# Patient Record
Sex: MALE | Race: WHITE | NOT HISPANIC OR LATINO | Employment: OTHER | URBAN - NONMETROPOLITAN AREA
[De-identification: names, ages, dates, MRNs, and addresses within clinical notes are randomized per-mention and may not be internally consistent; named-entity substitution may affect disease eponyms.]

---

## 2017-01-05 ENCOUNTER — PROCEDURE VISIT (OUTPATIENT)
Dept: UROLOGY | Facility: CLINIC | Age: 77
End: 2017-01-05

## 2017-01-05 VITALS
WEIGHT: 217 LBS | TEMPERATURE: 97.5 F | HEIGHT: 71 IN | BODY MASS INDEX: 30.38 KG/M2 | DIASTOLIC BLOOD PRESSURE: 72 MMHG | SYSTOLIC BLOOD PRESSURE: 138 MMHG

## 2017-01-05 DIAGNOSIS — N40.0 BENIGN NON-NODULAR PROSTATIC HYPERPLASIA WITHOUT LOWER URINARY TRACT SYMPTOMS: Primary | ICD-10-CM

## 2017-01-05 LAB
BILIRUB BLD-MCNC: NEGATIVE MG/DL
CLARITY, POC: CLEAR
COLOR UR: YELLOW
GLUCOSE UR STRIP-MCNC: NEGATIVE MG/DL
KETONES UR QL: ABNORMAL
LEUKOCYTE EST, POC: NEGATIVE
NITRITE UR-MCNC: NEGATIVE MG/ML
PH UR: 7 [PH] (ref 5–8)
PROT UR STRIP-MCNC: NEGATIVE MG/DL
RBC # UR STRIP: NEGATIVE /UL
SP GR UR: 1.02 (ref 1–1.03)
UROBILINOGEN UR QL: NORMAL

## 2017-01-05 PROCEDURE — 99213 OFFICE O/P EST LOW 20 MIN: CPT | Performed by: UROLOGY

## 2017-01-05 PROCEDURE — 81003 URINALYSIS AUTO W/O SCOPE: CPT | Performed by: UROLOGY

## 2017-01-05 NOTE — MR AVS SNAPSHOT
Mina Ratliff   1/5/2017 9:20 AM   Procedure visit    Dept Phone:  [phone]   Encounter #:  [id number]    Provider:  Joe Guerrero MD   Department:  [facility]OLOG[facility]                Your Full Care Plan              Your Updated Medication List          This list is accurate as of: 1/5/17 10:35 AM.  Always use your most recent med list.                aspirin  MG tablet       isosorbide mononitrate 30 MG 24 hr tablet   Commonly known as:  IMDUR   Take 1 tablet by mouth Daily.       metFORMIN 1000 MG tablet   Commonly known as:  GLUCOPHAGE       MULTIVITAMIN ADULT PO       omeprazole 20 MG capsule   Commonly known as:  priLOSEC       primidone 50 MG tablet   Commonly known as:  MYSOLINE       propranolol 40 MG tablet   Commonly known as:  INDERAL       simvastatin 40 MG tablet   Commonly known as:  ZOCOR       tamsulosin 0.4 MG capsule 24 hr capsule   Commonly known as:  FLOMAX       tiotropium 18 MCG per inhalation capsule   Commonly known as:  SPIRIVA       triamcinolone 0.1 % cream   Commonly known as:  KENALOG               We Performed the Following     POC Urinalysis Dipstick, Automated       You Were Diagnosed With        Codes Comments    Benign non-nodular prostatic hyperplasia without lower urinary tract symptoms    -  Primary ICD-10-CM: N40.0  ICD-9-CM: 600.90       Medications to be Given to You by a Medical Professional     Due       Frequency    (none) lidocaine (XYLOCAINE) 2 % jelly  As Needed      Instructions     None    Patient Instructions History      Upcoming Appointments     Visit Type Date Time Department    IN OFFICE PROCEDURE 1/5/2017  9:20 AM [facility]    FOLLOW UP 3/6/2017  2:30 PM [facility]      MyChart Signup     [facility] [employer] allows you to send messages to your doctor, view your test results, renew your prescriptions, schedule appointments, and more. To sign up, go to [employer] and click  "on the Sign Up Now link in the New User? box. Enter your [employer] Activation Code exactly as it appears below along with the last four digits of your Social Security Number and your Date of Birth () to complete the sign-up process. If you do not sign up before the expiration date, you must request a new code.    [employer] Activation Code: [email or web address]  Expires: 2017 10:34 AM    If you have questions, you can email [email or web address] or call [phone] to talk to our [employer] staff. Remember, [employer] is NOT to be used for urgent needs. For medical emergencies, dial 911.               Other Info from Your Visit           Your Appointments     Mar 06, 2017  2:30 PM CST   Follow Up with Ciro Helm MD   [facility] (--)    [address] [place] [address] [address]  [facility] [address] [phone]           Arrive 15 minutes prior to appointment.              Allergies     No Known Allergies      Reason for Visit     Benign Prostatic Hypertrophy           Vital Signs     Blood Pressure Temperature Height Weight Body Mass Index Smoking Status    138/72 97.5 °F (36.4 °C) 71\" (180.3 cm) 217 lb (98.4 kg) 30.27 kg/m2 Former Smoker      Problems and Diagnoses Noted     Benign non-nodular prostatic hyperplasia without lower urinary tract symptoms    -  Primary      Results     POC Urinalysis Dipstick, Automated      Component Value Standard Range & Units    Color Yellow Yellow, Straw, Dark Yellow, Ruth    Clarity, UA Clear Clear    Glucose, UA Negative Negative, 1000 mg/dL (3+) mg/dL    Bilirubin Negative Negative    Ketones, UA Trace Negative    Specific Gravity  1.020 1.005 - 1.030    Blood, UA Negative Negative    pH, Urine 7.0 5.0 - 8.0    Protein, POC Negative Negative mg/dL    Urobilinogen, UA Normal Normal    Leukocytes Negative Negative    Nitrite, UA Negative Negative                    "

## 2017-01-05 NOTE — LETTER
January 8, 2017     Shabana Franks MD  [address]  [place] [address]    Patient: Mina Ratliff   YOB: 1940   Date of Visit: 1/5/2017       Dear Dr. Tiny MD:    Thank you for referring Mina Ratliff to me for evaluation. Below are the relevant portions of my assessment and plan of care.    If you have questions, please do not hesitate to call me. I look forward to following Mina along with you.         Sincerely,        Joe Guerrero MD        CC: No Recipients  Joe Guerrero MD  1/8/2017 12:13 PM  Sign at close encounter    Mr. Ratliff is 76 y.o. male    CHIEF COMPLAINT: Here to follow prostate    HPI  Benign Prostatic hyperplasia  Patient was initially diagnosed with benign prostatic hyperplasia approximately5 years ago. This was identified in the context of worsening voiding symptoms. Severity of the diease would be moderate. This has been managed with Alpha blockers and 5 alpha reductase inhibitors. Alpha blockers and 5 alpha reductase inhibitors has/have not improved the symptoms to an acceptable level to the patient.  His disease has not been complicated by gross hematuria, urinary infection and bladder stones. His most bothersome symptom(s) is/are nocturia x 2, urinary frequency, weak stream.       The following portions of the patient's history were reviewed and updated as appropriate: allergies, current medications, past family history, past medical history, past social history, past surgical history and problem list.    Review of Systems   Constitutional: Negative for chills and fever.   Gastrointestinal: Negative for abdominal pain, anal bleeding and blood in stool.   Genitourinary: Negative for flank pain and hematuria.         Current Outpatient Prescriptions:   •  aspirin  MG tablet, Take 325 mg by mouth Every 6 (Six) Hours As Needed., Disp: , Rfl:   •  isosorbide mononitrate (IMDUR) 30 MG 24 hr tablet, Take 1 tablet by mouth Daily., Disp: 90 tablet,  "Rfl: 3  •  metFORMIN (GLUCOPHAGE) 1000 MG tablet, Take 1,000 mg by mouth 2 (Two) Times a Day With Meals., Disp: , Rfl:   •  Multiple Vitamins-Minerals (MULTIVITAMIN ADULT PO), Take  by mouth., Disp: , Rfl:   •  omeprazole (priLOSEC) 20 MG capsule, Take 20 mg by mouth Daily., Disp: , Rfl:   •  primidone (MYSOLINE) 50 MG tablet, Take 50 mg by mouth 4 (Four) Times a Day., Disp: , Rfl:   •  propranolol (INDERAL) 40 MG tablet, Take 40 mg by mouth 2 (Two) Times a Day., Disp: , Rfl:   •  simvastatin (ZOCOR) 40 MG tablet, Take 40 mg by mouth Every Night., Disp: , Rfl:   •  tamsulosin (FLOMAX) 0.4 MG capsule 24 hr capsule, Take 1 capsule by mouth Every Night., Disp: , Rfl:   •  tiotropium (SPIRIVA) 18 MCG per inhalation capsule, Place 1 capsule into inhaler and inhale Daily., Disp: , Rfl:   •  triamcinolone (KENALOG) 0.1 % cream, Apply topically 2 times daily. To lower leg dry skin, Disp: , Rfl:     Current Facility-Administered Medications:   •  lidocaine (XYLOCAINE) 2 % jelly, , Topical, PRN, Joe Guerrero MD    Past Medical History   Diagnosis Date   • Asthma    • CAD (coronary artery disease)    • Chest pain    • COPD (chronic obstructive pulmonary disease)    • Diabetes mellitus    • GERD (gastroesophageal reflux disease)    • HTN (hypertension)    • Hyperlipidemia    • Malaise and fatigue    • Old myocardial infarction        Past Surgical History   Procedure Laterality Date   • Cardiac catheterization         Social History     Social History   • Marital status:      Spouse name: N/A   • Number of children: N/A   • Years of education: N/A     Social History Main Topics   • Smoking status: Former Smoker     Types: Cigarettes   • Smokeless tobacco: Never Used   • Alcohol use No   • Drug use: No   • Sexual activity: Defer     Other Topics Concern   • Not on file     Social History Narrative       No family history on file.    Visit Vitals   • /72   • Temp 97.5 °F (36.4 °C)   • Ht 71\" (180.3 cm)   • Wt 217 " lb (98.4 kg)   • BMI 30.27 kg/m2       Physical Exam   Constitutional: He is oriented to person, place, and time. He appears well-developed and well-nourished.   Pulmonary/Chest: Effort normal.   Abdominal: Soft. He exhibits no distension and no mass. There is no tenderness. There is no rebound and no guarding. No hernia.   Genitourinary: Penis normal. Rectal exam shows no mass, no tenderness and anal tone normal. Enlarged: for the age of the patient. Right testis shows no mass, no swelling and no tenderness. Left testis shows no mass, no swelling and no tenderness. No hypospadias. No discharge found.   Genitourinary Comments:  The urethral meatus normal in position without evidence of stricture. Epididymis without mass or tenderness. Vas Deferens is palpably normal.Anus and perineum without mass or tenderness. The prostate is approximately 35 ml. It is Symmetric, with a Soft consistency. There are no nodules present. . The seminal vesicles are Palpably normal in size and without mass.     Neurological: He is alert and oriented to person, place, and time.   Vitals reviewed.        Results for orders placed or performed in visit on 01/05/17   POC Urinalysis Dipstick, Automated   Result Value Ref Range    Color Yellow Yellow, Straw, Dark Yellow, Ruth    Clarity, UA Clear Clear    Glucose, UA Negative Negative, 1000 mg/dL (3+) mg/dL    Bilirubin Negative Negative    Ketones, UA Trace (A) Negative    Specific Gravity  1.020 1.005 - 1.030    Blood, UA Negative Negative    pH, Urine 7.0 5.0 - 8.0    Protein, POC Negative Negative mg/dL    Urobilinogen, UA Normal Normal    Leukocytes Negative Negative    Nitrite, UA Negative Negative         Assessment and Plan  Mina was seen today for benign prostatic hypertrophy.    Diagnoses and all orders for this visit:    Benign non-nodular prostatic hyperplasia without lower urinary tract symptoms  -     POC Urinalysis Dipstick, Automated  -     lidocaine (XYLOCAINE) 2 % jelly;  Apply  topically As Needed for mild pain (1-3).    Continue tamsulosin and finasteride.  Pros and cons of GLAP were discussed.  When I last saw him he was considering green light laser of the prostate was going to discuss this with his cardiologist.  He thinks his symptoms have improved enough that he does not want to pursue surgical therapy at this time.      Joe Guerrero MD  01/08/17  12:12 PM    EMR Dragon/Transcription disclaimer:  Much of this encounter note is an electronic transcription/translation of spoken language to printed text. The electronic translation of spoken language may permit erroneous, or at times, nonsensical words or phrases to be inadvertently transcribed; although I have reviewed the note for such errors, some may still exist.       Cc:

## 2017-01-05 NOTE — PROGRESS NOTES
Mr. Ratliff is 76 y.o. male    CHIEF COMPLAINT: Here to follow prostate    HPI  Benign Prostatic hyperplasia  Patient was initially diagnosed with benign prostatic hyperplasia approximately5 years ago. This was identified in the context of worsening voiding symptoms. Severity of the diease would be moderate. This has been managed with Alpha blockers and 5 alpha reductase inhibitors. Alpha blockers and 5 alpha reductase inhibitors has/have not improved the symptoms to an acceptable level to the patient.  His disease has not been complicated by gross hematuria, urinary infection and bladder stones. His most bothersome symptom(s) is/are nocturia x 2, urinary frequency, weak stream.       The following portions of the patient's history were reviewed and updated as appropriate: allergies, current medications, past family history, past medical history, past social history, past surgical history and problem list.    Review of Systems   Constitutional: Negative for chills and fever.   Gastrointestinal: Negative for abdominal pain, anal bleeding and blood in stool.   Genitourinary: Negative for flank pain and hematuria.         Current Outpatient Prescriptions:   •  aspirin  MG tablet, Take 325 mg by mouth Every 6 (Six) Hours As Needed., Disp: , Rfl:   •  isosorbide mononitrate (IMDUR) 30 MG 24 hr tablet, Take 1 tablet by mouth Daily., Disp: 90 tablet, Rfl: 3  •  metFORMIN (GLUCOPHAGE) 1000 MG tablet, Take 1,000 mg by mouth 2 (Two) Times a Day With Meals., Disp: , Rfl:   •  Multiple Vitamins-Minerals (MULTIVITAMIN ADULT PO), Take  by mouth., Disp: , Rfl:   •  omeprazole (priLOSEC) 20 MG capsule, Take 20 mg by mouth Daily., Disp: , Rfl:   •  primidone (MYSOLINE) 50 MG tablet, Take 50 mg by mouth 4 (Four) Times a Day., Disp: , Rfl:   •  propranolol (INDERAL) 40 MG tablet, Take 40 mg by mouth 2 (Two) Times a Day., Disp: , Rfl:   •  simvastatin (ZOCOR) 40 MG tablet, Take 40 mg by mouth Every Night., Disp: , Rfl:   •   "tamsulosin (FLOMAX) 0.4 MG capsule 24 hr capsule, Take 1 capsule by mouth Every Night., Disp: , Rfl:   •  tiotropium (SPIRIVA) 18 MCG per inhalation capsule, Place 1 capsule into inhaler and inhale Daily., Disp: , Rfl:   •  triamcinolone (KENALOG) 0.1 % cream, Apply topically 2 times daily. To lower leg dry skin, Disp: , Rfl:     Current Facility-Administered Medications:   •  lidocaine (XYLOCAINE) 2 % jelly, , Topical, PRN, Joe Guerrero MD    Past Medical History   Diagnosis Date   • Asthma    • CAD (coronary artery disease)    • Chest pain    • COPD (chronic obstructive pulmonary disease)    • Diabetes mellitus    • GERD (gastroesophageal reflux disease)    • HTN (hypertension)    • Hyperlipidemia    • Malaise and fatigue    • Old myocardial infarction        Past Surgical History   Procedure Laterality Date   • Cardiac catheterization         Social History     Social History   • Marital status:      Spouse name: N/A   • Number of children: N/A   • Years of education: N/A     Social History Main Topics   • Smoking status: Former Smoker     Types: Cigarettes   • Smokeless tobacco: Never Used   • Alcohol use No   • Drug use: No   • Sexual activity: Defer     Other Topics Concern   • Not on file     Social History Narrative       No family history on file.    Visit Vitals   • /72   • Temp 97.5 °F (36.4 °C)   • Ht 71\" (180.3 cm)   • Wt 217 lb (98.4 kg)   • BMI 30.27 kg/m2       Physical Exam   Constitutional: He is oriented to person, place, and time. He appears well-developed and well-nourished.   Pulmonary/Chest: Effort normal.   Abdominal: Soft. He exhibits no distension and no mass. There is no tenderness. There is no rebound and no guarding. No hernia.   Genitourinary: Penis normal. Rectal exam shows no mass, no tenderness and anal tone normal. Enlarged: for the age of the patient. Right testis shows no mass, no swelling and no tenderness. Left testis shows no mass, no swelling and no " tenderness. No hypospadias. No discharge found.   Genitourinary Comments:  The urethral meatus normal in position without evidence of stricture. Epididymis without mass or tenderness. Vas Deferens is palpably normal.Anus and perineum without mass or tenderness. The prostate is approximately 35 ml. It is Symmetric, with a Soft consistency. There are no nodules present. . The seminal vesicles are Palpably normal in size and without mass.     Neurological: He is alert and oriented to person, place, and time.   Vitals reviewed.        Results for orders placed or performed in visit on 01/05/17   POC Urinalysis Dipstick, Automated   Result Value Ref Range    Color Yellow Yellow, Straw, Dark Yellow, Ruth    Clarity, UA Clear Clear    Glucose, UA Negative Negative, 1000 mg/dL (3+) mg/dL    Bilirubin Negative Negative    Ketones, UA Trace (A) Negative    Specific Gravity  1.020 1.005 - 1.030    Blood, UA Negative Negative    pH, Urine 7.0 5.0 - 8.0    Protein, POC Negative Negative mg/dL    Urobilinogen, UA Normal Normal    Leukocytes Negative Negative    Nitrite, UA Negative Negative         Assessment and Plan  Mina was seen today for benign prostatic hypertrophy.    Diagnoses and all orders for this visit:    Benign non-nodular prostatic hyperplasia without lower urinary tract symptoms  -     POC Urinalysis Dipstick, Automated  -     lidocaine (XYLOCAINE) 2 % jelly; Apply  topically As Needed for mild pain (1-3).    Continue tamsulosin and finasteride.  Pros and cons of GLAP were discussed.  When I last saw him he was considering green light laser of the prostate was going to discuss this with his cardiologist.  He thinks his symptoms have improved enough that he does not want to pursue surgical therapy at this time.      Joe Guerrero MD  01/08/17  12:12 PM    EMR Dragon/Transcription disclaimer:  Much of this encounter note is an electronic transcription/translation of spoken language to printed text. The  electronic translation of spoken language may permit erroneous, or at times, nonsensical words or phrases to be inadvertently transcribed; although I have reviewed the note for such errors, some may still exist.       Cc:

## 2017-01-07 DIAGNOSIS — N40.0 BENIGN PROSTATIC HYPERPLASIA WITHOUT LOWER URINARY TRACT SYMPTOMS, UNSPECIFIED MORPHOLOGY: Primary | ICD-10-CM

## 2017-01-09 RX ORDER — TAMSULOSIN HYDROCHLORIDE 0.4 MG/1
CAPSULE ORAL
Qty: 90 CAPSULE | Refills: 3 | Status: SHIPPED | OUTPATIENT
Start: 2017-01-09 | End: 2017-10-31 | Stop reason: SDUPTHER

## 2017-03-06 ENCOUNTER — OFFICE VISIT (OUTPATIENT)
Dept: CARDIOLOGY | Facility: CLINIC | Age: 77
End: 2017-03-06

## 2017-03-06 VITALS
HEIGHT: 71 IN | OXYGEN SATURATION: 99 % | HEART RATE: 78 BPM | BODY MASS INDEX: 29.12 KG/M2 | SYSTOLIC BLOOD PRESSURE: 146 MMHG | DIASTOLIC BLOOD PRESSURE: 76 MMHG | WEIGHT: 208 LBS

## 2017-03-06 DIAGNOSIS — R53.83 MALAISE AND FATIGUE: ICD-10-CM

## 2017-03-06 DIAGNOSIS — I10 ESSENTIAL HYPERTENSION: ICD-10-CM

## 2017-03-06 DIAGNOSIS — I25.119 CORONARY ARTERY DISEASE INVOLVING NATIVE CORONARY ARTERY OF NATIVE HEART WITH ANGINA PECTORIS (HCC): Primary | ICD-10-CM

## 2017-03-06 DIAGNOSIS — R53.81 MALAISE AND FATIGUE: ICD-10-CM

## 2017-03-06 DIAGNOSIS — E78.2 MIXED HYPERLIPIDEMIA: ICD-10-CM

## 2017-03-06 PROCEDURE — 99214 OFFICE O/P EST MOD 30 MIN: CPT | Performed by: INTERNAL MEDICINE

## 2017-03-06 NOTE — PROGRESS NOTES
Reason for Visit: cardiovascular follow up.    HPI:  Mina Ratliff is a 77 y.o. male is here today for follow-up.  He feels he is continuing to get worse.  He has little to no energy.  He recently had an episode of dizziness, about a week ago, lasting minutes before subsiding.  He denies any chest pain.  He feels his breathing is relatively good as well.  He take propranolol for a tremor which seems to help.      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    Patient Active Problem List   Diagnosis   • Malaise and fatigue   • Hyperlipidemia   • CAD (coronary artery disease)   • HTN (hypertension)   • Chest pain       Social History   Substance Use Topics   • Smoking status: Former Smoker     Types: Cigarettes   • Smokeless tobacco: Never Used      Comment: Quit about 1989   • Alcohol use No       Family History   Problem Relation Age of Onset   • Coronary artery disease Other        The following portions of the patient's history were reviewed and updated as appropriate: allergies, current medications, past family history, past medical history, past social history, past surgical history and problem list.      Current Outpatient Prescriptions:   •  aspirin  MG tablet, Take 325 mg by mouth Every 6 (Six) Hours As Needed., Disp: , Rfl:   •  FINASTERIDE PO, Take 4 mg by mouth Daily., Disp: , Rfl:   •  fluticasone-salmeterol (ADVAIR) 100-50 MCG/DOSE DISKUS, Inhale 2 (Two) Times a Day., Disp: , Rfl:   •  isosorbide mononitrate (IMDUR) 30 MG 24 hr tablet, Take 1 tablet by mouth Daily., Disp: 90 tablet, Rfl: 3  •  metFORMIN (GLUCOPHAGE) 1000 MG tablet, Take 1,000 mg by mouth 2 (Two) Times a Day With Meals., Disp: , Rfl:   •  Multiple Vitamins-Minerals (MULTIVITAMIN ADULT PO), Take  by mouth., Disp: , Rfl:   •  omeprazole (priLOSEC) 20 MG capsule, Take  "20 mg by mouth Daily., Disp: , Rfl:   •  primidone (MYSOLINE) 50 MG tablet, Take 50 mg by mouth 4 (Four) Times a Day., Disp: , Rfl:   •  propranolol (INDERAL) 40 MG tablet, Take 40 mg by mouth 2 (Two) Times a Day., Disp: , Rfl:   •  simvastatin (ZOCOR) 40 MG tablet, Take 40 mg by mouth Every Night., Disp: , Rfl:   •  tamsulosin (FLOMAX) 0.4 MG capsule 24 hr capsule, TAKE 1 CAPSULE TWICE A DAY, Disp: 90 capsule, Rfl: 3  •  tiotropium (SPIRIVA) 18 MCG per inhalation capsule, Place 1 capsule into inhaler and inhale Daily., Disp: , Rfl:   •  triamcinolone (KENALOG) 0.1 % cream, Apply topically 2 times daily. To lower leg dry skin, Disp: , Rfl:     Current Facility-Administered Medications:   •  lidocaine (XYLOCAINE) 2 % jelly, , Topical, PRN, Joe Guerrero MD    Review of Systems   Constitution: Positive for weakness and malaise/fatigue. Negative for chills and fever.   Cardiovascular: Negative for chest pain and paroxysmal nocturnal dyspnea.   Respiratory: Negative for cough and shortness of breath.    Skin: Negative for rash.   Musculoskeletal: Positive for back pain.   Gastrointestinal: Negative for abdominal pain and heartburn.   Neurological: Negative for dizziness and numbness.       Objective   Visit Vitals   • /76 (BP Location: Right arm, Patient Position: Sitting, Cuff Size: Adult)   • Pulse 78   • Ht 71\" (180.3 cm)   • Wt 208 lb (94.3 kg)   • SpO2 99%   • BMI 29.01 kg/m2     Physical Exam   Constitutional: He is oriented to person, place, and time. He appears well-developed and well-nourished.   HENT:   Head: Normocephalic and atraumatic.   Cardiovascular: Normal rate, regular rhythm and normal heart sounds.    No murmur heard.  Pulmonary/Chest: Effort normal and breath sounds normal.   Musculoskeletal: He exhibits no edema.   Neurological: He is alert and oriented to person, place, and time.   Skin: Skin is warm and dry.   Psychiatric: He has a normal mood and affect.     Procedures      ICD-10-CM " ICD-9-CM   1. Coronary artery disease involving native coronary artery of native heart with angina pectoris I25.119 414.01     413.9   2. Essential hypertension I10 401.9   3. Mixed hyperlipidemia E78.2 272.2   4. Malaise and fatigue R53.81 780.79    R53.83          Assessment/Plan:  1.  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  Currently managed aspirin, Imdur, propranolol, and simvastatin.  He denies any chest pain.      2.  Essential hypertension: Systolic blood pressure is elevated at 146 mmHg.  Will continue to monitor.    3.  Hyperlipidemia: Managed on simvastatin.    4.  Fatigue:  Unclear etiology, physical deconditioning and sedentary lifestyle may be playing a role.  Consider holding propranolol.

## 2017-03-28 ENCOUNTER — NURSE ONLY (OUTPATIENT)
Dept: PRIMARY CARE CLINIC | Age: 77
End: 2017-03-28
Payer: MEDICARE

## 2017-03-28 DIAGNOSIS — Z23 NEED FOR 23-POLYVALENT PNEUMOCOCCAL POLYSACCHARIDE VACCINE: Primary | ICD-10-CM

## 2017-03-28 PROCEDURE — G0009 ADMIN PNEUMOCOCCAL VACCINE: HCPCS | Performed by: FAMILY MEDICINE

## 2017-03-28 PROCEDURE — 90732 PPSV23 VACC 2 YRS+ SUBQ/IM: CPT | Performed by: FAMILY MEDICINE

## 2017-04-17 RX ORDER — OMEPRAZOLE 20 MG/1
CAPSULE, DELAYED RELEASE ORAL
Qty: 180 CAPSULE | Refills: 2 | Status: SHIPPED | OUTPATIENT
Start: 2017-04-17 | End: 2018-01-15 | Stop reason: SDUPTHER

## 2017-04-17 RX ORDER — SIMVASTATIN 40 MG
TABLET ORAL
Qty: 90 TABLET | Refills: 3 | Status: SHIPPED | OUTPATIENT
Start: 2017-04-17 | End: 2018-03-20 | Stop reason: SDUPTHER

## 2017-04-17 RX ORDER — PROPRANOLOL HCL 60 MG
CAPSULE, EXTENDED RELEASE 24HR ORAL
Qty: 90 CAPSULE | Refills: 2 | Status: SHIPPED | OUTPATIENT
Start: 2017-04-17 | End: 2018-01-15 | Stop reason: SDUPTHER

## 2017-04-24 ENCOUNTER — TELEPHONE (OUTPATIENT)
Dept: PRIMARY CARE CLINIC | Age: 77
End: 2017-04-24

## 2017-04-24 RX ORDER — PREDNISONE 10 MG/1
TABLET ORAL
Qty: 15 TABLET | Refills: 0 | Status: SHIPPED | OUTPATIENT
Start: 2017-04-24 | End: 2017-11-03 | Stop reason: SDUPTHER

## 2017-04-24 RX ORDER — AMOXICILLIN AND CLAVULANATE POTASSIUM 875; 125 MG/1; MG/1
TABLET, FILM COATED ORAL
Qty: 20 TABLET | Refills: 0 | Status: SHIPPED | OUTPATIENT
Start: 2017-04-24 | End: 2018-05-04 | Stop reason: CLARIF

## 2017-07-05 DIAGNOSIS — J44.9 CHRONIC OBSTRUCTIVE PULMONARY DISEASE, UNSPECIFIED COPD TYPE (HCC): Primary | ICD-10-CM

## 2017-09-11 ENCOUNTER — OFFICE VISIT (OUTPATIENT)
Dept: CARDIOLOGY | Facility: CLINIC | Age: 77
End: 2017-09-11

## 2017-09-11 VITALS
WEIGHT: 202 LBS | OXYGEN SATURATION: 90 % | HEART RATE: 98 BPM | HEIGHT: 71 IN | DIASTOLIC BLOOD PRESSURE: 60 MMHG | BODY MASS INDEX: 28.28 KG/M2 | SYSTOLIC BLOOD PRESSURE: 160 MMHG

## 2017-09-11 DIAGNOSIS — I10 ESSENTIAL HYPERTENSION: ICD-10-CM

## 2017-09-11 DIAGNOSIS — E78.2 MIXED HYPERLIPIDEMIA: ICD-10-CM

## 2017-09-11 DIAGNOSIS — R60.0 LOCALIZED EDEMA: ICD-10-CM

## 2017-09-11 DIAGNOSIS — I25.10 CORONARY ARTERY DISEASE INVOLVING NATIVE CORONARY ARTERY OF NATIVE HEART WITHOUT ANGINA PECTORIS: Primary | ICD-10-CM

## 2017-09-11 PROCEDURE — 93000 ELECTROCARDIOGRAM COMPLETE: CPT | Performed by: INTERNAL MEDICINE

## 2017-09-11 PROCEDURE — 99214 OFFICE O/P EST MOD 30 MIN: CPT | Performed by: INTERNAL MEDICINE

## 2017-09-11 RX ORDER — ISOSORBIDE MONONITRATE 30 MG/1
30 TABLET, EXTENDED RELEASE ORAL DAILY
Qty: 90 TABLET | Refills: 3 | Status: SHIPPED | OUTPATIENT
Start: 2017-09-11 | End: 2018-09-26 | Stop reason: SDUPTHER

## 2017-09-11 RX ORDER — FUROSEMIDE 40 MG/1
40 TABLET ORAL DAILY
COMMUNITY

## 2017-09-11 NOTE — PROGRESS NOTES
Reason for Visit: cardiovascular follow up.    HPI:  Mina Ratliff is a 77 y.o. male is here today for follow-up.  He continues not to have any energy.  He gets wore out easily.  Has been having swelling in his right ankle.  This has been present over the past week.  Has not been checking his blood pressure at home.  He denies any chest pain.       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    Patient Active Problem List   Diagnosis   • Malaise and fatigue   • Hyperlipidemia   • CAD (coronary artery disease)   • HTN (hypertension)   • Chest pain       Social History   Substance Use Topics   • Smoking status: Former Smoker     Types: Cigarettes   • Smokeless tobacco: Never Used      Comment: Quit about 1989   • Alcohol use No       Family History   Problem Relation Age of Onset   • Coronary artery disease Other        The following portions of the patient's history were reviewed and updated as appropriate: allergies, current medications, past family history, past medical history, past social history, past surgical history and problem list.      Current Outpatient Prescriptions:   •  aspirin  MG tablet, Take 325 mg by mouth Every 6 (Six) Hours As Needed., Disp: , Rfl:   •  fluticasone-salmeterol (ADVAIR) 100-50 MCG/DOSE DISKUS, Inhale 2 (Two) Times a Day., Disp: , Rfl:   •  furosemide (LASIX) 40 MG tablet, Take 40 mg by mouth 2 (Two) Times a Day., Disp: , Rfl:   •  isosorbide mononitrate (IMDUR) 30 MG 24 hr tablet, Take 1 tablet by mouth Daily., Disp: 90 tablet, Rfl: 3  •  metFORMIN (GLUCOPHAGE) 1000 MG tablet, Take 1,000 mg by mouth 2 (Two) Times a Day With Meals., Disp: , Rfl:   •  Multiple Vitamins-Minerals (MULTIVITAMIN ADULT PO), Take  by mouth., Disp: , Rfl:   •  omeprazole (priLOSEC) 20 MG capsule, Take 20 mg by mouth Daily., Disp: ,  "Rfl:   •  primidone (MYSOLINE) 50 MG tablet, Take 50 mg by mouth 4 (Four) Times a Day., Disp: , Rfl:   •  propranolol (INDERAL) 40 MG tablet, Take 40 mg by mouth 2 (Two) Times a Day., Disp: , Rfl:   •  simvastatin (ZOCOR) 40 MG tablet, Take 40 mg by mouth Every Night., Disp: , Rfl:   •  tamsulosin (FLOMAX) 0.4 MG capsule 24 hr capsule, TAKE 1 CAPSULE TWICE A DAY, Disp: 90 capsule, Rfl: 3  •  tiotropium (SPIRIVA) 18 MCG per inhalation capsule, Place 1 capsule into inhaler and inhale Daily., Disp: , Rfl:   •  triamcinolone (KENALOG) 0.1 % cream, Apply topically 2 times daily. To lower leg dry skin, Disp: , Rfl:     Current Facility-Administered Medications:   •  lidocaine (XYLOCAINE) 2 % jelly, , Topical, PRN, Joe Guerrero MD    Review of Systems   Constitution: Positive for malaise/fatigue. Negative for chills and fever.   Cardiovascular: Negative for chest pain and paroxysmal nocturnal dyspnea.   Respiratory: Negative for cough and shortness of breath.    Skin: Negative for rash.   Gastrointestinal: Negative for abdominal pain and heartburn.   Neurological: Negative for dizziness and numbness.       Objective   /60 (BP Location: Left arm, Patient Position: Sitting, Cuff Size: Adult)  Pulse 98  Ht 71\" (180.3 cm)  Wt 202 lb (91.6 kg)  SpO2 90%  BMI 28.17 kg/m2  Physical Exam   Constitutional: He is oriented to person, place, and time. He appears well-developed and well-nourished.   HENT:   Head: Normocephalic and atraumatic.   Cardiovascular: Normal rate, regular rhythm and normal heart sounds.    No murmur heard.  Pulmonary/Chest: Effort normal and breath sounds normal.   Musculoskeletal: He exhibits edema (mild right lower extremity).   Neurological: He is alert and oriented to person, place, and time.   Skin: Skin is warm and dry.   Psychiatric: He has a normal mood and affect.       ECG 12 Lead  Date/Time: 9/11/2017 2:17 PM  Performed by: GRAYSON SHERMAN  Authorized by: GRAYSON SHERMAN   Comparison: " compared with previous ECG from 11/7/2016  Similar to previous ECG  Rhythm: sinus rhythm  Ectopy: atrial premature contractions  Other findings comments: Poor R wave progression                ICD-10-CM ICD-9-CM   1. Coronary artery disease involving native coronary artery of native heart without angina pectoris I25.10 414.01   2. Essential hypertension I10 401.9   3. Mixed hyperlipidemia E78.2 272.2   4. Localized edema R60.0 782.3         Assessment/Plan:  1. 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  Continue current aspirin, Imdur, propranolol, and simvastatin.  He denies any chest pain.       2.  Essential hypertension: Systolic blood pressure remains elevated.  Encourage him to keep log and bring to next visit.  Will check BMP given potassium replacement and Lasix.       3.  Hyperlipidemia: Continue simvastatin.     4.  Edema: Right lower extremity.  Will check a venous ultrasound to evaluate for any possible clot.

## 2017-09-21 ENCOUNTER — HOSPITAL ENCOUNTER (OUTPATIENT)
Dept: ULTRASOUND IMAGING | Facility: HOSPITAL | Age: 77
Discharge: HOME | End: 2017-09-21
Attending: INTERNAL MEDICINE | Admitting: INTERNAL MEDICINE

## 2017-09-21 PROCEDURE — 93971 EXTREMITY STUDY: CPT

## 2017-09-21 PROCEDURE — 93971 EXTREMITY STUDY: CPT | Performed by: SURGERY

## 2017-10-31 DIAGNOSIS — N40.0 BENIGN PROSTATIC HYPERPLASIA WITHOUT LOWER URINARY TRACT SYMPTOMS: ICD-10-CM

## 2017-10-31 RX ORDER — TAMSULOSIN HYDROCHLORIDE 0.4 MG/1
CAPSULE ORAL
Qty: 90 CAPSULE | Refills: 3 | Status: SHIPPED | OUTPATIENT
Start: 2017-10-31 | End: 2018-05-21

## 2017-11-03 ENCOUNTER — OFFICE VISIT (OUTPATIENT)
Dept: PRIMARY CARE CLINIC | Age: 77
End: 2017-11-03
Payer: MEDICARE

## 2017-11-03 VITALS
TEMPERATURE: 97.4 F | BODY MASS INDEX: 28.7 KG/M2 | SYSTOLIC BLOOD PRESSURE: 135 MMHG | RESPIRATION RATE: 18 BRPM | HEIGHT: 71 IN | OXYGEN SATURATION: 98 % | HEART RATE: 87 BPM | DIASTOLIC BLOOD PRESSURE: 71 MMHG | WEIGHT: 205 LBS

## 2017-11-03 DIAGNOSIS — E11.9 TYPE 2 DIABETES MELLITUS WITHOUT COMPLICATION, WITHOUT LONG-TERM CURRENT USE OF INSULIN (HCC): ICD-10-CM

## 2017-11-03 DIAGNOSIS — L82.0 SEBORRHEIC KERATOSES, INFLAMED: ICD-10-CM

## 2017-11-03 DIAGNOSIS — R06.09 DYSPNEA ON EXERTION: ICD-10-CM

## 2017-11-03 DIAGNOSIS — Z23 NEED FOR INFLUENZA VACCINATION: ICD-10-CM

## 2017-11-03 DIAGNOSIS — Z00.00 ROUTINE GENERAL MEDICAL EXAMINATION AT A HEALTH CARE FACILITY: Primary | ICD-10-CM

## 2017-11-03 PROCEDURE — 1036F TOBACCO NON-USER: CPT | Performed by: FAMILY MEDICINE

## 2017-11-03 PROCEDURE — G8484 FLU IMMUNIZE NO ADMIN: HCPCS | Performed by: FAMILY MEDICINE

## 2017-11-03 PROCEDURE — 4040F PNEUMOC VAC/ADMIN/RCVD: CPT | Performed by: FAMILY MEDICINE

## 2017-11-03 PROCEDURE — 1123F ACP DISCUSS/DSCN MKR DOCD: CPT | Performed by: FAMILY MEDICINE

## 2017-11-03 PROCEDURE — 99213 OFFICE O/P EST LOW 20 MIN: CPT | Performed by: FAMILY MEDICINE

## 2017-11-03 PROCEDURE — G8419 CALC BMI OUT NRM PARAM NOF/U: HCPCS | Performed by: FAMILY MEDICINE

## 2017-11-03 PROCEDURE — G0439 PPPS, SUBSEQ VISIT: HCPCS | Performed by: FAMILY MEDICINE

## 2017-11-03 PROCEDURE — 90662 IIV NO PRSV INCREASED AG IM: CPT | Performed by: FAMILY MEDICINE

## 2017-11-03 PROCEDURE — G8427 DOCREV CUR MEDS BY ELIG CLIN: HCPCS | Performed by: FAMILY MEDICINE

## 2017-11-03 PROCEDURE — G0008 ADMIN INFLUENZA VIRUS VAC: HCPCS | Performed by: FAMILY MEDICINE

## 2017-11-03 RX ORDER — PRIMIDONE 250 MG/1
TABLET ORAL
Qty: 180 TABLET | Refills: 3 | Status: SHIPPED | OUTPATIENT
Start: 2017-11-03 | End: 2017-11-29 | Stop reason: DRUGHIGH

## 2017-11-03 ASSESSMENT — PATIENT HEALTH QUESTIONNAIRE - PHQ9: SUM OF ALL RESPONSES TO PHQ QUESTIONS 1-9: 0

## 2017-11-03 ASSESSMENT — LIFESTYLE VARIABLES: HOW OFTEN DO YOU HAVE A DRINK CONTAINING ALCOHOL: 0

## 2017-11-03 ASSESSMENT — ANXIETY QUESTIONNAIRES: GAD7 TOTAL SCORE: 0

## 2017-11-03 NOTE — PROGRESS NOTES
Medicare Annual Wellness Visit  Name: Mitali Potter Date: 11/3/2017   MRN: [id number] Sex: Male   Age: 65 y.o. Ethnicity: Non-/Non    : 1940 Race: Brian Johnston is here for Medicare AWV (yearly)    Screenings for behavioral, psychosocial and functional/safety risks, and cognitive dysfunction are all negative except as indicated below. These results, as well as other patient data from the [address] form, are documented in Flowsheets linked to this Encounter. No Known Allergies  Prior to Visit Medications    Medication Sig Taking? Authorizing Provider   metFORMIN (GLUCOPHAGE) 1000 MG tablet TAKE 1 TABLET TWICE A DAY FOR DIABETES Yes Marit Leyden, MD   propranolol (INDERAL LA) 60 MG extended release capsule TAKE 1 CAPSULE DAILY FOR TREMOR (REPLACES 40 MG INDERAL) Yes Marit Leyden, MD   simvastatin (ZOCOR) 40 MG tablet 1 tab po q HS for cholesterol Yes Marit Leyden, MD   furosemide (LASIX) 40 MG tablet 1 tablet by mouth once a day for fluid Yes Marit Leyden, MD   primidone (MYSOLINE) 250 MG tablet Take 250 mg by mouth nightly Yes Historical Provider, MD   KLOR-CON M10 10 MEQ extended release tablet TAKE 1 TABLET TWICE A DAY Yes Marit Leyden, MD   metoprolol (TOPROL XL) 50 MG XL tablet 1 tablet by mouth once a day for high blood pressure Yes Marit Leyden, MD   triamcinolone (KENALOG) 0.1 % cream Apply topically 2 times daily. To lower leg dry skin Yes Marit Leyden, MD   tamsulosin (FLOMAX) 0.4 MG capsule Take 1 capsule by mouth daily. Yes Marit Leyden, MD   aspirin 325 MG tablet Take 325 mg by mouth daily.    Yes Historical Provider, MD   predniSONE (DELTASONE) 10 MG tablet 2 tablets by mouth once a day for 3 days then one tablet once a day for 7 days  Marit Leyden, MD   amoxicillin-clavulanate (AUGMENTIN) 875-125 MG per tablet 1 tablet by mouth with food twice a day for 10 days  Marit Leyden, MD   omeprazole (PRILOSEC) 20 MG delayed release capsule TAKE 1 CAPSULE TWICE A DAY  Lenard Barthel, MD   levofloxacin (LEVAQUIN) 500 MG tablet 1 tab po q day for infection  Lenard Barthel, MD   amoxicillin (AMOXIL) 875 MG tablet 1 tablet by mouth twice a day when needed for flareup of bronchitis  Lenard Barthel, MD   fluticasone-salmeterol (ADVAIR DISKUS) 250-50 MCG/DOSE AEPB Inhale 1 puff into the lungs every 12 hours. Lenard Barthel, MD   tiotropium (SPIRIVA HANDIHALER) 18 MCG inhalation capsule Inhale 1 capsule into the lungs daily. Lenard Barthel, MD   potassium chloride (K-DUR) 10 MEQ tablet TAKE 1 TABLET TWICE A DAY  Lenard Barthel, MD     Past Medical History:   Diagnosis Date    Allergic rhinitis     Bronchitis, chronic (HCC)     Carotid artery stenosis     GERD (gastroesophageal reflux disease)     Hypercholesterolemia     Type II or unspecified type diabetes mellitus without mention of complication, not stated as uncontrolled      Past Surgical History:   Procedure Laterality Date    CARDIAC SURGERY       Family History   Problem Relation Age of Onset    Diabetes Mother     High Blood Pressure Mother     Cancer Father        CareTeam (Including outside providers/suppliers regularly involved in providing care):   Patient Care Team:  Lenard Barthel, MD as PCP - General (Family Medicine)  Lenard Barthel, MD as PCP - [facility] Attributed Provider    Wt Readings from Last 3 Encounters:   11/03/17 205 lb (93 kg)   10/31/16 205 lb (93 kg)   03/23/16 210 lb 4 oz (95.4 kg)     Vitals:    11/03/17 1407   BP: 135/71   Site: Left Arm   Position: Sitting   Cuff Size: Medium Adult   Pulse: 87   Resp: 18   Temp: 97.4 °F (36.3 °C)   TempSrc: Temporal   SpO2: 98%   Weight: 205 lb (93 kg)   Height: 5' 11\" (1.803 m)           The following problems were reviewed today and where indicated follow up appointments were made and/or referrals ordered.     Risk Factor Screenings with Interventions:   Fall Risk:  Timed Up and Go Test > 12 seconds?: no  2 or more falls in past year?: (!) yes  Fall with injury in past year?: no  Fall Risk Interventions:    · Home safety tips provided    Depression:  PHQ-2 Score: 0  Depression Interventions:  · None indicated    Anxiety:  Anxiety Score: 0  Anxiety Interventions:  · None indicated    Cognitive: Words recalled: 3  Clock Drawing Test (CDT) Score: Normal  Cognitive Impairment Interventions:  · None indicated    Substance Abuse:  Social History     Social History Main Topics    Smoking status: Former Smoker    Smokeless tobacco: Never Used    Alcohol use No    Drug use: No    Sexual activity: Not on file     Audit Questionnaire: Screen for Alcohol Misuse  How often do you have a drink containing alcohol?: Never  Substance Abuse Interventions:  · None indicated    Health Risk Assessment:   General  In general, how would you say your health is?: Good  In the past 7 days, have you experienced any of the following?: None of These  Do you get the social and emotional support that you need?: Yes  Do you have a Living Will?: (!) No  General Health Risk Interventions:  · No Living Will: additional information provided    Health Habits/Nutrition  Do you exercise for at least 20 minutes 2-3 times per week?: (!) No  Have you lost any weight without trying in the past 3 months?: (!) Yes  Do you eat fewer than 2 meals per day?: (!) Yes  Have you seen a dentist within the past year?: Yes  Body mass index is 28.59 kg/m². Health Habits/Nutrition Interventions:  · Patient is doing well in regards to his weight. We are monitoring it. It is difficult for him to exercise because of his lung problems.     Hearing/Vision  Do you or your family notice any trouble with your hearing?: (!) Yes (does have hearing aids)  Do you have difficulty driving, watching TV, or doing any of your daily activities because of your eyesight?: No  Have you had an eye exam within the past year?: Yes  Hearing/Vision Interventions:  · Hearing concerns:  Patient will call if he needs a referral to the audiologist.    Safety  Do you have working smoke detectors?: Yes  Have all throw rugs been removed or fastened?: Yes  Do you have non-slip mats in all bathtubs?: (!) No  Do all of your stairways have a railing or banister?: (!) No  Are your doorways, halls and stairs free of clutter?: (!) No  Do you always fasten your seatbelt when you are in a car?: Yes  Safety Interventions:  · Home safety tips provided    ADLs  In the past 7 days, did you need help from others to perform any of the following everyday activities?: None  In the past 7 days, did you need help from others to take care of any of the following?: None  ADL Interventions:  · None indicated    Personalized Preventive Plan   Current Health Maintenance Status  Immunization History   Administered Date(s) Administered    Influenza Whole 10/08/2015    Influenza, High Dose 08/07/2015    Pneumococcal 13-valent Conjugate ([id number]) 10/08/2015    Pneumococcal Polysaccharide ([id number]) 03/28/2017        Health Maintenance   Topic Date Due    DTaP/Tdap/Td vaccine (1 - Tdap) 02/20/1959    Flu vaccine (1) 09/01/2017    Lipid screen  03/23/2021    Zostavax vaccine  Addressed    Pneumococcal low/med risk  Completed     Recommendations for Preventive Services Due: see orders.   Recommended screening schedule for the next 5-10 years is provided to the patient in written form: see Patient Instructions/AVS.

## 2017-11-03 NOTE — PATIENT INSTRUCTIONS
make even the most youthful senior more prone to accidents. Falls are one of the leading health risks for older people. This increased risk of falling is related to:   Aging process (eg, decreased muscle strength, slowed reflexes)   Higher incidence of chronic health problems (eg, arthritis, diabetes) that may limit mobility, agility or sensory awareness   Side effects of medicine (eg, dizziness, blurred vision)especially medicines like prescription pain medicines and drugs used to treat mental health conditions   Depending on the brittleness of your bones, the consequences of a fall can be serious and long lasting. Home Life   Research by the Association of Aging [facility]) shows that some home accidents among older adults can be prevented by making simple lifestyle changes and basic modifications and repairs to the home environment. Here are some lifestyle changes that experts recommend:   Have your hearing and vision checked regularly. Be sure to wear prescription glasses that are right for you. Speak to your doctor or pharmacist about the possible side effects of your medicines. A number of medicines can cause dizziness. If you have problems with sleep, talk to your doctor. Limit your intake of alcohol. If necessary, use a cane or walker to help maintain your balance. Wear supportive, rubber-soled shoes, even at home. If you live in a region that gets wintry weather, you may want to put special cleats on your shoes to prevent you from slipping on the snow and ice. Exercise regularly to help maintain muscle tone, agility, and balance. Always hold the banister when going up or down stairs. Also, use  bars when getting in or out of the bath or shower, or using the toilet. To avoid dizziness, get up slowly from a lying down position. Sit up first, dangling your legs for a minute or two before rising to a standing position.    Overall Home Safety Check   According to the Consumer Product Safety Commision's \"Older Consumer Home Safety Checklist,\" it is important to check for potential hazards in each room. And remember, proper lighting is an essential factor in home safety. If you cannot see clearly, you are more likely to fall. Important questions to ask yourself include:   Are lamp, electric, extension, and telephone cords placed out of the flow of traffic and maintained in good condition? Have frayed cords been replaced? Are all small rugs and runners slip resistant? If not, you can secure them to the floor with a special double-sided carpet tape. Are smoke detectors properly locatedone on every floor of your home and one outside of every sleeping area? Are they in good working order? Are batteries replaced at least once a year? Do you have a well-maintained carbon monoxide detector outside every sleeping are in your home? Does your furniture layout leave plenty of space to maneuver between and around chairs, tables, beds, and sofas? Are hallways, stairs and passages between rooms well lit? Can you reach a lamp without getting out of bed? Are floor surfaces well maintained? Shag rugs, high-pile carpeting, tile floors, and polished wood floors can be particularly slippery. Stairs should always have handrails and be carpeted or fitted with a non-skid tread. Is your telephone easily reachable. Is the cord safely tucked away? Room by Room   According to the Association of Aging, bathrooms and mary are the two most potentially hazardous rooms in your home. In the Kitchen    Be sure your stove is in proper working order and always make sure burners and the oven are off before you go out or go to sleep. Keep pots on the back burners, turn handles away from the front of the stove, and keep stove clean and free of grease build-up. Kitchen ventilation systems and range exhausts should be working properly.     Keep flammable objects such as towels and pot holders away from the cooking area except when in use. Make sure kitchen curtains are tied back. Move cords and appliances away from the sink and hot surfaces. If extension cords are needed, install wiring guides so they do not hang over the sink, range, or working areas. Look for coffee pots, kettles and toaster ovens with automatic shut-offs. Keep a mop handy in the kitchen so you can wipe up spills instantly. You should also have a small fire extinguisher. Arrange your kitchen with frequently used items on lower shelves to avoid the need to stand on a stepstool to reach them. Make sure countertops are well-lit to avoid injuries while cutting and preparing food. In the Bathroom    Use a non-slip mat or decals in the tub and shower, since wet, soapy tile or porcelain surfaces are extremely slippery. Make sure bathroom rugs are non-skid or tape them firmly to the floor. Bathtubs should have at least one, preferably two, grab bars, firmly attached to structural supports in the wall. (Do not use built-in soap holders or glass shower doors as grab bars.)    Tub seats fitted with non-slip material on the legs allow you to wash sitting down. For people with limited mobility, bathtub transfer benches allow you to slide safely into the tub. Raised toilet seats and toilet safety rails are helpful for those with knee or hip problems. In the [facility]    Make sure you use a nightlight and that the area around your bed is clear of potential obstacles. Be careful with electric blankets and never go to sleep with a heating pad, which can cause serious burns even if on a low setting. Use fire-resistant mattress covers and pillows, and NEVER smoke in bed. Keep a phone next to the bed that is programmed to dial 911 at the push of a button. If you have a chronic condition, you may want to sign on with an automatic call-in service.  Typically the system includes a small pendant that connects directly to an emergency medical voice-response system. You should also make arrangements to stay in contact with someonefriend, neighbor, family memberon a regular schedule. Fire Prevention   According to the [employer]. (Smoke Alarms for Every) [facility], senior citizens are one of the two highest risk groups for death and serious injuries due to residential fires. When cooking, wear short-sleeved items, never a bulky long-sleeved robe. The [facility]'s Safety Checklist for Older Consumers emphasizes the importance of checking basements, garages, workshops and storage areas for fire hazards, such as volatile liquids, piles of old rags or clothing and overloaded circuits. Never smoke in bed or when lying down on a couch or recliner chair. Small portable electric or kerosene heaters are responsible for many home fires and should be used cautiously if at all. If you do use one, be sure to keep them away from flammable materials. In case of fire, make sure you have a pre-established emergency exit plan. Have a professional check your fireplace and other fuel-burning appliances yearly. [employer]   Baby boomers entering the burgess years will continue to see the development of new products to help older adults live safely and independently in spite of age-related changes. Making Life More Livable  , by Elzbieta Ramirez, lists over 1,000 products for \"living well in the mature years,\" such as bathing and mobility aids, household security devices, ergonomically designed knives and peelers, and faucet valves and knobs for temperature control. Medical supply stores and organizations are good sources of information about products that improve your quality of life and insure your safety. Last Reviewed: November 2009 Lisa Stoner MD   Updated: 3/7/2011     ·        Learning About Living Sol Soulier  What is a living will? A living will is a legal form you use to write down the kind of care you want at the end of your life.  It is online registry. This is a place where you can store your living will online so the doctors and nurses who need to treat you can find it right away. What should you think about when creating a living will? Talk about your end-of-life wishes with your family members and your doctor. Let them know what you want. That way the people making decisions for you won't be surprised by your choices. Think about these questions as you make your living will:  Do you know enough about life support methods that might be used? If not, talk to your doctor so you know what might be done if you can't breathe on your own, your heart stops, or you're unable to swallow. What things would you still want to be able to do after you receive life-support methods? Would you want to be able to walk? To speak? To eat on your own? To live without the help of machines? If you have a choice, where do you want to be cared for? In your home? At a hospital or nursing home? Do you want certain [facility] practices performed if you become very ill? If you have a choice at the end of your life, where would you prefer to die? At home? In a hospital or nursing home? Somewhere else? Would you prefer to be buried or cremated? Do you want your organs to be donated after you die? What should you do with your living will? Make sure that your family members and your health care agent have copies of your living will. Give your doctor a copy of your living will to keep in your medical record. If you have more than one doctor, make sure that each one has a copy. You may want to put a copy of your living will where it can be easily found. Where can you learn more? Go to https://[email or web address] org and sign in to your [employer] account. Enter P258 in the [employer] box to learn more about \"Learning About Living [place]. \"     If you do not have an account, please click on the \"Sign Up Now\" link.   Current as of: August 8, 2016  Content Version: 11.3  © 4470-8585 [employer]. Care instructions adapted under license by [facility] ([facility]). If you have questions about a medical condition or this instruction, always ask your healthcare professional. [address] any warranty or liability for your use of this information.   ·

## 2017-11-04 ASSESSMENT — ENCOUNTER SYMPTOMS
SHORTNESS OF BREATH: 1
COUGH: 0
WHEEZING: 0

## 2017-11-04 NOTE — PROGRESS NOTES
recheck. PATIENT INSTRUCTIONS:  Patient Instructions     Personalized Preventive Plan for Andie Esquivel - 11/3/2017  Medicare offers a range of preventive health benefits. Some of the tests and screenings are paid in full while other may be subject to a deductible, co-insurance, and/or copay. Some of these benefits include a comprehensive review of your medical history including lifestyle, illnesses that may run in your family, and various assessments and screenings as appropriate. After reviewing your medical record and screening and assessments performed today your provider may have ordered immunizations, labs, imaging, and/or referrals for you. A list of these orders (if applicable) as well as your Preventive Care list are included within your After Visit Summary for your review. Other Preventive Recommendations:    · A preventive eye exam performed by an eye specialist is recommended every 1-2 years to screen for glaucoma; cataracts, macular degeneration, and other eye disorders. · A preventive dental visit is recommended every 6 months. · Try to get at least 150 minutes of exercise per week or 10,000 steps per day on a pedometer . · Order or download the FREE \"Exercise & Physical Activity: Your Everyday Guide\" from [employer]. Call 5-759.278.5630 or search [employer] online. · You need 1321-4978 mg of calcium and 5263-0983 IU of vitamin D per day. It is possible to meet your calcium requirement with diet alone, but a vitamin D supplement is usually necessary to meet this goal.  · When exposed to the sun, use a sunscreen that protects against both UVA and UVB radiation with an SPF of 30 or greater. Reapply every 2 to 3 hours or after sweating, drying off with a towel, or swimming. · Always wear a seat belt when traveling in a car. Always wear a helmet when riding a bicycle or motorcycle.     Keeping Home a [facility]       As we get older, changes objects such as towels and pot holders away from the cooking area except when in use. Make sure kitchen curtains are tied back. Move cords and appliances away from the sink and hot surfaces. If extension cords are needed, install wiring guides so they do not hang over the sink, range, or working areas. Look for coffee pots, kettles and toaster ovens with automatic shut-offs. Keep a mop handy in the kitchen so you can wipe up spills instantly. You should also have a small fire extinguisher. Arrange your kitchen with frequently used items on lower shelves to avoid the need to stand on a stepstool to reach them. Make sure countertops are well-lit to avoid injuries while cutting and preparing food. In the Bathroom    Use a non-slip mat or decals in the tub and shower, since wet, soapy tile or porcelain surfaces are extremely slippery. Make sure bathroom rugs are non-skid or tape them firmly to the floor. Bathtubs should have at least one, preferably two, grab bars, firmly attached to structural supports in the wall. (Do not use built-in soap holders or glass shower doors as grab bars.)    Tub seats fitted with non-slip material on the legs allow you to wash sitting down. For people with limited mobility, bathtub transfer benches allow you to slide safely into the tub. Raised toilet seats and toilet safety rails are helpful for those with knee or hip problems. In the [facility]    Make sure you use a nightlight and that the area around your bed is clear of potential obstacles. Be careful with electric blankets and never go to sleep with a heating pad, which can cause serious burns even if on a low setting. Use fire-resistant mattress covers and pillows, and NEVER smoke in bed. Keep a phone next to the bed that is programmed to dial 911 at the push of a button. If you have a chronic condition, you may want to sign on with an automatic call-in service.  Typically the system includes a down the kind of care you want at the end of your life. It is used by the health professionals who will treat you if you aren't able to decide for yourself. If you put your wishes in writing, your loved ones and others will know what kind of care you want. They won't need to guess. This can ease your mind and be helpful to others. A living will is not the same as an estate or property will. An estate will explains what you want to happen with your money and property after you die. Is a living will a legal document? A living will is a legal document. Each state has its own laws about living [place]. If you move to another state, make sure that your living will is legal in the state where you now live. Or you might use a universal form that has been approved by many states. This kind of form can sometimes be completed and stored online. Your electronic copy will then be available wherever you have a connection to the Internet. In most cases, doctors will respect your wishes even if you have a form from a different state. You don't need an  to complete a living will. But legal advice can be helpful if your state's laws are unclear, your health history is complicated, or your family can't agree on what should be in your living will. You can change your living will at any time. Some people find that their wishes about end-of-life care change as their health changes. In addition to making a living will, think about completing a medical power of  form. This form lets you name the person you want to make end-of-life treatment decisions for you (your \"health care agent\") if you're not able to. Many hospitals and nursing homes will give you the forms you need to complete a living will and a medical power of . Your living will is used only if you can't make or communicate decisions for yourself anymore.  If you become able to make decisions again, you can accept or refuse any treatment, no matter what you wrote in your living will. Your state may offer an online registry. This is a place where you can store your living will online so the doctors and nurses who need to treat you can find it right away. What should you think about when creating a living will? Talk about your end-of-life wishes with your family members and your doctor. Let them know what you want. That way the people making decisions for you won't be surprised by your choices. Think about these questions as you make your living will:  Do you know enough about life support methods that might be used? If not, talk to your doctor so you know what might be done if you can't breathe on your own, your heart stops, or you're unable to swallow. What things would you still want to be able to do after you receive life-support methods? Would you want to be able to walk? To speak? To eat on your own? To live without the help of machines? If you have a choice, where do you want to be cared for? In your home? At a hospital or nursing home? Do you want certain Hinduism practices performed if you become very ill? If you have a choice at the end of your life, where would you prefer to die? At home? In a hospital or nursing home? Somewhere else? Would you prefer to be buried or cremated? Do you want your organs to be donated after you die? What should you do with your living will? Make sure that your family members and your health care agent have copies of your living will. Give your doctor a copy of your living will to keep in your medical record. If you have more than one doctor, make sure that each one has a copy. You may want to put a copy of your living will where it can be easily found. Where can you learn more? Go to [email or web address] and sign in to your F[employer] account. Enter P448 in the [employer] box to learn more about \"Learning About Living [place]. \"     If you do not have an account, please click

## 2017-11-29 RX ORDER — PRIMIDONE 50 MG/1
TABLET ORAL
Qty: 180 TABLET | Refills: 3 | Status: SHIPPED | OUTPATIENT
Start: 2017-11-29

## 2017-12-11 ENCOUNTER — OFFICE VISIT (OUTPATIENT)
Dept: CARDIOLOGY | Facility: CLINIC | Age: 77
End: 2017-12-11

## 2017-12-11 VITALS
SYSTOLIC BLOOD PRESSURE: 130 MMHG | WEIGHT: 212 LBS | HEIGHT: 71 IN | BODY MASS INDEX: 29.68 KG/M2 | DIASTOLIC BLOOD PRESSURE: 68 MMHG | HEART RATE: 88 BPM | OXYGEN SATURATION: 97 %

## 2017-12-11 DIAGNOSIS — E78.2 MIXED HYPERLIPIDEMIA: ICD-10-CM

## 2017-12-11 DIAGNOSIS — I25.10 CORONARY ARTERY DISEASE INVOLVING NATIVE CORONARY ARTERY OF NATIVE HEART WITHOUT ANGINA PECTORIS: Primary | ICD-10-CM

## 2017-12-11 DIAGNOSIS — I10 ESSENTIAL HYPERTENSION: ICD-10-CM

## 2017-12-11 PROCEDURE — 99213 OFFICE O/P EST LOW 20 MIN: CPT | Performed by: INTERNAL MEDICINE

## 2017-12-11 NOTE — PROGRESS NOTES
Reason for Visit: cardiovascular follow up.    HPI:  Mina Ratliff is a 77 y.o. male is here today for follow-up.  He has been doing well for the most part.  He forgot to get his blood test that was ordered last visit.  He notes his pulmonary meds have been changed around by Dr Amin.  He does not have any new issues or complaints.      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      Patient Active Problem List   Diagnosis   • Malaise and fatigue   • Hyperlipidemia   • CAD (coronary artery disease)   • HTN (hypertension)   • Chest pain       Social History   Substance Use Topics   • Smoking status: Former Smoker     Types: Cigarettes   • Smokeless tobacco: Never Used      Comment: Quit about 1989   • Alcohol use No       Family History   Problem Relation Age of Onset   • Coronary artery disease Other        The following portions of the patient's history were reviewed and updated as appropriate: allergies, current medications, past family history, past medical history, past social history, past surgical history and problem list.      Current Outpatient Prescriptions:   •  aspirin  MG tablet, Take 325 mg by mouth Every 6 (Six) Hours As Needed., Disp: , Rfl:   •  furosemide (LASIX) 40 MG tablet, Take 40 mg by mouth 2 (Two) Times a Day., Disp: , Rfl:   •  isosorbide mononitrate (IMDUR) 30 MG 24 hr tablet, Take 1 tablet by mouth Daily., Disp: 90 tablet, Rfl: 3  •  metFORMIN (GLUCOPHAGE) 1000 MG tablet, Take 1,000 mg by mouth 2 (Two) Times a Day With Meals., Disp: , Rfl:   •  Multiple Vitamins-Minerals (MULTIVITAMIN ADULT PO), Take  by mouth., Disp: , Rfl:   •  omeprazole (priLOSEC) 20 MG capsule, Take 20 mg by mouth Daily., Disp: , Rfl:   •  primidone (MYSOLINE) 50 MG tablet, Take 50 mg  "by mouth 4 (Four) Times a Day., Disp: , Rfl:   •  propranolol (INDERAL) 40 MG tablet, Take 40 mg by mouth 2 (Two) Times a Day., Disp: , Rfl:   •  simvastatin (ZOCOR) 40 MG tablet, Take 40 mg by mouth Every Night., Disp: , Rfl:   •  tamsulosin (FLOMAX) 0.4 MG capsule 24 hr capsule, TAKE 1 CAPSULE TWICE A DAY, Disp: 90 capsule, Rfl: 3  •  triamcinolone (KENALOG) 0.1 % cream, Apply topically 2 times daily. To lower leg dry skin, Disp: , Rfl:   •  umeclidinium-vilanterol (ANORO ELLIPTA) 62.5-25 MCG/INH aerosol powder  inhaler, Inhale 1 puff Daily., Disp: , Rfl:     Current Facility-Administered Medications:   •  lidocaine (XYLOCAINE) 2 % jelly, , Topical, PRN, Joe Guerrero MD    Review of Systems   Constitution: Positive for malaise/fatigue. Negative for chills and fever.   Cardiovascular: Positive for leg swelling. Negative for chest pain and paroxysmal nocturnal dyspnea.   Respiratory: Negative for cough and shortness of breath.    Skin: Negative for rash.   Gastrointestinal: Negative for abdominal pain and heartburn.   Neurological: Negative for dizziness and numbness.       Objective   /68 (BP Location: Left arm, Patient Position: Sitting, Cuff Size: Adult)  Pulse 88  Ht 180.3 cm (70.98\")  Wt 96.2 kg (212 lb)  SpO2 97%  BMI 29.58 kg/m2  Physical Exam   Constitutional: He is oriented to person, place, and time. He appears well-developed and well-nourished.   HENT:   Head: Normocephalic and atraumatic.   Cardiovascular: Normal rate, regular rhythm and normal heart sounds.    No murmur heard.  Pulmonary/Chest: Effort normal and breath sounds normal.   Musculoskeletal: He exhibits no edema.   Neurological: He is alert and oriented to person, place, and time.   Skin: Skin is warm and dry.   Psychiatric: He has a normal mood and affect.     Procedures      ICD-10-CM ICD-9-CM   1. Coronary artery disease involving native coronary artery of native heart without angina pectoris I25.10 414.01   2. Essential " hypertension I10 401.9   3. Mixed hyperlipidemia E78.2 272.2         Assessment/Plan:  1. 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  Continue current aspirin, Imdur, propranolol, and simvastatin.  Remains asymptomatic.         2.  Essential hypertension: Blood pressure is acceptable today.  Continue to monitor.  BMP ordered and pending due to diuretic and potassium usage.        3.  Hyperlipidemia: Continue simvastatin.

## 2017-12-13 LAB
BUN SERPL-MCNC: 17 MG/DL (ref 8–27)
BUN/CREAT SERPL: 15 (ref 10–24)
CALCIUM SERPL-MCNC: 9.5 MG/DL (ref 8.6–10.2)
CHLORIDE SERPL-SCNC: 96 MMOL/L (ref 96–106)
CO2 SERPL-SCNC: 27 MMOL/L (ref 18–29)
CREAT SERPL-MCNC: 1.11 MG/DL (ref 0.76–1.27)
GFR SERPLBLD CREATININE-BSD FMLA CKD-EPI: 63 ML/MIN/1.73
GFR SERPLBLD CREATININE-BSD FMLA CKD-EPI: 73 ML/MIN/1.73
GLUCOSE SERPL-MCNC: 133 MG/DL (ref 65–99)
POTASSIUM SERPL-SCNC: 5.6 MMOL/L (ref 3.5–5.2)
SODIUM SERPL-SCNC: 140 MMOL/L (ref 134–144)

## 2017-12-13 RX ORDER — POTASSIUM CHLORIDE 750 MG/1
TABLET, EXTENDED RELEASE ORAL
Qty: 180 TABLET | Refills: 3 | Status: SHIPPED | OUTPATIENT
Start: 2017-12-13 | End: 2018-05-05 | Stop reason: ALTCHOICE

## 2017-12-15 DIAGNOSIS — E87.5 HYPERKALEMIA: Primary | ICD-10-CM

## 2017-12-23 LAB
BUN SERPL-MCNC: 17 MG/DL (ref 5–21)
BUN/CREAT SERPL: 17.7 (ref 7–25)
CALCIUM SERPL-MCNC: 9.4 MG/DL (ref 8.4–10.4)
CHLORIDE SERPL-SCNC: 98 MMOL/L (ref 98–110)
CO2 SERPL-SCNC: 29 MMOL/L (ref 24–31)
CREAT SERPL-MCNC: 0.96 MG/DL (ref 0.5–1.4)
GLUCOSE SERPL-MCNC: 151 MG/DL (ref 70–100)
POTASSIUM SERPL-SCNC: 4.7 MMOL/L (ref 3.5–5.3)
SODIUM SERPL-SCNC: 141 MMOL/L (ref 135–145)

## 2018-01-15 RX ORDER — OMEPRAZOLE 20 MG/1
CAPSULE, DELAYED RELEASE ORAL
Qty: 180 CAPSULE | Refills: 2 | Status: SHIPPED | OUTPATIENT
Start: 2018-01-15 | End: 2018-09-26 | Stop reason: SDUPTHER

## 2018-01-15 RX ORDER — FUROSEMIDE 40 MG/1
TABLET ORAL
Qty: 90 TABLET | Refills: 3 | Status: SHIPPED | OUTPATIENT
Start: 2018-01-15 | End: 2019-03-26 | Stop reason: SDUPTHER

## 2018-01-15 RX ORDER — PROPRANOLOL HCL 60 MG
CAPSULE, EXTENDED RELEASE 24HR ORAL
Qty: 90 CAPSULE | Refills: 2 | Status: SHIPPED | OUTPATIENT
Start: 2018-01-15 | End: 2018-09-26 | Stop reason: SDUPTHER

## 2018-03-20 RX ORDER — SIMVASTATIN 40 MG
TABLET ORAL
Qty: 90 TABLET | Refills: 3 | Status: SHIPPED | OUTPATIENT
Start: 2018-03-20 | End: 2019-07-03 | Stop reason: SDUPTHER

## 2018-05-04 ENCOUNTER — OFFICE VISIT (OUTPATIENT)
Dept: PRIMARY CARE CLINIC | Age: 78
End: 2018-05-04
Payer: MEDICARE

## 2018-05-04 VITALS
RESPIRATION RATE: 16 BRPM | SYSTOLIC BLOOD PRESSURE: 114 MMHG | WEIGHT: 211 LBS | DIASTOLIC BLOOD PRESSURE: 74 MMHG | HEIGHT: 70 IN | HEART RATE: 85 BPM | TEMPERATURE: 98.5 F | OXYGEN SATURATION: 96 % | BODY MASS INDEX: 30.21 KG/M2

## 2018-05-04 DIAGNOSIS — E11.9 TYPE 2 DIABETES MELLITUS WITHOUT COMPLICATION, WITHOUT LONG-TERM CURRENT USE OF INSULIN (HCC): ICD-10-CM

## 2018-05-04 DIAGNOSIS — R60.9 PERIPHERAL EDEMA: Primary | ICD-10-CM

## 2018-05-04 DIAGNOSIS — I10 ESSENTIAL HYPERTENSION: ICD-10-CM

## 2018-05-04 DIAGNOSIS — E78.00 HYPERCHOLESTEROLEMIA: ICD-10-CM

## 2018-05-04 DIAGNOSIS — Z91.81 AT HIGH RISK FOR FALLS: ICD-10-CM

## 2018-05-04 PROCEDURE — G8417 CALC BMI ABV UP PARAM F/U: HCPCS | Performed by: FAMILY MEDICINE

## 2018-05-04 PROCEDURE — 4040F PNEUMOC VAC/ADMIN/RCVD: CPT | Performed by: FAMILY MEDICINE

## 2018-05-04 PROCEDURE — 1036F TOBACCO NON-USER: CPT | Performed by: FAMILY MEDICINE

## 2018-05-04 PROCEDURE — 1123F ACP DISCUSS/DSCN MKR DOCD: CPT | Performed by: FAMILY MEDICINE

## 2018-05-04 PROCEDURE — G8427 DOCREV CUR MEDS BY ELIG CLIN: HCPCS | Performed by: FAMILY MEDICINE

## 2018-05-04 PROCEDURE — 99213 OFFICE O/P EST LOW 20 MIN: CPT | Performed by: FAMILY MEDICINE

## 2018-05-05 ASSESSMENT — ENCOUNTER SYMPTOMS
SHORTNESS OF BREATH: 1
COUGH: 1
GASTROINTESTINAL NEGATIVE: 1
RHINORRHEA: 1

## 2018-05-12 DIAGNOSIS — N40.0 BENIGN PROSTATIC HYPERPLASIA WITHOUT LOWER URINARY TRACT SYMPTOMS: ICD-10-CM

## 2018-05-14 RX ORDER — TAMSULOSIN HYDROCHLORIDE 0.4 MG/1
CAPSULE ORAL
Qty: 90 CAPSULE | Refills: 3 | OUTPATIENT
Start: 2018-05-14

## 2018-05-17 ENCOUNTER — TELEPHONE (OUTPATIENT)
Dept: UROLOGY | Facility: CLINIC | Age: 78
End: 2018-05-17

## 2018-05-17 DIAGNOSIS — N13.8 BPH WITH OBSTRUCTION/LOWER URINARY TRACT SYMPTOMS: Primary | ICD-10-CM

## 2018-05-17 DIAGNOSIS — N40.1 BPH WITH OBSTRUCTION/LOWER URINARY TRACT SYMPTOMS: Primary | ICD-10-CM

## 2018-05-17 NOTE — PROGRESS NOTES
"  Mr. Ratliff is 78 y.o. male    CHIEF COMPLAINT: I am here for follow up on BPH.    HPI  Follow up BPH  Location: Prostate gland  Quality:  Benign enlargement  Severity: Symptom score is 25 which is considered \"severe symptoms\"  Duration: 6 years  Context: Evaluation of lower urinary tract symptoms  Modifying factors: Tamsulosin has helped some but not enough. He stopped finasteride 2 years ago. .   Associated signs or symptoms: Poor stream, intermittency, frequency and straining to urinate      The following portions of the patient's history were reviewed and updated as appropriate: allergies, current medications, past family history, past medical history, past social history, past surgical history and problem list.      Review of Systems   Constitutional: Negative for chills and fever.   Gastrointestinal: Negative for abdominal pain, anal bleeding and blood in stool.   Genitourinary: Positive for difficulty urinating, frequency and urgency. Negative for flank pain and hematuria.           Current Outpatient Prescriptions:   •  aspirin  MG tablet, Take 325 mg by mouth Every 6 (Six) Hours As Needed., Disp: , Rfl:   •  furosemide (LASIX) 40 MG tablet, Take 40 mg by mouth 2 (Two) Times a Day., Disp: , Rfl:   •  isosorbide mononitrate (IMDUR) 30 MG 24 hr tablet, Take 1 tablet by mouth Daily., Disp: 90 tablet, Rfl: 3  •  metFORMIN (GLUCOPHAGE) 1000 MG tablet, Take 1,000 mg by mouth 2 (Two) Times a Day With Meals., Disp: , Rfl:   •  Multiple Vitamins-Minerals (MULTIVITAMIN ADULT PO), Take  by mouth., Disp: , Rfl:   •  omeprazole (priLOSEC) 20 MG capsule, Take 20 mg by mouth Daily., Disp: , Rfl:   •  primidone (MYSOLINE) 50 MG tablet, Take 50 mg by mouth 4 (Four) Times a Day., Disp: , Rfl:   •  propranolol (INDERAL) 40 MG tablet, Take 40 mg by mouth 2 (Two) Times a Day., Disp: , Rfl:   •  simvastatin (ZOCOR) 40 MG tablet, Take 40 mg by mouth Every Night., Disp: , Rfl:   •  tamsulosin (FLOMAX) 0.4 MG capsule 24 hr " "capsule, Take 1 capsule by mouth Every Night for 360 days., Disp: 90 capsule, Rfl: 3  •  triamcinolone (KENALOG) 0.1 % cream, Apply topically 2 times daily. To lower leg dry skin, Disp: , Rfl:   •  umeclidinium-vilanterol (ANORO ELLIPTA) 62.5-25 MCG/INH aerosol powder  inhaler, Inhale 1 puff Daily., Disp: , Rfl:     Current Facility-Administered Medications:   •  lidocaine (XYLOCAINE) 2 % jelly, , Topical, PRN, Joe Guerrero MD    Past Medical History:   Diagnosis Date   • Asthma    • Atherosclerosis of native coronary artery of native heart without angina pectoris    • CAD (coronary artery disease)    • Chest pain    • Chronic airway obstruction    • COPD (chronic obstructive pulmonary disease)    • Diabetes mellitus    • Dizziness    • GERD (gastroesophageal reflux disease)    • HTN (hypertension)    • Hyperlipidemia    • Malaise and fatigue    • Myocardial infarction, old    • Old myocardial infarction        Past Surgical History:   Procedure Laterality Date   • CARDIAC CATHETERIZATION     • CORONARY ANGIOPLASTY  08/27/2009    PSTPRC STATUS, PTCA        Social History     Social History   • Marital status:      Social History Main Topics   • Smoking status: Former Smoker     Types: Cigarettes   • Smokeless tobacco: Never Used      Comment: Quit about 1989   • Alcohol use No   • Drug use: No   • Sexual activity: Defer     Other Topics Concern   • Not on file       Family History   Problem Relation Age of Onset   • Coronary artery disease Other          /68   Temp 98.7 °F (37.1 °C)   Ht 177.8 cm (70\")   Wt 95.6 kg (210 lb 12.8 oz)   BMI 30.25 kg/m²       Physical Exam  Alert and oriented ×3  Not agitated or distressed  No obvious deformities  No respiratory distress  Skin without pallor or diaphoresis  TRINA: Benign feeling prostate without nodule approximately 45 mL in size.   Penis and testicles are normal     Data    Bladder Scan interpretation  Estimation of residual urine via abdominal " ultrasound  Residual Urine: 45 ml  Indication: BPH  Position: Supine  Examination: Incremental scanning of the suprapubic area using 3 MHz transducer using copious amounts of acoustic gel.   Findings: An anechoic area was demonstrated which represented the bladder, with measurement of residual urine as noted. I inspected this myself. In that the residual urine was stable or insignificant, no treatment will be necessary at this time.     Data  Results for orders placed or performed in visit on 05/23/18   POC Urinalysis Dipstick, Automated   Result Value Ref Range    Color Yellow Yellow, Straw, Dark Yellow, Ruth    Clarity, UA Clear Clear    Glucose, UA Negative Negative, 1000 mg/dL (3+) mg/dL    Bilirubin Negative Negative    Ketones, UA Negative Negative    Specific Gravity  1.025 1.005 - 1.030    Blood, UA Negative Negative    pH, Urine 5.0 5.0 - 8.0    Protein, POC Trace (A) Negative mg/dL    Urobilinogen, UA Normal Normal    Leukocytes Negative Negative    Nitrite, UA Negative Negative         Assessment and Plan  Diagnoses and all orders for this visit:    Benign prostatic hyperplasia with severe lower urinary tract symptoms  -     POC Urinalysis Dipstick, Automated    BPH with obstruction/lower urinary tract symptoms  -     tamsulosin (FLOMAX) 0.4 MG capsule 24 hr capsule; Take 1 capsule by mouth Every Night for 360 days.    #1. His symptoms are considerably worse. I am going to cystoscope him. We talked about UroLift and TURP. He is interested in these options. Cystoscopy as the definitive lower urinary tract study is discussed . The risks of pain and discomfort, infection, and urethral stricture are discussed with the patient including the technique used in the office setting.  All patient questions were answered.         Joe Guerrero MD  05/23/18  9:59 AM      Cc: Shabana Franks MD

## 2018-05-17 NOTE — TELEPHONE ENCOUNTER
Pt made appt for 1 yr fu 5/23/18. He needs 1 refill of the Tamsulosin sent to [facility]. He is completely out of med.

## 2018-05-21 RX ORDER — TAMSULOSIN HYDROCHLORIDE 0.4 MG/1
1 CAPSULE ORAL NIGHTLY
Qty: 90 CAPSULE | Refills: 3 | Status: SHIPPED | OUTPATIENT
Start: 2018-05-21 | End: 2018-05-23 | Stop reason: SDUPTHER

## 2018-05-23 ENCOUNTER — OFFICE VISIT (OUTPATIENT)
Dept: UROLOGY | Facility: CLINIC | Age: 78
End: 2018-05-23

## 2018-05-23 VITALS
DIASTOLIC BLOOD PRESSURE: 68 MMHG | HEIGHT: 70 IN | WEIGHT: 210.8 LBS | SYSTOLIC BLOOD PRESSURE: 130 MMHG | BODY MASS INDEX: 30.18 KG/M2 | TEMPERATURE: 98.7 F

## 2018-05-23 DIAGNOSIS — N13.8 BPH WITH OBSTRUCTION/LOWER URINARY TRACT SYMPTOMS: ICD-10-CM

## 2018-05-23 DIAGNOSIS — N40.0 BENIGN PROSTATIC HYPERPLASIA WITHOUT LOWER URINARY TRACT SYMPTOMS: Primary | ICD-10-CM

## 2018-05-23 DIAGNOSIS — N40.1 BPH WITH OBSTRUCTION/LOWER URINARY TRACT SYMPTOMS: ICD-10-CM

## 2018-05-23 LAB
BILIRUB BLD-MCNC: NEGATIVE MG/DL
CLARITY, POC: CLEAR
COLOR UR: YELLOW
GLUCOSE UR STRIP-MCNC: NEGATIVE MG/DL
KETONES UR QL: NEGATIVE
LEUKOCYTE EST, POC: NEGATIVE
NITRITE UR-MCNC: NEGATIVE MG/ML
PH UR: 5 [PH] (ref 5–8)
PROT UR STRIP-MCNC: ABNORMAL MG/DL
RBC # UR STRIP: NEGATIVE /UL
SP GR UR: 1.02 (ref 1–1.03)
UROBILINOGEN UR QL: NORMAL

## 2018-05-23 PROCEDURE — 51798 US URINE CAPACITY MEASURE: CPT | Performed by: UROLOGY

## 2018-05-23 PROCEDURE — 81003 URINALYSIS AUTO W/O SCOPE: CPT | Performed by: UROLOGY

## 2018-05-23 PROCEDURE — 99214 OFFICE O/P EST MOD 30 MIN: CPT | Performed by: UROLOGY

## 2018-05-23 RX ORDER — TAMSULOSIN HYDROCHLORIDE 0.4 MG/1
1 CAPSULE ORAL NIGHTLY
Qty: 90 CAPSULE | Refills: 3 | Status: SHIPPED | OUTPATIENT
Start: 2018-05-23 | End: 2019-05-18

## 2018-05-23 NOTE — PATIENT INSTRUCTIONS

## 2018-06-06 ENCOUNTER — APPOINTMENT (OUTPATIENT)
Dept: PREADMISSION TESTING | Facility: HOSPITAL | Age: 78
End: 2018-06-06

## 2018-06-06 ENCOUNTER — TELEPHONE (OUTPATIENT)
Dept: UROLOGY | Facility: CLINIC | Age: 78
End: 2018-06-06

## 2018-06-06 ENCOUNTER — PROCEDURE VISIT (OUTPATIENT)
Dept: UROLOGY | Facility: CLINIC | Age: 78
End: 2018-06-06

## 2018-06-06 VITALS
BODY MASS INDEX: 31.61 KG/M2 | SYSTOLIC BLOOD PRESSURE: 129 MMHG | RESPIRATION RATE: 18 BRPM | DIASTOLIC BLOOD PRESSURE: 57 MMHG | HEIGHT: 69 IN | OXYGEN SATURATION: 96 % | WEIGHT: 213.41 LBS | HEART RATE: 97 BPM

## 2018-06-06 DIAGNOSIS — N40.0 BENIGN PROSTATIC HYPERPLASIA WITHOUT LOWER URINARY TRACT SYMPTOMS: Primary | ICD-10-CM

## 2018-06-06 LAB
ANION GAP SERPL CALCULATED.3IONS-SCNC: 13 MMOL/L (ref 4–13)
BACTERIA UR QL AUTO: ABNORMAL /HPF
BILIRUB BLD-MCNC: NEGATIVE MG/DL
BILIRUB UR QL STRIP: NEGATIVE
BUN BLD-MCNC: 26 MG/DL (ref 5–21)
BUN/CREAT SERPL: 23.6 (ref 7–25)
CALCIUM SPEC-SCNC: 9.3 MG/DL (ref 8.4–10.4)
CHLORIDE SERPL-SCNC: 98 MMOL/L (ref 98–110)
CLARITY UR: CLEAR
CLARITY, POC: CLEAR
CO2 SERPL-SCNC: 28 MMOL/L (ref 24–31)
COLOR UR: ABNORMAL
COLOR UR: YELLOW
CREAT BLD-MCNC: 1.1 MG/DL (ref 0.5–1.4)
DEPRECATED RDW RBC AUTO: 45 FL (ref 40–54)
ERYTHROCYTE [DISTWIDTH] IN BLOOD BY AUTOMATED COUNT: 13.6 % (ref 12–15)
GFR SERPL CREATININE-BSD FRML MDRD: 65 ML/MIN/1.73
GLUCOSE BLD-MCNC: 148 MG/DL (ref 70–100)
GLUCOSE UR STRIP-MCNC: NEGATIVE MG/DL
GLUCOSE UR STRIP-MCNC: NEGATIVE MG/DL
HCT VFR BLD AUTO: 35.5 % (ref 40–52)
HGB BLD-MCNC: 11.7 G/DL (ref 14–18)
HGB UR QL STRIP.AUTO: ABNORMAL
HYALINE CASTS UR QL AUTO: ABNORMAL /LPF
KETONES UR QL STRIP: ABNORMAL
KETONES UR QL: NEGATIVE
LEUKOCYTE EST, POC: NEGATIVE
LEUKOCYTE ESTERASE UR QL STRIP.AUTO: ABNORMAL
MCH RBC QN AUTO: 29.8 PG (ref 28–32)
MCHC RBC AUTO-ENTMCNC: 33 G/DL (ref 33–36)
MCV RBC AUTO: 90.6 FL (ref 82–95)
NITRITE UR QL STRIP: NEGATIVE
NITRITE UR-MCNC: NEGATIVE MG/ML
PH UR STRIP.AUTO: <=5 [PH] (ref 5–8)
PH UR: 5 [PH] (ref 5–8)
PLATELET # BLD AUTO: 243 10*3/MM3 (ref 130–400)
PMV BLD AUTO: 11.4 FL (ref 6–12)
POTASSIUM BLD-SCNC: 4.5 MMOL/L (ref 3.5–5.3)
PROT UR QL STRIP: ABNORMAL
PROT UR STRIP-MCNC: NEGATIVE MG/DL
RBC # BLD AUTO: 3.92 10*6/MM3 (ref 4.8–5.9)
RBC # UR STRIP: NEGATIVE /UL
RBC # UR: ABNORMAL /HPF
REF LAB TEST METHOD: ABNORMAL
SODIUM BLD-SCNC: 139 MMOL/L (ref 135–145)
SP GR UR STRIP: 1.02 (ref 1–1.03)
SP GR UR: 1.01 (ref 1–1.03)
SQUAMOUS #/AREA URNS HPF: ABNORMAL /HPF
UROBILINOGEN UR QL STRIP: ABNORMAL
UROBILINOGEN UR QL: NORMAL
WBC NRBC COR # BLD: 7.78 10*3/MM3 (ref 4.8–10.8)
WBC UR QL AUTO: ABNORMAL /HPF

## 2018-06-06 PROCEDURE — 93005 ELECTROCARDIOGRAM TRACING: CPT

## 2018-06-06 PROCEDURE — 80048 BASIC METABOLIC PNL TOTAL CA: CPT | Performed by: UROLOGY

## 2018-06-06 PROCEDURE — 36415 COLL VENOUS BLD VENIPUNCTURE: CPT

## 2018-06-06 PROCEDURE — 85027 COMPLETE CBC AUTOMATED: CPT | Performed by: UROLOGY

## 2018-06-06 PROCEDURE — 93010 ELECTROCARDIOGRAM REPORT: CPT | Performed by: INTERNAL MEDICINE

## 2018-06-06 PROCEDURE — 81002 URINALYSIS NONAUTO W/O SCOPE: CPT | Performed by: UROLOGY

## 2018-06-06 PROCEDURE — 52000 CYSTOURETHROSCOPY: CPT | Performed by: UROLOGY

## 2018-06-06 PROCEDURE — 81001 URINALYSIS AUTO W/SCOPE: CPT | Performed by: UROLOGY

## 2018-06-06 NOTE — PROGRESS NOTES
CC: I am here for the doctor to look at my bladder    Cystoscopy procedure note  Pre- operative diagnosis:  Benign prostatic hypertrophy    Post operative diagnosis:  Same    Procedure:  The patient was prepped and draped in a normal sterile fashion.  The urethra was anesthetized with 2% lidocaine jelly.  A flexible cystoscope was introduced per urethra.      Urethra:  No urethral stricture    Bladder:  There is no evidence of a stone, foreign body or mass within the bladder.  The bladder is minimally trabeculated.  The bladder neck is without contracture.    Ureteral orifices:  Normal position bilaterally and Clear efflux bilaterally    Prostate:  lateral lobe hypertrophy    Patient tolerated the procedure well    Complications: none    Blood loss: minimal    Diagnoses and all orders for this visit:    Benign prostatic hyperplasia with severe lower urinary tract symptoms  -     POC Urinalysis Dipstick, Multipro  -     Case Request; Standing  -     ceFAZolin (ANCEF) 2 g in sodium chloride 0.9 % 100 mL IVPB; Infuse 2 g into a venous catheter 1 (One) Time.  -     Case Request    Other orders  -     Follow Anesthesia Guidelines / Standing Orders; Future  -     Obtain Informed Consent  -     Follow Anesthesia Guidelines / Standing Orders; Standing        Follow up:    We discussed benign prostatic hyperplasia (BPH) today including the pathophysiology, diagnosis, and management.  The role of PSA and management of BPH is discussed.  The patient and I discussed objective measurements of voiding dysfunction related to BPH such as uroflow, postvoid residual by bladder scan, and the need for cystoscopy.  We discussed the role of alpha blockers, 5 alpha reductase inhibitors, and anticholinergics.  Minimally invasive techniques including UroLift,  transurethral needle ablation of prostate, transurethral microwave therapy, interstitial laser and ablative laser techniques to remove prostate tissue are discussed.  We also  discussed more invasive procedures such as transurethral incision of the prostate, transurethral resection of the prostate and open removal of adenomatous tissue of very large prostate glands.  TURP is acknowledged to be the gold standard for surgical management.  Behavioral, dietary, and herbal therapy is also discussed pointing out the limited available data for the latter.  At this point he chooses to undergo UroLift.  I did explain this is a relatively new procedure with only 5 year follow-up but appears to work very well in patients with similar symptoms and the findings of his prostate.  Basically success I told him is approximately 80%.  Of that 80% to see significant improvement in her urine symptoms, only 10-13% of patients will require some type of surgical intervention whether that be a repeat procedure or a transurethral resection prostate.  Lastly, we talked about the risks of bladder neck contracture, encrustation of the device, malfunction of device, hematuria, pelvic pain that can last typically up to one month but is usually mild and controlled with anti-inflammatory drugs, persistence or recurrence of symptoms.

## 2018-06-06 NOTE — DISCHARGE INSTRUCTIONS
DAY OF SURGERY INSTRUCTIONS        YOUR SURGEON: EDWINAHERNANDEZ RIZVI    PROCEDURE: PROSTATIC URETHRAL LIFT    DATE OF SURGERY: June 12TH 2018    ARRIVAL TIME: AS DIRECTED BY OFFICE    DAY OF SURGERY TAKE ONLY THESE MEDICATIONS UNLESS OTHERWISE INSTRUCTED BY YOUR PHYSICIAN: ISOSORBIDE AND PROPRANOLOL          MANAGING PAIN AFTER SURGERY    We know you are probably wondering what your pain will be like after surgery.  Following surgery it is unrealistic to expect you will not have pain.   Pain is how our bodies let us know that something is wrong or cautions us to be careful.  That said, our goal is to make your pain tolerable.    Methods we may use to treat your pain include (oral or IV medications, PCAs, epidurals, nerve blocks, etc.)   While some procedures require IV pain medications for a short time after surgery, transitioning to pain medications by mouth allows for better management of pain.   Your nurse will encourage you to take oral pain medications whenever possible.  IV medications work almost immediately, but only last a short while.  Taking medications by mouth allows for a more constant level of medication in your blood stream for a longer period of time.      Once your pain is out of control it is harder to get back under control.  It is important you are aware when your next dose of pain medication is due.  If you are admitted, your nurse may write the time of your next dose on the white board in your room to help you remember.      We are interested in your pain and encourage you to inform us about aggravating factors during your visit.   Many times a simple repositioning every few hours can make a big difference.    If your physician says it is okay, do not let your pain prevent you from getting out of bed. Be sure to call your nurse for assistance prior to getting up so you do not fall.      Before surgery, please decide your tolerable pain goal.  These faces help describe the pain ratings we use on a  0-10 scale.   Be prepared to tell us your goal and whether or not you take pain or anxiety medications at home.            BEFORE YOU COME TO THE HOSPITAL  (Pre-op instructions)  • Do not eat, drink, smoke or chew gum after midnight the night before surgery.  This also includes no mints.  • Morning of surgery take only the medicines you have been instructed with a sip of water unless otherwise instructed  by your physician.  • Do not shave, wear makeup or dark nail polish.  • Remove all jewelry including rings.  • Leave anything you consider valuable at home.  • Leave your suitcase in the car until after your surgery.  • Bring the following with you if applicable:  o Picture ID and insurance, Medicare or Medicaid cards  o Co-pay/deductible required by insurance (cash, check, credit card)  o Copy of advance directive, living will or power-of- documents if not brought to PAT  o CPAP or BIPAP mask and tubing  o Relaxation aids (MP3 player, book, magazine)  • On the day of surgery check in at registration located at the main entrance of the hospital.       Outpatient Surgery Guidelines, Adult  Outpatient procedures are those for which the person having the procedure is allowed to go home the same day as the procedure. Various procedures are done on an outpatient basis. You should follow some general guidelines if you will be having an outpatient procedure.  LET YOUR HEALTH CARE PROVIDER KNOW ABOUT:  · Any allergies you have.  · All medicines you are taking, including vitamins, herbs, eye drops, creams, and over-the-counter medicines.  · Previous problems you or members of your family have had with the use of anesthetics.  · Any blood disorders you have.  · Previous surgeries you have had.  · Medical conditions you have.  RISKS AND COMPLICATIONS  Your health care provider will discuss possible risks and complications with you before surgery. Common risks and complications include:    · Problems due to the use of  anesthetics.  · Blood loss and replacement (does not apply to minor surgical procedures).  · Temporary increase in pain due to surgery.  · Uncorrected pain or problems that the surgery was meant to correct.  · Infection.  · New damage.  BEFORE THE PROCEDURE  · Ask your health care provider about changing or stopping your regular medicines. You may need to stop taking certain medicines in the days or weeks before the procedure.  · Stop smoking at least 2 weeks before surgery. This lowers your risk for complications during and after surgery. Ask your health care provider for help with this if needed.  · Eat your usual meals and a light supper the day before surgery. Continue fluid intake. Do not drink alcohol.  · Do not eat or drink after midnight the night before your surgery.   · Arrange for someone to take you home and to stay with you for 24 hours after the procedure. Medicine given for your procedure may affect your ability to drive or to care for yourself.  · Call your health care provider's office if you develop an illness or problem that may prevent you from safely having your procedure.  AFTER THE PROCEDURE  After surgery, you will be taken to a recovery area, where your progress will be monitored. If there are no complications, you will be allowed to go home when you are awake, stable, and taking fluids well. You may have numbness around the surgical site. Healing will take some time. You will have tenderness at the surgical site and may have some swelling and bruising. You may also have some nausea.  HOME CARE INSTRUCTIONS  · Do not drive for 24 hours, or as directed by your health care provider. Do not drive while taking prescription pain medicines.  · Do not drink alcohol for 24 hours.  · Do not make important decisions or sign legal documents for 24 hours.  · You may resume a normal diet and activities as directed.  · Do not lift anything heavier than 10 pounds (4.5 kg) or play contact sports until your  health care provider says it is okay.  · Change your bandages (dressings) as directed.  · Only take over-the-counter or prescription medicines as directed by your health care provider.  · Follow up with your health care provider as directed.  SEEK MEDICAL CARE IF:  · You have increased bleeding (more than a small spot) from the surgical site.  · You have redness, swelling, or increasing pain in the wound.  · You see pus coming from the wound.  · You have a fever.  · You notice a bad smell coming from the wound or dressing.  · You feel lightheaded or faint.  · You develop a rash.  · You have trouble breathing.  · You develop allergies.  MAKE SURE YOU:  · Understand these instructions.  · Will watch your condition.  · Will get help right away if you are not doing well or get worse.     This information is not intended to replace advice given to you by your health care provider. Make sure you discuss any questions you have with your health care provider.     Document Released: 09/12/2002 Document Revised: 05/03/2016 Document Reviewed: 05/22/2014  [employer] Interactive Patient Education ©2016 [employer]       Fall Prevention in Hospitals, Adult  As a hospital patient, your condition and the treatments you receive can increase your risk for falls. Some additional risk factors for falls in a hospital include:  · Being in an unfamiliar environment.  · Being on bed rest.  · Your surgery.  · Taking certain medicines.  · Your tubing requirements, such as intravenous (IV) therapy or catheters.  It is important that you learn how to decrease fall risks while at the hospital. Below are important tips that can help prevent falls.  SAFETY TIPS FOR PREVENTING FALLS  Talk about your risk of falling.  · Ask your health care provider why you are at risk for falling. Is it your medicine, illness, tubing placement, or something else?  · Make a plan with your health care provider to keep you safe from falls.  · Ask your health care  provider or pharmacist about side effects of your medicines. Some medicines can make you dizzy or affect your coordination.  Ask for help.  · Ask for help before getting out of bed. You may need to press your call button.  · Ask for assistance in getting safely to the toilet.  · Ask for a walker or cane to be put at your bedside. Ask that most of the side rails on your bed be placed up before your health care provider leaves the room.  · Ask family or friends to sit with you.  · Ask for things that are out of your reach, such as your glasses, hearing aids, telephone, bedside table, or call button.  Follow these tips to avoid falling:  · Stay lying or seated, rather than standing, while waiting for help.  · Wear rubber-soled slippers or shoes whenever you walk in the hospital.  · Avoid quick, sudden movements.  ¨ Change positions slowly.  ¨ Sit on the side of your bed before standing.  ¨ Stand up slowly and wait before you start to walk.  · Let your health care provider know if there is a spill on the floor.  · Pay careful attention to the medical equipment, electrical cords, and tubes around you.  · When you need help, use your call button by your bed or in the bathroom. Wait for one of your health care providers to help you.  · If you feel dizzy or unsure of your footing, return to bed and wait for assistance.  · Avoid being distracted by the TV, telephone, or another person in your room.  · Do not lean or support yourself on rolling objects, such as IV poles or bedside tables.     This information is not intended to replace advice given to you by your health care provider. Make sure you discuss any questions you have with your health care provider.     Document Released: 12/15/2001 Document Revised: 01/08/2016 Document Reviewed: 08/25/2013  [employer] Interactive Patient Education ©2016 [employer]       Surgical Site Infections FAQs  What is a Surgical Site Infection (SSI)?  A surgical site infection is an  infection that occurs after surgery in the part of the body where the surgery took place. Most patients who have surgery do not develop an infection. However, infections develop in about 1 to 3 out of every 100 patients who have surgery.  Some of the common symptoms of a surgical site infection are:  · Redness and pain around the area where you had surgery  · Drainage of cloudy fluid from your surgical wound  · Fever  Can SSIs be treated?  Yes. Most surgical site infections can be treated with antibiotics. The antibiotic given to you depends on the bacteria (germs) causing the infection. Sometimes patients with SSIs also need another surgery to treat the infection.  What are some of the things that hospitals are doing to prevent SSIs?  To prevent SSIs, doctors, nurses, and other healthcare providers:  · Clean their hands and arms up to their elbows with an antiseptic agent just before the surgery.  · Clean their hands with soap and water or an alcohol-based hand rub before and after caring for each patient.  · May remove some of your hair immediately before your surgery using electric clippers if the hair is in the same area where the procedure will occur. They should not shave you with a razor.  · Wear special hair covers, masks, gowns, and gloves during surgery to keep the surgery area clean.  · Give you antibiotics before your surgery starts. In most cases, you should get antibiotics within 60 minutes before the surgery starts and the antibiotics should be stopped within 24 hours after surgery.  · Clean the skin at the site of your surgery with a special soap that kills germs.  What can I do to help prevent SSIs?  Before your surgery:  · Tell your doctor about other medical problems you may have. Health problems such as allergies, diabetes, and obesity could affect your surgery and your treatment.  · Quit smoking. Patients who smoke get more infections. Talk to your doctor about how you can quit before your  surgery.  · Do not shave near where you will have surgery. Shaving with a razor can irritate your skin and make it easier to develop an infection.  At the time of your surgery:  · Speak up if someone tries to shave you with a razor before surgery. Ask why you need to be shaved and talk with your surgeon if you have any concerns.  · Ask if you will get antibiotics before surgery.  After your surgery:  · Make sure that your healthcare providers clean their hands before examining you, either with soap and water or an alcohol-based hand rub.  · If you do not see your providers clean their hands, please ask them to do so.  · Family and friends who visit you should not touch the surgical wound or dressings.  · Family and friends should clean their hands with soap and water or an alcohol-based hand rub before and after visiting you. If you do not see them clean their hands, ask them to clean their hands.  What do I need to do when I go home from the hospital?  · Before you go home, your doctor or nurse should explain everything you need to know about taking care of your wound. Make sure you understand how to care for your wound before you leave the hospital.  · Always clean your hands before and after caring for your wound.  · Before you go home, make sure you know who to contact if you have questions or problems after you get home.  · If you have any symptoms of an infection, such as redness and pain at the surgery site, drainage, or fever, call your doctor immediately.  If you have additional questions, please ask your doctor or nurse.  Developed and co-sponsored by The Society for Healthcare Epidemiology of [place] (SHEA); Infectious Diseases Society of [place] (IDSA); American Hospital Association; Association for Professionals in Infection Control and Epidemiology (APIC); Centers for Disease Control and Prevention (CDC); and The Joint Commission.     This information is not intended to replace advice given to you by  your health care provider. Make sure you discuss any questions you have with your health care provider.     Document Released: 12/23/2014 Document Revised: 01/08/2016 Document Reviewed: 03/02/2016  [employer] Interactive Patient Education ©2016 [employer]     PATIENT/FAMILY/RESPONSIBLE PARTY VERBALIZES UNDERSTANDING OF ABOVE EDUCATION.  COPY OF PAIN SCALE GIVEN AND REVIEWED WITH VERBALIZED UNDERSTANDING.

## 2018-06-12 ENCOUNTER — ANESTHESIA (OUTPATIENT)
Dept: PERIOP | Facility: HOSPITAL | Age: 78
End: 2018-06-12

## 2018-06-12 ENCOUNTER — HOSPITAL ENCOUNTER (OUTPATIENT)
Facility: HOSPITAL | Age: 78
Setting detail: HOSPITAL OUTPATIENT SURGERY
Discharge: HOME | End: 2018-06-12
Attending: UROLOGY | Admitting: UROLOGY

## 2018-06-12 ENCOUNTER — ANESTHESIA EVENT (OUTPATIENT)
Dept: PERIOP | Facility: HOSPITAL | Age: 78
End: 2018-06-12

## 2018-06-12 VITALS
RESPIRATION RATE: 16 BRPM | HEART RATE: 76 BPM | TEMPERATURE: 97.2 F | SYSTOLIC BLOOD PRESSURE: 164 MMHG | OXYGEN SATURATION: 99 % | DIASTOLIC BLOOD PRESSURE: 86 MMHG

## 2018-06-12 DIAGNOSIS — N40.0 BENIGN PROSTATIC HYPERPLASIA WITHOUT LOWER URINARY TRACT SYMPTOMS: Primary | ICD-10-CM

## 2018-06-12 DIAGNOSIS — N40.0 BENIGN PROSTATIC HYPERPLASIA WITHOUT LOWER URINARY TRACT SYMPTOMS: ICD-10-CM

## 2018-06-12 LAB
GLUCOSE BLDC GLUCOMTR-MCNC: 151 MG/DL (ref 70–130)
GLUCOSE BLDC GLUCOMTR-MCNC: 159 MG/DL (ref 70–130)

## 2018-06-12 PROCEDURE — 25010000002 ONDANSETRON PER 1 MG: Performed by: NURSE ANESTHETIST, CERTIFIED REGISTERED

## 2018-06-12 PROCEDURE — C1889 IMPLANT/INSERT DEVICE, NOC: HCPCS | Performed by: UROLOGY

## 2018-06-12 PROCEDURE — 82962 GLUCOSE BLOOD TEST: CPT

## 2018-06-12 PROCEDURE — 25010000002 MIDAZOLAM PER 1 MG: Performed by: ANESTHESIOLOGY

## 2018-06-12 PROCEDURE — 25010000002 PROPOFOL 10 MG/ML EMULSION: Performed by: NURSE ANESTHETIST, CERTIFIED REGISTERED

## 2018-06-12 PROCEDURE — 52441 CYSTO INSJ TRNSPRSTC 1 IMPLT: CPT | Performed by: UROLOGY

## 2018-06-12 PROCEDURE — 25010000002 FENTANYL CITRATE (PF) 100 MCG/2ML SOLUTION: Performed by: NURSE ANESTHETIST, CERTIFIED REGISTERED

## 2018-06-12 PROCEDURE — 52442 CYSTO INS TRNSPRSTC IMPLT EA: CPT | Performed by: UROLOGY

## 2018-06-12 DEVICE — SYS UROLIFT PROSTATIC RETR: Type: IMPLANTABLE DEVICE | Site: URETHRA | Status: FUNCTIONAL

## 2018-06-12 RX ORDER — METOCLOPRAMIDE HYDROCHLORIDE 5 MG/ML
5 INJECTION INTRAMUSCULAR; INTRAVENOUS
Status: DISCONTINUED | OUTPATIENT
Start: 2018-06-12 | End: 2018-06-12 | Stop reason: HOSPADM

## 2018-06-12 RX ORDER — OXYCODONE AND ACETAMINOPHEN 10; 325 MG/1; MG/1
1 TABLET ORAL ONCE AS NEEDED
Status: DISCONTINUED | OUTPATIENT
Start: 2018-06-12 | End: 2018-06-12 | Stop reason: HOSPADM

## 2018-06-12 RX ORDER — LIDOCAINE HYDROCHLORIDE 20 MG/ML
INJECTION, SOLUTION INFILTRATION; PERINEURAL AS NEEDED
Status: DISCONTINUED | OUTPATIENT
Start: 2018-06-12 | End: 2018-06-12 | Stop reason: SURG

## 2018-06-12 RX ORDER — LABETALOL HYDROCHLORIDE 5 MG/ML
5 INJECTION, SOLUTION INTRAVENOUS
Status: DISCONTINUED | OUTPATIENT
Start: 2018-06-12 | End: 2018-06-12 | Stop reason: HOSPADM

## 2018-06-12 RX ORDER — FENTANYL CITRATE 50 UG/ML
INJECTION, SOLUTION INTRAMUSCULAR; INTRAVENOUS AS NEEDED
Status: DISCONTINUED | OUTPATIENT
Start: 2018-06-12 | End: 2018-06-12 | Stop reason: SURG

## 2018-06-12 RX ORDER — MIDAZOLAM HYDROCHLORIDE 1 MG/ML
1 INJECTION INTRAMUSCULAR; INTRAVENOUS
Status: DISCONTINUED | OUTPATIENT
Start: 2018-06-12 | End: 2018-06-12 | Stop reason: HOSPADM

## 2018-06-12 RX ORDER — ACETAMINOPHEN 500 MG
1000 TABLET ORAL ONCE
Status: COMPLETED | OUTPATIENT
Start: 2018-06-12 | End: 2018-06-12

## 2018-06-12 RX ORDER — FENTANYL CITRATE 50 UG/ML
25 INJECTION, SOLUTION INTRAMUSCULAR; INTRAVENOUS AS NEEDED
Status: DISCONTINUED | OUTPATIENT
Start: 2018-06-12 | End: 2018-06-12 | Stop reason: HOSPADM

## 2018-06-12 RX ORDER — SODIUM CHLORIDE 0.9 % (FLUSH) 0.9 %
3 SYRINGE (ML) INJECTION AS NEEDED
Status: DISCONTINUED | OUTPATIENT
Start: 2018-06-12 | End: 2018-06-12 | Stop reason: HOSPADM

## 2018-06-12 RX ORDER — NALOXONE HCL 0.4 MG/ML
0.4 VIAL (ML) INJECTION AS NEEDED
Status: DISCONTINUED | OUTPATIENT
Start: 2018-06-12 | End: 2018-06-12 | Stop reason: HOSPADM

## 2018-06-12 RX ORDER — MAGNESIUM HYDROXIDE 1200 MG/15ML
LIQUID ORAL AS NEEDED
Status: DISCONTINUED | OUTPATIENT
Start: 2018-06-12 | End: 2018-06-12 | Stop reason: HOSPADM

## 2018-06-12 RX ORDER — HYDROCODONE BITARTRATE AND ACETAMINOPHEN 5; 325 MG/1; MG/1
1 TABLET ORAL ONCE AS NEEDED
Status: DISCONTINUED | OUTPATIENT
Start: 2018-06-12 | End: 2018-06-12 | Stop reason: HOSPADM

## 2018-06-12 RX ORDER — SODIUM CHLORIDE 0.9 % (FLUSH) 0.9 %
1-10 SYRINGE (ML) INJECTION AS NEEDED
Status: DISCONTINUED | OUTPATIENT
Start: 2018-06-12 | End: 2018-06-12 | Stop reason: HOSPADM

## 2018-06-12 RX ORDER — CEPHALEXIN 500 MG/1
500 CAPSULE ORAL 2 TIMES DAILY
Qty: 6 CAPSULE | Refills: 0 | Status: SHIPPED | OUTPATIENT
Start: 2018-06-12 | End: 2018-06-15

## 2018-06-12 RX ORDER — OXYCODONE AND ACETAMINOPHEN 7.5; 325 MG/1; MG/1
2 TABLET ORAL ONCE AS NEEDED
Status: DISCONTINUED | OUTPATIENT
Start: 2018-06-12 | End: 2018-06-12 | Stop reason: HOSPADM

## 2018-06-12 RX ORDER — SODIUM CHLORIDE, SODIUM LACTATE, POTASSIUM CHLORIDE, CALCIUM CHLORIDE 600; 310; 30; 20 MG/100ML; MG/100ML; MG/100ML; MG/100ML
100 INJECTION, SOLUTION INTRAVENOUS CONTINUOUS
Status: DISCONTINUED | OUTPATIENT
Start: 2018-06-12 | End: 2018-06-12 | Stop reason: HOSPADM

## 2018-06-12 RX ORDER — MEPERIDINE HYDROCHLORIDE 50 MG/ML
12.5 INJECTION INTRAMUSCULAR; INTRAVENOUS; SUBCUTANEOUS
Status: DISCONTINUED | OUTPATIENT
Start: 2018-06-12 | End: 2018-06-12 | Stop reason: HOSPADM

## 2018-06-12 RX ORDER — ONDANSETRON 2 MG/ML
4 INJECTION INTRAMUSCULAR; INTRAVENOUS ONCE AS NEEDED
Status: DISCONTINUED | OUTPATIENT
Start: 2018-06-12 | End: 2018-06-12 | Stop reason: HOSPADM

## 2018-06-12 RX ORDER — FAMOTIDINE 10 MG/ML
20 INJECTION, SOLUTION INTRAVENOUS
Status: DISCONTINUED | OUTPATIENT
Start: 2018-06-12 | End: 2018-06-12 | Stop reason: HOSPADM

## 2018-06-12 RX ORDER — IPRATROPIUM BROMIDE AND ALBUTEROL SULFATE 2.5; .5 MG/3ML; MG/3ML
3 SOLUTION RESPIRATORY (INHALATION) ONCE AS NEEDED
Status: DISCONTINUED | OUTPATIENT
Start: 2018-06-12 | End: 2018-06-12 | Stop reason: HOSPADM

## 2018-06-12 RX ORDER — SODIUM CHLORIDE, SODIUM LACTATE, POTASSIUM CHLORIDE, CALCIUM CHLORIDE 600; 310; 30; 20 MG/100ML; MG/100ML; MG/100ML; MG/100ML
1000 INJECTION, SOLUTION INTRAVENOUS CONTINUOUS
Status: DISCONTINUED | OUTPATIENT
Start: 2018-06-12 | End: 2018-06-12 | Stop reason: HOSPADM

## 2018-06-12 RX ORDER — PROPOFOL 10 MG/ML
VIAL (ML) INTRAVENOUS AS NEEDED
Status: DISCONTINUED | OUTPATIENT
Start: 2018-06-12 | End: 2018-06-12 | Stop reason: SURG

## 2018-06-12 RX ORDER — HYDROCODONE BITARTRATE AND ACETAMINOPHEN 5; 325 MG/1; MG/1
1 TABLET ORAL EVERY 8 HOURS PRN
Qty: 6 TABLET | Refills: 0 | Status: SHIPPED | OUTPATIENT
Start: 2018-06-12 | End: 2018-10-01

## 2018-06-12 RX ORDER — MIDAZOLAM HYDROCHLORIDE 1 MG/ML
2 INJECTION INTRAMUSCULAR; INTRAVENOUS
Status: DISCONTINUED | OUTPATIENT
Start: 2018-06-12 | End: 2018-06-12 | Stop reason: HOSPADM

## 2018-06-12 RX ORDER — IBUPROFEN 600 MG/1
600 TABLET ORAL ONCE AS NEEDED
Status: DISCONTINUED | OUTPATIENT
Start: 2018-06-12 | End: 2018-06-12 | Stop reason: HOSPADM

## 2018-06-12 RX ORDER — ONDANSETRON 2 MG/ML
INJECTION INTRAMUSCULAR; INTRAVENOUS AS NEEDED
Status: DISCONTINUED | OUTPATIENT
Start: 2018-06-12 | End: 2018-06-12 | Stop reason: SURG

## 2018-06-12 RX ADMIN — SODIUM CHLORIDE, POTASSIUM CHLORIDE, SODIUM LACTATE AND CALCIUM CHLORIDE 1000 ML: 600; 310; 30; 20 INJECTION, SOLUTION INTRAVENOUS at 06:51

## 2018-06-12 RX ADMIN — FENTANYL CITRATE 50 MCG: 50 INJECTION, SOLUTION INTRAMUSCULAR; INTRAVENOUS at 08:58

## 2018-06-12 RX ADMIN — LIDOCAINE HYDROCHLORIDE 100 MG: 20 INJECTION, SOLUTION INFILTRATION; PERINEURAL at 08:45

## 2018-06-12 RX ADMIN — ACETAMINOPHEN 1000 MG: 500 TABLET, FILM COATED ORAL at 08:38

## 2018-06-12 RX ADMIN — Medication 2 G: at 08:47

## 2018-06-12 RX ADMIN — FAMOTIDINE 20 MG: 10 INJECTION INTRAVENOUS at 08:38

## 2018-06-12 RX ADMIN — PROPOFOL 120 MG: 10 INJECTION, EMULSION INTRAVENOUS at 08:45

## 2018-06-12 RX ADMIN — FENTANYL CITRATE 50 MCG: 50 INJECTION, SOLUTION INTRAMUSCULAR; INTRAVENOUS at 08:45

## 2018-06-12 RX ADMIN — LIDOCAINE HYDROCHLORIDE 0.5 ML: 10 INJECTION, SOLUTION EPIDURAL; INFILTRATION; INTRACAUDAL; PERINEURAL at 06:51

## 2018-06-12 RX ADMIN — ONDANSETRON HYDROCHLORIDE 4 MG: 2 SOLUTION INTRAMUSCULAR; INTRAVENOUS at 08:57

## 2018-06-12 RX ADMIN — MIDAZOLAM HYDROCHLORIDE 2 MG: 1 INJECTION, SOLUTION INTRAMUSCULAR; INTRAVENOUS at 08:38

## 2018-06-12 NOTE — INTERVAL H&P NOTE
H&P updated. 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

## 2018-06-12 NOTE — ANESTHESIA PREPROCEDURE EVALUATION
Anesthesia Evaluation     Patient summary reviewed and Nursing notes reviewed   no history of anesthetic complications:  NPO Solid Status: > 8 hours  NPO Liquid Status: > 8 hours           Airway   Mallampati: I  TM distance: >3 FB  Neck ROM: full  No difficulty expected  Dental      Pulmonary     breath sounds clear to auscultation  (+) COPD, asthma,   Cardiovascular   Exercise tolerance: poor (<4 METS)    Patient on routine beta blocker and Beta blocker given within 24 hours of surgery  Rhythm: regular  Rate: normal    (+) hypertension, CAD, hyperlipidemia,     ROS comment: Echo 2012 wnl    [facility] (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

## 2018-06-12 NOTE — ANESTHESIA POSTPROCEDURE EVALUATION
Patient: Mina Ratliff    Procedure Summary     Date:  06/12/18 Room / Location:   [facility]  /  [facility]    Anesthesia Start:  0842 Anesthesia Stop:  0911    Procedure:  PROSTATIC URETHRAL LIFT (N/A Urethra) Diagnosis:       Benign prostatic hyperplasia without lower urinary tract symptoms      (Benign prostatic hyperplasia without lower urinary tract symptoms [N40.0])    Surgeon:  Joe Guerrero MD Provider:  Braulio Marino CRNA    Anesthesia Type:  general ASA Status:  3          Anesthesia Type: general  Last vitals  BP   164/86 (06/12/18 1020)   Temp   97.2 °F (36.2 °C) (06/12/18 0922)   Pulse   76 (06/12/18 1020)   Resp   16 (06/12/18 1020)     SpO2   99 % (06/12/18 1020)     Post Anesthesia Care and Evaluation    Patient location during evaluation: PACU  Patient participation: complete - patient participated  Level of consciousness: awake and alert  Pain management: adequate  Airway patency: patent  Anesthetic complications: No anesthetic complications    Cardiovascular status: acceptable  Respiratory status: acceptable  Hydration status: acceptable    Comments: Blood pressure 164/86, pulse 76, temperature 97.2 °F (36.2 °C), temperature source Temporal Artery , resp. rate 16, SpO2 99 %.    Pt discharged from PACU based on delilah score >8

## 2018-06-12 NOTE — OP NOTE
Operative Summary    Mina Ratliff  Date of Procedure: 6/12/2018    Pre-op Diagnosis:   Benign prostatic hyperplasia without lower urinary tract symptoms [N40.0]    Post-op Diagnosis:     Post-Op Diagnosis Codes:     * Benign prostatic hyperplasia without lower urinary tract symptoms [N40.0]    Procedure/CPT® Codes:      Procedure(s):  PROSTATIC URETHRAL LIFT    Surgeon(s):  Joe Guerrero MD    Anesthesia: General    Staff:   Circulator: Mily Wolf RN  Scrub Person: Chapin Ireland; Raegan Bella    Indications for procedure:  Bladder outlet obstruction on cystoscopy with Lower urinary tract symptoms    Findings:   Lateral lobe hyperplasia with obstructive appearance.     Procedure details:  The patient is identified in the preoperative holding area.  The informed consent process had been completed In the office documented by recent note that included a discussion of the risks of this procedure, alternative management options, and the potential benefits of this procedure.  The patient voiced a good recollection of that discussion.  The patient voiced no additional questions.     The patient is taken to the cystoscopic suite and given effective general anesthesia. The patient is then placed in the{ds OR patient positions: dorsal lithotomy position.  The patient is then prepped and draped in the usual sterile fashion.  At this point appropriate timeout protocol was observed.    A 20 Japanese cystoscope sheath is inserted into the meatus and advanced toward the bladder to assess the precise location of the prostatic obstruction, the intended location for implants and the amount of compression required to apply to the obstructing lateral lobes so as to achieve the intended result.  This is accomplished by pressing the cystoscope against prostate lobe and determining the level of the opening to be created.  Once the locations of the implantation sites are confirmed, I proceeded with placement of the  first implant.  With the sheath in the bladder, the telescope Bridges replaced with the implant delivery device and the telescope lens reintroduced into the device.  The first treatment site is determined by orienting the delivery device tip in an anterior lateral direction at anatomic 10 o'clock in the bladder and slowly moving the device distally within the prostatic fossa approximately 1.5 cm from the bladder neck.  The distal tip of the delivery device is then angled laterally at least 20° at this position so as to compress the lateral lobes and assure proper needle trajectory.  Prior to deploying the implant I confirmed cystoscopically that adequate tissue is captured both anterior and posterior to the implant.  The trigger was pulled, thereby deploying a needle containing the implant to the prostate.  The needle was then retracted, allowing one end of the implant to be delivered to the capsular surface of the prostate.  The implant is then tensioned to assure capsular seating and removal of the slack monofilament.  The device is then angled back towards the midline and slowly advanced proximally about 3-4 mm until cystoscopic verification of the monofilament is centered in the delivery bay.  The urethral end pieces then affixed to the monofilament, thereby tailoring the size and tension of the implant.  Excess filament is then severed.  The device is then advanced into the bladder delivery system was replaced with the cystoscope bridge.  I then inspected the degree of prostatic compression achieved at the site determine where the next implant should be delivered so as to ultimately shape an anterior channel through the prostatic fossa.  The above sequences repeated at the contralateral location in the 10 o'clock rotation with the installing the cystoscope and inspecting successful placement of each implant and degree of lateral lobe obstruction remaining.  The above sequences is repeated with further implants  delivered and inspected until a satisfactory result is obtained given the presence of persistent obstruction distally.  Namely a reshaping of the prostate that offers a continuous channel to the anterior aspect of the prostatic fossa.  A final cystoscopy is conducted to inspect the location state of each implant to assure proper seating and location.  A cystoscopy was contacted with the irrigation turned off to assure that the prostatic channel remains, without irrigation pressure.  The bladder was then filled with 100-200 mL of saline to assist the patient in the postprocedure void.  There appeared to be no need for a epps catheter based on how open the prostate urethra was at the conclusion of implant placements. .     I placed a total number of 4 implants.       Estimated Blood Loss: <30 mL    Specimens:                None      Drains:  None    Complications: none    Plan: See him one month. Home without catheter.     Joe Guerrero MD     Date: 6/12/2018  Time: 9:19 AM

## 2018-06-12 NOTE — DISCHARGE INSTRUCTIONS

## 2018-06-18 ENCOUNTER — OFFICE VISIT (OUTPATIENT)
Dept: CARDIOLOGY | Facility: CLINIC | Age: 78
End: 2018-06-18

## 2018-06-18 VITALS
SYSTOLIC BLOOD PRESSURE: 160 MMHG | OXYGEN SATURATION: 99 % | HEART RATE: 99 BPM | WEIGHT: 210 LBS | HEIGHT: 69 IN | BODY MASS INDEX: 31.1 KG/M2 | DIASTOLIC BLOOD PRESSURE: 80 MMHG

## 2018-06-18 DIAGNOSIS — I25.10 CORONARY ARTERY DISEASE INVOLVING NATIVE CORONARY ARTERY OF NATIVE HEART WITHOUT ANGINA PECTORIS: Primary | ICD-10-CM

## 2018-06-18 DIAGNOSIS — E78.2 MIXED HYPERLIPIDEMIA: ICD-10-CM

## 2018-06-18 DIAGNOSIS — I10 ESSENTIAL HYPERTENSION: ICD-10-CM

## 2018-06-18 PROCEDURE — 99214 OFFICE O/P EST MOD 30 MIN: CPT | Performed by: INTERNAL MEDICINE

## 2018-06-18 RX ORDER — LOSARTAN POTASSIUM 25 MG/1
25 TABLET ORAL DAILY
Qty: 90 TABLET | Refills: 3 | Status: SHIPPED | OUTPATIENT
Start: 2018-06-18 | End: 2019-05-13

## 2018-06-18 NOTE — PROGRESS NOTES
Reason for Visit: cardiovascular follow up.    HPI:  Mina Ratliff is a 78 y.o. male is here today for follow-up.  He has been doing well and having no issues or complaints.  He denies any chest pain, palpitations, dizziness, syncope, PND, or orthopnea.  Does not have much energy and feels lethargic.  He does not get any exercise.  Does not check blood pressure at home much.   Blood pressure is elevated today.   He recently had prostate surgery last week.      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      Patient Active Problem List   Diagnosis   • Malaise and fatigue   • Hyperlipidemia   • CAD (coronary artery disease)   • HTN (hypertension)   • Chest pain   • Benign prostatic hyperplasia without lower urinary tract symptoms       Social History   Substance Use Topics   • Smoking status: Former Smoker     Types: Cigarettes   • Smokeless tobacco: Never Used      Comment: Quit about 1989   • Alcohol use No       Family History   Problem Relation Age of Onset   • Coronary artery disease Other        The following portions of the patient's history were reviewed and updated as appropriate: allergies, current medications, past family history, past medical history, past social history, past surgical history and problem list.      Current Outpatient Prescriptions:   •  aspirin  MG tablet, Take 325 mg by mouth Daily. HOLD STARTING TODAY, Disp: , Rfl:   •  Fluticasone Furoate-Vilanterol (BREO ELLIPTA) 100-25 MCG/INH aerosol powder , Inhale 1 puff Daily., Disp: , Rfl:   •  furosemide (LASIX) 40 MG tablet, Take 40 mg by mouth Daily., Disp: , Rfl:   •  HYDROcodone-acetaminophen (NORCO) 5-325 MG per tablet, Take 1 tablet by mouth Every 8 (Eight) Hours As Needed for Moderate Pain  for up to 6  "doses., Disp: 6 tablet, Rfl: 0  •  isosorbide mononitrate (IMDUR) 30 MG 24 hr tablet, Take 1 tablet by mouth Daily., Disp: 90 tablet, Rfl: 3  •  metFORMIN (GLUCOPHAGE) 1000 MG tablet, Take 1,000 mg by mouth 2 (Two) Times a Day With Meals., Disp: , Rfl:   •  Multiple Vitamins-Minerals (MULTIVITAMIN ADULT PO), Take  by mouth., Disp: , Rfl:   •  omeprazole (priLOSEC) 20 MG capsule, Take 20 mg by mouth Daily., Disp: , Rfl:   •  primidone (MYSOLINE) 50 MG tablet, Take 50 mg by mouth 2 (Two) Times a Day., Disp: , Rfl:   •  propranolol (INDERAL) 40 MG tablet, Take 40 mg by mouth 2 (Two) Times a Day., Disp: , Rfl:   •  simvastatin (ZOCOR) 40 MG tablet, Take 40 mg by mouth Every Night., Disp: , Rfl:   •  tamsulosin (FLOMAX) 0.4 MG capsule 24 hr capsule, Take 1 capsule by mouth Every Night for 360 days., Disp: 90 capsule, Rfl: 3  •  triamcinolone (KENALOG) 0.1 % cream, Apply topically 2 times daily. To lower leg dry skin, Disp: , Rfl:     Current Facility-Administered Medications:   •  lidocaine (XYLOCAINE) 2 % jelly, , Topical, PRN, Joe Guerrero MD    Review of Systems   Constitution: Negative for chills and fever.   Cardiovascular: Negative for chest pain and paroxysmal nocturnal dyspnea.   Respiratory: Negative for cough and shortness of breath.    Skin: Negative for rash.   Gastrointestinal: Negative for abdominal pain and heartburn.   Neurological: Negative for dizziness and numbness.       Objective   /80 (BP Location: Left arm, Patient Position: Sitting, Cuff Size: Adult)   Pulse 99   Ht 174.5 cm (68.7\")   Wt 95.3 kg (210 lb)   SpO2 99%   BMI 31.28 kg/m²   Physical Exam   Constitutional: He is oriented to person, place, and time. He appears well-developed and well-nourished.   HENT:   Head: Normocephalic and atraumatic.   Cardiovascular: Normal rate, regular rhythm and normal heart sounds.    No murmur heard.  Pulmonary/Chest: Effort normal and breath sounds normal.   Musculoskeletal: He exhibits no " edema.   Neurological: He is alert and oriented to person, place, and time.   Skin: Skin is warm and dry.   Psychiatric: He has a normal mood and affect.     Procedures      ICD-10-CM ICD-9-CM   1. Coronary artery disease involving native coronary artery of native heart without angina pectoris I25.10 414.01   2. Essential hypertension I10 401.9   3. Mixed hyperlipidemia E78.2 272.2         Assessment/Plan:  1. 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  No current symptoms.  Continue medical therapy with aspirin, Imdur, propranolol, and simvastatin.     2.  Essential hypertension: Blood pressure is elevated today at 160 mmHg systolic.  Will add low losartan.          3.  Hyperlipidemia: Continue simvastatin.

## 2018-07-03 NOTE — PROGRESS NOTES
Mr. Ratliff is 78 y.o. male    CHIEF COMPLAINT: I am here for follow up on BPH.    HPI  He is here for BPH with obstruction with moderate voiding symptoms. Some improvement noted after undergoing UroLift. Symptom score pre-procedure was 25. Symptom score is 13.         The following portions of the patient's history were reviewed and updated as appropriate: allergies, current medications, past family history, past medical history, past social history, past surgical history and problem list.      Review of Systems   Constitutional: Negative for chills and fever.   Gastrointestinal: Negative for abdominal pain, anal bleeding and blood in stool.   Genitourinary: Negative for flank pain, frequency, hematuria and urgency.           Current Outpatient Prescriptions:   •  aspirin  MG tablet, Take 325 mg by mouth Daily. HOLD STARTING TODAY, Disp: , Rfl:   •  diphenhydrAMINE-acetaminophen (TYLENOL PM)  MG tablet per tablet, Take 1 tablet by mouth At Night As Needed for Sleep., Disp: , Rfl:   •  furosemide (LASIX) 40 MG tablet, Take 40 mg by mouth Daily., Disp: , Rfl:   •  metFORMIN (GLUCOPHAGE) 1000 MG tablet, Take 1,000 mg by mouth 2 (Two) Times a Day With Meals., Disp: , Rfl:   •  Multiple Vitamins-Minerals (MULTIVITAMIN ADULT PO), Take  by mouth., Disp: , Rfl:   •  omeprazole (priLOSEC) 20 MG capsule, Take 20 mg by mouth Daily., Disp: , Rfl:   •  primidone (MYSOLINE) 50 MG tablet, Take 50 mg by mouth 2 (Two) Times a Day., Disp: , Rfl:   •  propranolol (INDERAL) 40 MG tablet, Take 40 mg by mouth 2 (Two) Times a Day., Disp: , Rfl:   •  simvastatin (ZOCOR) 40 MG tablet, Take 40 mg by mouth Every Night., Disp: , Rfl:   •  tamsulosin (FLOMAX) 0.4 MG capsule 24 hr capsule, Take 1 capsule by mouth Every Night for 360 days., Disp: 90 capsule, Rfl: 3  •  tiotropium (SPIRIVA) 18 MCG per inhalation capsule, Place 1 capsule into inhaler and inhale Daily., Disp: , Rfl:   •  umeclidinium-vilanterol (ANORO ELLIPTA)  62.5-25 MCG/INH aerosol powder  inhaler, Inhale 1 puff Daily., Disp: , Rfl:   •  Fluticasone Furoate-Vilanterol (BREO ELLIPTA) 100-25 MCG/INH aerosol powder , Inhale 1 puff Daily., Disp: , Rfl:   •  HYDROcodone-acetaminophen (NORCO) 5-325 MG per tablet, Take 1 tablet by mouth Every 8 (Eight) Hours As Needed for Moderate Pain  for up to 6 doses., Disp: 6 tablet, Rfl: 0  •  isosorbide mononitrate (IMDUR) 30 MG 24 hr tablet, Take 1 tablet by mouth Daily., Disp: 90 tablet, Rfl: 3  •  losartan (COZAAR) 25 MG tablet, Take 1 tablet by mouth Daily., Disp: 90 tablet, Rfl: 3  •  triamcinolone (KENALOG) 0.1 % cream, Apply topically 2 times daily. To lower leg dry skin, Disp: , Rfl:     Current Facility-Administered Medications:   •  lidocaine (XYLOCAINE) 2 % jelly, , Topical, PRN, Joe Guerrero MD    Past Medical History:   Diagnosis Date   • Asthma    • Atherosclerosis of native coronary artery of native heart without angina pectoris    • CAD (coronary artery disease)    • Chest pain    • Chronic airway obstruction (CMS/HCC)    • COPD (chronic obstructive pulmonary disease) (CMS/HCC)    • Diabetes mellitus (CMS/HCC)    • Dizziness    • GERD (gastroesophageal reflux disease)    • HTN (hypertension)    • Hyperlipidemia    • Malaise and fatigue    • Myocardial infarction, old    • Old myocardial infarction        Past Surgical History:   Procedure Laterality Date   • CARDIAC CATHETERIZATION     • CORONARY ANGIOPLASTY  08/27/2009    [facility] STATUS, [facility]    • HEMORRHOIDECTOMY     • INNER EAR SURGERY      NEW EAR DRUM CONSTRUCTED   • PROSTATE SURGERY         Social History     Social History   • Marital status:      Social History Main Topics   • Smoking status: Former Smoker     Types: Cigarettes   • Smokeless tobacco: Never Used      Comment: Quit about 1989   • Alcohol use No   • Drug use: No   • Sexual activity: Defer     Other Topics Concern   • Not on file       Family History   Problem Relation Age of Onset   •  "Coronary artery disease Other          /66   Temp 98.3 °F (36.8 °C)   Ht 177.8 cm (70\")   Wt 97.3 kg (214 lb 9.6 oz)   BMI 30.79 kg/m²       Physical Exam  Constitutional: The patient  is oriented to person, place, and time. They  appear well-developed and well-nourished. No distress.   Pulmonary/Chest: Effort normal.   Abdominal: Soft. The patient exhibits no distension and no mass. There is no tenderness. There is no rebound and no guarding. No hernia.   Neurological: Patient is alert and oriented to person, place, and time.   Skin: Skin is warm and dry. Not diaphoretic.   Psychiatric:  normal mood and affect.   Vitals reviewed.      Data  Results for orders placed or performed in visit on 07/12/18   POC Urinalysis Dipstick, Multipro   Result Value Ref Range    Color Yellow Yellow, Straw, Dark Yellow, Ruth    Clarity, UA Clear Clear    Glucose, UA Negative Negative, 1000 mg/dL (3+) mg/dL    Bilirubin Negative Negative    Ketones, UA Negative Negative    Specific Gravity  1.030 1.005 - 1.030    Blood, UA Negative Negative    pH, Urine 5.5 5.0 - 8.0    Protein, POC 30 mg/dL (A) Negative mg/dL    Urobilinogen, UA Normal Normal    Nitrite, UA Negative Negative    Leukocytes Negative Negative         Imaging Results (last 7 days)     ** No results found for the last 168 hours. **            Assessment and Plan  Diagnoses and all orders for this visit:    Benign prostatic hyperplasia with severe lower urinary tract symptoms  -     POC Urinalysis Dipstick, Multipro      Doing okay after UroLift. Will begin to reduce his tamsulosin to every other day. Hope to wean to off by the time he sees me in six months.       Joe Guerrero MD  07/12/18  3:52 PM      Cc: Shabana Franks MD  "

## 2018-07-12 ENCOUNTER — OFFICE VISIT (OUTPATIENT)
Dept: UROLOGY | Facility: CLINIC | Age: 78
End: 2018-07-12

## 2018-07-12 VITALS
BODY MASS INDEX: 30.72 KG/M2 | SYSTOLIC BLOOD PRESSURE: 134 MMHG | HEIGHT: 70 IN | WEIGHT: 214.6 LBS | DIASTOLIC BLOOD PRESSURE: 66 MMHG | TEMPERATURE: 98.3 F

## 2018-07-12 DIAGNOSIS — N40.0 BENIGN PROSTATIC HYPERPLASIA WITHOUT LOWER URINARY TRACT SYMPTOMS: Primary | ICD-10-CM

## 2018-07-12 LAB
BILIRUB BLD-MCNC: NEGATIVE MG/DL
CLARITY, POC: CLEAR
COLOR UR: YELLOW
GLUCOSE UR STRIP-MCNC: NEGATIVE MG/DL
KETONES UR QL: NEGATIVE
LEUKOCYTE EST, POC: NEGATIVE
NITRITE UR-MCNC: NEGATIVE MG/ML
PH UR: 5.5 [PH] (ref 5–8)
PROT UR STRIP-MCNC: ABNORMAL MG/DL
RBC # UR STRIP: NEGATIVE /UL
SP GR UR: 1.03 (ref 1–1.03)
UROBILINOGEN UR QL: NORMAL

## 2018-07-12 PROCEDURE — 81002 URINALYSIS NONAUTO W/O SCOPE: CPT | Performed by: UROLOGY

## 2018-07-12 PROCEDURE — 99213 OFFICE O/P EST LOW 20 MIN: CPT | Performed by: UROLOGY

## 2018-07-12 RX ORDER — ACETAMINOPHEN,DIPHENHYDRAMINE HCL 500; 25 MG/1; MG/1
1 TABLET, FILM COATED ORAL NIGHTLY PRN
COMMUNITY

## 2018-07-12 NOTE — PATIENT INSTRUCTIONS

## 2018-09-26 DIAGNOSIS — I25.10 CORONARY ARTERY DISEASE INVOLVING NATIVE CORONARY ARTERY OF NATIVE HEART WITHOUT ANGINA PECTORIS: ICD-10-CM

## 2018-09-26 RX ORDER — PROPRANOLOL HCL 60 MG
CAPSULE, EXTENDED RELEASE 24HR ORAL
Qty: 90 CAPSULE | Refills: 2 | Status: SHIPPED | OUTPATIENT
Start: 2018-09-26 | End: 2018-12-13 | Stop reason: ALTCHOICE

## 2018-09-26 RX ORDER — OMEPRAZOLE 20 MG/1
CAPSULE, DELAYED RELEASE ORAL
Qty: 180 CAPSULE | Refills: 2 | Status: SHIPPED | OUTPATIENT
Start: 2018-09-26 | End: 2019-07-03 | Stop reason: SDUPTHER

## 2018-09-27 RX ORDER — ISOSORBIDE MONONITRATE 30 MG/1
TABLET, EXTENDED RELEASE ORAL
Qty: 90 TABLET | Refills: 3 | Status: SHIPPED | OUTPATIENT
Start: 2018-09-27 | End: 2018-10-01 | Stop reason: SDUPTHER

## 2018-10-01 ENCOUNTER — OFFICE VISIT (OUTPATIENT)
Dept: CARDIOLOGY | Facility: CLINIC | Age: 78
End: 2018-10-01

## 2018-10-01 VITALS
BODY MASS INDEX: 29.92 KG/M2 | DIASTOLIC BLOOD PRESSURE: 80 MMHG | WEIGHT: 209 LBS | SYSTOLIC BLOOD PRESSURE: 140 MMHG | OXYGEN SATURATION: 98 % | HEART RATE: 109 BPM | HEIGHT: 70 IN

## 2018-10-01 DIAGNOSIS — E78.2 MIXED HYPERLIPIDEMIA: ICD-10-CM

## 2018-10-01 DIAGNOSIS — I10 ESSENTIAL HYPERTENSION: ICD-10-CM

## 2018-10-01 DIAGNOSIS — I25.118 CORONARY ARTERY DISEASE OF NATIVE ARTERY OF NATIVE HEART WITH STABLE ANGINA PECTORIS (HCC): Primary | ICD-10-CM

## 2018-10-01 PROCEDURE — 99214 OFFICE O/P EST MOD 30 MIN: CPT | Performed by: INTERNAL MEDICINE

## 2018-10-01 PROCEDURE — 93000 ELECTROCARDIOGRAM COMPLETE: CPT | Performed by: INTERNAL MEDICINE

## 2018-10-01 RX ORDER — ISOSORBIDE MONONITRATE 60 MG/1
60 TABLET, EXTENDED RELEASE ORAL DAILY
Qty: 90 TABLET | Refills: 3 | Status: SHIPPED | OUTPATIENT
Start: 2018-10-01 | End: 2018-10-15 | Stop reason: SDUPTHER

## 2018-10-01 NOTE — PROGRESS NOTES
Reason for Visit: cardiovascular follow up.    HPI:  Mina Ratliff is a 78 y.o. male is here today for follow-up.  He has had some chest pain recently.  If he exerts himself sometimes it will affect and other times it won't.  He feels the pain is relatively mild.   He has no symptoms at rest.  He gets out of breath very easily.  He recently ran out of his propranolol and is waiting for the new one to come in the mail.  He had waited until there is only one or 2 pills left in his bottle to [facility] in [facility].  His heart rate is elevated today, in sinus tachycardia with premature atrial contractions.  His essential tremor is also worse with being off the propranolol.  He has not sure if he is still taking isosorbide mononitrate at home.  It is on his medication list he brings in today but he does not remember seeing the pill bottle.    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    - Lower extremity US (9/21/17) negative for any DVT or thrombophlebitis.       Patient Active Problem List   Diagnosis   • Malaise and fatigue   • Hyperlipidemia   • CAD (coronary artery disease)   • HTN (hypertension)   • Chest pain   • Benign prostatic hyperplasia without lower urinary tract symptoms       Social History   Substance Use Topics   • Smoking status: Former Smoker     Types: Cigarettes   • Smokeless tobacco: Never Used      Comment: Quit about 1989   • Alcohol use No       Family History   Problem Relation Age of Onset   • Coronary artery disease Other        The following portions of the patient's history were reviewed and updated as appropriate: allergies, current medications, past family history, past medical history, past social history, past surgical history and problem list.      Current  Outpatient Prescriptions:   •  aspirin  MG tablet, Take 325 mg by mouth Daily. HOLD STARTING TODAY, Disp: , Rfl:   •  diphenhydrAMINE-acetaminophen (TYLENOL PM)  MG tablet per tablet, Take 1 tablet by mouth At Night As Needed for Sleep., Disp: , Rfl:   •  Fluticasone Furoate-Vilanterol (BREO ELLIPTA) 100-25 MCG/INH aerosol powder , Inhale 1 puff Daily., Disp: , Rfl:   •  furosemide (LASIX) 40 MG tablet, Take 40 mg by mouth Daily., Disp: , Rfl:   •  isosorbide mononitrate (IMDUR) 60 MG 24 hr tablet, Take 1 tablet by mouth Daily., Disp: 90 tablet, Rfl: 3  •  losartan (COZAAR) 25 MG tablet, Take 1 tablet by mouth Daily., Disp: 90 tablet, Rfl: 3  •  metFORMIN (GLUCOPHAGE) 1000 MG tablet, Take 1,000 mg by mouth 2 (Two) Times a Day With Meals., Disp: , Rfl:   •  Multiple Vitamins-Minerals (MULTIVITAMIN ADULT PO), Take  by mouth., Disp: , Rfl:   •  omeprazole (priLOSEC) 20 MG capsule, Take 20 mg by mouth Daily., Disp: , Rfl:   •  primidone (MYSOLINE) 50 MG tablet, Take 50 mg by mouth 2 (Two) Times a Day., Disp: , Rfl:   •  propranolol (INDERAL) 40 MG tablet, Take 40 mg by mouth 2 (Two) Times a Day., Disp: , Rfl:   •  simvastatin (ZOCOR) 40 MG tablet, Take 40 mg by mouth Every Night., Disp: , Rfl:   •  tamsulosin (FLOMAX) 0.4 MG capsule 24 hr capsule, Take 1 capsule by mouth Every Night for 360 days., Disp: 90 capsule, Rfl: 3  •  triamcinolone (KENALOG) 0.1 % cream, Apply topically 2 times daily. To lower leg dry skin, Disp: , Rfl:   •  umeclidinium-vilanterol (ANORO ELLIPTA) 62.5-25 MCG/INH aerosol powder  inhaler, Inhale 1 puff Daily., Disp: , Rfl:   •  tiotropium (SPIRIVA) 18 MCG per inhalation capsule, Place 1 capsule into inhaler and inhale Daily., Disp: , Rfl:     Current Facility-Administered Medications:   •  lidocaine (XYLOCAINE) 2 % jelly, , Topical, PRN, Joe Guerrero MD    Review of Systems   Constitution: Negative for chills and fever.   Cardiovascular: Positive for chest pain. Negative for  "paroxysmal nocturnal dyspnea.   Respiratory: Negative for cough and shortness of breath.    Skin: Negative for rash.   Gastrointestinal: Negative for abdominal pain and heartburn.   Neurological: Positive for tremors. Negative for dizziness and numbness.       Objective   /80 (BP Location: Left arm, Patient Position: Sitting, Cuff Size: Adult)   Pulse 109   Ht 177.8 cm (70\")   Wt 94.8 kg (209 lb)   SpO2 98%   BMI 29.99 kg/m²   Physical Exam   Constitutional: He is oriented to person, place, and time. He appears well-developed and well-nourished.   HENT:   Head: Normocephalic and atraumatic.   Cardiovascular: Regular rhythm and normal heart sounds.  Tachycardia present.    No murmur heard.  Pulmonary/Chest: Effort normal and breath sounds normal.   Abdominal: He exhibits no distension.   Musculoskeletal: He exhibits no edema.   Neurological: He is alert and oriented to person, place, and time.   Skin: Skin is warm and dry.   Psychiatric: He has a normal mood and affect.       ECG 12 Lead  Date/Time: 10/1/2018 5:44 PM  Performed by: GRAYSON SHERMAN  Authorized by: GRAYSON SHERMAN   Comparison: compared with previous ECG from 6/6/2018  Comparison to previous ECG: Heart rate has increased  Rhythm: sinus tachycardia  Other findings comments: Nonspecific ST changes                ICD-10-CM ICD-9-CM   1. Coronary artery disease of native artery of native heart with stable angina pectoris (CMS/[facility]) I25.118 414.01     413.9   2. Essential hypertension I10 401.9   3. Mixed hyperlipidemia E78.2 272.2         Assessment/Plan:  1. 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  Now having worsening chest pain.  Will check a nuclear stress test.  Titrate up Imdur to 60 mg.  Continue medical therapy with aspirin, Imdur, propranolol, and simvastatin.       2.  Essential hypertension: Blood pressure is " mildly elevated today.  Will titrate up Imdur which should help with blood pressure.  If remains elevated at follow-up will titrate up medical therapy further.      3.  Hyperlipidemia: Continue simvastatin.  Will obtain copy of last lipid panel from PCP.

## 2018-10-03 ENCOUNTER — TELEPHONE (OUTPATIENT)
Dept: CARDIOLOGY | Facility: CLINIC | Age: 78
End: 2018-10-03

## 2018-10-03 DIAGNOSIS — E78.5 DYSLIPIDEMIA: Primary | ICD-10-CM

## 2018-10-03 NOTE — TELEPHONE ENCOUNTER
Eloisa Warner from [facility] has called and left and message stating that you had called her about most recent labs/lipid on this patient of yours, she states the last lipid/labs that they have are from 10/2016 and didn't know if you would want them still, please call patient back at your convenience.     Thanks,     Marie FELIZ

## 2018-10-04 ENCOUNTER — NURSE ONLY (OUTPATIENT)
Dept: PRIMARY CARE CLINIC | Age: 78
End: 2018-10-04
Payer: MEDICARE

## 2018-10-04 DIAGNOSIS — E78.5 HYPERLIPIDEMIA, UNSPECIFIED HYPERLIPIDEMIA TYPE: Primary | ICD-10-CM

## 2018-10-04 DIAGNOSIS — Z23 NEED FOR INFLUENZA VACCINATION: ICD-10-CM

## 2018-10-04 LAB
CHOLESTEROL, TOTAL: 195 MG/DL (ref 160–199)
HDLC SERPL-MCNC: 42 MG/DL (ref 55–121)
LDL CHOLESTEROL CALCULATED: 95 MG/DL
TRIGL SERPL-MCNC: 290 MG/DL (ref 0–149)

## 2018-10-04 PROCEDURE — 90688 IIV4 VACCINE SPLT 0.5 ML IM: CPT | Performed by: FAMILY MEDICINE

## 2018-10-04 PROCEDURE — G0008 ADMIN INFLUENZA VIRUS VAC: HCPCS | Performed by: FAMILY MEDICINE

## 2018-10-04 PROCEDURE — 36415 COLL VENOUS BLD VENIPUNCTURE: CPT | Performed by: FAMILY MEDICINE

## 2018-10-08 ENCOUNTER — TELEPHONE (OUTPATIENT)
Dept: CARDIOLOGY | Facility: CLINIC | Age: 78
End: 2018-10-08

## 2018-10-08 DIAGNOSIS — E78.5 DYSLIPIDEMIA: ICD-10-CM

## 2018-10-08 DIAGNOSIS — E87.5 HYPERKALEMIA: ICD-10-CM

## 2018-10-12 ENCOUNTER — HOSPITAL ENCOUNTER (OUTPATIENT)
Dept: CARDIOLOGY | Facility: HOSPITAL | Age: 78
Discharge: HOME | End: 2018-10-12
Attending: INTERNAL MEDICINE

## 2018-10-12 VITALS
DIASTOLIC BLOOD PRESSURE: 95 MMHG | BODY MASS INDEX: 29.92 KG/M2 | WEIGHT: 209 LBS | SYSTOLIC BLOOD PRESSURE: 195 MMHG | HEART RATE: 80 BPM | HEIGHT: 70 IN

## 2018-10-12 PROCEDURE — 78452 HT MUSCLE IMAGE SPECT MULT: CPT

## 2018-10-12 PROCEDURE — A9500 TC99M SESTAMIBI: HCPCS | Performed by: INTERNAL MEDICINE

## 2018-10-12 PROCEDURE — 0 TECHNETIUM SESTAMIBI: Performed by: INTERNAL MEDICINE

## 2018-10-12 PROCEDURE — 25010000002 REGADENOSON 0.4 MG/5ML SOLUTION: Performed by: INTERNAL MEDICINE

## 2018-10-12 PROCEDURE — 93017 CV STRESS TEST TRACING ONLY: CPT

## 2018-10-12 PROCEDURE — 93018 CV STRESS TEST I&R ONLY: CPT | Performed by: INTERNAL MEDICINE

## 2018-10-12 PROCEDURE — 78452 HT MUSCLE IMAGE SPECT MULT: CPT | Performed by: INTERNAL MEDICINE

## 2018-10-12 RX ADMIN — TECHNETIUM TC 99M SESTAMIBI 1 DOSE: 1 INJECTION INTRAVENOUS at 08:04

## 2018-10-12 RX ADMIN — TECHNETIUM TC 99M SESTAMIBI 1 DOSE: 1 INJECTION INTRAVENOUS at 09:46

## 2018-10-12 RX ADMIN — REGADENOSON 0.4 MG: 0.08 INJECTION, SOLUTION INTRAVENOUS at 09:33

## 2018-10-15 DIAGNOSIS — I25.118 CORONARY ARTERY DISEASE OF NATIVE ARTERY OF NATIVE HEART WITH STABLE ANGINA PECTORIS (HCC): ICD-10-CM

## 2018-10-15 LAB
BH CV NUCLEAR PRIOR STUDY: 3
BH CV STRESS BP STAGE 1: NORMAL
BH CV STRESS COMMENTS STAGE 1: NORMAL
BH CV STRESS DOSE REGADENOSON STAGE 1: 0.4
BH CV STRESS DURATION MIN STAGE 1: 0
BH CV STRESS DURATION SEC STAGE 1: 10
BH CV STRESS HR STAGE 1: 98
BH CV STRESS PROTOCOL 1: NORMAL
BH CV STRESS RECOVERY BP: NORMAL MMHG
BH CV STRESS RECOVERY HR: 88 BPM
BH CV STRESS STAGE 1: 1
LV EF NUC BP: 72 %
MAXIMAL PREDICTED HEART RATE: 141 BPM
PERCENT MAX PREDICTED HR: 69.5 %
STRESS BASELINE BP: NORMAL MMHG
STRESS BASELINE HR: 80 BPM
STRESS PERCENT HR: 82 %
STRESS POST EXERCISE DUR MIN: 0 MIN
STRESS POST EXERCISE DUR SEC: 10 SEC
STRESS POST PEAK BP: NORMAL MMHG
STRESS POST PEAK HR: 98 BPM
STRESS TARGET HR: 120 BPM

## 2018-10-15 RX ORDER — ISOSORBIDE MONONITRATE 60 MG/1
60 TABLET, EXTENDED RELEASE ORAL DAILY
Qty: 90 TABLET | Refills: 3 | Status: SHIPPED | OUTPATIENT
Start: 2018-10-15 | End: 2020-01-20

## 2018-11-12 ENCOUNTER — OFFICE VISIT (OUTPATIENT)
Dept: PRIMARY CARE CLINIC | Age: 78
End: 2018-11-12
Payer: MEDICARE

## 2018-11-12 VITALS
WEIGHT: 210.8 LBS | OXYGEN SATURATION: 93 % | TEMPERATURE: 97.2 F | SYSTOLIC BLOOD PRESSURE: 118 MMHG | RESPIRATION RATE: 18 BRPM | HEART RATE: 68 BPM | HEIGHT: 71 IN | DIASTOLIC BLOOD PRESSURE: 69 MMHG | BODY MASS INDEX: 29.51 KG/M2

## 2018-11-12 DIAGNOSIS — E11.9 TYPE 2 DIABETES MELLITUS WITHOUT COMPLICATION, WITHOUT LONG-TERM CURRENT USE OF INSULIN (HCC): ICD-10-CM

## 2018-11-12 DIAGNOSIS — I10 ESSENTIAL HYPERTENSION: ICD-10-CM

## 2018-11-12 DIAGNOSIS — R53.1 GENERAL WEAKNESS: ICD-10-CM

## 2018-11-12 DIAGNOSIS — H61.22 LEFT EAR IMPACTED CERUMEN: ICD-10-CM

## 2018-11-12 DIAGNOSIS — J44.9 CHRONIC OBSTRUCTIVE PULMONARY DISEASE, UNSPECIFIED COPD TYPE (HCC): ICD-10-CM

## 2018-11-12 DIAGNOSIS — Z00.00 ROUTINE GENERAL MEDICAL EXAMINATION AT A HEALTH CARE FACILITY: Primary | ICD-10-CM

## 2018-11-12 LAB
ALBUMIN SERPL-MCNC: 4.3 G/DL (ref 3.5–5.2)
ALP BLD-CCNC: 67 U/L (ref 40–130)
ALT SERPL-CCNC: 19 U/L (ref 5–41)
ANION GAP SERPL CALCULATED.3IONS-SCNC: 16 MMOL/L (ref 7–19)
AST SERPL-CCNC: 21 U/L (ref 5–40)
BILIRUB SERPL-MCNC: <0.2 MG/DL (ref 0.2–1.2)
BUN BLDV-MCNC: 16 MG/DL (ref 8–23)
CALCIUM SERPL-MCNC: 9 MG/DL (ref 8.8–10.2)
CHLORIDE BLD-SCNC: 94 MMOL/L (ref 98–111)
CO2: 29 MMOL/L (ref 22–29)
CREAT SERPL-MCNC: 1.1 MG/DL (ref 0.5–1.2)
CREATININE URINE: 68.3 MG/DL (ref 4.2–622)
GFR NON-AFRICAN AMERICAN: >60
GLUCOSE BLD-MCNC: 136 MG/DL (ref 74–109)
HBA1C MFR BLD: 7.3 % (ref 4–6)
HCT VFR BLD CALC: 38.8 % (ref 42–52)
HEMOGLOBIN: 12.4 G/DL (ref 14–18)
MCH RBC QN AUTO: 30.2 PG (ref 27–31)
MCHC RBC AUTO-ENTMCNC: 32 G/DL (ref 33–37)
MCV RBC AUTO: 94.6 FL (ref 80–94)
MICROALBUMIN UR-MCNC: <1.2 MG/DL (ref 0–19)
MICROALBUMIN/CREAT UR-RTO: NORMAL MG/G
PDW BLD-RTO: 13.7 % (ref 11.5–14.5)
PLATELET # BLD: 250 K/UL (ref 130–400)
PMV BLD AUTO: 11.2 FL (ref 9.4–12.4)
POTASSIUM SERPL-SCNC: 4.5 MMOL/L (ref 3.5–5)
RBC # BLD: 4.1 M/UL (ref 4.7–6.1)
SODIUM BLD-SCNC: 139 MMOL/L (ref 136–145)
TOTAL PROTEIN: 7.7 G/DL (ref 6.6–8.7)
WBC # BLD: 8.2 K/UL (ref 4.8–10.8)

## 2018-11-12 PROCEDURE — 3023F SPIROM DOC REV: CPT | Performed by: FAMILY MEDICINE

## 2018-11-12 PROCEDURE — G8417 CALC BMI ABV UP PARAM F/U: HCPCS | Performed by: FAMILY MEDICINE

## 2018-11-12 PROCEDURE — G0439 PPPS, SUBSEQ VISIT: HCPCS | Performed by: FAMILY MEDICINE

## 2018-11-12 PROCEDURE — G8926 SPIRO NO PERF OR DOC: HCPCS | Performed by: FAMILY MEDICINE

## 2018-11-12 PROCEDURE — G8427 DOCREV CUR MEDS BY ELIG CLIN: HCPCS | Performed by: FAMILY MEDICINE

## 2018-11-12 PROCEDURE — 4040F PNEUMOC VAC/ADMIN/RCVD: CPT | Performed by: FAMILY MEDICINE

## 2018-11-12 PROCEDURE — 1101F PT FALLS ASSESS-DOCD LE1/YR: CPT | Performed by: FAMILY MEDICINE

## 2018-11-12 PROCEDURE — 1123F ACP DISCUSS/DSCN MKR DOCD: CPT | Performed by: FAMILY MEDICINE

## 2018-11-12 PROCEDURE — 36415 COLL VENOUS BLD VENIPUNCTURE: CPT | Performed by: FAMILY MEDICINE

## 2018-11-12 PROCEDURE — 1036F TOBACCO NON-USER: CPT | Performed by: FAMILY MEDICINE

## 2018-11-12 PROCEDURE — 99214 OFFICE O/P EST MOD 30 MIN: CPT | Performed by: FAMILY MEDICINE

## 2018-11-12 PROCEDURE — G8482 FLU IMMUNIZE ORDER/ADMIN: HCPCS | Performed by: FAMILY MEDICINE

## 2018-11-12 RX ORDER — LOSARTAN POTASSIUM 25 MG/1
25 TABLET ORAL
COMMUNITY
Start: 2018-06-18

## 2018-11-12 RX ORDER — ISOSORBIDE MONONITRATE 60 MG/1
60 TABLET, EXTENDED RELEASE ORAL
COMMUNITY
Start: 2018-10-15

## 2018-11-12 ASSESSMENT — LIFESTYLE VARIABLES: HOW OFTEN DO YOU HAVE A DRINK CONTAINING ALCOHOL: 0

## 2018-11-12 ASSESSMENT — PATIENT HEALTH QUESTIONNAIRE - PHQ9
SUM OF ALL RESPONSES TO PHQ QUESTIONS 1-9: 0
SUM OF ALL RESPONSES TO PHQ QUESTIONS 1-9: 0

## 2018-11-12 ASSESSMENT — ANXIETY QUESTIONNAIRES: GAD7 TOTAL SCORE: 0

## 2018-11-12 NOTE — PATIENT INSTRUCTIONS
Personalized Preventive Plan for Andrade Armando - 11/12/2018  Medicare offers a range of preventive health benefits. Some of the tests and screenings are paid in full while other may be subject to a deductible, co-insurance, and/or copay. Some of these benefits include a comprehensive review of your medical history including lifestyle, illnesses that may run in your family, and various assessments and screenings as appropriate. After reviewing your medical record and screening and assessments performed today your provider may have ordered immunizations, labs, imaging, and/or referrals for you. A list of these orders (if applicable) as well as your Preventive Care list are included within your After Visit Summary for your review. Other Preventive Recommendations:    · A preventive eye exam performed by an eye specialist is recommended every 1-2 years to screen for glaucoma; cataracts, macular degeneration, and other eye disorders. · A preventive dental visit is recommended every 6 months. · Try to get at least 150 minutes of exercise per week or 10,000 steps per day on a pedometer . · Order or download the FREE \"Exercise & Physical Activity: Your Everyday Guide\" from [employer]. Call 7-430.493.7516 or search [employer] online. · You need 7189-2222 mg of calcium and 7674-2567 IU of vitamin D per day. It is possible to meet your calcium requirement with diet alone, but a vitamin D supplement is usually necessary to meet this goal.  · When exposed to the sun, use a sunscreen that protects against both UVA and UVB radiation with an SPF of 30 or greater. Reapply every 2 to 3 hours or after sweating, drying off with a towel, or swimming. · Always wear a seat belt when traveling in a car. Always wear a helmet when riding a bicycle or motorcycle. Heart-Healthy Diet   Sodium, Fat, and Cholesterol Controlled Diet       What Is a Heart Healthy Diet?    A emergency exit plan. Have a professional check your fireplace and other fuel-burning appliances yearly. [employer]   Baby boomers entering the burgess years will continue to see the development of new products to help older adults live safely and independently in spite of age-related changes. Making Life More Livable  , by [employer], lists over 1,000 products for \"living well in the mature years,\" such as bathing and mobility aids, household security devices, ergonomically designed knives and peelers, and faucet valves and knobs for temperature control. Medical supply stores and organizations are good sources of information about products that improve your quality of life and insure your safety.      Last Reviewed: November 2009 Karina Mckeon MD   Updated: 3/7/2011     ·

## 2018-11-12 NOTE — PROGRESS NOTES
Medicare Annual Wellness Visit  Name: Ludivina Durbin Date: 2018   MRN: [id number] Sex: Male   Age: 77 y.o. Ethnicity: Non-/Non    : 1940 Race: Edwige Lind is here for Medicare AWV    Screenings for behavioral, psychosocial and functional/safety risks, and cognitive dysfunction are all negative except as indicated below. These results, as well as other patient data from the [address] form, are documented in Flowsheets linked to this Encounter. No Known Allergies  Prior to Visit Medications    Medication Sig Taking? Authorizing Provider   isosorbide mononitrate (IMDUR) 60 MG extended release tablet Take 60 mg by mouth Yes Historical Provider, MD   losartan (COZAAR) 25 MG tablet Take 25 mg by mouth Yes Historical Provider, MD   propranolol (INDERAL LA) 60 MG extended release capsule TAKE 1 CAPSULE DAILY FOR TREMOR (REPLACES 40 MG INDERAL) Yes Radha Cordero MD   metFORMIN (GLUCOPHAGE) 1000 MG tablet TAKE 1 TABLET TWICE A DAY FOR DIABETES Yes Radha Cordero MD   omeprazole (PRILOSEC) 20 MG delayed release capsule TAKE 1 CAPSULE TWICE A DAY Yes Radha Cordero MD   umeclidinium-vilanterol (ANORO ELLIPTA) 62.5-25 MCG/INH AEPB inhaler Inhale 1 puff into the lungs daily Yes Historical Provider, MD   simvastatin (ZOCOR) 40 MG tablet TAKE 1 TABLET AT BEDTIME FOR CHOLESTEROL Yes Radha Cordero MD   furosemide (LASIX) 40 MG tablet TAKE 1 TABLET DAILY FOR FLUID Yes Radha Cordero MD   primidone (MYSOLINE) 50 MG tablet 1 tab po BID for tremor Yes Radha Cordero MD   metoprolol (TOPROL XL) 50 MG XL tablet 1 tablet by mouth once a day for high blood pressure Yes Radha Cordero MD   triamcinolone (KENALOG) 0.1 % cream Apply topically 2 times daily. To lower leg dry skin Yes Radha Cordero MD   tamsulosin (FLOMAX) 0.4 MG capsule Take 1 capsule by mouth daily. Yes Radha Cordero MD   aspirin 325 MG tablet Take 325 mg by mouth daily.    Yes Historical Provider, MD

## 2018-11-13 ASSESSMENT — ENCOUNTER SYMPTOMS
SINUS PRESSURE: 0
RHINORRHEA: 0
COUGH: 1
SHORTNESS OF BREATH: 1
GASTROINTESTINAL NEGATIVE: 1
SINUS PAIN: 0

## 2018-11-14 NOTE — PROGRESS NOTES
per day. It is possible to meet your calcium requirement with diet alone, but a vitamin D supplement is usually necessary to meet this goal.  · When exposed to the sun, use a sunscreen that protects against both UVA and UVB radiation with an SPF of 30 or greater. Reapply every 2 to 3 hours or after sweating, drying off with a towel, or swimming. · Always wear a seat belt when traveling in a car. Always wear a helmet when riding a bicycle or motorcycle. Heart-Healthy Diet   Sodium, Fat, and Cholesterol Controlled Diet       What Is a Heart Healthy Diet? A heart-healthy diet is one that limits sodium , certain types of fat , and cholesterol . This type of diet is recommended for:   People with any form of cardiovascular disease (eg, coronary heart disease , peripheral vascular disease , previous heart attack , previous stroke )   People with risk factors for cardiovascular disease, such as high blood pressure , high cholesterol , or diabetes   Anyone who wants to lower their risk of developing cardiovascular disease   Sodium    Sodium is a mineral found in many foods. In general, most people consume much more sodium than they need. Diets high in sodium can increase blood pressure and lead to edema (water retention). On a heart-healthy diet, you should consume no more than 2,300 mg (milligrams) of sodium per dayabout the amount in one teaspoon of table salt. The foods highest in sodium include table salt (about 50% sodium), processed foods, convenience foods, and preserved foods. Cholesterol    Cholesterol is a fat-like, waxy substance in your blood. Our bodies make some cholesterol. It is also found in animal products, with the highest amounts in fatty meat, egg yolks, whole milk, cheese, shellfish, and organ meats. On a heart-healthy diet, you should limit your cholesterol intake to less than 200 mg per day.    It is normal and important to have some cholesterol in your bloodstream. But too much cholesterol Fruits processed with salt or sodium   Milk   Nonfat or low-fat (1%) milk Nonfat or low-fat yogurt Cottage cheese, low-fat ricotta, cheeses labeled as low-fat and low-sodium   Whole milk Reduced-fat (2%) milk Malted and chocolate milk Full fat yogurt Most cheeses (unless low-fat and low salt) Buttermilk (no more than 1 cup per week)   Meats and Beans   Lean cuts of fresh or frozen beef, veal, lamb, or pork (look for the word loin) Fresh or frozen poultry without the skin Fresh or frozen fish and some shellfish Egg whites and egg substitutes (Limit whole eggs to three per week) Tofu Nuts or seeds (unsalted, dry-roasted), low-sodium peanut butter Dried peas, beans, and lentils   Any smoked, cured, salted, or canned meat, fish, or poultry (including zamora, chipped beef, cold cuts, hot dogs, sausages, sardines, and anchovies) Poultry skins Breaded and/or fried fish or meats Canned peas, beans, and lentils Salted nuts   Fats and Oils   Olive oil and canola oil Low-sodium, low-fat salad dressings and mayonnaise   Butter, margarine, coconut and palm oils, azmora fat   Snacks, Sweets, and Condiments   Low-sodium or unsalted versions of broths, soups, soy sauce, and condiments Pepper, herbs, and spices; vinegar, lemon, or lime juice Low-fat frozen desserts (yogurt, sherbet, fruit bars) Sugar, cocoa powder, honey, syrup, jam, and preserves Low-fat, trans-fat free cookies, cakes, and pies Mahendra and animal crackers, fig bars, vargas snaps   High-fat desserts Broth, soups, gravies, and sauces, made from instant mixes or other high-sodium ingredients Salted snack foods Canned olives Meat tenderizers, seasoning salt, and most flavored vinegars   Beverages   Low-sodium carbonated beverages Tea and coffee in moderation Soy milk   Commercially softened water   Suggestions   Make whole grains, fruits, and vegetables the base of your diet.     Choose heart-healthy fats such as canola, olive, and flaxseed oil, and foods high in blurred vision)especially medicines like prescription pain medicines and drugs used to treat mental health conditions   Depending on the brittleness of your bones, the consequences of a fall can be serious and long lasting. Home Life   Research by the [employer]ging [facility]) shows that some home accidents among older adults can be prevented by making simple lifestyle changes and basic modifications and repairs to the home environment. Here are some lifestyle changes that experts recommend:   Have your hearing and vision checked regularly. Be sure to wear prescription glasses that are right for you. Speak to your doctor or pharmacist about the possible side effects of your medicines. A number of medicines can cause dizziness. If you have problems with sleep, talk to your doctor. Limit your intake of alcohol. If necessary, use a cane or walker to help maintain your balance. Wear supportive, rubber-soled shoes, even at home. If you live in a region that gets wintry weather, you may want to put special cleats on your shoes to prevent you from slipping on the snow and ice. Exercise regularly to help maintain muscle tone, agility, and balance. Always hold the banister when going up or down stairs. Also, use  bars when getting in or out of the bath or shower, or using the toilet. To avoid dizziness, get up slowly from a lying down position. Sit up first, dangling your legs for a minute or two before rising to a standing position. Overall Home Safety Check   According to the Consumer Product Safety Commision's \"Older Consumer Home Safety Checklist,\" it is important to check for potential hazards in each room. And remember, proper lighting is an essential factor in home safety. If you cannot see clearly, you are more likely to fall. Important questions to ask yourself include:   Are lamp, electric, extension, and telephone cords placed out of the flow of traffic and maintained in good condition?

## 2018-11-19 ENCOUNTER — OFFICE VISIT (OUTPATIENT)
Dept: CARDIOLOGY | Facility: CLINIC | Age: 78
End: 2018-11-19

## 2018-11-19 VITALS
HEIGHT: 70 IN | BODY MASS INDEX: 29.92 KG/M2 | SYSTOLIC BLOOD PRESSURE: 140 MMHG | DIASTOLIC BLOOD PRESSURE: 78 MMHG | HEART RATE: 78 BPM | WEIGHT: 209 LBS | OXYGEN SATURATION: 97 %

## 2018-11-19 DIAGNOSIS — I25.118 CORONARY ARTERY DISEASE OF NATIVE ARTERY OF NATIVE HEART WITH STABLE ANGINA PECTORIS (HCC): Primary | ICD-10-CM

## 2018-11-19 DIAGNOSIS — E78.2 MIXED HYPERLIPIDEMIA: ICD-10-CM

## 2018-11-19 DIAGNOSIS — I10 ESSENTIAL HYPERTENSION: ICD-10-CM

## 2018-11-19 PROCEDURE — 99214 OFFICE O/P EST MOD 30 MIN: CPT | Performed by: INTERNAL MEDICINE

## 2018-11-19 PROCEDURE — 93000 ELECTROCARDIOGRAM COMPLETE: CPT | Performed by: INTERNAL MEDICINE

## 2018-11-19 RX ORDER — PROPRANOLOL HYDROCHLORIDE 60 MG/1
60 TABLET ORAL 2 TIMES DAILY
Qty: 180 TABLET | Refills: 3 | Status: SHIPPED | OUTPATIENT
Start: 2018-11-19 | End: 2019-05-13 | Stop reason: SDUPTHER

## 2018-11-19 NOTE — PROGRESS NOTES
Reason for Visit: cardiovascular follow up.    HPI:  Mina Ratliff is a 78 y.o. male is here today for 6 week follow-up.  He was last seen for evaluation of worsening chest pain.  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  He was started on medical therapy.  He is doing about the same.  He occasionally notes some mild discomfort in his chest but overall this has improved slightly with medication changes.  He denies any palpitations, dizziness, syncope, PND, or orthopnea.      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    - Lower extremity US (9/21/17) negative for any DVT or thrombophlebitis.   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 with mild miranda-infarct ischemia, EF > 70%      Patient Active Problem List   Diagnosis   • Malaise and fatigue   • Hyperlipidemia   • CAD (coronary artery disease)   • HTN (hypertension)   • Chest pain   • Benign prostatic hyperplasia without lower urinary tract symptoms       Social History     Tobacco Use   • Smoking status: Former Smoker     Types: Cigarettes   • Smokeless tobacco: Never Used   • Tobacco comment: Quit about 1989   Substance Use Topics   • Alcohol use: No   • Drug use: No       Family History   Problem Relation Age of Onset   • Coronary artery disease Other        The following portions of the patient's history were reviewed and updated as appropriate: allergies, current medications, past family history, past medical history, past social history, past surgical history and problem list.      Current Outpatient Medications:   •  aspirin  MG tablet, Take 325 mg by mouth Daily. HOLD STARTING  TODAY, Disp: , Rfl:   •  diphenhydrAMINE-acetaminophen (TYLENOL PM)  MG tablet per tablet, Take 1 tablet by mouth At Night As Needed for Sleep., Disp: , Rfl:   •  Fluticasone Furoate-Vilanterol (BREO ELLIPTA) 100-25 MCG/INH aerosol powder , Inhale 1 puff Daily., Disp: , Rfl:   •  furosemide (LASIX) 40 MG tablet, Take 40 mg by mouth Daily., Disp: , Rfl:   •  isosorbide mononitrate (IMDUR) 60 MG 24 hr tablet, Take 1 tablet by mouth Daily., Disp: 90 tablet, Rfl: 3  •  losartan (COZAAR) 25 MG tablet, Take 1 tablet by mouth Daily., Disp: 90 tablet, Rfl: 3  •  metFORMIN (GLUCOPHAGE) 1000 MG tablet, Take 1,000 mg by mouth 2 (Two) Times a Day With Meals., Disp: , Rfl:   •  Multiple Vitamins-Minerals (MULTIVITAMIN ADULT PO), Take  by mouth., Disp: , Rfl:   •  omeprazole (priLOSEC) 20 MG capsule, Take 20 mg by mouth Daily., Disp: , Rfl:   •  primidone (MYSOLINE) 50 MG tablet, Take 50 mg by mouth 2 (Two) Times a Day., Disp: , Rfl:   •  propranolol (INDERAL) 60 MG tablet, Take 1 tablet by mouth 2 (Two) Times a Day., Disp: 180 tablet, Rfl: 3  •  simvastatin (ZOCOR) 40 MG tablet, Take 40 mg by mouth Every Night., Disp: , Rfl:   •  tamsulosin (FLOMAX) 0.4 MG capsule 24 hr capsule, Take 1 capsule by mouth Every Night for 360 days., Disp: 90 capsule, Rfl: 3  •  tiotropium (SPIRIVA) 18 MCG per inhalation capsule, Place 1 capsule into inhaler and inhale Daily., Disp: , Rfl:   •  triamcinolone (KENALOG) 0.1 % cream, Apply topically 2 times daily. To lower leg dry skin, Disp: , Rfl:   •  umeclidinium-vilanterol (ANORO ELLIPTA) 62.5-25 MCG/INH aerosol powder  inhaler, Inhale 1 puff Daily., Disp: , Rfl:     Current Facility-Administered Medications:   •  lidocaine (XYLOCAINE) 2 % jelly, , Topical, PRN, Cesar, Joe L, MD    Review of Systems   Constitution: Negative for chills and fever.   Cardiovascular: Positive for chest pain. Negative for paroxysmal nocturnal dyspnea.   Respiratory: Negative for cough and shortness of breath.   "  Skin: Negative for rash.   Gastrointestinal: Negative for abdominal pain and heartburn.   Neurological: Negative for dizziness and numbness.       Objective   /78 (BP Location: Left arm, Patient Position: Sitting, Cuff Size: Adult)   Pulse 78   Ht 177.8 cm (70\")   Wt 94.8 kg (209 lb)   SpO2 97%   BMI 29.99 kg/m²   Physical Exam   Constitutional: He is oriented to person, place, and time. He appears well-developed and well-nourished.   HENT:   Head: Normocephalic and atraumatic.   Cardiovascular: Normal rate, regular rhythm and normal heart sounds.   No murmur heard.  Pulmonary/Chest: Effort normal and breath sounds normal.   Musculoskeletal: He exhibits no edema.   Neurological: He is alert and oriented to person, place, and time.   Skin: Skin is warm and dry.   Psychiatric: He has a normal mood and affect.       ECG 12 Lead  Date/Time: 11/19/2018 2:22 PM  Performed by: Ciro Helm MD  Authorized by: Ciro Helm MD   Comparison: compared with previous ECG from 10/1/2018  Similar to previous ECG  Rhythm: sinus rhythm  Ectopy: atrial premature contractions  Rate: normal  Other findings comments: nonspecific ST changes                ICD-10-CM ICD-9-CM   1. Coronary artery disease of native artery of native heart with stable angina pectoris (CMS/[facility]) I25.118 414.01     413.9   2. Essential hypertension I10 401.9   3. Mixed hyperlipidemia E78.2 272.2         Assessment/Plan:  1. 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 Nuclear stress test demonstrated inferolateral defect in area of circumflex distribution.  Titrate up propranolol.  Continue aspirin, Imdur, and simvastatin.        2.  Essential hypertension: Blood pressure remains mildly elevated today.  Will titrate up propranolol which patient takes for an essential tremor.        3.  Hyperlipidemia: Continue simvastatin.  " Will obtain copy of last lipid panel from PCP.

## 2018-11-29 ENCOUNTER — TELEPHONE (OUTPATIENT)
Dept: PRIMARY CARE CLINIC | Age: 78
End: 2018-11-29

## 2018-12-02 PROBLEM — K21.9 GERD (GASTROESOPHAGEAL REFLUX DISEASE): Status: ACTIVE | Noted: 2018-12-02

## 2018-12-03 ENCOUNTER — OFFICE VISIT (OUTPATIENT)
Dept: PULMONOLOGY | Facility: CLINIC | Age: 78
End: 2018-12-03

## 2018-12-03 VITALS
HEIGHT: 70 IN | WEIGHT: 213 LBS | SYSTOLIC BLOOD PRESSURE: 130 MMHG | DIASTOLIC BLOOD PRESSURE: 80 MMHG | HEART RATE: 83 BPM | OXYGEN SATURATION: 97 % | BODY MASS INDEX: 30.49 KG/M2

## 2018-12-03 DIAGNOSIS — K21.9 GASTROESOPHAGEAL REFLUX DISEASE WITHOUT ESOPHAGITIS: ICD-10-CM

## 2018-12-03 DIAGNOSIS — J44.9 STAGE 3 SEVERE COPD BY GOLD CLASSIFICATION (HCC): Primary | ICD-10-CM

## 2018-12-03 DIAGNOSIS — I25.10 CORONARY ARTERY DISEASE INVOLVING NATIVE CORONARY ARTERY OF NATIVE HEART WITHOUT ANGINA PECTORIS: ICD-10-CM

## 2018-12-03 DIAGNOSIS — I10 ESSENTIAL HYPERTENSION: ICD-10-CM

## 2018-12-03 DIAGNOSIS — E11.9 TYPE 2 DIABETES MELLITUS WITHOUT COMPLICATION, UNSPECIFIED WHETHER LONG TERM INSULIN USE (HCC): ICD-10-CM

## 2018-12-03 PROCEDURE — 99214 OFFICE O/P EST MOD 30 MIN: CPT | Performed by: INTERNAL MEDICINE

## 2018-12-03 RX ORDER — METOPROLOL SUCCINATE 50 MG/1
TABLET, EXTENDED RELEASE ORAL
COMMUNITY
Start: 2016-04-05 | End: 2019-05-13

## 2018-12-03 NOTE — PROGRESS NOTES
Subjective   Mina Ratliff is a 78 y.o. male.     Chief Complaint   Patient presents with   • Follow-up        History of Present Illness   I lst saw him about 6 months ago with Gold stage 3 copd with fev1 42% predicted.  He says he has moderate to severe shortness of breath and fatigue with walking for the past 6 months, associated with severe phlegm build up.  He is in biokinetic therapy.    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   has a past surgical history that includes Cardiac catheterization; Coronary angioplasty (08/27/2009); Hemorrhoid surgery; Inner ear surgery; Prostate surgery; and PROSTATIC URETHRAL LIFT (N/A, 6/12/2018).  family history includes Coronary artery disease in his other.   reports that he has quit smoking. His smoking use included cigarettes. he has never used smokeless tobacco. He reports that he does not drink alcohol or use drugs.  No Known Allergies  Medications    Current Outpatient Medications:   •  metoprolol succinate XL (TOPROL-XL) 50 MG 24 hr tablet, 1 tablet by mouth once a day for high blood pressure, Disp: , Rfl:   •  aspirin  MG tablet, Take 325 mg by mouth Daily. HOLD STARTING TODAY, Disp: , Rfl:   •  diphenhydrAMINE-acetaminophen (TYLENOL PM)  MG tablet per tablet, Take 1 tablet by mouth At Night As Needed for Sleep., Disp: , Rfl:   •  Fluticasone Furoate-Vilanterol (BREO ELLIPTA) 100-25 MCG/INH aerosol powder , Inhale 1 puff Daily., Disp: , Rfl:   •   furosemide (LASIX) 40 MG tablet, Take 40 mg by mouth Daily., Disp: , Rfl:   •  isosorbide mononitrate (IMDUR) 60 MG 24 hr tablet, Take 1 tablet by mouth Daily., Disp: 90 tablet, Rfl: 3  •  losartan (COZAAR) 25 MG tablet, Take 1 tablet by mouth Daily., Disp: 90 tablet, Rfl: 3  •  metFORMIN (GLUCOPHAGE) 1000 MG tablet, Take 1,000 mg by mouth 2 (Two) Times a Day With Meals., Disp: , Rfl:   •  Multiple Vitamins-Minerals (MULTIVITAMIN ADULT PO), Take  by mouth., Disp: , Rfl:   •  omeprazole (priLOSEC) 20 MG capsule, Take 20 mg by mouth Daily., Disp: , Rfl:   •  primidone (MYSOLINE) 50 MG tablet, Take 50 mg by mouth 2 (Two) Times a Day., Disp: , Rfl:   •  propranolol (INDERAL) 60 MG tablet, Take 1 tablet by mouth 2 (Two) Times a Day., Disp: 180 tablet, Rfl: 3  •  simvastatin (ZOCOR) 40 MG tablet, Take 40 mg by mouth Every Night., Disp: , Rfl:   •  tamsulosin (FLOMAX) 0.4 MG capsule 24 hr capsule, Take 1 capsule by mouth Every Night for 360 days., Disp: 90 capsule, Rfl: 3  •  tiotropium (SPIRIVA) 18 MCG per inhalation capsule, Place 1 capsule into inhaler and inhale Daily., Disp: , Rfl:   •  triamcinolone (KENALOG) 0.1 % cream, Apply topically 2 times daily. To lower leg dry skin, Disp: , Rfl:   •  umeclidinium-vilanterol (ANORO ELLIPTA) 62.5-25 MCG/INH aerosol powder  inhaler, Inhale 1 puff Daily., Disp: , Rfl:     Current Facility-Administered Medications:   •  lidocaine (XYLOCAINE) 2 % jelly, , Topical, PRN, CesarJoe MD    Review of Systems   HENT: Positive for hearing loss (uses hearing aids).    Gastrointestinal: Negative for nausea and vomiting.     ------------------------------------  Objective     Physical Exam   Constitutional: He appears well-developed and well-nourished. He does not appear ill. No distress.   HENT:   Head: Normocephalic and atraumatic.   Nose: Nose normal.   Eyes: Conjunctivae and EOM are normal.   Neck: Neck supple.   Cardiovascular: Normal rate, regular rhythm, S1 normal and S2  normal.   Pulmonary/Chest: Effort normal. No stridor. He has no wheezes. He has no rales.   Abdominal: Soft. He exhibits no distension. There is no tenderness. There is no guarding.   Musculoskeletal: He exhibits no edema or deformity.   Neurological: He is alert.   Skin: Skin is warm and dry. No rash noted.   Psychiatric: He has a normal mood and affect.         ------------------------------------  Assessment/Plan   Mina was seen today for follow-up.    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 BMI is above normal parameters. Recommendations include: referral to primary care.      He is doing as well as we could expect.  I offered pulmonary rehab, but he is going to continue his current therapy for now.  He will call for problems over the winter and will call when he is ready to try pulmonary rehab. Continue anoro for now.  Continue rescue inhaler prn and preemptively prior to exertion.  Cad and htn could contribute to respiratory symptoms but appear stable currently  Recommendmucinex or mucinex dm for cough symptoms

## 2018-12-03 NOTE — PATIENT INSTRUCTIONS
Dextromethorphan; Guaifenesin oral solution  What is this medicine?  DEXTROMETHORPHAN; GUAIFENESIN (dex troe meth OR fan; gwye FEN e sin) is a combination of a cough suppressant andDextromethorphan; Guaifenesin capsules and ER tablets  What is this medicine?  DEXTROMETHORPHAN; GUAIFENESIN (dex troe meth OR fan; gwye FEN e sin) is a cough suppressant, expectorant combination. It is used to provide relief from cough. This medicine will not treat an infection.  This medicine may be used for other purposes; ask your health care provider or pharmacist if you have questions.  COMMON BRAND NAME(S): Maru-[place] Plus Max Cough, Mucus & Congestion, Maru-[place] Plus Mucus and Congestion, AllFen DM, Ambi, Amibid DM, Aquabid DM, Aquatab DM, Bidex-A, Bidex-DM, Cofex-DM, Coricidin HBP Chest Congetion and Cough, Duradex, Duradex Forte, Extuss LA, Fenesin DM, G-Bid DM TR, /DM 30, Guaidrine DM, Guaifenex DM, Reinaldo-D, Humibid DM, Iobid DM, Mindal DM, Mucinex DM, Muco-Fen DM, Phlemex, Q-Bid DM, Relacon LAX, Respa-DM, Ru-Tuss-DM, Sudal DM, Touro DM, Tussi-Bid, Z-Cof LA, Z-Cof LAX  What should I tell my health care provider before I take this medicine?  They need to know if you have any of these conditions:  -asthma  -chronic bronchitis  -emphysema  -if you have taken an MAOI like Carbex, Eldepryl, Marplan, Nardil, or Parnate in last 14 days  -kidney or liver disease  -unable to sit up  -an unusual or allergic reaction to dextromethorphan, guaifenesin, other medicines, foods, dyes, bromides, or preservatives  -pregnant or trying to get pregnant  -breast-feeding  How should I use this medicine?  Take this medicine by mouth with a full glass of water. Follow the directions on the prescription label. Do not crush or chew. Take your medicine at regular intervals. Do not take your medicine more often than directed.  Talk to your pediatrician regarding the use of this medicine in children. While this drug may be prescribed for children  as young as 6 years of age for selected conditions, precautions do apply.  Overdosage: If you think you have taken too much of this medicine contact a poison control center or emergency room at once.  NOTE: This medicine is only for you. Do not share this medicine with others.  What if I miss a dose?  If you miss a dose, take it as soon as you can. If it is almost time for your next dose, take only that dose. Do not take double or extra doses.  What may interact with this medicine?  Do not take this medicine with any of the following medications:  -MAOIs like Carbex, Eldepryl, Marplan, Nardil, and Parnate  -procarbazine  This medicine may also interact with the following medications:  -medicines for depression or other mental disturbances  -other medicines for colds or allergy  This list may not describe all possible interactions. Give your health care provider a list of all the medicines, herbs, non-prescription drugs, or dietary supplements you use. Also tell them if you smoke, drink alcohol, or use illegal drugs. Some items may interact with your medicine.  What should I watch for while using this medicine?  Tell your doctor if your symptoms do not improve within 5 days or if they get worse. If you have a high fever, skin rash, lasting headache, or sore throat, see your doctor.  Drink 6 to 8 glasses of water daily while you are taking this medicine to help loosen mucus.  You may get drowsy or dizzy. Do not drive, use machinery, or do anything that needs mental alertness until you know how this medicine affects you. Do not stand or sit up quickly, especially if you are an older patient. This reduces the risk of dizzy or fainting spells. Alcohol may interfere with the effect of this medicine. Avoid alcoholic drinks.  What side effects may I notice from receiving this medicine?  Side effects that you should report to your doctor or health care professional as soon as possible:  -allergic reactions like skin rash,  itching or hives, swelling of the face, lips, or tongue  -confusion  -excitement, nervousness, restlessness, or irritability  -slow or troubled breathing  Side effects that usually do not require medical attention (report to your doctor or health care professional if they continue or are bothersome):  -headache  -stomach upset  This list may not describe all possible side effects. Call your doctor for medical advice about side effects. You may report side effects to FDA at 9-070-FDA-9685.  Where should I keep my medicine?  Keep out of the reach of children.  Store at room temperature between 15 and 30 degrees C (59 and 86 degrees F). Protect from light and moisture. Throw away any unused medicine after the expiration date.  NOTE: This sheet is a summary. It may not cover all possible information. If you have questions about this medicine, talk to your doctor, pharmacist, or health care provider.  © 2018 Elsevier/Gold Standard (2009-04-09 17:31:08)   expectorant. It is used for the temporary relief of coughs. This medicine is also used to loosen mucus.  This medicine may be used for other purposes; ask your health care provider or pharmacist if you have questions.  COMMON BRAND NAME(S): Altarussin DM, Aquatab DM, Cheracol D, Delsym Children's Cough + Chest Congestion DM, Delsym Cough + Chest Congestion DM, Dex-Tuss DM, Diabetic Tussin DM, DM/GUAI, Dometuss DM, Drituss DM, Duraganidin DM, Duratuss DM, Gani-Tuss DM NR, Genatuss DM, Guai-Dex, Guiadrine DX, Guiatuss DM, H-T Tussin, Hydro-Tussin DM, Iophen DM-NR, Mucinex Children's Cough, Mucinex Fast-Max DM Max, Mucus Children's Cough, Naldecon, Nalspan Senior DX, Nortuss EX, Orgadin-Tuss DM, PediaCare Cough & Congestion, Pulexn DM, Q-Tussin DM, Robafen DM, Robafen DM Clear, Robafen DM Max, Robitussin Adult Peak Cold, Robitussin Cough and Congestion, Robitussin DM, Scot-Tussin Senior, Siltussin DM [place], Siltussin-DM, Siltussin-DM Diabetic [place]-Na, Siltussin-DM Diabetic  [place]-Na Maximum Strength, Simuc-DM, Stewart-Tuss DM, Triaminic Cough & Congestion, Tussi-Organidin DM NR, Tussiden DM, Tussidin DM NR, Vicks DayQuil Mucus Control DM, Vicks DayQuil Nature Fusion, Vicks Formula 44, Vicks Formula 44E, Vicks Nature Fusion Cough & Chest Congestion  What should I tell my health care provider before I take this medicine?  They need to know if you have any of these conditions:  -chronic bronchitis  -kidney disease  -liver disease  -lung or breathing disease, like asthma or emphysema  -unable to sit up  -an unusual or allergic reaction to dextromethorphan, guaifenesin, other medicines, foods, dyes, bromides, or preservatives  -pregnant or trying to get pregnant  -breast-feeding  How should I use this medicine?  Take this medicine by mouth with a full glass of water. Follow the directions on the prescription label. Use a specially marked spoon or container to measure your dose. Household spoons are not accurate. Take your medicine at regular intervals. Do not take it more often than directed.  Talk to your pediatrician regarding the use of this medicine in children. Special care may be needed.  Overdosage: If you think you have taken too much of this medicine contact a poison control center or emergency room at once.  NOTE: This medicine is only for you. Do not share this medicine with others.  What if I miss a dose?  If you miss a dose, take it as soon as you can. If it is almost time for your next dose, take only that dose. Do not take double or extra doses.  What may interact with this medicine?  Do not take this medicine with any of the following medications:  -MAOIs like Carbex, Eldepryl, Marplan, Nardil, and Parnate  -procarbazine  This medicine may also interact with the following medications:  -other medicines for colds or allergy  -medicines for depression or other mental disturbances  This list may not describe all possible interactions. Give your health care provider a list of all the  medicines, herbs, non-prescription drugs, or dietary supplements you use. Also tell them if you smoke, drink alcohol, or use illegal drugs. Some items may interact with your medicine.  What should I watch for while using this medicine?  Do not treat yourself for a cough for more than 1 week without consulting your doctor or health care professional. If you have a high fever, skin rash, lasting headache, or sore throat, see your doctor.  Drink 6 to 8 glasses of water daily while you are taking this medicine to help loosen mucus.  You may get drowsy or dizzy. Do not drive, use machinery, or do anything that needs mental alertness until you know how this medicine affects you. Do not stand or sit up quickly, especially if you are an older patient. This reduces the risk of dizzy or fainting spells. Alcohol may interfere with the effect of this medicine. Avoid alcoholic drinks.  What side effects may I notice from receiving this medicine?  Side effects that you should report to your doctor or health care professional as soon as possible:  -allergic reactions like skin rash, itching or hives, swelling of the face, lips, or tongue  -breathing problems  -confusion  -excitement, nervousness, restlessness, or irritability  Side effects that usually do not require medical attention (report to your doctor or health care professional if they continue or are bothersome):  -headache  -stomach upset  This list may not describe all possible side effects. Call your doctor for medical advice about side effects. You may report side effects to FDA at 9-754-FDA-1935.  Where should I keep my medicine?  Keep out of the reach of children.  Store at room temperature between 20 and 25 degrees C (68 and 77 degrees F). Keep bottle tightly closed. Throw away any unused medicine after the expiration date.  NOTE: This sheet is a summary. It may not cover all possible information. If you have questions about this medicine, talk to your doctor,  pharmacist, or health care provider.  © 2018 Elsevier/Gold Standard (2009-04-09 17:34:44)

## 2018-12-13 ENCOUNTER — TELEPHONE (OUTPATIENT)
Dept: PRIMARY CARE CLINIC | Age: 78
End: 2018-12-13

## 2018-12-13 ENCOUNTER — OFFICE VISIT (OUTPATIENT)
Dept: PRIMARY CARE CLINIC | Age: 78
End: 2018-12-13
Payer: MEDICARE

## 2018-12-13 VITALS
HEIGHT: 70 IN | BODY MASS INDEX: 30.43 KG/M2 | DIASTOLIC BLOOD PRESSURE: 72 MMHG | RESPIRATION RATE: 18 BRPM | SYSTOLIC BLOOD PRESSURE: 145 MMHG | HEART RATE: 84 BPM | OXYGEN SATURATION: 97 % | WEIGHT: 212.6 LBS | TEMPERATURE: 98 F

## 2018-12-13 DIAGNOSIS — I10 ESSENTIAL HYPERTENSION: ICD-10-CM

## 2018-12-13 DIAGNOSIS — R53.82 CHRONIC FATIGUE: ICD-10-CM

## 2018-12-13 DIAGNOSIS — I49.9 IRREGULAR HEART BEAT: Primary | ICD-10-CM

## 2018-12-13 PROCEDURE — 99213 OFFICE O/P EST LOW 20 MIN: CPT | Performed by: FAMILY MEDICINE

## 2018-12-13 PROCEDURE — 1123F ACP DISCUSS/DSCN MKR DOCD: CPT | Performed by: FAMILY MEDICINE

## 2018-12-13 PROCEDURE — 4040F PNEUMOC VAC/ADMIN/RCVD: CPT | Performed by: FAMILY MEDICINE

## 2018-12-13 PROCEDURE — G8482 FLU IMMUNIZE ORDER/ADMIN: HCPCS | Performed by: FAMILY MEDICINE

## 2018-12-13 PROCEDURE — 1036F TOBACCO NON-USER: CPT | Performed by: FAMILY MEDICINE

## 2018-12-13 PROCEDURE — G8427 DOCREV CUR MEDS BY ELIG CLIN: HCPCS | Performed by: FAMILY MEDICINE

## 2018-12-13 PROCEDURE — 1101F PT FALLS ASSESS-DOCD LE1/YR: CPT | Performed by: FAMILY MEDICINE

## 2018-12-13 PROCEDURE — G8417 CALC BMI ABV UP PARAM F/U: HCPCS | Performed by: FAMILY MEDICINE

## 2018-12-13 PROCEDURE — 93000 ELECTROCARDIOGRAM COMPLETE: CPT | Performed by: FAMILY MEDICINE

## 2018-12-13 RX ORDER — METOPROLOL TARTRATE 50 MG/1
TABLET, FILM COATED ORAL
Qty: 60 TABLET | Refills: 5 | Status: SHIPPED | OUTPATIENT
Start: 2018-12-13 | End: 2019-01-08 | Stop reason: SDUPTHER

## 2018-12-13 ASSESSMENT — ENCOUNTER SYMPTOMS
WHEEZING: 0
SHORTNESS OF BREATH: 1
COUGH: 1

## 2018-12-13 NOTE — TELEPHONE ENCOUNTER
I spoke with Dr. Micah Kenney the cardiologist. He suggested that we stop the propranolol and changed him to Lopressor 50 mg 1 tablet twice a day. We can titrate up if we need to. Follow-up with me in 2 weeks. If he develops severe fatigue, chest pain or increasing shortness of breath he needs to call. I have sent in the Lopressor.  Please put this in a phone note. -per

## 2018-12-13 NOTE — PATIENT INSTRUCTIONS
Patient Education        Premature Heartbeat: Care Instructions  Your Care Instructions    A premature heartbeat happens when the heart beats earlier than it should. This briefly interrupts the heart's rhythm. You do not usually feel the early heartbeat, and the next beat is stronger. To many people, this feels like a skipped heartbeat or a flutter. This heartbeat is also called a premature ventricular contraction (PVC). If you have no heart problems, premature heartbeats that happen once in a while are not a cause for concern. Most people have them at some time. They may happen more often if you drink a lot of caffeine or alcohol or are under stress. Usually, no cause for a premature heartbeat is found, and no treatment is needed. Some people may take medicine to prevent these heartbeats and to relieve symptoms. Follow-up care is a key part of your treatment and safety. Be sure to make and go to all appointments, and call your doctor if you are having problems. It's also a good idea to know your test results and keep a list of the medicines you take. How can you care for yourself at home? · Limit caffeine and other stimulants if they trigger premature heartbeats. · Reduce stress. Avoid people and places that make you feel anxious, if you can. Learn ways to reduce stress, such as biofeedback, guided imagery, and meditation. · Do not smoke or allow others to smoke around you. If you need help quitting, talk to your doctor about stop-smoking programs and medicines. These can increase your chances of quitting for good. · Get at least 30 minutes of exercise on most days of the week. Walking is a good choice. You also may want to do other activities, such as running, swimming, cycling, or playing tennis or team sports. · Eat heart-healthy foods. · Stay at a healthy weight. Lose weight if you need to. · Get enough sleep.  Keep your room dark and quiet, and try to go to bed at the same time every

## 2018-12-13 NOTE — PROGRESS NOTES
Subjective:      Patient ID: Margo Rowe is a 77 y.o. male who comes in today because they told him at physical therapy. He had an irregular heartbeat. HPI. A few days ago he noticed that he was dizzy and tired at physical therapy. Blood pressure was elevated 180 over something and it came down to 150/72 when he rested. He felt better but went back to physical therapy today and again he became lightheaded and tired and felt weak. They listen to his heart and told him he had an irregular heartbeat and he needed to be seen. He has not noticed that his heart was out of rhythm. He denies any chest pain. He does have some shortness of breath but he does have COPD and occasionally will have rhonchi. He says he thinks he needs an antibiotic and steroid but has had no fever or chills. He does have a cough and it is occasionally productive.     Margo Rowe is a 77 y.o. male with the following history as recorded in [facility]:  Patient Active Problem List    Diagnosis Date Noted    Type 2 diabetes mellitus without complication ([facility] 55/76/3788    Essential hypertension 09/15/2015    COPD (chronic obstructive pulmonary disease) ([facility] 03/13/2015    Hypercholesterolemia     Bronchitis, chronic (HCC)     GERD (gastroesophageal reflux disease)      Current Outpatient Prescriptions   Medication Sig Dispense Refill    isosorbide mononitrate (IMDUR) 60 MG extended release tablet Take 60 mg by mouth      losartan (COZAAR) 25 MG tablet Take 25 mg by mouth      propranolol (INDERAL LA) 60 MG extended release capsule TAKE 1 CAPSULE DAILY FOR TREMOR (REPLACES 40 MG INDERAL) 90 capsule 2    metFORMIN (GLUCOPHAGE) 1000 MG tablet TAKE 1 TABLET TWICE A DAY FOR DIABETES 180 tablet 3    omeprazole (PRILOSEC) 20 MG delayed release capsule TAKE 1 CAPSULE TWICE A  capsule 2    umeclidinium-vilanterol (ANORO ELLIPTA) 62.5-25 MCG/INH AEPB inhaler Inhale 1 puff into the lungs daily      simvastatin (ZOCOR) 40 MG

## 2019-01-04 ENCOUNTER — OFFICE VISIT (OUTPATIENT)
Dept: PRIMARY CARE CLINIC | Age: 79
End: 2019-01-04
Payer: MEDICARE

## 2019-01-04 VITALS
HEART RATE: 67 BPM | RESPIRATION RATE: 18 BRPM | DIASTOLIC BLOOD PRESSURE: 84 MMHG | TEMPERATURE: 97.9 F | HEIGHT: 71 IN | OXYGEN SATURATION: 93 % | WEIGHT: 210 LBS | BODY MASS INDEX: 29.4 KG/M2 | SYSTOLIC BLOOD PRESSURE: 122 MMHG

## 2019-01-04 DIAGNOSIS — J41.0 SIMPLE CHRONIC BRONCHITIS (HCC): ICD-10-CM

## 2019-01-04 DIAGNOSIS — J44.1 COPD EXACERBATION (HCC): Primary | ICD-10-CM

## 2019-01-04 DIAGNOSIS — I10 ESSENTIAL HYPERTENSION: ICD-10-CM

## 2019-01-04 PROCEDURE — G8427 DOCREV CUR MEDS BY ELIG CLIN: HCPCS | Performed by: FAMILY MEDICINE

## 2019-01-04 PROCEDURE — 3023F SPIROM DOC REV: CPT | Performed by: FAMILY MEDICINE

## 2019-01-04 PROCEDURE — 99213 OFFICE O/P EST LOW 20 MIN: CPT | Performed by: FAMILY MEDICINE

## 2019-01-04 PROCEDURE — G8482 FLU IMMUNIZE ORDER/ADMIN: HCPCS | Performed by: FAMILY MEDICINE

## 2019-01-04 PROCEDURE — G8417 CALC BMI ABV UP PARAM F/U: HCPCS | Performed by: FAMILY MEDICINE

## 2019-01-04 PROCEDURE — 1101F PT FALLS ASSESS-DOCD LE1/YR: CPT | Performed by: FAMILY MEDICINE

## 2019-01-04 PROCEDURE — G8926 SPIRO NO PERF OR DOC: HCPCS | Performed by: FAMILY MEDICINE

## 2019-01-04 PROCEDURE — 4040F PNEUMOC VAC/ADMIN/RCVD: CPT | Performed by: FAMILY MEDICINE

## 2019-01-04 PROCEDURE — 1123F ACP DISCUSS/DSCN MKR DOCD: CPT | Performed by: FAMILY MEDICINE

## 2019-01-04 PROCEDURE — 1036F TOBACCO NON-USER: CPT | Performed by: FAMILY MEDICINE

## 2019-01-04 RX ORDER — PREDNISONE 20 MG/1
20 TABLET ORAL DAILY
Qty: 10 TABLET | Refills: 0 | Status: SHIPPED | OUTPATIENT
Start: 2019-01-04 | End: 2019-01-14

## 2019-01-04 RX ORDER — AZITHROMYCIN 250 MG/1
TABLET, FILM COATED ORAL
Qty: 6 TABLET | Refills: 0 | Status: SHIPPED | OUTPATIENT
Start: 2019-01-04 | End: 2019-05-18 | Stop reason: ALTCHOICE

## 2019-01-04 RX ORDER — TRIAMCINOLONE ACETONIDE 40 MG/ML
40 INJECTION, SUSPENSION INTRA-ARTICULAR; INTRAMUSCULAR ONCE
Status: COMPLETED | OUTPATIENT
Start: 2019-01-04 | End: 2019-01-04

## 2019-01-04 RX ADMIN — TRIAMCINOLONE ACETONIDE 40 MG: 40 INJECTION, SUSPENSION INTRA-ARTICULAR; INTRAMUSCULAR at 12:03

## 2019-01-05 ASSESSMENT — ENCOUNTER SYMPTOMS
COUGH: 1
WHEEZING: 0
SHORTNESS OF BREATH: 1

## 2019-01-08 RX ORDER — METOPROLOL TARTRATE 50 MG/1
TABLET, FILM COATED ORAL
Qty: 180 TABLET | Refills: 3 | Status: SHIPPED | OUTPATIENT
Start: 2019-01-08 | End: 2019-07-30 | Stop reason: SDUPTHER

## 2019-01-31 ENCOUNTER — OFFICE VISIT (OUTPATIENT)
Dept: UROLOGY | Facility: CLINIC | Age: 79
End: 2019-01-31

## 2019-01-31 VITALS — BODY MASS INDEX: 29.4 KG/M2 | HEIGHT: 71 IN | TEMPERATURE: 98.7 F | WEIGHT: 210 LBS

## 2019-01-31 DIAGNOSIS — R35.1 NOCTURIA: ICD-10-CM

## 2019-01-31 DIAGNOSIS — N40.0 BENIGN PROSTATIC HYPERPLASIA WITHOUT LOWER URINARY TRACT SYMPTOMS: Primary | ICD-10-CM

## 2019-01-31 LAB
BILIRUB BLD-MCNC: NEGATIVE MG/DL
CLARITY, POC: CLEAR
COLOR UR: YELLOW
GLUCOSE UR STRIP-MCNC: NEGATIVE MG/DL
KETONES UR QL: NEGATIVE
LEUKOCYTE EST, POC: NEGATIVE
NITRITE UR-MCNC: NEGATIVE MG/ML
PH UR: 5 [PH] (ref 5–8)
PROT UR STRIP-MCNC: NEGATIVE MG/DL
RBC # UR STRIP: ABNORMAL /UL
SP GR UR: 1.02 (ref 1–1.03)
UROBILINOGEN UR QL: NORMAL

## 2019-01-31 PROCEDURE — 99213 OFFICE O/P EST LOW 20 MIN: CPT | Performed by: UROLOGY

## 2019-01-31 PROCEDURE — 81003 URINALYSIS AUTO W/O SCOPE: CPT | Performed by: UROLOGY

## 2019-02-13 ENCOUNTER — NURSE ONLY (OUTPATIENT)
Dept: PRIMARY CARE CLINIC | Age: 79
End: 2019-02-13

## 2019-02-13 DIAGNOSIS — E78.00 HYPERCHOLESTEREMIA: ICD-10-CM

## 2019-02-13 DIAGNOSIS — E11.9 TYPE 2 DIABETES MELLITUS WITHOUT COMPLICATION, WITHOUT LONG-TERM CURRENT USE OF INSULIN (HCC): Primary | ICD-10-CM

## 2019-02-13 DIAGNOSIS — I10 HYPERTENSION, UNSPECIFIED TYPE: ICD-10-CM

## 2019-02-13 LAB
ANION GAP SERPL CALCULATED.3IONS-SCNC: 15 MMOL/L (ref 7–19)
BUN BLDV-MCNC: 25 MG/DL (ref 8–23)
CALCIUM SERPL-MCNC: 9.3 MG/DL (ref 8.8–10.2)
CHLORIDE BLD-SCNC: 102 MMOL/L (ref 98–111)
CO2: 25 MMOL/L (ref 22–29)
CREAT SERPL-MCNC: 1.1 MG/DL (ref 0.5–1.2)
GFR NON-AFRICAN AMERICAN: >60
GLUCOSE BLD-MCNC: 147 MG/DL (ref 74–109)
POTASSIUM SERPL-SCNC: 5 MMOL/L (ref 3.5–5)
SODIUM BLD-SCNC: 142 MMOL/L (ref 136–145)

## 2019-03-27 RX ORDER — FUROSEMIDE 40 MG/1
TABLET ORAL
Qty: 90 TABLET | Refills: 3 | Status: SHIPPED | OUTPATIENT
Start: 2019-03-27 | End: 2020-02-10

## 2019-04-08 ENCOUNTER — NURSE ONLY (OUTPATIENT)
Dept: PRIMARY CARE CLINIC | Age: 79
End: 2019-04-08
Payer: MEDICARE

## 2019-04-08 DIAGNOSIS — E78.00 HYPERCHOLESTEREMIA: ICD-10-CM

## 2019-04-08 LAB
CHOLESTEROL, TOTAL: 192 MG/DL (ref 160–199)
HDLC SERPL-MCNC: 46 MG/DL (ref 55–121)
LDL CHOLESTEROL CALCULATED: 88 MG/DL
TRIGL SERPL-MCNC: 292 MG/DL (ref 0–149)

## 2019-04-08 PROCEDURE — 36415 COLL VENOUS BLD VENIPUNCTURE: CPT | Performed by: FAMILY MEDICINE

## 2019-05-13 ENCOUNTER — OFFICE VISIT (OUTPATIENT)
Dept: CARDIOLOGY | Facility: CLINIC | Age: 79
End: 2019-05-13

## 2019-05-13 VITALS
SYSTOLIC BLOOD PRESSURE: 110 MMHG | HEIGHT: 71 IN | OXYGEN SATURATION: 98 % | HEART RATE: 87 BPM | DIASTOLIC BLOOD PRESSURE: 60 MMHG | BODY MASS INDEX: 29.96 KG/M2 | WEIGHT: 214 LBS

## 2019-05-13 DIAGNOSIS — I25.10 CORONARY ARTERY DISEASE INVOLVING NATIVE CORONARY ARTERY OF NATIVE HEART WITHOUT ANGINA PECTORIS: Primary | ICD-10-CM

## 2019-05-13 DIAGNOSIS — E78.2 MIXED HYPERLIPIDEMIA: ICD-10-CM

## 2019-05-13 DIAGNOSIS — I10 ESSENTIAL HYPERTENSION: ICD-10-CM

## 2019-05-13 PROBLEM — G25.0 ESSENTIAL TREMOR: Status: ACTIVE | Noted: 2019-05-13

## 2019-05-13 PROCEDURE — 99214 OFFICE O/P EST MOD 30 MIN: CPT | Performed by: INTERNAL MEDICINE

## 2019-05-13 RX ORDER — PROPRANOLOL HYDROCHLORIDE 60 MG/1
60 TABLET ORAL 2 TIMES DAILY
Qty: 180 TABLET | Refills: 3 | Status: SHIPPED | OUTPATIENT
Start: 2019-05-13 | End: 2021-03-02

## 2019-05-13 NOTE — PROGRESS NOTES
Reason for Visit: cardiovascular follow up.    HPI:  Mina Ratliff is a 79 y.o. male is being seen for follow up.  He misunderstood instructions for his propranolol and stopped taking it instead of increasing the dose.  His heart has been doing well and he has no new issues or complaints.  He stopped taking the losartan because his blood pressure is well controlled and he was concerned about some of the recall as he was hearing about.  He denies any chest pain, palpitations, dizziness, syncope, PND, or orthopnea.        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    - Lower extremity US (9/21/17) negative for any DVT or thrombophlebitis.   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 with mild miranda-infarct ischemia, EF > 70%  - Lipid panel (4/8/2019) total cholesterol 192, HDL 46, LDL 88, triglycerides 292    Patient Active Problem List   Diagnosis   • Malaise and fatigue   • Hyperlipidemia   • CAD (coronary artery disease)   • HTN (hypertension)   • Chest pain   • Benign prostatic hyperplasia without lower urinary tract symptoms   • Stage 3 severe COPD by GOLD classification (CMS/HCC)   • GERD (gastroesophageal reflux disease)   • Type 2 diabetes mellitus without complication (CMS/HCC)   • Essential tremor       Social History     Tobacco Use   • Smoking status: Former Smoker     Types: Cigarettes   • Smokeless tobacco: Never Used   • Tobacco comment: Quit about 1989   Substance Use Topics   • Alcohol use: No   • Drug use: No       Family History   Problem Relation Age of Onset   • Coronary artery disease Other        The following portions of the patient's history were reviewed and updated as appropriate: allergies, current medications, past family history, past  medical history, past social history, past surgical history and problem list.      Current Outpatient Medications:   •  aspirin  MG tablet, Take 325 mg by mouth Daily. HOLD STARTING TODAY, Disp: , Rfl:   •  diphenhydrAMINE-acetaminophen (TYLENOL PM)  MG tablet per tablet, Take 1 tablet by mouth At Night As Needed for Sleep., Disp: , Rfl:   •  furosemide (LASIX) 40 MG tablet, Take 40 mg by mouth Daily., Disp: , Rfl:   •  isosorbide mononitrate (IMDUR) 60 MG 24 hr tablet, Take 1 tablet by mouth Daily., Disp: 90 tablet, Rfl: 3  •  metFORMIN (GLUCOPHAGE) 1000 MG tablet, Take 1,000 mg by mouth 2 (Two) Times a Day With Meals., Disp: , Rfl:   •  Multiple Vitamins-Minerals (MULTIVITAMIN ADULT PO), Take  by mouth., Disp: , Rfl:   •  omeprazole (priLOSEC) 20 MG capsule, Take 20 mg by mouth Daily., Disp: , Rfl:   •  primidone (MYSOLINE) 50 MG tablet, Take 50 mg by mouth 2 (Two) Times a Day., Disp: , Rfl:   •  propranolol (INDERAL) 60 MG tablet, Take 1 tablet by mouth 2 (Two) Times a Day., Disp: 180 tablet, Rfl: 3  •  simvastatin (ZOCOR) 40 MG tablet, Take 40 mg by mouth Every Night., Disp: , Rfl:   •  tamsulosin (FLOMAX) 0.4 MG capsule 24 hr capsule, Take 1 capsule by mouth Every Night for 360 days., Disp: 90 capsule, Rfl: 3  •  triamcinolone (KENALOG) 0.1 % cream, Apply topically 2 times daily. To lower leg dry skin, Disp: , Rfl:   •  umeclidinium-vilanterol (ANORO ELLIPTA) 62.5-25 MCG/INH aerosol powder  inhaler, Inhale 1 puff Daily., Disp: , Rfl:   •  umeclidinium-vilanterol (ANORO ELLIPTA) 62.5-25 MCG/INH aerosol powder  inhaler, Inhale 1 puff Daily., Disp: 3 each, Rfl: 0    Current Facility-Administered Medications:   •  lidocaine (XYLOCAINE) 2 % jelly, , Topical, PRN, [place], Joe INGRAM MD    Review of Systems   Constitution: Negative for chills and fever.   Cardiovascular: Negative for chest pain and paroxysmal nocturnal dyspnea.   Respiratory: Negative for cough and shortness of breath.    Skin: Negative for  "rash.   Gastrointestinal: Negative for abdominal pain and heartburn.   Neurological: Negative for dizziness and numbness.       Objective   /60 (BP Location: Left arm, Patient Position: Sitting, Cuff Size: Adult)   Pulse 87   Ht 180.3 cm (70.98\")   Wt 97.1 kg (214 lb)   SpO2 98%   BMI 29.86 kg/m²   Physical Exam   Constitutional: He is oriented to person, place, and time. He appears well-developed and well-nourished.   HENT:   Head: Normocephalic and atraumatic.   Cardiovascular: Normal rate, regular rhythm and normal heart sounds.   No murmur heard.  Pulmonary/Chest: Effort normal and breath sounds normal.   Musculoskeletal: He exhibits no edema.   Neurological: He is alert and oriented to person, place, and time.   Skin: Skin is warm and dry.   Psychiatric: He has a normal mood and affect.     Procedures      ICD-10-CM ICD-9-CM   1. Coronary artery disease involving native coronary artery of native heart without angina pectoris I25.10 414.01   2. Essential hypertension I10 401.9   3. Mixed hyperlipidemia E78.2 272.2         Assessment/Plan:  1. 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 Nuclear stress test demonstrated inferolateral defect in area of circumflex distribution.    No current chest pain.  Restart propanolol.  Continue aspirin, simvastatin, and Imdur.        2.  Essential hypertension: Blood pressure is well controlled today.  No longer taking losartan.  Restart propranolol which he takes for an essential tremor.      3.  Mixed hyperlipidemia: Reasonably well controlled cholesterol with elevated triglycerides.  Continue simvastatin.        "

## 2019-05-15 ENCOUNTER — OFFICE VISIT (OUTPATIENT)
Dept: PRIMARY CARE CLINIC | Age: 79
End: 2019-05-15
Payer: MEDICARE

## 2019-05-15 VITALS
SYSTOLIC BLOOD PRESSURE: 111 MMHG | HEIGHT: 70 IN | RESPIRATION RATE: 18 BRPM | HEART RATE: 87 BPM | DIASTOLIC BLOOD PRESSURE: 62 MMHG | TEMPERATURE: 97.9 F | BODY MASS INDEX: 30.41 KG/M2 | WEIGHT: 212.4 LBS | OXYGEN SATURATION: 96 %

## 2019-05-15 DIAGNOSIS — I10 ESSENTIAL HYPERTENSION: ICD-10-CM

## 2019-05-15 DIAGNOSIS — E11.9 TYPE 2 DIABETES MELLITUS WITHOUT COMPLICATION, WITHOUT LONG-TERM CURRENT USE OF INSULIN (HCC): Primary | ICD-10-CM

## 2019-05-15 LAB — HBA1C MFR BLD: 7.4 % (ref 4–6)

## 2019-05-15 PROCEDURE — G8427 DOCREV CUR MEDS BY ELIG CLIN: HCPCS | Performed by: FAMILY MEDICINE

## 2019-05-15 PROCEDURE — G8417 CALC BMI ABV UP PARAM F/U: HCPCS | Performed by: FAMILY MEDICINE

## 2019-05-15 PROCEDURE — 1036F TOBACCO NON-USER: CPT | Performed by: FAMILY MEDICINE

## 2019-05-15 PROCEDURE — 1123F ACP DISCUSS/DSCN MKR DOCD: CPT | Performed by: FAMILY MEDICINE

## 2019-05-15 PROCEDURE — 99213 OFFICE O/P EST LOW 20 MIN: CPT | Performed by: FAMILY MEDICINE

## 2019-05-15 PROCEDURE — 36415 COLL VENOUS BLD VENIPUNCTURE: CPT | Performed by: FAMILY MEDICINE

## 2019-05-15 PROCEDURE — 4040F PNEUMOC VAC/ADMIN/RCVD: CPT | Performed by: FAMILY MEDICINE

## 2019-05-15 RX ORDER — PROPRANOLOL HYDROCHLORIDE 60 MG/1
60 TABLET ORAL
COMMUNITY
Start: 2019-05-13 | End: 2019-11-27 | Stop reason: SDUPTHER

## 2019-05-15 ASSESSMENT — PATIENT HEALTH QUESTIONNAIRE - PHQ9
SUM OF ALL RESPONSES TO PHQ QUESTIONS 1-9: 0
SUM OF ALL RESPONSES TO PHQ9 QUESTIONS 1 & 2: 0
1. LITTLE INTEREST OR PLEASURE IN DOING THINGS: 0
2. FEELING DOWN, DEPRESSED OR HOPELESS: 0
SUM OF ALL RESPONSES TO PHQ QUESTIONS 1-9: 0

## 2019-05-18 ASSESSMENT — ENCOUNTER SYMPTOMS
DIARRHEA: 0
SHORTNESS OF BREATH: 1
COUGH: 1
WHEEZING: 0

## 2019-05-19 NOTE — PROGRESS NOTES
Subjective:      Patient ID: Justice Johnson is a [de-identified] y.o. male who comes in today for a six-month checkup of his diabetes. HPI. He is accompanied by his wife. He says he is doing fairly well. He denies any symptoms of diabetes. He also has essential hypertension which seems to be well controlled. His biggest complaint is his constant phlegm. He has it all year long. It is usually clear. He does carry a diagnosis of COPD for which she sees Dr. Marylen Dunker. He is a former smoker. Review of Systems   Constitutional: Negative. Negative for activity change and fatigue. Eyes: Negative for visual disturbance. Respiratory: Positive for cough and shortness of breath. Negative for wheezing. Cardiovascular: Negative. Gastrointestinal: Negative for diarrhea. Endocrine: Negative for polydipsia, polyphagia and polyuria. Genitourinary: Negative for dysuria. Musculoskeletal: Positive for arthralgias. Neurological: Negative for weakness and numbness. Hematological: Negative. Psychiatric/Behavioral: Negative. Objective:   Physical Exam   Constitutional: He is oriented to person, place, and time. He appears well-developed and well-nourished. No distress. HENT:   Head: Normocephalic. Right Ear: Tympanic membrane, external ear and ear canal normal.   Left Ear: Tympanic membrane, external ear and ear canal normal.   Mouth/Throat: Oropharynx is clear and moist.   Eyes: Pupils are equal, round, and reactive to light. Conjunctivae are normal.   Neck: Normal range of motion. Neck supple. Cardiovascular: Normal rate, regular rhythm and normal heart sounds. Pulmonary/Chest: Effort normal and breath sounds normal.   Decreased breath sounds diffusely   Musculoskeletal: Normal range of motion. 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 He exhibits no edema. Lymphadenopathy:     He has no cervical adenopathy.    Neurological: He is alert and oriented to

## 2019-06-10 ENCOUNTER — OFFICE VISIT (OUTPATIENT)
Dept: PULMONOLOGY | Facility: CLINIC | Age: 79
End: 2019-06-10

## 2019-06-10 VITALS
WEIGHT: 214.4 LBS | DIASTOLIC BLOOD PRESSURE: 70 MMHG | BODY MASS INDEX: 30.69 KG/M2 | HEART RATE: 77 BPM | OXYGEN SATURATION: 97 % | SYSTOLIC BLOOD PRESSURE: 140 MMHG | HEIGHT: 70 IN

## 2019-06-10 DIAGNOSIS — J44.9 STAGE 3 SEVERE COPD BY GOLD CLASSIFICATION (HCC): Primary | ICD-10-CM

## 2019-06-10 DIAGNOSIS — E11.9 TYPE 2 DIABETES MELLITUS WITHOUT COMPLICATION, UNSPECIFIED WHETHER LONG TERM INSULIN USE (HCC): ICD-10-CM

## 2019-06-10 DIAGNOSIS — K21.9 GASTROESOPHAGEAL REFLUX DISEASE WITHOUT ESOPHAGITIS: ICD-10-CM

## 2019-06-10 DIAGNOSIS — I25.10 CORONARY ARTERY DISEASE INVOLVING NATIVE CORONARY ARTERY OF NATIVE HEART WITHOUT ANGINA PECTORIS: ICD-10-CM

## 2019-06-10 DIAGNOSIS — I10 ESSENTIAL HYPERTENSION: ICD-10-CM

## 2019-06-10 LAB
FEV1/FVC: NORMAL %
FEV1: NORMAL LITERS
FVC VOL RESPIRATORY: NORMAL LITERS

## 2019-06-10 PROCEDURE — 94010 BREATHING CAPACITY TEST: CPT | Performed by: INTERNAL MEDICINE

## 2019-06-10 PROCEDURE — 99214 OFFICE O/P EST MOD 30 MIN: CPT | Performed by: INTERNAL MEDICINE

## 2019-06-10 NOTE — PROGRESS NOTES
Subjective   Mina Ratliff is a 79 y.o. male.     Background: pt with gold stage 3 copd, fev1 42% pred 2018, asthma overlap    Chief Complaint   Patient presents with   • Cough   • Shortness of Breath      History of Present Illness   He cant shake a cold with a moderate cough in his chest daily for several months associated with phlegm and not improving.  No blood or fever.  He has not been needing the rescue inhaler    Medical/Family/Social History   has a past medical history of Asthma, Atherosclerosis of native coronary artery of native heart without angina pectoris, CAD (coronary artery disease), Chest pain, Chronic airway obstruction (CMS/HCC), COPD (chronic obstructive pulmonary disease) (CMS/[facility]), Diabetes mellitus (CMS/[facility]), Dizziness, GERD (gastroesophageal reflux disease), HTN (hypertension), Hyperlipidemia, Malaise and fatigue, Myocardial infarction, old, Old myocardial infarction, and Stage 3 severe COPD by GOLD classification (CMS/[facility]) (3/13/2015).   has a past surgical history that includes Cardiac catheterization; Coronary angioplasty (08/27/2009); Hemorrhoid surgery; Inner ear surgery; and Prostate surgery.  family history includes Coronary artery disease in his other.   reports that he has quit smoking. His smoking use included cigarettes. He has never used smokeless tobacco. He reports that he does not drink alcohol or use drugs.  No Known Allergies  Medications    Current Outpatient Medications:   •  aspirin  MG tablet, Take 325 mg by mouth Daily. HOLD STARTING TODAY, Disp: , Rfl:   •  diphenhydrAMINE-acetaminophen (TYLENOL PM)  MG tablet per tablet, Take 1 tablet by mouth At Night As Needed for Sleep., Disp: , Rfl:   •  furosemide (LASIX) 40 MG tablet, Take 40 mg by mouth Daily., Disp: , Rfl:   •  isosorbide mononitrate (IMDUR) 60 MG 24 hr tablet, Take 1 tablet by mouth Daily., Disp: 90 tablet, Rfl: 3  •  metFORMIN (GLUCOPHAGE) 1000 MG tablet, Take 1,000 mg by mouth 2 (Two)  "Times a Day With Meals., Disp: , Rfl:   •  Multiple Vitamins-Minerals (MULTIVITAMIN ADULT PO), Take  by mouth., Disp: , Rfl:   •  omeprazole (priLOSEC) 20 MG capsule, Take 20 mg by mouth Daily., Disp: , Rfl:   •  primidone (MYSOLINE) 50 MG tablet, Take 50 mg by mouth 2 (Two) Times a Day., Disp: , Rfl:   •  propranolol (INDERAL) 60 MG tablet, Take 1 tablet by mouth 2 (Two) Times a Day., Disp: 180 tablet, Rfl: 3  •  simvastatin (ZOCOR) 40 MG tablet, Take 40 mg by mouth Every Night., Disp: , Rfl:   •  umeclidinium-vilanterol (ANORO ELLIPTA) 62.5-25 MCG/INH aerosol powder  inhaler, Inhale 1 puff Daily., Disp: , Rfl:   •  umeclidinium-vilanterol (ANORO ELLIPTA) 62.5-25 MCG/INH aerosol powder  inhaler, Inhale 1 puff Daily., Disp: 3 each, Rfl: 0  •  Fluticasone-Umeclidin-Vilant (TRELEGY ELLIPTA) 100-62.5-25 MCG/INH aerosol powder , Inhale 1 each Daily for 30 days., Disp: 1 each, Rfl: 11  •  Fluticasone-Umeclidin-Vilant (TRELEGY ELLIPTA) 100-62.5-25 MCG/INH aerosol powder , Inhale 1 each Daily for 30 days., Disp: 1 each, Rfl: 11    Current Facility-Administered Medications:   •  lidocaine (XYLOCAINE) 2 % jelly, , Topical, PRN, [place]Joe MD    Review of Systems   Constitutional: Negative for chills and fever.   Cardiovascular: Negative for chest pain.   Gastrointestinal: Negative for nausea and vomiting.       Objective   /70   Pulse 77   Ht 177.8 cm (70\")   Wt 97.3 kg (214 lb 6.4 oz)   SpO2 97% Comment: ra  BMI 30.76 kg/m²   Physical Exam   Constitutional: He appears well-developed and well-nourished. He does not appear ill. No distress.   HENT:   Head: Normocephalic and atraumatic.   Nose: Nose normal.   Eyes: Conjunctivae and EOM are normal.   Neck: Neck supple.   Cardiovascular: Normal rate, regular rhythm, S1 normal and S2 normal.   Pulmonary/Chest: Effort normal. No respiratory distress. He has decreased breath sounds. He has no wheezes. He has no rales. He exhibits no tenderness.   Abdominal: Soft. " He exhibits no distension. There is no tenderness. There is no guarding.   Musculoskeletal: He exhibits no deformity.   Lymphadenopathy:     He has no cervical adenopathy.   Neurological: He is alert.   Skin: Skin is warm and dry. No rash noted.   Psychiatric: He has a normal mood and affect.     -----------------------------------------------------------------------------------------------  Recent Imaging:    XR CHEST STANDARD TWO VW   11/1/2016 2:56 PM  History: Persistent cough for 3 weeks or longer.  Two-view chest x-ray with no comparison.  Fully expanded lungs with mild interstitial disease and a few  granulomas.  No pneumonia, pneumothorax, or pleural effusion.  Moderate thoracic spurring.  Normal heart size.  Moderate aortic arch calcification.  Summary:  1. Mild chronic interstitial disease with no pneumonia.  Dictated on 11/1/2016 3:56 PM EST. Signed by Dr Ranulfo Roberts  on 11/1/2016 3:56 PM EST  Signed by Dr Ranulfo Roberts  on 11/01/2016 14:56  -----------------------------------------------------------------------------------------------  Pulmonary Functions Testing Results:  FEV1   Date Value Ref Range Status   06/10/2019 49% liters Final     FVC   Date Value Ref Range Status   06/10/2019 68% liters Final     FEV1/FVC   Date Value Ref Range Status   06/10/2019 55.55% % Final      My interpretation of PFT: severe airflow obstruction, improved from prior study last year  -----------------------------------------------------------------------------------------------  Assessment/Plan   Problem List Items Addressed This Visit        Cardiovascular and Mediastinum    CAD (coronary artery disease)    HTN (hypertension)       Respiratory    Stage 3 severe COPD by GOLD classification (CMS/[facility]) - Primary    Relevant Medications    Fluticasone-Umeclidin-Vilant (TRELEGY ELLIPTA) 100-62.5-25 MCG/INH aerosol powder     Fluticasone-Umeclidin-Vilant (TRELEGY ELLIPTA) 100-62.5-25 MCG/INH aerosol powder     Other  Relevant Orders    Pulmonary Function Test (Completed)       Digestive    GERD (gastroesophageal reflux disease)       Endocrine    Type 2 diabetes mellitus without complication (CMS/[facility])        Patient's Body mass index is 30.76 kg/m². BMI is above normal parameters. Recommendations include: referral to primary care.      Will start trial of trelegy due to persistent symptoms above  Discussed pulm function which is improved, and his overall improvement in shortness of breath which is good  After trelegy trial we will reassess whether to continue with that or continue with anoro.  Continue albuterol as needed  Continue mucinex for cough  Continue meds for gerd.    Electronically signed by Mg Amin MD, 6/10/2019, 11:07 AM

## 2019-07-03 DIAGNOSIS — N40.1 BPH WITH OBSTRUCTION/LOWER URINARY TRACT SYMPTOMS: ICD-10-CM

## 2019-07-03 DIAGNOSIS — N13.8 BPH WITH OBSTRUCTION/LOWER URINARY TRACT SYMPTOMS: ICD-10-CM

## 2019-07-03 RX ORDER — TAMSULOSIN HYDROCHLORIDE 0.4 MG/1
CAPSULE ORAL
Qty: 90 CAPSULE | Refills: 3 | Status: SHIPPED | OUTPATIENT
Start: 2019-07-03 | End: 2020-01-20

## 2019-07-03 RX ORDER — OMEPRAZOLE 20 MG/1
CAPSULE, DELAYED RELEASE ORAL
Qty: 180 CAPSULE | Refills: 2 | Status: SHIPPED | OUTPATIENT
Start: 2019-07-03 | End: 2020-06-16

## 2019-07-03 RX ORDER — SIMVASTATIN 40 MG
TABLET ORAL
Qty: 90 TABLET | Refills: 3 | Status: SHIPPED | OUTPATIENT
Start: 2019-07-03 | End: 2020-08-07

## 2019-07-30 RX ORDER — METOPROLOL TARTRATE 50 MG/1
TABLET, FILM COATED ORAL
Qty: 180 TABLET | Refills: 3 | Status: SHIPPED | OUTPATIENT
Start: 2019-07-30 | End: 2020-10-02

## 2019-10-16 ENCOUNTER — NURSE ONLY (OUTPATIENT)
Dept: PRIMARY CARE CLINIC | Age: 79
End: 2019-10-16
Payer: MEDICARE

## 2019-10-16 DIAGNOSIS — Z23 NEED FOR INFLUENZA VACCINATION: Primary | ICD-10-CM

## 2019-10-16 PROCEDURE — 90653 IIV ADJUVANT VACCINE IM: CPT | Performed by: FAMILY MEDICINE

## 2019-10-16 PROCEDURE — G0008 ADMIN INFLUENZA VIRUS VAC: HCPCS | Performed by: FAMILY MEDICINE

## 2019-11-15 ENCOUNTER — OFFICE VISIT (OUTPATIENT)
Dept: PRIMARY CARE CLINIC | Age: 79
End: 2019-11-15
Payer: MEDICARE

## 2019-11-15 VITALS
WEIGHT: 208 LBS | OXYGEN SATURATION: 97 % | TEMPERATURE: 97.5 F | SYSTOLIC BLOOD PRESSURE: 130 MMHG | BODY MASS INDEX: 29.12 KG/M2 | RESPIRATION RATE: 18 BRPM | DIASTOLIC BLOOD PRESSURE: 76 MMHG | HEIGHT: 71 IN | HEART RATE: 77 BPM

## 2019-11-15 DIAGNOSIS — B35.1 ONYCHOMYCOSIS: ICD-10-CM

## 2019-11-15 DIAGNOSIS — E11.9 TYPE 2 DIABETES MELLITUS WITHOUT COMPLICATION, WITHOUT LONG-TERM CURRENT USE OF INSULIN (HCC): ICD-10-CM

## 2019-11-15 DIAGNOSIS — Z00.00 ROUTINE GENERAL MEDICAL EXAMINATION AT A HEALTH CARE FACILITY: Primary | ICD-10-CM

## 2019-11-15 DIAGNOSIS — E78.00 HYPERCHOLESTEROLEMIA: ICD-10-CM

## 2019-11-15 DIAGNOSIS — J44.9 CHRONIC OBSTRUCTIVE PULMONARY DISEASE, UNSPECIFIED COPD TYPE (HCC): ICD-10-CM

## 2019-11-15 DIAGNOSIS — I10 ESSENTIAL HYPERTENSION: ICD-10-CM

## 2019-11-15 LAB
ALBUMIN SERPL-MCNC: 4.4 G/DL (ref 3.5–5.2)
ALP BLD-CCNC: 68 U/L (ref 40–130)
ALT SERPL-CCNC: 25 U/L (ref 5–41)
ANION GAP SERPL CALCULATED.3IONS-SCNC: 17 MMOL/L (ref 7–19)
AST SERPL-CCNC: 23 U/L (ref 5–40)
BILIRUB SERPL-MCNC: <0.2 MG/DL (ref 0.2–1.2)
BUN BLDV-MCNC: 26 MG/DL (ref 8–23)
CALCIUM SERPL-MCNC: 10 MG/DL (ref 8.8–10.2)
CHLORIDE BLD-SCNC: 94 MMOL/L (ref 98–111)
CHOLESTEROL, TOTAL: 169 MG/DL (ref 160–199)
CO2: 26 MMOL/L (ref 22–29)
CREAT SERPL-MCNC: 1.3 MG/DL (ref 0.5–1.2)
GFR NON-AFRICAN AMERICAN: 53
GLUCOSE BLD-MCNC: 152 MG/DL (ref 74–109)
HBA1C MFR BLD: 7.1 % (ref 4–6)
HCT VFR BLD CALC: 39.1 % (ref 42–52)
HDLC SERPL-MCNC: 45 MG/DL (ref 55–121)
HEMOGLOBIN: 12.5 G/DL (ref 14–18)
LDL CHOLESTEROL CALCULATED: 66 MG/DL
MCH RBC QN AUTO: 30.3 PG (ref 27–31)
MCHC RBC AUTO-ENTMCNC: 32 G/DL (ref 33–37)
MCV RBC AUTO: 94.7 FL (ref 80–94)
PDW BLD-RTO: 13.9 % (ref 11.5–14.5)
PLATELET # BLD: 256 K/UL (ref 130–400)
PMV BLD AUTO: 11.2 FL (ref 9.4–12.4)
POTASSIUM SERPL-SCNC: 4.5 MMOL/L (ref 3.5–5)
RBC # BLD: 4.13 M/UL (ref 4.7–6.1)
SODIUM BLD-SCNC: 137 MMOL/L (ref 136–145)
TOTAL PROTEIN: 7.8 G/DL (ref 6.6–8.7)
TRIGL SERPL-MCNC: 292 MG/DL (ref 0–149)
WBC # BLD: 7.7 K/UL (ref 4.8–10.8)

## 2019-11-15 PROCEDURE — 1123F ACP DISCUSS/DSCN MKR DOCD: CPT | Performed by: FAMILY MEDICINE

## 2019-11-15 PROCEDURE — G0439 PPPS, SUBSEQ VISIT: HCPCS | Performed by: FAMILY MEDICINE

## 2019-11-15 PROCEDURE — 3023F SPIROM DOC REV: CPT | Performed by: FAMILY MEDICINE

## 2019-11-15 PROCEDURE — 36415 COLL VENOUS BLD VENIPUNCTURE: CPT | Performed by: FAMILY MEDICINE

## 2019-11-15 PROCEDURE — G8417 CALC BMI ABV UP PARAM F/U: HCPCS | Performed by: FAMILY MEDICINE

## 2019-11-15 PROCEDURE — 1036F TOBACCO NON-USER: CPT | Performed by: FAMILY MEDICINE

## 2019-11-15 PROCEDURE — 4040F PNEUMOC VAC/ADMIN/RCVD: CPT | Performed by: FAMILY MEDICINE

## 2019-11-15 PROCEDURE — G8427 DOCREV CUR MEDS BY ELIG CLIN: HCPCS | Performed by: FAMILY MEDICINE

## 2019-11-15 PROCEDURE — G8926 SPIRO NO PERF OR DOC: HCPCS | Performed by: FAMILY MEDICINE

## 2019-11-15 PROCEDURE — 99214 OFFICE O/P EST MOD 30 MIN: CPT | Performed by: FAMILY MEDICINE

## 2019-11-15 PROCEDURE — G8482 FLU IMMUNIZE ORDER/ADMIN: HCPCS | Performed by: FAMILY MEDICINE

## 2019-11-15 ASSESSMENT — PATIENT HEALTH QUESTIONNAIRE - PHQ9
SUM OF ALL RESPONSES TO PHQ QUESTIONS 1-9: 0
SUM OF ALL RESPONSES TO PHQ QUESTIONS 1-9: 0

## 2019-11-17 ASSESSMENT — ENCOUNTER SYMPTOMS
COUGH: 1
SHORTNESS OF BREATH: 1
GASTROINTESTINAL NEGATIVE: 1

## 2019-11-25 DIAGNOSIS — I10 ESSENTIAL HYPERTENSION: ICD-10-CM

## 2019-11-25 RX ORDER — LOSARTAN POTASSIUM 25 MG/1
TABLET ORAL
Qty: 90 TABLET | Refills: 3 | Status: SHIPPED | OUTPATIENT
Start: 2019-11-25 | End: 2020-10-19

## 2019-11-27 ENCOUNTER — TELEPHONE (OUTPATIENT)
Dept: PRIMARY CARE CLINIC | Age: 79
End: 2019-11-27

## 2019-11-27 RX ORDER — PROPRANOLOL HYDROCHLORIDE 60 MG/1
60 TABLET ORAL DAILY
Qty: 90 TABLET | Refills: 3 | Status: SHIPPED | OUTPATIENT
Start: 2019-11-27 | End: 2019-11-27 | Stop reason: SDUPTHER

## 2019-11-27 RX ORDER — PROPRANOLOL HYDROCHLORIDE 60 MG/1
60 TABLET ORAL DAILY
Qty: 30 TABLET | Refills: 0 | Status: SHIPPED | OUTPATIENT
Start: 2019-11-27 | End: 2019-12-10 | Stop reason: SDUPTHER

## 2019-12-10 RX ORDER — PROPRANOLOL HYDROCHLORIDE 60 MG/1
60 TABLET ORAL 2 TIMES DAILY
Qty: 180 TABLET | Refills: 3 | Status: SHIPPED | OUTPATIENT
Start: 2019-12-10 | End: 2019-12-18 | Stop reason: SDUPTHER

## 2019-12-11 ENCOUNTER — OFFICE VISIT (OUTPATIENT)
Dept: PULMONOLOGY | Facility: CLINIC | Age: 79
End: 2019-12-11

## 2019-12-11 VITALS
DIASTOLIC BLOOD PRESSURE: 80 MMHG | HEIGHT: 70 IN | WEIGHT: 211 LBS | HEART RATE: 80 BPM | BODY MASS INDEX: 30.21 KG/M2 | OXYGEN SATURATION: 98 % | SYSTOLIC BLOOD PRESSURE: 132 MMHG

## 2019-12-11 DIAGNOSIS — I25.10 CORONARY ARTERY DISEASE INVOLVING NATIVE CORONARY ARTERY OF NATIVE HEART WITHOUT ANGINA PECTORIS: ICD-10-CM

## 2019-12-11 DIAGNOSIS — K21.9 GASTROESOPHAGEAL REFLUX DISEASE WITHOUT ESOPHAGITIS: ICD-10-CM

## 2019-12-11 DIAGNOSIS — Z79.899 CURRENT USE OF BETA BLOCKER: ICD-10-CM

## 2019-12-11 DIAGNOSIS — J44.9 STAGE 3 SEVERE COPD BY GOLD CLASSIFICATION (HCC): Primary | ICD-10-CM

## 2019-12-11 PROCEDURE — 99214 OFFICE O/P EST MOD 30 MIN: CPT | Performed by: INTERNAL MEDICINE

## 2019-12-11 NOTE — PROGRESS NOTES
Subjective   Mina Ratliff is a 79 y.o. male.     Background: pt with gold stage 3 copd, fev1 42% pred 2018, asthma overlap    Chief Complaint   Patient presents with   • COPD        History of Present Illness   He is taking the trelegy but not every day.  He preferred the anoro.  Pt reports moderate intermittent dyspnea on exertion in chest associated with wheeze and alleviated by inhalers.     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   has a past surgical history that includes Cardiac catheterization; Coronary angioplasty (08/27/2009); Hemorrhoid surgery; Inner ear surgery; and Prostate surgery.  family history includes Coronary artery disease in an other family member.   reports that he has quit smoking. His smoking use included cigarettes. He has never used smokeless tobacco. He reports that he does not drink alcohol or use drugs.  No Known Allergies  Medications    Current Outpatient Medications:   •  aspirin  MG tablet, Take 325 mg by mouth Daily. HOLD STARTING TODAY, Disp: , Rfl:   •  diphenhydrAMINE-acetaminophen (TYLENOL PM)  MG tablet per tablet, Take 1 tablet by mouth At Night As Needed for Sleep., Disp: , Rfl:   •  furosemide (LASIX) 40 MG tablet, Take 40 mg by mouth Daily., Disp: , Rfl:   •  isosorbide mononitrate (IMDUR) 60 MG 24 hr tablet, Take 1 tablet by mouth Daily., Disp: 90 tablet, Rfl: 3  •  losartan (COZAAR) 25 MG tablet, TAKE 1 TABLET DAILY, Disp: 90 tablet, Rfl: 3  •   "metFORMIN (GLUCOPHAGE) 1000 MG tablet, Take 1,000 mg by mouth 2 (Two) Times a Day With Meals., Disp: , Rfl:   •  Multiple Vitamins-Minerals (MULTIVITAMIN ADULT PO), Take  by mouth., Disp: , Rfl:   •  omeprazole (priLOSEC) 20 MG capsule, Take 20 mg by mouth Daily., Disp: , Rfl:   •  primidone (MYSOLINE) 50 MG tablet, Take 50 mg by mouth 2 (Two) Times a Day., Disp: , Rfl:   •  propranolol (INDERAL) 60 MG tablet, Take 1 tablet by mouth 2 (Two) Times a Day., Disp: 180 tablet, Rfl: 3  •  simvastatin (ZOCOR) 40 MG tablet, Take 40 mg by mouth Every Night., Disp: , Rfl:   •  tamsulosin (FLOMAX) 0.4 MG capsule 24 hr capsule, TAKE 1 CAPSULE EVERY NIGHT, Disp: 90 capsule, Rfl: 3  •  umeclidinium-vilanterol (ANORO ELLIPTA) 62.5-25 MCG/INH aerosol powder  inhaler, Inhale 1 puff Daily., Disp: 1 each, Rfl: 11    Current Facility-Administered Medications:   •  lidocaine (XYLOCAINE) 2 % jelly, , Topical, PRN, [place] TopJoe MD    Review of Systems   Constitutional: Negative for chills and fever.   Cardiovascular: Negative for chest pain.   Gastrointestinal: Negative for nausea and vomiting.         Objective   /80   Pulse 80   Ht 177.8 cm (70\")   Wt 95.7 kg (211 lb)   SpO2 98% Comment: RA  BMI 30.28 kg/m²   Physical Exam   Constitutional: He appears well-developed and well-nourished. He does not appear ill. No distress.   HENT:   Head: Normocephalic and atraumatic.   Nose: Nose normal.   Eyes: Conjunctivae and EOM are normal.   Neck: Neck supple.   Cardiovascular: Normal rate, regular rhythm, S1 normal and S2 normal.   No murmur heard.  Pulmonary/Chest: Effort normal. He has no wheezes. He has no rales.   Abdominal: Soft. He exhibits no distension. There is no tenderness. There is no guarding.   Musculoskeletal: He exhibits no deformity.   Neurological: He is alert.   Skin: Skin is warm.   -----------------------------------------------------------------------------------------------  PFT Values        Some values may " be hidden. Unless noted otherwise, only the newest values recorded on each date are displayed.         Old Values PFT Results 6/10/19   FVC 68%   FEV1 49%   FEV1/FVC 55.55%      Pre Drug PFT Results 6/10/19   No data to display.      Post Drug PFT Results 6/10/19   No data to display.      Other Tests PFT Results 6/10/19   No data to display.               -----------------------------------------------------------------------------------------------  Assessment/Plan   Problem List Items Addressed This Visit        Pulmonary Problems    Stage 3 severe COPD by GOLD classification (CMS/[facility]) - Primary    Relevant Medications    umeclidinium-vilanterol (ANORO ELLIPTA) 62.5-25 MCG/INH aerosol powder  inhaler       Other    CAD (coronary artery disease)    GERD (gastroesophageal reflux disease)    Current use of beta blocker        Patient's Body mass index is 30.28 kg/m². BMI is above normal parameters. Recommendations include: referral to primary care.    Will change off trelegy back to anoro.  Pt takes propranolol for tremors, consider alternative.  But he really likes it for the tremors.  This probably is adversely affecting pulmonary physiology  We discussed disadvantage of trelegy including risk for pneumonia and other side effects and cost       Electronically signed by Mg Amin MD, 12/11/2019, 12:28 PM

## 2019-12-18 ENCOUNTER — TELEPHONE (OUTPATIENT)
Dept: PRIMARY CARE CLINIC | Age: 79
End: 2019-12-18

## 2019-12-18 RX ORDER — PROPRANOLOL HYDROCHLORIDE 60 MG/1
60 TABLET ORAL 2 TIMES DAILY
Qty: 180 TABLET | Refills: 3 | Status: SHIPPED | OUTPATIENT
Start: 2019-12-18 | End: 2020-01-27 | Stop reason: SDUPTHER

## 2020-01-03 ENCOUNTER — TELEPHONE (OUTPATIENT)
Dept: PULMONOLOGY | Facility: CLINIC | Age: 80
End: 2020-01-03

## 2020-01-20 ENCOUNTER — OFFICE VISIT (OUTPATIENT)
Dept: CARDIOLOGY | Facility: CLINIC | Age: 80
End: 2020-01-20

## 2020-01-20 VITALS
BODY MASS INDEX: 30.35 KG/M2 | WEIGHT: 212 LBS | HEIGHT: 70 IN | DIASTOLIC BLOOD PRESSURE: 66 MMHG | SYSTOLIC BLOOD PRESSURE: 120 MMHG | HEART RATE: 81 BPM | OXYGEN SATURATION: 98 %

## 2020-01-20 DIAGNOSIS — I10 ESSENTIAL HYPERTENSION: ICD-10-CM

## 2020-01-20 DIAGNOSIS — I25.10 CORONARY ARTERY DISEASE INVOLVING NATIVE CORONARY ARTERY OF NATIVE HEART WITHOUT ANGINA PECTORIS: Primary | ICD-10-CM

## 2020-01-20 DIAGNOSIS — E66.09 CLASS 1 OBESITY DUE TO EXCESS CALORIES WITH SERIOUS COMORBIDITY AND BODY MASS INDEX (BMI) OF 30.0 TO 30.9 IN ADULT: ICD-10-CM

## 2020-01-20 DIAGNOSIS — E78.2 MIXED HYPERLIPIDEMIA: ICD-10-CM

## 2020-01-20 PROBLEM — E66.9 OBESITY: Status: ACTIVE | Noted: 2020-01-20

## 2020-01-20 PROCEDURE — 93000 ELECTROCARDIOGRAM COMPLETE: CPT | Performed by: INTERNAL MEDICINE

## 2020-01-20 PROCEDURE — 99214 OFFICE O/P EST MOD 30 MIN: CPT | Performed by: INTERNAL MEDICINE

## 2020-01-20 NOTE — PROGRESS NOTES
Reason for Visit: cardiovascular follow up.    HPI:  Mina Ratliff is a 79 y.o. male is here today for follow-up.  He is doing well not having any new issues or complaints.  He notes some right shoulder pain but denies any chest pain, palpitations, dizziness, syncope, PND, or orthopnea.  His blood pressure is well controlled at home.  He is no longer taking Imdur and has not felt any different without it.  He is tolerating all of his other medications.    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    - Lower extremity US (9/21/17) negative for any DVT or thrombophlebitis.   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 with mild miranda-infarct ischemia, EF > 70%  - Lipid panel (4/8/2019) total cholesterol 192, HDL 46, LDL 88, triglycerides 292    Patient Active Problem List   Diagnosis   • Malaise and fatigue   • Hyperlipidemia   • CAD (coronary artery disease)   • HTN (hypertension)   • Benign prostatic hyperplasia without lower urinary tract symptoms   • Stage 3 severe COPD by GOLD classification (CMS/[facility])   • GERD (gastroesophageal reflux disease)   • Type 2 diabetes mellitus without complication (CMS/[facility])   • Essential tremor   • Current use of beta blocker   • Obesity       Social History     Tobacco Use   • Smoking status: Former Smoker     Types: Cigarettes   • Smokeless tobacco: Never Used   • Tobacco comment: Quit about 1989   Substance Use Topics   • Alcohol use: No   • Drug use: No       Family History   Problem Relation Age of Onset   • Coronary artery disease Other        The following portions of the patient's history were reviewed and updated as appropriate: allergies, current medications, past family history, past medical history, past social history,  "past surgical history and problem list.      Current Outpatient Medications:   •  aspirin  MG tablet, Take 325 mg by mouth Daily. HOLD STARTING TODAY, Disp: , Rfl:   •  diphenhydrAMINE-acetaminophen (TYLENOL PM)  MG tablet per tablet, Take 1 tablet by mouth At Night As Needed for Sleep., Disp: , Rfl:   •  furosemide (LASIX) 40 MG tablet, Take 40 mg by mouth Daily., Disp: , Rfl:   •  losartan (COZAAR) 25 MG tablet, TAKE 1 TABLET DAILY, Disp: 90 tablet, Rfl: 3  •  metFORMIN (GLUCOPHAGE) 1000 MG tablet, Take 1,000 mg by mouth 2 (Two) Times a Day With Meals., Disp: , Rfl:   •  Multiple Vitamins-Minerals (MULTIVITAMIN ADULT PO), Take  by mouth., Disp: , Rfl:   •  omeprazole (priLOSEC) 20 MG capsule, Take 20 mg by mouth Daily., Disp: , Rfl:   •  primidone (MYSOLINE) 50 MG tablet, Take 50 mg by mouth 2 (Two) Times a Day., Disp: , Rfl:   •  propranolol (INDERAL) 60 MG tablet, Take 1 tablet by mouth 2 (Two) Times a Day., Disp: 180 tablet, Rfl: 3  •  simvastatin (ZOCOR) 40 MG tablet, Take 40 mg by mouth Every Night., Disp: , Rfl:   •  umeclidinium-vilanterol (ANORO ELLIPTA) 62.5-25 MCG/INH aerosol powder  inhaler, Inhale 1 puff Daily., Disp: 3 each, Rfl: 3    Current Facility-Administered Medications:   •  lidocaine (XYLOCAINE) 2 % jelly, , Topical, PRN, CesarJoe MD    Review of Systems   Constitution: Negative for chills and fever.   Cardiovascular: Negative for chest pain and paroxysmal nocturnal dyspnea.   Respiratory: Negative for cough and shortness of breath.    Skin: Negative for rash.   Musculoskeletal: Positive for arthritis.   Gastrointestinal: Negative for abdominal pain and heartburn.   Neurological: Negative for dizziness and numbness.       Objective   /66 (BP Location: Left arm, Patient Position: Sitting, Cuff Size: Adult)   Pulse 81   Ht 177.8 cm (70\")   Wt 96.2 kg (212 lb)   SpO2 98%   BMI 30.42 kg/m²   Physical Exam   Constitutional: He is oriented to person, place, and time. " He appears well-developed and well-nourished.   HENT:   Head: Normocephalic and atraumatic.   Cardiovascular: Normal rate, regular rhythm and normal heart sounds.   No murmur heard.  Pulmonary/Chest: Effort normal and breath sounds normal.   Abdominal: He exhibits distension (obese).   Musculoskeletal: He exhibits no edema.   Neurological: He is alert and oriented to person, place, and time.   Skin: Skin is warm and dry.   Psychiatric: He has a normal mood and affect.       ECG 12 Lead  Date/Time: 1/20/2020 2:55 PM  Performed by: Ciro Helm MD  Authorized by: Ciro Helm MD   Comparison: compared with previous ECG from 11/20/2018  Similar to previous ECG  Rhythm: sinus rhythm  Rate: normal  Other findings: poor R wave progression              ICD-10-CM ICD-9-CM   1. Coronary artery disease involving native coronary artery of native heart without angina pectoris I25.10 414.01   2. Essential hypertension I10 401.9   3. Mixed hyperlipidemia E78.2 272.2   4. Class 1 obesity due to excess calories with serious comorbidity and body mass index (BMI) of 30.0 to 30.9 in adult E66.09 278.00    Z68.30 V85.30         Assessment/Plan:  1. 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 Nuclear stress from 10/2018 with inferolateral defect in area of circumflex distribution.  No current angina or chest pain symptoms.  Continue propranolol, aspirin, and simvastatin.  He is now off Imdur.        2.  Essential hypertension: Blood pressure remains well controlled on current medications.      3.  Mixed hyperlipidemia:  Acceptable control on simvastatin.    4.  Obesity: Patient's Body mass index is 30.42 kg/m². BMI is above normal parameters. Recommendations include: exercise counseling and nutrition counseling.

## 2020-01-27 RX ORDER — PROPRANOLOL HYDROCHLORIDE 60 MG/1
60 TABLET ORAL 2 TIMES DAILY
Qty: 180 TABLET | Refills: 3 | Status: SHIPPED | OUTPATIENT
Start: 2020-01-27 | End: 2020-10-12 | Stop reason: SDUPTHER

## 2020-02-10 RX ORDER — FUROSEMIDE 40 MG/1
TABLET ORAL
Qty: 90 TABLET | Refills: 4 | Status: SHIPPED | OUTPATIENT
Start: 2020-02-10 | End: 2021-04-16

## 2020-02-10 RX ORDER — ISOSORBIDE MONONITRATE 60 MG/1
TABLET, EXTENDED RELEASE ORAL
Qty: 90 TABLET | Refills: 4 | OUTPATIENT
Start: 2020-02-10

## 2020-02-13 NOTE — PROGRESS NOTES
Mr. Ratliff is 79 y.o. male    CHIEF COMPLAINT: I am here for my yearly follow up for BPH.     HPI  Follow BPH/Lower urinary tract symptoms & nocturia  He is followed for 2 chronic urologic issues.  He has BPH with lower urinary tract symptoms that are moderate in severity.  He has not had improvement with UroLift procedure.  He continues on tamsulosin but is frequency and urgency has worsened due to furosemide.  Continues to have a symptom score of 18 bothered by combination of both bladder outlet obstructive and irritative symptoms.    He is also bothered by nocturia which is from multiple factors including sleep pattern, deterioration and concentration of urine in the evening as is common in this age group and there may be some role from the BPH with obstruction as well.  Nonetheless he is able to get back to sleep and says he has a reasonable quality of life.  Continues to get up around 4 times a night.  History related to this issue:   - 06/2018: Urolift    The following portions of the patient's history were reviewed and updated as appropriate: allergies, current medications, past family history, past medical history, past social history, past surgical history and problem list.      Review of Systems   Constitutional: Negative for chills and fever.   Gastrointestinal: Negative for abdominal pain, anal bleeding and blood in stool.   Genitourinary: Positive for difficulty urinating (weak stream), frequency and urgency. Negative for dysuria and hematuria.         Current Outpatient Medications:   •  aspirin  MG tablet, Take 325 mg by mouth Daily. HOLD STARTING TODAY, Disp: , Rfl:   •  diphenhydrAMINE-acetaminophen (TYLENOL PM)  MG tablet per tablet, Take 1 tablet by mouth At Night As Needed for Sleep., Disp: , Rfl:   •  furosemide (LASIX) 40 MG tablet, Take 40 mg by mouth Daily., Disp: , Rfl:   •  losartan (COZAAR) 25 MG tablet, TAKE 1 TABLET DAILY, Disp: 90 tablet, Rfl: 3  •  metFORMIN  (GLUCOPHAGE) 1000 MG tablet, Take 1,000 mg by mouth 2 (Two) Times a Day With Meals., Disp: , Rfl:   •  Multiple Vitamins-Minerals (MULTIVITAMIN ADULT PO), Take  by mouth., Disp: , Rfl:   •  omeprazole (priLOSEC) 20 MG capsule, Take 20 mg by mouth Daily., Disp: , Rfl:   •  primidone (MYSOLINE) 50 MG tablet, Take 50 mg by mouth 2 (Two) Times a Day., Disp: , Rfl:   •  propranolol (INDERAL) 60 MG tablet, Take 1 tablet by mouth 2 (Two) Times a Day., Disp: 180 tablet, Rfl: 3  •  simvastatin (ZOCOR) 40 MG tablet, Take 40 mg by mouth Every Night., Disp: , Rfl:   •  tamsulosin (FLOMAX) 0.4 MG capsule 24 hr capsule, Take 1 capsule by mouth Daily., Disp: , Rfl:   •  umeclidinium-vilanterol (ANORO ELLIPTA) 62.5-25 MCG/INH aerosol powder  inhaler, Inhale 1 puff Daily., Disp: 3 each, Rfl: 3    Current Facility-Administered Medications:   •  lidocaine (XYLOCAINE) 2 % jelly, , Topical, PRN, Joe Guerrero MD    Past Medical History:   Diagnosis Date   • Asthma    • Atherosclerosis of native coronary artery of native heart without angina pectoris    • CAD (coronary artery disease)    • Chest pain    • Chronic airway obstruction (CMS/[facility])    • COPD (chronic obstructive pulmonary disease) (CMS/[facility])    • Diabetes mellitus (CMS/[facility])    • Dizziness    • GERD (gastroesophageal reflux disease)    • HTN (hypertension)    • Hyperlipidemia    • Malaise and fatigue    • Myocardial infarction, old    • Old myocardial infarction    • Stage 3 severe COPD by GOLD classification (CMS/[facility]) 3/13/2015       Past Surgical History:   Procedure Laterality Date   • CARDIAC CATHETERIZATION     • CORONARY ANGIOPLASTY  08/27/2009    PSTPRC STATUS, PTCA    • HEMORRHOIDECTOMY     • INNER EAR SURGERY      NEW EAR DRUM CONSTRUCTED   • PROSTATE SURGERY         Social History     Socioeconomic History   • Marital status:      Spouse name: Not on file   • Number of children: Not on file   • Years of education: Not on file   • Highest education level: Not  "on file   Tobacco Use   • Smoking status: Former Smoker     Types: Cigarettes   • Smokeless tobacco: Never Used   • Tobacco comment: Quit about 1989   Substance and Sexual Activity   • Alcohol use: No   • Drug use: No   • Sexual activity: Defer       Family History   Problem Relation Age of Onset   • Coronary artery disease Other          Temp 98 °F (36.7 °C)   Ht 177.8 cm (70\")   Wt 96.2 kg (212 lb)   BMI 30.42 kg/m²       Physical Exam      Data  Results for orders placed or performed in visit on 02/17/20   POC Urinalysis Dipstick, Multipro   Result Value Ref Range    Color Yellow Yellow, Straw, Dark Yellow, Ruth    Clarity, UA Clear Clear    Glucose, UA Negative Negative, 1000 mg/dL (3+) mg/dL    Bilirubin Negative Negative    Ketones, UA Negative Negative    Specific Gravity  1.015 1.005 - 1.030    Blood, UA Negative Negative    pH, Urine 5.0 5.0 - 8.0    Protein, POC Negative Negative mg/dL    Urobilinogen, UA Normal Normal    Nitrite, UA Negative Negative    Leukocytes Negative Negative         Imaging Results (Last 7 Days)     ** No results found for the last 168 hours. **        International Prostate Symptom Score  The following is posted based on patient questionnaire answers:  0 - not at all    1-7 mild symptoms  1- Less than one time in five  8-19 moderate symptoms  2 -Less than half the time  20-35 severe symptoms  3 - About half the time  4 - More than half the time  5 - Almost always     For following sections:  Incomplete Emptying: - How often have you had the sensation  of not emptying your bladder completely after you finished urinating?  3  Frequency: -How often have you had to urinate again less than   two hours after you finished urinating?      3  Intermittency: -How often have you found you stopped and started again  Several times when you urinate?       3  Urgency: -How often do you find it difficult to postpone urination?             3  Weak stream: - How often have you had a weak " urinary stream?             3  Straining: - How often have you had to push or strain to begin  Urination?          2  Sleeping: -How many times did you most typically get up to urinate   From the time you went to bed at night until the time you got up in the   4  Morning          Total `  21    Quality of Life  How would you feel if you had to live with your urinary condition the way   3  It is now, no better, no worse for the rest of your life?    Where: 0=delighted; 1= pleased, 2= mostly satisfied, 3= mixed, 4 = mostly  Dissatisfied, 5= Unhappy, 6 = terrible      Assessment and Plan  Diagnoses and all orders for this visit:    BPH with obstruction/lower urinary tract symptoms  -     POC Urinalysis Dipstick, Multipro    Nocturia    -Voiding symptoms the same.  No evidence of retention, gross hematuria or recurrent UTI.  We will continue tamsulosin.  He does not think his symptoms are bad enough to warrant transurethral section prostate considering the risks of the procedure.  -Nocturia continues to be a problem.  Evening fluid restriction is discussed.  Both chronic issues are about the same.    (Please note that portions of this note were completed with a voice recognition program.)  Joe Guerrero MD  02/18/20

## 2020-02-17 ENCOUNTER — OFFICE VISIT (OUTPATIENT)
Dept: UROLOGY | Facility: CLINIC | Age: 80
End: 2020-02-17

## 2020-02-17 VITALS — TEMPERATURE: 98 F | WEIGHT: 212 LBS | BODY MASS INDEX: 30.35 KG/M2 | HEIGHT: 70 IN

## 2020-02-17 DIAGNOSIS — N13.8 BPH WITH OBSTRUCTION/LOWER URINARY TRACT SYMPTOMS: Primary | ICD-10-CM

## 2020-02-17 DIAGNOSIS — R35.1 NOCTURIA: ICD-10-CM

## 2020-02-17 DIAGNOSIS — N40.1 BPH WITH OBSTRUCTION/LOWER URINARY TRACT SYMPTOMS: Primary | ICD-10-CM

## 2020-02-17 LAB
BILIRUB BLD-MCNC: NEGATIVE MG/DL
CLARITY, POC: CLEAR
COLOR UR: YELLOW
GLUCOSE UR STRIP-MCNC: NEGATIVE MG/DL
KETONES UR QL: NEGATIVE
LEUKOCYTE EST, POC: NEGATIVE
NITRITE UR-MCNC: NEGATIVE MG/ML
PH UR: 5 [PH] (ref 5–8)
PROT UR STRIP-MCNC: NEGATIVE MG/DL
RBC # UR STRIP: NEGATIVE /UL
SP GR UR: 1.01 (ref 1–1.03)
UROBILINOGEN UR QL: NORMAL

## 2020-02-17 PROCEDURE — 81003 URINALYSIS AUTO W/O SCOPE: CPT | Performed by: UROLOGY

## 2020-02-17 PROCEDURE — 99213 OFFICE O/P EST LOW 20 MIN: CPT | Performed by: UROLOGY

## 2020-02-17 RX ORDER — TAMSULOSIN HYDROCHLORIDE 0.4 MG/1
1 CAPSULE ORAL DAILY
COMMUNITY
End: 2020-07-27

## 2020-02-27 DIAGNOSIS — I25.118 CORONARY ARTERY DISEASE OF NATIVE ARTERY OF NATIVE HEART WITH STABLE ANGINA PECTORIS (HCC): ICD-10-CM

## 2020-02-28 RX ORDER — ISOSORBIDE MONONITRATE 60 MG/1
TABLET, EXTENDED RELEASE ORAL
Qty: 90 TABLET | Refills: 4 | OUTPATIENT
Start: 2020-02-28

## 2020-05-20 ENCOUNTER — VIRTUAL VISIT (OUTPATIENT)
Dept: PRIMARY CARE CLINIC | Age: 80
End: 2020-05-20
Payer: MEDICARE

## 2020-05-20 PROCEDURE — G8428 CUR MEDS NOT DOCUMENT: HCPCS | Performed by: FAMILY MEDICINE

## 2020-05-20 PROCEDURE — 99213 OFFICE O/P EST LOW 20 MIN: CPT | Performed by: FAMILY MEDICINE

## 2020-05-20 PROCEDURE — 1123F ACP DISCUSS/DSCN MKR DOCD: CPT | Performed by: FAMILY MEDICINE

## 2020-05-20 PROCEDURE — 4040F PNEUMOC VAC/ADMIN/RCVD: CPT | Performed by: FAMILY MEDICINE

## 2020-05-20 RX ORDER — PREDNISONE 20 MG/1
20 TABLET ORAL DAILY
Qty: 7 TABLET | Refills: 0 | Status: SHIPPED | OUTPATIENT
Start: 2020-05-20 | End: 2020-05-27

## 2020-05-20 RX ORDER — AMOXICILLIN AND CLAVULANATE POTASSIUM 875; 125 MG/1; MG/1
1 TABLET, FILM COATED ORAL 2 TIMES DAILY
Qty: 14 TABLET | Refills: 0 | Status: SHIPPED | OUTPATIENT
Start: 2020-05-20 | End: 2020-05-27

## 2020-05-20 ASSESSMENT — ENCOUNTER SYMPTOMS
COUGH: 1
SHORTNESS OF BREATH: 1
CHEST TIGHTNESS: 1

## 2020-05-20 NOTE — PROGRESS NOTES
[address], [address]  Phone:  [phone]      2020     TELEHEALTH EVALUATION -- Audio/Visual (During [id number] public health emergency)    HPI:    Radha Solis (:  1940) has requested an audio/video evaluation for the following concern(s):    Recheck of diabetes. 134 glucose yesterday. He has had no episodes of hypoglycemia. He has not been getting out and has gained a little bit of weight but his sugars are staying in the 140 range. Chest has been tight lately with increased phlegm. He has COPD. He denies shortness of breath. Sinuses are clear. It is all in his chest with clear phlegm. He has not checked his blood pressure but he feels good. Review of Systems   Constitutional: Negative. Eyes: Negative for visual disturbance. Respiratory: Positive for cough, chest tightness and shortness of breath (with exertion). Endocrine: Negative for polydipsia, polyphagia and polyuria. Musculoskeletal: Positive for arthralgias. Neurological: Negative. Hematological: Negative. Psychiatric/Behavioral: Negative. Prior to Visit Medications    Medication Sig Taking?  Authorizing Provider   predniSONE (DELTASONE) 20 MG tablet Take 1 tablet by mouth daily for 7 days Yes Deloris Bustos MD   amoxicillin-clavulanate (AUGMENTIN) 875-125 MG per tablet Take 1 tablet by mouth 2 times daily for 7 days With food for infection Yes Deloris Bustos MD   furosemide (LASIX) 40 MG tablet TAKE 1 TABLET DAILY FOR FLUID  Susy Marlow MD   propranolol (INDERAL) 60 MG tablet Take 1 tablet by mouth 2 times daily  Deloris Bustos MD   metFORMIN (GLUCOPHAGE) 1000 MG tablet TAKE 1 TABLET TWICE A DAY FOR DIABETES  Deloris Bustos MD   metoprolol tartrate (LOPRESSOR) 50 MG tablet 1 tablet by mouth twice a day for blood pressure and irregular heartbeat  Deloris Bustos MD   omeprazole (PRILOSEC) 20 MG delayed release capsule TAKE 1 CAPSULE TWICE A DAY  Deloris Bustos MD

## 2020-05-27 ENCOUNTER — NURSE ONLY (OUTPATIENT)
Dept: PRIMARY CARE CLINIC | Age: 80
End: 2020-05-27
Payer: MEDICARE

## 2020-05-27 LAB
ALBUMIN SERPL-MCNC: 4.5 G/DL (ref 3.5–5.2)
ALP BLD-CCNC: 76 U/L (ref 40–130)
ALT SERPL-CCNC: 19 U/L (ref 5–41)
ANION GAP SERPL CALCULATED.3IONS-SCNC: 16 MMOL/L (ref 7–19)
AST SERPL-CCNC: 20 U/L (ref 5–40)
BILIRUB SERPL-MCNC: <0.2 MG/DL (ref 0.2–1.2)
BUN BLDV-MCNC: 15 MG/DL (ref 8–23)
CALCIUM SERPL-MCNC: 9.7 MG/DL (ref 8.8–10.2)
CHLORIDE BLD-SCNC: 98 MMOL/L (ref 98–111)
CHOLESTEROL, TOTAL: 174 MG/DL (ref 160–199)
CO2: 24 MMOL/L (ref 22–29)
CREAT SERPL-MCNC: 1 MG/DL (ref 0.5–1.2)
GFR NON-AFRICAN AMERICAN: >60
GLUCOSE BLD-MCNC: 167 MG/DL (ref 74–109)
HBA1C MFR BLD: 7.5 % (ref 4–6)
HDLC SERPL-MCNC: 46 MG/DL (ref 55–121)
LDL CHOLESTEROL CALCULATED: 71 MG/DL
POTASSIUM SERPL-SCNC: 4.5 MMOL/L (ref 3.5–5)
SODIUM BLD-SCNC: 138 MMOL/L (ref 136–145)
TOTAL PROTEIN: 7.8 G/DL (ref 6.6–8.7)
TRIGL SERPL-MCNC: 284 MG/DL (ref 0–149)

## 2020-05-27 PROCEDURE — 36415 COLL VENOUS BLD VENIPUNCTURE: CPT | Performed by: FAMILY MEDICINE

## 2020-06-16 RX ORDER — OMEPRAZOLE 40 MG/1
CAPSULE, DELAYED RELEASE ORAL
Qty: 90 CAPSULE | Refills: 3 | Status: SHIPPED | OUTPATIENT
Start: 2020-06-16 | End: 2021-06-11

## 2020-07-07 ENCOUNTER — OFFICE VISIT (OUTPATIENT)
Dept: PULMONOLOGY | Facility: CLINIC | Age: 80
End: 2020-07-07

## 2020-07-07 VITALS
HEART RATE: 80 BPM | WEIGHT: 207.8 LBS | BODY MASS INDEX: 29.82 KG/M2 | SYSTOLIC BLOOD PRESSURE: 152 MMHG | OXYGEN SATURATION: 97 % | TEMPERATURE: 98.1 F | DIASTOLIC BLOOD PRESSURE: 74 MMHG

## 2020-07-07 DIAGNOSIS — K21.9 GASTROESOPHAGEAL REFLUX DISEASE WITHOUT ESOPHAGITIS: ICD-10-CM

## 2020-07-07 DIAGNOSIS — J44.9 STAGE 3 SEVERE COPD BY GOLD CLASSIFICATION (HCC): Primary | ICD-10-CM

## 2020-07-07 DIAGNOSIS — J45.40 MODERATE PERSISTENT ASTHMA WITHOUT COMPLICATION: ICD-10-CM

## 2020-07-07 DIAGNOSIS — I25.10 CORONARY ARTERY DISEASE INVOLVING NATIVE CORONARY ARTERY OF NATIVE HEART WITHOUT ANGINA PECTORIS: ICD-10-CM

## 2020-07-07 DIAGNOSIS — Z79.899 CURRENT USE OF BETA BLOCKER: ICD-10-CM

## 2020-07-07 DIAGNOSIS — K21.9 GASTROESOPHAGEAL REFLUX DISEASE, ESOPHAGITIS PRESENCE NOT SPECIFIED: ICD-10-CM

## 2020-07-07 PROCEDURE — 99214 OFFICE O/P EST MOD 30 MIN: CPT | Performed by: INTERNAL MEDICINE

## 2020-07-07 RX ORDER — MONTELUKAST SODIUM 10 MG/1
10 TABLET ORAL NIGHTLY
Qty: 90 TABLET | Refills: 3 | Status: SHIPPED | OUTPATIENT
Start: 2020-07-07 | End: 2020-07-07 | Stop reason: SDUPTHER

## 2020-07-07 RX ORDER — MONTELUKAST SODIUM 10 MG/1
10 TABLET ORAL NIGHTLY
Qty: 90 TABLET | Refills: 3 | Status: SHIPPED | OUTPATIENT
Start: 2020-07-07 | End: 2021-03-02

## 2020-07-07 RX ORDER — ISOSORBIDE MONONITRATE 30 MG/1
30 TABLET, EXTENDED RELEASE ORAL DAILY
COMMUNITY
End: 2020-08-11

## 2020-07-07 NOTE — PATIENT INSTRUCTIONS
Montelukast oral tablets  What is this medicine?  MONTELUKAST (mon te LOO kast) is used to prevent and treat the symptoms of asthma. It is also used to treat allergies. Do not use for an acute asthma attack.  This medicine may be used for other purposes; ask your health care provider or pharmacist if you have questions.  COMMON BRAND NAME(S): Singulair  What should I tell my health care provider before I take this medicine?  They need to know if you have any of these conditions:  · liver disease  · an unusual or allergic reaction to montelukast, other medicines, foods, dyes, or preservatives  · pregnant or trying to get pregnant  · breast-feeding  How should I use this medicine?  This medicine should be given by mouth. Follow the directions on the prescription label. Take this medicine at the same time every day. You may take this medicine with or without meals. Do not chew the tablets. Do not stop taking your medicine unless your doctor tells you to.  Talk to your pediatrician regarding the use of this medicine in children. Special care may be needed. While this drug may be prescribed for children as young as 15 years of age for selected conditions, precautions do apply.  Overdosage: If you think you have taken too much of this medicine contact a poison control center or emergency room at once.  NOTE: This medicine is only for you. Do not share this medicine with others.  What if I miss a dose?  If you miss a dose, skip it. Take your next dose at the normal time. Do not take extra or 2 doses at the same time to make up for the missed dose.  What may interact with this medicine?  · anti-infectives like rifampin and rifabutin  · medicines for seizures like phenytoin, phenobarbital, and carbamazepine  This list may not describe all possible interactions. Give your health care provider a list of all the medicines, herbs, non-prescription drugs, or dietary supplements you use. Also tell them if you smoke, drink alcohol,  or use illegal drugs. Some items may interact with your medicine.  What should I watch for while using this medicine?  Visit your doctor or health care professional for regular checks on your progress. Tell your doctor or health care professional if your allergy or asthma symptoms do not improve. Take your medicine even when you do not have symptoms. Do not stop taking any of your medicine(s) unless your doctor tells you to.  If you have asthma, talk to your doctor about what to do in an acute asthma attack. Always have your inhaled rescue medicine for asthma attacks with you.  Patients and their families should watch for new or worsening thoughts of suicide or depression. Also watch for sudden changes in feelings such as feeling anxious, agitated, panicky, irritable, hostile, aggressive, impulsive, severely restless, overly excited and hyperactive, or not being able to sleep. Any worsening of mood or thoughts of suicide or dying should be reported to your health care professional right away.  What side effects may I notice from receiving this medicine?  Side effects that you should report to your doctor or health care professional as soon as possible:  · allergic reactions like skin rash or hives, or swelling of the face, lips, or tongue  · breathing problems  · changes in emotions or moods  · confusion  · depressed mood  · fever or infection  · hallucinations  · joint pain  · painful lumps under the skin  · pain, tingling, numbness in the hands or feet  · redness, blistering, peeling, or loosening of the skin, including inside the mouth  · restlessness  · seizures  · sleep walking  · signs and symptoms of infection like fever; chills; cough; sore throat; flu-like illness  · signs and symptoms of liver injury like dark yellow or brown urine; general ill feeling or flu-like symptoms; light-colored stools; loss of appetite; nausea; right upper belly pain; unusually weak or tired; yellowing of the eyes or  skin  · sinus pain or swelling  · stuttering  · suicidal thoughts or other mood changes  · tremors  · trouble sleeping  · uncontrolled muscle movements  · unusual bleeding or bruising  · vivid or bad dreams  Side effects that usually do not require medical attention (report to your doctor or health care professional if they continue or are bothersome):  · dizziness  · drowsiness  · headache  · runny nose  · stomach upset  · tiredness  This list may not describe all possible side effects. Call your doctor for medical advice about side effects. You may report side effects to FDA at 7-471-FDA-6723.  Where should I keep my medicine?  Keep out of the reach of children.  Store at room temperature between 15 and 30 degrees C (59 and 86 degrees F). Protect from light and moisture. Keep this medicine in the original bottle. Throw away any unused medicine after the expiration date.  NOTE: This sheet is a summary. It may not cover all possible information. If you have questions about this medicine, talk to your doctor, pharmacist, or health care provider.  © 2020 [employer] (2020-04-17 12:54:33)

## 2020-07-07 NOTE — PROGRESS NOTES
Subjective   Mina Ratliff is a 80 y.o. male.     Background: pt with gold stage 3 copd, fev1 42% pred 2018, asthma overlap    Chief Complaint   Patient presents with   • COPD        History of Present Illness   Patient follows up with history of asthma and COPD.  He is denying any sinus problems right now but he says he is coughing more but denies full-blown exacerbation or symptoms of bronchitis or fever.  Notes purulent sputum.  His daughter is bringing him his groceries.  He does have some phlegm.  He is using Anoro and he says that is improved compared to Trelegy.  He is known to have an asthmatic component and he also is known to take Inderal for tremor.    Medical/Family/Social History   has a past medical history of Asthma, Atherosclerosis of native coronary artery of native heart without angina pectoris, CAD (coronary artery disease), Chest pain, Chronic airway obstruction (CMS/HCC), COPD (chronic obstructive pulmonary disease) (CMS/[facility]), Diabetes mellitus (CMS/[facility]), Dizziness, GERD (gastroesophageal reflux disease), HTN (hypertension), Hyperlipidemia, Malaise and fatigue, Myocardial infarction, old, Old myocardial infarction, and Stage 3 severe COPD by GOLD classification (CMS/HCC) (3/13/2015).   has a past surgical history that includes Cardiac catheterization; Coronary angioplasty (08/27/2009); Hemorrhoid surgery; Inner ear surgery; and Prostate surgery.  family history includes Coronary artery disease in an other family member.   reports that he has quit smoking. His smoking use included cigarettes. He has never used smokeless tobacco. He reports that he does not drink alcohol or use drugs.  No Known Allergies  Medications    Current Outpatient Medications:   •  aspirin  MG tablet, Take 325 mg by mouth Daily. HOLD STARTING TODAY, Disp: , Rfl:   •  diphenhydrAMINE-acetaminophen (TYLENOL PM)  MG tablet per tablet, Take 1 tablet by mouth At Night As Needed for Sleep., Disp: , Rfl:   •   furosemide (LASIX) 40 MG tablet, Take 40 mg by mouth Daily., Disp: , Rfl:   •  isosorbide mononitrate (IMDUR) 30 MG 24 hr tablet, Take 30 mg by mouth Daily., Disp: , Rfl:   •  losartan (COZAAR) 25 MG tablet, TAKE 1 TABLET DAILY, Disp: 90 tablet, Rfl: 3  •  metFORMIN (GLUCOPHAGE) 1000 MG tablet, Take 1,000 mg by mouth 2 (Two) Times a Day With Meals., Disp: , Rfl:   •  Multiple Vitamins-Minerals (MULTIVITAMIN ADULT PO), Take  by mouth., Disp: , Rfl:   •  omeprazole (priLOSEC) 20 MG capsule, Take 20 mg by mouth Daily., Disp: , Rfl:   •  primidone (MYSOLINE) 50 MG tablet, Take 50 mg by mouth 2 (Two) Times a Day., Disp: , Rfl:   •  propranolol (INDERAL) 60 MG tablet, Take 1 tablet by mouth 2 (Two) Times a Day. (Patient taking differently: Take 40 mg by mouth 2 (Two) Times a Day.), Disp: 180 tablet, Rfl: 3  •  simvastatin (ZOCOR) 40 MG tablet, Take 40 mg by mouth Every Night., Disp: , Rfl:   •  tamsulosin (FLOMAX) 0.4 MG capsule 24 hr capsule, Take 1 capsule by mouth Daily., Disp: , Rfl:   •  umeclidinium-vilanterol (ANORO ELLIPTA) 62.5-25 MCG/INH aerosol powder  inhaler, Inhale 1 puff Daily., Disp: 3 each, Rfl: 3  •  montelukast (SINGULAIR) 10 MG tablet, Take 1 tablet by mouth Every Night., Disp: 90 tablet, Rfl: 3    Current Facility-Administered Medications:   •  lidocaine (XYLOCAINE) 2 % jelly, , Topical, PRN, [place] MD    Review of Systems   Constitutional: Negative for chills and fever.   Cardiovascular: Negative for chest pain, palpitations and leg swelling.   Gastrointestinal: Negative for nausea and vomiting.   Neurological: Negative for tremors.       Objective   /74   Pulse 80   Temp 98.1 °F (36.7 °C)   Wt 94.3 kg (207 lb 12.8 oz)   SpO2 97% Comment: ra  BMI 29.82 kg/m²   Physical Exam    -----------------------------------------------------------------------------------------------       -----------------------------------------------------------------------------------------------  PFT  Values        Some values may be hidden. Unless noted otherwise, only the newest values recorded on each date are displayed.         Old Values PFT Results 6/10/19   FVC 68%   FEV1 49%   FEV1/FVC 55.55%      Pre Drug PFT Results 6/10/19   No data to display.      Post Drug PFT Results 6/10/19   No data to display.      Other Tests PFT Results 6/10/19   No data to display.                -----------------------------------------------------------------------------------------------  Assessment/Plan   Problem List Items Addressed This Visit        Pulmonary Problems    Stage 3 severe COPD by GOLD classification (CMS/[facility]) - Primary    Relevant Medications    montelukast (SINGULAIR) 10 MG tablet       Other    CAD (coronary artery disease)    Relevant Medications    isosorbide mononitrate (IMDUR) 30 MG 24 hr tablet    GERD (gastroesophageal reflux disease)    Current use of beta blocker      Other Visit Diagnoses     Moderate persistent asthma without complication        Relevant Medications    montelukast (SINGULAIR) 10 MG tablet        Patient's Body mass index is 29.82 kg/m². BMI is within normal parameters. No follow-up required..      We will add montelukast.  I gave him information about this medication and a prescription.  Continue Anoro and continue albuterol.  monitor for any effect on the cough.  We also discussed that the propranolol that he uses for tremors could be aggravating bronchospasm but he feels he can do without this.       Electronically signed by Mg Amin MD, 7/7/2020, 16:47

## 2020-07-26 DIAGNOSIS — N13.8 BPH WITH OBSTRUCTION/LOWER URINARY TRACT SYMPTOMS: Primary | ICD-10-CM

## 2020-07-26 DIAGNOSIS — N40.1 BPH WITH OBSTRUCTION/LOWER URINARY TRACT SYMPTOMS: Primary | ICD-10-CM

## 2020-07-27 RX ORDER — TAMSULOSIN HYDROCHLORIDE 0.4 MG/1
CAPSULE ORAL
Qty: 90 CAPSULE | Refills: 3 | Status: SHIPPED | OUTPATIENT
Start: 2020-07-27 | End: 2021-06-08

## 2020-08-07 RX ORDER — SIMVASTATIN 40 MG
TABLET ORAL
Qty: 90 TABLET | Refills: 3 | Status: SHIPPED | OUTPATIENT
Start: 2020-08-07 | End: 2021-07-19

## 2020-10-02 RX ORDER — METOPROLOL TARTRATE 50 MG/1
TABLET, FILM COATED ORAL
Qty: 180 TABLET | Refills: 3 | Status: SHIPPED | OUTPATIENT
Start: 2020-10-02 | End: 2021-04-22 | Stop reason: ALTCHOICE

## 2020-10-12 RX ORDER — PROPRANOLOL HYDROCHLORIDE 60 MG/1
60 TABLET ORAL 2 TIMES DAILY
Qty: 180 TABLET | Refills: 3 | Status: SHIPPED | OUTPATIENT
Start: 2020-10-12 | End: 2021-04-22 | Stop reason: SDUPTHER

## 2020-10-17 DIAGNOSIS — I10 ESSENTIAL HYPERTENSION: ICD-10-CM

## 2020-10-19 ENCOUNTER — OFFICE VISIT (OUTPATIENT)
Dept: CARDIOLOGY | Facility: CLINIC | Age: 80
End: 2020-10-19

## 2020-10-19 VITALS
BODY MASS INDEX: 28.77 KG/M2 | HEART RATE: 85 BPM | HEIGHT: 70 IN | OXYGEN SATURATION: 98 % | SYSTOLIC BLOOD PRESSURE: 128 MMHG | DIASTOLIC BLOOD PRESSURE: 68 MMHG | WEIGHT: 201 LBS

## 2020-10-19 DIAGNOSIS — E66.3 OVERWEIGHT: ICD-10-CM

## 2020-10-19 DIAGNOSIS — I10 ESSENTIAL HYPERTENSION: ICD-10-CM

## 2020-10-19 DIAGNOSIS — I25.10 CORONARY ARTERY DISEASE INVOLVING NATIVE CORONARY ARTERY OF NATIVE HEART WITHOUT ANGINA PECTORIS: Primary | ICD-10-CM

## 2020-10-19 DIAGNOSIS — E78.2 MIXED HYPERLIPIDEMIA: ICD-10-CM

## 2020-10-19 PROCEDURE — 93000 ELECTROCARDIOGRAM COMPLETE: CPT | Performed by: INTERNAL MEDICINE

## 2020-10-19 PROCEDURE — 99214 OFFICE O/P EST MOD 30 MIN: CPT | Performed by: INTERNAL MEDICINE

## 2020-10-19 RX ORDER — LOSARTAN POTASSIUM 25 MG/1
TABLET ORAL
Qty: 90 TABLET | Refills: 3 | Status: SHIPPED | OUTPATIENT
Start: 2020-10-19 | End: 2021-10-14

## 2020-10-19 NOTE — PROGRESS NOTES
Reason for Visit: cardiovascular follow up.    HPI:  Mina Ratliff is a 80 y.o. male is here today for follow-up.  He has been doing well and not having any issues or complaints.  He has been isolation and social distancing.  He denies any chest pain, palpitations, dizziness, syncope, PND, or orthopnea.  He is able to do all his activities of daily living without difficulty.  His blood pressure has been well controlled at home.    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    - Lower extremity US (9/21/17) negative for any DVT or thrombophlebitis.   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 with mild miranda-infarct ischemia, EF > 70%  - Lipid panel (4/8/2019) total cholesterol 192, HDL 46, LDL 88, triglycerides 292  - Lipid panel (5/27/2020) total cholesterol 174, HDL 46, LDL 71, triglycerides 284  - Hemoglobin A1c (5/27/2020) 7.5%    Patient Active Problem List   Diagnosis   • Malaise and fatigue   • Mixed hyperlipidemia   • Coronary artery disease involving native coronary artery of native heart without angina pectoris   • Essential hypertension   • Benign prostatic hyperplasia without lower urinary tract symptoms   • Stage 3 severe COPD by GOLD classification (CMS/[facility])   • GERD (gastroesophageal reflux disease)   • Type 2 diabetes mellitus without complication (CMS/[facility])   • Essential tremor   • Current use of beta blocker   • Obesity       Social History     Tobacco Use   • Smoking status: Former Smoker     Types: Cigarettes   • Smokeless tobacco: Never Used   • Tobacco comment: Quit about 1989   Substance Use Topics   • Alcohol use: No   • Drug use: No       Family History   Problem Relation Age of Onset   • Coronary artery disease Other        The following  portions of the patient's history were reviewed and updated as appropriate: allergies, current medications, past family history, past medical history, past social history, past surgical history and problem list.      Current Outpatient Medications:   •  aspirin  MG tablet, Take 325 mg by mouth Daily. HOLD STARTING TODAY, Disp: , Rfl:   •  diphenhydrAMINE-acetaminophen (TYLENOL PM)  MG tablet per tablet, Take 1 tablet by mouth At Night As Needed for Sleep., Disp: , Rfl:   •  furosemide (LASIX) 40 MG tablet, Take 40 mg by mouth Daily., Disp: , Rfl:   •  losartan (COZAAR) 25 MG tablet, TAKE 1 TABLET DAILY, Disp: 90 tablet, Rfl: 3  •  metFORMIN (GLUCOPHAGE) 1000 MG tablet, Take 1,000 mg by mouth 2 (Two) Times a Day With Meals., Disp: , Rfl:   •  montelukast (SINGULAIR) 10 MG tablet, Take 1 tablet by mouth Every Night., Disp: 90 tablet, Rfl: 3  •  Multiple Vitamins-Minerals (MULTIVITAMIN ADULT PO), Take  by mouth., Disp: , Rfl:   •  omeprazole (priLOSEC) 20 MG capsule, Take 20 mg by mouth Daily., Disp: , Rfl:   •  primidone (MYSOLINE) 50 MG tablet, Take 50 mg by mouth 2 (Two) Times a Day., Disp: , Rfl:   •  propranolol (INDERAL) 60 MG tablet, Take 1 tablet by mouth 2 (Two) Times a Day. (Patient taking differently: Take 40 mg by mouth 2 (Two) Times a Day.), Disp: 180 tablet, Rfl: 3  •  simvastatin (ZOCOR) 40 MG tablet, Take 40 mg by mouth Every Night., Disp: , Rfl:   •  tamsulosin (FLOMAX) 0.4 MG capsule 24 hr capsule, TAKE 1 CAPSULE EVERY NIGHT, Disp: 90 capsule, Rfl: 3  •  umeclidinium-vilanterol (ANORO ELLIPTA) 62.5-25 MCG/INH aerosol powder  inhaler, Inhale 1 puff Daily., Disp: 3 each, Rfl: 3    Current Facility-Administered Medications:   •  lidocaine (XYLOCAINE) 2 % jelly, , Topical, PRN, [place], Joe INGRAM MD    Review of Systems   Constitution: Negative for chills and fever.   HENT: Negative for congestion and hearing loss.    Cardiovascular: Negative for chest pain and paroxysmal nocturnal dyspnea.  "  Respiratory: Negative for cough and shortness of breath.    Skin: Negative for rash.   Musculoskeletal: Negative for arthritis and back pain.   Gastrointestinal: Negative for abdominal pain and heartburn.   Neurological: Positive for numbness and paresthesias. Negative for dizziness.   Psychiatric/Behavioral: Negative for altered mental status and hallucinations.       Objective   /68 (BP Location: Left arm, Patient Position: Sitting, Cuff Size: Adult)   Pulse 85   Ht 177.8 cm (70\")   Wt 91.2 kg (201 lb)   SpO2 98%   BMI 28.84 kg/m²   Constitutional:       Appearance: Well-developed.   Eyes:      General: Lids are normal. Vision grossly intact.   HENT:      Head: Normocephalic and atraumatic.   Neck:      Musculoskeletal: Neck supple.      Vascular: No JVD.   Pulmonary:      Effort: Pulmonary effort is normal.      Breath sounds: Normal breath sounds.   Cardiovascular:      Normal rate. Regular rhythm.   Skin:     General: Skin is warm and dry.   Neurological:      Mental Status: Alert and oriented to person, place, and time.   Psychiatric:         Attention and Perception: Attention normal.         Mood and Affect: Mood normal.         Speech: Speech normal.         ECG 12 Lead    Date/Time: 10/19/2020 3:57 PM  Performed by: Ciro Helm MD  Authorized by: Ciro Helm MD   Comparison: compared with previous ECG from 1/20/2020  Similar to previous ECG  Rhythm: sinus rhythm  Rate: normal  Other findings: non-specific ST-T wave changes and prolonged QTc interval              ICD-10-CM ICD-9-CM   1. Coronary artery disease involving native coronary artery of native heart without angina pectoris  I25.10 414.01   2. Essential hypertension  I10 401.9   3. Mixed hyperlipidemia  E78.2 272.2   4. Overweight  E66.3 278.02         Assessment/Plan:  1. Coronary artery disease: Anomalous left circumflex artery that was  on [facility] from 1/2011, and filling via collaterals. Nuclear stress from 10/2018 with " inferolateral defect consistent with the circumflex .  He remains chest pain-free.  Continue aspirin, simvastatin, and propanolol.  Will discuss changing to 81 mg follow-up.    2.  Essential hypertension: Blood pressure remains well controlled on current therapy.      3.  Mixed hyperlipidemia:  Continue simvastatin.     4.  Overweight: Patient's Body mass index is 28.84 kg/m².  Weight is down 11 lbs over the past 6 months.  BMI is above normal parameters. Recommendations include: exercise counseling and nutrition counseling.

## 2020-11-25 ENCOUNTER — TRANSCRIBE ORDERS (OUTPATIENT)
Dept: ADMINISTRATIVE | Facility: HOSPITAL | Age: 80
End: 2020-11-25

## 2020-11-25 ENCOUNTER — OFFICE VISIT (OUTPATIENT)
Dept: PRIMARY CARE CLINIC | Age: 80
End: 2020-11-25
Payer: MEDICARE

## 2020-11-25 VITALS
HEIGHT: 71 IN | RESPIRATION RATE: 18 BRPM | SYSTOLIC BLOOD PRESSURE: 135 MMHG | BODY MASS INDEX: 29.09 KG/M2 | OXYGEN SATURATION: 96 % | TEMPERATURE: 98.2 F | WEIGHT: 207.8 LBS | DIASTOLIC BLOOD PRESSURE: 78 MMHG | HEART RATE: 74 BPM

## 2020-11-25 DIAGNOSIS — R93.89 ABNORMAL CHEST X-RAY: ICD-10-CM

## 2020-11-25 DIAGNOSIS — J44.9 CHRONIC OBSTRUCTIVE PULMONARY DISEASE, UNSPECIFIED COPD TYPE (HCC): Primary | ICD-10-CM

## 2020-11-25 DIAGNOSIS — R91.8 MASS OF UPPER LOBE OF RIGHT LUNG: ICD-10-CM

## 2020-11-25 LAB
ALBUMIN SERPL-MCNC: 4.5 G/DL (ref 3.5–5.2)
ALP BLD-CCNC: 78 U/L (ref 40–130)
ALT SERPL-CCNC: 17 U/L (ref 5–41)
ANION GAP SERPL CALCULATED.3IONS-SCNC: 17 MMOL/L (ref 7–19)
AST SERPL-CCNC: 20 U/L (ref 5–40)
BILIRUB SERPL-MCNC: <0.2 MG/DL (ref 0.2–1.2)
BUN BLDV-MCNC: 22 MG/DL (ref 8–23)
CALCIUM SERPL-MCNC: 9.4 MG/DL (ref 8.8–10.2)
CHLORIDE BLD-SCNC: 100 MMOL/L (ref 98–111)
CHOLESTEROL, TOTAL: 156 MG/DL (ref 160–199)
CO2: 23 MMOL/L (ref 22–29)
CREAT SERPL-MCNC: 1.1 MG/DL (ref 0.5–1.2)
GFR AFRICAN AMERICAN: >59
GFR NON-AFRICAN AMERICAN: >60
GLUCOSE BLD-MCNC: 207 MG/DL (ref 74–109)
HBA1C MFR BLD: 7 % (ref 4–6)
HDLC SERPL-MCNC: 44 MG/DL (ref 55–121)
LDL CHOLESTEROL CALCULATED: 62 MG/DL
POTASSIUM SERPL-SCNC: 4.7 MMOL/L (ref 3.5–5)
SODIUM BLD-SCNC: 140 MMOL/L (ref 136–145)
TOTAL PROTEIN: 7.6 G/DL (ref 6.6–8.7)
TRIGL SERPL-MCNC: 251 MG/DL (ref 0–149)

## 2020-11-25 PROCEDURE — G8427 DOCREV CUR MEDS BY ELIG CLIN: HCPCS | Performed by: FAMILY MEDICINE

## 2020-11-25 PROCEDURE — G8417 CALC BMI ABV UP PARAM F/U: HCPCS | Performed by: FAMILY MEDICINE

## 2020-11-25 PROCEDURE — G0008 ADMIN INFLUENZA VIRUS VAC: HCPCS | Performed by: FAMILY MEDICINE

## 2020-11-25 PROCEDURE — 3023F SPIROM DOC REV: CPT | Performed by: FAMILY MEDICINE

## 2020-11-25 PROCEDURE — 4040F PNEUMOC VAC/ADMIN/RCVD: CPT | Performed by: FAMILY MEDICINE

## 2020-11-25 PROCEDURE — G0439 PPPS, SUBSEQ VISIT: HCPCS | Performed by: FAMILY MEDICINE

## 2020-11-25 PROCEDURE — 1123F ACP DISCUSS/DSCN MKR DOCD: CPT | Performed by: FAMILY MEDICINE

## 2020-11-25 PROCEDURE — G8926 SPIRO NO PERF OR DOC: HCPCS | Performed by: FAMILY MEDICINE

## 2020-11-25 PROCEDURE — 4004F PT TOBACCO SCREEN RCVD TLK: CPT | Performed by: FAMILY MEDICINE

## 2020-11-25 PROCEDURE — G8484 FLU IMMUNIZE NO ADMIN: HCPCS | Performed by: FAMILY MEDICINE

## 2020-11-25 PROCEDURE — 3051F HG A1C>EQUAL 7.0%<8.0%: CPT | Performed by: FAMILY MEDICINE

## 2020-11-25 PROCEDURE — 36415 COLL VENOUS BLD VENIPUNCTURE: CPT | Performed by: FAMILY MEDICINE

## 2020-11-25 PROCEDURE — 90694 VACC AIIV4 NO PRSRV 0.5ML IM: CPT | Performed by: FAMILY MEDICINE

## 2020-11-25 PROCEDURE — 99214 OFFICE O/P EST MOD 30 MIN: CPT | Performed by: FAMILY MEDICINE

## 2020-11-25 RX ORDER — PREDNISONE 10 MG/1
10 TABLET ORAL DAILY
Qty: 10 TABLET | Refills: 0 | Status: SHIPPED | OUTPATIENT
Start: 2020-11-25 | End: 2020-12-05

## 2020-11-25 RX ORDER — MECLIZINE HYDROCHLORIDE 25 MG/1
25 TABLET ORAL 3 TIMES DAILY PRN
Qty: 30 TABLET | Refills: 1 | Status: SHIPPED | OUTPATIENT
Start: 2020-11-25 | End: 2020-12-05

## 2020-11-25 RX ORDER — AMOXICILLIN 875 MG/1
875 TABLET, COATED ORAL 2 TIMES DAILY
Qty: 20 TABLET | Refills: 0 | Status: SHIPPED | OUTPATIENT
Start: 2020-11-25 | End: 2020-12-05

## 2020-11-25 RX ORDER — CALCIUM CITRATE/VITAMIN D3 200MG-6.25
TABLET ORAL
Qty: 100 STRIP | Refills: 5 | Status: SHIPPED | OUTPATIENT
Start: 2020-11-25

## 2020-11-25 RX ORDER — CALCIUM CITRATE/VITAMIN D3 200MG-6.25
TABLET ORAL
Qty: 100 STRIP | Refills: 0 | Status: SHIPPED | OUTPATIENT
Start: 2020-11-25 | End: 2020-11-25 | Stop reason: SDUPTHER

## 2020-11-25 ASSESSMENT — PATIENT HEALTH QUESTIONNAIRE - PHQ9
SUM OF ALL RESPONSES TO PHQ QUESTIONS 1-9: 0
SUM OF ALL RESPONSES TO PHQ9 QUESTIONS 1 & 2: 0
1. LITTLE INTEREST OR PLEASURE IN DOING THINGS: 0
2. FEELING DOWN, DEPRESSED OR HOPELESS: 0

## 2020-11-25 ASSESSMENT — LIFESTYLE VARIABLES: HOW OFTEN DO YOU HAVE A DRINK CONTAINING ALCOHOL: 0

## 2020-11-25 NOTE — PROGRESS NOTES
SUBJECTIVE:   Ava Lambert is a 79 y.o. male presenting for his annual checkup. He actually came in for a Medicare annual wellness which was done but he has other problems we follow including type 2 diabetes, hypertension and COPD. He does not check his sugars often but when he does they are usually less than 140. He denies polyuria polyphagia or polydipsia. He denies numbness or tingling in his feet. He has had no episodes of hypoglycemia. Blood pressure is fairly well controlled. He has had no recent flareups of COPD exacerbation. He did have an episode about a week ago where he was a little dizzy. It felt like he was off balance and he took some Antivert that was 15years old. It did help. His daughter also has inner ear problems and she went to the chiropractor. He also mentioned that occasionally when he takes a deep breath there will be a sharp pain in his upper lungs. It really is not associated with a cough. He occasionally will have a cough productive of phlegm but it is clear. He denies any fever or chills.     Patient Active Problem List    Diagnosis Date Noted    Type 2 diabetes mellitus without complication ([facility]) 33/91/1755    Essential hypertension 09/15/2015    COPD (chronic obstructive pulmonary disease) ([facility]) 03/13/2015    Hypercholesterolemia     Bronchitis, chronic (HCC)     GERD (gastroesophageal reflux disease)      Current Outpatient Medications   Medication Sig Dispense Refill    meclizine (ANTIVERT) 25 MG tablet Take 1 tablet by mouth 3 times daily as needed for Dizziness 30 tablet 1    amoxicillin (AMOXIL) 875 MG tablet Take 1 tablet by mouth 2 times daily for 10 days 20 tablet 0    predniSONE (DELTASONE) 10 MG tablet Take 1 tablet by mouth daily for 10 days 10 tablet 0    blood glucose test strips (TRUE METRIX BLOOD GLUCOSE TEST) strip TEST BLOOD SUGAR ONCE DAILY *E11.9* 100 strip 5    propranolol (INDERAL) 60 MG tablet Take 1 tablet by mouth 2 times daily 180 tablet 3    metoprolol tartrate (LOPRESSOR) 50 MG tablet TAKE 1 TABLET TWICE A DAY FOR BLOOD PRESSURE AND IRREGULAR HEARTBEAT (STOP PROPRANOLOL) 180 tablet 3    simvastatin (ZOCOR) 40 MG tablet TAKE 1 TABLET AT BEDTIME FOR CHOLESTEROL 90 tablet 3    omeprazole (PRILOSEC) 40 MG delayed release capsule TAKE 1 CAPSULE DAILY FOR STOMACH 90 capsule 3    furosemide (LASIX) 40 MG tablet TAKE 1 TABLET DAILY FOR FLUID 90 tablet 4    metFORMIN (GLUCOPHAGE) 1000 MG tablet TAKE 1 TABLET TWICE A DAY FOR DIABETES 180 tablet 4    isosorbide mononitrate (IMDUR) 60 MG extended release tablet Take 60 mg by mouth      losartan (COZAAR) 25 MG tablet Take 25 mg by mouth      umeclidinium-vilanterol (ANORO ELLIPTA) 62.5-25 MCG/INH AEPB inhaler Inhale 1 puff into the lungs daily      primidone (MYSOLINE) 50 MG tablet 1 tab po BID for tremor 180 tablet 3    metoprolol (TOPROL XL) 50 MG XL tablet 1 tablet by mouth once a day for high blood pressure 30 tablet 3    triamcinolone (KENALOG) 0.1 % cream Apply topically 2 times daily. To lower leg dry skin 3 Tube 3    tamsulosin (FLOMAX) 0.4 MG capsule Take 1 capsule by mouth daily. 90 capsule 3    aspirin 325 MG tablet Take 325 mg by mouth daily. No current facility-administered medications for this visit.       Past Medical History:   Diagnosis Date    Allergic rhinitis     Bronchitis, chronic (HCC)     Carotid artery stenosis     GERD (gastroesophageal reflux disease)     Hypercholesterolemia     Type II or unspecified type diabetes mellitus without mention of complication, not stated as uncontrolled      Past Surgical History:   Procedure Laterality Date    CARDIAC SURGERY       Family History   Problem Relation Age of Onset    Diabetes Mother     High Blood Pressure Mother     Cancer Father      Social History     Tobacco Use    Smoking status: Former Smoker     Packs/day: 1.50     Years: 30.00     Pack years: 45.00     Types: Cigarettes     Last attempt to quit:      Years since quittin.9    Smokeless tobacco: Never Used   Substance Use Topics    Alcohol use: No       Allergies: Patient has no known allergies. ROS:  Feeling well. No dyspnea or chest pain on exertion. No abdominal pain, change in bowel habits, black or bloody stools. No urinary tract or prostatic symptoms. No neurological complaints. All other systems negative. OBJECTIVE:   The patient appears well, alert, oriented x 3, in no distress. /78   Pulse 74   Temp 98.2 °F (36.8 °C)   Resp 18   Ht 5' 11\" (1.803 m)   Wt 207 lb 12.8 oz (94.3 kg)   SpO2 96%   BMI 28.98 kg/m²   Skin normal, no suspicious skin lesions. ENT normal.  Neck supple. No adenopathy or thyromegaly. REGINALD. Lungs are clear, good air entry, no wheezes, rhonchi or rales. S1 and S2 normal, no murmurs, regular rate and rhythm. Abdomen is soft without tenderness, guarding, mass or organomegaly.  exam: Not examined. .  Extremities show no edema, normal peripheral pulses. Neurological is normal without focal findings. Psychiatric exam, no signs of depression. ASSESSMENT:   1. Routine general medical examination at a health care facility    2. Need for influenza vaccination    3. Type 2 diabetes mellitus without complication, without long-term current use of insulin ([facility] [address]    4. Essential hypertension    5. Chronic obstructive pulmonary disease, unspecified COPD type ([facility] [address]    6. Hypercholesterolemia    7. Productive cough    8. Abnormal chest x-ray    9. Mass of upper lobe of right lung    10.  Dizziness        PLAN:   Lifestyle advice: discussed diet and exercise  MEDICATIONS:  Orders Placed This Encounter   Medications    meclizine (ANTIVERT) 25 MG tablet     Sig: Take 1 tablet by mouth 3 times daily as needed for Dizziness     Dispense:  30 tablet     Refill:  1    amoxicillin (AMOXIL) 875 MG tablet     Sig: Take 1 tablet by mouth 2 times daily for 10 days     Dispense:  20 tablet     Refill:  0  predniSONE (DELTASONE) 10 MG tablet     Sig: Take 1 tablet by mouth daily for 10 days     Dispense:  10 tablet     Refill:  0    blood glucose test strips (TRUE METRIX BLOOD GLUCOSE TEST) strip     Sig: TEST BLOOD SUGAR ONCE DAILY *E11.9*     Dispense:  100 strip     Refill:  5       ORDERS:  Orders Placed This Encounter   Procedures    XR CHEST (2 VW)    CT CHEST W CONTRAST    INFLUENZA, QUADV, ADJUVANTED, 65 YRS =, IM, PF, PREFILL SYR, 0.5ML (FLUAD)    Comprehensive Metabolic Panel    Lipid Panel    Hemoglobin A1C     I am going to get a chest x-ray because of his sharp chest pain. Unfortunately the chest x-ray showed a 4 cm mass in the right upper lung. Radiologist recommended chest CT. Immunizations were updated. We will contact patient when we get results of blood work. He was given a prescription for glucometer strips. He is to check his glucoses once a day. I am going to go ahead and put him on prednisone but I added an antibiotic once we got the results of his chest x-ray. I also gave him a new prescription for Antivert. If the dizziness persists we may need to send him for physical therapy. Follow-up:  Return in 6 months (on 5/25/2021) for diabetes check. PATIENT INSTRUCTIONS:  Patient Instructions     Personalized Preventive Plan for Anthony Eddy - 11/25/2020  Medicare offers a range of preventive health benefits. Some of the tests and screenings are paid in full while other may be subject to a deductible, co-insurance, and/or copay. Some of these benefits include a comprehensive review of your medical history including lifestyle, illnesses that may run in your family, and various assessments and screenings as appropriate. After reviewing your medical record and screening and assessments performed today your provider may have ordered immunizations, labs, imaging, and/or referrals for you.   A list of these orders (if applicable) as well as your Preventive Care list are support you want as you near the end of your life or if you get seriously hurt or ill. Keep these facts in mind about living irving. Your living will is used only if you can't speak or make decisions for yourself. Most often, one or more doctors must certify that you can't speak or decide for yourself before your living will takes effect. If you get better and can speak for yourself again, you can accept or refuse any treatment. It doesn't matter what you said in your living will. Some states may limit your right to refuse treatment in certain cases. For example, you may need to clearly state in your living will that you don't want artificial hydration and nutrition, such as being fed through a tube. Is a living will a legal document? A living will is a legal document. Each state has its own laws about living irving. And a living will may be called something else in your state. Here are some things to know about living irving. You don't need an  to complete a living will. But legal advice can be helpful if your state's laws are unclear. It can also help if your health history is complicated or your family can't agree on what should be in your living will. You can change your living will at any time. Some people find that their wishes about end-of-life care change as their health changes. If you make big changes to your living will, complete a new form. If you move to another state, make sure that your living will is legal in the state where you now live. In most cases, doctors will respect your wishes even if you have a form from a different state. You might use a universal form that has been approved by many states. This kind of form can sometimes be filled out and stored online. Your digital copy will then be available wherever you have a connection to the internet. The doctors and nurses who need to treat you can find it right away. Your state may offer an online registry.  This is another place where you can store your living will online. It's a good idea to get your living will notarized. This means using a person called a Yecuris to watch two people sign, or witness, your living will. What should you know when you create a living will? Here are some questions to ask yourself as you make your living will:  Do you know enough about life support methods that might be used? If not, talk to your doctor so you know what might be done if you can't breathe on your own, your heart stops, or you can't swallow. What things would you still want to be able to do after you receive life-support methods? Would you want to be able to walk? To speak? To eat on your own? To live without the help of machines? Do you want certain Oriental orthodox practices performed if you become very ill? If you have a choice, where do you want to be cared for? In your home? At a hospital or nursing home? If you have a choice at the end of your life, where would you prefer to die? At home? In a hospital or nursing home? Somewhere else? Would you prefer to be buried or cremated? Do you want your organs to be donated after you die? What should you do with your living will? Make sure that your family members and your health care agent have copies of your living will (also called a declaration). Give your doctor a copy of your living will. Ask him or her to keep it as part of your medical record. If you have more than one doctor, make sure that each one has a copy. Put a copy of your living will where it can be easily found. For example, some people may put a copy on their refrigerator door. If you are using a digital copy, be sure your doctor, family members, and health care agent know how to find and access it. Where can you learn more? Go to https://[email or web address].[employer]. org and sign in to your [employer] account. Enter W844 in the [employer] box to learn more about \"Learning About Living Zohra Rudolph. \"     If you do not have an account, please click on the \"Sign Up Now\" link. Current as of: December 9, 2019               Content Version: 12.6  © 3334-1599 [employer]. Care instructions adapted under license by [facility] [place] ([facility]). If you have questions about a medical condition or this instruction, always ask your healthcare professional. [address] any warranty or liability for your use of this information. ·       EMR Dragon/transcription disclaimer:  Much of this encounter note is electronic transcription/translation of spoken language to printed texts. The electronic translation of spoken language may be erroneous, or at times, nonsensical words or phrases may be inadvertently transcribed.   Although I have reviewed the note for such errors, some may still exist.

## 2020-11-25 NOTE — PATIENT INSTRUCTIONS
Personalized Preventive Plan for Rachell Vargas - 11/25/2020  Medicare offers a range of preventive health benefits. Some of the tests and screenings are paid in full while other may be subject to a deductible, co-insurance, and/or copay. Some of these benefits include a comprehensive review of your medical history including lifestyle, illnesses that may run in your family, and various assessments and screenings as appropriate. After reviewing your medical record and screening and assessments performed today your provider may have ordered immunizations, labs, imaging, and/or referrals for you. A list of these orders (if applicable) as well as your Preventive Care list are included within your After Visit Summary for your review. Other Preventive Recommendations:    · A preventive eye exam performed by an eye specialist is recommended every 1-2 years to screen for glaucoma; cataracts, macular degeneration, and other eye disorders. · A preventive dental visit is recommended every 6 months. · Try to get at least 150 minutes of exercise per week or 10,000 steps per day on a pedometer . · Order or download the FREE \"Exercise & Physical Activity: Your Everyday Guide\" from [employer]. Call 4-703.765.9660 or search [employer] online. · You need 3423-3145 mg of calcium and 4117-4553 IU of vitamin D per day. It is possible to meet your calcium requirement with diet alone, but a vitamin D supplement is usually necessary to meet this goal.  · When exposed to the sun, use a sunscreen that protects against both UVA and UVB radiation with an SPF of 30 or greater. Reapply every 2 to 3 hours or after sweating, drying off with a towel, or swimming. · Always wear a seat belt when traveling in a car. Always wear a helmet when riding a bicycle or motorcycle. Learning About Living Reinaldo Luu  What is a living will?      A living will, also called a declaration, is a legal form. It tells your family and your doctor your wishes when you can't speak for yourself. It's used by the health professionals who will treat you as you near the end of your life or if you get seriously hurt or ill. If you put your wishes in writing, your loved ones and others will know what kind of care you want. They won't need to guess. This can ease your mind and be helpful to others. And you can change or cancel your living will at any time. A living will is not the same as an estate or property will. An estate will explains what you want to happen with your money and property after you die. How do you use it? A living will is used to describe the kinds of treatment or life support you want as you near the end of your life or if you get seriously hurt or ill. Keep these facts in mind about living irving. Your living will is used only if you can't speak or make decisions for yourself. Most often, one or more doctors must certify that you can't speak or decide for yourself before your living will takes effect. If you get better and can speak for yourself again, you can accept or refuse any treatment. It doesn't matter what you said in your living will. Some states may limit your right to refuse treatment in certain cases. For example, you may need to clearly state in your living will that you don't want artificial hydration and nutrition, such as being fed through a tube. Is a living will a legal document? A living will is a legal document. Each state has its own laws about living irving. And a living will may be called something else in your state. Here are some things to know about living irving. You don't need an  to complete a living will. But legal advice can be helpful if your state's laws are unclear. It can also help if your health history is complicated or your family can't agree on what should be in your living will. You can change your living will at any time.  Some people find that their wishes about end-of-life care change as their health changes. If you make big changes to your living will, complete a new form. If you move to another state, make sure that your living will is legal in the state where you now live. In most cases, doctors will respect your wishes even if you have a form from a different state. You might use a universal form that has been approved by many states. This kind of form can sometimes be filled out and stored online. Your digital copy will then be available wherever you have a connection to the internet. The doctors and nurses who need to treat you can find it right away. Your state may offer an online registry. This is another place where you can store your living will online. It's a good idea to get your living will notarized. This means using a person called a Ninua to watch two people sign, or witness, your living will. What should you know when you create a living will? Here are some questions to ask yourself as you make your living will:  Do you know enough about life support methods that might be used? If not, talk to your doctor so you know what might be done if you can't breathe on your own, your heart stops, or you can't swallow. What things would you still want to be able to do after you receive life-support methods? Would you want to be able to walk? To speak? To eat on your own? To live without the help of machines? Do you want certain Latter-day practices performed if you become very ill? If you have a choice, where do you want to be cared for? In your home? At a hospital or nursing home? If you have a choice at the end of your life, where would you prefer to die? At home? In a hospital or nursing home? Somewhere else? Would you prefer to be buried or cremated? Do you want your organs to be donated after you die? What should you do with your living will?   Make sure that your family members and your health care agent have copies of your living will (also called a declaration). Give your doctor a copy of your living will. Ask him or her to keep it as part of your medical record. If you have more than one doctor, make sure that each one has a copy. Put a copy of your living will where it can be easily found. For example, some people may put a copy on their refrigerator door. If you are using a digital copy, be sure your doctor, family members, and health care agent know how to find and access it. Where can you learn more? Go to [email or web address] org and sign in to your [employer] account. Enter Q788 in the [employer] box to learn more about \"Learning About Living [place]. \"     If you do not have an account, please click on the \"Sign Up Now\" link. Current as of: December 9, 2019               Content Version: 12.6  © 8081-9870 [employer]. Care instructions adapted under license by [facility] ([facility]). If you have questions about a medical condition or this instruction, always ask your healthcare professional. [address] any warranty or liability for your use of this information.     ·

## 2020-11-25 NOTE — PROGRESS NOTES
Medicare Annual Wellness Visit  Name: Ary Burton Date: 2020   MRN: [id number] Sex: Male   Age: 79 y.o. Ethnicity: Non-/Non    : 1940 Race: Heidi Ortiz is here for Medicare AWV    Screenings for behavioral, psychosocial and functional/safety risks, and cognitive dysfunction are all negative except as indicated below. These results, as well as other patient data from the [address] form, are documented in Flowsheets linked to this Encounter. No Known Allergies    Prior to Visit Medications    Medication Sig Taking? Authorizing Provider   propranolol (INDERAL) 60 MG tablet Take 1 tablet by mouth 2 times daily Yes Hemalatha Winter MD   metoprolol tartrate (LOPRESSOR) 50 MG tablet TAKE 1 TABLET TWICE A DAY FOR BLOOD PRESSURE AND IRREGULAR HEARTBEAT (STOP PROPRANOLOL) Yes Hemalatha Winter MD   simvastatin (ZOCOR) 40 MG tablet TAKE 1 TABLET AT BEDTIME FOR CHOLESTEROL Yes Hemalatha Winter MD   omeprazole (PRILOSEC) 40 MG delayed release capsule TAKE 1 CAPSULE DAILY FOR STOMACH Yes Hemalatha Winter MD   furosemide (LASIX) 40 MG tablet TAKE 1 TABLET DAILY FOR FLUID Yes Susy Marlow MD   metFORMIN (GLUCOPHAGE) 1000 MG tablet TAKE 1 TABLET TWICE A DAY FOR DIABETES Yes Hemalatha Winter MD   isosorbide mononitrate (IMDUR) 60 MG extended release tablet Take 60 mg by mouth Yes Historical Provider, MD   losartan (COZAAR) 25 MG tablet Take 25 mg by mouth Yes Historical Provider, MD   umeclidinium-vilanterol (ANORO ELLIPTA) 62.5-25 MCG/INH AEPB inhaler Inhale 1 puff into the lungs daily Yes Historical Provider, MD   primidone (MYSOLINE) 50 MG tablet 1 tab po BID for tremor Yes Hemalatha Winter MD   metoprolol (TOPROL XL) 50 MG XL tablet 1 tablet by mouth once a day for high blood pressure Yes Hemalatha Winter MD   triamcinolone (KENALOG) 0.1 % cream Apply topically 2 times daily.  To lower leg dry skin Yes Hemalatha Winter MD   tamsulosin (FLOMAX) 0.4 MG capsule Take 1 capsule by mouth daily. Yes Paul Ybarra MD   aspirin 325 MG tablet Take 325 mg by mouth daily. Yes Historical Provider, MD   potassium chloride (K-DUR) 10 MEQ tablet TAKE 1 TABLET TWICE A DAY  Paul Ybarra MD       Past Medical History:   Diagnosis Date    Allergic rhinitis     Bronchitis, chronic (HCC)     Carotid artery stenosis     GERD (gastroesophageal reflux disease)     Hypercholesterolemia     Type II or unspecified type diabetes mellitus without mention of complication, not stated as uncontrolled        Past Surgical History:   Procedure Laterality Date    CARDIAC SURGERY         Family History   Problem Relation Age of Onset    Diabetes Mother     High Blood Pressure Mother     Cancer Father        CareTeam (Including outside providers/suppliers regularly involved in providing care):   Patient Care Team:  Paul Ybarra MD as PCP - General (Family Medicine)  Paul Ybarra MD as PCP - [facility] Empaneled Provider    Wt Readings from Last 3 Encounters:   11/25/20 207 lb 12.8 oz (94.3 kg)   11/15/19 208 lb (94.3 kg)   05/15/19 212 lb 6.4 oz (96.3 kg)     Vitals:    11/25/20 1115   BP: 135/78   Pulse: 74   Resp: 18   Temp: 98.2 °F (36.8 °C)   SpO2: 96%   Weight: 207 lb 12.8 oz (94.3 kg)   Height: 5' 11\" (1.803 m)     Body mass index is 28.98 kg/m². Based upon direct observation of the patient, evaluation of cognition reveals recent and remote memory intact. Patient's complete Health Risk Assessment and screening values have been reviewed and are found in Flowsheets. The following problems were reviewed today and where indicated follow up appointments were made and/or referrals ordered. Positive Risk Factor Screenings with Interventions:     General Health and ACP:  General  In general, how would you say your health is?: Good  In the past 7 days, have you experienced any of the following?  New or Increased Pain, New or Increased Fatigue, Loneliness, Social Isolation, Stress or Anger?: None of These  Do you get the social and emotional support that you need?: Yes  Do you have a Living Will?: (!) No  Advance Directives     Power of [address] Will ACP-Advance Directive ACP-Power of     Not on File Not on File [address] [facility] Risk Interventions:  · No Living Will: Advance Care Planning addressed with patient today    Hearing/Vision:  No exam data present  Hearing/Vision  Do you or your family notice any trouble with your hearing?: (!) Yes  Do you have difficulty driving, watching TV, or doing any of your daily activities because of your eyesight?: No  Have you had an eye exam within the past year?: Yes  Hearing/Vision Interventions:  · Hearing concerns:  Hearing worsens, please call and I will refer him to the audiologist    Personalized Preventive Plan   Current Health Maintenance Status  Immunization History   Administered Date(s) Administered    Influenza Vaccine, unspecified formulation 10/04/2018    Influenza Whole 10/08/2015    Influenza, High Dose (Fluzone 65 yrs and older) 08/07/2015, 10/01/2016, 11/03/2017, 09/10/2019    Influenza, Janas Rota, IM, (6 mo and older Fluzone, Flulaval, Fluarix and 3 yrs and older Afluria) 10/04/2018    Influenza, Triv, inactivated, subunit, adjuvanted, IM (Fluad 65 yrs and older) 10/16/2019    Pneumococcal Conjugate 13-valent ([id number]) 10/08/2015    Pneumococcal Polysaccharide ([id number]) 03/28/2017        Health Maintenance   Topic Date Due    Shingles Vaccine (1 of 2) 02/20/1989    Annual Wellness Visit (AWV)  05/29/2019    Flu vaccine (1) 09/01/2020    DTaP/Tdap/Td vaccine (1 - Tdap) 11/25/2021 (Originally 2/20/1959)    Lipid screen  05/27/2021    Potassium monitoring  05/27/2021    Creatinine monitoring  05/27/2021    Pneumococcal 65+ years Vaccine  Completed    Hepatitis A vaccine  Aged Out    Hib vaccine  Aged Out    Meningococcal (ACWY) vaccine  Aged Out     Recommendations for [employer] Due: see orders and patient instructions/AVS.  . Recommended screening schedule for the next 5-10 years is provided to the patient in written form: see Patient Reji Hagen was seen today for medicare awv.     Diagnoses and all orders for this visit:    Need for influenza vaccination  -     INFLUENZA, QUADV, ADJUVANTED, 65 YRS =, IM, PF, PREFILL SYR, 0.5ML (FLUAD)

## 2020-11-25 NOTE — LETTER
[facility] [address] [address] [place] [address]  Phone: [phone]  Fax: [phone]    Bishop Guillory MD        November 30, 2020     [id number]      Dear Leanna Livingston:    Below are the results from your recent visit:    Resulted Orders   Comprehensive Metabolic Panel   Result Value Ref Range    Sodium 140 136 - 145 mmol/L    Potassium 4.7 3.5 - 5.0 mmol/L    Chloride 100 98 - 111 mmol/L    CO2 23 22 - 29 mmol/L    Anion Gap 17 7 - 19 mmol/L    Glucose 207 (H) 74 - 109 mg/dL    BUN 22 8 - 23 mg/dL    CREATININE 1.1 0.5 - 1.2 mg/dL    GFR Non-African American >60 >60      Comment: This calculation may be inaccurate for patients under the age of 25 years. For ages 25 and older, a GFR >60 mL/min/1.73m2 (not corrected for weight) is  valid for stable renal function. GFR  >59 >59      Comment:      Chronic Kidney Disease: less than 60 ml/min/1.73 sq.m. Kidney Failure: less than 15 ml/min/1.73 sq.m. Results valid for patients 18 years and older.       Calcium 9.4 8.8 - 10.2 mg/dL    Total Protein 7.6 6.6 - 8.7 g/dL    Alb 4.5 3.5 - 5.2 g/dL    Total Bilirubin <0.2 0.2 - 1.2 mg/dL    Alkaline Phosphatase 78 40 - 130 U/L    ALT 17 5 - 41 U/L    AST 20 5 - 40 U/L   Lipid Panel   Result Value Ref Range    Cholesterol, Total 156 (L) 160 - 199 mg/dL      Comment:      <160 MG/DL=OPTIMAL    160-199 MG/DL= DESIRABLE    200-239 MG/DL=BORDERLINE-INCREASED RISK OF ATHEROSCLEROTIC  CARDIOVASCULAR DISEASE    > OR = 240 MG/DL-ASSOCIATED WITH AN INCREASED RISK OF  ATHROSCLEROTIC CARDIOVASCULAR DISEASE      Triglycerides 251 (H) 0 - 149 mg/dL    HDL 44 (L) 55 - 121 mg/dL      Comment:      VALUES>60 MG/DL ARE ASSOCIATED WITH A DECREASED RISK OF  ATHEROSCLEROTIC CARDIOVASCULAR DISEASE      LDL Calculated 62 <100 mg/dL      Comment:      <100 MG/DL=OPITIMAL    100-129 MG/DL=DESIRABLE 130-159 MG/DL BORDERLINE=INCREASED RISK OF ATHEROSCLEROTIC  CARDIOVASCULAR DISEASE    > OR = 160 MG/DL=ASSOCIATED WITH AN INCREASE RISK OF  ATHEROSCLEROTIC CARDIOVASCULAR DISEASE     Hemoglobin A1C   Result Value Ref Range    Hemoglobin A1C 7.0 (H) 4.0 - 6.0 %      Comment:      HbA1c levels >6% are an indication of hyperglycemia during the preceding 2  to 3 months or longer. HbA1c levels may reach 20% or higher in poorly controlled diabetes. Therapeutic action is suggested at levels above 8%. Diabetes patients with HbA1c levels below 7% meet the goal of the American  Diabetes Association. HbA1c levels below the established reference range may indicate recent  episodes of hypoglycemia, the presence of Hb variants, or shortened lifetime  of erythrocytes.       Glucose is elevated at 207 but he was not fasting.  Cholesterol is excellent but triglycerides are elevated at 251.  HB A1c is good at 7.  Work hard on eating healthy and exercising. If you have any questions or concerns, please don't hesitate to call.     Sincerely,        Yanira Hernandez MD   [facility], [facility]

## 2020-11-30 ENCOUNTER — HOSPITAL ENCOUNTER (OUTPATIENT)
Dept: CT IMAGING | Facility: HOSPITAL | Age: 80
Discharge: HOME | End: 2020-11-30
Admitting: FAMILY MEDICINE

## 2020-11-30 LAB — CREAT BLDA-MCNC: 1.1 MG/DL (ref 0.6–1.3)

## 2020-11-30 PROCEDURE — 82565 ASSAY OF CREATININE: CPT

## 2020-11-30 PROCEDURE — 25010000002 IOPAMIDOL 61 % SOLUTION: Performed by: FAMILY MEDICINE

## 2020-11-30 PROCEDURE — 71260 CT THORAX DX C+: CPT

## 2020-11-30 RX ADMIN — IOPAMIDOL 100 ML: 612 INJECTION, SOLUTION INTRAVENOUS at 16:41

## 2020-12-03 ENCOUNTER — PREP FOR SURGERY (OUTPATIENT)
Dept: OTHER | Facility: HOSPITAL | Age: 80
End: 2020-12-03

## 2020-12-03 ENCOUNTER — OFFICE VISIT (OUTPATIENT)
Dept: PULMONOLOGY | Facility: CLINIC | Age: 80
End: 2020-12-03

## 2020-12-03 VITALS
HEIGHT: 70 IN | BODY MASS INDEX: 29.63 KG/M2 | SYSTOLIC BLOOD PRESSURE: 124 MMHG | TEMPERATURE: 97.9 F | DIASTOLIC BLOOD PRESSURE: 78 MMHG | OXYGEN SATURATION: 99 % | HEART RATE: 80 BPM | WEIGHT: 207 LBS

## 2020-12-03 DIAGNOSIS — J44.9 STAGE 3 SEVERE COPD BY GOLD CLASSIFICATION (HCC): Primary | ICD-10-CM

## 2020-12-03 DIAGNOSIS — R91.8 MASS OF UPPER LOBE OF RIGHT LUNG: Primary | ICD-10-CM

## 2020-12-03 DIAGNOSIS — K21.9 GASTROESOPHAGEAL REFLUX DISEASE WITHOUT ESOPHAGITIS: ICD-10-CM

## 2020-12-03 DIAGNOSIS — E11.9 TYPE 2 DIABETES MELLITUS WITHOUT COMPLICATION, UNSPECIFIED WHETHER LONG TERM INSULIN USE (HCC): ICD-10-CM

## 2020-12-03 DIAGNOSIS — Z79.899 CURRENT USE OF BETA BLOCKER: ICD-10-CM

## 2020-12-03 DIAGNOSIS — R59.0 HILAR ADENOPATHY: ICD-10-CM

## 2020-12-03 DIAGNOSIS — J45.40 MODERATE PERSISTENT ASTHMA WITHOUT COMPLICATION: ICD-10-CM

## 2020-12-03 PROCEDURE — 99214 OFFICE O/P EST MOD 30 MIN: CPT | Performed by: INTERNAL MEDICINE

## 2020-12-03 RX ORDER — SODIUM CHLORIDE 0.9 % (FLUSH) 0.9 %
10 SYRINGE (ML) INJECTION AS NEEDED
Status: CANCELLED | OUTPATIENT
Start: 2020-12-03

## 2020-12-03 RX ORDER — SODIUM CHLORIDE 0.9 % (FLUSH) 0.9 %
3 SYRINGE (ML) INJECTION EVERY 12 HOURS SCHEDULED
Status: CANCELLED | OUTPATIENT
Start: 2020-12-03

## 2020-12-03 NOTE — H&P (VIEW-ONLY)
Subjective   Mina Ratliff is a 80 y.o. male.     Background: pt with gold stage 3 copd, fev1 42% pred 2018, asthma overlap    Chief Complaint   Patient presents with   • COPD        History of Present Illness   He had his physical with Dr. Franks recently.  A spot was found on right lung.  I have reviewed images.  My personal interpretation: rul posteror segment mass, station 11R adenopathy.  This is asymptomatic.  Lung lesion is present continuously in lung for undetermined time, of undetermined severity, with no modifying factors or associated symptoms. Pt reports moderate intermittent dyspnea on exertion in chest associated with wheeze and alleviated by inhalers.  No COPD exacerbations.  He feels breathing is better than it was in the past.      Medical/Family/Social History   has a past medical history of Asthma, Atherosclerosis of native coronary artery of native heart without angina pectoris, CAD (coronary artery disease), Chest pain, Chronic airway obstruction (CMS/HCC), COPD (chronic obstructive pulmonary disease) (CMS/[facility]), Diabetes mellitus (CMS/[facility]), Dizziness, GERD (gastroesophageal reflux disease), HTN (hypertension), Hyperlipidemia, Malaise and fatigue, Myocardial infarction, old, Old myocardial infarction, and Stage 3 severe COPD by GOLD classification (CMS/HCC) (3/13/2015).   has a past surgical history that includes Cardiac catheterization; Coronary angioplasty (08/27/2009); Hemorrhoid surgery; Inner ear surgery; and Prostate surgery.  family history includes Coronary artery disease in an other family member.   reports that he has quit smoking. His smoking use included cigarettes. He has never used smokeless tobacco. He reports that he does not drink alcohol or use drugs.  No Known Allergies  Medications    Current Outpatient Medications:   •  aspirin  MG tablet, Take 325 mg by mouth Daily. HOLD STARTING TODAY, Disp: , Rfl:   •  diphenhydrAMINE-acetaminophen (TYLENOL PM)  MG tablet  "per tablet, Take 1 tablet by mouth At Night As Needed for Sleep., Disp: , Rfl:   •  furosemide (LASIX) 40 MG tablet, Take 40 mg by mouth Daily., Disp: , Rfl:   •  losartan (COZAAR) 25 MG tablet, TAKE 1 TABLET DAILY, Disp: 90 tablet, Rfl: 3  •  metFORMIN (GLUCOPHAGE) 1000 MG tablet, Take 1,000 mg by mouth 2 (Two) Times a Day With Meals., Disp: , Rfl:   •  montelukast (SINGULAIR) 10 MG tablet, Take 1 tablet by mouth Every Night., Disp: 90 tablet, Rfl: 3  •  Multiple Vitamins-Minerals (MULTIVITAMIN ADULT PO), Take  by mouth., Disp: , Rfl:   •  omeprazole (priLOSEC) 20 MG capsule, Take 20 mg by mouth Daily., Disp: , Rfl:   •  primidone (MYSOLINE) 50 MG tablet, Take 50 mg by mouth 2 (Two) Times a Day., Disp: , Rfl:   •  propranolol (INDERAL) 60 MG tablet, Take 1 tablet by mouth 2 (Two) Times a Day. (Patient taking differently: Take 40 mg by mouth 2 (Two) Times a Day.), Disp: 180 tablet, Rfl: 3  •  simvastatin (ZOCOR) 40 MG tablet, Take 40 mg by mouth Every Night., Disp: , Rfl:   •  tamsulosin (FLOMAX) 0.4 MG capsule 24 hr capsule, TAKE 1 CAPSULE EVERY NIGHT, Disp: 90 capsule, Rfl: 3  •  umeclidinium-vilanterol (ANORO ELLIPTA) 62.5-25 MCG/INH aerosol powder  inhaler, Inhale 1 puff Daily., Disp: 3 each, Rfl: 3    Current Facility-Administered Medications:   •  lidocaine (XYLOCAINE) 2 % jelly, , Topical, PRN, CesarJoe MD    Review of Systems   Constitutional: Negative for chills and fever.   Cardiovascular: Negative for chest pain.   Gastrointestinal: Negative for nausea and vomiting.       Objective   /78   Pulse 80   Temp 97.9 °F (36.6 °C)   Ht 177.8 cm (70\")   Wt 93.9 kg (207 lb)   SpO2 99% Comment: RA  BMI 29.70 kg/m²   Physical Exam  Constitutional:       General: He is not in acute distress.     Appearance: He is well-developed. He is not toxic-appearing or diaphoretic.   Eyes:      General: No scleral icterus.  Neck:      Vascular: No JVD.      Trachea: No tracheal deviation.   Cardiovascular:    "   Rate and Rhythm: Normal rate and regular rhythm.   Pulmonary:      Effort: No respiratory distress.      Breath sounds: No wheezing or rhonchi.   Abdominal:      Palpations: Abdomen is soft.      Tenderness: There is no abdominal tenderness. There is no guarding.   Skin:     General: Skin is warm and dry.   Psychiatric:         Behavior: Behavior normal.       -----------------------------------------------------------------------------------------------    Ct Chest With Contrast    Result Date: 12/1/2020  Impression: 1. Large right upper lobe mass concerning for primary neoplasm.  2. Enlarged right hilar lymph node concerning for metastatic disease. 3. Additional noncalcified pulmonary nodules bilaterally for which follow-up CT is recommended in 3-6 months. 4. Atherosclerosis of the aorta and coronary arteries.  This report was finalized on 12/01/2020 08:14 by Dr. Mal Elizabeth MD.    -----------------------------------------------------------------------------------------------  PFT Values        Some values may be hidden. Unless noted otherwise, only the newest values recorded on each date are displayed.         Old Values PFT Results 6/10/19   FVC 68%   FEV1 49%   FEV1/FVC 55.55%      Pre Drug PFT Results 6/10/19   No data to display.      Post Drug PFT Results 6/10/19   No data to display.      Other Tests PFT Results 6/10/19   No data to display.                -----------------------------------------------------------------------------------------------  Assessment/Plan   Problem List Items Addressed This Visit        Pulmonary Problems    Stage 3 severe COPD by GOLD classification (CMS/[facility]) - Primary       Other    GERD (gastroesophageal reflux disease)    Type 2 diabetes mellitus without complication (CMS/[facility])    Current use of beta blocker      Other Visit Diagnoses     Moderate persistent asthma without complication          right upper lobe mass  Hilar adenopathy    Patient's Body mass index is 29.7  kg/m². BMI is within normal parameters. No follow-up required..      Pt has new dx lung mass and hilar adenopathy.  He has chronic dyspnea on exertion.  If this is cancer his lung function is very marginal for any sort of surgical management.  We discussed bronchoscopy with endobronchial ultrasound which in my opinion is preferred approach.  Pt wishes to proceed with this.  He will hold asa after tomorrow's dose.       Electronically signed by Mg Amin MD, 12/3/2020, 14:21 CST

## 2020-12-03 NOTE — H&P
Subjective   Mina Ratliff is a 80 y.o. male.     Background: pt with gold stage 3 copd, fev1 42% pred 2018, asthma overlap    Chief Complaint   Patient presents with   • COPD        History of Present Illness   He had his physical with Dr. Franks recently.  A spot was found on right lung.  I have reviewed images.  My personal interpretation: rul posteror segment mass, station 11R adenopathy.  This is asymptomatic.  Lung lesion is present continuously in lung for undetermined time, of undetermined severity, with no modifying factors or associated symptoms. Pt reports moderate intermittent dyspnea on exertion in chest associated with wheeze and alleviated by inhalers.  No COPD exacerbations.  He feels breathing is better than it was in the past.      Medical/Family/Social History   has a past medical history of Asthma, Atherosclerosis of native coronary artery of native heart without angina pectoris, CAD (coronary artery disease), Chest pain, Chronic airway obstruction (CMS/HCC), COPD (chronic obstructive pulmonary disease) (CMS/[facility]), Diabetes mellitus (CMS/[facility]), Dizziness, GERD (gastroesophageal reflux disease), HTN (hypertension), Hyperlipidemia, Malaise and fatigue, Myocardial infarction, old, Old myocardial infarction, and Stage 3 severe COPD by GOLD classification (CMS/HCC) (3/13/2015).   has a past surgical history that includes Cardiac catheterization; Coronary angioplasty (08/27/2009); Hemorrhoid surgery; Inner ear surgery; and Prostate surgery.  family history includes Coronary artery disease in an other family member.   reports that he has quit smoking. His smoking use included cigarettes. He has never used smokeless tobacco. He reports that he does not drink alcohol or use drugs.  No Known Allergies  Medications    Current Outpatient Medications:   •  aspirin  MG tablet, Take 325 mg by mouth Daily. HOLD STARTING TODAY, Disp: , Rfl:   •  diphenhydrAMINE-acetaminophen (TYLENOL PM)  MG tablet  "per tablet, Take 1 tablet by mouth At Night As Needed for Sleep., Disp: , Rfl:   •  furosemide (LASIX) 40 MG tablet, Take 40 mg by mouth Daily., Disp: , Rfl:   •  losartan (COZAAR) 25 MG tablet, TAKE 1 TABLET DAILY, Disp: 90 tablet, Rfl: 3  •  metFORMIN (GLUCOPHAGE) 1000 MG tablet, Take 1,000 mg by mouth 2 (Two) Times a Day With Meals., Disp: , Rfl:   •  montelukast (SINGULAIR) 10 MG tablet, Take 1 tablet by mouth Every Night., Disp: 90 tablet, Rfl: 3  •  Multiple Vitamins-Minerals (MULTIVITAMIN ADULT PO), Take  by mouth., Disp: , Rfl:   •  omeprazole (priLOSEC) 20 MG capsule, Take 20 mg by mouth Daily., Disp: , Rfl:   •  primidone (MYSOLINE) 50 MG tablet, Take 50 mg by mouth 2 (Two) Times a Day., Disp: , Rfl:   •  propranolol (INDERAL) 60 MG tablet, Take 1 tablet by mouth 2 (Two) Times a Day. (Patient taking differently: Take 40 mg by mouth 2 (Two) Times a Day.), Disp: 180 tablet, Rfl: 3  •  simvastatin (ZOCOR) 40 MG tablet, Take 40 mg by mouth Every Night., Disp: , Rfl:   •  tamsulosin (FLOMAX) 0.4 MG capsule 24 hr capsule, TAKE 1 CAPSULE EVERY NIGHT, Disp: 90 capsule, Rfl: 3  •  umeclidinium-vilanterol (ANORO ELLIPTA) 62.5-25 MCG/INH aerosol powder  inhaler, Inhale 1 puff Daily., Disp: 3 each, Rfl: 3    Current Facility-Administered Medications:   •  lidocaine (XYLOCAINE) 2 % jelly, , Topical, PRN, CesarJoe MD    Review of Systems   Constitutional: Negative for chills and fever.   Cardiovascular: Negative for chest pain.   Gastrointestinal: Negative for nausea and vomiting.       Objective   /78   Pulse 80   Temp 97.9 °F (36.6 °C)   Ht 177.8 cm (70\")   Wt 93.9 kg (207 lb)   SpO2 99% Comment: RA  BMI 29.70 kg/m²   Physical Exam  Constitutional:       General: He is not in acute distress.     Appearance: He is well-developed. He is not toxic-appearing or diaphoretic.   Eyes:      General: No scleral icterus.  Neck:      Vascular: No JVD.      Trachea: No tracheal deviation.   Cardiovascular:    "   Rate and Rhythm: Normal rate and regular rhythm.   Pulmonary:      Effort: No respiratory distress.      Breath sounds: No wheezing or rhonchi.   Abdominal:      Palpations: Abdomen is soft.      Tenderness: There is no abdominal tenderness. There is no guarding.   Skin:     General: Skin is warm and dry.   Psychiatric:         Behavior: Behavior normal.       -----------------------------------------------------------------------------------------------    Ct Chest With Contrast    Result Date: 12/1/2020  Impression: 1. Large right upper lobe mass concerning for primary neoplasm.  2. Enlarged right hilar lymph node concerning for metastatic disease. 3. Additional noncalcified pulmonary nodules bilaterally for which follow-up CT is recommended in 3-6 months. 4. Atherosclerosis of the aorta and coronary arteries.  This report was finalized on 12/01/2020 08:14 by Dr. Mal Elizabeth MD.    -----------------------------------------------------------------------------------------------  PFT Values        Some values may be hidden. Unless noted otherwise, only the newest values recorded on each date are displayed.         Old Values PFT Results 6/10/19   FVC 68%   FEV1 49%   FEV1/FVC 55.55%      Pre Drug PFT Results 6/10/19   No data to display.      Post Drug PFT Results 6/10/19   No data to display.      Other Tests PFT Results 6/10/19   No data to display.                -----------------------------------------------------------------------------------------------  Assessment/Plan   Problem List Items Addressed This Visit        Pulmonary Problems    Stage 3 severe COPD by GOLD classification (CMS/[facility]) - Primary       Other    GERD (gastroesophageal reflux disease)    Type 2 diabetes mellitus without complication (CMS/[facility])    Current use of beta blocker      Other Visit Diagnoses     Moderate persistent asthma without complication          right upper lobe mass  Hilar adenopathy    Patient's Body mass index is 29.7  kg/m². BMI is within normal parameters. No follow-up required..      Pt has new dx lung mass and hilar adenopathy.  He has chronic dyspnea on exertion.  If this is cancer his lung function is very marginal for any sort of surgical management.  We discussed bronchoscopy with endobronchial ultrasound which in my opinion is preferred approach.  Pt wishes to proceed with this.  He will hold asa after tomorrow's dose.       Electronically signed by Mg Amin MD, 12/3/2020, 14:21 CST

## 2020-12-03 NOTE — PROGRESS NOTES
Subjective   Mina Ratliff is a 80 y.o. male.     Background: pt with gold stage 3 copd, fev1 42% pred 2018, asthma overlap    Chief Complaint   Patient presents with   • COPD        History of Present Illness   He had his physical with Dr. Franks recently.  A spot was found on right lung.  I have reviewed images.  My personal interpretation: rul posteror segment mass, station 11R adenopathy.  This is asymptomatic.  Lung lesion is present continuously in lung for undetermined time, of undetermined severity, with no modifying factors or associated symptoms. Pt reports moderate intermittent dyspnea on exertion in chest associated with wheeze and alleviated by inhalers.  No COPD exacerbations.  He feels breathing is better than it was in the past.      Medical/Family/Social History   has a past medical history of Asthma, Atherosclerosis of native coronary artery of native heart without angina pectoris, CAD (coronary artery disease), Chest pain, Chronic airway obstruction (CMS/HCC), COPD (chronic obstructive pulmonary disease) (CMS/[facility]), Diabetes mellitus (CMS/[facility]), Dizziness, GERD (gastroesophageal reflux disease), HTN (hypertension), Hyperlipidemia, Malaise and fatigue, Myocardial infarction, old, Old myocardial infarction, and Stage 3 severe COPD by GOLD classification (CMS/HCC) (3/13/2015).   has a past surgical history that includes Cardiac catheterization; Coronary angioplasty (08/27/2009); Hemorrhoid surgery; Inner ear surgery; and Prostate surgery.  family history includes Coronary artery disease in an other family member.   reports that he has quit smoking. His smoking use included cigarettes. He has never used smokeless tobacco. He reports that he does not drink alcohol or use drugs.  No Known Allergies  Medications    Current Outpatient Medications:   •  aspirin  MG tablet, Take 325 mg by mouth Daily. HOLD STARTING TODAY, Disp: , Rfl:   •  diphenhydrAMINE-acetaminophen (TYLENOL PM)  MG tablet  "per tablet, Take 1 tablet by mouth At Night As Needed for Sleep., Disp: , Rfl:   •  furosemide (LASIX) 40 MG tablet, Take 40 mg by mouth Daily., Disp: , Rfl:   •  losartan (COZAAR) 25 MG tablet, TAKE 1 TABLET DAILY, Disp: 90 tablet, Rfl: 3  •  metFORMIN (GLUCOPHAGE) 1000 MG tablet, Take 1,000 mg by mouth 2 (Two) Times a Day With Meals., Disp: , Rfl:   •  montelukast (SINGULAIR) 10 MG tablet, Take 1 tablet by mouth Every Night., Disp: 90 tablet, Rfl: 3  •  Multiple Vitamins-Minerals (MULTIVITAMIN ADULT PO), Take  by mouth., Disp: , Rfl:   •  omeprazole (priLOSEC) 20 MG capsule, Take 20 mg by mouth Daily., Disp: , Rfl:   •  primidone (MYSOLINE) 50 MG tablet, Take 50 mg by mouth 2 (Two) Times a Day., Disp: , Rfl:   •  propranolol (INDERAL) 60 MG tablet, Take 1 tablet by mouth 2 (Two) Times a Day. (Patient taking differently: Take 40 mg by mouth 2 (Two) Times a Day.), Disp: 180 tablet, Rfl: 3  •  simvastatin (ZOCOR) 40 MG tablet, Take 40 mg by mouth Every Night., Disp: , Rfl:   •  tamsulosin (FLOMAX) 0.4 MG capsule 24 hr capsule, TAKE 1 CAPSULE EVERY NIGHT, Disp: 90 capsule, Rfl: 3  •  umeclidinium-vilanterol (ANORO ELLIPTA) 62.5-25 MCG/INH aerosol powder  inhaler, Inhale 1 puff Daily., Disp: 3 each, Rfl: 3    Current Facility-Administered Medications:   •  lidocaine (XYLOCAINE) 2 % jelly, , Topical, PRN, CesarJoe MD    Review of Systems   Constitutional: Negative for chills and fever.   Cardiovascular: Negative for chest pain.   Gastrointestinal: Negative for nausea and vomiting.       Objective   /78   Pulse 80   Temp 97.9 °F (36.6 °C)   Ht 177.8 cm (70\")   Wt 93.9 kg (207 lb)   SpO2 99% Comment: RA  BMI 29.70 kg/m²   Physical Exam  Constitutional:       General: He is not in acute distress.     Appearance: He is well-developed. He is not toxic-appearing or diaphoretic.   Eyes:      General: No scleral icterus.  Neck:      Vascular: No JVD.      Trachea: No tracheal deviation.   Cardiovascular:    "   Rate and Rhythm: Normal rate and regular rhythm.   Pulmonary:      Effort: No respiratory distress.      Breath sounds: No wheezing or rhonchi.   Abdominal:      Palpations: Abdomen is soft.      Tenderness: There is no abdominal tenderness. There is no guarding.   Skin:     General: Skin is warm and dry.   Psychiatric:         Behavior: Behavior normal.       -----------------------------------------------------------------------------------------------    Ct Chest With Contrast    Result Date: 12/1/2020  Impression: 1. Large right upper lobe mass concerning for primary neoplasm.  2. Enlarged right hilar lymph node concerning for metastatic disease. 3. Additional noncalcified pulmonary nodules bilaterally for which follow-up CT is recommended in 3-6 months. 4. Atherosclerosis of the aorta and coronary arteries.  This report was finalized on 12/01/2020 08:14 by Dr. Mal Elizabeth MD.    -----------------------------------------------------------------------------------------------  PFT Values        Some values may be hidden. Unless noted otherwise, only the newest values recorded on each date are displayed.         Old Values PFT Results 6/10/19   FVC 68%   FEV1 49%   FEV1/FVC 55.55%      Pre Drug PFT Results 6/10/19   No data to display.      Post Drug PFT Results 6/10/19   No data to display.      Other Tests PFT Results 6/10/19   No data to display.                -----------------------------------------------------------------------------------------------  Assessment/Plan   Problem List Items Addressed This Visit        Pulmonary Problems    Stage 3 severe COPD by GOLD classification (CMS/[facility]) - Primary       Other    GERD (gastroesophageal reflux disease)    Type 2 diabetes mellitus without complication (CMS/[facility])    Current use of beta blocker      Other Visit Diagnoses     Moderate persistent asthma without complication          right upper lobe mass  Hilar adenopathy    Patient's Body mass index is 29.7  kg/m². BMI is within normal parameters. No follow-up required..      Pt has new dx lung mass and hilar adenopathy.  He has chronic dyspnea on exertion.  If this is cancer his lung function is very marginal for any sort of surgical management.  We discussed bronchoscopy with endobronchial ultrasound which in my opinion is preferred approach.  Pt wishes to proceed with this.  He will hold asa after tomorrow's dose.       Electronically signed by Mg Amin MD, 12/3/2020, 14:21 CST

## 2020-12-03 NOTE — PATIENT INSTRUCTIONS
Flexible Bronchoscopy    Flexible bronchoscopy is a procedure that is used to examine the passageways in the lungs. During the procedure, a thin, flexible tool with a camera on it (bronchoscope) is passed into the mouth or nose, down through the windpipe (trachea), and into the air tubes (bronchi) in the lungs. This tool allows your health care provider to look at your lungs from the inside and take testing (diagnostic) samples if needed.  Tell a health care provider about:  · Any allergies you have.  · All medicines you are taking, including vitamins, herbs, eye drops, creams, and over-the-counter medicines.  · Any problems you or family members have had with anesthetic medicines.  · Any blood disorders you have.  · Any surgeries you have had.  · Any medical conditions you have.  · Whether you are pregnant or may be pregnant.  What are the risks?  Generally, this is a safe procedure. However, problems may occur, including:  · Infection.  · Bleeding.  · Damage to other structures or organs.  · Allergic reactions to medicines.  · Collapsed lung (pneumothorax).  · Increased need for oxygen or difficulty breathing after the procedure.  What happens before the procedure?  Medicines  Ask your health care provider about:  · Changing or stopping your regular medicines. This is especially important if you are taking diabetes medicines or blood thinners.  · Taking medicines such as aspirin and ibuprofen. These medicines can thin your blood. Do not take these medicines before your procedure if your health care provider instructs you not to.  You may be given antibiotic medicine to help prevent infection.  Staying hydrated  Follow instructions from your health care provider about hydration, which may include:  · Up to 2 hours before the procedure - you may continue to drink clear liquids, such as water, clear fruit juice, black coffee, and plain tea.  Eating and drinking  Follow instructions from your health care provider  about eating and drinking, which may include:  · 8 hours before the procedure - stop eating heavy meals or foods such as meat, fried foods, or fatty foods.  · 6 hours before the procedure - stop eating light meals or foods, such as toast or cereal.  · 6 hours before the procedure - stop drinking milk or drinks that contain milk.  · 2 hours before the procedure - stop drinking clear liquids.  General instructions  · Plan to have someone take you home from the hospital or clinic.  · If you will be going home right after the procedure, plan to have someone with you for 24 hours.  What happens during the procedure?  · To lower your risk of infection:  ? Your health care team will wash or sanitize their hands.  ? Your skin will be washed with soap.  · An IV tube will be inserted into one of your veins.  · You will be given a medicine (local anesthetic) to numb your mouth, nose, throat, and voice box (larynx). You may also be given one or more of the following:  ? A medicine to help you relax (sedative).  ? A medicine to control coughing.  ? A medicine to dry up any fluids in your lungs (secretions).  · A bronchoscope will be passed into your nose or mouth, and into your lungs. Your health care provider will examine your lungs.  · Samples of airway secretions may be collected for testing.  · If abnormal areas are seen in your airways, tissue samples may be removed for examination under a microscope (biopsy).  · If tissue samples are needed from the outer parts of the lung, a type of X-ray (fluoroscopy) may be used to guide the bronchoscope to these areas.  · If bleeding occurs, you may be given medicine to stop or decrease the bleeding.  The procedure may vary among health care providers and hospitals.  What happens after the procedure?  · Do not drive for 24 hours if you were given a sedative.  · Your blood pressure, heart rate, breathing rate, and blood oxygen level will be monitored until the medicines you were given  have worn off.  · You may have a chest X-ray to check for signs of pneumothorax.  · You will not be allowed to eat or drink anything for 2 hours after your procedure.  · If a biopsy was taken, it is up to you to get the results of your procedure. Ask your health care provider, or the department that is doing the procedure, when your results will be ready.  Summary  · Flexible bronchoscopy is a procedure that allows your health care provider to look closely at your lungs from the inside and take testing (diagnostic) samples if needed.  · Risks of flexible bronchoscopy include bleeding, infection, and pneumothorax.  · Before a flexible bronchoscopy, you will be given a medicine (local anesthetic) to numb your mouth, nose, throat, and voice box (larynx). Then, a bronchoscope will be passed into your nose or mouth, and into your lungs.  · After the procedure, your blood pressure, heart rate, breathing rate, and blood oxygen level will be monitored until the medicines you were given have worn off. You may have a chest X-ray to check for signs of pneumothorax.  · You will not be allowed to eat or drink anything for 2 hours after your procedure.  This information is not intended to replace advice given to you by your health care provider. Make sure you discuss any questions you have with your health care provider.  Document Revised: 11/30/2018 Document Reviewed: 01/20/2018  [employer] Patient Education © 2020 [employer]

## 2020-12-07 ENCOUNTER — TELEPHONE (OUTPATIENT)
Dept: PULMONOLOGY | Facility: CLINIC | Age: 80
End: 2020-12-07

## 2020-12-07 PROBLEM — R91.8 MASS OF UPPER LOBE OF RIGHT LUNG: Status: ACTIVE | Noted: 2020-12-07

## 2020-12-07 PROBLEM — R59.0 HILAR ADENOPATHY: Status: ACTIVE | Noted: 2020-12-07

## 2020-12-07 PROCEDURE — U0004 COV-19 TEST NON-CDC HGH THRU: HCPCS | Performed by: INTERNAL MEDICINE

## 2020-12-09 ENCOUNTER — TRANSCRIBE ORDERS (OUTPATIENT)
Dept: ADMINISTRATIVE | Facility: HOSPITAL | Age: 80
End: 2020-12-09

## 2020-12-09 DIAGNOSIS — Z11.59 SCREENING FOR VIRAL DISEASE: Primary | ICD-10-CM

## 2020-12-10 ENCOUNTER — APPOINTMENT (OUTPATIENT)
Dept: PREADMISSION TESTING | Facility: HOSPITAL | Age: 80
End: 2020-12-10

## 2020-12-10 VITALS
DIASTOLIC BLOOD PRESSURE: 75 MMHG | BODY MASS INDEX: 30.14 KG/M2 | RESPIRATION RATE: 18 BRPM | HEIGHT: 69 IN | HEART RATE: 81 BPM | WEIGHT: 203.48 LBS | SYSTOLIC BLOOD PRESSURE: 159 MMHG | OXYGEN SATURATION: 98 %

## 2020-12-10 LAB
ANION GAP SERPL CALCULATED.3IONS-SCNC: 13 MMOL/L (ref 5–15)
BUN SERPL-MCNC: 29 MG/DL (ref 8–23)
BUN/CREAT SERPL: 24 (ref 7–25)
CALCIUM SPEC-SCNC: 10.6 MG/DL (ref 8.6–10.5)
CHLORIDE SERPL-SCNC: 97 MMOL/L (ref 98–107)
CO2 SERPL-SCNC: 28 MMOL/L (ref 22–29)
CREAT SERPL-MCNC: 1.21 MG/DL (ref 0.76–1.27)
DEPRECATED RDW RBC AUTO: 48 FL (ref 37–54)
ERYTHROCYTE [DISTWIDTH] IN BLOOD BY AUTOMATED COUNT: 14.5 % (ref 12.3–15.4)
GFR SERPL CREATININE-BSD FRML MDRD: 58 ML/MIN/1.73
GLUCOSE SERPL-MCNC: 190 MG/DL (ref 65–99)
HCT VFR BLD AUTO: 37.8 % (ref 37.5–51)
HGB BLD-MCNC: 12.3 G/DL (ref 13–17.7)
MCH RBC QN AUTO: 29.6 PG (ref 26.6–33)
MCHC RBC AUTO-ENTMCNC: 32.5 G/DL (ref 31.5–35.7)
MCV RBC AUTO: 90.9 FL (ref 79–97)
PLATELET # BLD AUTO: 299 10*3/MM3 (ref 140–450)
PMV BLD AUTO: 11.3 FL (ref 6–12)
POTASSIUM SERPL-SCNC: 4.8 MMOL/L (ref 3.5–5.2)
RBC # BLD AUTO: 4.16 10*6/MM3 (ref 4.14–5.8)
SODIUM SERPL-SCNC: 138 MMOL/L (ref 136–145)
WBC # BLD AUTO: 8.97 10*3/MM3 (ref 3.4–10.8)

## 2020-12-10 PROCEDURE — 80048 BASIC METABOLIC PNL TOTAL CA: CPT

## 2020-12-10 PROCEDURE — 85027 COMPLETE CBC AUTOMATED: CPT

## 2020-12-10 PROCEDURE — 36415 COLL VENOUS BLD VENIPUNCTURE: CPT

## 2020-12-10 NOTE — DISCHARGE INSTRUCTIONS
DAY OF SURGERY INSTRUCTIONS        YOUR SURGEON: ***DIANNE WHEAT    PROCEDURE: ***BRONCHOSCOPY    DATE OF SURGERY: ***December 16,2020    ARRIVAL TIME: AS DIRECTED BY OFFICE    YOU MAY TAKE THE FOLLOWING MEDICATION(S) THE MORNING OF SURGERY WITH A SIP OF WATER: ***PROPANOLOL/INDERAL    ALL OTHER HOME MEDICATIONS CHECK WITH YOUR DOCTOR    DO NOT TAKE ANY ERECTILE DYSFUNCTION MEDICATIONS (EX:  CIALIS, VIAGRA) 24 HOURS PRIOR TO SURGERY              MANAGING PAIN AFTER SURGERY    We know you are probably wondering what your pain will be like after surgery.  Following surgery it is unrealistic to expect you will not have pain.   Pain is how our bodies let us know that something is wrong or cautions us to be careful.  That said, our goal is to make your pain tolerable.    Methods we may use to treat your pain include (oral or IV medications, PCAs, epidurals, nerve blocks, etc.)   While some procedures require IV pain medications for a short time after surgery, transitioning to pain medications by mouth allows for better management of pain.   Your nurse will encourage you to take oral pain medications whenever possible.  IV medications work almost immediately, but only last a short while.  Taking medications by mouth allows for a more constant level of medication in your blood stream for a longer period of time.      Once your pain is out of control it is harder to get back under control.  It is important you are aware when your next dose of pain medication is due.  If you are admitted, your nurse may write the time of your next dose on the white board in your room to help you remember.      We are interested in your pain and encourage you to inform us about aggravating factors during your visit.   Many times a simple repositioning every few hours can make a big difference.    If your physician says it is okay, do not let your pain prevent you from getting out of bed. Be sure to call your nurse for assistance prior to  getting up so you do not fall.      Before surgery, please decide your tolerable pain goal.  These faces help describe the pain ratings we use on a 0-10 scale.   Be prepared to tell us your goal and whether or not you take pain or anxiety medications at home.      BEFORE YOU COME TO THE HOSPITAL  (Pre-op instructions)  • Do not eat, drink, smoke or chew gum after midnight the night before surgery.  This also includes no mints.  • Morning of surgery take only the medicines you have been instructed with a sip of water unless otherwise instructed  by your physician.  • Do not shave, wear makeup or dark nail polish.  • Remove all jewelry including rings.  • Leave anything you consider valuable at home.  • Leave your suitcase in the car until after your surgery.  • Bring the following with you if applicable:  o Picture ID and insurance, Medicare or Medicaid cards  o Co-pay/deductible required by insurance (cash, check, credit card)  o Copy of advance directive, living will or power-of- documents if not brought to PAT  o CPAP or BIPAP mask and tubing  o Relaxation aids ( book, magazine), etc.  o Hearing aids                                 ON THE DAY OF SURGERY  · On the day of surgery check in at registration located at the main entrance of the hospital.   ? You will be registered and given a beeper with instructions where to wait in the main lobby.  ? When your beeper lights up and vibrates a member of the Outpatient Surgery staff will meet you at the double doors under the stair steps and escort you to your preoperative room.   · You may have cloth compression devices placed on your legs. These help to prevent blood clots and reduce swelling in your legs.  · An IV may be inserted into one of your veins.  · In the operating room, you may be given one or more of the following:  ? A medicine to help you relax (sedative).  ? A medicine to numb the area (local anesthetic).  ? A medicine to make you fall asleep  "(general anesthetic).  ? A medicine that is injected into an area of your body to numb everything below the injection site (regional anesthetic).  · Your surgical site will be marked or identified.  · You may be given an antibiotic through your IV to help prevent infection.  Contact a health care provider if you:  · Develop a fever of more than 100.4°F (38°C) or other feelings of illness during the 48 hours before your surgery.  · Have symptoms that get worse.  Have questions or concerns about your surgery    General Anesthesia/Surgery, Adult  General anesthesia is the use of medicines to make a person \"go to sleep\" (unconscious) for a medical procedure. General anesthesia must be used for certain procedures, and is often recommended for procedures that:  · Last a long time.  · Require you to be still or in an unusual position.  · Are major and can cause blood loss.  The medicines used for general anesthesia are called general anesthetics. As well as making you unconscious for a certain amount of time, these medicines:  · Prevent pain.  · Control your blood pressure.  · Relax your muscles.  Tell a health care provider about:  · Any allergies you have.  · All medicines you are taking, including vitamins, herbs, eye drops, creams, and over-the-counter medicines.  · Any problems you or family members have had with anesthetic medicines.  · Types of anesthetics you have had in the past.  · Any blood disorders you have.  · Any surgeries you have had.  · Any medical conditions you have.  · Any recent upper respiratory, chest, or ear infections.  · Any history of:  ? Heart or lung conditions, such as heart failure, sleep apnea, asthma, or chronic obstructive pulmonary disease (COPD).  ?  service.  ? Depression or anxiety.  · Any tobacco or drug use, including marijuana or alcohol use.  · Whether you are pregnant or may be pregnant.  What are the risks?  Generally, this is a safe procedure. However, problems may " occur, including:  · Allergic reaction.  · Lung and heart problems.  · Inhaling food or liquid from the stomach into the lungs (aspiration).  · Nerve injury.  · Air in the bloodstream, which can lead to stroke.  · Extreme agitation or confusion (delirium) when you wake up from the anesthetic.  · Waking up during your procedure and being unable to move. This is rare.  These problems are more likely to develop if you are having a major surgery or if you have an advanced or serious medical condition. You can prevent some of these complications by answering all of your health care provider's questions thoroughly and by following all instructions before your procedure.  General anesthesia can cause side effects, including:  · Nausea or vomiting.  · A sore throat from the breathing tube.  · Hoarseness.  · Wheezing or coughing.  · Shaking chills.  · Tiredness.  · Body aches.  · Anxiety.  · Sleepiness or drowsiness.  · Confusion or agitation.  RISKS AND COMPLICATIONS OF SURGERY  Your health care provider will discuss possible risks and complications with you before surgery. Common risks and complications include:    · Problems due to the use of anesthetics.  · Blood loss and replacement (does not apply to minor surgical procedures).  · Temporary increase in pain due to surgery.  · Uncorrected pain or problems that the surgery was meant to correct.  · Infection.  · New damage.    What happens before the procedure?    Medicines  Ask your health care provider about:  · Changing or stopping your regular medicines. This is especially important if you are taking diabetes medicines or blood thinners.  · Taking medicines such as aspirin and ibuprofen. These medicines can thin your blood. Do not take these medicines unless your health care provider tells you to take them.  · Taking over-the-counter medicines, vitamins, herbs, and supplements. Do not take these during the week before your procedure unless your health care provider  approves them.  General instructions  · Starting 3-6 weeks before the procedure, do not use any products that contain nicotine or tobacco, such as cigarettes and e-cigarettes. If you need help quitting, ask your health care provider.  · If you brush your teeth on the morning of the procedure, make sure to spit out all of the toothpaste.  · Tell your health care provider if you become ill or develop a cold, cough, or fever.  · If instructed by your health care provider, bring your sleep apnea device with you on the day of your surgery (if applicable).  · Ask your health care provider if you will be going home the same day, the following day, or after a longer hospital stay.  ? Plan to have someone take you home from the hospital or clinic.  ? Plan to have a responsible adult care for you for at least 24 hours after you leave the hospital or clinic. This is important.  What happens during the procedure?  · You will be given anesthetics through both of the following:  ? A mask placed over your nose and mouth.  ? An IV in one of your veins.  · You may receive a medicine to help you relax (sedative).  · After you are unconscious, a breathing tube may be inserted down your throat to help you breathe. This will be removed before you wake up.  · An anesthesia specialist will stay with you throughout your procedure. He or she will:  ? Keep you comfortable and safe by continuing to give you medicines and adjusting the amount of medicine that you get.  ? Monitor your blood pressure, pulse, and oxygen levels to make sure that the anesthetics do not cause any problems.  The procedure may vary among health care providers and hospitals.  What happens after the procedure?  · Your blood pressure, temperature, heart rate, breathing rate, and blood oxygen level will be monitored until the medicines you were given have worn off.  · You will wake up in a recovery area. You may wake up slowly.  · If you feel anxious or agitated, you may  be given medicine to help you calm down.  · If you will be going home the same day, your health care provider may check to make sure you can walk, drink, and urinate.  · Your health care provider will treat any pain or side effects you have before you go home.  · Do not drive for 24 hours if you were given a sedative.  Summary  · General anesthesia is used to keep you still and prevent pain during a procedure.  · It is important to tell your healthcare provider about your medical history and any surgeries you have had, and previous experience with anesthesia.  · Follow your healthcare provider’s instructions about when to stop eating, drinking, or taking certain medicines before your procedure.  · Plan to have someone take you home from the hospital or clinic.  This information is not intended to replace advice given to you by your health care provider. Make sure you discuss any questions you have with your health care provider.  Document Released: 03/26/2009 Document Revised: 08/03/2018 Document Reviewed: 08/03/2018  [employer] Interactive Patient Education © 2019 [employer]      Fall Prevention in Hospitals, Adult  As a hospital patient, your condition and the treatments you receive can increase your risk for falls. Some additional risk factors for falls in a hospital include:  · Being in an unfamiliar environment.  · Being on bed rest.  · Your surgery.  · Taking certain medicines.  · Your tubing requirements, such as intravenous (IV) therapy or catheters.  It is important that you learn how to decrease fall risks while at the hospital. Below are important tips that can help prevent falls.  SAFETY TIPS FOR PREVENTING FALLS  Talk about your risk of falling.  · Ask your health care provider why you are at risk for falling. Is it your medicine, illness, tubing placement, or something else?  · Make a plan with your health care provider to keep you safe from falls.  · Ask your health care provider or pharmacist about  side effects of your medicines. Some medicines can make you dizzy or affect your coordination.  Ask for help.  · Ask for help before getting out of bed. You may need to press your call button.  · Ask for assistance in getting safely to the toilet.  · Ask for a walker or cane to be put at your bedside. Ask that most of the side rails on your bed be placed up before your health care provider leaves the room.  · Ask family or friends to sit with you.  · Ask for things that are out of your reach, such as your glasses, hearing aids, telephone, bedside table, or call button.  Follow these tips to avoid falling:  · Stay lying or seated, rather than standing, while waiting for help.  · Wear rubber-soled slippers or shoes whenever you walk in the hospital.  · Avoid quick, sudden movements.  ¨ Change positions slowly.  ¨ Sit on the side of your bed before standing.  ¨ Stand up slowly and wait before you start to walk.  · Let your health care provider know if there is a spill on the floor.  · Pay careful attention to the medical equipment, electrical cords, and tubes around you.  · When you need help, use your call button by your bed or in the bathroom. Wait for one of your health care providers to help you.  · If you feel dizzy or unsure of your footing, return to bed and wait for assistance.  · Avoid being distracted by the TV, telephone, or another person in your room.  · Do not lean or support yourself on rolling objects, such as IV poles or bedside tables.     This information is not intended to replace advice given to you by your health care provider. Make sure you discuss any questions you have with your health care provider.     Document Released: 12/15/2001 Document Revised: 01/08/2016 Document Reviewed: 08/25/2013  [employer] Interactive Patient Education ©2016 [employer]            PATIENT/FAMILY/RESPONSIBLE PARTY VERBALIZES UNDERSTANDING OF ABOVE EDUCATION.  COPY OF PAIN SCALE GIVEN AND REVIEWED WITH VERBALIZED  UNDERSTANDING.

## 2020-12-14 ENCOUNTER — LAB (OUTPATIENT)
Dept: LAB | Facility: HOSPITAL | Age: 80
End: 2020-12-14

## 2020-12-14 DIAGNOSIS — Z01.818 PREOP TESTING: Primary | ICD-10-CM

## 2020-12-16 ENCOUNTER — ANESTHESIA EVENT (OUTPATIENT)
Dept: PERIOP | Facility: HOSPITAL | Age: 80
End: 2020-12-16

## 2020-12-16 ENCOUNTER — HOSPITAL ENCOUNTER (OUTPATIENT)
Facility: HOSPITAL | Age: 80
Setting detail: HOSPITAL OUTPATIENT SURGERY
Discharge: HOME | End: 2020-12-16
Attending: INTERNAL MEDICINE | Admitting: INTERNAL MEDICINE

## 2020-12-16 ENCOUNTER — ANESTHESIA (OUTPATIENT)
Dept: PERIOP | Facility: HOSPITAL | Age: 80
End: 2020-12-16

## 2020-12-16 VITALS
OXYGEN SATURATION: 100 % | DIASTOLIC BLOOD PRESSURE: 69 MMHG | SYSTOLIC BLOOD PRESSURE: 158 MMHG | TEMPERATURE: 97.3 F | HEART RATE: 76 BPM | RESPIRATION RATE: 16 BRPM

## 2020-12-16 DIAGNOSIS — R59.0 HILAR ADENOPATHY: ICD-10-CM

## 2020-12-16 DIAGNOSIS — R91.8 MASS OF UPPER LOBE OF RIGHT LUNG: ICD-10-CM

## 2020-12-16 LAB
GLUCOSE BLDC GLUCOMTR-MCNC: 190 MG/DL (ref 70–130)
GLUCOSE BLDC GLUCOMTR-MCNC: 230 MG/DL (ref 70–130)
KOH PREP NAIL: NORMAL
SARS-COV-2 ORF1AB RESP QL NAA+PROBE: NOT DETECTED

## 2020-12-16 PROCEDURE — 25010000002 PHENYLEPHRINE HCL 0.8 MG/10ML SOLUTION PREFILLED SYRINGE: Performed by: NURSE ANESTHETIST, CERTIFIED REGISTERED

## 2020-12-16 PROCEDURE — 31652 BRONCH EBUS SAMPLNG 1/2 NODE: CPT | Performed by: INTERNAL MEDICINE

## 2020-12-16 PROCEDURE — 87070 CULTURE OTHR SPECIMN AEROBIC: CPT | Performed by: INTERNAL MEDICINE

## 2020-12-16 PROCEDURE — 88313 SPECIAL STAINS GROUP 2: CPT | Performed by: INTERNAL MEDICINE

## 2020-12-16 PROCEDURE — C1726 CATH, BAL DIL, NON-VASCULAR: HCPCS | Performed by: INTERNAL MEDICINE

## 2020-12-16 PROCEDURE — 25010000002 PROPOFOL 10 MG/ML EMULSION: Performed by: NURSE ANESTHETIST, CERTIFIED REGISTERED

## 2020-12-16 PROCEDURE — 25010000002 ONDANSETRON PER 1 MG: Performed by: NURSE ANESTHETIST, CERTIFIED REGISTERED

## 2020-12-16 PROCEDURE — 87205 SMEAR GRAM STAIN: CPT | Performed by: INTERNAL MEDICINE

## 2020-12-16 PROCEDURE — 88305 TISSUE EXAM BY PATHOLOGIST: CPT | Performed by: INTERNAL MEDICINE

## 2020-12-16 PROCEDURE — 87220 TISSUE EXAM FOR FUNGI: CPT | Performed by: INTERNAL MEDICINE

## 2020-12-16 PROCEDURE — 88312 SPECIAL STAINS GROUP 1: CPT | Performed by: INTERNAL MEDICINE

## 2020-12-16 PROCEDURE — 87116 MYCOBACTERIA CULTURE: CPT | Performed by: INTERNAL MEDICINE

## 2020-12-16 PROCEDURE — 87102 FUNGUS ISOLATION CULTURE: CPT | Performed by: INTERNAL MEDICINE

## 2020-12-16 PROCEDURE — 87206 SMEAR FLUORESCENT/ACID STAI: CPT | Performed by: INTERNAL MEDICINE

## 2020-12-16 PROCEDURE — 82962 GLUCOSE BLOOD TEST: CPT

## 2020-12-16 RX ORDER — FENTANYL CITRATE 50 UG/ML
25 INJECTION, SOLUTION INTRAMUSCULAR; INTRAVENOUS
Status: DISCONTINUED | OUTPATIENT
Start: 2020-12-16 | End: 2020-12-16 | Stop reason: HOSPADM

## 2020-12-16 RX ORDER — FLUMAZENIL 0.1 MG/ML
0.2 INJECTION INTRAVENOUS AS NEEDED
Status: DISCONTINUED | OUTPATIENT
Start: 2020-12-16 | End: 2020-12-16 | Stop reason: HOSPADM

## 2020-12-16 RX ORDER — PHENYLEPHRINE HCL IN 0.9% NACL 0.8MG/10ML
SYRINGE (ML) INTRAVENOUS AS NEEDED
Status: DISCONTINUED | OUTPATIENT
Start: 2020-12-16 | End: 2020-12-16 | Stop reason: SURG

## 2020-12-16 RX ORDER — ONDANSETRON 2 MG/ML
4 INJECTION INTRAMUSCULAR; INTRAVENOUS ONCE AS NEEDED
Status: DISCONTINUED | OUTPATIENT
Start: 2020-12-16 | End: 2020-12-16 | Stop reason: HOSPADM

## 2020-12-16 RX ORDER — DEXTROSE MONOHYDRATE 25 G/50ML
12.5 INJECTION, SOLUTION INTRAVENOUS AS NEEDED
Status: DISCONTINUED | OUTPATIENT
Start: 2020-12-16 | End: 2020-12-16 | Stop reason: HOSPADM

## 2020-12-16 RX ORDER — SODIUM CHLORIDE 0.9 % (FLUSH) 0.9 %
10 SYRINGE (ML) INJECTION AS NEEDED
Status: DISCONTINUED | OUTPATIENT
Start: 2020-12-16 | End: 2020-12-16 | Stop reason: HOSPADM

## 2020-12-16 RX ORDER — LIDOCAINE HYDROCHLORIDE 10 MG/ML
0.5 INJECTION, SOLUTION EPIDURAL; INFILTRATION; INTRACAUDAL; PERINEURAL ONCE AS NEEDED
Status: DISCONTINUED | OUTPATIENT
Start: 2020-12-16 | End: 2020-12-16 | Stop reason: HOSPADM

## 2020-12-16 RX ORDER — IBUPROFEN 600 MG/1
600 TABLET ORAL ONCE AS NEEDED
Status: DISCONTINUED | OUTPATIENT
Start: 2020-12-16 | End: 2020-12-16 | Stop reason: HOSPADM

## 2020-12-16 RX ORDER — LIDOCAINE HYDROCHLORIDE 40 MG/ML
SOLUTION TOPICAL AS NEEDED
Status: DISCONTINUED | OUTPATIENT
Start: 2020-12-16 | End: 2020-12-16 | Stop reason: SURG

## 2020-12-16 RX ORDER — ROCURONIUM BROMIDE 10 MG/ML
INJECTION, SOLUTION INTRAVENOUS AS NEEDED
Status: DISCONTINUED | OUTPATIENT
Start: 2020-12-16 | End: 2020-12-16 | Stop reason: SURG

## 2020-12-16 RX ORDER — SODIUM CHLORIDE, SODIUM LACTATE, POTASSIUM CHLORIDE, CALCIUM CHLORIDE 600; 310; 30; 20 MG/100ML; MG/100ML; MG/100ML; MG/100ML
9 INJECTION, SOLUTION INTRAVENOUS CONTINUOUS
Status: DISCONTINUED | OUTPATIENT
Start: 2020-12-16 | End: 2020-12-16 | Stop reason: HOSPADM

## 2020-12-16 RX ORDER — SODIUM CHLORIDE 0.9 % (FLUSH) 0.9 %
3 SYRINGE (ML) INJECTION EVERY 12 HOURS SCHEDULED
Status: DISCONTINUED | OUTPATIENT
Start: 2020-12-16 | End: 2020-12-16 | Stop reason: HOSPADM

## 2020-12-16 RX ORDER — LABETALOL HYDROCHLORIDE 5 MG/ML
5 INJECTION, SOLUTION INTRAVENOUS
Status: DISCONTINUED | OUTPATIENT
Start: 2020-12-16 | End: 2020-12-16 | Stop reason: HOSPADM

## 2020-12-16 RX ORDER — ONDANSETRON 2 MG/ML
INJECTION INTRAMUSCULAR; INTRAVENOUS AS NEEDED
Status: DISCONTINUED | OUTPATIENT
Start: 2020-12-16 | End: 2020-12-16 | Stop reason: SURG

## 2020-12-16 RX ORDER — OXYCODONE AND ACETAMINOPHEN 10; 325 MG/1; MG/1
1 TABLET ORAL ONCE AS NEEDED
Status: DISCONTINUED | OUTPATIENT
Start: 2020-12-16 | End: 2020-12-16 | Stop reason: HOSPADM

## 2020-12-16 RX ORDER — SODIUM CHLORIDE 0.9 % (FLUSH) 0.9 %
10 SYRINGE (ML) INJECTION EVERY 12 HOURS SCHEDULED
Status: DISCONTINUED | OUTPATIENT
Start: 2020-12-16 | End: 2020-12-16 | Stop reason: HOSPADM

## 2020-12-16 RX ORDER — PROPOFOL 10 MG/ML
VIAL (ML) INTRAVENOUS AS NEEDED
Status: DISCONTINUED | OUTPATIENT
Start: 2020-12-16 | End: 2020-12-16 | Stop reason: SURG

## 2020-12-16 RX ORDER — NALOXONE HCL 0.4 MG/ML
0.4 VIAL (ML) INJECTION AS NEEDED
Status: DISCONTINUED | OUTPATIENT
Start: 2020-12-16 | End: 2020-12-16 | Stop reason: HOSPADM

## 2020-12-16 RX ORDER — SODIUM CHLORIDE, SODIUM LACTATE, POTASSIUM CHLORIDE, CALCIUM CHLORIDE 600; 310; 30; 20 MG/100ML; MG/100ML; MG/100ML; MG/100ML
1000 INJECTION, SOLUTION INTRAVENOUS CONTINUOUS
Status: DISCONTINUED | OUTPATIENT
Start: 2020-12-16 | End: 2020-12-16 | Stop reason: HOSPADM

## 2020-12-16 RX ORDER — OXYCODONE AND ACETAMINOPHEN 7.5; 325 MG/1; MG/1
2 TABLET ORAL EVERY 4 HOURS PRN
Status: DISCONTINUED | OUTPATIENT
Start: 2020-12-16 | End: 2020-12-16 | Stop reason: HOSPADM

## 2020-12-16 RX ADMIN — ROCURONIUM BROMIDE 20 MG: 10 INJECTION INTRAVENOUS at 14:12

## 2020-12-16 RX ADMIN — PROPOFOL 30 MG: 10 INJECTION, EMULSION INTRAVENOUS at 14:42

## 2020-12-16 RX ADMIN — ONDANSETRON HYDROCHLORIDE 4 MG: 2 SOLUTION INTRAMUSCULAR; INTRAVENOUS at 14:25

## 2020-12-16 RX ADMIN — LIDOCAINE HYDROCHLORIDE 1 EACH: 40 SOLUTION TOPICAL at 14:14

## 2020-12-16 RX ADMIN — SODIUM CHLORIDE, POTASSIUM CHLORIDE, SODIUM LACTATE AND CALCIUM CHLORIDE 1000 ML: 600; 310; 30; 20 INJECTION, SOLUTION INTRAVENOUS at 12:49

## 2020-12-16 RX ADMIN — PROPOFOL 120 MG: 10 INJECTION, EMULSION INTRAVENOUS at 14:12

## 2020-12-16 RX ADMIN — Medication 80 MCG: at 14:37

## 2020-12-16 RX ADMIN — LIDOCAINE HYDROCHLORIDE 100 MG: 20 INJECTION, SOLUTION INTRAVENOUS at 14:11

## 2020-12-16 RX ADMIN — Medication 80 MCG: at 14:31

## 2020-12-16 NOTE — DISCHARGE INSTRUCTIONS

## 2020-12-16 NOTE — ANESTHESIA POSTPROCEDURE EVALUATION
Patient: Mina Ratliff    Procedure Summary     Date: 12/16/20 Room / Location:  [facility] /  [facility]    Anesthesia Start: 1410 Anesthesia Stop: 1504    Procedure: BRONCHOSCOPY WITH ENDOBRONCHIAL ULTRASOUND, BIOPSY, TRANSBRONCHIAL NEEDLE ASPIRATE (N/A Bronchus) Diagnosis:       Mass of upper lobe of right lung      Hilar adenopathy      (Mass of upper lobe of right lung [R91.8])      (Hilar adenopathy [R59.0])    Surgeon: Mg Amin MD Provider: Artemio Oglesby CRNA    Anesthesia Type: general ASA Status: 3          Anesthesia Type: general    Vitals  Vitals Value Taken Time   BP     Temp     Pulse 82 12/16/20 1504   Resp     SpO2 100 % 12/16/20 1504   Vitals shown include unvalidated device data.        Post Anesthesia Care and Evaluation    Patient location during evaluation: PHASE II  Patient participation: complete - patient participated  Level of consciousness: awake  Pain score: 1  Pain management: adequate  Airway patency: patent  Anesthetic complications: No anesthetic complications  PONV Status: none  Cardiovascular status: acceptable  Respiratory status: acceptable  Hydration status: acceptable

## 2020-12-16 NOTE — ANESTHESIA PROCEDURE NOTES
Airway  Airway not difficult    General Information and Staff    CRNA: Artemio Oglesby CRNA    Indications and Patient Condition  Indications for airway management: airway protection    Preoxygenated: yes  Mask difficulty assessment: 1 - vent by mask    Final Airway Details  Final airway type: endotracheal airway      Successful airway: ETT  Cuffed: yes   Successful intubation technique: direct laryngoscopy  Blade: Elizabeth  Blade size: 2  ETT size (mm): 8.5  Cormack-Lehane Classification: grade I - full view of glottis  Placement verified by: chest auscultation and capnometry   Cuff volume (mL): 8  Measured from: teeth  ETT/EBT  to teeth (cm): 22  Number of attempts at approach: 1  Assessment: lips, teeth, and gum same as pre-op

## 2020-12-16 NOTE — ANESTHESIA PREPROCEDURE EVALUATION
Anesthesia Evaluation     Patient summary reviewed   no history of anesthetic complications:  NPO Solid Status: > 8 hours             Airway   Mallampati: II  TM distance: >3 FB  Neck ROM: full  Dental      Pulmonary    (+) COPD,   (-) asthma, sleep apnea, not a smoker  Cardiovascular   Exercise tolerance: good (4-7 METS)    (+) hypertension, past MI , CAD, hyperlipidemia,   (-) pacemaker, angina, cardiac stents      Neuro/Psych  (-) seizures, TIA, CVA  GI/Hepatic/Renal/Endo    (+) obesity,  GERD,  diabetes mellitus,   (-) liver disease, no renal disease    Musculoskeletal     Abdominal    Substance History      OB/GYN          Other                        Anesthesia Plan    ASA 3     general     intravenous induction     Anesthetic plan, all risks, benefits, and alternatives have been provided, discussed and informed consent has been obtained with: patient.

## 2020-12-16 NOTE — OP NOTE
Bronchoscopy Procedure Note (Endobronchial Ultrasound Bronchoscopy)    Date of Operation: 12/16/20  Pre-op Diagnosis: lung mass, mediastinal adenopathy  Post-op Diagnosis: Same  Surgeon: Mg Amin MD  Anesthesia: General    Operation: Flexible fiberoptic bronchoscopy Bronchoscopy, Diagnostic, Endobronchial ultrasound and Transbronchial needle aspirate was performed  Findings: normal airways.  Single enlarged station 11 R node  Specimen: bronch wash, tbna station 11 R, uncertain and doubtful cellularity  Estimated Blood Loss: Minimal  Complications: none    Indications and History:  The patient is a 80 y.o.  male with <principal problem not specified>.  The risks, benefits, complications, treatment options and expected outcomes were discussed with the patient.  The possibilities of reaction to medication, pulmonary aspiration, perforation of a viscus, bleeding, failure to diagnose a condition and creating a complication requiring transfusion or operation were discussed with the patient who freely signed the consent.  Time out was taken prior to the procedure.    Description of Procedure:    After the induction of general anesthesia, the patient was positioned supine and the bronchoscope was passed through the endotracheal tube.  The scope was then passed into the trachea.     The trachea, mainstem bronchi, RUL, RML, Bronchus Intermedius, RLL, CARLITO, CARLITO upper division and lingula, and LLL, and all primary segmental airways were examined and were normal except as described below:    Endobronchial findings:  Trachea: Normal mucosa  Yvette: Sharp  Right main bronchus: Normal mucosa  Right upper lobe bronchus: Normal mucosa.  No endobronchial tumor encountered  Right middle lobe bronchus: Normal mucosa  Right lower lobe bronchus: Normal mucosa  Left main bronchus: Normal mucosa  Left upper lobe bronchus: Normal mucosa  Left lower lobe bronchus: Normal mucosa    The conventional bronchoscope was removed and the  EBUS scope was inserted and the mediastinum was examined via ultrasound.  Findings: 1.5 cm node station 11R.  TBNA under ultrasound guidance was collected from station 11R, minimal hemorrhage controlled with suction and iced saline wash.  Adequacy of specimen doubtful.   I was unable to recapture the node again on ultrasound despite all efforts and localizing maneuvers.  Balloon changed out and still unable to localize the node.  As it was clear we had passed the point of diminishing return, the bronchoscope was removed.    The Patient was extubated and taken to the Recovery Room in satisfactory condition.      Mg Amin MD at 14:59 CST, 12/16/2020

## 2020-12-18 LAB
BACTERIA SPEC RESP CULT: ABNORMAL
BACTERIA SPEC RESP CULT: ABNORMAL
GRAM STN SPEC: ABNORMAL

## 2020-12-22 LAB
CYTO UR: NORMAL
LAB AP CASE REPORT: NORMAL
PATH REPORT.FINAL DX SPEC: NORMAL
PATH REPORT.GROSS SPEC: NORMAL

## 2020-12-23 NOTE — PROGRESS NOTES
Please call the patient regarding his abnormal result. From the bronch we did not find cancer cells, which does not rule out the possibility of cancer though.  They did see some foreign material.  Any exposure to any particulate fumes or vapors in any industrial type work?  Will need to continue to follow and monitor at very least.  Waiting on culture results.  May still need to do another biopsy at some point but this is difficult with poor lung function.

## 2021-01-04 PROBLEM — R91.8 MASS OF UPPER LOBE OF RIGHT LUNG: Chronic | Status: ACTIVE | Noted: 2020-12-07

## 2021-01-04 PROBLEM — R59.0 HILAR ADENOPATHY: Chronic | Status: ACTIVE | Noted: 2020-12-07

## 2021-01-04 PROBLEM — E66.9 OBESITY: Chronic | Status: ACTIVE | Noted: 2020-01-20

## 2021-01-04 NOTE — PROGRESS NOTES
"Chief Complaint  Stage 3 severe COPD (no hospitalizations; no exposure; tested and negative a month ago)    Subjective    History of Present Illness      Mina Ratliff presents to [facility] for:    Management of lung mass with hilar adenopathy on the right.  He is chronically short of breath and findings were identified on routine CT imaging.  He underwent EBUS with Dr. Amin which was nondiagnostic.  It did show \"Refractile polarizable foreign material of uncertain origin identified in the background\".  Dr. Amin inquired about occupational history.  He was employed it be of [employer] looks like for over 30 years.  He was exposed to radiation, asbestos and other occupational hazards during his employment.  He gets short of breath and coughs.  He is on Anoro every day which helps.  He has tried Trelegy, Breo and Symbicort however none of those helped anymore than the Anoro did.  He has a rescue inhaler but seldom uses it.  He does not use a nebulizer or oxygen.  He has had no fever, chills, night sweats change in sputum production or increasing in shortness of breath.  He does experience some occasional pain with a deep breath around right shoulder blade in that location of the known tumor.        Objective   Vital Signs:   /78   Pulse 80   Temp 97.8 °F (36.6 °C)   Ht 175 cm (68.9\")   Wt 93.4 kg (206 lb)   SpO2 95% Comment: RA  BMI 30.51 kg/m²     Physical Exam  Constitutional:       Appearance: He is obese.      Interventions: Face mask in place.   Eyes:      Comments: Glasses   Cardiovascular:      Rate and Rhythm: Normal rate and regular rhythm.      Heart sounds: No murmur.   Pulmonary:      Effort: Pulmonary effort is normal.      Breath sounds: Normal breath sounds.   Musculoskeletal:      Right lower leg: No edema.      Left lower leg: No edema.   Neurological:      Mental Status: He is alert and oriented to person, place, and time.      "   Result Review :{ Labs  Result Review  Imaging  Med Tab  Media    PFT Values        Some values may be hidden. Unless noted otherwise, only the newest values recorded on each date are displayed.         Old Values PFT Results 6/10/19   FVC 68%   FEV1 49%   FEV1/FVC 55.55%      Pre Drug PFT Results 6/10/19   No data to display.      Post Drug PFT Results 6/10/19   No data to display.      Other Tests PFT Results 6/10/19   No data to display.           My interpretation of the PFT: None for this visit    Results for orders placed in visit on 06/10/19   Pulmonary Function Test       My interpretation of imaging: None for this visit    Non-gynecologic Cytology (12/16/2020 14:50)    My interpretation of labs: No malignant cells, epithelial cells, mucus, Refractile polarizable foreign material of uncertain origin       Assessment and Plan      Problem List Items Addressed This Visit        Other    Stage 3 severe COPD by GOLD classification (CMS/[facility]) (Chronic)    Obesity (Chronic)    Mass of upper lobe of right lung (Chronic)    Overview     Added automatically from request for surgery [id number]         Relevant Orders    CT Chest Without Contrast    Hilar adenopathy - Primary (Chronic)    Overview     Added automatically from request for surgery [id number]         Relevant Orders    CT Chest Without Contrast    Occupational exposure in workplace (Chronic)    Overview     Asbestos,  Radiation, silica               Patient's Body mass index is 30.51 kg/m². BMI is above normal parameters. Recommendations include: educational material.      EBUS nondiagnostic for hilar adenopathy and lung mass.  Suspicious for hazardous material potentially related to occupational history of exposure.  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  We discussed that there is still a high suspicion for malignant process  despite no malignant cells on biopsy.  We will repeat CT scan in the short-term.  If this were inflammation or infection it should be improving by then.  If it is not he will need to be considered for repeat EBUS versus navigational bronchoscopy versus surgery.  He would be a high risk for surgical resection given his age, comorbidities and COPD.  He understands this and would like to repeat CT and discuss other biopsy options in a few weeks.  His COPD remained stable on Anoro.  He will continue this.  He is overweight and was provided educational material on this.  We will see him back in a few weeks after CT imaging to discuss.  He is aware he can call sooner if needed.    Meenakshi Munguia, APRN  1/7/2021  16:18 CST    Follow Up   Return in about 7 weeks (around 2/22/2021).    Patient was given instructions and counseling regarding his condition or for health maintenance advice. Please see specific information pulled into the AVS if appropriate.

## 2021-01-07 ENCOUNTER — OFFICE VISIT (OUTPATIENT)
Dept: PULMONOLOGY | Facility: CLINIC | Age: 81
End: 2021-01-07

## 2021-01-07 VITALS
TEMPERATURE: 97.8 F | SYSTOLIC BLOOD PRESSURE: 128 MMHG | WEIGHT: 206 LBS | DIASTOLIC BLOOD PRESSURE: 78 MMHG | HEIGHT: 69 IN | BODY MASS INDEX: 30.51 KG/M2 | HEART RATE: 80 BPM | OXYGEN SATURATION: 95 %

## 2021-01-07 DIAGNOSIS — R59.0 HILAR ADENOPATHY: Primary | ICD-10-CM

## 2021-01-07 DIAGNOSIS — Z57.9 OCCUPATIONAL EXPOSURE IN WORKPLACE: Chronic | ICD-10-CM

## 2021-01-07 DIAGNOSIS — E66.09 CLASS 1 OBESITY DUE TO EXCESS CALORIES WITH SERIOUS COMORBIDITY AND BODY MASS INDEX (BMI) OF 30.0 TO 30.9 IN ADULT: ICD-10-CM

## 2021-01-07 DIAGNOSIS — J44.9 STAGE 3 SEVERE COPD BY GOLD CLASSIFICATION (HCC): ICD-10-CM

## 2021-01-07 DIAGNOSIS — R91.8 MASS OF UPPER LOBE OF RIGHT LUNG: ICD-10-CM

## 2021-01-07 PROCEDURE — 99214 OFFICE O/P EST MOD 30 MIN: CPT | Performed by: NURSE PRACTITIONER

## 2021-01-07 RX ORDER — OMEPRAZOLE 40 MG/1
40 CAPSULE, DELAYED RELEASE ORAL DAILY
COMMUNITY
Start: 2020-12-13

## 2021-01-07 RX ORDER — METOPROLOL TARTRATE 50 MG/1
50 TABLET, FILM COATED ORAL DAILY
COMMUNITY
Start: 2020-12-13

## 2021-01-07 SDOH — HEALTH STABILITY - PHYSICAL HEALTH: OCCUPATIONAL EXPOSURE TO UNSPECIFIED RISK FACTOR: Z57.9

## 2021-01-07 NOTE — PATIENT INSTRUCTIONS
"BMI for Adults  What is BMI?  Body mass index (BMI) is a number that is calculated from a person's weight and height. BMI can help estimate how much of a person's weight is composed of fat. BMI does not measure body fat directly. Rather, it is an alternative to procedures that directly measure body fat, which can be difficult and expensive.  BMI can help identify people who may be at higher risk for certain medical problems.  What are BMI measurements used for?  BMI is used as a screening tool to identify possible weight problems. It helps determine whether a person is obese, overweight, a healthy weight, or underweight.  BMI is useful for:  · Identifying a weight problem that may be related to a medical condition or may increase the risk for medical problems.  · Promoting changes, such as changes in diet and exercise, to help reach a healthy weight. BMI screening can be repeated to see if these changes are working.  How is BMI calculated?  BMI involves measuring your weight in relation to your height. Both height and weight are measured, and the BMI is calculated from those numbers. This can be done either in English (U.S.) or metric measurements. Note that charts and online BMI calculators are available to help you find your BMI quickly and easily without having to do these calculations yourself.  To calculate your BMI in English (U.S.) measurements:    1. Measure your weight in pounds (lb).  2. Multiply the number of pounds by 703.  ? For example, for a person who weighs 180 lb, multiply that number by 703, which equals 126,540.  3. Measure your height in inches. Then multiply that number by itself to get a measurement called \"inches squared.\"  ? For example, for a person who is 70 inches tall, the \"inches squared\" measurement is 70 inches x 70 inches, which equals 4,900 inches squared.  4. Divide the total from step 2 (number of lb x 703) by the total from step 3 (inches squared): 126,540 ÷ 4,900 = 25.8. This is " "your BMI.  To calculate your BMI in metric measurements:  1. Measure your weight in kilograms (kg).  2. Measure your height in meters (m). Then multiply that number by itself to get a measurement called \"meters squared.\"  ? For example, for a person who is 1.75 m tall, the \"meters squared\" measurement is 1.75 m x 1.75 m, which is equal to 3.1 meters squared.  3. Divide the number of kilograms (your weight) by the meters squared number. In this example: 70 ÷ 3.1 = 22.6. This is your BMI.  What do the results mean?  BMI charts are used to identify whether you are underweight, normal weight, overweight, or obese. The following guidelines will be used:  · Underweight: BMI less than 18.5.  · Normal weight: BMI between 18.5 and 24.9.  · Overweight: BMI between 25 and 29.9.  · Obese: BMI of 30 or above.  Keep these notes in mind:  · Weight includes both fat and muscle, so someone with a muscular build, such as an athlete, may have a BMI that is higher than 24.9. In cases like these, BMI is not an accurate measure of body fat.  · To determine if excess body fat is the cause of a BMI of 25 or higher, further assessments may need to be done by a health care provider.  · BMI is usually interpreted in the same way for men and women.  Where to find more information  For more information about BMI, including tools to quickly calculate your BMI, go to these websites:  · Centers for Disease Control and Prevention: www.cdc.gov  · American Heart Association: www.heart.org  · National Heart, Lung, and Blood [place]: www.nhlbi.nih.gov  Summary  · Body mass index (BMI) is a number that is calculated from a person's weight and height.  · BMI may help estimate how much of a person's weight is composed of fat. BMI can help identify those who may be at higher risk for certain medical problems.  · BMI can be measured using English measurements or metric measurements.  · BMI charts are used to identify whether you are underweight, normal " weight, overweight, or obese.  This information is not intended to replace advice given to you by your health care provider. Make sure you discuss any questions you have with your health care provider.  Document Revised: 09/09/2020 Document Reviewed: 07/17/2020  Elsevier Patient Education © 2020 [employer] Inc.

## 2021-01-27 LAB
FUNGUS WND CULT: NORMAL
MYCOBACTERIUM SPEC CULT: NORMAL
NIGHT BLUE STAIN TISS: NORMAL
NIGHT BLUE STAIN TISS: NORMAL

## 2021-02-17 NOTE — PROGRESS NOTES
"Chief Complaint  COPD    Subjective    History of Present Illness {CC  Problem List  Visit Diagnosis   Encounters  Notes  Medications  Labs  Result Review Imaging  Media     Mina Ratliff presents to [facility] for:    Management of lung mass with hilar adenopathy on the right.  He is chronically short of breath and findings were identified on routine CT imaging.  He underwent EBUS with Dr. Amin which was nondiagnostic.  It did show \"Refractile polarizable foreign material of uncertain origin identified in the background\".  Occupational history does include being employed at [employer] for over 30 years.  He was exposed to radiation, asbestos and other occupational hazards during his employment.  He gets short of breath and coughs.  He is on Anoro every day which helps.  He has tried Trelegy, Breo and Symbicor, none of which helped anymore than the Anoro did.  He has a rescue inhaler but seldom uses it.  He does not use a nebulizer or oxygen.  He reports increased pain in his posterior right lung located adjacent to his right shoulder blade.  This is consistent with location of tumor.  This is slightly worse than his last office visit.       Prior to Admission medications    Medication Sig Start Date End Date Taking? Authorizing Provider   aspirin  MG tablet Take 325 mg by mouth Daily. HOLD STARTING TODAY    Jose Hammond MD   diphenhydrAMINE-acetaminophen (TYLENOL PM)  MG tablet per tablet Take 1 tablet by mouth At Night As Needed for Sleep.    Jose Hammond MD   furosemide (LASIX) 40 MG tablet Take 40 mg by mouth Daily.    Jose Hammond MD   losartan (COZAAR) 25 MG tablet TAKE 1 TABLET DAILY 10/19/20   Ciro Helm MD   metFORMIN (GLUCOPHAGE) 1000 MG tablet Take 1,000 mg by mouth 2 (Two) Times a Day With Meals.    Jose Hammond MD   metoprolol tartrate (LOPRESSOR) 50 MG tablet  12/13/20   " "Jose Hammond MD   montelukast (SINGULAIR) 10 MG tablet Take 1 tablet by mouth Every Night. 20   Mg Amin MD   Multiple Vitamins-Minerals (MULTIVITAMIN ADULT PO) Take  by mouth.    Jose Hammond MD   omeprazole (priLOSEC) 40 MG capsule  20   Jose Hammond MD   primidone (MYSOLINE) 50 MG tablet Take 50 mg by mouth 2 (Two) Times a Day.    Jose Hammond MD   propranolol (INDERAL) 60 MG tablet Take 1 tablet by mouth 2 (Two) Times a Day.  Patient taking differently: Take 40 mg by mouth 2 (Two) Times a Day. 19   Ciro Helm MD   simvastatin (ZOCOR) 40 MG tablet Take 40 mg by mouth Every Night.    Jose Hammond MD   tamsulosin (FLOMAX) 0.4 MG capsule 24 hr capsule TAKE 1 CAPSULE EVERY NIGHT 20   Joe Guerrero MD   umeclidinium-vilanterol (ANORO ELLIPTA) 62.5-25 MCG/INH aerosol powder  inhaler Inhale 1 puff Daily. 1/3/20   Mg Amin MD       Social History     Socioeconomic History   • Marital status:      Spouse name: Not on file   • Number of children: Not on file   • Years of education: Not on file   • Highest education level: Not on file   Tobacco Use   • Smoking status: Former Smoker     Packs/day: 1.50     Years: 25.00     Pack years: 37.50     Types: Cigarettes     Quit date:      Years since quittin.1   • Smokeless tobacco: Never Used   • Tobacco comment: Quit about    Substance and Sexual Activity   • Alcohol use: No   • Drug use: No   • Sexual activity: Defer       Objective   Vital Signs:   /70   Pulse 82   Temp 97.1 °F (36.2 °C)   Ht 172.7 cm (68\")   Wt 92.5 kg (204 lb)   SpO2 97% Comment: RA  BMI 31.02 kg/m²     Physical Exam  Constitutional:       Appearance: He is overweight.      Interventions: Face mask in place.   Eyes:      Comments: Glasses   Cardiovascular:      Rate and Rhythm: Normal rate and regular rhythm.      Heart sounds: No murmur.   Pulmonary:      Effort: Pulmonary effort " is normal.      Breath sounds: Normal breath sounds.   Musculoskeletal:      Right lower leg: No edema.      Left lower leg: No edema.   Neurological:      Mental Status: He is alert and oriented to person, place, and time.        Result Review :{ Labs  Result Review  Imaging  Med Tab  Media :    PFT Values        Some values may be hidden. Unless noted otherwise, only the newest values recorded on each date are displayed.         Old Values PFT Results 6/10/19   FVC 68%   FEV1 49%   FEV1/FVC 55.55%      Pre Drug PFT Results 6/10/19   No data to display.      Post Drug PFT Results 6/10/19   No data to display.      Other Tests PFT Results 6/10/19   No data to display.           My interpretation of the PFT: None for this visit    Results for orders placed in visit on 06/10/19   Pulmonary Function Test      CT Chest Without Contrast Diagnostic (02/23/2021 14:13)      My interpretation of imaging: Right upper lobe mass, 6.6 x 3.8 cm, previously 6.3 x 3.7, surrounded by patchy infiltrate, stable 3 mm nodule right lower lobe, stable 2 mm nodule left lower lobe, groundglass lesion in the left lower lobe measuring 1.7 cm, 5 mm nodule left upper lobe, 4 mm nodule left upper lobe, 2 mm nodule left lower lobe, scarring versus nodule left lower lobe, 1 cm    My interpretation of labs: None for this visit      Assessment and Plan {CC Problem List  Visit Diagnosis  ROS  Review (Popup)  Health Maintenance  Quality  BestPractice  Medications  SmartSets  SnapShot Encounters  Media      Problem List Items Addressed This Visit        Advance Directives and General Issues    Occupational exposure in workplace (Chronic)    Overview     Asbestos,  Radiation, silica            Endocrine and Metabolic    Obesity (Chronic)       Pulmonary and Pneumonias    Stage 3 severe COPD by GOLD classification (CMS/HCC) (Chronic)    Mass of upper lobe of right lung - Primary (Chronic)    Overview     Added automatically from request  for surgery [id number]            Symptoms and Signs    Hilar adenopathy (Chronic)    Overview     Added automatically from request for surgery [id number]               Patient's Body mass index is 31.02 kg/m². BMI is above normal parameters. Recommendations include: educational material.      Hilar adenopathy addressed with EBUS.  Identified refractory polarizing material suspicious for occupational obtain debris.  Mass in right lung slightly larger than previous.  Reviewed imaging with Dr. Amin.  Agreeable to trial navigational bronchoscopy and biopsy of mass.  Risk and benefits of the procedure explained to the patient.  He is agreeable to proceed.  His COPD seems to be stable and at baseline on his inhalers.  We will continue the Anoro for maintenance therapy.  He has a nebulizer he can use when needed.  We will contact him to make arrangements for the bronchoscopy.  He will call sooner if needed.    Meenakshi Munguia, APRN  2/25/2021  16:03 CST    Follow Up {Instructions Charge Capture  Follow-up Communications   Return in about 3 months (around 5/25/2021).    Patient was given instructions and counseling regarding his condition or for health maintenance advice. Please see specific information pulled into the AVS if appropriate.

## 2021-02-18 ENCOUNTER — APPOINTMENT (OUTPATIENT)
Dept: CT IMAGING | Facility: HOSPITAL | Age: 81
End: 2021-02-18

## 2021-02-23 ENCOUNTER — HOSPITAL ENCOUNTER (OUTPATIENT)
Dept: CT IMAGING | Facility: HOSPITAL | Age: 81
Discharge: HOME | End: 2021-02-23
Admitting: NURSE PRACTITIONER

## 2021-02-23 DIAGNOSIS — R59.0 HILAR ADENOPATHY: ICD-10-CM

## 2021-02-23 DIAGNOSIS — R91.8 MASS OF UPPER LOBE OF RIGHT LUNG: ICD-10-CM

## 2021-02-23 PROCEDURE — 71250 CT THORAX DX C-: CPT

## 2021-02-25 ENCOUNTER — PREP FOR SURGERY (OUTPATIENT)
Dept: OTHER | Facility: HOSPITAL | Age: 81
End: 2021-02-25

## 2021-02-25 ENCOUNTER — OFFICE VISIT (OUTPATIENT)
Dept: PULMONOLOGY | Facility: CLINIC | Age: 81
End: 2021-02-25

## 2021-02-25 VITALS
HEART RATE: 82 BPM | SYSTOLIC BLOOD PRESSURE: 136 MMHG | WEIGHT: 204 LBS | OXYGEN SATURATION: 97 % | BODY MASS INDEX: 30.92 KG/M2 | HEIGHT: 68 IN | DIASTOLIC BLOOD PRESSURE: 70 MMHG | TEMPERATURE: 97.1 F

## 2021-02-25 DIAGNOSIS — J44.9 STAGE 3 SEVERE COPD BY GOLD CLASSIFICATION (HCC): Chronic | ICD-10-CM

## 2021-02-25 DIAGNOSIS — Z57.9 OCCUPATIONAL EXPOSURE IN WORKPLACE: Chronic | ICD-10-CM

## 2021-02-25 DIAGNOSIS — R59.0 HILAR ADENOPATHY: Chronic | ICD-10-CM

## 2021-02-25 DIAGNOSIS — R91.8 MASS OF UPPER LOBE OF RIGHT LUNG: Primary | Chronic | ICD-10-CM

## 2021-02-25 DIAGNOSIS — E66.09 CLASS 1 OBESITY DUE TO EXCESS CALORIES WITH SERIOUS COMORBIDITY AND BODY MASS INDEX (BMI) OF 30.0 TO 30.9 IN ADULT: Chronic | ICD-10-CM

## 2021-02-25 DIAGNOSIS — R91.8 MASS OF UPPER LOBE OF RIGHT LUNG: Primary | ICD-10-CM

## 2021-02-25 PROCEDURE — 99214 OFFICE O/P EST MOD 30 MIN: CPT | Performed by: NURSE PRACTITIONER

## 2021-02-25 SDOH — HEALTH STABILITY - PHYSICAL HEALTH: OCCUPATIONAL EXPOSURE TO UNSPECIFIED RISK FACTOR: Z57.9

## 2021-02-25 NOTE — PATIENT INSTRUCTIONS
"BMI for Adults  What is BMI?  Body mass index (BMI) is a number that is calculated from a person's weight and height. BMI can help estimate how much of a person's weight is composed of fat. BMI does not measure body fat directly. Rather, it is an alternative to procedures that directly measure body fat, which can be difficult and expensive.  BMI can help identify people who may be at higher risk for certain medical problems.  What are BMI measurements used for?  BMI is used as a screening tool to identify possible weight problems. It helps determine whether a person is obese, overweight, a healthy weight, or underweight.  BMI is useful for:  · Identifying a weight problem that may be related to a medical condition or may increase the risk for medical problems.  · Promoting changes, such as changes in diet and exercise, to help reach a healthy weight. BMI screening can be repeated to see if these changes are working.  How is BMI calculated?  BMI involves measuring your weight in relation to your height. Both height and weight are measured, and the BMI is calculated from those numbers. This can be done either in English (U.S.) or metric measurements. Note that charts and online BMI calculators are available to help you find your BMI quickly and easily without having to do these calculations yourself.  To calculate your BMI in English (U.S.) measurements:    1. Measure your weight in pounds (lb).  2. Multiply the number of pounds by 703.  ? For example, for a person who weighs 180 lb, multiply that number by 703, which equals 126,540.  3. Measure your height in inches. Then multiply that number by itself to get a measurement called \"inches squared.\"  ? For example, for a person who is 70 inches tall, the \"inches squared\" measurement is 70 inches x 70 inches, which equals 4,900 inches squared.  4. Divide the total from step 2 (number of lb x 703) by the total from step 3 (inches squared): 126,540 ÷ 4,900 = 25.8. This is " "your BMI.  To calculate your BMI in metric measurements:  1. Measure your weight in kilograms (kg).  2. Measure your height in meters (m). Then multiply that number by itself to get a measurement called \"meters squared.\"  ? For example, for a person who is 1.75 m tall, the \"meters squared\" measurement is 1.75 m x 1.75 m, which is equal to 3.1 meters squared.  3. Divide the number of kilograms (your weight) by the meters squared number. In this example: 70 ÷ 3.1 = 22.6. This is your BMI.  What do the results mean?  BMI charts are used to identify whether you are underweight, normal weight, overweight, or obese. The following guidelines will be used:  · Underweight: BMI less than 18.5.  · Normal weight: BMI between 18.5 and 24.9.  · Overweight: BMI between 25 and 29.9.  · Obese: BMI of 30 or above.  Keep these notes in mind:  · Weight includes both fat and muscle, so someone with a muscular build, such as an athlete, may have a BMI that is higher than 24.9. In cases like these, BMI is not an accurate measure of body fat.  · To determine if excess body fat is the cause of a BMI of 25 or higher, further assessments may need to be done by a health care provider.  · BMI is usually interpreted in the same way for men and women.  Where to find more information  For more information about BMI, including tools to quickly calculate your BMI, go to these websites:  · Centers for Disease Control and Prevention: www.cdc.gov  · American Heart Association: www.heart.org  · National Heart, Lung, and Blood [place]: www.nhlbi.nih.gov  Summary  · Body mass index (BMI) is a number that is calculated from a person's weight and height.  · BMI may help estimate how much of a person's weight is composed of fat. BMI can help identify those who may be at higher risk for certain medical problems.  · BMI can be measured using English measurements or metric measurements.  · BMI charts are used to identify whether you are underweight, normal " weight, overweight, or obese.  This information is not intended to replace advice given to you by your health care provider. Make sure you discuss any questions you have with your health care provider.  Document Revised: 09/09/2020 Document Reviewed: 07/17/2020  Elsevier Patient Education © 2020 Elsevier Inc.

## 2021-02-26 ENCOUNTER — TRANSCRIBE ORDERS (OUTPATIENT)
Dept: ADMINISTRATIVE | Facility: HOSPITAL | Age: 81
End: 2021-02-26

## 2021-02-26 ENCOUNTER — TELEPHONE (OUTPATIENT)
Dept: PULMONOLOGY | Facility: CLINIC | Age: 81
End: 2021-02-26

## 2021-02-26 DIAGNOSIS — Z11.59 SCREENING FOR VIRAL DISEASE: Primary | ICD-10-CM

## 2021-03-01 ENCOUNTER — TELEPHONE (OUTPATIENT)
Dept: PULMONOLOGY | Facility: CLINIC | Age: 81
End: 2021-03-01

## 2021-03-01 DIAGNOSIS — R91.8 MASS OF UPPER LOBE OF RIGHT LUNG: Primary | Chronic | ICD-10-CM

## 2021-03-01 NOTE — TELEPHONE ENCOUNTER
I was told by Baljeet/ Surgery  that a CT Chest WO Contrast referral will need to be placed when ordering the navigational bronch's as this is mandatory to be done 1 hour prior to the surgery.  This patient is scheduled for Friday 03/05/21 at 1:00pm, will you please place the order the CT Chest WO so this can be scheduled accordingly? Thank you!

## 2021-03-02 ENCOUNTER — APPOINTMENT (OUTPATIENT)
Dept: PREADMISSION TESTING | Facility: HOSPITAL | Age: 81
End: 2021-03-02

## 2021-03-02 ENCOUNTER — LAB (OUTPATIENT)
Dept: LAB | Facility: HOSPITAL | Age: 81
End: 2021-03-02

## 2021-03-02 VITALS
DIASTOLIC BLOOD PRESSURE: 64 MMHG | HEIGHT: 69 IN | SYSTOLIC BLOOD PRESSURE: 140 MMHG | WEIGHT: 206.35 LBS | OXYGEN SATURATION: 99 % | BODY MASS INDEX: 30.56 KG/M2 | HEART RATE: 81 BPM | RESPIRATION RATE: 20 BRPM

## 2021-03-02 LAB
ANION GAP SERPL CALCULATED.3IONS-SCNC: 12 MMOL/L (ref 5–15)
BUN SERPL-MCNC: 21 MG/DL (ref 8–23)
BUN/CREAT SERPL: 18.3 (ref 7–25)
CALCIUM SPEC-SCNC: 9.7 MG/DL (ref 8.6–10.5)
CHLORIDE SERPL-SCNC: 95 MMOL/L (ref 98–107)
CO2 SERPL-SCNC: 27 MMOL/L (ref 22–29)
CREAT SERPL-MCNC: 1.15 MG/DL (ref 0.76–1.27)
DEPRECATED RDW RBC AUTO: 45.9 FL (ref 37–54)
ERYTHROCYTE [DISTWIDTH] IN BLOOD BY AUTOMATED COUNT: 14.4 % (ref 12.3–15.4)
GFR SERPL CREATININE-BSD FRML MDRD: 61 ML/MIN/1.73
GLUCOSE SERPL-MCNC: 316 MG/DL (ref 65–99)
HCT VFR BLD AUTO: 36 % (ref 37.5–51)
HGB BLD-MCNC: 11.9 G/DL (ref 13–17.7)
MCH RBC QN AUTO: 29.2 PG (ref 26.6–33)
MCHC RBC AUTO-ENTMCNC: 33.1 G/DL (ref 31.5–35.7)
MCV RBC AUTO: 88.5 FL (ref 79–97)
PLATELET # BLD AUTO: 291 10*3/MM3 (ref 140–450)
PMV BLD AUTO: 10.9 FL (ref 6–12)
POTASSIUM SERPL-SCNC: 4.8 MMOL/L (ref 3.5–5.2)
RBC # BLD AUTO: 4.07 10*6/MM3 (ref 4.14–5.8)
SARS-COV-2 ORF1AB RESP QL NAA+PROBE: NOT DETECTED
SODIUM SERPL-SCNC: 134 MMOL/L (ref 136–145)
WBC # BLD AUTO: 7.08 10*3/MM3 (ref 3.4–10.8)

## 2021-03-02 PROCEDURE — U0004 COV-19 TEST NON-CDC HGH THRU: HCPCS | Performed by: INTERNAL MEDICINE

## 2021-03-02 PROCEDURE — U0005 INFEC AGEN DETEC AMPLI PROBE: HCPCS | Performed by: INTERNAL MEDICINE

## 2021-03-02 PROCEDURE — 80048 BASIC METABOLIC PNL TOTAL CA: CPT

## 2021-03-02 PROCEDURE — C9803 HOPD COVID-19 SPEC COLLECT: HCPCS | Performed by: INTERNAL MEDICINE

## 2021-03-02 PROCEDURE — 85027 COMPLETE CBC AUTOMATED: CPT

## 2021-03-02 PROCEDURE — 36415 COLL VENOUS BLD VENIPUNCTURE: CPT

## 2021-03-02 RX ORDER — PROPRANOLOL HYDROCHLORIDE 40 MG/1
40 TABLET ORAL DAILY
COMMUNITY

## 2021-03-02 NOTE — DISCHARGE INSTRUCTIONS
DAY OF SURGERY INSTRUCTIONS        YOUR SURGEON: DIANNE WHEAT    PROCEDURE: NAVIGATIONAL BRONCHOSCOPY FOR BIOPSY OF RIGHT UPPER LOBE LUNG MASS - RIGHT    DATE OF SURGERY: MARCH 5, 2021    ARRIVAL TIME: AS DIRECTED BY OFFICE    YOU MAY TAKE THE FOLLOWING MEDICATION(S) THE MORNING OF SURGERY WITH A SIP OF WATER: METOPROLOL, PROPRANOLOL      DO NOT TAKE LOSARTAN FOR 24 HOURS PRIOR TO SURGERY    ALL OTHER HOME MEDICATIONS CHECK WITH YOUR DOCTOR    DO NOT TAKE ANY ERECTILE DYSFUNCTION MEDICATIONS (EX:  CIALIS, VIAGRA) 24 HOURS PRIOR TO SURGERY              MANAGING PAIN AFTER SURGERY    We know you are probably wondering what your pain will be like after surgery.  Following surgery it is unrealistic to expect you will not have pain.   Pain is how our bodies let us know that something is wrong or cautions us to be careful.  That said, our goal is to make your pain tolerable.    Methods we may use to treat your pain include (oral or IV medications, PCAs, epidurals, nerve blocks, etc.)   While some procedures require IV pain medications for a short time after surgery, transitioning to pain medications by mouth allows for better management of pain.   Your nurse will encourage you to take oral pain medications whenever possible.  IV medications work almost immediately, but only last a short while.  Taking medications by mouth allows for a more constant level of medication in your blood stream for a longer period of time.      Once your pain is out of control it is harder to get back under control.  It is important you are aware when your next dose of pain medication is due.  If you are admitted, your nurse may write the time of your next dose on the white board in your room to help you remember.      We are interested in your pain and encourage you to inform us about aggravating factors during your visit.   Many times a simple repositioning every few hours can make a big difference.    If your physician says it is okay,  do not let your pain prevent you from getting out of bed. Be sure to call your nurse for assistance prior to getting up so you do not fall.      Before surgery, please decide your tolerable pain goal.  These faces help describe the pain ratings we use on a 0-10 scale.   Be prepared to tell us your goal and whether or not you take pain or anxiety medications at home.      BEFORE YOU COME TO THE HOSPITAL  (Pre-op instructions)  • Do not eat, drink, smoke or chew gum after midnight the night before surgery.  This also includes no mints.  • Morning of surgery take only the medicines you have been instructed with a sip of water unless otherwise instructed  by your physician.  • Do not shave, wear makeup or dark nail polish.  • Remove all jewelry including rings.  • Leave anything you consider valuable at home.  • Leave your suitcase in the car until after your surgery.  • Bring the following with you if applicable:  o Picture ID and insurance, Medicare or Medicaid cards  o Co-pay/deductible required by insurance (cash, check, credit card)  o Copy of advance directive, living will or power-of- documents if not brought to PAT  o CPAP or BIPAP mask and tubing  o Relaxation aids ( book, magazine), etc.  o Hearing aids                                 ON THE DAY OF SURGERY  · On the day of surgery check in at registration located at the main entrance of the hospital.   ? You will be registered and given a beeper with instructions where to wait in the main lobby.  ? When your beeper lights up and vibrates a member of the Outpatient Surgery staff will meet you at the double doors under the stair steps and escort you to your preoperative room.   · You may have cloth compression devices placed on your legs. These help to prevent blood clots and reduce swelling in your legs.  · An IV may be inserted into one of your veins.  · In the operating room, you may be given one or more of the following:  ? A medicine to help you  "relax (sedative).  ? A medicine to numb the area (local anesthetic).  ? A medicine to make you fall asleep (general anesthetic).  ? A medicine that is injected into an area of your body to numb everything below the injection site (regional anesthetic).  · Your surgical site will be marked or identified.  · You may be given an antibiotic through your IV to help prevent infection.  Contact a health care provider if you:  · Develop a fever of more than 100.4°F (38°C) or other feelings of illness during the 48 hours before your surgery.  · Have symptoms that get worse.  Have questions or concerns about your surgery    General Anesthesia/Surgery, Adult  General anesthesia is the use of medicines to make a person \"go to sleep\" (unconscious) for a medical procedure. General anesthesia must be used for certain procedures, and is often recommended for procedures that:  · Last a long time.  · Require you to be still or in an unusual position.  · Are major and can cause blood loss.  The medicines used for general anesthesia are called general anesthetics. As well as making you unconscious for a certain amount of time, these medicines:  · Prevent pain.  · Control your blood pressure.  · Relax your muscles.  Tell a health care provider about:  · Any allergies you have.  · All medicines you are taking, including vitamins, herbs, eye drops, creams, and over-the-counter medicines.  · Any problems you or family members have had with anesthetic medicines.  · Types of anesthetics you have had in the past.  · Any blood disorders you have.  · Any surgeries you have had.  · Any medical conditions you have.  · Any recent upper respiratory, chest, or ear infections.  · Any history of:  ? Heart or lung conditions, such as heart failure, sleep apnea, asthma, or chronic obstructive pulmonary disease (COPD).  ?  service.  ? Depression or anxiety.  · Any tobacco or drug use, including marijuana or alcohol use.  · Whether you are " pregnant or may be pregnant.  What are the risks?  Generally, this is a safe procedure. However, problems may occur, including:  · Allergic reaction.  · Lung and heart problems.  · Inhaling food or liquid from the stomach into the lungs (aspiration).  · Nerve injury.  · Air in the bloodstream, which can lead to stroke.  · Extreme agitation or confusion (delirium) when you wake up from the anesthetic.  · Waking up during your procedure and being unable to move. This is rare.  These problems are more likely to develop if you are having a major surgery or if you have an advanced or serious medical condition. You can prevent some of these complications by answering all of your health care provider's questions thoroughly and by following all instructions before your procedure.  General anesthesia can cause side effects, including:  · Nausea or vomiting.  · A sore throat from the breathing tube.  · Hoarseness.  · Wheezing or coughing.  · Shaking chills.  · Tiredness.  · Body aches.  · Anxiety.  · Sleepiness or drowsiness.  · Confusion or agitation.  RISKS AND COMPLICATIONS OF SURGERY  Your health care provider will discuss possible risks and complications with you before surgery. Common risks and complications include:    · Problems due to the use of anesthetics.  · Blood loss and replacement (does not apply to minor surgical procedures).  · Temporary increase in pain due to surgery.  · Uncorrected pain or problems that the surgery was meant to correct.  · Infection.  · New damage.    What happens before the procedure?    Medicines  Ask your health care provider about:  · Changing or stopping your regular medicines. This is especially important if you are taking diabetes medicines or blood thinners.  · Taking medicines such as aspirin and ibuprofen. These medicines can thin your blood. Do not take these medicines unless your health care provider tells you to take them.  · Taking over-the-counter medicines, vitamins,  herbs, and supplements. Do not take these during the week before your procedure unless your health care provider approves them.  General instructions  · Starting 3-6 weeks before the procedure, do not use any products that contain nicotine or tobacco, such as cigarettes and e-cigarettes. If you need help quitting, ask your health care provider.  · If you brush your teeth on the morning of the procedure, make sure to spit out all of the toothpaste.  · Tell your health care provider if you become ill or develop a cold, cough, or fever.  · If instructed by your health care provider, bring your sleep apnea device with you on the day of your surgery (if applicable).  · Ask your health care provider if you will be going home the same day, the following day, or after a longer hospital stay.  ? Plan to have someone take you home from the hospital or clinic.  ? Plan to have a responsible adult care for you for at least 24 hours after you leave the hospital or clinic. This is important.  What happens during the procedure?  · You will be given anesthetics through both of the following:  ? A mask placed over your nose and mouth.  ? An IV in one of your veins.  · You may receive a medicine to help you relax (sedative).  · After you are unconscious, a breathing tube may be inserted down your throat to help you breathe. This will be removed before you wake up.  · An anesthesia specialist will stay with you throughout your procedure. He or she will:  ? Keep you comfortable and safe by continuing to give you medicines and adjusting the amount of medicine that you get.  ? Monitor your blood pressure, pulse, and oxygen levels to make sure that the anesthetics do not cause any problems.  The procedure may vary among health care providers and hospitals.  What happens after the procedure?  · Your blood pressure, temperature, heart rate, breathing rate, and blood oxygen level will be monitored until the medicines you were given have worn  off.  · You will wake up in a recovery area. You may wake up slowly.  · If you feel anxious or agitated, you may be given medicine to help you calm down.  · If you will be going home the same day, your health care provider may check to make sure you can walk, drink, and urinate.  · Your health care provider will treat any pain or side effects you have before you go home.  · Do not drive for 24 hours if you were given a sedative.  Summary  · General anesthesia is used to keep you still and prevent pain during a procedure.  · It is important to tell your healthcare provider about your medical history and any surgeries you have had, and previous experience with anesthesia.  · Follow your healthcare provider’s instructions about when to stop eating, drinking, or taking certain medicines before your procedure.  · Plan to have someone take you home from the hospital or clinic.  This information is not intended to replace advice given to you by your health care provider. Make sure you discuss any questions you have with your health care provider.  Document Released: 03/26/2009 Document Revised: 08/03/2018 Document Reviewed: 08/03/2018  [employer] Patient Education © 2019 [employer]      Fall Prevention in Hospitals, Adult  As a hospital patient, your condition and the treatments you receive can increase your risk for falls. Some additional risk factors for falls in a hospital include:  · Being in an unfamiliar environment.  · Being on bed rest.  · Your surgery.  · Taking certain medicines.  · Your tubing requirements, such as intravenous (IV) therapy or catheters.  It is important that you learn how to decrease fall risks while at the hospital. Below are important tips that can help prevent falls.  SAFETY TIPS FOR PREVENTING FALLS  Talk about your risk of falling.  · Ask your health care provider why you are at risk for falling. Is it your medicine, illness, tubing placement, or something else?  · Make a plan  with your health care provider to keep you safe from falls.  · Ask your health care provider or pharmacist about side effects of your medicines. Some medicines can make you dizzy or affect your coordination.  Ask for help.  · Ask for help before getting out of bed. You may need to press your call button.  · Ask for assistance in getting safely to the toilet.  · Ask for a walker or cane to be put at your bedside. Ask that most of the side rails on your bed be placed up before your health care provider leaves the room.  · Ask family or friends to sit with you.  · Ask for things that are out of your reach, such as your glasses, hearing aids, telephone, bedside table, or call button.  Follow these tips to avoid falling:  · Stay lying or seated, rather than standing, while waiting for help.  · Wear rubber-soled slippers or shoes whenever you walk in the hospital.  · Avoid quick, sudden movements.  ¨ Change positions slowly.  ¨ Sit on the side of your bed before standing.  ¨ Stand up slowly and wait before you start to walk.  · Let your health care provider know if there is a spill on the floor.  · Pay careful attention to the medical equipment, electrical cords, and tubes around you.  · When you need help, use your call button by your bed or in the bathroom. Wait for one of your health care providers to help you.  · If you feel dizzy or unsure of your footing, return to bed and wait for assistance.  · Avoid being distracted by the TV, telephone, or another person in your room.  · Do not lean or support yourself on rolling objects, such as IV poles or bedside tables.     This information is not intended to replace advice given to you by your health care provider. Make sure you discuss any questions you have with your health care provider.     Document Released: 12/15/2001 Document Revised: 01/08/2016 Document Reviewed: 08/25/2013  [employer] Interactive Patient Education ©2016 [employer]             PATIENT/FAMILY/RESPONSIBLE PARTY VERBALIZES UNDERSTANDING OF ABOVE EDUCATION.  COPY OF PAIN SCALE GIVEN AND REVIEWED WITH VERBALIZED UNDERSTANDING.

## 2021-03-05 ENCOUNTER — HOSPITAL ENCOUNTER (OUTPATIENT)
Dept: CT IMAGING | Facility: HOSPITAL | Age: 81
Discharge: HOME | End: 2021-03-05

## 2021-03-05 ENCOUNTER — APPOINTMENT (OUTPATIENT)
Dept: GENERAL RADIOLOGY | Facility: HOSPITAL | Age: 81
End: 2021-03-05

## 2021-03-05 ENCOUNTER — ANESTHESIA (OUTPATIENT)
Dept: PERIOP | Facility: HOSPITAL | Age: 81
End: 2021-03-05

## 2021-03-05 ENCOUNTER — ANESTHESIA EVENT (OUTPATIENT)
Dept: PERIOP | Facility: HOSPITAL | Age: 81
End: 2021-03-05

## 2021-03-05 ENCOUNTER — HOSPITAL ENCOUNTER (OUTPATIENT)
Facility: HOSPITAL | Age: 81
Setting detail: HOSPITAL OUTPATIENT SURGERY
Discharge: HOME | End: 2021-03-05
Attending: INTERNAL MEDICINE | Admitting: INTERNAL MEDICINE

## 2021-03-05 VITALS
RESPIRATION RATE: 16 BRPM | TEMPERATURE: 97.9 F | SYSTOLIC BLOOD PRESSURE: 169 MMHG | OXYGEN SATURATION: 95 % | HEART RATE: 97 BPM | DIASTOLIC BLOOD PRESSURE: 77 MMHG

## 2021-03-05 DIAGNOSIS — R91.8 MASS OF UPPER LOBE OF RIGHT LUNG: Chronic | ICD-10-CM

## 2021-03-05 DIAGNOSIS — R91.8 MASS OF UPPER LOBE OF RIGHT LUNG: Primary | ICD-10-CM

## 2021-03-05 LAB
GLUCOSE BLDC GLUCOMTR-MCNC: 126 MG/DL (ref 70–130)
GLUCOSE BLDC GLUCOMTR-MCNC: 168 MG/DL (ref 70–130)
KOH PREP NAIL: NORMAL
KOH PREP NAIL: NORMAL

## 2021-03-05 PROCEDURE — 87102 FUNGUS ISOLATION CULTURE: CPT | Performed by: INTERNAL MEDICINE

## 2021-03-05 PROCEDURE — 87186 SC STD MICRODIL/AGAR DIL: CPT

## 2021-03-05 PROCEDURE — 31627 NAVIGATIONAL BRONCHOSCOPY: CPT | Performed by: INTERNAL MEDICINE

## 2021-03-05 PROCEDURE — 71250 CT THORAX DX C-: CPT

## 2021-03-05 PROCEDURE — 25010000002 PHENYLEPHRINE HCL 0.8 MG/10ML SOLUTION PREFILLED SYRINGE: Performed by: NURSE ANESTHETIST, CERTIFIED REGISTERED

## 2021-03-05 PROCEDURE — 93005 ELECTROCARDIOGRAM TRACING: CPT | Performed by: INTERNAL MEDICINE

## 2021-03-05 PROCEDURE — 87116 MYCOBACTERIA CULTURE: CPT | Performed by: INTERNAL MEDICINE

## 2021-03-05 PROCEDURE — 93010 ELECTROCARDIOGRAM REPORT: CPT | Performed by: INTERNAL MEDICINE

## 2021-03-05 PROCEDURE — 25010000002 PROPOFOL 10 MG/ML EMULSION: Performed by: NURSE ANESTHETIST, CERTIFIED REGISTERED

## 2021-03-05 PROCEDURE — 87220 TISSUE EXAM FOR FUNGI: CPT | Performed by: INTERNAL MEDICINE

## 2021-03-05 PROCEDURE — 87070 CULTURE OTHR SPECIMN AEROBIC: CPT | Performed by: INTERNAL MEDICINE

## 2021-03-05 PROCEDURE — 87206 SMEAR FLUORESCENT/ACID STAI: CPT | Performed by: INTERNAL MEDICINE

## 2021-03-05 PROCEDURE — 31624 DX BRONCHOSCOPE/LAVAGE: CPT | Performed by: INTERNAL MEDICINE

## 2021-03-05 PROCEDURE — 88112 CYTOPATH CELL ENHANCE TECH: CPT | Performed by: INTERNAL MEDICINE

## 2021-03-05 PROCEDURE — 87205 SMEAR GRAM STAIN: CPT | Performed by: INTERNAL MEDICINE

## 2021-03-05 PROCEDURE — 82962 GLUCOSE BLOOD TEST: CPT

## 2021-03-05 PROCEDURE — 31628 BRONCHOSCOPY/LUNG BX EACH: CPT | Performed by: INTERNAL MEDICINE

## 2021-03-05 PROCEDURE — 71045 X-RAY EXAM CHEST 1 VIEW: CPT

## 2021-03-05 PROCEDURE — 88305 TISSUE EXAM BY PATHOLOGIST: CPT | Performed by: INTERNAL MEDICINE

## 2021-03-05 RX ORDER — IBUPROFEN 600 MG/1
600 TABLET ORAL ONCE AS NEEDED
Status: DISCONTINUED | OUTPATIENT
Start: 2021-03-05 | End: 2021-03-05 | Stop reason: HOSPADM

## 2021-03-05 RX ORDER — SODIUM CHLORIDE, SODIUM LACTATE, POTASSIUM CHLORIDE, CALCIUM CHLORIDE 600; 310; 30; 20 MG/100ML; MG/100ML; MG/100ML; MG/100ML
100 INJECTION, SOLUTION INTRAVENOUS CONTINUOUS
Status: DISCONTINUED | OUTPATIENT
Start: 2021-03-05 | End: 2021-03-05 | Stop reason: HOSPADM

## 2021-03-05 RX ORDER — LIDOCAINE HYDROCHLORIDE 20 MG/ML
INJECTION, SOLUTION EPIDURAL; INFILTRATION; INTRACAUDAL; PERINEURAL AS NEEDED
Status: DISCONTINUED | OUTPATIENT
Start: 2021-03-05 | End: 2021-03-05 | Stop reason: SURG

## 2021-03-05 RX ORDER — OXYCODONE AND ACETAMINOPHEN 7.5; 325 MG/1; MG/1
1 TABLET ORAL EVERY 4 HOURS PRN
Status: CANCELLED | OUTPATIENT
Start: 2021-03-05 | End: 2021-03-15

## 2021-03-05 RX ORDER — FLUMAZENIL 0.1 MG/ML
0.2 INJECTION INTRAVENOUS AS NEEDED
Status: DISCONTINUED | OUTPATIENT
Start: 2021-03-05 | End: 2021-03-05 | Stop reason: HOSPADM

## 2021-03-05 RX ORDER — PHENYLEPHRINE HCL IN 0.9% NACL 0.8MG/10ML
SYRINGE (ML) INTRAVENOUS AS NEEDED
Status: DISCONTINUED | OUTPATIENT
Start: 2021-03-05 | End: 2021-03-05 | Stop reason: SURG

## 2021-03-05 RX ORDER — FENTANYL CITRATE 50 UG/ML
25 INJECTION, SOLUTION INTRAMUSCULAR; INTRAVENOUS
Status: DISCONTINUED | OUTPATIENT
Start: 2021-03-05 | End: 2021-03-05 | Stop reason: HOSPADM

## 2021-03-05 RX ORDER — LIDOCAINE HYDROCHLORIDE 10 MG/ML
0.5 INJECTION, SOLUTION EPIDURAL; INFILTRATION; INTRACAUDAL; PERINEURAL ONCE AS NEEDED
Status: DISCONTINUED | OUTPATIENT
Start: 2021-03-05 | End: 2021-03-05 | Stop reason: HOSPADM

## 2021-03-05 RX ORDER — SODIUM CHLORIDE 0.9 % (FLUSH) 0.9 %
3 SYRINGE (ML) INJECTION AS NEEDED
Status: DISCONTINUED | OUTPATIENT
Start: 2021-03-05 | End: 2021-03-05 | Stop reason: HOSPADM

## 2021-03-05 RX ORDER — SODIUM CHLORIDE 0.9 % (FLUSH) 0.9 %
3 SYRINGE (ML) INJECTION EVERY 12 HOURS SCHEDULED
Status: DISCONTINUED | OUTPATIENT
Start: 2021-03-05 | End: 2021-03-05 | Stop reason: HOSPADM

## 2021-03-05 RX ORDER — SODIUM CHLORIDE, SODIUM LACTATE, POTASSIUM CHLORIDE, CALCIUM CHLORIDE 600; 310; 30; 20 MG/100ML; MG/100ML; MG/100ML; MG/100ML
1000 INJECTION, SOLUTION INTRAVENOUS CONTINUOUS
Status: DISCONTINUED | OUTPATIENT
Start: 2021-03-05 | End: 2021-03-05 | Stop reason: HOSPADM

## 2021-03-05 RX ORDER — SODIUM CHLORIDE 0.9 % (FLUSH) 0.9 %
3-10 SYRINGE (ML) INJECTION AS NEEDED
Status: DISCONTINUED | OUTPATIENT
Start: 2021-03-05 | End: 2021-03-05 | Stop reason: HOSPADM

## 2021-03-05 RX ORDER — LABETALOL HYDROCHLORIDE 5 MG/ML
5 INJECTION, SOLUTION INTRAVENOUS
Status: DISCONTINUED | OUTPATIENT
Start: 2021-03-05 | End: 2021-03-05 | Stop reason: HOSPADM

## 2021-03-05 RX ORDER — ROCURONIUM BROMIDE 10 MG/ML
INJECTION, SOLUTION INTRAVENOUS AS NEEDED
Status: DISCONTINUED | OUTPATIENT
Start: 2021-03-05 | End: 2021-03-05 | Stop reason: SURG

## 2021-03-05 RX ORDER — NALOXONE HCL 0.4 MG/ML
0.4 VIAL (ML) INJECTION AS NEEDED
Status: DISCONTINUED | OUTPATIENT
Start: 2021-03-05 | End: 2021-03-05 | Stop reason: HOSPADM

## 2021-03-05 RX ORDER — NEOSTIGMINE METHYLSULFATE 5 MG/5 ML
SYRINGE (ML) INTRAVENOUS AS NEEDED
Status: DISCONTINUED | OUTPATIENT
Start: 2021-03-05 | End: 2021-03-05 | Stop reason: SURG

## 2021-03-05 RX ORDER — ONDANSETRON 2 MG/ML
4 INJECTION INTRAMUSCULAR; INTRAVENOUS ONCE AS NEEDED
Status: DISCONTINUED | OUTPATIENT
Start: 2021-03-05 | End: 2021-03-05 | Stop reason: HOSPADM

## 2021-03-05 RX ORDER — OXYCODONE HYDROCHLORIDE AND ACETAMINOPHEN 5; 325 MG/1; MG/1
1 TABLET ORAL ONCE AS NEEDED
Status: DISCONTINUED | OUTPATIENT
Start: 2021-03-05 | End: 2021-03-05 | Stop reason: HOSPADM

## 2021-03-05 RX ORDER — LIDOCAINE HYDROCHLORIDE 40 MG/ML
SOLUTION TOPICAL AS NEEDED
Status: DISCONTINUED | OUTPATIENT
Start: 2021-03-05 | End: 2021-03-05 | Stop reason: SURG

## 2021-03-05 RX ORDER — PROPOFOL 10 MG/ML
VIAL (ML) INTRAVENOUS AS NEEDED
Status: DISCONTINUED | OUTPATIENT
Start: 2021-03-05 | End: 2021-03-05 | Stop reason: SURG

## 2021-03-05 RX ADMIN — Medication 320 MCG: at 13:38

## 2021-03-05 RX ADMIN — PROPOFOL 100 MG: 10 INJECTION, EMULSION INTRAVENOUS at 13:26

## 2021-03-05 RX ADMIN — ROCURONIUM BROMIDE 35 MG: 10 INJECTION INTRAVENOUS at 13:26

## 2021-03-05 RX ADMIN — Medication 320 MCG: at 14:00

## 2021-03-05 RX ADMIN — SODIUM CHLORIDE, POTASSIUM CHLORIDE, SODIUM LACTATE AND CALCIUM CHLORIDE 1000 ML: 600; 310; 30; 20 INJECTION, SOLUTION INTRAVENOUS at 12:47

## 2021-03-05 RX ADMIN — Medication 320 MCG: at 13:46

## 2021-03-05 RX ADMIN — Medication 320 MCG: at 14:05

## 2021-03-05 RX ADMIN — GLYCOPYRROLATE 0.4 MG: 0.2 INJECTION, SOLUTION INTRAMUSCULAR; INTRAVENOUS at 14:19

## 2021-03-05 RX ADMIN — LIDOCAINE HYDROCHLORIDE 40 MG: 20 INJECTION, SOLUTION EPIDURAL; INFILTRATION; INTRACAUDAL; PERINEURAL at 13:26

## 2021-03-05 RX ADMIN — Medication 3 MG: at 14:19

## 2021-03-05 RX ADMIN — Medication 320 MCG: at 13:53

## 2021-03-05 RX ADMIN — LIDOCAINE HYDROCHLORIDE 1 EACH: 40 SOLUTION TOPICAL at 13:26

## 2021-03-05 NOTE — OP NOTE
Bronchoscopy Procedure Note    Date of Operation: 03/05/21    Pre-op Diagnosis: Mass of upper lobe of right lung    Post-op Diagnosis: Same    Surgeon: Mg Amin MD    Anesthesia:  General.    Operation: Flexible fiberoptic bronchoscopy, diagnostic without c-arm    Bronchoscopy, Diagnostic  Bronchoalveolar lavage, BAL  Transbronchial biopsy  Electromagnetic navigation was performed    Findings: normal endobronchial anatomy    Specimen: bronch wash, BAL RUL, transbronchial biopsy with electromagnetic navigation guidance    Estimated Blood Loss: Minimal    Complications: none    Indications and History:  The patient is a 81 y.o.  male..  The risks, benefits, complications, treatment options and expected outcomes were discussed with the patient.  The possibilities of reaction to medication, pulmonary aspiration, perforation of a viscus, bleeding, failure to diagnose a condition and creating a complication requiring transfusion or operation were discussed with the patient who freely signed the consent.  Time out was taken prior to the procedure.    Description of Procedure:    After the induction of general endotrachal anesthesia, the patient was positioned supine and the bronchoscope was passed through the ETT.  The trachea, mainstem bronchi, RUL, RML, Bronchus Intermedius, RLL, CARLITO, CARLITO upper division and lingula, and LLL, and all primary segmental airways were examined with olympus p180 bronchoscope and were normal.  The olympus scope was removed and the Veran 4mm OD navigational scope was introduced.  waypoint registration of the main chalo, secondary carinae bilaterally and trachea was performed.  The bronchoscope was advanced into the right upper lobe apical segment and via navigation, we approached the lesion with Veran forceps and completed multiple transbronchial biopsies with washed performed between biopsies and collected by suction.  Following completion of biopsies we performed 100 ml saline  bronchoalveolar lavage with 25 ml cloudy balf return.  The bronchoscope was withdrawn.  Pt was released to anesthesia for extubation.    The Patient was taken to the Endoscopy Recovery area in satisfactory condition.      Mg Amin MD at 14:30 CST, 3/5/2021

## 2021-03-05 NOTE — ANESTHESIA PREPROCEDURE EVALUATION
Anesthesia Evaluation     Patient summary reviewed and Nursing notes reviewed   no history of anesthetic complications:  NPO Solid Status: > 8 hours  NPO Liquid Status: > 8 hours           Airway   Mallampati: I  TM distance: >3 FB  Neck ROM: full  No difficulty expected  Dental      Pulmonary     breath sounds clear to auscultation  (+) COPD, asthma,    ROS comment: Right lung mass, hilar adenopathy  Cardiovascular   Exercise tolerance: poor (<4 METS)    Patient on routine beta blocker and Beta blocker given within 24 hours of surgery  Rhythm: regular  Rate: normal    (+) hypertension, past MI , CAD, hyperlipidemia,     ROS comment: Echo 2012 wnl    [facility] (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

## 2021-03-05 NOTE — DISCHARGE INSTRUCTIONS

## 2021-03-05 NOTE — ANESTHESIA POSTPROCEDURE EVALUATION
Patient: Mina Ratliff    Procedure Summary     Date: 03/05/21 Room / Location: [facility] OR 08 /  [facility] OR    Anesthesia Start: 1322 Anesthesia Stop: 1455    Procedure: Navigational BRONCHOSCOPY (Right Bronchus) Diagnosis:       Mass of upper lobe of right lung      (Mass of upper lobe of right lung [R91.8])    Surgeon: Mg Amin MD Provider: Tab Tim CRNA    Anesthesia Type: general ASA Status: 3          Anesthesia Type: general    Vitals  Vitals Value Taken Time   /65 03/05/21 1515   Temp 97.9 °F (36.6 °C) 03/05/21 1515   Pulse 89 03/05/21 1515   Resp 16 03/05/21 1515   SpO2 95 % 03/05/21 1515           Post Anesthesia Care and Evaluation    Patient location during evaluation: PACU  Patient participation: complete - patient participated  Level of consciousness: awake and alert  Pain management: adequate  Airway patency: patent  Anesthetic complications: No anesthetic complications  PONV Status: none  Cardiovascular status: acceptable and hemodynamically stable  Respiratory status: acceptable  Hydration status: acceptable    Comments: Blood pressure 173/65, pulse 92, temperature 97.9 °F (36.6 °C), temperature source Temporal, resp. rate 16, SpO2 94 %.    Patient discharged from PACU based upon Nael score. Please see RN notes for further details

## 2021-03-05 NOTE — ANESTHESIA PROCEDURE NOTES
Airway  Urgency: elective    Date/Time: 3/5/2021 1:26 PM  Airway not difficult    General Information and Staff    Patient location during procedure: OR  CRNA: Tab Tim CRNA    Indications and Patient Condition  Indications for airway management: airway protection    Preoxygenated: yes  MILS maintained throughout  Mask difficulty assessment: 1 - vent by mask    Final Airway Details  Final airway type: endotracheal airway      Successful airway: ETT  Cuffed: yes   Successful intubation technique: direct laryngoscopy  Facilitating devices/methods: intubating stylet  Endotracheal tube insertion site: oral  Blade: Gwen  Blade size: 4  ETT size (mm): 8.0  Cormack-Lehane Classification: grade IIa - partial view of glottis  Placement verified by: chest auscultation and capnometry   Cuff volume (mL): 8  Measured from: lips  ETT/EBT  to lips (cm): 21  Number of attempts at approach: 1  Assessment: lips, teeth, and gum same as pre-op and atraumatic intubation

## 2021-03-07 LAB
BACTERIA SPEC RESP CULT: NO GROWTH
BACTERIA SPEC RESP CULT: NORMAL
GRAM STN SPEC: NORMAL
QT INTERVAL: 364 MS
QTC INTERVAL: 442 MS

## 2021-03-09 DIAGNOSIS — R91.8 MASS OF UPPER LOBE OF RIGHT LUNG: Primary | ICD-10-CM

## 2021-03-09 NOTE — PROGRESS NOTES
I spoke with patient.  He is not opposed to going to [place] for evaluation.  Move his follow up to sooner either later this week or early next week with me or Meenakshi.  I am going to start process for referral to interventional pulmonology.

## 2021-03-09 NOTE — PROGRESS NOTES
Spoke with patient and relayed test results. Patient voiced understanding. Appointment given for tomorrow at 10:30 with Dr. Amin.

## 2021-03-10 ENCOUNTER — OFFICE VISIT (OUTPATIENT)
Dept: PULMONOLOGY | Facility: CLINIC | Age: 81
End: 2021-03-10

## 2021-03-10 VITALS
SYSTOLIC BLOOD PRESSURE: 162 MMHG | HEART RATE: 97 BPM | HEIGHT: 69 IN | WEIGHT: 203.6 LBS | BODY MASS INDEX: 30.16 KG/M2 | DIASTOLIC BLOOD PRESSURE: 86 MMHG | OXYGEN SATURATION: 97 % | TEMPERATURE: 97.5 F

## 2021-03-10 DIAGNOSIS — R91.8 MASS OF UPPER LOBE OF RIGHT LUNG: Primary | ICD-10-CM

## 2021-03-10 DIAGNOSIS — J44.9 STAGE 3 SEVERE COPD BY GOLD CLASSIFICATION (HCC): ICD-10-CM

## 2021-03-10 DIAGNOSIS — Z57.9 OCCUPATIONAL EXPOSURE IN WORKPLACE: ICD-10-CM

## 2021-03-10 PROCEDURE — 99214 OFFICE O/P EST MOD 30 MIN: CPT | Performed by: INTERNAL MEDICINE

## 2021-03-10 SDOH — HEALTH STABILITY - PHYSICAL HEALTH: OCCUPATIONAL EXPOSURE TO UNSPECIFIED RISK FACTOR: Z57.9

## 2021-03-10 NOTE — PATIENT INSTRUCTIONS
PET Scan  A PET scan (positron emission tomography) is a test that creates pictures of the inside of your body. For the test, a small amount of radioactive material is injected into a vein. A special scanner then takes pictures of your body.  The pictures created during a PET scan can be used to study diseases, like cancer. The colors and brightness on the pictures show different levels of organ and tissue function. For example, cancer tissue appears brighter than normal tissue on a PET scan image.  Tell a health care provider about:  · Any allergies you have.  · All medicines you are taking, including vitamins, herbs, eye drops, creams, and over-the-counter medicines.  · Any blood disorders you have.  · Any surgeries you have had.  · Any medical conditions you have.  · If you are afraid of cramped spaces (claustrophobic). If claustrophobia is a problem, it usually can be relieved with a medicine to help you relax (sedative) or a medicine to treat anxiety.  · If you have trouble staying still for long periods of time.  · Whether you are pregnant or may be pregnant.  What are the risks?  Generally, this is a safe test. However, problems may occur, including:  · Bleeding, pain, or swelling at the injection site.  · Allergic reactions to the radioactive material. This is rare.  What happens before the procedure?  · Do not eat or drink anything after midnight on the night before the procedure, or as directed by your health care provider.  · Take medicines only as directed by your health care provider.  · Tell your health care provider if you are pregnant or breastfeeding.  · If you have diabetes, ask your health care provider for diet guidelines to control your blood sugar (glucose) levels on the day of the test.  What happens during the procedure?  · An IV will be inserted into one of your veins.  · A small amount of radioactive material will be injected into a vein.  · You will wait 30-60 minutes after the injection.  This allows the material to travel through your body.  · You will lie on a cushioned table, and the table will be moved through the center of an imaging machine similar to a CT scanner.  · Pictures of your body will be taken. It will take about 30-60 minutes for the machine to produce the pictures. You will need to stay very still during this time.  What happens after the procedure?    · Ask your health care provider, or the department that is doing the test:  ? When will my results be ready?  ? How will I get my results?  ? What are my treatment options?  ? What other tests do I need?  ? What are my next steps?  · You may resume your normal diet and activities.  · Drink 6-8 glasses of water after the test to flush the radioactive material out of your body. Drink enough fluid to keep your urine pale yellow.  Summary  · A PET scan is a test that creates pictures of the inside of the body. PET stands for positron emission tomography.  · For this test, a small dose of a harmless radioactive material is injected into a vein. It will travel through your body in 30-60 minutes.  · While lying down and staying very still, you will be moved through a machine that takes pictures of your body. This will take 30-60 minutes.  · The colors and brightness on the pictures show different levels of organ and tissue function. For example, cancer tissue appears brighter than normal tissue on a PET scan image.  This information is not intended to replace advice given to you by your health care provider. Make sure you discuss any questions you have with your health care provider.  Document Revised: 01/09/2019 Document Reviewed: 01/09/2019  [employer] Patient Education © 2020 [employer]

## 2021-03-10 NOTE — PROGRESS NOTES
Background:  pt with gold stage 3 copd, fev1 42% pred 2018, asthma overlap   Chief Complaint  stage 3 severe copd and Lung Mass    Subjective    History of Present Illness       Mina Ratliff presents to [facility].  He follows up after we originally took him for bronchoscopy with endobronchial ultrasound.  This identified some polarizable material in the background but nothing else significant.  We then attempted electromagnetic navigational bronchoscopy with the ventilation system to try to get needle and transbronchial sampling of the right apical segmental lesion.  We were able to navigate to the lesion, but encountered difficulty with inadequate flexion of the device with the needle loaded, prohibiting sampling with that device, and then with repeated dislodgment and retroflexion of the scope with each transbronchial biopsy past.  Navigation did indicate desirable position for biopsies, unfortunately nondiagnostic.  He came in today to discuss all of this and I have also proactively contacted an interventional pulmonologist in [place] to discuss his case and consider additional work-up.  The lesion in the upper lobe is irregular and worrisome for malignancy.  Notably he does have significant occupational exposures to silica, asbestos and others.  I think this would be a very unusual silicosis case with developing progressive massive fibrosis only in one area without significant interstitial disease elsewhere.  However the foreign material that we recovered at [facility] is somewhat intriguing.  Nevertheless we do not have a diagnosis.  He has been very patient with our attempts to try to get these tests done.  Initially he was reluctant to go out of town for any additional testing.  After we have discussed this today he feels that he might be more willing to do that.  He has no specific new complaints.       Objective     Vital Signs:   /86   Pulse 97  "  Temp 97.5 °F (36.4 °C)   Ht 176.5 cm (69.49\")   Wt 92.4 kg (203 lb 9.6 oz)   SpO2 97% Comment: ra  BMI 29.64 kg/m²   Physical Exam  Constitutional:       Appearance: Normal appearance.   Cardiovascular:      Rate and Rhythm: Normal rate.   Pulmonary:      Effort: Pulmonary effort is normal. No respiratory distress.      Breath sounds: No wheezing, rhonchi or rales.   Skin:     Coloration: Skin is not jaundiced.      Findings: No erythema.   Neurological:      Mental Status: He is alert.        Result Review  Data Reviewed:{ Labs  Result Review  Imaging  Media :23}   XR Chest 1 View (03/05/2021 14:49) post bronchoscopy film, my interpretation: right upper lobe process, with radiology report of surrounding opacity.  I think that is related to my bronchoalveolar lavage.      PFT Values        Some values may be hidden. Unless noted otherwise, only the newest values recorded on each date are displayed.         Old Values PFT Results 6/10/19   FVC 68%   FEV1 49%   FEV1/FVC 55.55%      Pre Drug PFT Results 6/10/19   No data to display.      Post Drug PFT Results 6/10/19   No data to display.      Other Tests PFT Results 6/10/19   No data to display.                      Assessment and Plan  {CC Problem List  Visit Diagnosis  ROS  Review (Popup)  Health Maintenance  Quality  BestPractice  Medications  SmartSets  SnapShot Encounters  Media :23}   Problem List Items Addressed This Visit        Pulmonary Problems    Stage 3 severe COPD by GOLD classification (CMS/HCC) (Chronic)    Mass of upper lobe of right lung - Primary (Chronic)    Overview     Added automatically from request for surgery [id number]            Other    Occupational exposure in workplace (Chronic)    Overview     Asbestos,  Radiation, silica           We discussed the above.  He does have pretty significant COPD but he has tolerated both of the bronchoscopy procedures with general anesthesia very well.  Mass is suspected to be cancer.  " Alternative diagnosis is considered but less likely.  I am going to refer him to interventional pulmonology and [place] for consideration for robotic bronchoscopy.  I think this modality will be more technically advanced than the standard navigational scope and without the shortcomings that led to the problems I had above, which may have limited my ability to establish diagnosis.  From a respiratory standpoint we will not change any medications for right now.  He is due for his second Covid vaccine and we discussed that is the most important thing for him to do in the near term.  We will try to arrange to get his PET scan done later this week or early next week followed by his coronavirus vaccine and then hopefully get him down to [place] soon thereafter.  IP help greatly appreciated.    Follow Up {Instructions Charge Capture  Follow-up Communications :23}   Return in about 3 months (around 6/10/2021) for Recheck.  Patient was given instructions and counseling regarding his condition or for health maintenance advice. Please see specific information pulled into the AVS if appropriate.    Electronically signed by Mg Amin MD, 3/10/2021, 19:59 CST

## 2021-03-15 ENCOUNTER — TELEPHONE (OUTPATIENT)
Dept: PULMONOLOGY | Facility: CLINIC | Age: 81
End: 2021-03-15

## 2021-03-17 ENCOUNTER — APPOINTMENT (OUTPATIENT)
Dept: CT IMAGING | Facility: HOSPITAL | Age: 81
End: 2021-03-17

## 2021-03-23 ENCOUNTER — HOSPITAL ENCOUNTER (OUTPATIENT)
Dept: CT IMAGING | Facility: HOSPITAL | Age: 81
Discharge: HOME | End: 2021-03-23
Admitting: INTERNAL MEDICINE

## 2021-03-23 DIAGNOSIS — R91.8 MASS OF UPPER LOBE OF RIGHT LUNG: ICD-10-CM

## 2021-03-23 PROCEDURE — 78815 PET IMAGE W/CT SKULL-THIGH: CPT

## 2021-03-23 PROCEDURE — A9552 F18 FDG: HCPCS | Performed by: INTERNAL MEDICINE

## 2021-03-23 PROCEDURE — 0 FLUDEOXYGLUCOSE F18 SOLUTION: Performed by: INTERNAL MEDICINE

## 2021-03-23 RX ADMIN — FLUDEOXYGLUCOSE F18 1 DOSE: 300 INJECTION INTRAVENOUS at 11:17

## 2021-03-24 DIAGNOSIS — R91.8 LUNG INFILTRATE: Primary | ICD-10-CM

## 2021-03-24 NOTE — PROGRESS NOTES
Please call the patient regarding his abnormal result.  This did show positive in the area we were concerned about, which is not a surprise.  Fortunately it did not show anything  anywhere else which is very good news.  We hopefully can get the definite diagnosis in [place]; We probably will then want to refer him for radiation treatment of that area if we determine it is a tumor.l

## 2021-03-24 NOTE — PROGRESS NOTES
Spoke with patient and relayed test results. Patient voiced understanding. Gave patient the phone number to [facility] to ask when they might have an appointment available to him.

## 2021-03-24 NOTE — PROGRESS NOTES
Spoke with patient and relayed test results. Patient voiced understanding. He does state that he is coughing up sputum with color at times.  He is coming by to get specimen cups/instructions. Please put in the orders for this to be done at [facility].

## 2021-03-25 ENCOUNTER — LAB (OUTPATIENT)
Dept: LAB | Facility: HOSPITAL | Age: 81
End: 2021-03-25

## 2021-03-25 DIAGNOSIS — R91.8 LUNG INFILTRATE: ICD-10-CM

## 2021-03-25 PROCEDURE — 87206 SMEAR FLUORESCENT/ACID STAI: CPT

## 2021-03-25 PROCEDURE — 87116 MYCOBACTERIA CULTURE: CPT

## 2021-04-06 DIAGNOSIS — C34.91 ADENOCARCINOMA OF LUNG, RIGHT (HCC): Primary | ICD-10-CM

## 2021-04-07 ENCOUNTER — HOSPITAL ENCOUNTER (OUTPATIENT)
Dept: INFUSION THERAPY | Age: 81
Discharge: HOME | End: 2021-04-07
Payer: MEDICARE

## 2021-04-07 ENCOUNTER — DOCUMENTATION (OUTPATIENT)
Dept: RADIATION ONCOLOGY | Facility: HOSPITAL | Age: 81
End: 2021-04-07

## 2021-04-07 ENCOUNTER — HOSPITAL ENCOUNTER (OUTPATIENT)
Dept: RADIATION ONCOLOGY | Facility: HOSPITAL | Age: 81
Setting detail: RADIATION/ONCOLOGY SERIES
End: 2021-04-07

## 2021-04-07 ENCOUNTER — APPOINTMENT (OUTPATIENT)
Dept: RADIATION ONCOLOGY | Facility: HOSPITAL | Age: 81
End: 2021-04-07

## 2021-04-07 ENCOUNTER — CONSULT (OUTPATIENT)
Dept: RADIATION ONCOLOGY | Facility: HOSPITAL | Age: 81
End: 2021-04-07

## 2021-04-07 ENCOUNTER — OFFICE VISIT (OUTPATIENT)
Dept: HEMATOLOGY | Age: 81
End: 2021-04-07
Payer: MEDICARE

## 2021-04-07 ENCOUNTER — HOSPITAL ENCOUNTER (OUTPATIENT)
Dept: RADIATION ONCOLOGY | Facility: HOSPITAL | Age: 81
Setting detail: RADIATION/ONCOLOGY SERIES
Discharge: HOME | End: 2021-04-07

## 2021-04-07 ENCOUNTER — TRANSCRIBE ORDERS (OUTPATIENT)
Dept: ADMINISTRATIVE | Facility: HOSPITAL | Age: 81
End: 2021-04-07

## 2021-04-07 VITALS
SYSTOLIC BLOOD PRESSURE: 132 MMHG | OXYGEN SATURATION: 97 % | HEIGHT: 69 IN | BODY MASS INDEX: 29.18 KG/M2 | WEIGHT: 197 LBS | HEART RATE: 83 BPM | RESPIRATION RATE: 20 BRPM | DIASTOLIC BLOOD PRESSURE: 64 MMHG

## 2021-04-07 VITALS
OXYGEN SATURATION: 97 % | HEIGHT: 70 IN | BODY MASS INDEX: 28.35 KG/M2 | WEIGHT: 198 LBS | TEMPERATURE: 98 F | DIASTOLIC BLOOD PRESSURE: 78 MMHG | SYSTOLIC BLOOD PRESSURE: 128 MMHG | HEART RATE: 88 BPM

## 2021-04-07 DIAGNOSIS — I25.119 CORONARY ARTERY DISEASE INVOLVING NATIVE HEART WITH ANGINA PECTORIS, UNSPECIFIED VESSEL OR LESION TYPE (HCC): ICD-10-CM

## 2021-04-07 DIAGNOSIS — C34.91 STAGE I ADENOCARCINOMA OF LUNG, RIGHT (HCC): Primary | ICD-10-CM

## 2021-04-07 DIAGNOSIS — C34.91 ADENOCARCINOMA OF RIGHT LUNG (HCC): ICD-10-CM

## 2021-04-07 DIAGNOSIS — C34.91 ADENOCARCINOMA OF RIGHT LUNG (HCC): Primary | ICD-10-CM

## 2021-04-07 DIAGNOSIS — J44.9 STAGE 3 SEVERE COPD BY GOLD CLASSIFICATION (HCC): ICD-10-CM

## 2021-04-07 DIAGNOSIS — I87.8 POOR VENOUS ACCESS: ICD-10-CM

## 2021-04-07 DIAGNOSIS — Z87.891 FORMER SMOKER: ICD-10-CM

## 2021-04-07 DIAGNOSIS — C34.11 MALIGNANT NEOPLASM OF UPPER LOBE OF RIGHT LUNG (HCC): Primary | ICD-10-CM

## 2021-04-07 DIAGNOSIS — I25.2 HISTORY OF HEART ATTACK: Primary | ICD-10-CM

## 2021-04-07 LAB
BASOPHILS ABSOLUTE: 0.05 K/UL (ref 0.01–0.08)
BASOPHILS RELATIVE PERCENT: 0.5 % (ref 0.1–1.2)
EOSINOPHILS ABSOLUTE: 0.24 K/UL (ref 0.04–0.54)
EOSINOPHILS RELATIVE PERCENT: 2.6 % (ref 0.7–7)
HCT VFR BLD CALC: 37.5 % (ref 40.1–51)
HEMOGLOBIN: 12.2 G/DL (ref 13.7–17.5)
LYMPHOCYTES ABSOLUTE: 2.86 K/UL (ref 1.18–3.74)
LYMPHOCYTES RELATIVE PERCENT: 31.1 % (ref 19.3–53.1)
MCH RBC QN AUTO: 30.3 PG (ref 25.7–32.2)
MCHC RBC AUTO-ENTMCNC: 32.5 G/DL (ref 32.3–36.5)
MCV RBC AUTO: 93.3 FL (ref 79–92.2)
MONOCYTES ABSOLUTE: 0.62 K/UL (ref 0.24–0.82)
MONOCYTES RELATIVE PERCENT: 6.7 % (ref 4.7–12.5)
NEUTROPHILS ABSOLUTE: 5.43 K/UL (ref 1.56–6.13)
NEUTROPHILS RELATIVE PERCENT: 59.1 % (ref 34–71.1)
PDW BLD-RTO: 13.8 % (ref 11.6–14.4)
PLATELET # BLD: 310 K/UL (ref 163–337)
PMV BLD AUTO: 10.4 FL (ref 7.4–10.4)
RBC # BLD: 4.02 M/UL (ref 4.63–6.08)
WBC # BLD: 9.2 K/UL (ref 4.23–9.07)

## 2021-04-07 PROCEDURE — 77334 RADIATION TREATMENT AID(S): CPT | Performed by: RADIOLOGY

## 2021-04-07 PROCEDURE — G8417 CALC BMI ABV UP PARAM F/U: HCPCS | Performed by: INTERNAL MEDICINE

## 2021-04-07 PROCEDURE — G8427 DOCREV CUR MEDS BY ELIG CLIN: HCPCS | Performed by: INTERNAL MEDICINE

## 2021-04-07 PROCEDURE — 99213 OFFICE O/P EST LOW 20 MIN: CPT

## 2021-04-07 PROCEDURE — 1123F ACP DISCUSS/DSCN MKR DOCD: CPT | Performed by: INTERNAL MEDICINE

## 2021-04-07 PROCEDURE — 99205 OFFICE O/P NEW HI 60 MIN: CPT | Performed by: INTERNAL MEDICINE

## 2021-04-07 PROCEDURE — 85025 COMPLETE CBC W/AUTO DIFF WBC: CPT

## 2021-04-07 PROCEDURE — 36415 COLL VENOUS BLD VENIPUNCTURE: CPT

## 2021-04-07 PROCEDURE — 4040F PNEUMOC VAC/ADMIN/RCVD: CPT | Performed by: INTERNAL MEDICINE

## 2021-04-07 PROCEDURE — 77290 THER RAD SIMULAJ FIELD CPLX: CPT | Performed by: RADIOLOGY

## 2021-04-07 PROCEDURE — 1036F TOBACCO NON-USER: CPT | Performed by: INTERNAL MEDICINE

## 2021-04-07 NOTE — PATIENT INSTRUCTIONS
1)  Plan on 33 daily radiation treatments M-F over 7 weeks for 30 minutes daily  2)  Chemotherapy/immunotherapy per Dr. Madrid  3)  We will call you when to start treatment

## 2021-04-07 NOTE — PROGRESS NOTES
JALYN met with Mr. Ratliff who is here for a radiation consultation today. JALYN introduced self and explained role and source of support. He is an 81 year old male and lives with his wife. His support system includes his wife and his two daughters. His wife plans to drive him to treatment. He does not have any financial concerns at this time. He does not take any medication for anxiety or depression. He said he is nervous to hear what the recommendations are and hopes the doctor will answer his questions. Mr. Ratliff states his wife has some health issues and that does concern him. Emotional support provided. Mr. Ratliff denies needing assistance and will contact this writer in the future if assistance is needed.

## 2021-04-07 NOTE — PROGRESS NOTES
MEDICAL ONCOLOGY CONSULTATION    Pt Name: Kiera Morgan  MRN: [id number]  YOB: 1940  Date of evaluation: 4/7/2021    REASON FOR CONSULTATION: Lung Cancer. REQUESTING PHYSICIAN: Dr. Cathi Melendez    History Obtained From:  patient and old medical records    HISTORY OF PRESENT ILLNESS:      Diagnosis  · RUL adenocarcinoma, NSCLC  · [id number], stage IIIA  · Not a surgical candidate    Treatment Summary  · Recommend concurrent chemoradiation with carboplatin/taxol    Hematology/Cancer History:  Ivanna Mazariegos was first seen by me on 4/7/2021 referred by Dr. Cathi Melendez, pulmonary [facility] for findings of non-small cell lung cancer, adenocarcinoma type. He has several comorbidities including history of type 2 diabetes, hypertension COPD. History of smoking in the past.  Quit many years ago. History of coronary artery disease followed by cardiology biopsy. Last EF 70% in 2018. He was seen by his primary care provider in November 2020. He has complaint of chest pain. Chest x-ray showed a 4 cm mass in the right upper lung. · 12/1/20 CT chest [facility]): Large right upper lobe mass concerning for primary neoplasm. Enlarged right hilar lymph node concerning for metastatic disease. Additional noncalcified pulmonary nodules bilaterally for which follow-up CT is recommended in 3-6 months. Atherosclerosis of the aorta and coronary arteries. · 12/16/2020bronchoscopy with bronchoalveolar lavage was nondiagnostic  · 2/23/21 CT cheat w/o [facility]): Probable right upper lobe mass, as described. This appears slightly larger in size and is likely neoplastic. Consider PET CT follow-up. There is new surrounding infiltrate that extends inferiorly into the right upper lobe. The new may be infectious or inflammatory. Pulmonary hemorrhage possible. Other areas of nodularity and groundglass opacity/nodularity are stable in appearance.   · 3/5/2021navigational bronchoscopy with biopsy was nondiagnostic  · 3/5/21 CT chest SURGERY      balloon surgery (angioplasty)       Social History:    Marital status: , 2 daughters  Smoking status: Former smoker for ~30 years, 1.5 ppd  ETOH status: No  Resides: [place], [place]    Family History:   Family History   Problem Relation Age of Onset    Diabetes Mother     High Blood Pressure Mother     Cancer Father         Lung       Current Hospital Medications:    Current Outpatient Medications   Medication Sig Dispense Refill    metFORMIN (GLUCOPHAGE) 1000 MG tablet TAKE 1 TABLET TWICE A DAY FOR DIABETES 180 tablet 3    blood glucose test strips (TRUE METRIX BLOOD GLUCOSE TEST) strip TEST BLOOD SUGAR ONCE DAILY *E11.9* 100 strip 5    propranolol (INDERAL) 60 MG tablet Take 1 tablet by mouth 2 times daily 180 tablet 3    metoprolol tartrate (LOPRESSOR) 50 MG tablet TAKE 1 TABLET TWICE A DAY FOR BLOOD PRESSURE AND IRREGULAR HEARTBEAT (STOP PROPRANOLOL) 180 tablet 3    simvastatin (ZOCOR) 40 MG tablet TAKE 1 TABLET AT BEDTIME FOR CHOLESTEROL 90 tablet 3    omeprazole (PRILOSEC) 40 MG delayed release capsule TAKE 1 CAPSULE DAILY FOR STOMACH 90 capsule 3    furosemide (LASIX) 40 MG tablet TAKE 1 TABLET DAILY FOR FLUID 90 tablet 4    isosorbide mononitrate (IMDUR) 60 MG extended release tablet Take 60 mg by mouth      losartan (COZAAR) 25 MG tablet Take 25 mg by mouth      primidone (MYSOLINE) 50 MG tablet 1 tab po BID for tremor 180 tablet 3    metoprolol (TOPROL XL) 50 MG XL tablet 1 tablet by mouth once a day for high blood pressure 30 tablet 3    tamsulosin (FLOMAX) 0.4 MG capsule Take 1 capsule by mouth daily. 90 capsule 3    aspirin 325 MG tablet Take 325 mg by mouth daily.  umeclidinium-vilanterol (ANORO ELLIPTA) 62.5-25 MCG/INH AEPB inhaler Inhale 1 puff into the lungs daily      triamcinolone (KENALOG) 0.1 % cream Apply topically 2 times daily.  To lower leg dry skin (Patient not taking: Reported on 4/7/2021) 3 Tube 3     No current facility-administered medications focal   PSYCH: mood and affect appropriate. Alert and oriented to time, place, person      LABORATORY RESULTS REVIEWED/ANALYZED BY ME:  3/31/21 Navigational bronchoscopy-Dr. Claudetta Duverney: Bronchoalveolar lavage RUL consistent with adenocarcinoma. RUL transbronchial bipsy consistent with adenocarcinoma. Lab Results   Component Value Date    WBC 9.20 (H) 04/07/2021    HGB 12.2 (L) 04/07/2021    HCT 37.5 (L) 04/07/2021    MCV 93.3 (H) 04/07/2021     04/07/2021     Lab Results   Component Value Date    NEUTROABS 5.43 04/07/2021       RADIOLOGY STUDIES REVIEWED BY ME:  12/1/20 CT chest [facility]): Large right upper lobe mass concerning for primary neoplasm. Enlarged right hilar lymph node concerning for metastatic disease. Additional noncalcified pulmonary nodules bilaterally for which follow-up CT is recommended in 3-6 months. Atherosclerosis of the aorta and coronary arteries. 2/23/21 CT cheat w/o [facility]): Probable right upper lobe mass, as described. This appears slightly larger in size and is likely neoplastic. Consider PET CT follow-up. There is new surrounding infiltrate that extends inferiorly into the right upper lobe. The new may be infectious or inflammatory. Pulmonary hemorrhage possible. Other areas of nodularity and groundglass opacity/nodularity are stable in appearance. Atheromatous disease of the thoracic aorta and coronary arteries.    3/5/21 CT chest w/o [facility]): Mikado soft tissue mass within the right upper lobe highly suspicious for neoplasm. There is associated postobstructive pneumonitis within the right upper lobe showing mild progression from the previous study of 2/23/2021. There is no associated hilar or mediastinal lymphadenopathy. Today's study is performed as part of a navigational bronchoscopy exam. Other scattered nodules including groundglass nodules are noted within the lungs all stable from the previous exam warranting follow-up.  Heavy atheromatous calcification of the thoracic type ([facility] 75.)  -     Echo 2d w doppler w contrast limited; Future    Poor venous access  -     External Referral To General Surgery       [id number], stge IIIA, NSCLC, adenocarcinoma subtype  The patient was counseled today about diagnosis, staging, prognosis, diagnostic tests, medications, side effects and disease management. Essentially stage III disease. The patient is not a surgical candidate. Recommend chemoradiation followed by immunotherapy. Discussed with radiation oncology, pulmonary (Dr. Monique Chan and also pulmonary in [place] Dr. Curly Peguero)    She is not a surgical candidate. The lesion is unresectable. Chemotherapy regimen:  Carboplatin AUC 2  Taxol 50 mg/m²  Weekly  With concurrent radiation. We will complete staging with MRI brain. 2D echo due to prior history of CAD. Complex medical patientdiscussed with radiation oncology, pulmonary. Patient has several comorbidities. He will follow-up with PCP and other medical providers for his chronic medical problems. Diabetes mellitus type 2/hypertensioncontrolled.   Follow-up PCP    PLAN:  · Request [facility] on pathology  · Referred to Dr. Jenelle Scott  · Referred to Dr. Hugh Durán for port placement  · 2D Echo at [facility] for baseline cardiac function  · MRI Brain at [facility] to complete staging   · Initiate concurrent chemoradiation with carbo/taxol  · RTC MD in tx room on Cycle 2    The total time (75min) I spent to see the patient today includes at least one or more of the following: preparing to see the patient by reviewing prior tests, prior notes or other relevant information, performing appropriate independent examination and evaluation, counseling, ordering of medications, tests or procedures, referrals, communicating with other healthcare professionals when appropriated to coordinate care, documenting clinic information in the electronic medical record or other health records, independently interpreting results of tests, managing test results and communicating the results to the patient/family or caregiver.     Ernestine Cueva MD    04/07/21  3:22 PM

## 2021-04-07 NOTE — PROGRESS NOTES
I spoke with Dr. Madrid today regarding this patient.  He has a large lung mass which is positive for malignancy.    Dr. Madrid anticipates concomitant chemoradiation.  He is sending the patient over and we will see him this afternoon for treatment planning and initiation of radiotherapy for his locally advanced lung cancer.

## 2021-04-07 NOTE — PROGRESS NOTES
[facility] Brunilda Mcgill BSN, PA-C  ____________________________________________________________               [facility]  Department of Radiation Oncology  [address] [place] [id number]  Office:  [phone]  Fax: [phone]    DATE: 04/07/2021  PATIENT: Mina Ratliff 1940   Medical record #: [id number]                                                       REASON FOR CONSULTATION:   Chief Complaint   Patient presents with   • Lung Cancer     Mina Ratliff is a 81 y.o. male that has been referred to our clinic to be evaluated for consideration of radiotherapy to the lung. Reports cough and SOB. Denies activity change, appetite change, fatigue, unexpected weight change, chest tightness, hemoptysis, wheezing, light-headedness, weakness, and headaches. He follows  and .          HISTORY OF PRESENT ILLNESS      11/30/2020 - CT Chest with contrast:  • There is a mildly enlarged right hilar lymph node measuring 14 mm in short axis.  • Additional small mediastinal lymph nodes are noted.  • A right upper lobe mass is seen which measures approximately 6.2 x 4.5 x 3.6 cm in size.   • There is a groundglass nodule in the superior segment of the left lower lobe measuring 1.8 cm in size.   • A tiny 5 mm groundglass nodule is seen in the left upper lobe posteriorly.   • A tiny 3-4 mm nodule is seen along the left major fissure, possibly a small intrafissural lymph node.   • A tiny right lower lobe nodule measures approximately 5 mm in size.   • A few additional tiny nodules are seen in the right lower lobe which have an inflammatory appearance (for example axial image 77).  • There is a tiny 3 mm noncalcified nodule in the right lower lobe posteriorly.   Impression:  • Large right upper lobe mass concerning for primary neoplasm.  • Enlarged right hilar lymph node concerning for metastatic disease.  • Additional noncalcified  pulmonary nodules bilaterally for which follow-up CT is recommended in 3-6 months.  • Atherosclerosis of the aorta and coronary arteries.    12/03/2020 - Appointment with :  • Pt has new dx lung mass and hilar adenopathy.    • He has chronic dyspnea on exertion.    • If this is cancer his lung function is very marginal for any sort of surgical management.    • We discussed bronchoscopy with endobronchial ultrasound which in my opinion is preferred approach.    • Pt wishes to proceed with this.    • He will hold asa after tomorrow's dose.    12/16/2020 - Bronchial washings, cytospin preparations (2) and cell block:  • Numerous ciliated columnar bronchial epithelial cells.  • Mucus with entrapped neutrophils.  • Refractile polarizable foreign material of uncertain origin identified in the background.  • No diagnostic fungal organisms identified utilizing GMS stain.  • Negative for malignant cells.    01/07/2021 - Appointment with :  • EBUS nondiagnostic for hilar adenopathy and lung mass.  Suspicious for hazardous material potentially related to occupational history of exposure.    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    • We discussed that there is still a high suspicion for malignant process despite no malignant cells on biopsy.    • We will repeat CT scan in the short-term.    • If this were inflammation or infection it should be improving by then.  If it is not he will need to be considered for repeat EBUS versus navigational bronchoscopy versus surgery.    • He would be a high risk for surgical resection given his age, comorbidities and COPD.    • He understands this and would like to repeat CT and discuss other biopsy options in a few weeks.    • His COPD remained stable on Anoro.  He will continue this.    • He is overweight and was provided educational material on  this.    • We will see him back in a few weeks after CT imaging to discuss.    • He is aware he can call sooner if needed.    02/23/2021 - CT Chest without contrast:  • Right upper lobe nodule/mass measuring 6.6 x 3.8 cm and previously measuring about 6.3 x 3.7 cm when measured in the same location.   • There is new patchy infiltrate around this area in the right upper lobe and extending inferiorly. This may be infectious or inflammatory or could represent pulmonary hemorrhage.   • There is a stable 2-3 mm right lower lobe nodule on image 106 of series 3.   • There is a stable left lower lobe 2 mm nodule in image 135 of series 3 posteriorly.  • A 1.7 cm groundglass opacity in the left lower lobe appears stable. This is on image 66 of series 3.   • There is a 4-5 mm subpleural groundglass nodule in the left upper lobe on image 66 of series 3 laterally, stable.  • There is a 3-4 mm subpleural left upper lobe nodule posteriorly on image 38 series 3, stable or minimally larger.   • There is a stable 2 mm left lower lobe nodule laterally on image 70 of series 3, stable.   • There is a 1 cm subpleural nodule versus scar in the left lower lobe posteriorly on image 105 of series 3, stable.  Impression:  • Probable right upper lobe mass, as described. This appears slightly larger in size and is likely neoplastic. Consider PET CT follow-up. There is new surrounding infiltrate that extends inferiorly into the right upper lobe. The new may be infectious or inflammatory. Pulmonary hemorrhage possible.  • Other areas of nodularity and groundglass opacity/nodularity are stable in appearance.  • Atheromatous disease of the thoracic aorta and coronary arteries.      02/25/2021 - Appointment with :  • Hilar adenopathy addressed with EBUS.  Identified refractory polarizing material suspicious for occupational obtain debris.  Mass in right lung slightly larger than previous.    • Agreeable to trial navigational bronchoscopy and  biopsy of mass.  Risk and benefits of the procedure explained to the patient.  He is agreeable to proceed.    • His COPD seems to be stable and at baseline on his inhalers.    • We will continue the Anoro for maintenance therapy.    • He has a nebulizer he can use when needed.    • We will contact him to make arrangements for the bronchoscopy.    • He will call sooner if needed.    03/05/2021 - CT Chest without contrast:  • A 3.8 x 6.7 irregular marginated mass is noted within the right upper lobe stable in size from the previous study.   • There is adjacent postobstructive infiltrate. This shows slight worsening from the previous exam. This involves a large portion of the right upper lobe.   • A small perifissural nodule adjacent to the major fissure within the apical posterior segment left upper lobe is stable.   • There is nodular scarring within the medial left lung apex stable from the previous exam.  • A small focus of groundglass opacity on image 226 abutting the major fissure is stable from the previous exam.   • There is a focus of groundglass nodularity with associated perifissural node within the superior segment left lower lobe on image 239 through 256 measuring 1.7 cm in transverse diameter.   • A small groundglass nodule anterior to the major fissure on image 245 is stable.   • A 1.1 cm subpleural nodules noted within the posterior left lower lobe on image 375 stable from the previous exam.   • There is scarring within the inferior lingular segment left upper lobe   • There is slight tethering of the major fissure. This measures 1.1 cm in size.    Impression:  • Melvin soft tissue mass within the right upper lobe highly suspicious for neoplasm. There is associated postobstructive pneumonitis within the right upper lobe showing mild progression from the previous study of 2/23/2021. There is no associated hilar or mediastinal lymphadenopathy. Today's study is performed as part of a navigational  bronchoscopy exam.  • Other scattered nodules including groundglass nodules are noted within the lungs all stable from the previous exam warranting follow-up.  • Heavy atheromatous calcification of the thoracic aorta and proximal great vessels. Coronary calcifications are present.    03/05/2021 - Navigational bronchoscopy and biopsy of mass per :  Mass, upper lobe right lung, CT-guided needle biopsy:  • No malignant cells identified.  • Benign respiratory epithelial cells and bronchial wall.  Bronchial washing (ThinPrep):  • No malignant cells identified.  • Benign bronchial epithelium, occasional alveolar macrophages, and a background of mixed inflammation.  Bronchoalveolar lavage (ThinPrep):  • No malignant cells identified.  • Benign bronchial epithelium, occasional alveolar macrophages, and a background of mixed inflammation.    03/10/2021 - Appointment with :  • We discussed the above. He does have pretty significant COPD but he has tolerated both of the bronchoscopy procedures with general anesthesia very well.  Mass is suspected to be cancer.  Alternative diagnosis is considered but less likely.    • I am going to refer him to interventional pulmonology and [facility] for consideration for robotic bronchoscopy.  I think this modality will be more technically advanced than the standard navigational scope and without the shortcomings that led to the problems I had above, which may have limited my ability to establish diagnosis.    • From a respiratory standpoint we will not change any medications for right now.    • He is due for his second Covid vaccine and we discussed that is the most important thing for him to do in the near term.    • We will try to arrange to get his PET scan done later this week or early next week followed by his coronavirus vaccine and then hopefully get him down to [facility] soon thereafter.    • IP help greatly appreciated.    03/23/2021- PET Scan:  • Right upper lobe  6.7 x 3.8 cm triangular opacity with maximum SUV 12.18, highly concerning for malignancy.   • Adjacent groundglass opacities, similar compared to 3/5/2021, which could represent additional malignancy or infection/inflammation.  • Left upper lobe scarring is not FDG avid.  • Superior segment of the left upper lobe with a 1 cm area of groundglass opacity, which was present on 6/18/2019, but more conspicuous on today's exam, which may be due to differences in technique.   • Groundglass character with likely placement below threshold for PET detection.  Impression:  • Markedly FDG avid right upper lobe mass highly concerning for malignancy. Adjacent right upper lobe groundglass opacities are similar compared to 3/5/2021, which could represent additional malignancy or infection/inflammation. Other non-FDG avid pulmonary findings as above.  • No evidence of kayden or distant metastatic disease.    03/29/2021 - Bronchoscopy with biopsy at [facility]:  Lung, right upper lobe, transbronchial biopsy:  • Pulmonary adenocarcinoma.   Lymph node, station 7, FNA:  • Anthracotic lymph node sampling and blood  • No malignancy identified   Lymph node, 11R, FNA:  • Hemodilute sample with scattered lymphocytes  • No malignancy identified   Lymph node, 11R, FNA:  • Blood, lymph node sampling, and respiratory epithelial cells.   • No malignancy identified   Lung, right upper lobe, FNA:  • Paucicellular smears with atypical cell groups, respiratory epithelial cells, and surface mucus  Bronchoalveolar lavage, right upper lobe lung:  • Atypical cell groups, consistent with adenocarcinoma     04/07/2021 - Appointment with :  • Note pending     04/07/2021 - Documentation per :  • I spoke with Dr. Madrid today regarding this patient.  He has a large lung mass which is positive for malignancy.  • Dr. Madrid anticipates concomitant chemoradiation.  He is sending the patient over and we will see him this  afternoon for treatment planning and initiation of radiotherapy for his locally advanced lung cancer.    04/15/2021 - MRI Brain with and without contrast:    History obtained from  PATIENT, FAMILY, and CHART    PAST MEDICAL HISTORY  Past Medical History:   Diagnosis Date   • Asthma    • Atherosclerosis of native coronary artery of native heart without angina pectoris    • BPH with obstruction/lower urinary tract symptoms    • CAD (coronary artery disease)    • Chest pain    • Chronic airway obstruction (CMS/HCC)    • COPD (chronic obstructive pulmonary disease) (CMS/HCC)    • Diabetes mellitus (CMS/HCC)    • Dizziness    • GERD (gastroesophageal reflux disease)    • HTN (hypertension)    • Hyperlipidemia    • Malaise and fatigue    • Mass of right lung    • Multiple gastric ulcers    • Myocardial infarction, old    • 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,  Radiation, silica   • Old myocardial infarction    • Stage 3 severe COPD by GOLD classification (CMS/[facility]) 3/13/2015      PAST SURGICAL HISTORY  Past Surgical History:   Procedure Laterality Date   • BRONCHOSCOPY N/A 12/16/2020    Procedure: BRONCHOSCOPY WITH ENDOBRONCHIAL ULTRASOUND, BIOPSY, TRANSBRONCHIAL NEEDLE ASPIRATE;  Surgeon: Mg Amin MD;  Location:  [facility] OR;  Service: Pulmonary;  Laterality: N/A;  pre: RUL masspost: Alana Wu MD   • BRONCHOSCOPY Right 3/5/2021    Procedure: Navigational BRONCHOSCOPY;  Surgeon: Mg Amin MD;  Location:  [facility] OR;  Service: Pulmonary;  Laterality: Right;  pre right lung mass  post right lung mass  dr alana valerio   • CARDIAC CATHETERIZATION     • CORONARY ANGIOPLASTY  08/27/2009    P[facility]PR[facility] STATUS, PTCA    • HEMORRHOIDECTOMY     • INNER EAR SURGERY      NEW EAR DRUM CONSTRUCTED   • PROSTATE SURGERY      TURP - DR RIZVI      FAMILY HISTORY  family history includes Coronary artery disease in an other family member.     SOCIAL HISTORY  Social History     Tobacco Use   • Smoking  status: Former Smoker     Packs/day: 1.50     Years: 25.00     Pack years: 37.50     Types: Cigarettes     Quit date:      Years since quittin.2   • Smokeless tobacco: Never Used   Vaping Use   • Vaping Use: Never used   Substance Use Topics   • Alcohol use: No   • Drug use: No      ALLERGIES  Patient has no known allergies.      MEDICATIONS  Current Outpatient Medications   Medication Instructions   • aspirin  mg, Oral, Daily   • diphenhydrAMINE-acetaminophen (TYLENOL PM)  MG tablet per tablet 1 tablet, Oral, Nightly PRN   • furosemide (LASIX) 40 mg, Oral, Daily   • losartan (COZAAR) 25 MG tablet TAKE 1 TABLET DAILY   • metFORMIN (GLUCOPHAGE) 1,000 mg, Oral, 2 Times Daily With Meals   • metoprolol tartrate (LOPRESSOR) 50 mg, Oral, Daily   • Multiple Vitamins-Minerals (MULTIVITAMIN ADULT PO) 1 tablet, Oral, Daily   • omeprazole (PRILOSEC) 40 mg, Oral, Daily   • primidone (MYSOLINE) 50 mg, Oral, Nightly   • propranolol (INDERAL) 40 mg, Oral, Daily   • simvastatin (ZOCOR) 40 mg, Oral, Nightly   • tamsulosin (FLOMAX) 0.4 MG capsule 24 hr capsule TAKE 1 CAPSULE EVERY NIGHT   • umeclidinium-vilanterol (ANORO ELLIPTA) 62.5-25 MCG/INH aerosol powder  inhaler 1 puff, Inhalation, Daily - RT     Current outpatient and discharge medications have been reconciled for the patient.  Reviewed by: Riky Palmer III, MD    Cancer-related family history is not on file.    The following portions of the patient's history were reviewed and updated as appropriate: allergies, current medications, past family history, past medical history, past social history, past surgical history and problem list.    REVIEW OF SYSTEMS  Review of Systems   Constitutional: Negative.    HENT:  Negative.         Very hard of hearing   Eyes: Negative.    Respiratory: Positive for cough and shortness of breath. Negative for chest tightness, hemoptysis and wheezing.    Cardiovascular: Negative.         History of MI   Gastrointestinal:  "Negative.         GERD/ Omeprazole   Endocrine: Negative.    Genitourinary: Negative.          Flomax   Musculoskeletal: Negative.    Skin: Negative.    Neurological: Negative.         Tremors in hands   Hematological: Negative.    Psychiatric/Behavioral: Negative.      I have reviewed and confirmed the accuracy of the ROS as documented by the MA/LPN/RN Riky Palmer III, MD    PHYSICAL EXAM  VITAL SIGNS:   Vitals:    04/07/21 1442   BP: 132/64   Pulse: 83   Resp: 20   SpO2: 97%  Comment: Room Air   Weight: 89.4 kg (197 lb)   Height: 176.5 cm (69.49\")   PainSc: 0-No pain      General:  Alert and oriented, no acute distress, well developed, Vitals reviewed.  Head:  Normocephalic, without obvious abnormality    Nose/Sinuses:  Nares normal externally, no sinus tenderness.  Mouth/Throat:  Mucosa moist, pharynx without erythema  Neck:  supple, No evidence of adenopathy in the cervical or supraclavicular areas.  Eyes: No gross abnormalities   Ears: Ears intact with no external abnormalities noted.   Chest: diminished breath sounds throughout lower lobes  Cardiovascular: Regular rate and rhythm   Abdomen:  Soft, non-tender, normal bowel sounds; no CVA tenderness   Extremities:  LUZ well, warm to touch, no evidence of cyanosis or edema.  Skin: No suspicious lesions or rashes of concern  Neurologic:  Alert and oriented, non focal exam, strength and sensation grossly normal  Psych: Mood and affect are appropriate    Performance Status: ECOG (1)    Clinical Quality Measures  -Pain Documented by Standardized Tool, FPS Mina Ratliff reports a pain score of 0.  Given his pain assessment as noted, treatment options were discussed and the following options were decided upon as a follow-up plan to address the patient's pain: No pain, no plan given.    Pain Medications             aspirin  MG tablet Take 325 mg by mouth Daily.    primidone (MYSOLINE) 50 MG tablet Take 50 mg by mouth Every Night.        -Advanced Care " Planning   Advance Care Planning   ACP discussion was held with the patient during this visit. Patient does not have an advance directive, information provided.    -Body Mass Index Screening and Follow-Up Plan Patient's Body mass index is 28.68 kg/m². BMI is above normal parameters. Recommendations include: educational material.  -Tobacco Use: Screening and Cessation Intervention Social History    Tobacco Use      Smoking status: Former Smoker        Packs/day: 1.50        Years: 25.00        Pack years: 37.5        Types: Cigarettes        Quit date:         Years since quittin.2      Smokeless tobacco: Never Used    ASSESSMENT AND PLAN  1. Malignant neoplasm of upper lobe of right lung (CMS/HCC)    2. Stage 3 severe COPD by GOLD classification (CMS/HCC)    3. Former smoker      RECOMMENDATIONS: Mina Ratliff was diagnosed in 2021 with Adenocarcinoma of lung, RUL 6.7 x 3.8 cm SUV 12.18, adjacent groundglass opacities, similar compared to 3/5/2021, which could represent additional malignancy or infection/inflammation. No evidence of kayden or distant metastatic disease on PET scan.     The indications and rationale of lung external beam radiation therapy according to the NCCN Guidelines has been discussed today. I have extensively reviewed the risks, benefits and alternatives of therapy with this diagnosis. The risks of radiation therapy includes but is not limited to radiation induced pulmonary fibrosis, progression of disease in spite of therapy with either local or systemic failure. I have seen, examined and reviewed this patient's medication list, appropriate labs and imaging studies as well as other physician notes. We discussed the goals and plans of care with the patient and family and answered all questions.     Pulmonary function tests in the past reveal a FEV1 of 49%-59%.     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    Following this discussion and in  consideration of the diagnostic data/evaluation of the patient, I recommended a course of external beam radiation, likely delivered concurrently with systemic therapy under the medical oncology service, I anticipate 6600cGy over 33 treatments, final course pending. MRI brain pending.     Continue ongoing management per primary care physician and other specialists. Thank you for allowing me to assist in this patients care.     Patient Instructions   1)  Plan on 33 daily radiation treatments M-F over 7 weeks for 30 minutes daily  2)  Chemotherapy/immunotherapy per Dr. Madrid  3)  We will call you when to start treatment    Time Spent: I spent 64 minutes caring for Mina on this date of service. This time includes time spent by me in the following activities: preparing for the visit, reviewing tests, obtaining and/or reviewing a separately obtained history, performing a medically appropriate examination and/or evaluation, counseling and educating the patient/family/caregiver and documenting information in the medical record.   Riky Palmer III, MD  04/07/2021

## 2021-04-08 PROCEDURE — 77301 RADIOTHERAPY DOSE PLAN IMRT: CPT | Performed by: RADIOLOGY

## 2021-04-08 PROCEDURE — 77300 RADIATION THERAPY DOSE PLAN: CPT | Performed by: RADIOLOGY

## 2021-04-08 PROCEDURE — 77338 DESIGN MLC DEVICE FOR IMRT: CPT | Performed by: RADIOLOGY

## 2021-04-13 ENCOUNTER — TRANSCRIBE ORDERS (OUTPATIENT)
Dept: ADMINISTRATIVE | Facility: HOSPITAL | Age: 81
End: 2021-04-13

## 2021-04-13 DIAGNOSIS — Z11.59 SCREENING FOR VIRAL DISEASE: Primary | ICD-10-CM

## 2021-04-14 ENCOUNTER — PRE-ADMISSION TESTING (OUTPATIENT)
Dept: PREADMISSION TESTING | Facility: HOSPITAL | Age: 81
End: 2021-04-14

## 2021-04-14 VITALS
RESPIRATION RATE: 20 BRPM | DIASTOLIC BLOOD PRESSURE: 71 MMHG | SYSTOLIC BLOOD PRESSURE: 172 MMHG | HEIGHT: 69 IN | OXYGEN SATURATION: 100 % | WEIGHT: 196.21 LBS | BODY MASS INDEX: 29.06 KG/M2 | HEART RATE: 87 BPM

## 2021-04-14 LAB
ALBUMIN SERPL-MCNC: 4.3 G/DL (ref 3.5–5.2)
ALBUMIN/GLOB SERPL: 1 G/DL
ALP SERPL-CCNC: 96 U/L (ref 39–117)
ALT SERPL W P-5'-P-CCNC: 13 U/L (ref 1–41)
ANION GAP SERPL CALCULATED.3IONS-SCNC: 12 MMOL/L (ref 5–15)
AST SERPL-CCNC: 19 U/L (ref 1–40)
BASOPHILS # BLD AUTO: 0.08 10*3/MM3 (ref 0–0.2)
BASOPHILS NFR BLD AUTO: 1 % (ref 0–1.5)
BILIRUB SERPL-MCNC: 0.2 MG/DL (ref 0–1.2)
BUN SERPL-MCNC: 25 MG/DL (ref 8–23)
BUN/CREAT SERPL: 21.2 (ref 7–25)
CALCIUM SPEC-SCNC: 9.9 MG/DL (ref 8.6–10.5)
CHLORIDE SERPL-SCNC: 95 MMOL/L (ref 98–107)
CO2 SERPL-SCNC: 30 MMOL/L (ref 22–29)
CREAT SERPL-MCNC: 1.18 MG/DL (ref 0.76–1.27)
DEPRECATED RDW RBC AUTO: 45 FL (ref 37–54)
EOSINOPHIL # BLD AUTO: 0.29 10*3/MM3 (ref 0–0.4)
EOSINOPHIL NFR BLD AUTO: 3.6 % (ref 0.3–6.2)
ERYTHROCYTE [DISTWIDTH] IN BLOOD BY AUTOMATED COUNT: 13.8 % (ref 12.3–15.4)
GFR SERPL CREATININE-BSD FRML MDRD: 59 ML/MIN/1.73
GLOBULIN UR ELPH-MCNC: 4.2 GM/DL
GLUCOSE SERPL-MCNC: 152 MG/DL (ref 65–99)
HCT VFR BLD AUTO: 35.9 % (ref 37.5–51)
HGB BLD-MCNC: 11.6 G/DL (ref 13–17.7)
IMM GRANULOCYTES # BLD AUTO: 0.03 10*3/MM3 (ref 0–0.05)
IMM GRANULOCYTES NFR BLD AUTO: 0.4 % (ref 0–0.5)
LYMPHOCYTES # BLD AUTO: 2.63 10*3/MM3 (ref 0.7–3.1)
LYMPHOCYTES NFR BLD AUTO: 32.4 % (ref 19.6–45.3)
MCH RBC QN AUTO: 28.7 PG (ref 26.6–33)
MCHC RBC AUTO-ENTMCNC: 32.3 G/DL (ref 31.5–35.7)
MCV RBC AUTO: 88.9 FL (ref 79–97)
MONOCYTES # BLD AUTO: 0.45 10*3/MM3 (ref 0.1–0.9)
MONOCYTES NFR BLD AUTO: 5.5 % (ref 5–12)
NEUTROPHILS NFR BLD AUTO: 4.64 10*3/MM3 (ref 1.7–7)
NEUTROPHILS NFR BLD AUTO: 57.1 % (ref 42.7–76)
NRBC BLD AUTO-RTO: 0 /100 WBC (ref 0–0.2)
PLATELET # BLD AUTO: 294 10*3/MM3 (ref 140–450)
PMV BLD AUTO: 10.7 FL (ref 6–12)
POTASSIUM SERPL-SCNC: 4.7 MMOL/L (ref 3.5–5.2)
PROT SERPL-MCNC: 8.5 G/DL (ref 6–8.5)
RBC # BLD AUTO: 4.04 10*6/MM3 (ref 4.14–5.8)
SODIUM SERPL-SCNC: 137 MMOL/L (ref 136–145)
WBC # BLD AUTO: 8.12 10*3/MM3 (ref 3.4–10.8)

## 2021-04-14 PROCEDURE — 85025 COMPLETE CBC W/AUTO DIFF WBC: CPT

## 2021-04-14 PROCEDURE — 80053 COMPREHEN METABOLIC PANEL: CPT

## 2021-04-14 PROCEDURE — 36415 COLL VENOUS BLD VENIPUNCTURE: CPT

## 2021-04-14 NOTE — DISCHARGE INSTRUCTIONS
DAY OF SURGERY INSTRUCTIONS        YOUR SURGEON: Dr Henson    PROCEDURE: Single Lumen Port Placement    DATE OF SURGERY: April 20    ARRIVAL TIME: AS DIRECTED BY OFFICE     TAKE METOPROLOL AND PROPRANOLOL THE FOLLOWING MEDICATION(S) THE MORNING OF SURGERY WITH A SIP OF WATER:       ALL OTHER HOME MEDICATION CHECK WITH YOUR PHYSICIAN      DO NOT TAKE ANY ERECTILE DYSFUNCTION MEDICATIONS (EX: CIALIS, VIAGRA)  OR LOSARTAN 24 HOURS PRIOR TO SURGERY                      MANAGING PAIN AFTER SURGERY    We know you are probably wondering what your pain will be like after surgery.  Following surgery it is unrealistic to expect you will not have pain.   Pain is how our bodies let us know that something is wrong or cautions us to be careful.  That said, our goal is to make your pain tolerable.    Methods we may use to treat your pain include (oral or IV medications, PCAs, epidurals, nerve blocks, etc.)   While some procedures require IV pain medications for a short time after surgery, transitioning to pain medications by mouth allows for better management of pain.   Your nurse will encourage you to take oral pain medications whenever possible.  IV medications work almost immediately, but only last a short while.  Taking medications by mouth allows for a more constant level of medication in your blood stream for a longer period of time.      Once your pain is out of control it is harder to get back under control.  It is important you are aware when your next dose of pain medication is due.  If you are admitted, your nurse may write the time of your next dose on the white board in your room to help you remember.      We are interested in your pain and encourage you to inform us about aggravating factors during your visit.   Many times a simple repositioning every few hours can make a big difference.    If your physician says it is okay, do not let your pain prevent you from getting out of bed. Be sure to call your nurse for  assistance prior to getting up so you do not fall.      Before surgery, please decide your tolerable pain goal.  These faces help describe the pain ratings we use on a 0-10 scale.   Be prepared to tell us your goal and whether or not you take pain or anxiety medications at home.          BEFORE YOU COME TO THE HOSPITAL  (Pre-op instructions)  • Do not eat, drink, smoke or chew gum after midnight the night before surgery.  This also includes no mints.  • Morning of surgery take only the medicines you have been instructed with a sip of water unless otherwise instructed  by your physician.  • Do not shave, wear makeup or dark nail polish.  • Remove all jewelry including rings.  • Leave anything you consider valuable at home.  • Leave your suitcase in the car until after your surgery.  • Bring the following with you if applicable:  o Picture ID and insurance, Medicare or Medicaid cards  o Co-pay/deductible required by insurance (cash, check, credit card)  o Copy of advance directive, living will or power-of- documents if not brought to PAT  o CPAP or BIPAP mask and tubing  o Relaxation aids ( book, magazine), etc.  o Hearing aids                        ON THE DAY OF SURGERY  · On the day of surgery check in at registration located at the main entrance of the hospital.   ? You will be registered and given a beeper with instructions where to wait in the main lobby.  ? When your beeper lights up and vibrates a member of the Outpatient Surgery staff will meet you at the double doors under the stair steps and escort you to your preoperative room.   · You may have cloth compression devices placed on your legs. These help to prevent blood clots and reduce swelling in your legs.  · An IV may be inserted into one of your veins.  · In the operating room, you may be given one or more of the following:  ? A medicine to help you relax (sedative).  ? A medicine to numb the area (local anesthetic).  ? A medicine to make you  "fall asleep (general anesthetic).  ? A medicine that is injected into an area of your body to numb everything below the injection site (regional anesthetic).  · Your surgical site will be marked or identified.  · You may be given an antibiotic through your IV to help prevent infection.  Contact a health care provider if you:  · Develop a fever of more than 100.4°F (38°C) or other feelings of illness during the 48 hours before your surgery.  · Have symptoms that get worse.  Have questions or concerns about your surgery    General Anesthesia/Surgery, Adult  General anesthesia is the use of medicines to make a person \"go to sleep\" (unconscious) for a medical procedure. General anesthesia must be used for certain procedures, and is often recommended for procedures that:  · Last a long time.  · Require you to be still or in an unusual position.  · Are major and can cause blood loss.  The medicines used for general anesthesia are called general anesthetics. As well as making you unconscious for a certain amount of time, these medicines:  · Prevent pain.  · Control your blood pressure.  · Relax your muscles.  Tell a health care provider about:  · Any allergies you have.  · All medicines you are taking, including vitamins, herbs, eye drops, creams, and over-the-counter medicines.  · Any problems you or family members have had with anesthetic medicines.  · Types of anesthetics you have had in the past.  · Any blood disorders you have.  · Any surgeries you have had.  · Any medical conditions you have.  · Any recent upper respiratory, chest, or ear infections.  · Any history of:  ? Heart or lung conditions, such as heart failure, sleep apnea, asthma, or chronic obstructive pulmonary disease (COPD).  ?  service.  ? Depression or anxiety.  · Any tobacco or drug use, including marijuana or alcohol use.  · Whether you are pregnant or may be pregnant.  What are the risks?  Generally, this is a safe procedure. However, " problems may occur, including:  · Allergic reaction.  · Lung and heart problems.  · Inhaling food or liquid from the stomach into the lungs (aspiration).  · Nerve injury.  · Air in the bloodstream, which can lead to stroke.  · Extreme agitation or confusion (delirium) when you wake up from the anesthetic.  · Waking up during your procedure and being unable to move. This is rare.  These problems are more likely to develop if you are having a major surgery or if you have an advanced or serious medical condition. You can prevent some of these complications by answering all of your health care provider's questions thoroughly and by following all instructions before your procedure.  General anesthesia can cause side effects, including:  · Nausea or vomiting.  · A sore throat from the breathing tube.  · Hoarseness.  · Wheezing or coughing.  · Shaking chills.  · Tiredness.  · Body aches.  · Anxiety.  · Sleepiness or drowsiness.  · Confusion or agitation.  RISKS AND COMPLICATIONS OF SURGERY  Your health care provider will discuss possible risks and complications with you before surgery. Common risks and complications include:    · Problems due to the use of anesthetics.  · Blood loss and replacement (does not apply to minor surgical procedures).  · Temporary increase in pain due to surgery.  · Uncorrected pain or problems that the surgery was meant to correct.  · Infection.  · New damage.    What happens before the procedure?    Medicines  Ask your health care provider about:  · Changing or stopping your regular medicines. This is especially important if you are taking diabetes medicines or blood thinners.  · Taking medicines such as aspirin and ibuprofen. These medicines can thin your blood. Do not take these medicines unless your health care provider tells you to take them.  · Taking over-the-counter medicines, vitamins, herbs, and supplements. Do not take these during the week before your procedure unless your health  care provider approves them.  General instructions  · Starting 3-6 weeks before the procedure, do not use any products that contain nicotine or tobacco, such as cigarettes and e-cigarettes. If you need help quitting, ask your health care provider.  · If you brush your teeth on the morning of the procedure, make sure to spit out all of the toothpaste.  · Tell your health care provider if you become ill or develop a cold, cough, or fever.  · If instructed by your health care provider, bring your sleep apnea device with you on the day of your surgery (if applicable).  · Ask your health care provider if you will be going home the same day, the following day, or after a longer hospital stay.  ? Plan to have someone take you home from the hospital or clinic.  ? Plan to have a responsible adult care for you for at least 24 hours after you leave the hospital or clinic. This is important.  What happens during the procedure?  · You will be given anesthetics through both of the following:  ? A mask placed over your nose and mouth.  ? An IV in one of your veins.  · You may receive a medicine to help you relax (sedative).  · After you are unconscious, a breathing tube may be inserted down your throat to help you breathe. This will be removed before you wake up.  · An anesthesia specialist will stay with you throughout your procedure. He or she will:  ? Keep you comfortable and safe by continuing to give you medicines and adjusting the amount of medicine that you get.  ? Monitor your blood pressure, pulse, and oxygen levels to make sure that the anesthetics do not cause any problems.  The procedure may vary among health care providers and hospitals.  What happens after the procedure?  · Your blood pressure, temperature, heart rate, breathing rate, and blood oxygen level will be monitored until the medicines you were given have worn off.  · You will wake up in a recovery area. You may wake up slowly.  · If you feel anxious or  agitated, you may be given medicine to help you calm down.  · If you will be going home the same day, your health care provider may check to make sure you can walk, drink, and urinate.  · Your health care provider will treat any pain or side effects you have before you go home.  · Do not drive for 24 hours if you were given a sedative.  Summary  · General anesthesia is used to keep you still and prevent pain during a procedure.  · It is important to tell your healthcare provider about your medical history and any surgeries you have had, and previous experience with anesthesia.  · Follow your healthcare provider’s instructions about when to stop eating, drinking, or taking certain medicines before your procedure.  · Plan to have someone take you home from the hospital or clinic.  This information is not intended to replace advice given to you by your health care provider. Make sure you discuss any questions you have with your health care provider.  Document Released: 03/26/2009 Document Revised: 08/03/2018 Document Reviewed: 08/03/2018  [employer] Interactive Patient Education © 2019 [employer]       Fall Prevention in Hospitals, Adult  As a hospital patient, your condition and the treatments you receive can increase your risk for falls. Some additional risk factors for falls in a hospital include:  · Being in an unfamiliar environment.  · Being on bed rest.  · Your surgery.  · Taking certain medicines.  · Your tubing requirements, such as intravenous (IV) therapy or catheters.  It is important that you learn how to decrease fall risks while at the hospital. Below are important tips that can help prevent falls.  SAFETY TIPS FOR PREVENTING FALLS  Talk about your risk of falling.  · Ask your health care provider why you are at risk for falling. Is it your medicine, illness, tubing placement, or something else?  · Make a plan with your health care provider to keep you safe from falls.  · Ask your health care provider  or pharmacist about side effects of your medicines. Some medicines can make you dizzy or affect your coordination.  Ask for help.  · Ask for help before getting out of bed. You may need to press your call button.  · Ask for assistance in getting safely to the toilet.  · Ask for a walker or cane to be put at your bedside. Ask that most of the side rails on your bed be placed up before your health care provider leaves the room.  · Ask family or friends to sit with you.  · Ask for things that are out of your reach, such as your glasses, hearing aids, telephone, bedside table, or call button.  Follow these tips to avoid falling:  · Stay lying or seated, rather than standing, while waiting for help.  · Wear rubber-soled slippers or shoes whenever you walk in the hospital.  · Avoid quick, sudden movements.  ¨ Change positions slowly.  ¨ Sit on the side of your bed before standing.  ¨ Stand up slowly and wait before you start to walk.  · Let your health care provider know if there is a spill on the floor.  · Pay careful attention to the medical equipment, electrical cords, and tubes around you.  · When you need help, use your call button by your bed or in the bathroom. Wait for one of your health care providers to help you.  · If you feel dizzy or unsure of your footing, return to bed and wait for assistance.  · Avoid being distracted by the TV, telephone, or another person in your room.  · Do not lean or support yourself on rolling objects, such as IV poles or bedside tables.     This information is not intended to replace advice given to you by your health care provider. Make sure you discuss any questions you have with your health care provider.     Document Released: 12/15/2001 Document Revised: 01/08/2016 Document Reviewed: 08/25/2013  [employer] Interactive Patient Education ©2016 [employer]       [facility]  CHG 4% Patient Instruction Sheet    Chlorhexidine Before Surgery  Chlorhexidine gluconate (CHG)  is a germ-killing (antiseptic) solution that is used to clean the skin. It gets rid of the bacteria that normally live on the skin. Cleaning your skin with CHG before surgery helps lower the risk for infection after surgery.    How to use CHG solution  · You will take 2 showers, one shower the night before surgery, the second shower the morning of surgery before coming to the hospital.  · Use CHG only as told by your health care provider, and follow the instructions on the label.  · Use CHG solution while taking a shower. Follow these steps when using CHG solution (unless your health care provider gives you different instructions):  1. Start the shower.  2. Use your normal soap and shampoo to wash your face and hair.  3. Turn off the shower or move out of the shower stream.  4. Pour the CHG onto a clean washcloth. Do not use any type of brush or rough-edged sponge.  5. Starting at your neck, lather your body down to your toes. Make sure you:  6. Pay special attention to the part of your body where you will be having surgery. Scrub this area for at least 1 minute.  7. Use the full amount of CHG as directed. Usually, this is one half bottle for each shower.  8. Do not use CHG on your head or face. If the solution gets into your ears or eyes, rinse them well with water.  9. Avoid your genital area.  10. Avoid any areas of skin that have broken skin, cuts, or scrapes.  11. Scrub your back and under your arms. Make sure to wash skin folds.  12. Let the lather sit on your skin for 1-2 minutes or as long as told by your health care  provider.  13. Thoroughly rinse your entire body in the shower. Make sure that all body creases and crevices are rinsed well.  14. Dry off with a clean towel. Do not put any substances on your body afterward, such as powder, lotion, or perfume.  15. Put on clean clothes or pajamas.  16. If it is the night before your surgery, sleep in clean sheets.    What are the risks?  Risks of using CHG  include:  · A skin reaction.  · Hearing loss, if CHG gets in your ears.  · Eye injury, if CHG gets in your eyes and is not rinsed out.  · The CHG product catching fire.  Make sure that you avoid smoking and flames after applying CHG to your skin.  Do not use CHG:  · If you have a chlorhexidine allergy or have previously reacted to chlorhexidine.  · On babies younger than 2 months of age.      On the day of surgery, when you are taken to your room in Outpatient Surgery you will be given a CHG prepackaged cloth to wipe the site for your surgery.  How to use CHG prepackaged cloths  · Follow the instructions on the label.  · Use the CHG cloth on clean, dry skin. Follow these steps when using a CHG cloth (unless your health care provider gives you different instructions):  1. Using the CHG cloth, vigorously scrub the part of your body where you will be having surgery. Scrub using a back-and-forth motion for 3 minutes. The area on your body should be completely wet with CHG when you are finished scrubbing.  2. Do not rinse. Discard the cloth and let the area air-dry for 1 minute. Do not put any substances on your body afterward, such as powder, lotion, or perfume.  Contact a health care provider if:  · Your skin gets irritated after scrubbing.  · You have questions about using your solution or cloth.  Get help right away if:  · Your eyes become very red or swollen.  · Your eyes itch badly.  · Your skin itches badly and is red or swollen.  · Your hearing changes.  · You have trouble seeing.  · You have swelling or tingling in your mouth or throat.  · You have trouble breathing.  · You swallow any chlorhexidine.  Summary  · Chlorhexidine gluconate (CHG) is a germ-killing (antiseptic) solution that is used to clean the skin. Cleaning your skin with CHG before surgery helps lower the risk for infection after surgery.  · You may be given CHG to use at home. It may be in a bottle or in a prepackaged cloth to use on your skin.  Carefully follow your health care provider's instructions and the instructions on the product label.  · Do not use CHG if you have a chlorhexidine allergy.  · Contact your health care provider if your skin gets irritated after scrubbing.  This information is not intended to replace advice given to you by your health care provider. Make sure you discuss any questions you have with your health care provider.  Document Released: 09/11/2013 Document Revised: 11/15/2018 Document Reviewed: 11/15/2018  [employer] Interactive Patient Education © 2019 [employer]

## 2021-04-15 ENCOUNTER — HOSPITAL ENCOUNTER (OUTPATIENT)
Dept: NON INVASIVE DIAGNOSTICS | Age: 81
Discharge: HOME | End: 2021-04-15
Payer: MEDICARE

## 2021-04-15 ENCOUNTER — APPOINTMENT (OUTPATIENT)
Dept: MRI IMAGING | Facility: HOSPITAL | Age: 81
End: 2021-04-15

## 2021-04-15 ENCOUNTER — HOSPITAL ENCOUNTER (OUTPATIENT)
Dept: RADIATION ONCOLOGY | Facility: HOSPITAL | Age: 81
Setting detail: RADIATION/ONCOLOGY SERIES
Discharge: HOME | End: 2021-04-15

## 2021-04-15 DIAGNOSIS — C34.91 ADENOCARCINOMA OF RIGHT LUNG (HCC): ICD-10-CM

## 2021-04-15 DIAGNOSIS — I25.119 CORONARY ARTERY DISEASE INVOLVING NATIVE HEART WITH ANGINA PECTORIS, UNSPECIFIED VESSEL OR LESION TYPE (HCC): ICD-10-CM

## 2021-04-15 DIAGNOSIS — I25.2 HISTORY OF HEART ATTACK: ICD-10-CM

## 2021-04-15 LAB
LV EF: 43 %
LVEF MODALITY: NORMAL

## 2021-04-15 PROCEDURE — 77386: CPT | Performed by: RADIOLOGY

## 2021-04-15 PROCEDURE — 93306 TTE W/DOPPLER COMPLETE: CPT

## 2021-04-16 ENCOUNTER — APPOINTMENT (OUTPATIENT)
Dept: RADIATION ONCOLOGY | Facility: HOSPITAL | Age: 81
End: 2021-04-16

## 2021-04-16 ENCOUNTER — LAB (OUTPATIENT)
Dept: LAB | Facility: HOSPITAL | Age: 81
End: 2021-04-16

## 2021-04-16 LAB
FUNGUS WND CULT: ABNORMAL
FUNGUS WND CULT: ABNORMAL
FUNGUS WND CULT: NORMAL
MYCOBACTERIUM SPEC CULT: NORMAL
NIGHT BLUE STAIN TISS: NORMAL
NIGHT BLUE STAIN TISS: NORMAL
SARS-COV-2 ORF1AB RESP QL NAA+PROBE: NOT DETECTED

## 2021-04-16 PROCEDURE — U0005 INFEC AGEN DETEC AMPLI PROBE: HCPCS | Performed by: SPECIALIST

## 2021-04-16 PROCEDURE — U0004 COV-19 TEST NON-CDC HGH THRU: HCPCS | Performed by: SPECIALIST

## 2021-04-16 PROCEDURE — C9803 HOPD COVID-19 SPEC COLLECT: HCPCS | Performed by: SPECIALIST

## 2021-04-17 ENCOUNTER — HOSPITAL ENCOUNTER (OUTPATIENT)
Dept: MRI IMAGING | Facility: HOSPITAL | Age: 81
Discharge: HOME | End: 2021-04-17
Admitting: INTERNAL MEDICINE

## 2021-04-17 DIAGNOSIS — C34.91 STAGE I ADENOCARCINOMA OF LUNG, RIGHT (HCC): ICD-10-CM

## 2021-04-17 PROCEDURE — A9577 INJ MULTIHANCE: HCPCS | Performed by: INTERNAL MEDICINE

## 2021-04-17 PROCEDURE — 70553 MRI BRAIN STEM W/O & W/DYE: CPT

## 2021-04-17 PROCEDURE — 0 GADOBENATE DIMEGLUMINE 529 MG/ML SOLUTION: Performed by: INTERNAL MEDICINE

## 2021-04-17 RX ADMIN — GADOBENATE DIMEGLUMINE 17 ML: 529 INJECTION, SOLUTION INTRAVENOUS at 14:20

## 2021-04-19 ENCOUNTER — ANESTHESIA EVENT (OUTPATIENT)
Dept: PERIOP | Facility: HOSPITAL | Age: 81
End: 2021-04-19

## 2021-04-19 ENCOUNTER — ANESTHESIA (OUTPATIENT)
Dept: PERIOP | Facility: HOSPITAL | Age: 81
End: 2021-04-19

## 2021-04-19 ENCOUNTER — APPOINTMENT (OUTPATIENT)
Dept: GENERAL RADIOLOGY | Facility: HOSPITAL | Age: 81
End: 2021-04-19

## 2021-04-19 ENCOUNTER — HOSPITAL ENCOUNTER (OUTPATIENT)
Facility: HOSPITAL | Age: 81
Setting detail: HOSPITAL OUTPATIENT SURGERY
Discharge: HOME | End: 2021-04-19
Attending: SPECIALIST | Admitting: SPECIALIST

## 2021-04-19 VITALS
SYSTOLIC BLOOD PRESSURE: 175 MMHG | HEART RATE: 66 BPM | OXYGEN SATURATION: 100 % | DIASTOLIC BLOOD PRESSURE: 74 MMHG | RESPIRATION RATE: 22 BRPM | TEMPERATURE: 96.7 F

## 2021-04-19 DIAGNOSIS — C34.91 ADENOCARCINOMA OF RIGHT LUNG (HCC): Primary | ICD-10-CM

## 2021-04-19 LAB — GLUCOSE BLDC GLUCOMTR-MCNC: 180 MG/DL (ref 70–130)

## 2021-04-19 PROCEDURE — 25010000003 LIDOCAINE 1 % SOLUTION: Performed by: SPECIALIST

## 2021-04-19 PROCEDURE — 25010000002 FENTANYL CITRATE (PF) 100 MCG/2ML SOLUTION: Performed by: NURSE ANESTHETIST, CERTIFIED REGISTERED

## 2021-04-19 PROCEDURE — C1788 PORT, INDWELLING, IMP: HCPCS | Performed by: SPECIALIST

## 2021-04-19 PROCEDURE — 71045 X-RAY EXAM CHEST 1 VIEW: CPT

## 2021-04-19 PROCEDURE — 82962 GLUCOSE BLOOD TEST: CPT

## 2021-04-19 PROCEDURE — 25010000002 ONDANSETRON PER 1 MG: Performed by: NURSE ANESTHETIST, CERTIFIED REGISTERED

## 2021-04-19 PROCEDURE — 76000 FLUOROSCOPY <1 HR PHYS/QHP: CPT

## 2021-04-19 PROCEDURE — 25010000002 PROPOFOL 10 MG/ML EMULSION: Performed by: NURSE ANESTHETIST, CERTIFIED REGISTERED

## 2021-04-19 PROCEDURE — 25010000002 VANCOMYCIN 1 G RECONSTITUTED SOLUTION 1 EACH VIAL: Performed by: SPECIALIST

## 2021-04-19 PROCEDURE — 25010000002 HEPARIN LOCK FLUSH PER 10 UNITS: Performed by: SPECIALIST

## 2021-04-19 DEVICE — POWERPORT M.R.I. IMPLANTABLE PORT WITH ATTACHABLE 9.6F OPEN-ENDED SINGLE-LUMEN VENOUS CATHETER INTERMEDIATE KIT (WITH SUTURE PLUGS)
Type: IMPLANTABLE DEVICE | Site: CHEST | Status: FUNCTIONAL
Brand: POWERPORT M.R.I.

## 2021-04-19 RX ORDER — HYDROCODONE BITARTRATE AND ACETAMINOPHEN 7.5; 325 MG/1; MG/1
1 TABLET ORAL EVERY 4 HOURS PRN
Qty: 15 TABLET | Refills: 0 | Status: SHIPPED | OUTPATIENT
Start: 2021-04-19

## 2021-04-19 RX ORDER — SODIUM CHLORIDE 0.9 % (FLUSH) 0.9 %
3 SYRINGE (ML) INJECTION AS NEEDED
Status: DISCONTINUED | OUTPATIENT
Start: 2021-04-19 | End: 2021-04-19 | Stop reason: HOSPADM

## 2021-04-19 RX ORDER — LIDOCAINE HYDROCHLORIDE 10 MG/ML
INJECTION, SOLUTION INFILTRATION; PERINEURAL AS NEEDED
Status: DISCONTINUED | OUTPATIENT
Start: 2021-04-19 | End: 2021-04-19 | Stop reason: HOSPADM

## 2021-04-19 RX ORDER — PROPOFOL 10 MG/ML
VIAL (ML) INTRAVENOUS AS NEEDED
Status: DISCONTINUED | OUTPATIENT
Start: 2021-04-19 | End: 2021-04-19 | Stop reason: SURG

## 2021-04-19 RX ORDER — HEPARIN SODIUM (PORCINE) LOCK FLUSH IV SOLN 100 UNIT/ML 100 UNIT/ML
SOLUTION INTRAVENOUS AS NEEDED
Status: DISCONTINUED | OUTPATIENT
Start: 2021-04-19 | End: 2021-04-19 | Stop reason: HOSPADM

## 2021-04-19 RX ORDER — ONDANSETRON 2 MG/ML
4 INJECTION INTRAMUSCULAR; INTRAVENOUS ONCE AS NEEDED
Status: DISCONTINUED | OUTPATIENT
Start: 2021-04-19 | End: 2021-04-19 | Stop reason: HOSPADM

## 2021-04-19 RX ORDER — LIDOCAINE HYDROCHLORIDE 10 MG/ML
0.5 INJECTION, SOLUTION EPIDURAL; INFILTRATION; INTRACAUDAL; PERINEURAL ONCE AS NEEDED
Status: DISCONTINUED | OUTPATIENT
Start: 2021-04-19 | End: 2021-04-19 | Stop reason: HOSPADM

## 2021-04-19 RX ORDER — PROMETHAZINE HYDROCHLORIDE 25 MG/1
12.5 TABLET ORAL ONCE AS NEEDED
Status: DISCONTINUED | OUTPATIENT
Start: 2021-04-19 | End: 2021-04-19 | Stop reason: HOSPADM

## 2021-04-19 RX ORDER — ONDANSETRON 2 MG/ML
INJECTION INTRAMUSCULAR; INTRAVENOUS AS NEEDED
Status: DISCONTINUED | OUTPATIENT
Start: 2021-04-19 | End: 2021-04-19 | Stop reason: SURG

## 2021-04-19 RX ORDER — LIDOCAINE HYDROCHLORIDE 20 MG/ML
INJECTION, SOLUTION EPIDURAL; INFILTRATION; INTRACAUDAL; PERINEURAL AS NEEDED
Status: DISCONTINUED | OUTPATIENT
Start: 2021-04-19 | End: 2021-04-19 | Stop reason: SURG

## 2021-04-19 RX ORDER — ONDANSETRON 4 MG/1
4 TABLET, FILM COATED ORAL ONCE AS NEEDED
Status: DISCONTINUED | OUTPATIENT
Start: 2021-04-19 | End: 2021-04-19 | Stop reason: HOSPADM

## 2021-04-19 RX ORDER — FENTANYL CITRATE 50 UG/ML
INJECTION, SOLUTION INTRAMUSCULAR; INTRAVENOUS AS NEEDED
Status: DISCONTINUED | OUTPATIENT
Start: 2021-04-19 | End: 2021-04-19 | Stop reason: SURG

## 2021-04-19 RX ORDER — HYDROMORPHONE HYDROCHLORIDE 1 MG/ML
0.5 INJECTION, SOLUTION INTRAMUSCULAR; INTRAVENOUS; SUBCUTANEOUS
Status: DISCONTINUED | OUTPATIENT
Start: 2021-04-19 | End: 2021-04-19 | Stop reason: HOSPADM

## 2021-04-19 RX ORDER — SODIUM CHLORIDE, SODIUM LACTATE, POTASSIUM CHLORIDE, CALCIUM CHLORIDE 600; 310; 30; 20 MG/100ML; MG/100ML; MG/100ML; MG/100ML
1000 INJECTION, SOLUTION INTRAVENOUS CONTINUOUS
Status: DISCONTINUED | OUTPATIENT
Start: 2021-04-19 | End: 2021-04-19 | Stop reason: HOSPADM

## 2021-04-19 RX ADMIN — FENTANYL CITRATE 25 MCG: 50 INJECTION, SOLUTION INTRAMUSCULAR; INTRAVENOUS at 13:10

## 2021-04-19 RX ADMIN — CEFAZOLIN 1 G: 1 INJECTION, POWDER, FOR SOLUTION INTRAMUSCULAR; INTRAVENOUS; PARENTERAL at 13:20

## 2021-04-19 RX ADMIN — FENTANYL CITRATE 25 MCG: 50 INJECTION, SOLUTION INTRAMUSCULAR; INTRAVENOUS at 13:27

## 2021-04-19 RX ADMIN — LIDOCAINE HYDROCHLORIDE 100 MG: 20 INJECTION, SOLUTION EPIDURAL; INFILTRATION; INTRACAUDAL; PERINEURAL at 13:12

## 2021-04-19 RX ADMIN — ONDANSETRON HYDROCHLORIDE 4 MG: 2 SOLUTION INTRAMUSCULAR; INTRAVENOUS at 13:26

## 2021-04-19 RX ADMIN — PROPOFOL 75 MCG/KG/MIN: 10 INJECTION, EMULSION INTRAVENOUS at 13:12

## 2021-04-19 RX ADMIN — PROPOFOL 30 MG: 10 INJECTION, EMULSION INTRAVENOUS at 13:12

## 2021-04-19 RX ADMIN — SODIUM CHLORIDE, POTASSIUM CHLORIDE, SODIUM LACTATE AND CALCIUM CHLORIDE 1000 ML: 600; 310; 30; 20 INJECTION, SOLUTION INTRAVENOUS at 11:34

## 2021-04-19 NOTE — OP NOTE
INSERTION VENOUS ACCESS DEVICE  Procedure Note    Mina Ratliff  4/19/2021    Pre-op Diagnosis:   LUNG CANCER    Post-op Diagnosis:     same    Procedure/CPT® Codes:      Procedure(s):  SINGLE LUMEN PORT PLACEMENT    Surgeon(s):  Eve Henson MD    Anesthesia: Monitored Anesthesia Care    Staff:   Circulator: Roxann Gonzales RN  Scrub Person: Marleni Madden; Duglas Elizabeth    Estimated Blood Loss: minimal    Specimens: none                     Access site: Right subclavian vein        Complications: none          Date: 4/19/2021  Time: 13:55 CDT  The patient was brought to the operating room and placed in the supine position. After IV sedation and infusion of IV antibiotics, the patient was prepped and draped in the usual sterile fashion. Lidocaine 1% was used for local anesthesia.  We first tried on the left side.  The vein was accessed and the wire passed.  Initially this went up into the left internal jugular so attempting to get this to pass into the superior vena cava were unsuccessful.  I accessed the vein multiple times but could never get it to go into the superior vena cava.  We aborted the left side and then went to the right side.  The vein was accessed, the wire passed. Fluoroscopy revealed it to be in good position. The pocket was incised, developed. The catheter was tunneled, cut to size, secured to the port, and the port sutured in place with 0 Prolene. Sheath passed, catheter was fed. Fluoroscopy revealed it to be in good position. Good flush and flow obtained. The wound was closed with 3-0 and 4-0 Vicryl sutures. Dressing was placed. The patient was awakened and transferred to the recovery room in stable condition, tolerated the procedure well. At the end of the procedure, all counts were correct.       Eve Henson MD

## 2021-04-19 NOTE — ANESTHESIA POSTPROCEDURE EVALUATION
Patient: Mina Ratliff    Procedure Summary     Date: 04/19/21 Room / Location:  [facility] OR 06 /  [facility] OR    Anesthesia Start: 1310 Anesthesia Stop: 1400    Procedure: SINGLE LUMEN PORT PLACEMENT (N/A Chin to Nipples) Diagnosis: (LUNG CANCER)    Surgeons: Eve Henson MD Provider: Dante Lira CRNA    Anesthesia Type: MAC ASA Status: 3          Anesthesia Type: MAC    Vitals  Vitals Value Taken Time   /61 04/19/21 1415   Temp 96.7 °F (35.9 °C) 04/19/21 1400   Pulse 69 04/19/21 1417   Resp 22 04/19/21 1410   SpO2 99 % 04/19/21 1417   Vitals shown include unvalidated device data.        Post Anesthesia Care and Evaluation    Patient location during evaluation: PACU  Patient participation: complete - patient participated  Level of consciousness: awake and alert  Pain management: adequate  Airway patency: patent  Anesthetic complications: No anesthetic complications    Cardiovascular status: acceptable  Respiratory status: acceptable  Hydration status: acceptable    Comments: Blood pressure 154/64, pulse 69, temperature 96.7 °F (35.9 °C), temperature source Temporal, resp. rate 22, SpO2 97 %.    Pt discharged from PACU based on delilah score >8

## 2021-04-19 NOTE — DISCHARGE INSTRUCTIONS
YOUR NEXT PAIN MEDICATION IS DUE AT______________        Moderate Conscious Sedation, Adult, Care After  Refer to this sheet in the next few weeks. These instructions provide you with information on caring for yourself after your procedure. Your health care provider may also give you more specific instructions. Your treatment has been planned according to current medical practices, but problems sometimes occur. Call your health care provider if you have any problems or questions after your procedure.  WHAT TO EXPECT AFTER THE PROCEDURE    After your procedure:  · You may feel sleepy, clumsy, and have poor balance for several hours.  · Vomiting may occur if you eat too soon after the procedure.  HOME CARE INSTRUCTIONS  · Do not participate in any activities where you could become injured for at least 24 hours. Do not:  ¨ Drive.  ¨ Swim.  ¨ Ride a bicycle.  ¨ Operate heavy machinery.  ¨ Cook.  ¨ Use power tools.  ¨ Climb ladders.  ¨ Work from a high place.  · Do not make important decisions or sign legal documents until you are improved.  · If you vomit, drink water, juice, or soup when you can drink without vomiting. Make sure you have little or no nausea before eating solid foods.  · Only take over-the-counter or prescription medicines for pain, discomfort, or fever as directed by your health care provider.  · Make sure you and your family fully understand everything about the medicines given to you, including what side effects may occur.  · You should not drink alcohol, take sleeping pills, or take medicines that cause drowsiness for at least 24 hours.  · If you smoke, do not smoke without supervision.  · If you are feeling better, you may resume normal activities 24 hours after you were sedated.  · Keep all appointments with your health care provider.  SEEK MEDICAL CARE IF:  · Your skin is pale or bluish in color.  · You continue to feel nauseous or vomit.  · Your pain is getting worse and is not helped by  medicine.  · You have bleeding or swelling.  · You are still sleepy or feeling clumsy after 24 hours.  SEEK IMMEDIATE MEDICAL CARE IF:  · You develop a rash.  · You have difficulty breathing.  · You develop any type of allergic problem.  · You have a fever.  MAKE SURE YOU:  · Understand these instructions.  · Will watch your condition.  · Will get help right away if you are not doing well or get worse.     This information is not intended to replace advice given to you by your health care provider. Make sure you discuss any questions you have with your health care provider.     Document Released: 10/08/2014 Document Revised: 01/08/2016 Document Reviewed: 10/08/2014  [employer] Interactive Patient Education ©2016 [employer]         CALL YOUR PHYSICIAN IF YOU EXPERIENCE  INCREASED PAIN NOT HELPED BY YOUR PAIN MEDICATION.        Fall Prevention in the Home      Falls can cause injuries. They can happen to people of all ages. There are many things you can do to make your home safe and to help prevent falls.    WHAT CAN I DO ON THE OUTSIDE OF MY HOME?  · Regularly fix the edges of walkways and driveways and fix any cracks.  · Remove anything that might make you trip as you walk through a door, such as a raised step or threshold.  · Trim any bushes or trees on the path to your home.  · Use bright outdoor lighting.  · Clear any walking paths of anything that might make someone trip, such as rocks or tools.  · Regularly check to see if handrails are loose or broken. Make sure that both sides of any steps have handrails.  · Any raised decks and porches should have guardrails on the edges.  · Have any leaves, snow, or ice cleared regularly.  · Use sand or salt on walking paths during winter.  · Clean up any spills in your garage right away. This includes oil or grease spills.  WHAT CAN I DO IN THE BATHROOM?    · Use night lights.  · Install grab bars by the toilet and in the tub and shower. Do not use towel bars as grab  bars.  · Use non-skid mats or decals in the tub or shower.  · If you need to sit down in the shower, use a plastic, non-slip stool.  · Keep the floor dry. Clean up any water that spills on the floor as soon as it happens.  · Remove soap buildup in the tub or shower regularly.  · Attach bath mats securely with double-sided non-slip rug tape.  · Do not have throw rugs and other things on the floor that can make you trip.  WHAT CAN I DO IN THE BEDROOM?  · Use night lights.  · Make sure that you have a light by your bed that is easy to reach.  · Do not use any sheets or blankets that are too big for your bed. They should not hang down onto the floor.  · Have a firm chair that has side arms. You can use this for support while you get dressed.  · Do not have throw rugs and other things on the floor that can make you trip.  WHAT CAN I DO IN THE KITCHEN?  · Clean up any spills right away.  · Avoid walking on wet floors.  · Keep items that you use a lot in easy-to-reach places.  · If you need to reach something above you, use a strong step stool that has a grab bar.  · Keep electrical cords out of the way.  · Do not use floor polish or wax that makes floors slippery. If you must use wax, use non-skid floor wax.  · Do not have throw rugs and other things on the floor that can make you trip.  WHAT CAN I DO WITH MY STAIRS?  · Do not leave any items on the stairs.  · Make sure that there are handrails on both sides of the stairs and use them. Fix handrails that are broken or loose. Make sure that handrails are as long as the stairways.  · Check any carpeting to make sure that it is firmly attached to the stairs. Fix any carpet that is loose or worn.  · Avoid having throw rugs at the top or bottom of the stairs. If you do have throw rugs, attach them to the floor with carpet tape.  · Make sure that you have a light switch at the top of the stairs and the bottom of the stairs. If you do not have them, ask someone to add them for  you.  WHAT ELSE CAN I DO TO HELP PREVENT FALLS?  · Wear shoes that:  ¨ Do not have high heels.  ¨ Have rubber bottoms.  ¨ Are comfortable and fit you well.  ¨ Are closed at the toe. Do not wear sandals.  · If you use a stepladder:  ¨ Make sure that it is fully opened. Do not climb a closed stepladder.  ¨ Make sure that both sides of the stepladder are locked into place.  ¨ Ask someone to hold it for you, if possible.  · Clearly [place] and make sure that you can see:  ¨ Any grab bars or handrails.  ¨ First and last steps.  ¨ Where the edge of each step is.  · Use tools that help you move around (mobility aids) if they are needed. These include:  ¨ Canes.  ¨ Walkers.  ¨ Scooters.  ¨ Crutches.  · Turn on the lights when you go into a dark area. Replace any light bulbs as soon as they burn out.  · Set up your furniture so you have a clear path. Avoid moving your furniture around.  · If any of your floors are uneven, fix them.  · If there are any pets around you, be aware of where they are.  · Review your medicines with your doctor. Some medicines can make you feel dizzy. This can increase your chance of falling.  Ask your doctor what other things that you can do to help prevent falls.     This information is not intended to replace advice given to you by your health care provider. Make sure you discuss any questions you have with your health care provider.     Document Released: 10/14/2010 Document Revised: 05/03/2016 Document Reviewed: 01/22/2016  [employer]vier Interactive Patient Education ©2016 [employer]     PATIENT/FAMILY/RESPONSIBLE PARTY VERBALIZES UNDERSTANDING OF ABOVE EDUCATION.  COPY OF PAIN SCALE GIVEN AND REVIEWED WITH VERBALIZED UNDERSTANDING.

## 2021-04-19 NOTE — ANESTHESIA PREPROCEDURE EVALUATION
Anesthesia Evaluation     Patient summary reviewed and Nursing notes reviewed   no history of anesthetic complications:  NPO Solid Status: > 8 hours  NPO Liquid Status: > 8 hours           Airway   Mallampati: I  TM distance: >3 FB  Neck ROM: full  No difficulty expected  Dental      Pulmonary     breath sounds clear to auscultation  (+) lung cancer, COPD, asthma,    ROS comment: Right lung mass, hilar adenopathy  Cardiovascular   Exercise tolerance: poor (<4 METS)    Patient on routine beta blocker and Beta blocker given within 24 hours of surgery  Rhythm: regular  Rate: normal    (+) hypertension, past MI , CAD, hyperlipidemia,     ROS comment: 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[facility] (January 2011) 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

## 2021-04-19 NOTE — PROGRESS NOTES
MEDICAL ONCOLOGY PROGRESS NOTE    Pt Name: Cherylene Cumins  MRN: [id number]  YOB: 1940  Date of evaluation: 4/21/2021    HISTORY OF PRESENT ILLNESS:    The patient presents today for his first cycle of chemotherapy. He was seen by radiation oncology. I reviewed RT notes. They recommended concurrent chemoradiation. The patient was found to be severely hypertensive today. Unfortunately, he did not take his blood pressure medications morning. I reviewed his medication list and also contacted his primary care provider who will see the patient tomorrow. The patient also has heart failure and is followed by cardiology. He has not seen his cardiologist in a while. He was encouraged to follow-up with cardiology soon. Blood pressure was very high today in the clinic. He was given one-time dose of clonidine 0.1 mg stat. Diagnosis  · RUL adenocarcinoma, NSCLC, Dec 2020  · [id number], stage IIIA  · Systolic heart failure  · Uncontrolled hypertension  · Not a surgical candidate    Treatment Summary  · Recommend concurrent chemoradiation with carboplatin/taxol to be followed by immunotherapy    Hematology/Cancer History:  Gagan Shipman was first seen by me on 4/7/2021 referred by Dr. Marla Bolden, pulmonary [facility] [place] for findings of non-small cell lung cancer, adenocarcinoma type. He has several comorbidities including history of type 2 diabetes, hypertension COPD. History of smoking in the past.  Quit many years ago. History of coronary artery disease followed by cardiology biopsy. Last EF 70% in 2018. He was seen by his primary care provider in November 2020. He has complaint of chest pain. Chest x-ray showed a 4 cm mass in the right upper lung. · 12/1/20 CT chest [facility]): Large right upper lobe mass concerning for primary neoplasm. Enlarged right hilar lymph node concerning for metastatic disease.  Additional noncalcified pulmonary nodules bilaterally for which follow-up CT is recommended in adenocarcinoma. RUL transbronchial bipsy consistent with adenocarcinoma. FNA biopsy of several jasmin station negative for metastatic disease. · 4/7/2021he was first seen by me. · 4/15/21 2D echo: Normal left ventricular size with reduced global systolic function EF 47-73%. Mild concentric left ventricular hypertrophy with mild [grade 1] diastolic dysfunction. Normal left atrial size. Mild thickening of a poorly visualized aortic valve without evidence of stenosis or insufficiency. Mild thickening of a normally mobile mitral valve with mild regurgitation. Nonvisualization pulmonic valve. Poorly visualized but apparently normal size right-sided chambers with preserved right ventricular systolic function. No tricuspid regurgitation with which to estimate RVSP. No significant pericardial effusion. Aortic root and ascending segments measured within normal limits. · 4/17/21 MRI brain: No acute intracranial process. No metastatic disease identified in the CNS. · 4/21/2021initiation of carboplatin/Taxol weekly. Reviewed MRI brain 2D echo.     Past Medical History:    Past Medical History:   Diagnosis Date    Allergic rhinitis     Bronchitis, chronic (HCC)     Carotid artery stenosis     GERD (gastroesophageal reflux disease)     [facility] attack ([facility] [address]     Hemorrhoids     Hypercholesterolemia     Type II or unspecified type diabetes mellitus without mention of complication, not stated as uncontrolled        Past Surgical History:    Past Surgical History:   Procedure Laterality Date    CARDIAC SURGERY      balloon surgery (angioplasty)       Social History:    Marital status: , 2 daughters  Smoking status: Former smoker for ~30 years, 1.5 ppd  ETOH status: No  Resides: [place], [place]    Family History:   Family History   Problem Relation Age of Onset    Diabetes Mother     High Blood Pressure Mother     Cancer Father         Lung       Current Hospital Medications:    Current Outpatient Medications Medication Sig Dispense Refill    furosemide (LASIX) 40 MG tablet TAKE 1 TABLET DAILY FOR FLUID 90 tablet 3    metFORMIN (GLUCOPHAGE) 1000 MG tablet TAKE 1 TABLET TWICE A DAY FOR DIABETES 180 tablet 3    blood glucose test strips (TRUE METRIX BLOOD GLUCOSE TEST) strip TEST BLOOD SUGAR ONCE DAILY *E11.9* 100 strip 5    propranolol (INDERAL) 60 MG tablet Take 1 tablet by mouth 2 times daily 180 tablet 3    metoprolol tartrate (LOPRESSOR) 50 MG tablet TAKE 1 TABLET TWICE A DAY FOR BLOOD PRESSURE AND IRREGULAR HEARTBEAT (STOP PROPRANOLOL) 180 tablet 3    simvastatin (ZOCOR) 40 MG tablet TAKE 1 TABLET AT BEDTIME FOR CHOLESTEROL 90 tablet 3    omeprazole (PRILOSEC) 40 MG delayed release capsule TAKE 1 CAPSULE DAILY FOR STOMACH 90 capsule 3    isosorbide mononitrate (IMDUR) 60 MG extended release tablet Take 60 mg by mouth      losartan (COZAAR) 25 MG tablet Take 25 mg by mouth      umeclidinium-vilanterol (ANORO ELLIPTA) 62.5-25 MCG/INH AEPB inhaler Inhale 1 puff into the lungs daily      primidone (MYSOLINE) 50 MG tablet 1 tab po BID for tremor 180 tablet 3    metoprolol (TOPROL XL) 50 MG XL tablet 1 tablet by mouth once a day for high blood pressure 30 tablet 3    triamcinolone (KENALOG) 0.1 % cream Apply topically 2 times daily. To lower leg dry skin (Patient not taking: Reported on 4/7/2021) 3 Tube 3    tamsulosin (FLOMAX) 0.4 MG capsule Take 1 capsule by mouth daily. 90 capsule 3    aspirin 325 MG tablet Take 325 mg by mouth daily. No current facility-administered medications for this visit.       Facility-Administered Medications Ordered in Other Visits   Medication Dose Route Frequency Provider Last Rate Last Admin    0.9 % sodium chloride infusion  20 mL/hr Intravenous Once Jian Carl MD 20 mL/hr at 04/21/21 1052 20 mL/hr at 04/21/21 1052    PACLitaxel (TAXOL) 96 mg in sodium chloride 0.9 % 250 mL chemo infusion  45 mg/m2 (Treatment Plan Recorded) Intravenous Once Rafael Chacon MD Mellissa        CARBOplatin (PARAPLATIN) 184 mg in sodium chloride 0.9 % 250 mL chemo IVPB  184 mg Intravenous Once Trinidad Mcgowan MD        sodium chloride flush 0.9 % injection 5-40 mL  5-40 mL Intravenous PRN Trinidad Mcgowan MD        heparin flush 100 UNIT/ML injection 500 Units  500 Units Intracatheter PRN Trinidad Mcgowan MD           Allergies: No Known Allergies      Subjective   REVIEW OF SYSTEMS:   CONSTITUTIONAL: no fever, no night sweats, fatigue; weight loss  HEENT: no blurring of vision, no double vision, no hearing difficulty, no tinnitus, no ulceration, no dysplasia, no epistaxis; congestion  LUNGS: cough, no hemoptysis, no wheeze,  exertional shortness of breath;  CARDIOVASCULAR: no palpitation, no chest pain, exertional shortness of breath;  GI: no abdominal pain, no nausea, no vomiting, diarrhea, constipation;  JESSIKA: no dysuria, no hematuria, no frequency or urgency, no nephrolithiasis;  MUSCULOSKELETAL: no joint pain, no swelling, no stiffness; lower extremity swelling  ENDOCRINE: no polyuria, no polydipsia, no cold or heat intolerance;  HEMATOLOGY:  easy bruising or bleeding, no history of clotting disorder;  DERMATOLOGY: no skin rash, no eczema, no pruritus;  PSYCHIATRY: no depression, no anxiety, no panic attacks, no suicidal ideation, no homicidal ideation;  NEUROLOGY: no syncope, no seizures, no numbness or tingling of hands, no numbness or tingling of feet, no paresis; balance issues    Objective   BP (!) 190/71   Pulse 65   Temp 97.3 °F (36.3 °C)   Ht 5' 10\" (1.778 m)   Wt 201 lb (91.2 kg)   BMI 28.84 kg/m²   Re-check /98    PHYSICAL EXAM:  CONSTITUTIONAL: Alert, appropriate, no acute distress  EYES: Non icteric, EOM intact, pupils equal round   ENT: Mucus membranes moist, no oral pharyngeal lesions, external inspection of ears and nose are normal  NECK: Supple, no masses.   No palpable thyroid mass  CHEST/LUNGS: CTA bilaterally, normal respiratory effort CARDIOVASCULAR: RRR, no murmurs. No lower extremity edema  ABDOMEN: soft non-tender, active bowel sounds, no HSM. No palpable masses  EXTREMITIES: warm, full ROM in all 4 extremities, no focal weakness. SKIN: warm, dry with no rashes or lesions  LYMPH: No cervical, clavicular, axillary, or inguinal lymphadenopathy  NEUROLOGIC: follows commands, non focal   PSYCH: mood and affect appropriate. Alert and oriented to time, place, person      LABORATORY RESULTS REVIEWED/ANALYZED BY ME:  Lab Results   Component Value Date    WBC 7.60 04/21/2021    HGB 11.1 (L) 04/21/2021    HCT 33.8 (L) 04/21/2021    MCV 91.4 04/21/2021     04/21/2021     Lab Results   Component Value Date    NEUTROABS 5.43 04/07/2021     Lab Results   Component Value Date     04/21/2021    K 4.4 04/21/2021     04/21/2021    CO2 26 04/21/2021    BUN 23 (H) 04/21/2021    CREATININE 1.1 04/21/2021    GLUCOSE 305 (H) 04/21/2021    CALCIUM 9.2 04/21/2021    PROT 7.3 04/21/2021    LABALBU 4.0 04/21/2021    BILITOT 0.4 04/21/2021    ALKPHOS 107 04/21/2021    AST 47 04/21/2021    ALT 18 (L) 04/21/2021    LABGLOM >60 04/21/2021    GFRAA >59 11/25/2020    GLOB 3.0 03/23/2016         RADIOLOGY STUDIES REVIEWED BY ME:  4/15/21 2D echo: Normal left ventricular size with reduced global systolic function EF 21-62%. Mild concentric left ventricular hypertrophy with mild [grade 1] diastolic dysfunction. Normal left atrial size. Mild thickening of a poorly visualized aortic valve without evidence of stenosis or insufficiency  Mild thickening of a normally mobile mitral valve with mild regurgitation  Nonvisualization pulmonic valve. Poorly visualized but apparently normal size right-sided chambers with preserved right ventricular systolic function. No tricuspid regurgitation with which to estimate RVSP. No significant pericardial effusion. Aortic root and ascending segments measured within normal limits.      4/17/21 MRI brain: No acute intracranial process. No metastatic disease identified in the CNS. ASSESSMENT:    Orders Placed This Encounter   Procedures    Comprehensive Metabolic Panel     Standing Status:   Future     Standing Expiration Date:   4/21/2022      Nati Grossman was seen today for lung cancer. Diagnoses and all orders for this visit:    Adenocarcinoma of right lung ([facility] [address]  -     Comprehensive Metabolic Panel; Future    Care plan discussed with patient    Chemotherapy management, encounter for    Essential hypertension    Uncontrolled hypertension    Hypertensive urgency    Chronic systolic heart failure ([facility] [address]  Comments: Followed by cardiology. 2D echo reviewed. [id number], stge IIIA, NSCLC, adenocarcinoma subtype  Essentially stage III disease. The patient is not a surgical candidate. Recommend chemoradiation followed by immunotherapy. Discussed with radiation oncology, pulmonary (Dr. Mata Lewis and also pulmonary in [place] Dr. Bekah Cadet)    She is not a surgical candidate. The lesion is unresectable. Chemotherapy regimen:  Carboplatin AUC 2  Taxol 50 mg/m²  Weekly  With concurrent radiation. Reviewed notes from radiation oncology. Plan for 6600 cGy over 33 treatments. Complex medical patientdiscussed with radiation oncology, pulmonary. Patient has several comorbidities. He will follow-up with PCP and other medical providers for his chronic medical problems. Diabetes mellitus type 2/hypertensionuncontrolled. Discussed with PCP office. Patient will be seen tomorrow. Patient was given clonidine 0.1 mg.      Systolic heart failureEF 45%  2D echo-LVEF 40-45%. Mild LVH, Mild [grade 1] diastolic dysfunction. Uncontrolled hypertensionblood pressure 190/71 mmhg. Clonidine- 0.1mg now. Contacted primary care physician. Reviewed medication list.  Patient currently on Lasix 40 mg and metoprolol 50 mg XL tartrate. The patient is also follow-up.   The patient was encouraged to follow-up with his primary care provider tomorrow to discuss strategies for better blood pressure control. He was also encouraged to follow-up with cardiology    PLAN:  · Request [facility] on pathology-pending  · Continue with weekly carbo/taxol  · Continue radiation therapy   · RTC MD in tx room 2 weeks  · Follow-up with PCP for BP control 4/22 at 1145   · Clonidine 0.1mg now    I, Jenna Nash, am scribing for Rossy Giraldo MD. Electronically signed by Jenna Nash RN on 4/21/2021 at 7:57 AM CDT. I, Dr Beryl Serrano, personally performed the services described in this documentation as scribed by Jenna Nash RN in my presence and is both accurate and complete. I have seen, examined and reviewed this patient medication list, appropriate labs and imaging studies. I reviewed relevant medical records and others physicians notes. I discussed the plans of care with the patient. I answered all the questions to the patients satisfaction. I have also reviewed the chief complaint (CC) and part of the history (History of Present Illness (HPI), Past Family Social History [facility]), or Review of Systems (ROS) and made changes when appropriated. (Please note that portions of this note were completed with a voice recognition program. Efforts were made to edit the dictations but occasionally words are mis-transcribed.)    Reviewed cardiology notes from [facility] [place], radiation oncology notes. Treatment of hypertensive urgency.       Rossy Giraldo MD    04/21/21  11:52 AM

## 2021-04-20 ENCOUNTER — HOSPITAL ENCOUNTER (OUTPATIENT)
Dept: RADIATION ONCOLOGY | Facility: HOSPITAL | Age: 81
Setting detail: RADIATION/ONCOLOGY SERIES
Discharge: HOME | End: 2021-04-20

## 2021-04-20 PROCEDURE — 77386: CPT | Performed by: RADIOLOGY

## 2021-04-21 ENCOUNTER — APPOINTMENT (OUTPATIENT)
Dept: RADIATION ONCOLOGY | Facility: HOSPITAL | Age: 81
End: 2021-04-21

## 2021-04-21 ENCOUNTER — OFFICE VISIT (OUTPATIENT)
Dept: HEMATOLOGY | Age: 81
End: 2021-04-21
Payer: MEDICARE

## 2021-04-21 ENCOUNTER — HOSPITAL ENCOUNTER (OUTPATIENT)
Dept: INFUSION THERAPY | Age: 81
Discharge: HOME | End: 2021-04-21
Payer: MEDICARE

## 2021-04-21 VITALS
HEIGHT: 70 IN | DIASTOLIC BLOOD PRESSURE: 71 MMHG | TEMPERATURE: 97.3 F | HEART RATE: 65 BPM | BODY MASS INDEX: 28.77 KG/M2 | WEIGHT: 201 LBS | SYSTOLIC BLOOD PRESSURE: 190 MMHG

## 2021-04-21 VITALS — DIASTOLIC BLOOD PRESSURE: 74 MMHG | SYSTOLIC BLOOD PRESSURE: 166 MMHG

## 2021-04-21 DIAGNOSIS — I10 UNCONTROLLED HYPERTENSION: ICD-10-CM

## 2021-04-21 DIAGNOSIS — I10 ESSENTIAL HYPERTENSION: ICD-10-CM

## 2021-04-21 DIAGNOSIS — I50.22 CHRONIC SYSTOLIC HEART FAILURE (HCC): ICD-10-CM

## 2021-04-21 DIAGNOSIS — C34.91 ADENOCARCINOMA OF RIGHT LUNG (HCC): Primary | ICD-10-CM

## 2021-04-21 DIAGNOSIS — Z51.11 CHEMOTHERAPY MANAGEMENT, ENCOUNTER FOR: ICD-10-CM

## 2021-04-21 DIAGNOSIS — I16.0 HYPERTENSIVE URGENCY: ICD-10-CM

## 2021-04-21 DIAGNOSIS — Z71.89 CARE PLAN DISCUSSED WITH PATIENT: ICD-10-CM

## 2021-04-21 LAB
ALBUMIN SERPL-MCNC: 4 G/DL (ref 3.5–5.2)
ALP BLD-CCNC: 107 U/L (ref 40–130)
ALT SERPL-CCNC: 18 U/L (ref 21–72)
ANION GAP SERPL CALCULATED.3IONS-SCNC: 12 MMOL/L (ref 7–19)
AST SERPL-CCNC: 47 U/L (ref 17–59)
BILIRUB SERPL-MCNC: 0.4 MG/DL (ref 0.2–1.3)
BUN BLDV-MCNC: 23 MG/DL (ref 9–20)
CALCIUM SERPL-MCNC: 9.2 MG/DL (ref 8.4–10.2)
CHLORIDE BLD-SCNC: 101 MMOL/L (ref 98–111)
CO2: 26 MMOL/L (ref 22–29)
CREAT SERPL-MCNC: 1.1 MG/DL (ref 0.6–1.2)
GFR NON-AFRICAN AMERICAN: >60
GLUCOSE BLD-MCNC: 305 MG/DL (ref 74–106)
HCT VFR BLD CALC: 33.8 % (ref 40.1–51)
HEMOGLOBIN: 11.1 G/DL (ref 13.7–17.5)
MCH RBC QN AUTO: 30 PG (ref 25.7–32.2)
MCHC RBC AUTO-ENTMCNC: 32.8 G/DL (ref 32.3–36.5)
MCV RBC AUTO: 91.4 FL (ref 79–92.2)
PDW BLD-RTO: 13.6 % (ref 11.6–14.4)
PLATELET # BLD: 236 K/UL (ref 163–337)
PMV BLD AUTO: 9.6 FL (ref 7.4–10.4)
POTASSIUM SERPL-SCNC: 4.4 MMOL/L (ref 3.5–5.1)
RBC # BLD: 3.7 M/UL (ref 4.63–6.08)
SODIUM BLD-SCNC: 139 MMOL/L (ref 137–145)
TOTAL PROTEIN: 7.3 G/DL (ref 6.3–8.2)
WBC # BLD: 7.6 K/UL (ref 4.23–9.07)

## 2021-04-21 PROCEDURE — 2500000003 HC RX 250 WO HCPCS: Performed by: INTERNAL MEDICINE

## 2021-04-21 PROCEDURE — 85027 COMPLETE CBC AUTOMATED: CPT

## 2021-04-21 PROCEDURE — G8427 DOCREV CUR MEDS BY ELIG CLIN: HCPCS | Performed by: INTERNAL MEDICINE

## 2021-04-21 PROCEDURE — 4040F PNEUMOC VAC/ADMIN/RCVD: CPT | Performed by: INTERNAL MEDICINE

## 2021-04-21 PROCEDURE — 6370000000 HC RX 637 (ALT 250 FOR IP): Performed by: INTERNAL MEDICINE

## 2021-04-21 PROCEDURE — 77386: CPT | Performed by: RADIOLOGY

## 2021-04-21 PROCEDURE — 96413 CHEMO IV INFUSION 1 HR: CPT

## 2021-04-21 PROCEDURE — 96375 TX/PRO/DX INJ NEW DRUG ADDON: CPT

## 2021-04-21 PROCEDURE — 96417 CHEMO IV INFUS EACH ADDL SEQ: CPT

## 2021-04-21 PROCEDURE — 99215 OFFICE O/P EST HI 40 MIN: CPT | Performed by: INTERNAL MEDICINE

## 2021-04-21 PROCEDURE — 1123F ACP DISCUSS/DSCN MKR DOCD: CPT | Performed by: INTERNAL MEDICINE

## 2021-04-21 PROCEDURE — 1036F TOBACCO NON-USER: CPT | Performed by: INTERNAL MEDICINE

## 2021-04-21 PROCEDURE — 6360000002 HC RX W HCPCS: Performed by: INTERNAL MEDICINE

## 2021-04-21 PROCEDURE — G8417 CALC BMI ABV UP PARAM F/U: HCPCS | Performed by: INTERNAL MEDICINE

## 2021-04-21 PROCEDURE — 2580000003 HC RX 258: Performed by: INTERNAL MEDICINE

## 2021-04-21 PROCEDURE — 80053 COMPREHEN METABOLIC PANEL: CPT

## 2021-04-21 RX ORDER — HEPARIN SODIUM (PORCINE) LOCK FLUSH IV SOLN 100 UNIT/ML 100 UNIT/ML
500 SOLUTION INTRAVENOUS PRN
Status: CANCELLED | OUTPATIENT
Start: 2021-04-21

## 2021-04-21 RX ORDER — EPINEPHRINE 1 MG/ML
0.3 INJECTION, SOLUTION, CONCENTRATE INTRAVENOUS PRN
Status: CANCELLED | OUTPATIENT
Start: 2021-04-21

## 2021-04-21 RX ORDER — DIPHENHYDRAMINE HYDROCHLORIDE 50 MG/ML
50 INJECTION INTRAMUSCULAR; INTRAVENOUS ONCE
Status: CANCELLED | OUTPATIENT
Start: 2021-04-21 | End: 2021-04-21

## 2021-04-21 RX ORDER — MEPERIDINE HYDROCHLORIDE 50 MG/ML
12.5 INJECTION INTRAMUSCULAR; INTRAVENOUS; SUBCUTANEOUS ONCE
Status: CANCELLED | OUTPATIENT
Start: 2021-04-21 | End: 2021-04-21

## 2021-04-21 RX ORDER — DIPHENHYDRAMINE HYDROCHLORIDE 50 MG/ML
50 INJECTION INTRAMUSCULAR; INTRAVENOUS ONCE
Status: COMPLETED | OUTPATIENT
Start: 2021-04-21 | End: 2021-04-21

## 2021-04-21 RX ORDER — SODIUM CHLORIDE 0.9 % (FLUSH) 0.9 %
5-40 SYRINGE (ML) INJECTION PRN
Status: CANCELLED | OUTPATIENT
Start: 2021-04-21

## 2021-04-21 RX ORDER — SODIUM CHLORIDE 0.9 % (FLUSH) 0.9 %
5-40 SYRINGE (ML) INJECTION PRN
Status: DISCONTINUED | OUTPATIENT
Start: 2021-04-21 | End: 2021-04-22 | Stop reason: HOSPADM

## 2021-04-21 RX ORDER — DEXAMETHASONE SODIUM PHOSPHATE 10 MG/ML
10 INJECTION, SOLUTION INTRAMUSCULAR; INTRAVENOUS ONCE
Status: COMPLETED | OUTPATIENT
Start: 2021-04-21 | End: 2021-04-21

## 2021-04-21 RX ORDER — SODIUM CHLORIDE 9 MG/ML
25 INJECTION, SOLUTION INTRAVENOUS PRN
Status: CANCELLED | OUTPATIENT
Start: 2021-04-21

## 2021-04-21 RX ORDER — SODIUM CHLORIDE 9 MG/ML
20 INJECTION, SOLUTION INTRAVENOUS ONCE
Status: CANCELLED | OUTPATIENT
Start: 2021-04-21 | End: 2021-04-21

## 2021-04-21 RX ORDER — CLONIDINE HYDROCHLORIDE 0.1 MG/1
0.1 TABLET ORAL ONCE
Status: COMPLETED | OUTPATIENT
Start: 2021-04-21 | End: 2021-04-21

## 2021-04-21 RX ORDER — HEPARIN SODIUM (PORCINE) LOCK FLUSH IV SOLN 100 UNIT/ML 100 UNIT/ML
500 SOLUTION INTRAVENOUS PRN
Status: DISCONTINUED | OUTPATIENT
Start: 2021-04-21 | End: 2021-04-22 | Stop reason: HOSPADM

## 2021-04-21 RX ORDER — ONDANSETRON HYDROCHLORIDE 8 MG/1
8 TABLET, FILM COATED ORAL EVERY 8 HOURS PRN
Qty: 30 TABLET | Refills: 3 | Status: SHIPPED | OUTPATIENT
Start: 2021-04-21

## 2021-04-21 RX ORDER — METHYLPREDNISOLONE SODIUM SUCCINATE 125 MG/2ML
125 INJECTION, POWDER, LYOPHILIZED, FOR SOLUTION INTRAMUSCULAR; INTRAVENOUS ONCE
Status: CANCELLED | OUTPATIENT
Start: 2021-04-21 | End: 2021-04-21

## 2021-04-21 RX ORDER — SODIUM CHLORIDE 9 MG/ML
INJECTION, SOLUTION INTRAVENOUS CONTINUOUS
Status: CANCELLED | OUTPATIENT
Start: 2021-04-21

## 2021-04-21 RX ORDER — LIDOCAINE AND PRILOCAINE 25; 25 MG/G; MG/G
CREAM TOPICAL
Qty: 1 TUBE | Refills: 1 | Status: SHIPPED | OUTPATIENT
Start: 2021-04-21

## 2021-04-21 RX ORDER — PALONOSETRON 0.05 MG/ML
0.25 INJECTION, SOLUTION INTRAVENOUS ONCE
Status: COMPLETED | OUTPATIENT
Start: 2021-04-21 | End: 2021-04-21

## 2021-04-21 RX ORDER — SODIUM CHLORIDE 9 MG/ML
20 INJECTION, SOLUTION INTRAVENOUS ONCE
Status: COMPLETED | OUTPATIENT
Start: 2021-04-21 | End: 2021-04-22

## 2021-04-21 RX ORDER — DIPHENHYDRAMINE HYDROCHLORIDE 50 MG/ML
50 INJECTION INTRAMUSCULAR; INTRAVENOUS ONCE
Status: CANCELLED | OUTPATIENT
Start: 2021-04-21

## 2021-04-21 RX ORDER — PALONOSETRON 0.05 MG/ML
0.25 INJECTION, SOLUTION INTRAVENOUS ONCE
Status: CANCELLED | OUTPATIENT
Start: 2021-04-21

## 2021-04-21 RX ADMIN — SODIUM CHLORIDE 20 ML/HR: 9 INJECTION, SOLUTION INTRAVENOUS at 10:52

## 2021-04-21 RX ADMIN — SODIUM CHLORIDE 96 MG: 9 INJECTION, SOLUTION INTRAVENOUS at 11:55

## 2021-04-21 RX ADMIN — PALONOSETRON 0.25 MG: 0.05 INJECTION, SOLUTION INTRAVENOUS at 10:52

## 2021-04-21 RX ADMIN — SODIUM CHLORIDE, PRESERVATIVE FREE 10 ML: 5 INJECTION INTRAVENOUS at 13:32

## 2021-04-21 RX ADMIN — FAMOTIDINE 20 MG: 10 INJECTION, SOLUTION INTRAVENOUS at 10:53

## 2021-04-21 RX ADMIN — DEXAMETHASONE SODIUM PHOSPHATE 10 MG: 10 INJECTION INTRAMUSCULAR; INTRAVENOUS at 10:52

## 2021-04-21 RX ADMIN — DIPHENHYDRAMINE HYDROCHLORIDE 50 MG: 50 INJECTION, SOLUTION INTRAMUSCULAR; INTRAVENOUS at 10:53

## 2021-04-21 RX ADMIN — CARBOPLATIN 184 MG: 10 INJECTION, SOLUTION INTRAVENOUS at 13:00

## 2021-04-21 RX ADMIN — CLONIDINE HYDROCHLORIDE 0.1 MG: 0.1 TABLET ORAL at 11:20

## 2021-04-21 RX ADMIN — HEPARIN 500 UNITS: 100 SYRINGE at 13:32

## 2021-04-22 ENCOUNTER — HOSPITAL ENCOUNTER (OUTPATIENT)
Dept: RADIATION ONCOLOGY | Facility: HOSPITAL | Age: 81
Setting detail: RADIATION/ONCOLOGY SERIES
Discharge: HOME | End: 2021-04-22

## 2021-04-22 ENCOUNTER — OFFICE VISIT (OUTPATIENT)
Dept: PRIMARY CARE CLINIC | Age: 81
End: 2021-04-22
Payer: MEDICARE

## 2021-04-22 VITALS
BODY MASS INDEX: 28.89 KG/M2 | TEMPERATURE: 96.5 F | DIASTOLIC BLOOD PRESSURE: 90 MMHG | HEART RATE: 67 BPM | HEIGHT: 70 IN | SYSTOLIC BLOOD PRESSURE: 165 MMHG | WEIGHT: 201.8 LBS | OXYGEN SATURATION: 100 %

## 2021-04-22 DIAGNOSIS — E11.9 TYPE 2 DIABETES MELLITUS WITHOUT COMPLICATION, WITHOUT LONG-TERM CURRENT USE OF INSULIN (HCC): ICD-10-CM

## 2021-04-22 DIAGNOSIS — J44.9 CHRONIC OBSTRUCTIVE PULMONARY DISEASE, UNSPECIFIED COPD TYPE (HCC): ICD-10-CM

## 2021-04-22 DIAGNOSIS — I10 ESSENTIAL HYPERTENSION: ICD-10-CM

## 2021-04-22 DIAGNOSIS — I10 ELEVATED BLOOD PRESSURE READING IN OFFICE WITH DIAGNOSIS OF HYPERTENSION: Primary | ICD-10-CM

## 2021-04-22 PROCEDURE — 77386: CPT | Performed by: RADIOLOGY

## 2021-04-22 PROCEDURE — 1123F ACP DISCUSS/DSCN MKR DOCD: CPT | Performed by: FAMILY MEDICINE

## 2021-04-22 PROCEDURE — 3023F SPIROM DOC REV: CPT | Performed by: FAMILY MEDICINE

## 2021-04-22 PROCEDURE — G8417 CALC BMI ABV UP PARAM F/U: HCPCS | Performed by: FAMILY MEDICINE

## 2021-04-22 PROCEDURE — 1036F TOBACCO NON-USER: CPT | Performed by: FAMILY MEDICINE

## 2021-04-22 PROCEDURE — 77336 RADIATION PHYSICS CONSULT: CPT | Performed by: RADIOLOGY

## 2021-04-22 PROCEDURE — G8427 DOCREV CUR MEDS BY ELIG CLIN: HCPCS | Performed by: FAMILY MEDICINE

## 2021-04-22 PROCEDURE — 99214 OFFICE O/P EST MOD 30 MIN: CPT | Performed by: FAMILY MEDICINE

## 2021-04-22 PROCEDURE — G8926 SPIRO NO PERF OR DOC: HCPCS | Performed by: FAMILY MEDICINE

## 2021-04-22 PROCEDURE — 4040F PNEUMOC VAC/ADMIN/RCVD: CPT | Performed by: FAMILY MEDICINE

## 2021-04-22 RX ORDER — MONTELUKAST SODIUM 10 MG/1
TABLET ORAL
COMMUNITY
Start: 2021-03-13

## 2021-04-22 RX ORDER — METOPROLOL TARTRATE 50 MG/1
50 TABLET, FILM COATED ORAL DAILY
COMMUNITY
Start: 2020-12-13

## 2021-04-22 RX ORDER — HYDROCODONE BITARTRATE AND ACETAMINOPHEN 7.5; 325 MG/1; MG/1
TABLET ORAL
COMMUNITY
Start: 2021-04-19

## 2021-04-22 RX ORDER — PROPRANOLOL HYDROCHLORIDE 60 MG/1
60 TABLET ORAL 2 TIMES DAILY
Qty: 180 TABLET | Refills: 3 | Status: SHIPPED | OUTPATIENT
Start: 2021-04-22 | End: 2022-06-07 | Stop reason: SDUPTHER

## 2021-04-22 ASSESSMENT — PATIENT HEALTH QUESTIONNAIRE - PHQ9
SUM OF ALL RESPONSES TO PHQ9 QUESTIONS 1 & 2: 2
SUM OF ALL RESPONSES TO PHQ QUESTIONS 1-9: 2
1. LITTLE INTEREST OR PLEASURE IN DOING THINGS: 1
SUM OF ALL RESPONSES TO PHQ QUESTIONS 1-9: 2

## 2021-04-22 NOTE — PATIENT INSTRUCTIONS

## 2021-04-23 ENCOUNTER — HOSPITAL ENCOUNTER (OUTPATIENT)
Dept: RADIATION ONCOLOGY | Facility: HOSPITAL | Age: 81
Setting detail: RADIATION/ONCOLOGY SERIES
Discharge: HOME | End: 2021-04-23

## 2021-04-23 PROCEDURE — 77386: CPT | Performed by: RADIOLOGY

## 2021-04-25 ASSESSMENT — ENCOUNTER SYMPTOMS
SHORTNESS OF BREATH: 1
COUGH: 1
EYES NEGATIVE: 1
GASTROINTESTINAL NEGATIVE: 1

## 2021-04-25 NOTE — PROGRESS NOTES
SUBJECTIVE:    Payton Sparks is 79 y.o.male who comes in complaining of Hypertension (Patient states BP is not in control.  )   . HPI: Mr. Sabillon has had a stressful few months. They finally were able to biopsy the lung lesion that we saw on x-ray several months ago and he does have lung cancer. He is undergoing radiation and chemotherapy. Unfortunately he says his blood pressure is running high. Today it is 165/90 but he says it has been higher than that when he goes for his cancer treatments. He denies any visual changes. He has not increased the salt in his diet. He has not been eating well or exercising well and has gained a little bit of weight. He denies any chest pain or shortness of breath. He does have a history of COPD and says he is still very short of breath but does not have a significant productive cough at this time. He is also a diabetic and is taking his medication such as Metformin regularly. He does not always check his glucoses but most of the time they are less than 170.   He says they have been a little higher than usual.    No Known Allergies    Social History     Socioeconomic History    Marital status:      Spouse name: None    Number of children: None    Years of education: None    Highest education level: None   Occupational History    None   Social Needs    Financial resource strain: None    Food insecurity     Worry: None     Inability: None    Transportation needs     Medical: None     Non-medical: None   Tobacco Use    Smoking status: Former Smoker     Packs/day: 1.50     Years: 30.00     Pack years: 45.00     Types: Cigarettes     Quit date:      Years since quittin.3    Smokeless tobacco: Never Used   Substance and Sexual Activity    Alcohol use: No    Drug use: No    Sexual activity: None   Lifestyle    Physical activity     Days per week: None     Minutes per session: None    Stress: None   Relationships    Social connections     Talks on phone: None     Gets together: None     Attends Shinto service: None     Active member of club or organization: None     Attends meetings of clubs or organizations: None     Relationship status: None    Intimate partner violence     Fear of current or ex partner: None     Emotionally abused: None     Physically abused: None     Forced sexual activity: None   Other Topics Concern    None   Social History Narrative    None       Review of Systems   Constitutional: Positive for activity change and fatigue. HENT: Negative. Eyes: Negative. Respiratory: Positive for cough (occasionally) and shortness of breath. Cardiovascular: Negative. Gastrointestinal: Negative. Endocrine: Negative for polydipsia, polyphagia and polyuria. Genitourinary: Negative. Musculoskeletal: Positive for arthralgias. Neurological: Negative for weakness, light-headedness, numbness and headaches. Hematological: Negative. Psychiatric/Behavioral: Negative. OBJECTIVE:    Wt Readings from Last 3 Encounters:   04/22/21 201 lb 12.8 oz (91.5 kg)   04/21/21 201 lb (91.2 kg)   04/07/21 198 lb (89.8 kg)       BP (!) 165/90   Pulse 67   Temp 96.5 °F (35.8 °C)   Ht 5' 10\" (1.778 m)   Wt 201 lb 12.8 oz (91.5 kg)   SpO2 100%   BMI 28.96 kg/m²     Physical Exam  Vitals signs and nursing note reviewed. Constitutional:       General: He is not in acute distress. Appearance: Normal appearance. He is well-developed. He is not ill-appearing. HENT:      Head: Normocephalic. Right Ear: Tympanic membrane, ear canal and external ear normal.      Left Ear: Tympanic membrane, ear canal and external ear normal.      Nose: Nose normal. No rhinorrhea. Mouth/Throat:      Mouth: Mucous membranes are moist.      Pharynx: No posterior oropharyngeal erythema. Eyes:      Extraocular Movements: Extraocular movements intact.       Conjunctiva/sclera: Conjunctivae normal.      Pupils: Pupils are equal, round, and reactive to light. Neck:      Musculoskeletal: Normal range of motion and neck supple. Cardiovascular:      Rate and Rhythm: Normal rate and regular rhythm. Pulses: Normal pulses. Heart sounds: Normal heart sounds. Pulmonary:      Effort: Pulmonary effort is normal. No respiratory distress. Breath sounds: Normal breath sounds. Comments: Decreased breath sounds diffusely  Musculoskeletal: Normal range of motion. General: No swelling. Lymphadenopathy:      Cervical: No cervical adenopathy. Skin:     General: Skin is warm and dry. Neurological:      General: No focal deficit present. Mental Status: He is alert and oriented to person, place, and time. Psychiatric:         Mood and Affect: Mood normal.         Behavior: Behavior normal.         Thought Content: Thought content normal.         Judgment: Judgment normal.         ASSESSMENT:    1. Elevated blood pressure reading in office with diagnosis of hypertension    2. Chronic obstructive pulmonary disease, unspecified COPD type ([facility]    3. Type 2 diabetes mellitus without complication, without long-term current use of insulin [facility]    4. Essential hypertension          PLAN:    MEDICATIONS:  Orders Placed This Encounter   Medications    propranolol (INDERAL) 60 MG tablet     Sig: Take 1 tablet by mouth 2 times daily For blood pressure and tremor     Dispense:  180 tablet     Refill:  3   He had stopped his metoprolol because he really felt like the propranolol did better for his blood pressure and his tremor. However he was only taking it once a day. He would like to try taking it twice a day and see if his blood pressure is better. I did review his most recent labs done on 4/21/2021. Glucose was 305 but he was not fasting. BUN was 23 but creatinine and GFR were good. CBC on that same day revealed a hemoglobin of 11.1 with hematocrit of 33.8. They are monitoring very closely.   ORDERS:  No orders of the defined types were placed in this encounter. Continue Lasix 40 mg 1 tablet once a day. Continue to check glucoses once a day. Follow-up:  Return in about 4 weeks (around 5/20/2021) for recheck BP with nurse. PATIENT INSTRUCTIONS:  Patient Instructions       Patient Education        High Blood Pressure: Care Instructions  Overview     It's normal for blood pressure to go up and down throughout the day. But if it stays up, you have high blood pressure. Another name for high blood pressure is hypertension. Despite what a lot of people think, high blood pressure usually doesn't cause headaches or make you feel dizzy or lightheaded. It usually has no symptoms. But it does increase your risk of stroke, heart attack, and other problems. You and your doctor will talk about your risks of these problems based on your blood pressure. Your doctor will give you a goal for your blood pressure. Your goal will be based on your health and your age. Lifestyle changes, such as eating healthy and being active, are always important to help lower blood pressure. You might also take medicine to reach your blood pressure goal.  Follow-up care is a key part of your treatment and safety. Be sure to make and go to all appointments, and call your doctor if you are having problems. It's also a good idea to know your test results and keep a list of the medicines you take. How can you care for yourself at home? Medical treatment  · If you stop taking your medicine, your blood pressure will go back up. You may take one or more types of medicine to lower your blood pressure. Be safe with medicines. Take your medicine exactly as prescribed. Call your doctor if you think you are having a problem with your medicine. · Talk to your doctor before you start taking aspirin every day. Aspirin can help certain people lower their risk of a heart attack or stroke.  But taking aspirin isn't right for everyone, because it can cause serious bleeding. · See your doctor regularly. You may need to see the doctor more often at first or until your blood pressure comes down. · If you are taking blood pressure medicine, talk to your doctor before you take decongestants or anti-inflammatory medicine, such as ibuprofen. Some of these medicines can raise blood pressure. · Learn how to check your blood pressure at home. Lifestyle changes  · Stay at a healthy weight. This is especially important if you put on weight around the waist. Losing even 10 pounds can help you lower your blood pressure. · If your doctor recommends it, get more exercise. Walking is a good choice. Bit by bit, increase the amount you walk every day. Try for at least 30 minutes on most days of the week. You also may want to swim, bike, or do other activities. · Avoid or limit alcohol. Talk to your doctor about whether you can drink any alcohol. · Try to limit how much sodium you eat to less than 2,300 milligrams (mg) a day. Your doctor may ask you to try to eat less than 1,500 mg a day. · Eat plenty of fruits (such as bananas and oranges), vegetables, legumes, whole grains, and low-fat dairy products. · Lower the amount of saturated fat in your diet. Saturated fat is found in animal products such as milk, cheese, and meat. Limiting these foods may help you lose weight and also lower your risk for heart disease. · Do not smoke. Smoking increases your risk for heart attack and stroke. If you need help quitting, talk to your doctor about stop-smoking programs and medicines. These can increase your chances of quitting for good. When should you call for help? Call  911 anytime you think you may need emergency care. This may mean having symptoms that suggest that your blood pressure is causing a serious heart or blood vessel problem. Your blood pressure may be over 180/120. For example, call 911 if:    · You have symptoms of a heart attack. These may include:  ?  Chest pain or pressure, or a strange feeling in the chest.  ? Sweating. ? Shortness of breath. ? Nausea or vomiting. ? Pain, pressure, or a strange feeling in the back, neck, jaw, or upper belly or in one or both shoulders or arms. ? Lightheadedness or sudden weakness. ? A fast or irregular heartbeat.     · You have symptoms of a stroke. These may include:  ? Sudden numbness, tingling, weakness, or loss of movement in your face, arm, or leg, especially on only one side of your body. ? Sudden vision changes. ? Sudden trouble speaking. ? Sudden confusion or trouble understanding simple statements. ? Sudden problems with walking or balance. ? A sudden, severe headache that is different from past headaches.     · You have severe back or belly pain. Do not wait until your blood pressure comes down on its own. Get help right away. Call your doctor now or seek immediate care if:    · Your blood pressure is much higher than normal (such as 180/120 or higher), but you don't have symptoms.     · You think high blood pressure is causing symptoms, such as:  ? Severe headache.  ? Blurry vision. Watch closely for changes in your health, and be sure to contact your doctor if:    · Your blood pressure measures higher than your doctor recommends at least 2 times. That means the top number is higher or the bottom number is higher, or both.     · You think you may be having side effects from your blood pressure medicine. Where can you learn more? Go to https://[employer]. org and sign in to your flaveit account. Enter O835 in the Omicia box to learn more about \"High Blood Pressure: Care Instructions. \"     If you do not have an account, please click on the \"Sign Up Now\" link. Current as of: August 31, 2020               Content Version: 12.8  © 4516-0015 Healthwise, Incorporated. Care instructions adapted under license by [facility] ([facility]).  If you have questions about a medical condition or this instruction, always ask your healthcare professional. [place] [address] any warranty or liability for your use of this information. EMR Dragon/transcription disclaimer:  Much of this encounter note is electronic transcription/translation of spoken language to printed texts. The electronic translation of spoken language may be erroneous, or at times, nonsensical words or phrases may beinadvertently transcribed.   Although I have reviewed the note for such errors, some may still exist.

## 2021-04-26 ENCOUNTER — APPOINTMENT (OUTPATIENT)
Dept: MRI IMAGING | Age: 81
End: 2021-04-26
Payer: MEDICARE

## 2021-04-26 ENCOUNTER — HOSPITAL ENCOUNTER (OUTPATIENT)
Dept: RADIATION ONCOLOGY | Facility: HOSPITAL | Age: 81
Setting detail: RADIATION/ONCOLOGY SERIES
Discharge: HOME | End: 2021-04-26

## 2021-04-26 PROCEDURE — 77386: CPT | Performed by: RADIOLOGY

## 2021-04-27 ENCOUNTER — HOSPITAL ENCOUNTER (OUTPATIENT)
Dept: RADIATION ONCOLOGY | Facility: HOSPITAL | Age: 81
Setting detail: RADIATION/ONCOLOGY SERIES
Discharge: HOME | End: 2021-04-27

## 2021-04-27 PROCEDURE — 77386: CPT | Performed by: RADIOLOGY

## 2021-04-28 ENCOUNTER — HOSPITAL ENCOUNTER (OUTPATIENT)
Dept: INFUSION THERAPY | Age: 81
Discharge: HOME | End: 2021-04-28
Payer: MEDICARE

## 2021-04-28 ENCOUNTER — HOSPITAL ENCOUNTER (OUTPATIENT)
Dept: RADIATION ONCOLOGY | Facility: HOSPITAL | Age: 81
Setting detail: RADIATION/ONCOLOGY SERIES
Discharge: HOME | End: 2021-04-28

## 2021-04-28 VITALS
SYSTOLIC BLOOD PRESSURE: 129 MMHG | TEMPERATURE: 97.7 F | OXYGEN SATURATION: 96 % | RESPIRATION RATE: 18 BRPM | HEIGHT: 70 IN | WEIGHT: 203 LBS | BODY MASS INDEX: 29.06 KG/M2 | HEART RATE: 91 BPM | DIASTOLIC BLOOD PRESSURE: 61 MMHG

## 2021-04-28 DIAGNOSIS — C34.91 ADENOCARCINOMA OF RIGHT LUNG (HCC): Primary | ICD-10-CM

## 2021-04-28 LAB
ALBUMIN SERPL-MCNC: 4.1 G/DL (ref 3.5–5.2)
ALP BLD-CCNC: 91 U/L (ref 40–130)
ALT SERPL-CCNC: 17 U/L (ref 21–72)
ANION GAP SERPL CALCULATED.3IONS-SCNC: 12 MMOL/L (ref 7–19)
AST SERPL-CCNC: 37 U/L (ref 17–59)
BASOPHILS ABSOLUTE: 0.05 K/UL (ref 0.01–0.08)
BASOPHILS RELATIVE PERCENT: 1.1 % (ref 0.1–1.2)
BILIRUB SERPL-MCNC: 0.4 MG/DL (ref 0.2–1.3)
BUN BLDV-MCNC: 25 MG/DL (ref 9–20)
CALCIUM SERPL-MCNC: 8.3 MG/DL (ref 8.4–10.2)
CHLORIDE BLD-SCNC: 98 MMOL/L (ref 98–111)
CO2: 28 MMOL/L (ref 22–29)
CREAT SERPL-MCNC: 1.2 MG/DL (ref 0.6–1.2)
EOSINOPHILS ABSOLUTE: 0.17 K/UL (ref 0.04–0.54)
EOSINOPHILS RELATIVE PERCENT: 3.8 % (ref 0.7–7)
GFR NON-AFRICAN AMERICAN: 58
GLOBULIN: 2.9 G/DL
GLUCOSE BLD-MCNC: 283 MG/DL (ref 74–106)
HCT VFR BLD CALC: 31.3 % (ref 40.1–51)
HEMOGLOBIN: 10 G/DL (ref 13.7–17.5)
LYMPHOCYTES ABSOLUTE: 1.26 K/UL (ref 1.18–3.74)
LYMPHOCYTES RELATIVE PERCENT: 28.5 % (ref 19.3–53.1)
MCH RBC QN AUTO: 29.2 PG (ref 25.7–32.2)
MCHC RBC AUTO-ENTMCNC: 31.9 G/DL (ref 32.3–36.5)
MCV RBC AUTO: 91.5 FL (ref 79–92.2)
MONOCYTES ABSOLUTE: 0.22 K/UL (ref 0.24–0.82)
MONOCYTES RELATIVE PERCENT: 5 % (ref 4.7–12.5)
NEUTROPHILS ABSOLUTE: 2.72 K/UL (ref 1.56–6.13)
NEUTROPHILS RELATIVE PERCENT: 61.6 % (ref 34–71.1)
PDW BLD-RTO: 13.8 % (ref 11.6–14.4)
PLATELET # BLD: 233 K/UL (ref 163–337)
PMV BLD AUTO: 9.5 FL (ref 7.4–10.4)
POTASSIUM SERPL-SCNC: 5.3 MMOL/L (ref 3.5–5.1)
RBC # BLD: 3.42 M/UL (ref 4.63–6.08)
SODIUM BLD-SCNC: 138 MMOL/L (ref 137–145)
TOTAL PROTEIN: 6.9 G/DL (ref 6.3–8.2)
WBC # BLD: 4.42 K/UL (ref 4.23–9.07)

## 2021-04-28 PROCEDURE — 6360000002 HC RX W HCPCS: Performed by: INTERNAL MEDICINE

## 2021-04-28 PROCEDURE — 36415 COLL VENOUS BLD VENIPUNCTURE: CPT

## 2021-04-28 PROCEDURE — 80053 COMPREHEN METABOLIC PANEL: CPT

## 2021-04-28 PROCEDURE — 96375 TX/PRO/DX INJ NEW DRUG ADDON: CPT

## 2021-04-28 PROCEDURE — 2580000003 HC RX 258: Performed by: INTERNAL MEDICINE

## 2021-04-28 PROCEDURE — 77336 RADIATION PHYSICS CONSULT: CPT | Performed by: RADIOLOGY

## 2021-04-28 PROCEDURE — 85025 COMPLETE CBC W/AUTO DIFF WBC: CPT

## 2021-04-28 PROCEDURE — 96417 CHEMO IV INFUS EACH ADDL SEQ: CPT

## 2021-04-28 PROCEDURE — 96413 CHEMO IV INFUSION 1 HR: CPT

## 2021-04-28 PROCEDURE — 77386: CPT | Performed by: RADIOLOGY

## 2021-04-28 PROCEDURE — 2500000003 HC RX 250 WO HCPCS: Performed by: INTERNAL MEDICINE

## 2021-04-28 RX ORDER — SODIUM CHLORIDE 0.9 % (FLUSH) 0.9 %
5-40 SYRINGE (ML) INJECTION PRN
Status: DISCONTINUED | OUTPATIENT
Start: 2021-04-28 | End: 2021-04-29 | Stop reason: HOSPADM

## 2021-04-28 RX ORDER — SODIUM CHLORIDE 9 MG/ML
20 INJECTION, SOLUTION INTRAVENOUS ONCE
Status: COMPLETED | OUTPATIENT
Start: 2021-04-28 | End: 2021-04-29

## 2021-04-28 RX ORDER — DIPHENHYDRAMINE HYDROCHLORIDE 50 MG/ML
50 INJECTION INTRAMUSCULAR; INTRAVENOUS ONCE
Status: CANCELLED | OUTPATIENT
Start: 2021-04-28 | End: 2021-04-28

## 2021-04-28 RX ORDER — MEPERIDINE HYDROCHLORIDE 50 MG/ML
12.5 INJECTION INTRAMUSCULAR; INTRAVENOUS; SUBCUTANEOUS ONCE
Status: CANCELLED | OUTPATIENT
Start: 2021-04-28 | End: 2021-04-28

## 2021-04-28 RX ORDER — PALONOSETRON 0.05 MG/ML
0.25 INJECTION, SOLUTION INTRAVENOUS ONCE
Status: CANCELLED | OUTPATIENT
Start: 2021-04-28

## 2021-04-28 RX ORDER — SODIUM CHLORIDE 9 MG/ML
INJECTION, SOLUTION INTRAVENOUS CONTINUOUS
Status: CANCELLED | OUTPATIENT
Start: 2021-04-28

## 2021-04-28 RX ORDER — DIPHENHYDRAMINE HYDROCHLORIDE 50 MG/ML
50 INJECTION INTRAMUSCULAR; INTRAVENOUS ONCE
Status: CANCELLED | OUTPATIENT
Start: 2021-04-28

## 2021-04-28 RX ORDER — HEPARIN SODIUM (PORCINE) LOCK FLUSH IV SOLN 100 UNIT/ML 100 UNIT/ML
500 SOLUTION INTRAVENOUS PRN
Status: DISCONTINUED | OUTPATIENT
Start: 2021-04-28 | End: 2021-04-29 | Stop reason: HOSPADM

## 2021-04-28 RX ORDER — SODIUM CHLORIDE 9 MG/ML
25 INJECTION, SOLUTION INTRAVENOUS PRN
Status: CANCELLED | OUTPATIENT
Start: 2021-04-28

## 2021-04-28 RX ORDER — DEXAMETHASONE SODIUM PHOSPHATE 10 MG/ML
10 INJECTION, SOLUTION INTRAMUSCULAR; INTRAVENOUS ONCE
Status: COMPLETED | OUTPATIENT
Start: 2021-04-28 | End: 2021-04-28

## 2021-04-28 RX ORDER — SODIUM CHLORIDE 0.9 % (FLUSH) 0.9 %
5-40 SYRINGE (ML) INJECTION PRN
Status: CANCELLED | OUTPATIENT
Start: 2021-04-28

## 2021-04-28 RX ORDER — DIPHENHYDRAMINE HYDROCHLORIDE 50 MG/ML
50 INJECTION INTRAMUSCULAR; INTRAVENOUS ONCE
Status: COMPLETED | OUTPATIENT
Start: 2021-04-28 | End: 2021-04-28

## 2021-04-28 RX ORDER — HEPARIN SODIUM (PORCINE) LOCK FLUSH IV SOLN 100 UNIT/ML 100 UNIT/ML
500 SOLUTION INTRAVENOUS PRN
Status: CANCELLED | OUTPATIENT
Start: 2021-04-28

## 2021-04-28 RX ORDER — EPINEPHRINE 1 MG/ML
0.3 INJECTION, SOLUTION, CONCENTRATE INTRAVENOUS PRN
Status: CANCELLED | OUTPATIENT
Start: 2021-04-28

## 2021-04-28 RX ORDER — PALONOSETRON 0.05 MG/ML
0.25 INJECTION, SOLUTION INTRAVENOUS ONCE
Status: COMPLETED | OUTPATIENT
Start: 2021-04-28 | End: 2021-04-28

## 2021-04-28 RX ORDER — SODIUM CHLORIDE 9 MG/ML
20 INJECTION, SOLUTION INTRAVENOUS ONCE
Status: CANCELLED | OUTPATIENT
Start: 2021-04-28 | End: 2021-04-28

## 2021-04-28 RX ORDER — METHYLPREDNISOLONE SODIUM SUCCINATE 125 MG/2ML
125 INJECTION, POWDER, LYOPHILIZED, FOR SOLUTION INTRAMUSCULAR; INTRAVENOUS ONCE
Status: CANCELLED | OUTPATIENT
Start: 2021-04-28 | End: 2021-04-28

## 2021-04-28 RX ADMIN — DEXAMETHASONE SODIUM PHOSPHATE 10 MG: 10 INJECTION, SOLUTION INTRAMUSCULAR; INTRAVENOUS at 14:28

## 2021-04-28 RX ADMIN — DIPHENHYDRAMINE HYDROCHLORIDE 50 MG: 50 INJECTION, SOLUTION INTRAMUSCULAR; INTRAVENOUS at 14:28

## 2021-04-28 RX ADMIN — SODIUM CHLORIDE 20 ML/HR: 9 INJECTION, SOLUTION INTRAVENOUS at 14:29

## 2021-04-28 RX ADMIN — FAMOTIDINE 20 MG: 10 INJECTION, SOLUTION INTRAVENOUS at 14:28

## 2021-04-28 RX ADMIN — PALONOSETRON 0.25 MG: 0.05 INJECTION, SOLUTION INTRAVENOUS at 14:28

## 2021-04-28 RX ADMIN — SODIUM CHLORIDE 96 MG: 900 INJECTION, SOLUTION INTRAVENOUS at 15:00

## 2021-04-28 RX ADMIN — CARBOPLATIN 174 MG: 10 INJECTION, SOLUTION INTRAVENOUS at 16:01

## 2021-04-29 ENCOUNTER — HOSPITAL ENCOUNTER (OUTPATIENT)
Dept: RADIATION ONCOLOGY | Facility: HOSPITAL | Age: 81
Setting detail: RADIATION/ONCOLOGY SERIES
Discharge: HOME | End: 2021-04-29

## 2021-04-29 PROCEDURE — 77386: CPT | Performed by: RADIOLOGY

## 2021-04-30 ENCOUNTER — HOSPITAL ENCOUNTER (OUTPATIENT)
Dept: RADIATION ONCOLOGY | Facility: HOSPITAL | Age: 81
Setting detail: RADIATION/ONCOLOGY SERIES
Discharge: HOME | End: 2021-04-30

## 2021-04-30 ENCOUNTER — CLINICAL DOCUMENTATION (OUTPATIENT)
Dept: HEMATOLOGY | Age: 81
End: 2021-04-30

## 2021-04-30 PROCEDURE — 77386: CPT | Performed by: RADIOLOGY

## 2021-05-03 ENCOUNTER — HOSPITAL ENCOUNTER (OUTPATIENT)
Dept: RADIATION ONCOLOGY | Facility: HOSPITAL | Age: 81
Setting detail: RADIATION/ONCOLOGY SERIES
End: 2021-05-03

## 2021-05-03 ENCOUNTER — HOSPITAL ENCOUNTER (OUTPATIENT)
Dept: RADIATION ONCOLOGY | Facility: HOSPITAL | Age: 81
Setting detail: RADIATION/ONCOLOGY SERIES
Discharge: HOME | End: 2021-05-03

## 2021-05-03 PROCEDURE — 77386: CPT | Performed by: RADIOLOGY

## 2021-05-03 NOTE — PROGRESS NOTES
MEDICAL ONCOLOGY PROGRESS NOTE    Pt Name: Bushra Mejia  MRN: [id number]  YOB: 1940  Date of evaluation: 5/5/2021    HISTORY OF PRESENT ILLNESS:    The patient presents today for his third cycle of chemotherapy. He has now restarted concurrent radiation. He is tolerating treatment so far well. He denies any sensorial neuropathy. He feels that he is getting more fatigued. He has been to his primary care physician who is working on his medication. Diagnosis  · RUL adenocarcinoma, NSCLC, Dec 2020  · [id number], stage IIIA  · Systolic heart failure  · Hypertension  · Not a surgical candidate    Treatment Summary  · Recommend concurrent chemoradiation with carboplatin/taxol to be followed by immunotherapy    Hematology/Cancer History:  Zita Regalaod was first seen by me on 4/7/2021 referred by Dr. Shivani Rodriguez, [facility], [place] [facility] [place] for findings of non-small cell lung cancer, adenocarcinoma type. He has several comorbidities including history of type 2 diabetes, hypertension COPD. History of smoking in the past.  Quit many years ago. History of coronary artery disease followed by cardiology biopsy. Last EF 70% in 2018. He was seen by his primary care provider in November 2020. He has complaint of chest pain. Chest x-ray showed a 4 cm mass in the right upper lung. · 12/1/20 CT chest [facility]): Large right upper lobe mass concerning for primary neoplasm. Enlarged right hilar lymph node concerning for metastatic disease. Additional noncalcified pulmonary nodules bilaterally for which follow-up CT is recommended in 3-6 months. Atherosclerosis of the aorta and coronary arteries. · 12/16/2020bronchoscopy with bronchoalveolar lavage was nondiagnostic  · 2/23/21 CT cheat w/o [facility]): Probable right upper lobe mass, as described. This appears slightly larger in size and is likely neoplastic. Consider PET CT follow-up.  There is new surrounding infiltrate that extends inferiorly into the right upper lobe. The new may be infectious or inflammatory. Pulmonary hemorrhage possible. Other areas of nodularity and groundglass opacity/nodularity are stable in appearance. · 3/5/2021navigational bronchoscopy with biopsy was nondiagnostic  · 3/5/21 CT chest w/o [facility]): [place] soft tissue mass within the right upper lobe highly suspicious for neoplasm. There is associated postobstructive pneumonitis within the right upper lobe showing mild progression from the previous study of 2/23/2021. There is no associated hilar or mediastinal lymphadenopathy. Today's study is performed as part of a navigational bronchoscopy exam. Other scattered nodules including groundglass nodules are noted within the lungs all stable from the previous exam warranting follow-up. Heavy atheromatous calcification of the thoracic aorta and proximal great vessels. Coronary calcifications are present. · 3/23/21 PET scan [facility]): Markedly FDG avid right upper lobe mass in the right upper lobe measuring 7.2 cm triangular opacity with maximum SUV 12.18, highly concerning for malignancy. Adjacent groundglass opacities,  similar compared to 3/5/2021, which could represent additional  malignancy or infection/inflammation. Adjacent right upper lobe groundglass opacities are similar compared to 3/5/2021, which could represent additional malignancy or infection/inflammation. Other non-FDG avid pulmonary findings as above. No evidence of jasmin or distant metastatic disease. · 3/29/21 Navigational bronchoscopy-Dr. Bronwyn Uriarte: Bronchoalveolar lavage RUL consistent with adenocarcinoma. RUL transbronchial bipsy consistent with adenocarcinoma. FNA biopsy of several jasmin station negative for metastatic disease. · 4/7/2021he was first seen by me. · 4/15/21 2D echo: Normal left ventricular size with reduced global systolic function EF 52-22%. Mild concentric left ventricular hypertrophy with mild [grade 1] diastolic dysfunction. Normal left atrial size.  Mild thickening of a poorly visualized aortic valve without evidence of stenosis or insufficiency. Mild thickening of a normally mobile mitral valve with mild regurgitation. Nonvisualization pulmonic valve. Poorly visualized but apparently normal size right-sided chambers with preserved right ventricular systolic function. No tricuspid regurgitation with which to estimate RVSP. No significant pericardial effusion. Aortic root and ascending segments measured within normal limits. · 4/17/21 MRI brain: No acute intracranial process. No metastatic disease identified in the CNS. · 4/21/2021initiation of carboplatin/Taxol weekly. Reviewed MRI brain 2D echo.     Past Medical History:    Past Medical History:   Diagnosis Date    Allergic rhinitis     Bronchitis, chronic (HCC)     Carotid artery stenosis     GERD (gastroesophageal reflux disease)     Heart attack ([facility]     Hemorrhoids     Hypercholesterolemia     Type II or unspecified type diabetes mellitus without mention of complication, not stated as uncontrolled        Past Surgical History:    Past Surgical History:   Procedure Laterality Date    CARDIAC SURGERY      balloon surgery (angioplasty)    PORT SURGERY Right 04/01/2021       Social History:    Marital status: , 2 daughters  Smoking status: Former smoker for ~30 years, 1.5 ppd  ETOH status: No  Resides: [place], [place]    Family History:   Family History   Problem Relation Age of Onset    Diabetes Mother     High Blood Pressure Mother     Cancer Father         Lung       Current Hospital Medications:    Current Outpatient Medications   Medication Sig Dispense Refill    propranolol (INDERAL) 60 MG tablet Take 1 tablet by mouth 2 times daily For blood pressure and tremor 180 tablet 3    HYDROcodone-acetaminophen (NORCO) 7.5-325 MG per tablet       metoprolol tartrate (LOPRESSOR) 50 MG tablet Take 50 mg by mouth daily      montelukast (SINGULAIR) 10 MG tablet       ondansetron (ZOFRAN) 8 MG tablet Take 1 tablet by mouth every 8 hours as needed for Nausea or Vomiting 30 tablet 3    lidocaine-prilocaine (EMLA) 2.5-2.5 % cream Apply topically to port area and cover with plastic wrap one hour prior to treatment. 1 Tube 1    furosemide (LASIX) 40 MG tablet TAKE 1 TABLET DAILY FOR FLUID 90 tablet 3    metFORMIN (GLUCOPHAGE) 1000 MG tablet TAKE 1 TABLET TWICE A DAY FOR DIABETES 180 tablet 3    blood glucose test strips (TRUE METRIX BLOOD GLUCOSE TEST) strip TEST BLOOD SUGAR ONCE DAILY *E11.9* 100 strip 5    simvastatin (ZOCOR) 40 MG tablet TAKE 1 TABLET AT BEDTIME FOR CHOLESTEROL 90 tablet 3    omeprazole (PRILOSEC) 40 MG delayed release capsule TAKE 1 CAPSULE DAILY FOR STOMACH 90 capsule 3    isosorbide mononitrate (IMDUR) 60 MG extended release tablet Take 60 mg by mouth      losartan (COZAAR) 25 MG tablet Take 25 mg by mouth      umeclidinium-vilanterol (ANORO ELLIPTA) 62.5-25 MCG/INH AEPB inhaler Inhale 1 puff into the lungs daily      primidone (MYSOLINE) 50 MG tablet 1 tab po BID for tremor 180 tablet 3    triamcinolone (KENALOG) 0.1 % cream Apply topically 2 times daily. To lower leg dry skin 3 Tube 3    tamsulosin (FLOMAX) 0.4 MG capsule Take 1 capsule by mouth daily. 90 capsule 3    aspirin 325 MG tablet Take 325 mg by mouth daily. No current facility-administered medications for this visit.       Facility-Administered Medications Ordered in Other Visits   Medication Dose Route Frequency Provider Last Rate Last Admin    0.9 % sodium chloride infusion  20 mL/hr Intravenous Once Fernando Ocasio MD        sodium chloride flush 0.9 % injection 5-40 mL  5-40 mL Intravenous PRN Fernando Ocasio MD   10 mL at 05/05/21 1457    heparin flush 100 UNIT/ML injection 500 Units  500 Units Intracatheter PRN Fernando Ocasio MD   500 Units at 05/05/21 1457       Allergies: No Known Allergies      Subjective   REVIEW OF SYSTEMS:   CONSTITUTIONAL: no fever, no night sweats, specimen    Lab Results   Component Value Date    WBC 3.92 (L) 05/05/2021    HGB 10.1 (L) 05/05/2021    HCT 31.4 (L) 05/05/2021    MCV 92.9 (H) 05/05/2021     05/05/2021     Lab Results   Component Value Date    NEUTROABS 2.41 05/05/2021     Lab Results   Component Value Date     05/05/2021    K 4.7 05/05/2021     05/05/2021    CO2 28 05/05/2021    BUN 23 (H) 05/05/2021    CREATININE 1.1 05/05/2021    GLUCOSE 186 (H) 05/05/2021    CALCIUM 9.1 05/05/2021    PROT 7.5 05/05/2021    LABALBU 4.2 05/05/2021    BILITOT 0.3 05/05/2021    ALKPHOS 89 05/05/2021    AST 35 05/05/2021    ALT 20 (L) 05/05/2021    LABGLOM >60 05/05/2021    GFRAA >59 11/25/2020    GLOB 3.2 05/05/2021       RADIOLOGY STUDIES REVIEWED BY ME:  No results found. ASSESSMENT:    Orders Placed This Encounter   Procedures    CBC Auto Differential     Standing Status:   Future     Number of Occurrences:   1     Standing Expiration Date:   5/5/2022    Comprehensive Metabolic Panel     Standing Status:   Future     Number of Occurrences:   1     Standing Expiration Date:   5/5/2022      Francesca Barron was seen today for lung cancer. Diagnoses and all orders for this visit:    Adenocarcinoma of right lung ([facility] [address]  -     CBC Auto Differential; Future  -     Comprehensive Metabolic Panel; Future    Chemotherapy management, encounter for    Adverse effect of chemotherapy, subsequent encounter    Care plan discussed with patient       [id number], stge IIIA, NSCLC, adenocarcinoma subtype  Essentially stage III disease. The patient is not a surgical candidate. Recommend chemoradiation followed by immunotherapy. She is not a surgical candidate. The lesion is unresectable. Chemotherapy regimen:  Carboplatin AUC 2  Taxol 50 mg/m²  Weekly  With concurrent radiation. Reviewed notes from radiation oncology. Plan for 6600 cGy over 33 treatments. Proceed with cycle #3 today.     Complex medical patientdiscussed with radiation oncology, pulmonary. Patient has several comorbidities. He will follow-up with PCP and other medical providers for his chronic medical problems. Diabetes mellitus type 2/hypertensioncontrolled. Discussed with PCP office. Patient will be seen tomorrow. Patient was given clonidine 0.1 mg.      Systolic heart failureEF 45%  2D echo-LVEF 40-45%. Mild LVH, Mild [grade 1] diastolic dysfunction. Controlled hypertension stable. Medication controlled. Followed by PCP and Cardiology    PLAN:  · [facility] on pathology-cancelled due to insufficient specimen  · Continue with weekly carbo/taxol  · Continue radiation therapy   · RTC MD in tx room 3 weeks    INoam, kamila scribing for Joceline Hughes MD. Electronically signed by Noam Martin RN on 5/5/2021 at 1:07 PM CDT. I, Dr Mich Diaz, personally performed the services described in this documentation as scribed by Noam Martin RN in my presence and is both accurate and complete. I have seen, examined and reviewed this patient medication list, appropriate labs and imaging studies. I reviewed relevant medical records and others physicians notes. I discussed the plans of care with the patient. I answered all the questions to the patients satisfaction. I have also reviewed the chief complaint (CC) and part of the history (History of Present Illness (HPI), Past Family Social History [facility]), or Review of Systems (ROS) and made changes when appropriated.        (Please note that portions of this note were completed with a voice recognition program. Efforts were made to edit the dictations but occasionally words are mis-transcribed.)      Joceline Hughes MD    05/05/21  3:16 PM

## 2021-05-04 ENCOUNTER — HOSPITAL ENCOUNTER (OUTPATIENT)
Dept: RADIATION ONCOLOGY | Facility: HOSPITAL | Age: 81
Setting detail: RADIATION/ONCOLOGY SERIES
Discharge: HOME | End: 2021-05-04

## 2021-05-04 DIAGNOSIS — C34.91 ADENOCARCINOMA OF RIGHT LUNG (HCC): Primary | ICD-10-CM

## 2021-05-04 PROCEDURE — 77386: CPT | Performed by: RADIOLOGY

## 2021-05-04 PROCEDURE — 77300 RADIATION THERAPY DOSE PLAN: CPT | Performed by: RADIOLOGY

## 2021-05-05 ENCOUNTER — HOSPITAL ENCOUNTER (OUTPATIENT)
Dept: RADIATION ONCOLOGY | Facility: HOSPITAL | Age: 81
Setting detail: RADIATION/ONCOLOGY SERIES
Discharge: HOME | End: 2021-05-05

## 2021-05-05 ENCOUNTER — OFFICE VISIT (OUTPATIENT)
Dept: HEMATOLOGY | Age: 81
End: 2021-05-05
Payer: MEDICARE

## 2021-05-05 ENCOUNTER — HOSPITAL ENCOUNTER (OUTPATIENT)
Dept: INFUSION THERAPY | Age: 81
Discharge: HOME | End: 2021-05-05
Payer: MEDICARE

## 2021-05-05 VITALS
HEART RATE: 87 BPM | TEMPERATURE: 96.8 F | SYSTOLIC BLOOD PRESSURE: 138 MMHG | DIASTOLIC BLOOD PRESSURE: 79 MMHG | BODY MASS INDEX: 28.92 KG/M2 | WEIGHT: 202 LBS | HEIGHT: 70 IN

## 2021-05-05 DIAGNOSIS — Z71.89 CARE PLAN DISCUSSED WITH PATIENT: ICD-10-CM

## 2021-05-05 DIAGNOSIS — C34.91 ADENOCARCINOMA OF RIGHT LUNG (HCC): Primary | ICD-10-CM

## 2021-05-05 DIAGNOSIS — T45.1X5D ADVERSE EFFECT OF CHEMOTHERAPY, SUBSEQUENT ENCOUNTER: ICD-10-CM

## 2021-05-05 DIAGNOSIS — Z51.11 CHEMOTHERAPY MANAGEMENT, ENCOUNTER FOR: ICD-10-CM

## 2021-05-05 LAB
ALBUMIN SERPL-MCNC: 4.2 G/DL (ref 3.5–5.2)
ALP BLD-CCNC: 89 U/L (ref 40–130)
ALT SERPL-CCNC: 20 U/L (ref 21–72)
ANION GAP SERPL CALCULATED.3IONS-SCNC: 12 MMOL/L (ref 7–19)
AST SERPL-CCNC: 35 U/L (ref 17–59)
BASOPHILS ABSOLUTE: 0.06 K/UL (ref 0.01–0.08)
BASOPHILS RELATIVE PERCENT: 1.5 % (ref 0.1–1.2)
BILIRUB SERPL-MCNC: 0.3 MG/DL (ref 0.2–1.3)
BUN BLDV-MCNC: 23 MG/DL (ref 9–20)
CALCIUM SERPL-MCNC: 9.1 MG/DL (ref 8.4–10.2)
CHLORIDE BLD-SCNC: 102 MMOL/L (ref 98–111)
CO2: 28 MMOL/L (ref 22–29)
CREAT SERPL-MCNC: 1.1 MG/DL (ref 0.6–1.2)
EOSINOPHILS ABSOLUTE: 0.15 K/UL (ref 0.04–0.54)
EOSINOPHILS RELATIVE PERCENT: 3.8 % (ref 0.7–7)
GFR NON-AFRICAN AMERICAN: >60
GLOBULIN: 3.2 G/DL
GLUCOSE BLD-MCNC: 186 MG/DL (ref 74–106)
HCT VFR BLD CALC: 31.4 % (ref 40.1–51)
HEMOGLOBIN: 10.1 G/DL (ref 13.7–17.5)
LYMPHOCYTES ABSOLUTE: 1.07 K/UL (ref 1.18–3.74)
LYMPHOCYTES RELATIVE PERCENT: 27.3 % (ref 19.3–53.1)
MCH RBC QN AUTO: 29.9 PG (ref 25.7–32.2)
MCHC RBC AUTO-ENTMCNC: 32.2 G/DL (ref 32.3–36.5)
MCV RBC AUTO: 92.9 FL (ref 79–92.2)
MONOCYTES ABSOLUTE: 0.23 K/UL (ref 0.24–0.82)
MONOCYTES RELATIVE PERCENT: 5.9 % (ref 4.7–12.5)
NEUTROPHILS ABSOLUTE: 2.41 K/UL (ref 1.56–6.13)
NEUTROPHILS RELATIVE PERCENT: 61.5 % (ref 34–71.1)
PDW BLD-RTO: 14.2 % (ref 11.6–14.4)
PLATELET # BLD: 204 K/UL (ref 163–337)
PMV BLD AUTO: 9.7 FL (ref 7.4–10.4)
POTASSIUM SERPL-SCNC: 4.7 MMOL/L (ref 3.5–5.1)
RBC # BLD: 3.38 M/UL (ref 4.63–6.08)
SODIUM BLD-SCNC: 142 MMOL/L (ref 137–145)
TOTAL PROTEIN: 7.5 G/DL (ref 6.3–8.2)
WBC # BLD: 3.92 K/UL (ref 4.23–9.07)

## 2021-05-05 PROCEDURE — 96413 CHEMO IV INFUSION 1 HR: CPT

## 2021-05-05 PROCEDURE — 80053 COMPREHEN METABOLIC PANEL: CPT

## 2021-05-05 PROCEDURE — 2500000003 HC RX 250 WO HCPCS: Performed by: INTERNAL MEDICINE

## 2021-05-05 PROCEDURE — 85025 COMPLETE CBC W/AUTO DIFF WBC: CPT

## 2021-05-05 PROCEDURE — 1123F ACP DISCUSS/DSCN MKR DOCD: CPT | Performed by: INTERNAL MEDICINE

## 2021-05-05 PROCEDURE — 77386: CPT | Performed by: RADIOLOGY

## 2021-05-05 PROCEDURE — 99214 OFFICE O/P EST MOD 30 MIN: CPT | Performed by: INTERNAL MEDICINE

## 2021-05-05 PROCEDURE — 96417 CHEMO IV INFUS EACH ADDL SEQ: CPT

## 2021-05-05 PROCEDURE — 96375 TX/PRO/DX INJ NEW DRUG ADDON: CPT

## 2021-05-05 PROCEDURE — 4040F PNEUMOC VAC/ADMIN/RCVD: CPT | Performed by: INTERNAL MEDICINE

## 2021-05-05 PROCEDURE — 1036F TOBACCO NON-USER: CPT | Performed by: INTERNAL MEDICINE

## 2021-05-05 PROCEDURE — 2580000003 HC RX 258: Performed by: INTERNAL MEDICINE

## 2021-05-05 PROCEDURE — 6360000002 HC RX W HCPCS: Performed by: INTERNAL MEDICINE

## 2021-05-05 PROCEDURE — G8417 CALC BMI ABV UP PARAM F/U: HCPCS | Performed by: INTERNAL MEDICINE

## 2021-05-05 PROCEDURE — G8427 DOCREV CUR MEDS BY ELIG CLIN: HCPCS | Performed by: INTERNAL MEDICINE

## 2021-05-05 RX ORDER — DIPHENHYDRAMINE HYDROCHLORIDE 50 MG/ML
50 INJECTION INTRAMUSCULAR; INTRAVENOUS ONCE
Status: COMPLETED | OUTPATIENT
Start: 2021-05-05 | End: 2021-05-05

## 2021-05-05 RX ORDER — SODIUM CHLORIDE 9 MG/ML
INJECTION, SOLUTION INTRAVENOUS CONTINUOUS
Status: CANCELLED | OUTPATIENT
Start: 2021-05-05

## 2021-05-05 RX ORDER — SODIUM CHLORIDE 9 MG/ML
25 INJECTION, SOLUTION INTRAVENOUS PRN
Status: CANCELLED | OUTPATIENT
Start: 2021-05-05

## 2021-05-05 RX ORDER — DEXAMETHASONE SODIUM PHOSPHATE 10 MG/ML
10 INJECTION, SOLUTION INTRAMUSCULAR; INTRAVENOUS ONCE
Status: COMPLETED | OUTPATIENT
Start: 2021-05-05 | End: 2021-05-05

## 2021-05-05 RX ORDER — PALONOSETRON 0.05 MG/ML
0.25 INJECTION, SOLUTION INTRAVENOUS ONCE
Status: COMPLETED | OUTPATIENT
Start: 2021-05-05 | End: 2021-05-05

## 2021-05-05 RX ORDER — SODIUM CHLORIDE 0.9 % (FLUSH) 0.9 %
5-40 SYRINGE (ML) INJECTION PRN
Status: CANCELLED | OUTPATIENT
Start: 2021-05-05

## 2021-05-05 RX ORDER — HEPARIN SODIUM (PORCINE) LOCK FLUSH IV SOLN 100 UNIT/ML 100 UNIT/ML
500 SOLUTION INTRAVENOUS PRN
Status: CANCELLED | OUTPATIENT
Start: 2021-05-05

## 2021-05-05 RX ORDER — SODIUM CHLORIDE 0.9 % (FLUSH) 0.9 %
5-40 SYRINGE (ML) INJECTION PRN
Status: DISCONTINUED | OUTPATIENT
Start: 2021-05-05 | End: 2021-05-06 | Stop reason: HOSPADM

## 2021-05-05 RX ORDER — SODIUM CHLORIDE 9 MG/ML
20 INJECTION, SOLUTION INTRAVENOUS ONCE
Status: DISCONTINUED | OUTPATIENT
Start: 2021-05-05 | End: 2021-05-07 | Stop reason: HOSPADM

## 2021-05-05 RX ORDER — EPINEPHRINE 1 MG/ML
0.3 INJECTION, SOLUTION, CONCENTRATE INTRAVENOUS PRN
Status: CANCELLED | OUTPATIENT
Start: 2021-05-05

## 2021-05-05 RX ORDER — DIPHENHYDRAMINE HYDROCHLORIDE 50 MG/ML
50 INJECTION INTRAMUSCULAR; INTRAVENOUS ONCE
Status: CANCELLED | OUTPATIENT
Start: 2021-05-05 | End: 2021-05-05

## 2021-05-05 RX ORDER — HEPARIN SODIUM (PORCINE) LOCK FLUSH IV SOLN 100 UNIT/ML 100 UNIT/ML
500 SOLUTION INTRAVENOUS PRN
Status: DISCONTINUED | OUTPATIENT
Start: 2021-05-05 | End: 2021-05-06 | Stop reason: HOSPADM

## 2021-05-05 RX ORDER — SODIUM CHLORIDE 9 MG/ML
20 INJECTION, SOLUTION INTRAVENOUS ONCE
Status: CANCELLED | OUTPATIENT
Start: 2021-05-05 | End: 2021-05-05

## 2021-05-05 RX ORDER — MEPERIDINE HYDROCHLORIDE 50 MG/ML
12.5 INJECTION INTRAMUSCULAR; INTRAVENOUS; SUBCUTANEOUS ONCE
Status: CANCELLED | OUTPATIENT
Start: 2021-05-05 | End: 2021-05-05

## 2021-05-05 RX ORDER — METHYLPREDNISOLONE SODIUM SUCCINATE 125 MG/2ML
125 INJECTION, POWDER, LYOPHILIZED, FOR SOLUTION INTRAMUSCULAR; INTRAVENOUS ONCE
Status: CANCELLED | OUTPATIENT
Start: 2021-05-05 | End: 2021-05-05

## 2021-05-05 RX ADMIN — PALONOSETRON 0.25 MG: 0.05 INJECTION, SOLUTION INTRAVENOUS at 12:41

## 2021-05-05 RX ADMIN — SODIUM CHLORIDE 96 MG: 900 INJECTION, SOLUTION INTRAVENOUS at 13:20

## 2021-05-05 RX ADMIN — CARBOPLATIN 184 MG: 10 INJECTION, SOLUTION INTRAVENOUS at 14:21

## 2021-05-05 RX ADMIN — DIPHENHYDRAMINE HYDROCHLORIDE 50 MG: 50 INJECTION, SOLUTION INTRAMUSCULAR; INTRAVENOUS at 12:41

## 2021-05-05 RX ADMIN — FAMOTIDINE 20 MG: 10 INJECTION, SOLUTION INTRAVENOUS at 12:41

## 2021-05-05 RX ADMIN — HEPARIN 500 UNITS: 100 SYRINGE at 14:57

## 2021-05-05 RX ADMIN — DEXAMETHASONE SODIUM PHOSPHATE 10 MG: 10 INJECTION, SOLUTION INTRAMUSCULAR; INTRAVENOUS at 12:41

## 2021-05-05 RX ADMIN — SODIUM CHLORIDE, PRESERVATIVE FREE 10 ML: 5 INJECTION INTRAVENOUS at 14:57

## 2021-05-06 ENCOUNTER — HOSPITAL ENCOUNTER (OUTPATIENT)
Dept: RADIATION ONCOLOGY | Facility: HOSPITAL | Age: 81
Setting detail: RADIATION/ONCOLOGY SERIES
Discharge: HOME | End: 2021-05-06

## 2021-05-06 LAB
MYCOBACTERIUM SPEC CULT: NORMAL
NIGHT BLUE STAIN TISS: NORMAL
NIGHT BLUE STAIN TISS: NORMAL

## 2021-05-06 PROCEDURE — 77336 RADIATION PHYSICS CONSULT: CPT | Performed by: RADIOLOGY

## 2021-05-06 PROCEDURE — 77386: CPT | Performed by: RADIOLOGY

## 2021-05-07 ENCOUNTER — HOSPITAL ENCOUNTER (OUTPATIENT)
Dept: RADIATION ONCOLOGY | Facility: HOSPITAL | Age: 81
Setting detail: RADIATION/ONCOLOGY SERIES
Discharge: HOME | End: 2021-05-07

## 2021-05-07 PROCEDURE — 77386: CPT | Performed by: RADIOLOGY

## 2021-05-10 ENCOUNTER — HOSPITAL ENCOUNTER (OUTPATIENT)
Dept: RADIATION ONCOLOGY | Facility: HOSPITAL | Age: 81
Discharge: HOME | End: 2021-05-10

## 2021-05-10 PROCEDURE — 77386: CPT | Performed by: RADIOLOGY

## 2021-05-11 ENCOUNTER — HOSPITAL ENCOUNTER (OUTPATIENT)
Dept: RADIATION ONCOLOGY | Facility: HOSPITAL | Age: 81
Setting detail: RADIATION/ONCOLOGY SERIES
Discharge: HOME | End: 2021-05-11

## 2021-05-11 PROCEDURE — 77386: CPT | Performed by: RADIOLOGY

## 2021-05-12 ENCOUNTER — HOSPITAL ENCOUNTER (OUTPATIENT)
Dept: RADIATION ONCOLOGY | Facility: HOSPITAL | Age: 81
Setting detail: RADIATION/ONCOLOGY SERIES
Discharge: HOME | End: 2021-05-12

## 2021-05-12 ENCOUNTER — HOSPITAL ENCOUNTER (OUTPATIENT)
Dept: INFUSION THERAPY | Age: 81
Discharge: HOME | End: 2021-05-12
Payer: MEDICARE

## 2021-05-12 VITALS
TEMPERATURE: 96.6 F | SYSTOLIC BLOOD PRESSURE: 141 MMHG | HEART RATE: 81 BPM | BODY MASS INDEX: 28.77 KG/M2 | WEIGHT: 201 LBS | OXYGEN SATURATION: 100 % | DIASTOLIC BLOOD PRESSURE: 69 MMHG | HEIGHT: 70 IN

## 2021-05-12 DIAGNOSIS — C34.91 ADENOCARCINOMA OF RIGHT LUNG (HCC): Primary | ICD-10-CM

## 2021-05-12 PROBLEM — R59.0 HILAR ADENOPATHY: Chronic | Status: RESOLVED | Noted: 2020-12-07 | Resolved: 2021-05-12

## 2021-05-12 PROBLEM — R91.8 MASS OF UPPER LOBE OF RIGHT LUNG: Chronic | Status: RESOLVED | Noted: 2020-12-07 | Resolved: 2021-05-12

## 2021-05-12 PROBLEM — Z87.891 FORMER SMOKER: Chronic | Status: ACTIVE | Noted: 2021-04-07

## 2021-05-12 LAB
ALBUMIN SERPL-MCNC: 4.3 G/DL (ref 3.5–5.2)
ALP BLD-CCNC: 92 U/L (ref 40–130)
ALT SERPL-CCNC: 20 U/L (ref 21–72)
ANION GAP SERPL CALCULATED.3IONS-SCNC: 9 MMOL/L (ref 7–19)
AST SERPL-CCNC: 25 U/L (ref 17–59)
BASOPHILS ABSOLUTE: 0.07 K/UL (ref 0.01–0.08)
BASOPHILS RELATIVE PERCENT: 2 % (ref 0.1–1.2)
BILIRUB SERPL-MCNC: <0.2 MG/DL (ref 0.2–1.3)
BUN BLDV-MCNC: 27 MG/DL (ref 9–20)
CALCIUM SERPL-MCNC: 9.2 MG/DL (ref 8.4–10.2)
CHLORIDE BLD-SCNC: 100 MMOL/L (ref 98–111)
CO2: 28 MMOL/L (ref 22–29)
CREAT SERPL-MCNC: 1.2 MG/DL (ref 0.6–1.2)
EOSINOPHILS ABSOLUTE: 0.12 K/UL (ref 0.04–0.54)
EOSINOPHILS RELATIVE PERCENT: 3.5 % (ref 0.7–7)
GFR NON-AFRICAN AMERICAN: 58
GLUCOSE BLD-MCNC: 200 MG/DL (ref 74–106)
HCT VFR BLD CALC: 30.2 % (ref 40.1–51)
HEMOGLOBIN: 9.9 G/DL (ref 13.7–17.5)
LYMPHOCYTES ABSOLUTE: 0.92 K/UL (ref 1.18–3.74)
LYMPHOCYTES RELATIVE PERCENT: 26.9 % (ref 19.3–53.1)
MCH RBC QN AUTO: 29.8 PG (ref 25.7–32.2)
MCHC RBC AUTO-ENTMCNC: 32.8 G/DL (ref 32.3–36.5)
MCV RBC AUTO: 91 FL (ref 79–92.2)
MONOCYTES ABSOLUTE: 0.22 K/UL (ref 0.24–0.82)
MONOCYTES RELATIVE PERCENT: 6.4 % (ref 4.7–12.5)
NEUTROPHILS ABSOLUTE: 2.09 K/UL (ref 1.56–6.13)
NEUTROPHILS RELATIVE PERCENT: 61.2 % (ref 34–71.1)
PDW BLD-RTO: 14.3 % (ref 11.6–14.4)
PLATELET # BLD: 195 K/UL (ref 163–337)
PMV BLD AUTO: 9.9 FL (ref 7.4–10.4)
POTASSIUM SERPL-SCNC: 5.3 MMOL/L (ref 3.5–5.1)
RBC # BLD: 3.32 M/UL (ref 4.63–6.08)
SODIUM BLD-SCNC: 137 MMOL/L (ref 137–145)
TOTAL PROTEIN: 7.4 G/DL (ref 6.3–8.2)
WBC # BLD: 3.42 K/UL (ref 4.23–9.07)

## 2021-05-12 PROCEDURE — 2500000003 HC RX 250 WO HCPCS: Performed by: INTERNAL MEDICINE

## 2021-05-12 PROCEDURE — 80053 COMPREHEN METABOLIC PANEL: CPT

## 2021-05-12 PROCEDURE — 77386: CPT | Performed by: RADIOLOGY

## 2021-05-12 PROCEDURE — 96413 CHEMO IV INFUSION 1 HR: CPT

## 2021-05-12 PROCEDURE — 96375 TX/PRO/DX INJ NEW DRUG ADDON: CPT

## 2021-05-12 PROCEDURE — 77336 RADIATION PHYSICS CONSULT: CPT | Performed by: RADIOLOGY

## 2021-05-12 PROCEDURE — 96417 CHEMO IV INFUS EACH ADDL SEQ: CPT

## 2021-05-12 PROCEDURE — 36415 COLL VENOUS BLD VENIPUNCTURE: CPT

## 2021-05-12 PROCEDURE — 2580000003 HC RX 258: Performed by: INTERNAL MEDICINE

## 2021-05-12 PROCEDURE — 85025 COMPLETE CBC W/AUTO DIFF WBC: CPT

## 2021-05-12 PROCEDURE — 6360000002 HC RX W HCPCS: Performed by: INTERNAL MEDICINE

## 2021-05-12 RX ORDER — MEPERIDINE HYDROCHLORIDE 50 MG/ML
12.5 INJECTION INTRAMUSCULAR; INTRAVENOUS; SUBCUTANEOUS ONCE
Status: CANCELLED | OUTPATIENT
Start: 2021-05-12 | End: 2021-05-12

## 2021-05-12 RX ORDER — HEPARIN SODIUM (PORCINE) LOCK FLUSH IV SOLN 100 UNIT/ML 100 UNIT/ML
500 SOLUTION INTRAVENOUS PRN
Status: CANCELLED | OUTPATIENT
Start: 2021-05-12

## 2021-05-12 RX ORDER — HEPARIN SODIUM (PORCINE) LOCK FLUSH IV SOLN 100 UNIT/ML 100 UNIT/ML
500 SOLUTION INTRAVENOUS PRN
Status: DISCONTINUED | OUTPATIENT
Start: 2021-05-12 | End: 2021-05-13 | Stop reason: HOSPADM

## 2021-05-12 RX ORDER — SODIUM CHLORIDE 0.9 % (FLUSH) 0.9 %
5-40 SYRINGE (ML) INJECTION PRN
Status: CANCELLED | OUTPATIENT
Start: 2021-05-12

## 2021-05-12 RX ORDER — SODIUM CHLORIDE 9 MG/ML
20 INJECTION, SOLUTION INTRAVENOUS ONCE
Status: COMPLETED | OUTPATIENT
Start: 2021-05-12 | End: 2021-05-13

## 2021-05-12 RX ORDER — DEXAMETHASONE SODIUM PHOSPHATE 10 MG/ML
10 INJECTION, SOLUTION INTRAMUSCULAR; INTRAVENOUS ONCE
Status: COMPLETED | OUTPATIENT
Start: 2021-05-12 | End: 2021-05-12

## 2021-05-12 RX ORDER — DIPHENHYDRAMINE HYDROCHLORIDE 50 MG/ML
50 INJECTION INTRAMUSCULAR; INTRAVENOUS ONCE
Status: CANCELLED | OUTPATIENT
Start: 2021-05-12 | End: 2021-05-12

## 2021-05-12 RX ORDER — DIPHENHYDRAMINE HYDROCHLORIDE 50 MG/ML
50 INJECTION INTRAMUSCULAR; INTRAVENOUS ONCE
Status: COMPLETED | OUTPATIENT
Start: 2021-05-12 | End: 2021-05-12

## 2021-05-12 RX ORDER — EPINEPHRINE 1 MG/ML
0.3 INJECTION, SOLUTION, CONCENTRATE INTRAVENOUS PRN
Status: CANCELLED | OUTPATIENT
Start: 2021-05-12

## 2021-05-12 RX ORDER — SODIUM CHLORIDE 0.9 % (FLUSH) 0.9 %
5-40 SYRINGE (ML) INJECTION PRN
Status: DISCONTINUED | OUTPATIENT
Start: 2021-05-12 | End: 2021-05-13 | Stop reason: HOSPADM

## 2021-05-12 RX ORDER — SODIUM CHLORIDE 9 MG/ML
INJECTION, SOLUTION INTRAVENOUS CONTINUOUS
Status: CANCELLED | OUTPATIENT
Start: 2021-05-12

## 2021-05-12 RX ORDER — PALONOSETRON 0.05 MG/ML
0.25 INJECTION, SOLUTION INTRAVENOUS ONCE
Status: COMPLETED | OUTPATIENT
Start: 2021-05-12 | End: 2021-05-12

## 2021-05-12 RX ORDER — METHYLPREDNISOLONE SODIUM SUCCINATE 125 MG/2ML
125 INJECTION, POWDER, LYOPHILIZED, FOR SOLUTION INTRAMUSCULAR; INTRAVENOUS ONCE
Status: CANCELLED | OUTPATIENT
Start: 2021-05-12 | End: 2021-05-12

## 2021-05-12 RX ORDER — SODIUM CHLORIDE 9 MG/ML
25 INJECTION, SOLUTION INTRAVENOUS PRN
Status: CANCELLED | OUTPATIENT
Start: 2021-05-12

## 2021-05-12 RX ADMIN — PACLITAXEL 96 MG: 6 INJECTION, SOLUTION, CONCENTRATE INTRAVENOUS at 13:24

## 2021-05-12 RX ADMIN — PALONOSETRON HYDROCHLORIDE 0.25 MG: 0.25 INJECTION INTRAVENOUS at 12:47

## 2021-05-12 RX ADMIN — FAMOTIDINE 20 MG: 10 INJECTION, SOLUTION INTRAVENOUS at 12:48

## 2021-05-12 RX ADMIN — CARBOPLATIN 172 MG: 10 INJECTION, SOLUTION INTRAVENOUS at 14:28

## 2021-05-12 RX ADMIN — DIPHENHYDRAMINE HYDROCHLORIDE 50 MG: 50 INJECTION, SOLUTION INTRAMUSCULAR; INTRAVENOUS at 12:47

## 2021-05-12 RX ADMIN — SODIUM CHLORIDE, PRESERVATIVE FREE 10 ML: 5 INJECTION INTRAVENOUS at 15:13

## 2021-05-12 RX ADMIN — DEXAMETHASONE SODIUM PHOSPHATE 10 MG: 10 INJECTION, SOLUTION INTRAMUSCULAR; INTRAVENOUS at 12:48

## 2021-05-12 RX ADMIN — SODIUM CHLORIDE 20 ML/HR: 9 INJECTION, SOLUTION INTRAVENOUS at 12:47

## 2021-05-12 RX ADMIN — HEPARIN 500 UNITS: 100 SYRINGE at 15:13

## 2021-05-13 ENCOUNTER — HOSPITAL ENCOUNTER (OUTPATIENT)
Dept: RADIATION ONCOLOGY | Facility: HOSPITAL | Age: 81
Setting detail: RADIATION/ONCOLOGY SERIES
Discharge: HOME | End: 2021-05-13

## 2021-05-13 PROCEDURE — 77386: CPT | Performed by: RADIOLOGY

## 2021-05-14 ENCOUNTER — HOSPITAL ENCOUNTER (OUTPATIENT)
Dept: RADIATION ONCOLOGY | Facility: HOSPITAL | Age: 81
Setting detail: RADIATION/ONCOLOGY SERIES
Discharge: HOME | End: 2021-05-14

## 2021-05-14 PROCEDURE — 77386: CPT | Performed by: RADIOLOGY

## 2021-05-17 ENCOUNTER — HOSPITAL ENCOUNTER (OUTPATIENT)
Dept: RADIATION ONCOLOGY | Facility: HOSPITAL | Age: 81
Setting detail: RADIATION/ONCOLOGY SERIES
Discharge: HOME | End: 2021-05-17

## 2021-05-17 PROCEDURE — 77386: CPT | Performed by: RADIOLOGY

## 2021-05-18 ENCOUNTER — HOSPITAL ENCOUNTER (OUTPATIENT)
Dept: RADIATION ONCOLOGY | Facility: HOSPITAL | Age: 81
Setting detail: RADIATION/ONCOLOGY SERIES
Discharge: HOME | End: 2021-05-18

## 2021-05-18 PROCEDURE — 77300 RADIATION THERAPY DOSE PLAN: CPT | Performed by: RADIOLOGY

## 2021-05-18 PROCEDURE — 77386: CPT | Performed by: RADIOLOGY

## 2021-05-19 ENCOUNTER — HOSPITAL ENCOUNTER (OUTPATIENT)
Dept: INFUSION THERAPY | Age: 81
Discharge: HOME | End: 2021-05-19
Payer: MEDICARE

## 2021-05-19 ENCOUNTER — HOSPITAL ENCOUNTER (OUTPATIENT)
Dept: RADIATION ONCOLOGY | Facility: HOSPITAL | Age: 81
Setting detail: RADIATION/ONCOLOGY SERIES
Discharge: HOME | End: 2021-05-19

## 2021-05-19 ENCOUNTER — APPOINTMENT (OUTPATIENT)
Dept: RADIATION ONCOLOGY | Facility: HOSPITAL | Age: 81
End: 2021-05-19

## 2021-05-19 VITALS
HEIGHT: 70 IN | SYSTOLIC BLOOD PRESSURE: 134 MMHG | OXYGEN SATURATION: 99 % | WEIGHT: 200 LBS | DIASTOLIC BLOOD PRESSURE: 67 MMHG | TEMPERATURE: 97.3 F | BODY MASS INDEX: 28.63 KG/M2 | HEART RATE: 85 BPM

## 2021-05-19 DIAGNOSIS — C34.91 ADENOCARCINOMA OF RIGHT LUNG (HCC): Primary | ICD-10-CM

## 2021-05-19 LAB
ALBUMIN SERPL-MCNC: 4.1 G/DL (ref 3.5–5.2)
ALP BLD-CCNC: 86 U/L (ref 40–130)
ALT SERPL-CCNC: 17 U/L (ref 21–72)
ANION GAP SERPL CALCULATED.3IONS-SCNC: 12 MMOL/L (ref 7–19)
AST SERPL-CCNC: 27 U/L (ref 17–59)
BASOPHILS ABSOLUTE: 0.05 K/UL (ref 0.01–0.08)
BASOPHILS RELATIVE PERCENT: 1.7 % (ref 0.1–1.2)
BILIRUB SERPL-MCNC: 0.3 MG/DL (ref 0.2–1.3)
BUN BLDV-MCNC: 31 MG/DL (ref 9–20)
CALCIUM SERPL-MCNC: 8.9 MG/DL (ref 8.4–10.2)
CHLORIDE BLD-SCNC: 101 MMOL/L (ref 98–111)
CO2: 26 MMOL/L (ref 22–29)
CREAT SERPL-MCNC: 1.3 MG/DL (ref 0.6–1.2)
EOSINOPHILS ABSOLUTE: 0.06 K/UL (ref 0.04–0.54)
EOSINOPHILS RELATIVE PERCENT: 2 % (ref 0.7–7)
GFR NON-AFRICAN AMERICAN: 53
GLUCOSE BLD-MCNC: 219 MG/DL (ref 74–106)
HCT VFR BLD CALC: 28.7 % (ref 40.1–51)
HEMOGLOBIN: 9.3 G/DL (ref 13.7–17.5)
LYMPHOCYTES ABSOLUTE: 0.78 K/UL (ref 1.18–3.74)
LYMPHOCYTES RELATIVE PERCENT: 26.4 % (ref 19.3–53.1)
MCH RBC QN AUTO: 29.8 PG (ref 25.7–32.2)
MCHC RBC AUTO-ENTMCNC: 32.4 G/DL (ref 32.3–36.5)
MCV RBC AUTO: 92 FL (ref 79–92.2)
MONOCYTES ABSOLUTE: 0.15 K/UL (ref 0.24–0.82)
MONOCYTES RELATIVE PERCENT: 5.1 % (ref 4.7–12.5)
NEUTROPHILS ABSOLUTE: 1.92 K/UL (ref 1.56–6.13)
NEUTROPHILS RELATIVE PERCENT: 64.8 % (ref 34–71.1)
PDW BLD-RTO: 14.9 % (ref 11.6–14.4)
PLATELET # BLD: 115 K/UL (ref 163–337)
PMV BLD AUTO: 9 FL (ref 7.4–10.4)
POTASSIUM SERPL-SCNC: 4.7 MMOL/L (ref 3.5–5.1)
RBC # BLD: 3.12 M/UL (ref 4.63–6.08)
SODIUM BLD-SCNC: 139 MMOL/L (ref 137–145)
TOTAL PROTEIN: 7.2 G/DL (ref 6.3–8.2)
WBC # BLD: 2.96 K/UL (ref 4.23–9.07)

## 2021-05-19 PROCEDURE — 96375 TX/PRO/DX INJ NEW DRUG ADDON: CPT

## 2021-05-19 PROCEDURE — 96417 CHEMO IV INFUS EACH ADDL SEQ: CPT

## 2021-05-19 PROCEDURE — 96413 CHEMO IV INFUSION 1 HR: CPT

## 2021-05-19 PROCEDURE — 2580000003 HC RX 258: Performed by: INTERNAL MEDICINE

## 2021-05-19 PROCEDURE — 36415 COLL VENOUS BLD VENIPUNCTURE: CPT

## 2021-05-19 PROCEDURE — 85025 COMPLETE CBC W/AUTO DIFF WBC: CPT

## 2021-05-19 PROCEDURE — 80053 COMPREHEN METABOLIC PANEL: CPT

## 2021-05-19 PROCEDURE — 6360000002 HC RX W HCPCS: Performed by: INTERNAL MEDICINE

## 2021-05-19 PROCEDURE — 77386: CPT | Performed by: RADIOLOGY

## 2021-05-19 PROCEDURE — 77336 RADIATION PHYSICS CONSULT: CPT | Performed by: RADIOLOGY

## 2021-05-19 PROCEDURE — 2500000003 HC RX 250 WO HCPCS: Performed by: INTERNAL MEDICINE

## 2021-05-19 RX ORDER — MEPERIDINE HYDROCHLORIDE 50 MG/ML
12.5 INJECTION INTRAMUSCULAR; INTRAVENOUS; SUBCUTANEOUS ONCE
Status: CANCELLED | OUTPATIENT
Start: 2021-05-19 | End: 2021-05-19

## 2021-05-19 RX ORDER — EPINEPHRINE 1 MG/ML
0.3 INJECTION, SOLUTION, CONCENTRATE INTRAVENOUS PRN
Status: CANCELLED | OUTPATIENT
Start: 2021-05-19

## 2021-05-19 RX ORDER — SODIUM CHLORIDE 0.9 % (FLUSH) 0.9 %
5-40 SYRINGE (ML) INJECTION PRN
Status: DISCONTINUED | OUTPATIENT
Start: 2021-05-19 | End: 2021-05-20 | Stop reason: HOSPADM

## 2021-05-19 RX ORDER — SODIUM CHLORIDE 0.9 % (FLUSH) 0.9 %
5-40 SYRINGE (ML) INJECTION PRN
Status: CANCELLED | OUTPATIENT
Start: 2021-05-19

## 2021-05-19 RX ORDER — SODIUM CHLORIDE 9 MG/ML
20 INJECTION, SOLUTION INTRAVENOUS ONCE
Status: CANCELLED | OUTPATIENT
Start: 2021-05-19 | End: 2021-05-19

## 2021-05-19 RX ORDER — DIPHENHYDRAMINE HYDROCHLORIDE 50 MG/ML
50 INJECTION INTRAMUSCULAR; INTRAVENOUS ONCE
Status: COMPLETED | OUTPATIENT
Start: 2021-05-19 | End: 2021-05-19

## 2021-05-19 RX ORDER — METHYLPREDNISOLONE SODIUM SUCCINATE 125 MG/2ML
125 INJECTION, POWDER, LYOPHILIZED, FOR SOLUTION INTRAMUSCULAR; INTRAVENOUS ONCE
Status: CANCELLED | OUTPATIENT
Start: 2021-05-19 | End: 2021-05-19

## 2021-05-19 RX ORDER — DIPHENHYDRAMINE HYDROCHLORIDE 50 MG/ML
50 INJECTION INTRAMUSCULAR; INTRAVENOUS ONCE
Status: CANCELLED | OUTPATIENT
Start: 2021-05-19 | End: 2021-05-19

## 2021-05-19 RX ORDER — HEPARIN SODIUM (PORCINE) LOCK FLUSH IV SOLN 100 UNIT/ML 100 UNIT/ML
500 SOLUTION INTRAVENOUS PRN
Status: DISCONTINUED | OUTPATIENT
Start: 2021-05-19 | End: 2021-05-20 | Stop reason: HOSPADM

## 2021-05-19 RX ORDER — PALONOSETRON 0.05 MG/ML
0.25 INJECTION, SOLUTION INTRAVENOUS ONCE
Status: COMPLETED | OUTPATIENT
Start: 2021-05-19 | End: 2021-05-19

## 2021-05-19 RX ORDER — HEPARIN SODIUM (PORCINE) LOCK FLUSH IV SOLN 100 UNIT/ML 100 UNIT/ML
500 SOLUTION INTRAVENOUS PRN
Status: CANCELLED | OUTPATIENT
Start: 2021-05-19

## 2021-05-19 RX ORDER — SODIUM CHLORIDE 9 MG/ML
25 INJECTION, SOLUTION INTRAVENOUS PRN
Status: CANCELLED | OUTPATIENT
Start: 2021-05-19

## 2021-05-19 RX ORDER — SODIUM CHLORIDE 9 MG/ML
INJECTION, SOLUTION INTRAVENOUS CONTINUOUS
Status: CANCELLED | OUTPATIENT
Start: 2021-05-19

## 2021-05-19 RX ORDER — DEXAMETHASONE SODIUM PHOSPHATE 10 MG/ML
10 INJECTION, SOLUTION INTRAMUSCULAR; INTRAVENOUS ONCE
Status: COMPLETED | OUTPATIENT
Start: 2021-05-19 | End: 2021-05-19

## 2021-05-19 RX ADMIN — PACLITAXEL 96 MG: 6 INJECTION, SOLUTION, CONCENTRATE INTRAVENOUS at 12:52

## 2021-05-19 RX ADMIN — CARBOPLATIN 172 MG: 10 INJECTION, SOLUTION INTRAVENOUS at 13:57

## 2021-05-19 RX ADMIN — HEPARIN 500 UNITS: 100 SYRINGE at 14:35

## 2021-05-19 RX ADMIN — PALONOSETRON 0.25 MG: 0.05 INJECTION, SOLUTION INTRAVENOUS at 12:19

## 2021-05-19 RX ADMIN — DEXAMETHASONE SODIUM PHOSPHATE 10 MG: 10 INJECTION, SOLUTION INTRAMUSCULAR; INTRAVENOUS at 12:19

## 2021-05-19 RX ADMIN — DIPHENHYDRAMINE HYDROCHLORIDE 50 MG: 50 INJECTION, SOLUTION INTRAMUSCULAR; INTRAVENOUS at 12:19

## 2021-05-19 RX ADMIN — SODIUM CHLORIDE, PRESERVATIVE FREE 10 ML: 5 INJECTION INTRAVENOUS at 14:35

## 2021-05-19 RX ADMIN — FAMOTIDINE 20 MG: 10 INJECTION, SOLUTION INTRAVENOUS at 12:19

## 2021-05-20 ENCOUNTER — HOSPITAL ENCOUNTER (OUTPATIENT)
Dept: RADIATION ONCOLOGY | Facility: HOSPITAL | Age: 81
Setting detail: RADIATION/ONCOLOGY SERIES
Discharge: HOME | End: 2021-05-20

## 2021-05-20 PROCEDURE — 77386: CPT | Performed by: RADIOLOGY

## 2021-05-21 ENCOUNTER — APPOINTMENT (OUTPATIENT)
Dept: RADIATION ONCOLOGY | Facility: HOSPITAL | Age: 81
End: 2021-05-21

## 2021-05-21 ENCOUNTER — HOSPITAL ENCOUNTER (OUTPATIENT)
Dept: RADIATION ONCOLOGY | Facility: HOSPITAL | Age: 81
Setting detail: RADIATION/ONCOLOGY SERIES
Discharge: HOME | End: 2021-05-21

## 2021-05-21 PROCEDURE — 77386: CPT | Performed by: RADIOLOGY

## 2021-05-24 ENCOUNTER — HOSPITAL ENCOUNTER (OUTPATIENT)
Dept: RADIATION ONCOLOGY | Facility: HOSPITAL | Age: 81
Setting detail: RADIATION/ONCOLOGY SERIES
Discharge: HOME | End: 2021-05-24

## 2021-05-24 PROCEDURE — 77386: CPT | Performed by: RADIOLOGY

## 2021-05-24 NOTE — PROGRESS NOTES
MEDICAL ONCOLOGY PROGRESS NOTE    Pt Name: Martha Nolan  MRN: [id number]  YOB: 1940  Date of evaluation: 5/26/2021    HISTORY OF PRESENT ILLNESS:    The patient presents today for his 6 cycle of chemotherapy. He is currently receiving concurrent chemoradiation. He has been tolerating treatment with complaints of increased fatigue, decreased appetite. He denies any sensory neuropathy. He presents today for consideration of cycle #6. CBC showed grade 2 neutropenia. Platelet counts 035,405. Hemoglobin 8.7. He is overall very tired. Diagnosis  · RUL adenocarcinoma, NSCLC, Dec 2020  · [id number], stage IIIA  · Systolic heart failure  · Hypertension  · Not a surgical candidate    Treatment Summary  · 4/21/2021recommend concurrent chemoradiation with carboplatin/taxol to be followed by immunotherapy    Hematology/Cancer History:  Hoang Farooq was first seen by me on 4/7/2021 referred by Dr. Alan Levine, [facility], [place] AT [place] for findings of non-small cell lung cancer, adenocarcinoma type. He has several comorbidities including history of type 2 diabetes, hypertension COPD. History of smoking in the past.  Quit many years ago. History of coronary artery disease followed by cardiology biopsy. Last EF 70% in 2018. He was seen by his primary care provider in November 2020. He has complaint of chest pain. Chest x-ray showed a 4 cm mass in the right upper lung. · 12/1/20 CT chest [facility]): Large right upper lobe mass concerning for primary neoplasm. Enlarged right hilar lymph node concerning for metastatic disease. Additional noncalcified pulmonary nodules bilaterally for which follow-up CT is recommended in 3-6 months. Atherosclerosis of the aorta and coronary arteries. · 12/16/2020bronchoscopy with bronchoalveolar lavage was nondiagnostic  · 2/23/21 CT cheat w/o [facility]): Probable right upper lobe mass, as described. This appears slightly larger in size and is likely neoplastic.  Consider PET CT follow-up. There is new surrounding infiltrate that extends inferiorly into the right upper lobe. The new may be infectious or inflammatory. Pulmonary hemorrhage possible. Other areas of nodularity and groundglass opacity/nodularity are stable in appearance. · 3/5/2021navigational bronchoscopy with biopsy was nondiagnostic  · 3/5/21 CT chest w/o [facility]): [place] soft tissue mass within the right upper lobe highly suspicious for neoplasm. There is associated postobstructive pneumonitis within the right upper lobe showing mild progression from the previous study of 2/23/2021. There is no associated hilar or mediastinal lymphadenopathy. Today's study is performed as part of a navigational bronchoscopy exam. Other scattered nodules including groundglass nodules are noted within the lungs all stable from the previous exam warranting follow-up. Heavy atheromatous calcification of the thoracic aorta and proximal great vessels. Coronary calcifications are present. · 3/23/21 PET scan [facility]): Markedly FDG avid right upper lobe mass in the right upper lobe measuring 7.2 cm triangular opacity with maximum SUV 12.18, highly concerning for malignancy. Adjacent groundglass opacities,  similar compared to 3/5/2021, which could represent additional  malignancy or infection/inflammation. Adjacent right upper lobe groundglass opacities are similar compared to 3/5/2021, which could represent additional malignancy or infection/inflammation. Other non-FDG avid pulmonary findings as above. No evidence of jasmin or distant metastatic disease. · 3/29/21 Navigational bronchoscopy-Dr. Saulo Castillo: Bronchoalveolar lavage RUL consistent with adenocarcinoma. RUL transbronchial bipsy consistent with adenocarcinoma. FNA biopsy of several jasmin station negative for metastatic disease. · 4/7/2021he was first seen by me. · 4/15/21 2D echo: Normal left ventricular size with reduced global systolic function EF 26-18%.  Mild concentric left ventricular hypertrophy with mild [grade 1] diastolic dysfunction. Normal left atrial size. Mild thickening of a poorly visualized aortic valve without evidence of stenosis or insufficiency. Mild thickening of a normally mobile mitral valve with mild regurgitation. Nonvisualization pulmonic valve. Poorly visualized but apparently normal size right-sided chambers with preserved right ventricular systolic function. No tricuspid regurgitation with which to estimate RVSP. No significant pericardial effusion. Aortic root and ascending segments measured within normal limits. · 4/17/21 MRI brain: No acute intracranial process. No metastatic disease identified in the CNS. · 4/21/2021initiation of carboplatin/Taxol weekly. Reviewed MRI brain 2D echo.     Past Medical History:    Past Medical History:   Diagnosis Date    Allergic rhinitis     Bronchitis, chronic (HCC)     Carotid artery stenosis     GERD (gastroesophageal reflux disease)     Heart attack ([facility] [address]     Hemorrhoids     Hypercholesterolemia     Type II or unspecified type diabetes mellitus without mention of complication, not stated as uncontrolled        Past Surgical History:    Past Surgical History:   Procedure Laterality Date    CARDIAC SURGERY      balloon surgery (angioplasty)    PORT SURGERY Right 04/01/2021       Social History:    Marital status: , 2 daughters  Smoking status: Former smoker for ~30 years, 1.5 ppd  ETOH status: No  Resides: [place], [place]    Family History:   Family History   Problem Relation Age of Onset    Diabetes Mother     High Blood Pressure Mother     Cancer Father         Lung       Current Hospital Medications:    Current Outpatient Medications   Medication Sig Dispense Refill    propranolol (INDERAL) 60 MG tablet Take 1 tablet by mouth 2 times daily For blood pressure and tremor 180 tablet 3    HYDROcodone-acetaminophen (NORCO) 7.5-325 MG per tablet       metoprolol tartrate (LOPRESSOR) 50 MG tablet Take 50 mg by mouth daily      montelukast (SINGULAIR) 10 MG tablet       ondansetron (ZOFRAN) 8 MG tablet Take 1 tablet by mouth every 8 hours as needed for Nausea or Vomiting 30 tablet 3    lidocaine-prilocaine (EMLA) 2.5-2.5 % cream Apply topically to port area and cover with plastic wrap one hour prior to treatment. 1 Tube 1    furosemide (LASIX) 40 MG tablet TAKE 1 TABLET DAILY FOR FLUID 90 tablet 3    metFORMIN (GLUCOPHAGE) 1000 MG tablet TAKE 1 TABLET TWICE A DAY FOR DIABETES 180 tablet 3    blood glucose test strips (TRUE METRIX BLOOD GLUCOSE TEST) strip TEST BLOOD SUGAR ONCE DAILY *E11.9* 100 strip 5    simvastatin (ZOCOR) 40 MG tablet TAKE 1 TABLET AT BEDTIME FOR CHOLESTEROL 90 tablet 3    omeprazole (PRILOSEC) 40 MG delayed release capsule TAKE 1 CAPSULE DAILY FOR STOMACH 90 capsule 3    isosorbide mononitrate (IMDUR) 60 MG extended release tablet Take 60 mg by mouth      losartan (COZAAR) 25 MG tablet Take 25 mg by mouth      umeclidinium-vilanterol (ANORO ELLIPTA) 62.5-25 MCG/INH AEPB inhaler Inhale 1 puff into the lungs daily      primidone (MYSOLINE) 50 MG tablet 1 tab po BID for tremor 180 tablet 3    triamcinolone (KENALOG) 0.1 % cream Apply topically 2 times daily. To lower leg dry skin 3 Tube 3    tamsulosin (FLOMAX) 0.4 MG capsule Take 1 capsule by mouth daily. 90 capsule 3    aspirin 325 MG tablet Take 325 mg by mouth daily. No current facility-administered medications for this visit.      Facility-Administered Medications Ordered in Other Visits   Medication Dose Route Frequency Provider Last Rate Last Admin    sodium chloride flush 0.9 % injection 5-40 mL  5-40 mL Intravenous PRN Joceline Hughes MD   10 mL at 05/26/21 1307    heparin flush 100 UNIT/ML injection 500 Units  500 Units Intracatheter PRN Joceline Hughes MD   500 Units at 05/26/21 1305       Allergies: No Known Allergies      Subjective   REVIEW OF SYSTEMS:   CONSTITUTIONAL: no fever, no night sweats, fatigue;   HEENT: no blurring of vision, no double vision, no hearing difficulty, no tinnitus, no ulceration, no dysplasia, no epistaxis;   LUNGS: cough, no hemoptysis, no wheeze,  exertional shortness of breath;  CARDIOVASCULAR: no palpitation, no chest pain, exertional shortness of breath;  GI: no abdominal pain, no nausea, no vomiting, no diarrhea, constipation;  JESSIKA: no dysuria, no hematuria, no frequency or urgency, no nephrolithiasis;  MUSCULOSKELETAL: no joint pain, no swelling, no stiffness;   ENDOCRINE: no polyuria, no polydipsia, no cold or heat intolerance;  HEMATOLOGY:  easy bruising or bleeding, no history of clotting disorder;  DERMATOLOGY: no skin rash, no eczema, no pruritus;  PSYCHIATRY: no depression, no anxiety, no panic attacks, no suicidal ideation, no homicidal ideation;  NEUROLOGY: no syncope, no seizures, no numbness or tingling of hands, no numbness or tingling of feet, no paresis;     Objective   /71   Pulse 77   Temp 97.1 °F (36.2 °C)   Resp 18   Ht 5' 10\" (1.778 m)   Wt 201 lb 3.2 oz (91.3 kg)   SpO2 100%   BMI 28.87 kg/m²       PHYSICAL EXAM:  CONSTITUTIONAL: Alert, appropriate, no acute distress  EYES: Non icteric, EOM intact, pupils equal round   ENT: Mucus membranes moist, no oral pharyngeal lesions, external inspection of ears and nose are normal  NECK: Supple, no masses. No palpable thyroid mass  CHEST/LUNGS: CTA bilaterally, normal respiratory effort   CARDIOVASCULAR: RRR, no murmurs. No lower extremity edema  ABDOMEN: soft non-tender, active bowel sounds, no HSM. No palpable masses  EXTREMITIES: warm, full ROM in all 4 extremities, no focal weakness. SKIN: warm, dry with no rashes or lesions  LYMPH: No cervical, clavicular, axillary, or inguinal lymphadenopathy  NEUROLOGIC: follows commands, non focal   PSYCH: mood and affect appropriate.   Alert and oriented to time, place, person      LABORATORY RESULTS REVIEWED/ANALYZED BY ME:  4/7/21 [facility] One on pathology-cancelled due to insufficient specimen  Lab Results   Component Value Date    WBC 1.69 (LL) 05/26/2021    HGB 8.7 (L) 05/26/2021    HCT 26.2 (LL) 05/26/2021    MCV 90.7 05/26/2021     (L) 05/26/2021     Lab Results   Component Value Date    NEUTROABS 1.01 (L) 05/26/2021     Lab Results   Component Value Date     05/26/2021    K 4.6 05/26/2021     05/26/2021    CO2 23 05/26/2021    BUN 31 (H) 05/26/2021    CREATININE 1.2 05/26/2021    GLUCOSE 237 (H) 05/26/2021    CALCIUM 8.9 05/26/2021    PROT 7.5 05/26/2021    LABALBU 4.2 05/26/2021    BILITOT 0.4 05/26/2021    ALKPHOS 87 05/26/2021    AST 36 05/26/2021    ALT 18 (L) 05/26/2021    LABGLOM 58 (A) 05/26/2021    GFRAA 59 (L) 05/25/2021    GLOB 3.2 05/26/2021 5/26/21 CBC  WBC 1.69  Hgb 8.7  Plt 117  Neut 1.01    RADIOLOGY STUDIES REVIEWED BY ME:  No results found. ASSESSMENT:    Orders Placed This Encounter   Procedures    CT CHEST W CONTRAST     Standing Status:   Future     Standing Expiration Date:   5/26/2022     Scheduling Instructions:      sched 5 weeks     Order Specific Question:   Reason for exam:     Answer:   Lung cancer, current or r/o recurrence; Lung cancer, r/o recurrence; No known/automatically detected potential contraindications to iodinated contrast    Comprehensive Metabolic Panel     Standing Status:   Future     Number of Occurrences:   1     Standing Expiration Date:   5/26/2022      Will Casey was seen today for chemotherapy. Diagnoses and all orders for this visit:    Adenocarcinoma of right lung ([facility] [address]  -     Comprehensive Metabolic Panel; Future  -     CT CHEST W CONTRAST;  Future    Chemotherapy management, encounter for    Encounter for antineoplastic immunotherapy    Adverse effect of chemotherapy, subsequent encounter    Care plan discussed with patient    Chemotherapy-induced neutropenia ([facility] [address]    Antineoplastic chemotherapy induced anemia       [id number], stge IIIA, NSCLC, adenocarcinoma subtype  Essentially stage III disease. The patient is not a surgical candidate. Recommend chemoradiation followed by immunotherapy. She is not a surgical candidate. The lesion is unresectable. Chemotherapy regimen:  Carboplatin AUC 2  Taxol 50 mg/m²  Weekly  With concurrent radiation. Due to advanced age/frailty no further chemotherapy after completion chemoradiation. Reviewed notes from radiation oncology. Plan for 6600 cGy over 33 treatments. Hold treatment today due to grade 2 neutropenia. Delay treatment for 1 week. CT chest in 5 weeks to assess response. See MD for consideration of durvalumab maintenance if stable disease. Complex medical patientdiscussed with radiation oncology, pulmonary. Patient has several comorbidities. He will follow-up with PCP and other medical providers for his chronic medical problems. Diabetes mellitus type 2/hypertensioncontrolled. Discussed with PCP office. Patient will be seen tomorrow. Patient was given clonidine 0.1 mg.      Systolic heart failureEF 45%  2D echo-LVEF 40-45%. Mild LVH, Mild [grade 1] diastolic dysfunction. Controlled hypertension stable. Medication controlled. Followed by PCP and Cardiology. 138/71 mmHg    PLAN:  · Hold treatment today  · RTC 1 week for last weekly carbo/taxol  · Continue radiation therapy   · CT chest in 5 weeks  · RTC MD in tx room 6 weeks for Phillip Mejia am scribing for Deb Martinez MD. Electronically signed by Shahram Saldivar RN on 5/26/2021 at 1:07 PM CDT. I, Dr Conner Carrasco, personally performed the services described in this documentation as scribed by Shahram Saldivar RN in my presence and is both accurate and complete. I have seen, examined and reviewed this patient medication list, appropriate labs and imaging studies. I reviewed relevant medical records and others physicians notes. I discussed the plans of care with the patient.  I answered all the questions to the patients satisfaction. I have also reviewed the chief complaint (CC) and part of the history (History of Present Illness (HPI), Past Family Social History [facility]), or Review of Systems (ROS) and made changes when appropriated.        (Please note that portions of this note were completed with a voice recognition program. Efforts were made to edit the dictations but occasionally words are mis-transcribed.)      Lee Yusuf MD    05/26/21  2:38 PM

## 2021-05-25 ENCOUNTER — HOSPITAL ENCOUNTER (OUTPATIENT)
Dept: RADIATION ONCOLOGY | Facility: HOSPITAL | Age: 81
Setting detail: RADIATION/ONCOLOGY SERIES
Discharge: HOME | End: 2021-05-25

## 2021-05-25 ENCOUNTER — OFFICE VISIT (OUTPATIENT)
Dept: PRIMARY CARE CLINIC | Age: 81
End: 2021-05-25
Payer: MEDICARE

## 2021-05-25 VITALS
WEIGHT: 202 LBS | DIASTOLIC BLOOD PRESSURE: 80 MMHG | HEIGHT: 70 IN | SYSTOLIC BLOOD PRESSURE: 126 MMHG | BODY MASS INDEX: 28.92 KG/M2 | OXYGEN SATURATION: 100 % | HEART RATE: 66 BPM | TEMPERATURE: 96.1 F

## 2021-05-25 DIAGNOSIS — I10 ELEVATED BLOOD PRESSURE READING IN OFFICE WITH DIAGNOSIS OF HYPERTENSION: ICD-10-CM

## 2021-05-25 DIAGNOSIS — I10 ESSENTIAL HYPERTENSION: ICD-10-CM

## 2021-05-25 DIAGNOSIS — E78.00 HYPERCHOLESTEROLEMIA: ICD-10-CM

## 2021-05-25 DIAGNOSIS — E11.9 TYPE 2 DIABETES MELLITUS WITHOUT COMPLICATION, WITHOUT LONG-TERM CURRENT USE OF INSULIN (HCC): Primary | ICD-10-CM

## 2021-05-25 DIAGNOSIS — R53.82 CHRONIC FATIGUE: ICD-10-CM

## 2021-05-25 PROBLEM — Z87.891 FORMER SMOKER: Status: ACTIVE | Noted: 2021-04-07

## 2021-05-25 PROBLEM — Z57.9 OCCUPATIONAL EXPOSURE IN WORKPLACE: Status: ACTIVE | Noted: 2021-01-07

## 2021-05-25 LAB
ALBUMIN SERPL-MCNC: 4.2 G/DL (ref 3.5–5.2)
ALP BLD-CCNC: 92 U/L (ref 40–130)
ALT SERPL-CCNC: 17 U/L (ref 5–41)
ANION GAP SERPL CALCULATED.3IONS-SCNC: 15 MMOL/L (ref 7–19)
AST SERPL-CCNC: 17 U/L (ref 5–40)
BILIRUB SERPL-MCNC: <0.2 MG/DL (ref 0.2–1.2)
BUN BLDV-MCNC: 33 MG/DL (ref 8–23)
CALCIUM SERPL-MCNC: 9.5 MG/DL (ref 8.8–10.2)
CHLORIDE BLD-SCNC: 100 MMOL/L (ref 98–111)
CHOLESTEROL, TOTAL: 185 MG/DL (ref 160–199)
CO2: 23 MMOL/L (ref 22–29)
CREAT SERPL-MCNC: 1.4 MG/DL (ref 0.5–1.2)
CREATININE URINE: 178.2 MG/DL (ref 4.2–622)
GFR AFRICAN AMERICAN: 59
GFR NON-AFRICAN AMERICAN: 48
GLUCOSE BLD-MCNC: 178 MG/DL (ref 74–109)
HBA1C MFR BLD: 7.7 % (ref 4–6)
HDLC SERPL-MCNC: 36 MG/DL (ref 55–121)
LDL CHOLESTEROL CALCULATED: ABNORMAL MG/DL
LDL CHOLESTEROL DIRECT: 96 MG/DL
MICROALBUMIN UR-MCNC: 6.1 MG/DL (ref 0–19)
MICROALBUMIN/CREAT UR-RTO: 34.2 MG/G
POTASSIUM SERPL-SCNC: 4.7 MMOL/L (ref 3.5–5)
SODIUM BLD-SCNC: 138 MMOL/L (ref 136–145)
TOTAL PROTEIN: 7.7 G/DL (ref 6.6–8.7)
TRIGL SERPL-MCNC: 459 MG/DL (ref 0–149)

## 2021-05-25 PROCEDURE — 36415 COLL VENOUS BLD VENIPUNCTURE: CPT | Performed by: FAMILY MEDICINE

## 2021-05-25 PROCEDURE — 99213 OFFICE O/P EST LOW 20 MIN: CPT | Performed by: FAMILY MEDICINE

## 2021-05-25 PROCEDURE — 1123F ACP DISCUSS/DSCN MKR DOCD: CPT | Performed by: FAMILY MEDICINE

## 2021-05-25 PROCEDURE — G8417 CALC BMI ABV UP PARAM F/U: HCPCS | Performed by: FAMILY MEDICINE

## 2021-05-25 PROCEDURE — 4040F PNEUMOC VAC/ADMIN/RCVD: CPT | Performed by: FAMILY MEDICINE

## 2021-05-25 PROCEDURE — 1036F TOBACCO NON-USER: CPT | Performed by: FAMILY MEDICINE

## 2021-05-25 PROCEDURE — G8427 DOCREV CUR MEDS BY ELIG CLIN: HCPCS | Performed by: FAMILY MEDICINE

## 2021-05-25 PROCEDURE — 3051F HG A1C>EQUAL 7.0%<8.0%: CPT | Performed by: FAMILY MEDICINE

## 2021-05-25 PROCEDURE — 77386: CPT | Performed by: RADIOLOGY

## 2021-05-25 ASSESSMENT — ENCOUNTER SYMPTOMS
GASTROINTESTINAL NEGATIVE: 1
SHORTNESS OF BREATH: 1

## 2021-05-25 NOTE — PROGRESS NOTES
SUBJECTIVE:    Bushra Mejia is 79 y.o.male who comes in complaining of Follow-up (In regards to Diabetes. Nonfasting today. Patient still undergoing chemo due to right lung cancer.  )   . HPI:Mr. Sabillon came in today for follow-up of his diabetes. He says that his glucoses are very erratic. They may range anywhere from 120 to the 160s. He has been under a tremendous amount of stress. He is undergoing radiation and chemo for adenocarcinoma of the lung. At home his blood pressure is doing much better. He usually runs in the 130s over 70s. He feels like this is a good number. He denies any chest pain or shortness of breath. He says that he had to walk back to the office and that is why blood pressure might be up. He says his fatigue is terrible and he just does not have any energy. Denies any episodes of hypoglycemia. No Known Allergies    Social History     Socioeconomic History    Marital status:      Spouse name: None    Number of children: None    Years of education: None    Highest education level: None   Occupational History    None   Tobacco Use    Smoking status: Former Smoker     Packs/day: 1.50     Years: 30.00     Pack years: 45.00     Types: Cigarettes     Quit date:      Years since quittin.4    Smokeless tobacco: Never Used   Substance and Sexual Activity    Alcohol use: No    Drug use: No    Sexual activity: None   Other Topics Concern    None   Social History Narrative    None     Social Determinants of Health     Financial Resource Strain:     Difficulty of Paying Living Expenses:    Food Insecurity:     Worried About Running Out of Food in the Last Year:     Ran Out of Food in the Last Year:    Transportation Needs:     Lack of Transportation (Medical):      Lack of Transportation (Non-Medical):    Physical Activity:     Days of Exercise per Week:     Minutes of Exercise per Session:    Stress:     Feeling of Stress :    Social Connections:     Frequency of Communication with Friends and Family:     Frequency of Social Gatherings with Friends and Family:     Attends Mu-ism Services:     Active Member of Clubs or Organizations:     Attends Club or Organization Meetings:     Marital Status:    Intimate Partner Violence:     Fear of Current or Ex-Partner:     Emotionally Abused:     Physically Abused:     Sexually Abused:        Review of Systems   Constitutional: Positive for activity change and fatigue. HENT: Negative. Respiratory: Positive for shortness of breath. Cardiovascular: Negative. Gastrointestinal: Negative. Endocrine: Negative for polydipsia, polyphagia and polyuria. Musculoskeletal: Positive for arthralgias. Neurological: Positive for weakness. Negative for numbness. Hematological: Bruises/bleeds easily. Psychiatric/Behavioral: Negative. Current Outpatient Medications on File Prior to Visit   Medication Sig Dispense Refill    propranolol (INDERAL) 60 MG tablet Take 1 tablet by mouth 2 times daily For blood pressure and tremor 180 tablet 3    HYDROcodone-acetaminophen (NORCO) 7.5-325 MG per tablet       metoprolol tartrate (LOPRESSOR) 50 MG tablet Take 50 mg by mouth daily      montelukast (SINGULAIR) 10 MG tablet       ondansetron (ZOFRAN) 8 MG tablet Take 1 tablet by mouth every 8 hours as needed for Nausea or Vomiting 30 tablet 3    lidocaine-prilocaine (EMLA) 2.5-2.5 % cream Apply topically to port area and cover with plastic wrap one hour prior to treatment.  1 Tube 1    furosemide (LASIX) 40 MG tablet TAKE 1 TABLET DAILY FOR FLUID 90 tablet 3    metFORMIN (GLUCOPHAGE) 1000 MG tablet TAKE 1 TABLET TWICE A DAY FOR DIABETES 180 tablet 3    blood glucose test strips (TRUE METRIX BLOOD GLUCOSE TEST) strip TEST BLOOD SUGAR ONCE DAILY *E11.9* 100 strip 5    simvastatin (ZOCOR) 40 MG tablet TAKE 1 TABLET AT BEDTIME FOR CHOLESTEROL 90 tablet 3    omeprazole (PRILOSEC) 40 MG delayed release capsule TAKE 1 CAPSULE DAILY FOR STOMACH 90 capsule 3    isosorbide mononitrate (IMDUR) 60 MG extended release tablet Take 60 mg by mouth      losartan (COZAAR) 25 MG tablet Take 25 mg by mouth      umeclidinium-vilanterol (ANORO ELLIPTA) 62.5-25 MCG/INH AEPB inhaler Inhale 1 puff into the lungs daily      primidone (MYSOLINE) 50 MG tablet 1 tab po BID for tremor 180 tablet 3    triamcinolone (KENALOG) 0.1 % cream Apply topically 2 times daily. To lower leg dry skin 3 Tube 3    tamsulosin (FLOMAX) 0.4 MG capsule Take 1 capsule by mouth daily. 90 capsule 3    aspirin 325 MG tablet Take 325 mg by mouth daily.  [DISCONTINUED] potassium chloride (K-DUR) 10 MEQ tablet TAKE 1 TABLET TWICE A  tablet 2     No current facility-administered medications on file prior to visit. OBJECTIVE:    Wt Readings from Last 3 Encounters:   05/25/21 202 lb (91.6 kg)   05/19/21 200 lb (90.7 kg)   05/12/21 201 lb (91.2 kg)       /80   Pulse 66   Temp 96.1 °F (35.6 °C)   Ht 5' 10\" (1.778 m)   Wt 202 lb (91.6 kg)   SpO2 100%   BMI 28.98 kg/m²     Physical Exam  Vitals and nursing note reviewed. Constitutional:       General: He is not in acute distress. Appearance: Normal appearance. He is well-developed. He is not ill-appearing. HENT:      Head: Normocephalic. Right Ear: Tympanic membrane, ear canal and external ear normal.      Left Ear: Tympanic membrane, ear canal and external ear normal.      Nose: Nose normal. No rhinorrhea. Mouth/Throat:      Mouth: Mucous membranes are moist.      Pharynx: No posterior oropharyngeal erythema. Eyes:      Extraocular Movements: Extraocular movements intact. Conjunctiva/sclera: Conjunctivae normal.      Pupils: Pupils are equal, round, and reactive to light. Cardiovascular:      Rate and Rhythm: Normal rate and regular rhythm. Pulses: Normal pulses. Heart sounds: Normal heart sounds.    Pulmonary:      Effort: Pulmonary effort is normal.      Breath sounds: Normal breath sounds. Musculoskeletal:         General: Swelling present. Normal range of motion. Cervical back: Normal range of motion and neck supple. Comments: 1+ edema at both ankles   Lymphadenopathy:      Cervical: No cervical adenopathy. Skin:     General: Skin is warm and dry. Coloration: Skin is pale. Neurological:      General: No focal deficit present. Mental Status: He is alert and oriented to person, place, and time. Psychiatric:         Mood and Affect: Mood normal.         Behavior: Behavior normal.         Thought Content: Thought content normal.         Judgment: Judgment normal.      Comments: A little subdued for him         ASSESSMENT:    1. Type 2 diabetes mellitus without complication, without long-term current use of insulin ([address]    2. Essential hypertension    3. Elevated blood pressure reading in office with diagnosis of hypertension    4. Hypercholesterolemia    5. Chronic fatigue          PLAN:    MEDICATIONS:  No orders of the defined types were placed in this encounter. Continue current medications. We will refill when needed. ORDERS:  Orders Placed This Encounter   Procedures    Microalbumin / Creatinine Urine Ratio    Comprehensive Metabolic Panel    Lipid Panel    Hemoglobin A1C    LDL Cholesterol, Direct     We will contact him when we get results of his blood work. I am treating him under a comprehensive care plan for his diabetes. Continue to check glucoses once a day. He will continue his current medications. He is following up with his lung doctor and his oncologist.  We will monitor him closely. If he has any problems he is to let me know. Follow-up:  Return in about 6 months (around 11/25/2021) for Medicare annual wellness and fasting blood work. PATIENT INSTRUCTIONS:  There are no Patient Instructions on file for this visit.     EMR Dragon/transcription disclaimer:  Much of this encounter note is electronic transcription/translation of spoken language to printed texts. The electronic translation of spoken language may be erroneous, or at times, nonsensical words or phrases may beinadvertently transcribed.   Although I have reviewed the note for such errors, some may still exist.

## 2021-05-26 ENCOUNTER — OFFICE VISIT (OUTPATIENT)
Dept: HEMATOLOGY | Age: 81
End: 2021-05-26
Payer: MEDICARE

## 2021-05-26 ENCOUNTER — TELEPHONE (OUTPATIENT)
Dept: HEMATOLOGY | Age: 81
End: 2021-05-26

## 2021-05-26 ENCOUNTER — HOSPITAL ENCOUNTER (OUTPATIENT)
Dept: RADIATION ONCOLOGY | Facility: HOSPITAL | Age: 81
Setting detail: RADIATION/ONCOLOGY SERIES
Discharge: HOME | End: 2021-05-26

## 2021-05-26 ENCOUNTER — HOSPITAL ENCOUNTER (OUTPATIENT)
Dept: INFUSION THERAPY | Age: 81
Discharge: HOME | End: 2021-05-26
Payer: MEDICARE

## 2021-05-26 VITALS
SYSTOLIC BLOOD PRESSURE: 138 MMHG | OXYGEN SATURATION: 100 % | HEIGHT: 70 IN | RESPIRATION RATE: 18 BRPM | TEMPERATURE: 97.1 F | DIASTOLIC BLOOD PRESSURE: 71 MMHG | BODY MASS INDEX: 28.8 KG/M2 | HEART RATE: 77 BPM | WEIGHT: 201.2 LBS

## 2021-05-26 DIAGNOSIS — Z51.11 CHEMOTHERAPY MANAGEMENT, ENCOUNTER FOR: ICD-10-CM

## 2021-05-26 DIAGNOSIS — C34.91 ADENOCARCINOMA OF RIGHT LUNG (HCC): Primary | ICD-10-CM

## 2021-05-26 DIAGNOSIS — Z51.12 ENCOUNTER FOR ANTINEOPLASTIC IMMUNOTHERAPY: ICD-10-CM

## 2021-05-26 DIAGNOSIS — D70.1 CHEMOTHERAPY-INDUCED NEUTROPENIA (HCC): ICD-10-CM

## 2021-05-26 DIAGNOSIS — Z71.89 CARE PLAN DISCUSSED WITH PATIENT: ICD-10-CM

## 2021-05-26 DIAGNOSIS — T45.1X5A ANTINEOPLASTIC CHEMOTHERAPY INDUCED ANEMIA: ICD-10-CM

## 2021-05-26 DIAGNOSIS — T45.1X5D ADVERSE EFFECT OF CHEMOTHERAPY, SUBSEQUENT ENCOUNTER: ICD-10-CM

## 2021-05-26 DIAGNOSIS — T45.1X5A CHEMOTHERAPY-INDUCED NEUTROPENIA (HCC): ICD-10-CM

## 2021-05-26 DIAGNOSIS — D64.81 ANTINEOPLASTIC CHEMOTHERAPY INDUCED ANEMIA: ICD-10-CM

## 2021-05-26 LAB
ALBUMIN SERPL-MCNC: 4.2 G/DL (ref 3.5–5.2)
ALP BLD-CCNC: 87 U/L (ref 40–130)
ALT SERPL-CCNC: 18 U/L (ref 21–72)
ANION GAP SERPL CALCULATED.3IONS-SCNC: 14 MMOL/L (ref 7–19)
AST SERPL-CCNC: 36 U/L (ref 17–59)
BASOPHILS ABSOLUTE: 0.02 K/UL (ref 0.01–0.08)
BASOPHILS RELATIVE PERCENT: 1.2 % (ref 0.1–1.2)
BILIRUB SERPL-MCNC: 0.4 MG/DL (ref 0.2–1.3)
BUN BLDV-MCNC: 31 MG/DL (ref 9–20)
CALCIUM SERPL-MCNC: 8.9 MG/DL (ref 8.4–10.2)
CHLORIDE BLD-SCNC: 103 MMOL/L (ref 98–111)
CO2: 23 MMOL/L (ref 22–29)
CREAT SERPL-MCNC: 1.2 MG/DL (ref 0.6–1.2)
EOSINOPHILS ABSOLUTE: 0.06 K/UL (ref 0.04–0.54)
EOSINOPHILS RELATIVE PERCENT: 3.6 % (ref 0.7–7)
GFR NON-AFRICAN AMERICAN: 58
GLOBULIN: 3.2 G/DL
GLUCOSE BLD-MCNC: 237 MG/DL (ref 74–106)
HCT VFR BLD CALC: 26.2 % (ref 40.1–51)
HEMOGLOBIN: 8.7 G/DL (ref 13.7–17.5)
LYMPHOCYTES ABSOLUTE: 0.5 K/UL (ref 1.18–3.74)
LYMPHOCYTES RELATIVE PERCENT: 29.6 % (ref 19.3–53.1)
MCH RBC QN AUTO: 30.1 PG (ref 25.7–32.2)
MCHC RBC AUTO-ENTMCNC: 33.2 G/DL (ref 32.3–36.5)
MCV RBC AUTO: 90.7 FL (ref 79–92.2)
MONOCYTES ABSOLUTE: 0.1 K/UL (ref 0.24–0.82)
MONOCYTES RELATIVE PERCENT: 5.9 % (ref 4.7–12.5)
NEUTROPHILS ABSOLUTE: 1.01 K/UL (ref 1.56–6.13)
NEUTROPHILS RELATIVE PERCENT: 59.7 % (ref 34–71.1)
PDW BLD-RTO: 14.9 % (ref 11.6–14.4)
PLATELET # BLD: 117 K/UL (ref 163–337)
PMV BLD AUTO: 8.9 FL (ref 7.4–10.4)
POTASSIUM SERPL-SCNC: 4.6 MMOL/L (ref 3.5–5.1)
RBC # BLD: 2.89 M/UL (ref 4.63–6.08)
SODIUM BLD-SCNC: 140 MMOL/L (ref 137–145)
TOTAL PROTEIN: 7.5 G/DL (ref 6.3–8.2)
WBC # BLD: 1.69 K/UL (ref 4.23–9.07)

## 2021-05-26 PROCEDURE — 1123F ACP DISCUSS/DSCN MKR DOCD: CPT | Performed by: INTERNAL MEDICINE

## 2021-05-26 PROCEDURE — 77386: CPT | Performed by: RADIOLOGY

## 2021-05-26 PROCEDURE — G8427 DOCREV CUR MEDS BY ELIG CLIN: HCPCS | Performed by: INTERNAL MEDICINE

## 2021-05-26 PROCEDURE — 99212 OFFICE O/P EST SF 10 MIN: CPT

## 2021-05-26 PROCEDURE — 2580000003 HC RX 258: Performed by: INTERNAL MEDICINE

## 2021-05-26 PROCEDURE — 4040F PNEUMOC VAC/ADMIN/RCVD: CPT | Performed by: INTERNAL MEDICINE

## 2021-05-26 PROCEDURE — 36415 COLL VENOUS BLD VENIPUNCTURE: CPT

## 2021-05-26 PROCEDURE — 99214 OFFICE O/P EST MOD 30 MIN: CPT | Performed by: INTERNAL MEDICINE

## 2021-05-26 PROCEDURE — G8417 CALC BMI ABV UP PARAM F/U: HCPCS | Performed by: INTERNAL MEDICINE

## 2021-05-26 PROCEDURE — 96523 IRRIG DRUG DELIVERY DEVICE: CPT

## 2021-05-26 PROCEDURE — 6360000002 HC RX W HCPCS

## 2021-05-26 PROCEDURE — 85025 COMPLETE CBC W/AUTO DIFF WBC: CPT

## 2021-05-26 PROCEDURE — 1036F TOBACCO NON-USER: CPT | Performed by: INTERNAL MEDICINE

## 2021-05-26 PROCEDURE — 80053 COMPREHEN METABOLIC PANEL: CPT

## 2021-05-26 RX ORDER — SODIUM CHLORIDE 9 MG/ML
20 INJECTION, SOLUTION INTRAVENOUS ONCE
Status: CANCELLED | OUTPATIENT
Start: 2021-05-26 | End: 2021-05-26

## 2021-05-26 RX ORDER — HEPARIN SODIUM (PORCINE) LOCK FLUSH IV SOLN 100 UNIT/ML 100 UNIT/ML
500 SOLUTION INTRAVENOUS PRN
Status: CANCELLED | OUTPATIENT
Start: 2021-05-26

## 2021-05-26 RX ORDER — SODIUM CHLORIDE 9 MG/ML
25 INJECTION, SOLUTION INTRAVENOUS PRN
Status: CANCELLED | OUTPATIENT
Start: 2021-05-26

## 2021-05-26 RX ORDER — MEPERIDINE HYDROCHLORIDE 50 MG/ML
12.5 INJECTION INTRAMUSCULAR; INTRAVENOUS; SUBCUTANEOUS ONCE
Status: CANCELLED | OUTPATIENT
Start: 2021-05-26 | End: 2021-05-26

## 2021-05-26 RX ORDER — HEPARIN SODIUM (PORCINE) LOCK FLUSH IV SOLN 100 UNIT/ML 100 UNIT/ML
500 SOLUTION INTRAVENOUS PRN
Status: DISCONTINUED | OUTPATIENT
Start: 2021-05-26 | End: 2021-05-27 | Stop reason: HOSPADM

## 2021-05-26 RX ORDER — SODIUM CHLORIDE 9 MG/ML
INJECTION, SOLUTION INTRAVENOUS CONTINUOUS
Status: CANCELLED | OUTPATIENT
Start: 2021-05-26

## 2021-05-26 RX ORDER — SODIUM CHLORIDE 0.9 % (FLUSH) 0.9 %
5-40 SYRINGE (ML) INJECTION PRN
Status: CANCELLED | OUTPATIENT
Start: 2021-05-26

## 2021-05-26 RX ORDER — DIPHENHYDRAMINE HYDROCHLORIDE 50 MG/ML
50 INJECTION INTRAMUSCULAR; INTRAVENOUS ONCE
Status: CANCELLED | OUTPATIENT
Start: 2021-05-26 | End: 2021-05-26

## 2021-05-26 RX ORDER — EPINEPHRINE 1 MG/ML
0.3 INJECTION, SOLUTION, CONCENTRATE INTRAVENOUS PRN
Status: CANCELLED | OUTPATIENT
Start: 2021-05-26

## 2021-05-26 RX ORDER — DIPHENHYDRAMINE HYDROCHLORIDE 50 MG/ML
50 INJECTION INTRAMUSCULAR; INTRAVENOUS ONCE
Status: CANCELLED | OUTPATIENT
Start: 2021-05-26

## 2021-05-26 RX ORDER — PALONOSETRON 0.05 MG/ML
0.25 INJECTION, SOLUTION INTRAVENOUS ONCE
Status: CANCELLED | OUTPATIENT
Start: 2021-05-26

## 2021-05-26 RX ORDER — SODIUM CHLORIDE 0.9 % (FLUSH) 0.9 %
5-40 SYRINGE (ML) INJECTION PRN
Status: DISCONTINUED | OUTPATIENT
Start: 2021-05-26 | End: 2021-05-27 | Stop reason: HOSPADM

## 2021-05-26 RX ORDER — HEPARIN SODIUM (PORCINE) LOCK FLUSH IV SOLN 100 UNIT/ML 100 UNIT/ML
SOLUTION INTRAVENOUS
Status: COMPLETED
Start: 2021-05-26 | End: 2021-05-26

## 2021-05-26 RX ORDER — METHYLPREDNISOLONE SODIUM SUCCINATE 125 MG/2ML
125 INJECTION, POWDER, LYOPHILIZED, FOR SOLUTION INTRAMUSCULAR; INTRAVENOUS ONCE
Status: CANCELLED | OUTPATIENT
Start: 2021-05-26 | End: 2021-05-26

## 2021-05-26 RX ADMIN — HEPARIN SODIUM (PORCINE) LOCK FLUSH IV SOLN 100 UNIT/ML 500 UNITS: 100 SOLUTION at 13:05

## 2021-05-26 RX ADMIN — HEPARIN 500 UNITS: 100 SYRINGE at 13:05

## 2021-05-26 RX ADMIN — SODIUM CHLORIDE, PRESERVATIVE FREE 10 ML: 5 INJECTION INTRAVENOUS at 13:07

## 2021-05-27 ENCOUNTER — HOSPITAL ENCOUNTER (OUTPATIENT)
Dept: RADIATION ONCOLOGY | Facility: HOSPITAL | Age: 81
Setting detail: RADIATION/ONCOLOGY SERIES
Discharge: HOME | End: 2021-05-27

## 2021-05-27 PROCEDURE — 77336 RADIATION PHYSICS CONSULT: CPT | Performed by: RADIOLOGY

## 2021-05-27 PROCEDURE — 77386: CPT | Performed by: RADIOLOGY

## 2021-05-28 ENCOUNTER — HOSPITAL ENCOUNTER (OUTPATIENT)
Dept: RADIATION ONCOLOGY | Facility: HOSPITAL | Age: 81
Setting detail: RADIATION/ONCOLOGY SERIES
Discharge: HOME | End: 2021-05-28

## 2021-05-28 PROCEDURE — 77386: CPT | Performed by: RADIOLOGY

## 2021-05-31 ENCOUNTER — APPOINTMENT (OUTPATIENT)
Dept: RADIATION ONCOLOGY | Facility: HOSPITAL | Age: 81
End: 2021-05-31

## 2021-06-01 ENCOUNTER — HOSPITAL ENCOUNTER (OUTPATIENT)
Dept: RADIATION ONCOLOGY | Facility: HOSPITAL | Age: 81
Setting detail: RADIATION/ONCOLOGY SERIES
End: 2021-06-01

## 2021-06-01 ENCOUNTER — HOSPITAL ENCOUNTER (OUTPATIENT)
Dept: RADIATION ONCOLOGY | Facility: HOSPITAL | Age: 81
Setting detail: RADIATION/ONCOLOGY SERIES
Discharge: HOME | End: 2021-06-01

## 2021-06-01 PROCEDURE — 77386: CPT | Performed by: RADIOLOGY

## 2021-06-02 ENCOUNTER — HOSPITAL ENCOUNTER (OUTPATIENT)
Dept: RADIATION ONCOLOGY | Facility: HOSPITAL | Age: 81
Setting detail: RADIATION/ONCOLOGY SERIES
Discharge: HOME | End: 2021-06-02

## 2021-06-02 PROCEDURE — 77386: CPT | Performed by: RADIOLOGY

## 2021-06-03 ENCOUNTER — HOSPITAL ENCOUNTER (OUTPATIENT)
Dept: RADIATION ONCOLOGY | Facility: HOSPITAL | Age: 81
Setting detail: RADIATION/ONCOLOGY SERIES
Discharge: HOME | End: 2021-06-03

## 2021-06-03 ENCOUNTER — HOSPITAL ENCOUNTER (OUTPATIENT)
Dept: INFUSION THERAPY | Age: 81
Setting detail: INFUSION SERIES
Discharge: HOME | End: 2021-06-03
Payer: MEDICARE

## 2021-06-03 VITALS
DIASTOLIC BLOOD PRESSURE: 74 MMHG | TEMPERATURE: 97.8 F | OXYGEN SATURATION: 99 % | RESPIRATION RATE: 18 BRPM | SYSTOLIC BLOOD PRESSURE: 126 MMHG | HEART RATE: 54 BPM

## 2021-06-03 DIAGNOSIS — C34.91 ADENOCARCINOMA OF RIGHT LUNG (HCC): Primary | ICD-10-CM

## 2021-06-03 LAB
ALBUMIN SERPL-MCNC: 3.8 G/DL (ref 3.5–5.2)
ALP BLD-CCNC: 89 U/L (ref 40–130)
ALT SERPL-CCNC: 14 U/L (ref 5–41)
ANION GAP SERPL CALCULATED.3IONS-SCNC: 14 MMOL/L (ref 7–19)
ANISOCYTOSIS: ABNORMAL
AST SERPL-CCNC: 16 U/L (ref 5–40)
ATYPICAL LYMPHOCYTE RELATIVE PERCENT: 2 % (ref 0–8)
BASOPHILS ABSOLUTE: 0 K/UL (ref 0–0.2)
BASOPHILS RELATIVE PERCENT: 1 % (ref 0–1)
BILIRUB SERPL-MCNC: <0.2 MG/DL (ref 0.2–1.2)
BUN BLDV-MCNC: 27 MG/DL (ref 8–23)
CALCIUM SERPL-MCNC: 8.4 MG/DL (ref 8.8–10.2)
CHLORIDE BLD-SCNC: 100 MMOL/L (ref 98–111)
CO2: 23 MMOL/L (ref 22–29)
CREAT SERPL-MCNC: 1.5 MG/DL (ref 0.5–1.2)
EOSINOPHILS ABSOLUTE: 0.06 K/UL (ref 0–0.6)
EOSINOPHILS RELATIVE PERCENT: 3 % (ref 0–5)
GFR AFRICAN AMERICAN: 54
GFR NON-AFRICAN AMERICAN: 45
GLUCOSE BLD-MCNC: 217 MG/DL (ref 74–109)
HCT VFR BLD CALC: 25.6 % (ref 42–52)
HEMOGLOBIN: 8.7 G/DL (ref 14–18)
IMMATURE GRANULOCYTES #: 0 K/UL
LYMPHOCYTES ABSOLUTE: 0.5 K/UL (ref 1.1–4.5)
LYMPHOCYTES RELATIVE PERCENT: 26 % (ref 20–40)
MCH RBC QN AUTO: 30.9 PG (ref 27–31)
MCHC RBC AUTO-ENTMCNC: 34 G/DL (ref 33–37)
MCV RBC AUTO: 90.8 FL (ref 80–94)
MONOCYTES ABSOLUTE: 0.2 K/UL (ref 0–0.9)
MONOCYTES RELATIVE PERCENT: 12 % (ref 0–10)
NEUTROPHILS ABSOLUTE: 1.1 K/UL (ref 1.5–7.5)
NEUTROPHILS RELATIVE PERCENT: 56 % (ref 50–65)
PDW BLD-RTO: 17.1 % (ref 11.5–14.5)
PLATELET # BLD: 206 K/UL (ref 130–400)
PLATELET SLIDE REVIEW: ADEQUATE
PMV BLD AUTO: 9.7 FL (ref 9.4–12.4)
POTASSIUM SERPL-SCNC: 4.4 MMOL/L (ref 3.5–5)
RBC # BLD: 2.82 M/UL (ref 4.7–6.1)
SODIUM BLD-SCNC: 137 MMOL/L (ref 136–145)
TOTAL PROTEIN: 7.2 G/DL (ref 6.6–8.7)
WBC # BLD: 1.9 K/UL (ref 4.8–10.8)

## 2021-06-03 PROCEDURE — 77336 RADIATION PHYSICS CONSULT: CPT | Performed by: RADIOLOGY

## 2021-06-03 PROCEDURE — 99211 OFF/OP EST MAY X REQ PHY/QHP: CPT

## 2021-06-03 PROCEDURE — 2580000003 HC RX 258: Performed by: INTERNAL MEDICINE

## 2021-06-03 PROCEDURE — 6360000002 HC RX W HCPCS: Performed by: INTERNAL MEDICINE

## 2021-06-03 PROCEDURE — 80053 COMPREHEN METABOLIC PANEL: CPT

## 2021-06-03 PROCEDURE — 85025 COMPLETE CBC W/AUTO DIFF WBC: CPT

## 2021-06-03 PROCEDURE — 36591 DRAW BLOOD OFF VENOUS DEVICE: CPT

## 2021-06-03 PROCEDURE — 77386: CPT | Performed by: RADIOLOGY

## 2021-06-03 RX ORDER — SODIUM CHLORIDE 9 MG/ML
25 INJECTION, SOLUTION INTRAVENOUS PRN
Status: CANCELLED | OUTPATIENT
Start: 2021-06-03

## 2021-06-03 RX ORDER — SODIUM CHLORIDE 0.9 % (FLUSH) 0.9 %
5-40 SYRINGE (ML) INJECTION PRN
Status: CANCELLED | OUTPATIENT
Start: 2021-06-03

## 2021-06-03 RX ORDER — HEPARIN SODIUM (PORCINE) LOCK FLUSH IV SOLN 100 UNIT/ML 100 UNIT/ML
500 SOLUTION INTRAVENOUS PRN
Status: DISCONTINUED | OUTPATIENT
Start: 2021-06-03 | End: 2021-06-04 | Stop reason: HOSPADM

## 2021-06-03 RX ORDER — SODIUM CHLORIDE 0.9 % (FLUSH) 0.9 %
5-40 SYRINGE (ML) INJECTION PRN
Status: DISCONTINUED | OUTPATIENT
Start: 2021-06-03 | End: 2021-06-04 | Stop reason: HOSPADM

## 2021-06-03 RX ORDER — HEPARIN SODIUM (PORCINE) LOCK FLUSH IV SOLN 100 UNIT/ML 100 UNIT/ML
500 SOLUTION INTRAVENOUS PRN
Status: CANCELLED | OUTPATIENT
Start: 2021-06-03

## 2021-06-03 RX ADMIN — SODIUM CHLORIDE, PRESERVATIVE FREE 10 ML: 5 INJECTION INTRAVENOUS at 13:24

## 2021-06-03 RX ADMIN — HEPARIN 500 UNITS: 100 SYRINGE at 13:24

## 2021-06-03 NOTE — PROGRESS NOTES
Dr Doroteo Pappas notified of lab results. Chemo held today per Dr. Lauren Rodriguez appointments discussed with pt and he  states understanding.

## 2021-06-08 DIAGNOSIS — N40.1 BPH WITH OBSTRUCTION/LOWER URINARY TRACT SYMPTOMS: ICD-10-CM

## 2021-06-08 DIAGNOSIS — N13.8 BPH WITH OBSTRUCTION/LOWER URINARY TRACT SYMPTOMS: ICD-10-CM

## 2021-06-08 RX ORDER — TAMSULOSIN HYDROCHLORIDE 0.4 MG/1
CAPSULE ORAL
Qty: 90 CAPSULE | Refills: 3 | Status: SHIPPED | OUTPATIENT
Start: 2021-06-08 | End: 2022-05-16

## 2021-06-08 NOTE — TELEPHONE ENCOUNTER
Requested Prescriptions     Pending Prescriptions Disp Refills   • tamsulosin (FLOMAX) 0.4 MG capsule 24 hr capsule [Pharmacy Med Name: TAMSULOSIN HCL CAPS 0.4MG] 90 capsule 3     Sig: TAKE 1 CAPSULE EVERY NIGHT

## 2021-06-11 DIAGNOSIS — J45.40 MODERATE PERSISTENT ASTHMA WITHOUT COMPLICATION: ICD-10-CM

## 2021-06-11 DIAGNOSIS — J44.9 STAGE 3 SEVERE COPD BY GOLD CLASSIFICATION (HCC): ICD-10-CM

## 2021-06-11 RX ORDER — OMEPRAZOLE 40 MG/1
CAPSULE, DELAYED RELEASE ORAL
Qty: 90 CAPSULE | Refills: 3 | Status: SHIPPED | OUTPATIENT
Start: 2021-06-11 | End: 2022-05-16

## 2021-06-11 RX ORDER — MONTELUKAST SODIUM 10 MG/1
TABLET ORAL
Qty: 90 TABLET | Refills: 0 | OUTPATIENT
Start: 2021-06-11

## 2021-06-13 RX ORDER — MONTELUKAST SODIUM 10 MG/1
10 TABLET ORAL NIGHTLY
Qty: 90 TABLET | Refills: 3 | Status: SHIPPED | OUTPATIENT
Start: 2021-06-13

## 2021-06-18 LAB
MYCOBACTERIUM SPEC CULT: ABNORMAL
NIGHT BLUE STAIN TISS: ABNORMAL
NIGHT BLUE STAIN TISS: ABNORMAL

## 2021-06-30 ENCOUNTER — HOSPITAL ENCOUNTER (OUTPATIENT)
Dept: CT IMAGING | Age: 81
Discharge: HOME | End: 2021-06-30
Payer: MEDICARE

## 2021-06-30 DIAGNOSIS — C34.91 ADENOCARCINOMA OF RIGHT LUNG (HCC): ICD-10-CM

## 2021-06-30 PROCEDURE — 71260 CT THORAX DX C+: CPT

## 2021-06-30 PROCEDURE — 6360000004 HC RX CONTRAST MEDICATION: Performed by: INTERNAL MEDICINE

## 2021-06-30 RX ADMIN — IOPAMIDOL 90 ML: 755 INJECTION, SOLUTION INTRAVENOUS at 12:13

## 2021-07-04 NOTE — PROGRESS NOTES
MEDICAL ONCOLOGY PROGRESS NOTE    Pt Name: Nitin Villagomez  MRN: [id number]  YOB: 1940  Date of evaluation: 7/8/2021    HISTORY OF PRESENT ILLNESS:    The patient is status post completion of chemoradiation. He had a CT chest that showed interval response to treatment. Patient will start durvalumab today. Discussed at length about treatment and side effects. Diagnosis  · RUL adenocarcinoma, NSCLC, Dec 2020  · [id number], stage IIIA  · Systolic heart failure  · Hypertension  · Not a surgical candidate    Treatment Summary  · 4/21/20215/26/21 completed concurrent chemoradiation with carboplatin/taxol to be followed by immunotherapy  · 4/22/21-6/3/21- 6600 cGy radiation therapy completed  · Anticipate maintenance Durvalumab 1500mg every 4 weeks x12 cycles    Hematology/Cancer History: Arya Aparicio Arm was first seen by me on 4/7/2021 referred by Dr. Alejandro Murray, [facility] [place] for findings of non-small cell lung cancer, adenocarcinoma type. He has several comorbidities including history of type 2 diabetes, hypertension COPD. History of smoking in the past.  Quit many years ago. History of coronary artery disease followed by cardiology biopsy. Last EF 70% in 2018. He was seen by his primary care provider in November 2020. He has complaint of chest pain. Chest x-ray showed a 4 cm mass in the right upper lung. · 12/1/20 CT chest [facility]): Large right upper lobe mass concerning for primary neoplasm. Enlarged right hilar lymph node concerning for metastatic disease. Additional noncalcified pulmonary nodules bilaterally for which follow-up CT is recommended in 3-6 months. Atherosclerosis of the aorta and coronary arteries. · 12/16/2020bronchoscopy with bronchoalveolar lavage was nondiagnostic  · 2/23/21 CT cheat w/o [facility]): Probable right upper lobe mass, as described. This appears slightly larger in size and is likely neoplastic. Consider PET CT follow-up.  There is new surrounding infiltrate that extends inferiorly into the right upper lobe. The new may be infectious or inflammatory. Pulmonary hemorrhage possible. Other areas of nodularity and groundglass opacity/nodularity are stable in appearance. · 3/5/2021navigational bronchoscopy with biopsy was nondiagnostic  · 3/5/21 CT chest w/o [facility]): [place] soft tissue mass within the right upper lobe highly suspicious for neoplasm. There is associated postobstructive pneumonitis within the right upper lobe showing mild progression from the previous study of 2/23/2021. There is no associated hilar or mediastinal lymphadenopathy. Today's study is performed as part of a navigational bronchoscopy exam. Other scattered nodules including groundglass nodules are noted within the lungs all stable from the previous exam warranting follow-up. Heavy atheromatous calcification of the thoracic aorta and proximal great vessels. Coronary calcifications are present. · 3/23/21 PET scan [facility]): Markedly FDG avid right upper lobe mass in the right upper lobe measuring 7.2 cm triangular opacity with maximum SUV 12.18, highly concerning for malignancy. Adjacent groundglass opacities,  similar compared to 3/5/2021, which could represent additional  malignancy or infection/inflammation. Adjacent right upper lobe groundglass opacities are similar compared to 3/5/2021, which could represent additional malignancy or infection/inflammation. Other non-FDG avid pulmonary findings as above. No evidence of jasmin or distant metastatic disease. · 3/29/21 Navigational bronchoscopy-Dr. Luigi Westbrook: Bronchoalveolar lavage RUL consistent with adenocarcinoma. RUL transbronchial bipsy consistent with adenocarcinoma. FNA biopsy of several jasmin station negative for metastatic disease. · 4/7/2021he was first seen by me. · 4/15/21 2D echo: Normal left ventricular size with reduced global systolic function EF 48-75%.  Mild concentric left ventricular hypertrophy with mild [grade 1] diastolic dysfunction. Normal left atrial size. Mild thickening of a poorly visualized aortic valve without evidence of stenosis or insufficiency. Mild thickening of a normally mobile mitral valve with mild regurgitation. Nonvisualization pulmonic valve. Poorly visualized but apparently normal size right-sided chambers with preserved right ventricular systolic function. No tricuspid regurgitation with which to estimate RVSP. No significant pericardial effusion. Aortic root and ascending segments measured within normal limits. · 4/17/21 MRI brain: No acute intracranial process. No metastatic disease identified in the CNS. · 4/21/2021initiation of carboplatin/Taxol weekly. Reviewed MRI brain 2D echo. · 4/21/20215/26/21 completed concurrent chemoradiation with carboplatin/taxol to be followed by immunotherapy  · 4/22/21-6/3/21 6600 cGy radiation therapy completed  · 6/30/21-CT CHEST W CONTRAST A large spiculated mass in the right upper lobe posterior segment represent a neoplastic process. The groundglass opacity/infiltrate in the right upper lobe and superior segments of the lower lobes bilaterally may represent an inflammatory or neoplastic/metastatic process. A single enlarged abnormal lymph node in the right hilum may represent a metastatic node.    · 07/08/21-reviewed CT chest with perform radiologist.     Past Medical History:    Past Medical History:   Diagnosis Date    Allergic rhinitis     Bronchitis, chronic (HCC)     Carotid artery stenosis     GERD (gastroesophageal reflux disease)     Heart attack ([address]     Hemorrhoids     Hypercholesterolemia     Type II or unspecified type diabetes mellitus without mention of complication, not stated as uncontrolled        Past Surgical History:    Past Surgical History:   Procedure Laterality Date    CARDIAC SURGERY      balloon surgery (angioplasty)    PORT SURGERY Right 04/01/2021       Social History:    Marital status: , 2 daughters  Smoking status: Former smoker for ~30 years, 1.5 ppd  ETOH status: No  Resides: [place], [place]    Family History:   Family History   Problem Relation Age of Onset    Diabetes Mother     High Blood Pressure Mother     Cancer Father         Lung       Current Hospital Medications:    Current Outpatient Medications   Medication Sig Dispense Refill    omeprazole (PRILOSEC) 40 MG delayed release capsule TAKE 1 CAPSULE DAILY FOR STOMACH 90 capsule 3    propranolol (INDERAL) 60 MG tablet Take 1 tablet by mouth 2 times daily For blood pressure and tremor 180 tablet 3    HYDROcodone-acetaminophen (NORCO) 7.5-325 MG per tablet       metoprolol tartrate (LOPRESSOR) 50 MG tablet Take 50 mg by mouth daily      montelukast (SINGULAIR) 10 MG tablet       ondansetron (ZOFRAN) 8 MG tablet Take 1 tablet by mouth every 8 hours as needed for Nausea or Vomiting 30 tablet 3    lidocaine-prilocaine (EMLA) 2.5-2.5 % cream Apply topically to port area and cover with plastic wrap one hour prior to treatment. 1 Tube 1    furosemide (LASIX) 40 MG tablet TAKE 1 TABLET DAILY FOR FLUID 90 tablet 3    metFORMIN (GLUCOPHAGE) 1000 MG tablet TAKE 1 TABLET TWICE A DAY FOR DIABETES 180 tablet 3    blood glucose test strips (TRUE METRIX BLOOD GLUCOSE TEST) strip TEST BLOOD SUGAR ONCE DAILY *E11.9* 100 strip 5    simvastatin (ZOCOR) 40 MG tablet TAKE 1 TABLET AT BEDTIME FOR CHOLESTEROL 90 tablet 3    isosorbide mononitrate (IMDUR) 60 MG extended release tablet Take 60 mg by mouth      losartan (COZAAR) 25 MG tablet Take 25 mg by mouth      umeclidinium-vilanterol (ANORO ELLIPTA) 62.5-25 MCG/INH AEPB inhaler Inhale 1 puff into the lungs daily      primidone (MYSOLINE) 50 MG tablet 1 tab po BID for tremor 180 tablet 3    triamcinolone (KENALOG) 0.1 % cream Apply topically 2 times daily. To lower leg dry skin 3 Tube 3    tamsulosin (FLOMAX) 0.4 MG capsule Take 1 capsule by mouth daily.  90 capsule 3    aspirin 325 MG tablet Take 325 mg by lymphadenopathy  NEUROLOGIC: follows commands, non focal   PSYCH: mood and affect appropriate. Alert and oriented to time, place, person      LABORATORY RESULTS REVIEWED/ANALYZED BY ME:  Lab Results   Component Value Date    WBC 5.93 07/08/2021    HGB 10.7 (L) 07/08/2021    HCT 32.2 (L) 07/08/2021    MCV 95.5 (H) 07/08/2021     07/08/2021     Lab Results   Component Value Date    NEUTROABS 3.84 07/08/2021     Lab Results   Component Value Date     (L) 07/08/2021    K 4.7 07/08/2021    CL 98 07/08/2021    CO2 28 07/08/2021    BUN 18 07/08/2021    CREATININE 1.1 07/08/2021    GLUCOSE 174 (H) 07/08/2021    CALCIUM 9.5 07/08/2021    PROT 7.7 07/08/2021    LABALBU 4.5 07/08/2021    BILITOT 0.4 07/08/2021    ALKPHOS 87 07/08/2021    AST 34 07/08/2021    ALT 23 07/08/2021    LABGLOM >60 07/08/2021    GFRAA 54 (L) 06/03/2021    GLOB 3.2 07/08/2021     RADIOLOGY STUDIES REVIEWED BY ME:  CT CHEST W CONTRAST  Result Date: 6/30/2021  A large spiculated mass in the right upper lobe posterior segment represent a neoplastic process. The groundglass opacity/infiltrate in the right upper lobe and superior segments of the lower lobes bilaterally may represent an inflammatory or neoplastic/metastatic process. A single enlarged abnormal lymph node in the right hilum may represent a metastatic node. My interpretation: interval reduction in the size of the right upper lobe mass. This is consistent treatment response. ASSESSMENT:    Orders Placed This Encounter   Procedures    Comprehensive Metabolic Panel     Standing Status:   Future     Number of Occurrences:   1     Standing Expiration Date:   7/8/2022    TSH without Reflex     Standing Status:   Future     Number of Occurrences:   1     Standing Expiration Date:   7/8/2022      My Espinoza was seen today for lung cancer. Diagnoses and all orders for this visit:    Adenocarcinoma of right lung ([facility] [address]  -     Comprehensive Metabolic Panel;  Future  -     TSH without Reflex; Future    Chemotherapy management, encounter for  -     TSH without Reflex; Future    Hypothyroidism due to medication  -     TSH without Reflex; Future    Antineoplastic chemotherapy induced anemia    Encounter for antineoplastic immunotherapy    Adverse effect of chemotherapy, subsequent encounter    Care plan discussed with patient       [id number], stge IIIA, NSCLC, adenocarcinoma subtype  Essentially stage III disease. The patient is not a surgical candidate. Recommend chemoradiation followed by immunotherapy. Status post completion of chemoradiation. CT chest showed interval response (discussed with radiology and I personally reviewed images). Recommended:  Durvalumab 1500 mg IV every 4 weeks x12 dose    Complex medical patientdiscussed with radiation oncology, pulmonary. Patient has several comorbidities. He will follow-up with PCP and other medical providers for his chronic medical problems. Diabetes mellitus type 2/hypertensioncontrolled. Discussed with PCP office. Patient will be seen tomorrow. Patient was given clonidine 0.1 mg.      Systolic heart failureEF 45%  2D echo-LVEF 40-45%. Mild LVH, Mild [grade 1] diastolic dysfunction. Controlled hypertension stable. Medication controlled. Followed by PCP and Cardiology. 138/71 mmHg    PLAN:  · Proceed with immunotherapy with durvalumab monthly x12 doses  · Continue radiation therapy   · RTC 4 weeks with MD  · Durvalumab every 4 weeks  · TSH, CMP      I, Yvon Howard, am scribing for Sarah Viramontes MD. Electronically signed by Yvon Howard RN on 7/8/2021 at 10:36 PM CDT. I, Dr Jc Yuan, personally performed the services described in this documentation as scribed by Yvon Howard RN in my presence and is both accurate and complete. I have seen, examined and reviewed this patient medication list, appropriate labs and imaging studies. I reviewed relevant medical records and others physicians notes.  I discussed the plans of care with the patient. I answered all the questions to the patients satisfaction. I have also reviewed the chief complaint (CC) and part of the history (History of Present Illness (HPI), Past Family Social History [facility]), or Review of Systems (ROS) and made changes when appropriated.        (Please note that portions of this note were completed with a voice recognition program. Efforts were made to edit the dictations but occasionally words are mis-transcribed.)      Rajani Oneil MD    07/08/21  12:02 PM

## 2021-07-08 ENCOUNTER — OFFICE VISIT (OUTPATIENT)
Dept: HEMATOLOGY | Age: 81
End: 2021-07-08
Payer: MEDICARE

## 2021-07-08 ENCOUNTER — HOSPITAL ENCOUNTER (OUTPATIENT)
Dept: INFUSION THERAPY | Age: 81
Discharge: HOME | End: 2021-07-08
Payer: MEDICARE

## 2021-07-08 VITALS
BODY MASS INDEX: 28.39 KG/M2 | HEIGHT: 70 IN | WEIGHT: 198.3 LBS | SYSTOLIC BLOOD PRESSURE: 180 MMHG | TEMPERATURE: 98.4 F | OXYGEN SATURATION: 97 % | RESPIRATION RATE: 18 BRPM | DIASTOLIC BLOOD PRESSURE: 81 MMHG | HEART RATE: 82 BPM

## 2021-07-08 DIAGNOSIS — Z71.89 CARE PLAN DISCUSSED WITH PATIENT: ICD-10-CM

## 2021-07-08 DIAGNOSIS — Z51.11 CHEMOTHERAPY MANAGEMENT, ENCOUNTER FOR: ICD-10-CM

## 2021-07-08 DIAGNOSIS — C34.91 ADENOCARCINOMA OF RIGHT LUNG (HCC): Primary | ICD-10-CM

## 2021-07-08 DIAGNOSIS — T45.1X5A ANTINEOPLASTIC CHEMOTHERAPY INDUCED ANEMIA: ICD-10-CM

## 2021-07-08 DIAGNOSIS — E03.2 HYPOTHYROIDISM DUE TO MEDICATION: ICD-10-CM

## 2021-07-08 DIAGNOSIS — D64.81 ANTINEOPLASTIC CHEMOTHERAPY INDUCED ANEMIA: ICD-10-CM

## 2021-07-08 DIAGNOSIS — T45.1X5D ADVERSE EFFECT OF CHEMOTHERAPY, SUBSEQUENT ENCOUNTER: ICD-10-CM

## 2021-07-08 DIAGNOSIS — Z51.12 ENCOUNTER FOR ANTINEOPLASTIC IMMUNOTHERAPY: ICD-10-CM

## 2021-07-08 LAB
ALBUMIN SERPL-MCNC: 4.5 G/DL (ref 3.5–5.2)
ALP BLD-CCNC: 87 U/L (ref 40–130)
ALT SERPL-CCNC: 23 U/L (ref 21–72)
ANION GAP SERPL CALCULATED.3IONS-SCNC: 10 MMOL/L (ref 7–19)
AST SERPL-CCNC: 34 U/L (ref 17–59)
BASOPHILS ABSOLUTE: 0.07 K/UL (ref 0.01–0.08)
BASOPHILS RELATIVE PERCENT: 1.2 % (ref 0.1–1.2)
BILIRUB SERPL-MCNC: 0.4 MG/DL (ref 0.2–1.3)
BUN BLDV-MCNC: 18 MG/DL (ref 9–20)
CALCIUM SERPL-MCNC: 9.5 MG/DL (ref 8.4–10.2)
CHLORIDE BLD-SCNC: 98 MMOL/L (ref 98–111)
CO2: 28 MMOL/L (ref 22–29)
CREAT SERPL-MCNC: 1.1 MG/DL (ref 0.6–1.2)
EOSINOPHILS ABSOLUTE: 0.31 K/UL (ref 0.04–0.54)
EOSINOPHILS RELATIVE PERCENT: 5.2 % (ref 0.7–7)
GFR NON-AFRICAN AMERICAN: >60
GLOBULIN: 3.2 G/DL
GLUCOSE BLD-MCNC: 174 MG/DL (ref 74–106)
HCT VFR BLD CALC: 32.2 % (ref 40.1–51)
HEMOGLOBIN: 10.7 G/DL (ref 13.7–17.5)
LYMPHOCYTES ABSOLUTE: 1.19 K/UL (ref 1.18–3.74)
LYMPHOCYTES RELATIVE PERCENT: 20.1 % (ref 19.3–53.1)
MCH RBC QN AUTO: 31.8 PG (ref 25.7–32.2)
MCHC RBC AUTO-ENTMCNC: 33.2 G/DL (ref 32.3–36.5)
MCV RBC AUTO: 95.5 FL (ref 79–92.2)
MONOCYTES ABSOLUTE: 0.52 K/UL (ref 0.24–0.82)
MONOCYTES RELATIVE PERCENT: 8.8 % (ref 4.7–12.5)
NEUTROPHILS ABSOLUTE: 3.84 K/UL (ref 1.56–6.13)
NEUTROPHILS RELATIVE PERCENT: 64.7 % (ref 34–71.1)
PDW BLD-RTO: 17.9 % (ref 11.6–14.4)
PLATELET # BLD: 252 K/UL (ref 163–337)
PMV BLD AUTO: 9.6 FL (ref 7.4–10.4)
POTASSIUM SERPL-SCNC: 4.7 MMOL/L (ref 3.5–5.1)
RBC # BLD: 3.37 M/UL (ref 4.63–6.08)
SODIUM BLD-SCNC: 136 MMOL/L (ref 137–145)
TOTAL PROTEIN: 7.7 G/DL (ref 6.3–8.2)
TSH SERPL DL<=0.05 MIU/L-ACNC: 4.02 UIU/ML (ref 0.47–4.68)
WBC # BLD: 5.93 K/UL (ref 4.23–9.07)

## 2021-07-08 PROCEDURE — 2580000003 HC RX 258: Performed by: INTERNAL MEDICINE

## 2021-07-08 PROCEDURE — G8428 CUR MEDS NOT DOCUMENT: HCPCS | Performed by: INTERNAL MEDICINE

## 2021-07-08 PROCEDURE — 96365 THER/PROPH/DIAG IV INF INIT: CPT

## 2021-07-08 PROCEDURE — 6360000002 HC RX W HCPCS: Performed by: INTERNAL MEDICINE

## 2021-07-08 PROCEDURE — 4040F PNEUMOC VAC/ADMIN/RCVD: CPT | Performed by: INTERNAL MEDICINE

## 2021-07-08 PROCEDURE — 99215 OFFICE O/P EST HI 40 MIN: CPT | Performed by: INTERNAL MEDICINE

## 2021-07-08 PROCEDURE — 1123F ACP DISCUSS/DSCN MKR DOCD: CPT | Performed by: INTERNAL MEDICINE

## 2021-07-08 PROCEDURE — 80053 COMPREHEN METABOLIC PANEL: CPT

## 2021-07-08 PROCEDURE — 84443 ASSAY THYROID STIM HORMONE: CPT

## 2021-07-08 PROCEDURE — 96413 CHEMO IV INFUSION 1 HR: CPT

## 2021-07-08 PROCEDURE — G8417 CALC BMI ABV UP PARAM F/U: HCPCS | Performed by: INTERNAL MEDICINE

## 2021-07-08 PROCEDURE — 36415 COLL VENOUS BLD VENIPUNCTURE: CPT

## 2021-07-08 PROCEDURE — 1036F TOBACCO NON-USER: CPT | Performed by: INTERNAL MEDICINE

## 2021-07-08 PROCEDURE — 85025 COMPLETE CBC W/AUTO DIFF WBC: CPT

## 2021-07-08 RX ORDER — METHYLPREDNISOLONE SODIUM SUCCINATE 125 MG/2ML
125 INJECTION, POWDER, LYOPHILIZED, FOR SOLUTION INTRAMUSCULAR; INTRAVENOUS ONCE
Status: CANCELLED | OUTPATIENT
Start: 2021-07-08 | End: 2021-07-08

## 2021-07-08 RX ORDER — DIPHENHYDRAMINE HYDROCHLORIDE 50 MG/ML
50 INJECTION INTRAMUSCULAR; INTRAVENOUS ONCE
Status: CANCELLED | OUTPATIENT
Start: 2021-07-08 | End: 2021-07-08

## 2021-07-08 RX ORDER — SODIUM CHLORIDE 9 MG/ML
INJECTION, SOLUTION INTRAVENOUS CONTINUOUS
Status: CANCELLED | OUTPATIENT
Start: 2021-07-08

## 2021-07-08 RX ORDER — EPINEPHRINE 1 MG/ML
0.3 INJECTION, SOLUTION, CONCENTRATE INTRAVENOUS PRN
Status: CANCELLED | OUTPATIENT
Start: 2021-07-08

## 2021-07-08 RX ORDER — HEPARIN SODIUM (PORCINE) LOCK FLUSH IV SOLN 100 UNIT/ML 100 UNIT/ML
500 SOLUTION INTRAVENOUS PRN
Status: DISCONTINUED | OUTPATIENT
Start: 2021-07-08 | End: 2021-07-09 | Stop reason: HOSPADM

## 2021-07-08 RX ORDER — SODIUM CHLORIDE 0.9 % (FLUSH) 0.9 %
5-40 SYRINGE (ML) INJECTION PRN
Status: CANCELLED | OUTPATIENT
Start: 2021-07-08

## 2021-07-08 RX ORDER — SODIUM CHLORIDE 9 MG/ML
25 INJECTION, SOLUTION INTRAVENOUS PRN
Status: CANCELLED | OUTPATIENT
Start: 2021-07-08

## 2021-07-08 RX ORDER — HEPARIN SODIUM (PORCINE) LOCK FLUSH IV SOLN 100 UNIT/ML 100 UNIT/ML
500 SOLUTION INTRAVENOUS PRN
Status: CANCELLED | OUTPATIENT
Start: 2021-07-08

## 2021-07-08 RX ORDER — SODIUM CHLORIDE 9 MG/ML
20 INJECTION, SOLUTION INTRAVENOUS ONCE
Status: CANCELLED | OUTPATIENT
Start: 2021-07-08 | End: 2021-07-08

## 2021-07-08 RX ORDER — SODIUM CHLORIDE 9 MG/ML
20 INJECTION, SOLUTION INTRAVENOUS ONCE
Status: DISCONTINUED | OUTPATIENT
Start: 2021-07-08 | End: 2021-07-10 | Stop reason: HOSPADM

## 2021-07-08 RX ORDER — SODIUM CHLORIDE 0.9 % (FLUSH) 0.9 %
5-40 SYRINGE (ML) INJECTION PRN
Status: DISCONTINUED | OUTPATIENT
Start: 2021-07-08 | End: 2021-07-09 | Stop reason: HOSPADM

## 2021-07-08 RX ADMIN — SODIUM CHLORIDE, PRESERVATIVE FREE 10 ML: 5 INJECTION INTRAVENOUS at 13:44

## 2021-07-08 RX ADMIN — HEPARIN 500 UNITS: 100 SYRINGE at 13:44

## 2021-07-08 RX ADMIN — SODIUM CHLORIDE 1500 MG: 9 INJECTION, SOLUTION INTRAVENOUS at 12:35

## 2021-07-19 RX ORDER — SIMVASTATIN 40 MG
TABLET ORAL
Qty: 90 TABLET | Refills: 3 | Status: SHIPPED | OUTPATIENT
Start: 2021-07-19 | End: 2022-07-11

## 2021-07-25 PROBLEM — C34.11 MALIGNANT NEOPLASM OF UPPER LOBE OF RIGHT LUNG (HCC): Status: ACTIVE | Noted: 2021-07-25

## 2021-07-25 PROBLEM — Z92.3 HISTORY OF RADIATION THERAPY: Status: ACTIVE | Noted: 2021-07-25

## 2021-07-25 NOTE — PROGRESS NOTES
[facility]MD Brunilda Mcgill BSN, PA-C  ____________________________________________________________  [facility]  Department of Radiation Oncology  [address] [place] [id number]  Office:  [phone]  Fax: [phone]    DATE:  07/26/2021  PATIENT: Mina Ratliff 1940                                 MEDICAL RECORD #: [id number]    Reason for Follow up Visit:  Mina Ratliff is a very pleasant 81 y.o. patient that completed radiation to the lung and returns to the clinic today for inital follow up exam. Reports appetite change, fatigue, trouble swallowing, cough, and SOB. Denies activity change, unexpected weight change, sore throat, nausea/vomiting, diarrhea, light-headedness, weakness, and headaches. He continues to follow .     History of Present Illness:  Diagnosed in March 2021 with Stage IIIA (T4, N0, cM0) Adenocarcinoma of lung, RUL 6.7 cm (SUV 12.18), adjacent groundglass opacities.   · Treated with 6600 cGy in 33 fractions to the RUL, completed on 06/03/2021.     11/30/2020 - CT Chest with contrast:  • There is a mildly enlarged right hilar lymph node measuring 14 mm in short axis.  • Additional small mediastinal lymph nodes are noted.  • A right upper lobe mass is seen which measures approximately 6.2 x 4.5 x 3.6 cm in size.   • There is a groundglass nodule in the superior segment of the left lower lobe measuring 1.8 cm in size.   • A tiny 5 mm groundglass nodule is seen in the left upper lobe posteriorly.   • A tiny 3-4 mm nodule is seen along the left major fissure, possibly a small intrafissural lymph node.   • A tiny right lower lobe nodule measures approximately 5 mm in size.   • A few additional tiny nodules are seen in the right lower lobe which have an inflammatory appearance (for example axial image 77).  • There is a tiny 3 mm noncalcified nodule in the right lower lobe posteriorly.   Impression:  • Large  right upper lobe mass concerning for primary neoplasm.  • Enlarged right hilar lymph node concerning for metastatic disease.  • Additional noncalcified pulmonary nodules bilaterally for which follow-up CT is recommended in 3-6 months.  • Atherosclerosis of the aorta and coronary arteries.    12/03/2020 - Appointment with :  • Pt has new dx lung mass and hilar adenopathy.    • He has chronic dyspnea on exertion.    • If this is cancer his lung function is very marginal for any sort of surgical management.    • We discussed bronchoscopy with endobronchial ultrasound which in my opinion is preferred approach.    • Pt wishes to proceed with this.    • He will hold asa after tomorrow's dose.    12/16/2020 - Bronchial washings, cytospin preparations (2) and cell block:  • Numerous ciliated columnar bronchial epithelial cells.  • Mucus with entrapped neutrophils.  • Refractile polarizable foreign material of uncertain origin identified in the background.  • No diagnostic fungal organisms identified utilizing GMS stain.  • Negative for malignant cells.    01/07/2021 - Appointment with :  • EBUS nondiagnostic for hilar adenopathy and lung mass.  Suspicious for hazardous material potentially related to occupational history of exposure.    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    • We discussed that there is still a high suspicion for malignant process despite no malignant cells on biopsy.    • We will repeat CT scan in the short-term.    • If this were inflammation or infection it should be improving by then.  If it is not he will need to be considered for repeat EBUS versus navigational bronchoscopy versus surgery.    • He would be a high risk for surgical resection given his age, comorbidities and COPD.    • He understands this and would like to repeat CT and discuss other biopsy  options in a few weeks.    • His COPD remained stable on Anoro.  He will continue this.    • He is overweight and was provided educational material on this.    • We will see him back in a few weeks after CT imaging to discuss.    • He is aware he can call sooner if needed.    02/23/2021 - CT Chest without contrast:  • Right upper lobe nodule/mass measuring 6.6 x 3.8 cm and previously measuring about 6.3 x 3.7 cm when measured in the same location.   • There is new patchy infiltrate around this area in the right upper lobe and extending inferiorly. This may be infectious or inflammatory or could represent pulmonary hemorrhage.   • There is a stable 2-3 mm right lower lobe nodule on image 106 of series 3.   • There is a stable left lower lobe 2 mm nodule in image 135 of series 3 posteriorly.  • A 1.7 cm groundglass opacity in the left lower lobe appears stable. This is on image 66 of series 3.   • There is a 4-5 mm subpleural groundglass nodule in the left upper lobe on image 66 of series 3 laterally, stable.  • There is a 3-4 mm subpleural left upper lobe nodule posteriorly on image 38 series 3, stable or minimally larger.   • There is a stable 2 mm left lower lobe nodule laterally on image 70 of series 3, stable.   • There is a 1 cm subpleural nodule versus scar in the left lower lobe posteriorly on image 105 of series 3, stable.  Impression:  • Probable right upper lobe mass, as described. This appears slightly larger in size and is likely neoplastic. Consider PET CT follow-up. There is new surrounding infiltrate that extends inferiorly into the right upper lobe. The new may be infectious or inflammatory. Pulmonary hemorrhage possible.  • Other areas of nodularity and groundglass opacity/nodularity are stable in appearance.  • Atheromatous disease of the thoracic aorta and coronary arteries.      02/25/2021 - Appointment with :  • Hilar adenopathy addressed with EBUS.  Identified refractory polarizing  material suspicious for occupational obtain debris.  Mass in right lung slightly larger than previous.    • Agreeable to trial navigational bronchoscopy and biopsy of mass.  Risk and benefits of the procedure explained to the patient.  He is agreeable to proceed.    • His COPD seems to be stable and at baseline on his inhalers.    • We will continue the Anoro for maintenance therapy.    • He has a nebulizer he can use when needed.    • We will contact him to make arrangements for the bronchoscopy.    • He will call sooner if needed.    03/05/2021 - CT Chest without contrast:  • A 3.8 x 6.7 irregular marginated mass is noted within the right upper lobe stable in size from the previous study.   • There is adjacent postobstructive infiltrate. This shows slight worsening from the previous exam. This involves a large portion of the right upper lobe.   • A small perifissural nodule adjacent to the major fissure within the apical posterior segment left upper lobe is stable.   • There is nodular scarring within the medial left lung apex stable from the previous exam.  • A small focus of groundglass opacity on image 226 abutting the major fissure is stable from the previous exam.   • There is a focus of groundglass nodularity with associated perifissural node within the superior segment left lower lobe on image 239 through 256 measuring 1.7 cm in transverse diameter.   • A small groundglass nodule anterior to the major fissure on image 245 is stable.   • A 1.1 cm subpleural nodules noted within the posterior left lower lobe on image 375 stable from the previous exam.   • There is scarring within the inferior lingular segment left upper lobe   • There is slight tethering of the major fissure. This measures 1.1 cm in size.    Impression:  • Chester soft tissue mass within the right upper lobe highly suspicious for neoplasm. There is associated postobstructive pneumonitis within the right upper lobe showing mild progression from  the previous study of 2/23/2021. There is no associated hilar or mediastinal lymphadenopathy. Today's study is performed as part of a navigational bronchoscopy exam.  • Other scattered nodules including groundglass nodules are noted within the lungs all stable from the previous exam warranting follow-up.  • Heavy atheromatous calcification of the thoracic aorta and proximal great vessels. Coronary calcifications are present.    03/05/2021 - Navigational bronchoscopy and biopsy of mass per :  Mass, upper lobe right lung, CT-guided needle biopsy:  • No malignant cells identified.  • Benign respiratory epithelial cells and bronchial wall.  Bronchial washing (ThinPrep):  • No malignant cells identified.  • Benign bronchial epithelium, occasional alveolar macrophages, and a background of mixed inflammation.  Bronchoalveolar lavage (ThinPrep):  • No malignant cells identified.  • Benign bronchial epithelium, occasional alveolar macrophages, and a background of mixed inflammation.    03/10/2021 - Appointment with :  • We discussed the above. He does have pretty significant COPD but he has tolerated both of the bronchoscopy procedures with general anesthesia very well.  Mass is suspected to be cancer.  Alternative diagnosis is considered but less likely.    • I am going to refer him to interventional pulmonology and [place] for consideration for robotic bronchoscopy.  I think this modality will be more technically advanced than the standard navigational scope and without the shortcomings that led to the problems I had above, which may have limited my ability to establish diagnosis.    • From a respiratory standpoint we will not change any medications for right now.    • He is due for his second Covid vaccine and we discussed that is the most important thing for him to do in the near term.    • We will try to arrange to get his PET scan done later this week or early next week followed by his coronavirus  vaccine and then hopefully get him down to [place] soon thereafter.    • IP help greatly appreciated.    03/23/2021- PET Scan:  • Right upper lobe 6.7 x 3.8 cm triangular opacity with maximum SUV 12.18, highly concerning for malignancy.   • Adjacent groundglass opacities, similar compared to 3/5/2021, which could represent additional malignancy or infection/inflammation.  • Left upper lobe scarring is not FDG avid.  • Superior segment of the left upper lobe with a 1 cm area of groundglass opacity, which was present on 6/18/2019, but more conspicuous on today's exam, which may be due to differences in technique.   • Groundglass character with likely placement below threshold for PET detection.  Impression:  • Markedly FDG avid right upper lobe mass highly concerning for malignancy. Adjacent right upper lobe groundglass opacities are similar compared to 3/5/2021, which could represent additional malignancy or infection/inflammation. Other non-FDG avid pulmonary findings as above.  • No evidence of kayden or distant metastatic disease.    03/29/2021 - Bronchoscopy with biopsy at [facility]:  Lung, right upper lobe, transbronchial biopsy:  • Pulmonary adenocarcinoma.   Lymph node, station 7, FNA:  • Anthracotic lymph node sampling and blood  • No malignancy identified   Lymph node, 11R, FNA:  • Hemodilute sample with scattered lymphocytes  • No malignancy identified   Lymph node, 11R, FNA:  • Blood, lymph node sampling, and respiratory epithelial cells.   • No malignancy identified   Lung, right upper lobe, FNA:  • Paucicellular smears with atypical cell groups, respiratory epithelial cells, and surface mucus  Bronchoalveolar lavage, right upper lobe lung:  • Atypical cell groups, consistent with adenocarcinoma     04/07/2021 - Appointment with :  • Note pending     04/07/2021 - Documentation per :  • I spoke with Dr. Madrid today regarding this patient.  He has a large lung mass  which is positive for malignancy.  • Dr. Madrid anticipates concomitant chemoradiation.  He is sending the patient over and we will see him this afternoon for treatment planning and initiation of radiotherapy for his locally advanced lung cancer.    04/07/2021 - Consult with :  • Following this discussion and in consideration of the diagnostic data/evaluation of the patient, I recommended a course of external beam radiation, likely delivered concurrently with systemic therapy under the medical oncology service, I anticipate 6600cGy over 33 treatments, final course pending. MRI brain pending.   Plan:  • Plan on 33 daily radiation treatments M-F over 7 weeks for 30 minutes daily  • Chemotherapy/immunotherapy per Dr. Madrid  • We will call you when to start treatment    04/15/2021 - 06/03/2021 - Completed radiation course:  • Received 6600 cGy in 33 fractions to the RUL.     04/17/2021 - MRI Brain with and without contrast:  • No acute intracranial process.  • No metastatic disease identified in the CNS.    04/21/2021-05/26/2021 - Completed chemotherapy course:  • Concurrent chemoradiation with carboplatin/taxol     06/30/2021 - CT Chest with contrast:  • There is a large spiculated mass in the right upper lobe posterior segment measuring 5.8 x 2.6 cm in axial plane images. It is adherent   to the right major fissure posteriorly. There is surrounding strands extending into the lung parenchyma. No calcification.   • There are groundglass opacity/infiltrate in the right upper lobe, superior segment of the right lower lobe and superior segment of the left lower lobe laterally.   • There is a small pleural/subpleural nodule in the left lower lobe posteriorly, image #86 in axial plane.   • The limited visualized soft tissues of the neck are partly obscured by the extensive artifacts from the dental hardware. The remaining   visualized soft tissues show no discrete mass or lymphadenopathy.   • A single  borderline prominent lymph node is seen in the right hilum measuring 1.6 cm in short axis. No other enlarged lymph nodes are noted.   • Moderate thickening of the wall of the esophagus is noted.   Impression:  • A large spiculated mass in the right upper lobe posterior segment represent a neoplastic process.   • The groundglass opacity/infiltrate in the right upper lobe and superior segments of the lower lobes bilaterally may represent an inflammatory or neoplastic/metastatic process.   • A single enlarged abnormal lymph node in the right hilum may represent a metastatic node.     07/08/2021 - Appointment with :  • [id number], stge IIIA, NSCLC, adenocarcinoma subtype  • Essentially stage III disease. The patient is not a surgical candidate.  • Recommend chemoradiation followed by immunotherapy.  • Status post completion of chemoradiation.  • CT chest showed interval response (discussed with radiology and I personally reviewed images).  • Recommended:  o Durvalumab 1500 mg IV every 4 weeks x12 dose  PLAN:  • Proceed with immunotherapy with durvalumab monthly x12 doses  • Continue radiation therapy   • RTC 4 weeks with MD   • Durvalumab every 4 weeks  • TSH, CMP    History obtained from  PATIENT, FAMILY and CHART    PAST MEDICAL HISTORY  Past Medical History:   Diagnosis Date   • Asthma    • Atherosclerosis of native coronary artery of native heart without angina pectoris    • BPH with obstruction/lower urinary tract symptoms    • CAD (coronary artery disease)    • Cancer (CMS/HCC)    • Chest pain    • Chronic airway obstruction (CMS/HCC)    • COPD (chronic obstructive pulmonary disease) (CMS/HCC)    • Diabetes mellitus (CMS/HCC)    • Dizziness    • GERD (gastroesophageal reflux disease)    • HTN (hypertension)    • Hyperlipidemia    • Malaise and fatigue    • Mass of right lung    • Multiple gastric ulcers    • Myocardial infarction, old    • 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,   Radiation, silica   • Old myocardial infarction    • Stage 3 severe COPD by GOLD classification (CMS/HCC) 3/13/2015      PAST SURGICAL HISTORY  Past Surgical History:   Procedure Laterality Date   • BRONCHOSCOPY N/A 2020    Procedure: BRONCHOSCOPY WITH ENDOBRONCHIAL ULTRASOUND, BIOPSY, TRANSBRONCHIAL NEEDLE ASPIRATE;  Surgeon: Mg Amin MD;  Location:  [facility] OR;  Service: Pulmonary;  Laterality: N/A;  pre: RUL masspost: Shabana Wu MD   • BRONCHOSCOPY Right 3/5/2021    Procedure: Navigational BRONCHOSCOPY;  Surgeon: Mg Amin MD;  Location:  [facility] OR;  Service: Pulmonary;  Laterality: Right;  pre right lung mass  post right lung mass  dr shabana valerio   • CARDIAC CATHETERIZATION     • CORONARY ANGIOPLASTY  2009    P[facility]PR[facility] STATUS, PTCA    • HEMORRHOIDECTOMY     • INNER EAR SURGERY      NEW EAR DRUM CONSTRUCTED   • PROSTATE SURGERY      TURP - DR BELKYS   • VENOUS ACCESS DEVICE (PORT) INSERTION N/A 2021    Procedure: SINGLE LUMEN PORT PLACEMENT;  Surgeon: Eve Henson MD;  Location:  [facility] OR;  Service: General;  Laterality: N/A;      FAMILY HISTORY  family history includes Coronary artery disease in an other family member.     SOCIAL HISTORY  Social History     Tobacco Use   • Smoking status: Former Smoker     Packs/day: 1.50     Years: 25.00     Pack years: 37.50     Types: Cigarettes     Quit date:      Years since quittin.5   • Smokeless tobacco: Never Used   Vaping Use   • Vaping Use: Never used   Substance Use Topics   • Alcohol use: No   • Drug use: No      Patient has no known allergies.     MEDICATIONS  Current Outpatient Medications   Medication Sig Dispense Refill   • aspirin  MG tablet Take 325 mg by mouth Daily.     • diphenhydrAMINE-acetaminophen (TYLENOL PM)  MG tablet per tablet Take 1 tablet by mouth At Night As Needed for Sleep.     • furosemide (LASIX) 40 MG tablet Take 40 mg by mouth Daily.     • HYDROcodone-acetaminophen (NORCO)  7.5-325 MG per tablet Take 1 tablet by mouth Every 4 (Four) Hours As Needed for Moderate Pain  (Pain). 15 tablet 0   • losartan (COZAAR) 25 MG tablet TAKE 1 TABLET DAILY 90 tablet 3   • metFORMIN (GLUCOPHAGE) 1000 MG tablet Take 1,000 mg by mouth 2 (Two) Times a Day With Meals.     • metoprolol tartrate (LOPRESSOR) 50 MG tablet Take 50 mg by mouth Daily.     • montelukast (SINGULAIR) 10 MG tablet Take 1 tablet by mouth Every Night. 90 tablet 3   • Multiple Vitamins-Minerals (MULTIVITAMIN ADULT PO) Take 1 tablet by mouth Daily.     • omeprazole (priLOSEC) 40 MG capsule Take 40 mg by mouth Daily.     • primidone (MYSOLINE) 50 MG tablet Take 50 mg by mouth Every Night.     • propranolol (INDERAL) 40 MG tablet Take 40 mg by mouth Daily.     • simvastatin (ZOCOR) 40 MG tablet Take 40 mg by mouth Every Night.     • tamsulosin (FLOMAX) 0.4 MG capsule 24 hr capsule TAKE 1 CAPSULE EVERY NIGHT 90 capsule 3   • umeclidinium-vilanterol (ANORO ELLIPTA) 62.5-25 MCG/INH aerosol powder  inhaler Inhale 1 puff Daily. 3 each 3     No current facility-administered medications for this visit.      Current outpatient and discharge medications have been reconciled for the patient.  Reviewed by: Alfred Head MD    The following portions of the patient's history were reviewed and updated as appropriate: allergies, current medications, past family history, past medical history, past social history, past surgical history and problem list.    REVIEW OF SYSTEMS  Review of Systems   Constitutional: Positive for appetite change (decrease) and fatigue.   HENT: Positive for trouble swallowing (improving).    Eyes: Negative.         Glasses   Respiratory: Positive for cough (productive cough clear sputum) and shortness of breath (with minimal exertion).    Cardiovascular: Negative.    Endocrine: Negative.    Genitourinary: Negative.    Musculoskeletal: Negative.    Skin: Negative.    Allergic/Immunologic: Negative.    Neurological: Negative.   "  Hematological: Negative.    Psychiatric/Behavioral: Negative.       PHYSICAL EXAM  VITAL SIGNS:   Vitals:    07/26/21 1515   BP: 143/62   SpO2: 97%  Comment: room air   Weight: 88.5 kg (195 lb)   Height: 176.5 cm (69.49\")   PainSc: 0-No pain      Physical Exam    General:  Alert and oriented, in no acute distress, well-developed, vitals reviewed.  Head:  Normocephalic  Nose/Sinuses:  Nares normal externally, no sinus tenderness.  Mouth/Throat:  Mucosa moist, without erythema  Neck:  supple, No evidence of adenopathy in the cervical or supraclavicular areas.  Eyes: No gross abnormalities   Ears: Ears intact with no external abnormalities noted  Chest:  Respiratory efforts are normal, unlabored, chest is clear to auscultation.   Cardiovascular: Regular rate and rhythm without murmurs, rubs, or gallops.   Abdomen:  Soft, non-tender, normal bowel sounds;   Extremities:  Uses wheelchair for assistance, warm to touch, no cyanosis or edema.  Skin: No suspicious lesions or rashes of concern  Neurologic: non focal exam, strength and sensation grossly normal  Psych: Mood and affect are appropriate    Performance Status: ECOG (1) Restricted in physically strenuous activity, ambulatory and able to do work of light nature    Clinical Quality Measures  -Pain Documented  Mina Ratliff reports a pain score of 0.  Given his pain assessment as noted, treatment options were discussed and the following options were decided upon as a follow-up plan to address the patient's pain: No pain, no plan given  Pain Medications             aspirin  MG tablet Take 325 mg by mouth Daily.    HYDROcodone-acetaminophen (NORCO) 7.5-325 MG per tablet Take 1 tablet by mouth Every 4 (Four) Hours As Needed for Moderate Pain  (Pain).    primidone (MYSOLINE) 50 MG tablet Take 50 mg by mouth Every Night.        -Advanced Care Planning Advance Care Planning   ACP discussion was held with the patient during this visit. Patient does not have an " advance directive, information provided.    -Body Mass Index Screening and Follow-Up Plan  Patient's Body mass index is 28.39 kg/m². indicating that he is overweight (BMI 25-29.9). Obesity-related health conditions include the following: No pain given.     -Tobacco Use: Screening and Cessation Intervention Social History    Tobacco Use      Smoking status: Former Smoker        Packs/day: 1.50        Years: 25.00        Pack years: 37.5        Types: Cigarettes        Quit date:         Years since quittin.5      Smokeless tobacco: Never Used    ASSESSMENT AND PLAN  1. Malignant neoplasm of upper lobe of right lung (CMS/HCC)    2. Stage 3 severe COPD by GOLD classification (CMS/HCC)    3. History of radiation therapy    4. Former smoker      RECOMMENDATIONS:  Mina Ratliff is status post completion of radiation therapy to the lung and presents to our clinic today for surveillance exam and to review imaging. Diagnosed in 2021 with Stage IIIA (T4, N0, cM0) Adenocarcinoma of lung, RUL 6.7 cm (SUV 12.18), adjacent groundglass opacities. Treated with 6600 cGy in 33 fractions to the RUL, completed on 2021 with concomitant chemotherapy.  Patient currently tolerating durvalumab under the care of Dr. Madrid    CT-scan of the chest on 2021 revealed a large spiculated mass in the right upper lobe posterior segment represent a neoplastic process. The groundglass opacity/infiltrate in the right upper lobe and superior segments of the lower lobes bilaterally may represent an inflammatory or neoplastic/metastatic process. A single enlarged abnormal lymph node in the right hilum may represent a metastatic node.  CT scan was done approximately 4 weeks following completion of radiation therapy and only partial response was noted at that time.    We will continue routine follow-up/surveillance as discussed in 6 months and I have instructed him to continue to see the other health care providers as per  their scheduling. He will see Dr. Madrid for follow up on 08/05/2021.    Patient Instructions   1) Continue to follow with Dr. Madrid, he will order your chest CT scans  2) Follow with Dr. Palmer in 6 months.     Todays appointment time was spent in counseling, coordination of care and surveillance related to patients diagnosis as well as radiation therapy possible and probable after effects.  I saw this patient in follow-up with Curly Guzman NP while covering for Dr. Riky Palmer, radiation oncologist.  Alfred Head MD  07/26/2021

## 2021-07-26 ENCOUNTER — HOSPITAL ENCOUNTER (OUTPATIENT)
Dept: RADIATION ONCOLOGY | Facility: HOSPITAL | Age: 81
Setting detail: RADIATION/ONCOLOGY SERIES
End: 2021-07-26

## 2021-07-26 ENCOUNTER — OFFICE VISIT (OUTPATIENT)
Dept: RADIATION ONCOLOGY | Facility: HOSPITAL | Age: 81
End: 2021-07-26

## 2021-07-26 VITALS
WEIGHT: 195 LBS | DIASTOLIC BLOOD PRESSURE: 62 MMHG | SYSTOLIC BLOOD PRESSURE: 143 MMHG | BODY MASS INDEX: 28.88 KG/M2 | HEIGHT: 69 IN | OXYGEN SATURATION: 97 %

## 2021-07-26 DIAGNOSIS — J44.9 STAGE 3 SEVERE COPD BY GOLD CLASSIFICATION (HCC): ICD-10-CM

## 2021-07-26 DIAGNOSIS — Z87.891 FORMER SMOKER: ICD-10-CM

## 2021-07-26 DIAGNOSIS — Z92.3 HISTORY OF RADIATION THERAPY: ICD-10-CM

## 2021-07-26 DIAGNOSIS — C34.11 MALIGNANT NEOPLASM OF UPPER LOBE OF RIGHT LUNG (HCC): Primary | ICD-10-CM

## 2021-07-26 PROCEDURE — G0463 HOSPITAL OUTPT CLINIC VISIT: HCPCS | Performed by: RADIOLOGY

## 2021-07-26 NOTE — PATIENT INSTRUCTIONS
1) Continue to follow with Dr. Madrid, he will order your chest CT scans  2) Follow with Dr. Palmer in 6 months.

## 2021-08-04 NOTE — PROGRESS NOTES
transbronchial bipsy consistent with adenocarcinoma. FNA biopsy of several jasmin station negative for metastatic disease. · 4/7/2021he was first seen by me. · 4/15/21 2D echo: Normal left ventricular size with reduced global systolic function EF 52-73%. Mild concentric left ventricular hypertrophy with mild [grade 1] diastolic dysfunction. Normal left atrial size. Mild thickening of a poorly visualized aortic valve without evidence of stenosis or insufficiency. Mild thickening of a normally mobile mitral valve with mild regurgitation. Nonvisualization pulmonic valve. Poorly visualized but apparently normal size right-sided chambers with preserved right ventricular systolic function. No tricuspid regurgitation with which to estimate RVSP. No significant pericardial effusion. Aortic root and ascending segments measured within normal limits. · 4/17/21 MRI brain: No acute intracranial process. No metastatic disease identified in the CNS. · 4/21/2021initiation of carboplatin/Taxol weekly. Reviewed MRI brain 2D echo. · 4/21/20215/26/21 completed concurrent chemoradiation with carboplatin/taxol to be followed by immunotherapy  · 4/22/21-6/3/21 6600 cGy radiation therapy completed  · 6/30/21-CT CHEST W CONTRAST A large spiculated mass in the right upper lobe posterior segment represent a neoplastic process. The groundglass opacity/infiltrate in the right upper lobe and superior segments of the lower lobes bilaterally may represent an inflammatory or neoplastic/metastatic process. A single enlarged abnormal lymph node in the right hilum may represent a metastatic node.    · 07/08/21-reviewed CT chest with perform radiologist.  · 7/8/21- initiate maintenance Durvalumab 1500mg every 4 weeks x12 cycles    Past Medical History:    Past Medical History:   Diagnosis Date    Allergic rhinitis     Bronchitis, chronic (HCC)     Carotid artery stenosis     GERD (gastroesophageal reflux disease)     Heart attack ([facility] [address]  Hemorrhoids     Hypercholesterolemia     Type II or unspecified type diabetes mellitus without mention of complication, not stated as uncontrolled        Past Surgical History:    Past Surgical History:   Procedure Laterality Date    CARDIAC SURGERY      balloon surgery (angioplasty)    PORT SURGERY Right 04/01/2021       Social History:    Marital status: , 2 daughters  Smoking status: Former smoker for ~30 years, 1.5 ppd  ETOH status: No  Resides: [address]    Family History:   Family History   Problem Relation Age of Onset    Diabetes Mother     High Blood Pressure Mother     Cancer Father         Lung       Current Hospital Medications:    Current Outpatient Medications   Medication Sig Dispense Refill    simvastatin (ZOCOR) 40 MG tablet TAKE 1 TABLET AT BEDTIME FOR CHOLESTEROL 90 tablet 3    omeprazole (PRILOSEC) 40 MG delayed release capsule TAKE 1 CAPSULE DAILY FOR STOMACH 90 capsule 3    propranolol (INDERAL) 60 MG tablet Take 1 tablet by mouth 2 times daily For blood pressure and tremor 180 tablet 3    HYDROcodone-acetaminophen (NORCO) 7.5-325 MG per tablet       metoprolol tartrate (LOPRESSOR) 50 MG tablet Take 50 mg by mouth daily      montelukast (SINGULAIR) 10 MG tablet       ondansetron (ZOFRAN) 8 MG tablet Take 1 tablet by mouth every 8 hours as needed for Nausea or Vomiting 30 tablet 3    lidocaine-prilocaine (EMLA) 2.5-2.5 % cream Apply topically to port area and cover with plastic wrap one hour prior to treatment.  1 Tube 1    furosemide (LASIX) 40 MG tablet TAKE 1 TABLET DAILY FOR FLUID 90 tablet 3    metFORMIN (GLUCOPHAGE) 1000 MG tablet TAKE 1 TABLET TWICE A DAY FOR DIABETES 180 tablet 3    blood glucose test strips (TRUE METRIX BLOOD GLUCOSE TEST) strip TEST BLOOD SUGAR ONCE DAILY *E11.9* 100 strip 5    isosorbide mononitrate (IMDUR) 60 MG extended release tablet Take 60 mg by mouth      losartan (COZAAR) 25 MG tablet Take 25 mg by mouth      umeclidinium-vilanterol (ANORO ELLIPTA) 62.5-25 MCG/INH AEPB inhaler Inhale 1 puff into the lungs daily      primidone (MYSOLINE) 50 MG tablet 1 tab po BID for tremor 180 tablet 3    triamcinolone (KENALOG) 0.1 % cream Apply topically 2 times daily. To lower leg dry skin 3 Tube 3    tamsulosin (FLOMAX) 0.4 MG capsule Take 1 capsule by mouth daily. 90 capsule 3    aspirin 325 MG tablet Take 325 mg by mouth daily. No current facility-administered medications for this visit.      Facility-Administered Medications Ordered in Other Visits   Medication Dose Route Frequency Provider Last Rate Last Admin    sodium chloride flush 0.9 % injection 5-40 mL  5-40 mL Intravenous PRN Sonido Trevino MD   10 mL at 08/05/21 1506    heparin flush 100 UNIT/ML injection 500 Units  500 Units Intracatheter PRN Sonido Trevino MD   500 Units at 08/05/21 1506       Allergies: No Known Allergies      Subjective   REVIEW OF SYSTEMS:   CONSTITUTIONAL: no fever, no night sweats, fatigue; decreased activity  HEENT: no blurring of vision, no double vision, no hearing difficulty, no tinnitus, no ulceration, no dysplasia, no epistaxis;   LUNGS: cough, no hemoptysis, no wheeze,  exertional shortness of breath;  CARDIOVASCULAR: no palpitation, no chest pain, exertional shortness of breath;  GI: no abdominal pain, no nausea, no vomiting, no diarrhea, constipation;  JESSIKA: no dysuria, no hematuria, no frequency or urgency, no nephrolithiasis;  MUSCULOSKELETAL: no joint pain, no swelling, no stiffness;   ENDOCRINE: no polyuria, no polydipsia, no cold or heat intolerance;  HEMATOLOGY:  easy bruising or bleeding, no history of clotting disorder;  DERMATOLOGY: no skin rash, no eczema, no pruritus;  PSYCHIATRY: no depression, no anxiety, no panic attacks, no suicidal ideation, no homicidal ideation;  NEUROLOGY: no syncope, no seizures, no numbness or tingling of hands, no numbness or tingling of feet, no paresis;     Objective   BP (!) 142/72   Pulse 85   Temp 98.6 °F (37 °C)   Resp 18   Ht 5' 10\" (1.778 m)   Wt 195 lb (88.5 kg)   BMI 27.98 kg/m²       PHYSICAL EXAM:  CONSTITUTIONAL: Alert, appropriate, no acute distress  EYES: Non icteric, EOM intact, pupils equal round   ENT: Mucus membranes moist, no oral pharyngeal lesions, external inspection of ears and nose are normal  NECK: Supple, no masses. No palpable thyroid mass  CHEST/LUNGS: CTA bilaterally, normal respiratory effort   CARDIOVASCULAR: RRR, no murmurs. No lower extremity edema  ABDOMEN: soft non-tender, active bowel sounds, no HSM. No palpable masses  EXTREMITIES: warm, full ROM in all 4 extremities, no focal weakness. SKIN: warm, dry with no rashes or lesions  LYMPH: No cervical, clavicular, axillary, or inguinal lymphadenopathy  NEUROLOGIC: follows commands, non focal   PSYCH: mood and affect appropriate.   Alert and oriented to time, place, person      LABORATORY RESULTS REVIEWED/ANALYZED BY ME:  Lab Results   Component Value Date    TSH 2.110 08/05/2021     Lab Results   Component Value Date    WBC 6.99 08/05/2021    HGB 10.9 (L) 08/05/2021    HCT 32.9 (L) 08/05/2021    MCV 97.9 (H) 08/05/2021     08/05/2021     Lab Results   Component Value Date    NEUTROABS 4.63 08/05/2021     Lab Results   Component Value Date     08/05/2021    K 4.4 08/05/2021    CL 96 (L) 08/05/2021    CO2 33 (H) 08/05/2021    BUN 24 (H) 08/05/2021    CREATININE 1.3 (H) 08/05/2021    GLUCOSE 115 (H) 08/05/2021    CALCIUM 9.8 08/05/2021    PROT 7.8 08/05/2021    LABALBU 4.6 08/05/2021    BILITOT 0.8 08/05/2021    ALKPHOS 89 08/05/2021    AST 28 08/05/2021    ALT 18 (L) 08/05/2021    LABGLOM 53 (A) 08/05/2021    GFRAA 54 (L) 06/03/2021    GLOB 3.2 08/05/2021       RADIOLOGY STUDIES REVIEWED BY ME:  none    ASSESSMENT:    Orders Placed This Encounter   Procedures    CBC Auto Differential     Standing Status:   Future     Number of Occurrences:   1     Standing Expiration Date:   8/5/2022  Comprehensive Metabolic Panel     Standing Status:   Future     Number of Occurrences:   1     Standing Expiration Date:   8/5/2022    TSH without Reflex     Standing Status:   Future     Number of Occurrences:   1     Standing Expiration Date:   8/5/2022      Smiht Paul was seen today for cancer. 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 The patient is not a surgical candidate. Recommend chemoradiation followed by immunotherapy. Status post completion of chemoradiation. CT chest showed interval response   Recommended:  Durvalumab 1500 mg IV every 4 weeks x12 dose    Proceed cycle #2/12 8/5/2021. Complex medical patientdiscussed with radiation oncology, pulmonary. Patient has several comorbidities. He will follow-up with PCP and other medical providers for his chronic medical problems. Diabetes mellitus type 2/hypertensioncontrolled. Discussed with PCP office. Patient will be seen tomorrow. Systolic heart failureEF 45%  2D echo-LVEF 40-45%. Mild LVH, Mild [grade 1] diastolic dysfunction. Controlled hypertension stable. Medication controlled. Followed by PCP and Cardiology.   142/72 mmHg    At risk thyroid disorderTSH 2.1 on 8/5/2021    PLAN:  · C# 2/12 monthly durvalumab today  · RTC 4 weeks with MD in treatment room  · Durvalumab every 4 weeks  · CBC, TSH, CMP today  · Repeat CT chest around 9/30/2021      I, Kory Garces, am scribing for Leopold Georgis, MD. Electronically signed by Kory Garces RN on 8/5/2021 at 9:54 PM CDT. I, Dr Bijal Mancini, personally performed the services described in this documentation as scribed by Kory Garces RN in my presence and is both accurate and complete. I have seen, examined and reviewed this patient medication list, appropriate labs and imaging studies. I reviewed relevant medical records and others physicians notes. I discussed the plans of care with the patient. I answered all the questions to the patients satisfaction. I have also reviewed the chief complaint (CC) and part of the history (History of Present Illness (HPI), Past Family Social History [facility]), or Review of Systems (ROS) and made changes when appropriated.        (Please note that portions of this note were completed with a voice recognition program. Efforts were made to edit the dictations but occasionally words are mis-transcribed.)      Leopold Georgis, MD    08/05/21  3:33 PM

## 2021-08-05 ENCOUNTER — HOSPITAL ENCOUNTER (OUTPATIENT)
Dept: INFUSION THERAPY | Age: 81
Discharge: HOME | End: 2021-08-05
Payer: MEDICARE

## 2021-08-05 ENCOUNTER — OFFICE VISIT (OUTPATIENT)
Dept: HEMATOLOGY | Age: 81
End: 2021-08-05
Payer: MEDICARE

## 2021-08-05 VITALS
BODY MASS INDEX: 27.92 KG/M2 | DIASTOLIC BLOOD PRESSURE: 72 MMHG | TEMPERATURE: 98.6 F | HEIGHT: 70 IN | RESPIRATION RATE: 18 BRPM | WEIGHT: 195 LBS | HEART RATE: 85 BPM | SYSTOLIC BLOOD PRESSURE: 142 MMHG

## 2021-08-05 VITALS
HEART RATE: 85 BPM | BODY MASS INDEX: 27.92 KG/M2 | RESPIRATION RATE: 18 BRPM | TEMPERATURE: 98.6 F | WEIGHT: 195 LBS | OXYGEN SATURATION: 96 % | HEIGHT: 70 IN | SYSTOLIC BLOOD PRESSURE: 142 MMHG | DIASTOLIC BLOOD PRESSURE: 72 MMHG

## 2021-08-05 DIAGNOSIS — I13.0 BENIGN HYPERTENSIVE HEART AND KIDNEY DISEASE WITH DIASTOLIC CHF, NYHA CLASS II AND CKD STAGE III (HCC): ICD-10-CM

## 2021-08-05 DIAGNOSIS — I50.30 BENIGN HYPERTENSIVE HEART AND KIDNEY DISEASE WITH DIASTOLIC CHF, NYHA CLASS II AND CKD STAGE III (HCC): ICD-10-CM

## 2021-08-05 DIAGNOSIS — E03.2 HYPOTHYROIDISM DUE TO MEDICATION: ICD-10-CM

## 2021-08-05 DIAGNOSIS — D64.81 ANTINEOPLASTIC CHEMOTHERAPY INDUCED ANEMIA: ICD-10-CM

## 2021-08-05 DIAGNOSIS — Z51.12 ENCOUNTER FOR ANTINEOPLASTIC IMMUNOTHERAPY: ICD-10-CM

## 2021-08-05 DIAGNOSIS — N18.30 BENIGN HYPERTENSIVE HEART AND KIDNEY DISEASE WITH DIASTOLIC CHF, NYHA CLASS II AND CKD STAGE III (HCC): ICD-10-CM

## 2021-08-05 DIAGNOSIS — C34.91 ADENOCARCINOMA OF RIGHT LUNG (HCC): Primary | ICD-10-CM

## 2021-08-05 DIAGNOSIS — T45.1X5A ANTINEOPLASTIC CHEMOTHERAPY INDUCED ANEMIA: ICD-10-CM

## 2021-08-05 DIAGNOSIS — Z71.89 CARE PLAN DISCUSSED WITH PATIENT: ICD-10-CM

## 2021-08-05 LAB
ALBUMIN SERPL-MCNC: 4.6 G/DL (ref 3.5–5.2)
ALP BLD-CCNC: 89 U/L (ref 40–130)
ALT SERPL-CCNC: 18 U/L (ref 21–72)
ANION GAP SERPL CALCULATED.3IONS-SCNC: 10 MMOL/L (ref 7–19)
AST SERPL-CCNC: 28 U/L (ref 17–59)
BASOPHILS ABSOLUTE: 0.05 K/UL (ref 0.01–0.08)
BASOPHILS RELATIVE PERCENT: 0.7 % (ref 0.1–1.2)
BILIRUB SERPL-MCNC: 0.8 MG/DL (ref 0.2–1.3)
BUN BLDV-MCNC: 24 MG/DL (ref 9–20)
CALCIUM SERPL-MCNC: 9.8 MG/DL (ref 8.4–10.2)
CHLORIDE BLD-SCNC: 96 MMOL/L (ref 98–111)
CO2: 33 MMOL/L (ref 22–29)
CREAT SERPL-MCNC: 1.3 MG/DL (ref 0.6–1.2)
EOSINOPHILS ABSOLUTE: 0.39 K/UL (ref 0.04–0.54)
EOSINOPHILS RELATIVE PERCENT: 5.6 % (ref 0.7–7)
GFR NON-AFRICAN AMERICAN: 53
GLOBULIN: 3.2 G/DL
GLUCOSE BLD-MCNC: 115 MG/DL (ref 74–106)
HCT VFR BLD CALC: 32.9 % (ref 40.1–51)
HEMOGLOBIN: 10.9 G/DL (ref 13.7–17.5)
LYMPHOCYTES ABSOLUTE: 1.36 K/UL (ref 1.18–3.74)
LYMPHOCYTES RELATIVE PERCENT: 19.5 % (ref 19.3–53.1)
MCH RBC QN AUTO: 32.4 PG (ref 25.7–32.2)
MCHC RBC AUTO-ENTMCNC: 33.1 G/DL (ref 32.3–36.5)
MCV RBC AUTO: 97.9 FL (ref 79–92.2)
MONOCYTES ABSOLUTE: 0.56 K/UL (ref 0.24–0.82)
MONOCYTES RELATIVE PERCENT: 8 % (ref 4.7–12.5)
NEUTROPHILS ABSOLUTE: 4.63 K/UL (ref 1.56–6.13)
NEUTROPHILS RELATIVE PERCENT: 66.2 % (ref 34–71.1)
PDW BLD-RTO: 14.8 % (ref 11.6–14.4)
PLATELET # BLD: 255 K/UL (ref 163–337)
PMV BLD AUTO: 9.7 FL (ref 7.4–10.4)
POTASSIUM SERPL-SCNC: 4.4 MMOL/L (ref 3.5–5.1)
RBC # BLD: 3.36 M/UL (ref 4.63–6.08)
SODIUM BLD-SCNC: 139 MMOL/L (ref 137–145)
TOTAL PROTEIN: 7.8 G/DL (ref 6.3–8.2)
TSH SERPL DL<=0.05 MIU/L-ACNC: 2.11 UIU/ML (ref 0.47–4.68)
WBC # BLD: 6.99 K/UL (ref 4.23–9.07)

## 2021-08-05 PROCEDURE — 2580000003 HC RX 258: Performed by: INTERNAL MEDICINE

## 2021-08-05 PROCEDURE — 1036F TOBACCO NON-USER: CPT | Performed by: INTERNAL MEDICINE

## 2021-08-05 PROCEDURE — 6360000002 HC RX W HCPCS: Performed by: INTERNAL MEDICINE

## 2021-08-05 PROCEDURE — 84443 ASSAY THYROID STIM HORMONE: CPT

## 2021-08-05 PROCEDURE — 1123F ACP DISCUSS/DSCN MKR DOCD: CPT | Performed by: INTERNAL MEDICINE

## 2021-08-05 PROCEDURE — 85025 COMPLETE CBC W/AUTO DIFF WBC: CPT

## 2021-08-05 PROCEDURE — G8427 DOCREV CUR MEDS BY ELIG CLIN: HCPCS | Performed by: INTERNAL MEDICINE

## 2021-08-05 PROCEDURE — 4040F PNEUMOC VAC/ADMIN/RCVD: CPT | Performed by: INTERNAL MEDICINE

## 2021-08-05 PROCEDURE — 80053 COMPREHEN METABOLIC PANEL: CPT

## 2021-08-05 PROCEDURE — 99214 OFFICE O/P EST MOD 30 MIN: CPT | Performed by: INTERNAL MEDICINE

## 2021-08-05 PROCEDURE — 96413 CHEMO IV INFUSION 1 HR: CPT

## 2021-08-05 PROCEDURE — G8417 CALC BMI ABV UP PARAM F/U: HCPCS | Performed by: INTERNAL MEDICINE

## 2021-08-05 RX ORDER — SODIUM CHLORIDE 0.9 % (FLUSH) 0.9 %
5-40 SYRINGE (ML) INJECTION PRN
Status: DISCONTINUED | OUTPATIENT
Start: 2021-08-05 | End: 2021-08-06 | Stop reason: HOSPADM

## 2021-08-05 RX ORDER — SODIUM CHLORIDE 9 MG/ML
25 INJECTION, SOLUTION INTRAVENOUS PRN
Status: CANCELLED | OUTPATIENT
Start: 2021-08-05

## 2021-08-05 RX ORDER — DIPHENHYDRAMINE HYDROCHLORIDE 50 MG/ML
50 INJECTION INTRAMUSCULAR; INTRAVENOUS ONCE
Status: CANCELLED | OUTPATIENT
Start: 2021-08-05 | End: 2021-08-05

## 2021-08-05 RX ORDER — HEPARIN SODIUM (PORCINE) LOCK FLUSH IV SOLN 100 UNIT/ML 100 UNIT/ML
500 SOLUTION INTRAVENOUS PRN
Status: CANCELLED | OUTPATIENT
Start: 2021-08-05

## 2021-08-05 RX ORDER — SODIUM CHLORIDE 0.9 % (FLUSH) 0.9 %
5-40 SYRINGE (ML) INJECTION PRN
Status: CANCELLED | OUTPATIENT
Start: 2021-08-05

## 2021-08-05 RX ORDER — SODIUM CHLORIDE 9 MG/ML
20 INJECTION, SOLUTION INTRAVENOUS ONCE
Status: CANCELLED | OUTPATIENT
Start: 2021-08-05 | End: 2021-08-05

## 2021-08-05 RX ORDER — SODIUM CHLORIDE 9 MG/ML
INJECTION, SOLUTION INTRAVENOUS CONTINUOUS
Status: CANCELLED | OUTPATIENT
Start: 2021-08-05

## 2021-08-05 RX ORDER — METHYLPREDNISOLONE SODIUM SUCCINATE 125 MG/2ML
125 INJECTION, POWDER, LYOPHILIZED, FOR SOLUTION INTRAMUSCULAR; INTRAVENOUS ONCE
Status: CANCELLED | OUTPATIENT
Start: 2021-08-05 | End: 2021-08-05

## 2021-08-05 RX ORDER — EPINEPHRINE 1 MG/ML
0.3 INJECTION, SOLUTION, CONCENTRATE INTRAVENOUS PRN
Status: CANCELLED | OUTPATIENT
Start: 2021-08-05

## 2021-08-05 RX ORDER — HEPARIN SODIUM (PORCINE) LOCK FLUSH IV SOLN 100 UNIT/ML 100 UNIT/ML
500 SOLUTION INTRAVENOUS PRN
Status: DISCONTINUED | OUTPATIENT
Start: 2021-08-05 | End: 2021-08-06 | Stop reason: HOSPADM

## 2021-08-05 RX ADMIN — SODIUM CHLORIDE 1500 MG: 9 INJECTION, SOLUTION INTRAVENOUS at 14:00

## 2021-08-05 RX ADMIN — HEPARIN 500 UNITS: 100 SYRINGE at 15:06

## 2021-08-05 RX ADMIN — SODIUM CHLORIDE, PRESERVATIVE FREE 10 ML: 5 INJECTION INTRAVENOUS at 15:06

## 2021-08-30 NOTE — PROGRESS NOTES
MEDICAL ONCOLOGY PROGRESS NOTE    Pt Name: Gus Greer  MRN: [id number]  YOB: 1940  Date of evaluation: 9/3/2021    HISTORY OF PRESENT ILLNESS:    Reason for MD visitdisease management/toxicity assessment  The patient is a very pleasant 79years old male who has a diagnosis of stage IIIa non-small cell lung cancer. He was initially diagnosed in December 2020. In addition he has had a several comorbidities to include systolic heart failure, hypertension. He has completed chemoradiation in June 2021. He complains of persistent fatigue. He is currently on durvalumab. He denies any immune mediated side effects such as skin rash, GI symptoms or respiratory symptoms. He is accompanied by his wife today. CBC and CMP were reviewed. Diagnosis  · RUL adenocarcinoma, NSCLC, Dec 2020  · [id number], stage IIIA  · Systolic heart failure  · Hypertension  · Not a surgical candidate    Treatment Summary  · 4/21/20215/26/21 completed concurrent chemoradiation with carboplatin/taxol to be followed by immunotherapy  · 4/22/21-6/3/21- 6600 cGy radiation therapy completed  · 7/8/21- initiate maintenance Durvalumab 1500mg every 4 weeks x12 cycles    Hematology/Cancer History:  Reji Jarvis was first seen by me on 4/7/2021 referred by Dr. Rasheed Tipton, [facility] for findings of non-small cell lung cancer, adenocarcinoma type. He has several comorbidities including history of type 2 diabetes, hypertension COPD. History of smoking in the past.  Quit many years ago. History of coronary artery disease followed by cardiology biopsy. Last EF 70% in 2018. He was seen by his primary care provider in November 2020. He has complaint of chest pain. Chest x-ray showed a 4 cm mass in the right upper lung. · 12/1/20 CT chest [facility]): Large right upper lobe mass concerning for primary neoplasm. Enlarged right hilar lymph node concerning for metastatic disease.  Additional noncalcified pulmonary nodules bilaterally for which follow-up CT is recommended in 3-6 months. Atherosclerosis of the aorta and coronary arteries. · 12/16/2020bronchoscopy with bronchoalveolar lavage was nondiagnostic  · 2/23/21 CT cheat w/o [facility]): Probable right upper lobe mass, as described. This appears slightly larger in size and is likely neoplastic. Consider PET CT follow-up. There is new surrounding infiltrate that extends inferiorly into the right upper lobe. The new may be infectious or inflammatory. Pulmonary hemorrhage possible. Other areas of nodularity and groundglass opacity/nodularity are stable in appearance. · 3/5/2021navigational bronchoscopy with biopsy was nondiagnostic  · 3/5/21 CT chest w/o [facility]): [place] soft tissue mass within the right upper lobe highly suspicious for neoplasm. There is associated postobstructive pneumonitis within the right upper lobe showing mild progression from the previous study of 2/23/2021. There is no associated hilar or mediastinal lymphadenopathy. Today's study is performed as part of a navigational bronchoscopy exam. Other scattered nodules including groundglass nodules are noted within the lungs all stable from the previous exam warranting follow-up. Heavy atheromatous calcification of the thoracic aorta and proximal great vessels. Coronary calcifications are present. · 3/23/21 PET scan [facility]): Markedly FDG avid right upper lobe mass in the right upper lobe measuring 7.2 cm triangular opacity with maximum SUV 12.18, highly concerning for malignancy. Adjacent groundglass opacities,  similar compared to 3/5/2021, which could represent additional  malignancy or infection/inflammation. Adjacent right upper lobe groundglass opacities are similar compared to 3/5/2021, which could represent additional malignancy or infection/inflammation. Other non-FDG avid pulmonary findings as above. No evidence of jasmin or distant metastatic disease.   · 3/29/21 Navigational bronchoscopy-Dr. Kathe Shefifeld: Bronchoalveolar lavage RUL consistent with adenocarcinoma. RUL transbronchial bipsy consistent with adenocarcinoma. FNA biopsy of several jasmin station negative for metastatic disease. · 4/7/2021he was first seen by me. · 4/15/21 2D echo: Normal left ventricular size with reduced global systolic function EF 65-71%. Mild concentric left ventricular hypertrophy with mild [grade 1] diastolic dysfunction. Normal left atrial size. Mild thickening of a poorly visualized aortic valve without evidence of stenosis or insufficiency. Mild thickening of a normally mobile mitral valve with mild regurgitation. Nonvisualization pulmonic valve. Poorly visualized but apparently normal size right-sided chambers with preserved right ventricular systolic function. No tricuspid regurgitation with which to estimate RVSP. No significant pericardial effusion. Aortic root and ascending segments measured within normal limits. · 4/17/21 MRI brain: No acute intracranial process. No metastatic disease identified in the CNS. · 4/21/2021initiation of carboplatin/Taxol weekly. Reviewed MRI brain 2D echo. · 4/21/20215/26/21 completed concurrent chemoradiation with carboplatin/taxol to be followed by immunotherapy  · 4/22/21-6/3/21 6600 cGy radiation therapy completed  · 6/30/21-CT CHEST W CONTRAST A large spiculated mass in the right upper lobe posterior segment represent a neoplastic process. The groundglass opacity/infiltrate in the right upper lobe and superior segments of the lower lobes bilaterally may represent an inflammatory or neoplastic/metastatic process. A single enlarged abnormal lymph node in the right hilum may represent a metastatic node.    · 07/08/21-reviewed CT chest with perform radiologist.  · 7/8/21- initiate maintenance Durvalumab 1500mg every 4 weeks x12 cycles    Past Medical History:  Past Medical History:   Diagnosis Date    Allergic rhinitis     Bronchitis, chronic ([address]     Carotid artery stenosis     GERD (gastroesophageal reflux disease)     Heart attack ([facility] [address]     Hemorrhoids     Hypercholesterolemia     Type II or unspecified type diabetes mellitus without mention of complication, not stated as uncontrolled        Past Surgical History:    Past Surgical History:   Procedure Laterality Date    CARDIAC SURGERY      balloon surgery (angioplasty)    PORT SURGERY Right 04/01/2021       Social History:   Marital status: , 2 daughters  Smoking status: Former smoker for ~30 years, 1.5 ppd  ETOH status: No  Resides: [place], [place]    Family History:   Family History   Problem Relation Age of Onset    Diabetes Mother     High Blood Pressure Mother     Cancer Father         Lung     Current Hospital Medications:    Current Outpatient Medications   Medication Sig Dispense Refill    simvastatin (ZOCOR) 40 MG tablet TAKE 1 TABLET AT BEDTIME FOR CHOLESTEROL 90 tablet 3    omeprazole (PRILOSEC) 40 MG delayed release capsule TAKE 1 CAPSULE DAILY FOR STOMACH 90 capsule 3    propranolol (INDERAL) 60 MG tablet Take 1 tablet by mouth 2 times daily For blood pressure and tremor 180 tablet 3    HYDROcodone-acetaminophen (NORCO) 7.5-325 MG per tablet       metoprolol tartrate (LOPRESSOR) 50 MG tablet Take 50 mg by mouth daily      montelukast (SINGULAIR) 10 MG tablet       ondansetron (ZOFRAN) 8 MG tablet Take 1 tablet by mouth every 8 hours as needed for Nausea or Vomiting 30 tablet 3    lidocaine-prilocaine (EMLA) 2.5-2.5 % cream Apply topically to port area and cover with plastic wrap one hour prior to treatment.  1 Tube 1    furosemide (LASIX) 40 MG tablet TAKE 1 TABLET DAILY FOR FLUID 90 tablet 3    metFORMIN (GLUCOPHAGE) 1000 MG tablet TAKE 1 TABLET TWICE A DAY FOR DIABETES 180 tablet 3    blood glucose test strips (TRUE METRIX BLOOD GLUCOSE TEST) strip TEST BLOOD SUGAR ONCE DAILY *E11.9* 100 strip 5    isosorbide mononitrate (IMDUR) 60 MG extended release tablet Take 60 mg by mouth      losartan (COZAAR) 25 MG tablet Take 25 mg by mouth      umeclidinium-vilanterol (ANORO ELLIPTA) 62.5-25 MCG/INH AEPB inhaler Inhale 1 puff into the lungs daily      primidone (MYSOLINE) 50 MG tablet 1 tab po BID for tremor 180 tablet 3    triamcinolone (KENALOG) 0.1 % cream Apply topically 2 times daily. To lower leg dry skin 3 Tube 3    tamsulosin (FLOMAX) 0.4 MG capsule Take 1 capsule by mouth daily. 90 capsule 3    aspirin 325 MG tablet Take 325 mg by mouth daily. No current facility-administered medications for this visit.      Facility-Administered Medications Ordered in Other Visits   Medication Dose Route Frequency Provider Last Rate Last Admin    0.9 % sodium chloride infusion  20 mL/hr IntraVENous Once Vianca Rosario MD        sodium chloride flush 0.9 % injection 5-40 mL  5-40 mL IntraVENous PRN Vianca Rosario MD   10 mL at 09/03/21 1412    heparin flush 100 UNIT/ML injection 500 Units  500 Units IntraCATHeter PRN Vianca Rosario MD   500 Units at 09/03/21 1412       Allergies: No Known Allergies      Subjective   REVIEW OF SYSTEMS:   CONSTITUTIONAL: no fever, no night sweats, fatigue;  HEENT: no blurring of vision, no double vision, no hearing difficulty, no tinnitus, no ulceration, no dysplasia, no epistaxis;   LUNGS: no cough, no hemoptysis, no wheeze,  no shortness of breath;  CARDIOVASCULAR: no palpitation, no chest pain, no shortness of breath;  GI: no abdominal pain, no nausea, no vomiting, no diarrhea, no constipation;  JESSIKA: no dysuria, no hematuria, no frequency or urgency, no nephrolithiasis;  MUSCULOSKELETAL: no joint pain, no swelling, no stiffness;  ENDOCRINE: no polyuria, no polydipsia, no cold or heat intolerance;  HEMATOLOGY: no easy bruising or bleeding, no history of clotting disorder;  DERMATOLOGY: no skin rash, no eczema, no pruritus;  PSYCHIATRY: no depression, no anxiety, no panic attacks, no suicidal ideation, no homicidal ideation;  NEUROLOGY: no syncope, no seizures, no numbness or tingling of hands, no numbness or tingling of feet, no paresis;       Objective   BP (!) 142/82   Pulse 89   Temp 98.6 °F (37 °C)   Resp 18   Ht 5' 10\" (1.778 m)   Wt 193 lb 9.6 oz (87.8 kg)   SpO2 96%   BMI 27.78 kg/m²       PHYSICAL EXAM:  CONSTITUTIONAL: Alert, appropriate, no acute distress  EYES: Non icteric, EOM intact, pupils equal round   ENT: Mucus membranes moist, no oral pharyngeal lesions, external inspection of ears and nose are normal  NECK: Supple, no masses. No palpable thyroid mass  CHEST/LUNGS: CTA bilaterally, normal respiratory effort   CARDIOVASCULAR: RRR, no murmurs. No lower extremity edema  ABDOMEN: soft non-tender, active bowel sounds, no HSM. No palpable masses  EXTREMITIES: warm, full ROM in all 4 extremities, no focal weakness. SKIN: warm, dry with no rashes or lesions  LYMPH: No cervical, clavicular, axillary, or inguinal lymphadenopathy  NEUROLOGIC: follows commands, non focal   PSYCH: mood and affect appropriate.   Alert and oriented to time, place, person      LABORATORY RESULTS REVIEWED/ANALYZED BY ME:  Lab Results   Component Value Date    WBC 7.70 09/03/2021    HGB 10.8 (L) 09/03/2021    HCT 32.5 (L) 09/03/2021    MCV 98.8 (H) 09/03/2021     09/03/2021     Lab Results   Component Value Date    NEUTROABS 5.31 09/03/2021     Lab Results   Component Value Date     09/03/2021    K 4.3 09/03/2021     09/03/2021    CO2 29 09/03/2021    BUN 20 09/03/2021    CREATININE 1.1 09/03/2021    GLUCOSE 164 (H) 09/03/2021    CALCIUM 9.1 09/03/2021    PROT 7.6 09/03/2021    LABALBU 4.4 09/03/2021    BILITOT 0.6 09/03/2021    ALKPHOS 85 09/03/2021    AST 24 09/03/2021    ALT 16 (L) 09/03/2021    LABGLOM >60 09/03/2021    GFRAA 54 (L) 06/03/2021    GLOB 3.2 09/03/2021     Lab Results   Component Value Date    TSH 1.002 09/03/2021       RADIOLOGY STUDIES REVIEWED BY ME:    ASSESSMENT:    Orders Placed This Encounter   Procedures    CT CHEST W CONTRAST     Standing Status:   Future     Standing Expiration Date:   9/3/2022     Scheduling Instructions:      sched before next tx     Order Specific Question:   Reason for exam:     Answer:   Lung cancer, current or r/o recurrence; Lung cancer, r/o recurrence; No known/automatically detected potential contraindications to iodinated contrast    Comprehensive Metabolic Panel     Standing Status:   Future     Number of Occurrences:   1     Standing Expiration Date:   9/3/2022    TSH without Reflex     Standing Status:   Future     Number of Occurrences:   1     Standing Expiration Date:   9/3/2022      Olga Smith was seen today for chemotherapy. Diagnoses and all orders for this visit:    Adenocarcinoma of right lung ([facility] [address]  -     Comprehensive Metabolic Panel; Future  -     CT CHEST W CONTRAST; Future    Hypothyroidism due to medication  -     TSH without Reflex; Future    Encounter for antineoplastic immunotherapy    Care plan discussed with patient    Antineoplastic chemotherapy induced anemia       [id number], stge IIIA, NSCLC, adenocarcinoma subtype  Essentially stage III disease. The patient is not a surgical candidate. Recommend chemoradiation followed by immunotherapy. Status post completion of chemoradiation. CT chest showed interval response   Recommended:  Durvalumab 1500 mg IV every 4 weeks x12 dose    Proceed cycle #3. Diabetes mellitus type 2/hypertensioncontrolled. Discussed with PCP office. Systolic heart failureEF 45%  2D echo-LVEF 40-45%. Mild LVH, Mild [grade 1] diastolic dysfunction. Controlled hypertension stable. Medication controlled. Followed by PCP and Cardiology.   142/82 mmHg    At risk thyroid disorderTSH 1.0 on 9/3/2021     PLAN:  · C# 3 /12 monthly durvalumab today  · RTC 4 weeks with MD in treatment room  · Durvalumab every 4 weeks  · CBC, TSH, CMP today  · Repeat CT chest around 9/30/2021      [facility]o SIMS am scribing for Brie Hermosillo MD. Electronically signed by Mert Venegas MA on 9/3/2021 at 3:45 PM CDT. I, Dr Ina Iraheta, personally performed the services described in this documentation as scribed by Mert Venegas MA in my presence and is both accurate and complete. I have seen, examined and reviewed this patient medication list, appropriate labs and imaging studies. I reviewed relevant medical records and others physicians notes. I discussed the plans of care with the patient. I answered all the questions to the patients satisfaction. I have also reviewed the chief complaint (CC) and part of the history (History of Present Illness (HPI), Past Family Social History [facility]), or Review of Systems (ROS) and made changes when appropriated.        (Please note that portions of this note were completed with a voice recognition program. Efforts were made to edit the dictations but occasionally words are mis-transcribed.)      Elysia Hensley MD    09/03/21  6:17 PM

## 2021-09-03 ENCOUNTER — OFFICE VISIT (OUTPATIENT)
Dept: HEMATOLOGY | Age: 81
End: 2021-09-03
Payer: MEDICARE

## 2021-09-03 ENCOUNTER — HOSPITAL ENCOUNTER (OUTPATIENT)
Dept: INFUSION THERAPY | Age: 81
Discharge: HOME | End: 2021-09-03
Payer: MEDICARE

## 2021-09-03 ENCOUNTER — TRANSCRIBE ORDERS (OUTPATIENT)
Dept: ADMINISTRATIVE | Facility: HOSPITAL | Age: 81
End: 2021-09-03

## 2021-09-03 VITALS
WEIGHT: 193.6 LBS | SYSTOLIC BLOOD PRESSURE: 142 MMHG | BODY MASS INDEX: 27.72 KG/M2 | OXYGEN SATURATION: 96 % | DIASTOLIC BLOOD PRESSURE: 82 MMHG | HEART RATE: 89 BPM | HEIGHT: 70 IN | TEMPERATURE: 98.6 F | RESPIRATION RATE: 18 BRPM

## 2021-09-03 DIAGNOSIS — E03.2 HYPOTHYROIDISM DUE TO MEDICATION: ICD-10-CM

## 2021-09-03 DIAGNOSIS — T45.1X5A ANTINEOPLASTIC CHEMOTHERAPY INDUCED ANEMIA: ICD-10-CM

## 2021-09-03 DIAGNOSIS — Z71.89 CARE PLAN DISCUSSED WITH PATIENT: ICD-10-CM

## 2021-09-03 DIAGNOSIS — C34.91 ADENOCARCINOMA OF RIGHT LUNG (HCC): Primary | ICD-10-CM

## 2021-09-03 DIAGNOSIS — D64.81 ANTINEOPLASTIC CHEMOTHERAPY INDUCED ANEMIA: ICD-10-CM

## 2021-09-03 DIAGNOSIS — C34.91 ADENOCARCINOMA, LUNG, RIGHT (HCC): Primary | ICD-10-CM

## 2021-09-03 DIAGNOSIS — Z51.12 ENCOUNTER FOR ANTINEOPLASTIC IMMUNOTHERAPY: ICD-10-CM

## 2021-09-03 LAB
ALBUMIN SERPL-MCNC: 4.4 G/DL (ref 3.5–5.2)
ALP BLD-CCNC: 85 U/L (ref 40–130)
ALT SERPL-CCNC: 16 U/L (ref 21–72)
ANION GAP SERPL CALCULATED.3IONS-SCNC: 11 MMOL/L (ref 7–19)
AST SERPL-CCNC: 24 U/L (ref 17–59)
BASOPHILS ABSOLUTE: 0.05 K/UL (ref 0.01–0.08)
BASOPHILS RELATIVE PERCENT: 0.6 % (ref 0.1–1.2)
BILIRUB SERPL-MCNC: 0.6 MG/DL (ref 0.2–1.3)
BUN BLDV-MCNC: 20 MG/DL (ref 9–20)
CALCIUM SERPL-MCNC: 9.1 MG/DL (ref 8.4–10.2)
CHLORIDE BLD-SCNC: 100 MMOL/L (ref 98–111)
CO2: 29 MMOL/L (ref 22–29)
CREAT SERPL-MCNC: 1.1 MG/DL (ref 0.6–1.2)
EOSINOPHILS ABSOLUTE: 0.29 K/UL (ref 0.04–0.54)
EOSINOPHILS RELATIVE PERCENT: 3.8 % (ref 0.7–7)
GFR NON-AFRICAN AMERICAN: >60
GLOBULIN: 3.2 G/DL
GLUCOSE BLD-MCNC: 164 MG/DL (ref 74–106)
HCT VFR BLD CALC: 32.5 % (ref 40.1–51)
HEMOGLOBIN: 10.8 G/DL (ref 13.7–17.5)
LYMPHOCYTES ABSOLUTE: 1.46 K/UL (ref 1.18–3.74)
LYMPHOCYTES RELATIVE PERCENT: 19 % (ref 19.3–53.1)
MCH RBC QN AUTO: 32.8 PG (ref 25.7–32.2)
MCHC RBC AUTO-ENTMCNC: 33.2 G/DL (ref 32.3–36.5)
MCV RBC AUTO: 98.8 FL (ref 79–92.2)
MONOCYTES ABSOLUTE: 0.59 K/UL (ref 0.24–0.82)
MONOCYTES RELATIVE PERCENT: 7.7 % (ref 4.7–12.5)
NEUTROPHILS ABSOLUTE: 5.31 K/UL (ref 1.56–6.13)
NEUTROPHILS RELATIVE PERCENT: 68.9 % (ref 34–71.1)
PDW BLD-RTO: 13.2 % (ref 11.6–14.4)
PLATELET # BLD: 255 K/UL (ref 163–337)
PMV BLD AUTO: 9.1 FL (ref 7.4–10.4)
POTASSIUM SERPL-SCNC: 4.3 MMOL/L (ref 3.5–5.1)
RBC # BLD: 3.29 M/UL (ref 4.63–6.08)
SODIUM BLD-SCNC: 140 MMOL/L (ref 137–145)
TOTAL PROTEIN: 7.6 G/DL (ref 6.3–8.2)
TSH SERPL DL<=0.05 MIU/L-ACNC: 1 UIU/ML (ref 0.47–4.68)
WBC # BLD: 7.7 K/UL (ref 4.23–9.07)

## 2021-09-03 PROCEDURE — 80053 COMPREHEN METABOLIC PANEL: CPT

## 2021-09-03 PROCEDURE — 6360000002 HC RX W HCPCS: Performed by: INTERNAL MEDICINE

## 2021-09-03 PROCEDURE — 36415 COLL VENOUS BLD VENIPUNCTURE: CPT

## 2021-09-03 PROCEDURE — 1036F TOBACCO NON-USER: CPT | Performed by: INTERNAL MEDICINE

## 2021-09-03 PROCEDURE — 99214 OFFICE O/P EST MOD 30 MIN: CPT | Performed by: INTERNAL MEDICINE

## 2021-09-03 PROCEDURE — 85025 COMPLETE CBC W/AUTO DIFF WBC: CPT

## 2021-09-03 PROCEDURE — 1123F ACP DISCUSS/DSCN MKR DOCD: CPT | Performed by: INTERNAL MEDICINE

## 2021-09-03 PROCEDURE — G8417 CALC BMI ABV UP PARAM F/U: HCPCS | Performed by: INTERNAL MEDICINE

## 2021-09-03 PROCEDURE — 4040F PNEUMOC VAC/ADMIN/RCVD: CPT | Performed by: INTERNAL MEDICINE

## 2021-09-03 PROCEDURE — 96413 CHEMO IV INFUSION 1 HR: CPT

## 2021-09-03 PROCEDURE — G8427 DOCREV CUR MEDS BY ELIG CLIN: HCPCS | Performed by: INTERNAL MEDICINE

## 2021-09-03 PROCEDURE — 84443 ASSAY THYROID STIM HORMONE: CPT

## 2021-09-03 PROCEDURE — 2580000003 HC RX 258: Performed by: INTERNAL MEDICINE

## 2021-09-03 RX ORDER — HEPARIN SODIUM (PORCINE) LOCK FLUSH IV SOLN 100 UNIT/ML 100 UNIT/ML
500 SOLUTION INTRAVENOUS PRN
Status: DISCONTINUED | OUTPATIENT
Start: 2021-09-03 | End: 2021-09-04 | Stop reason: HOSPADM

## 2021-09-03 RX ORDER — DIPHENHYDRAMINE HYDROCHLORIDE 50 MG/ML
50 INJECTION INTRAMUSCULAR; INTRAVENOUS ONCE
Status: CANCELLED | OUTPATIENT
Start: 2021-09-03 | End: 2021-09-03

## 2021-09-03 RX ORDER — SODIUM CHLORIDE 9 MG/ML
20 INJECTION, SOLUTION INTRAVENOUS ONCE
Status: CANCELLED | OUTPATIENT
Start: 2021-09-03 | End: 2021-09-03

## 2021-09-03 RX ORDER — SODIUM CHLORIDE 9 MG/ML
INJECTION, SOLUTION INTRAVENOUS CONTINUOUS
Status: CANCELLED | OUTPATIENT
Start: 2021-09-03

## 2021-09-03 RX ORDER — SODIUM CHLORIDE 0.9 % (FLUSH) 0.9 %
5-40 SYRINGE (ML) INJECTION PRN
Status: DISCONTINUED | OUTPATIENT
Start: 2021-09-03 | End: 2021-09-04 | Stop reason: HOSPADM

## 2021-09-03 RX ORDER — METHYLPREDNISOLONE SODIUM SUCCINATE 125 MG/2ML
125 INJECTION, POWDER, LYOPHILIZED, FOR SOLUTION INTRAMUSCULAR; INTRAVENOUS ONCE
Status: CANCELLED | OUTPATIENT
Start: 2021-09-03 | End: 2021-09-03

## 2021-09-03 RX ORDER — SODIUM CHLORIDE 9 MG/ML
20 INJECTION, SOLUTION INTRAVENOUS ONCE
Status: DISCONTINUED | OUTPATIENT
Start: 2021-09-03 | End: 2021-09-05 | Stop reason: HOSPADM

## 2021-09-03 RX ORDER — HEPARIN SODIUM (PORCINE) LOCK FLUSH IV SOLN 100 UNIT/ML 100 UNIT/ML
500 SOLUTION INTRAVENOUS PRN
Status: CANCELLED | OUTPATIENT
Start: 2021-09-03

## 2021-09-03 RX ORDER — SODIUM CHLORIDE 9 MG/ML
25 INJECTION, SOLUTION INTRAVENOUS PRN
Status: CANCELLED | OUTPATIENT
Start: 2021-09-03

## 2021-09-03 RX ORDER — SODIUM CHLORIDE 0.9 % (FLUSH) 0.9 %
5-40 SYRINGE (ML) INJECTION PRN
Status: CANCELLED | OUTPATIENT
Start: 2021-09-03

## 2021-09-03 RX ORDER — EPINEPHRINE 1 MG/ML
0.3 INJECTION, SOLUTION, CONCENTRATE INTRAVENOUS PRN
Status: CANCELLED | OUTPATIENT
Start: 2021-09-03

## 2021-09-03 RX ADMIN — SODIUM CHLORIDE, PRESERVATIVE FREE 10 ML: 5 INJECTION INTRAVENOUS at 14:12

## 2021-09-03 RX ADMIN — SODIUM CHLORIDE 1500 MG: 9 INJECTION, SOLUTION INTRAVENOUS at 13:05

## 2021-09-03 RX ADMIN — HEPARIN 500 UNITS: 100 SYRINGE at 14:12

## 2021-09-24 ENCOUNTER — HOSPITAL ENCOUNTER (OUTPATIENT)
Dept: CT IMAGING | Facility: HOSPITAL | Age: 81
Discharge: HOME | End: 2021-09-24
Admitting: INTERNAL MEDICINE

## 2021-09-24 DIAGNOSIS — C34.91 ADENOCARCINOMA, LUNG, RIGHT (HCC): ICD-10-CM

## 2021-09-24 PROCEDURE — 82565 ASSAY OF CREATININE: CPT

## 2021-09-24 PROCEDURE — 25010000002 IOPAMIDOL 61 % SOLUTION: Performed by: INTERNAL MEDICINE

## 2021-09-24 PROCEDURE — 71260 CT THORAX DX C+: CPT

## 2021-09-24 RX ADMIN — IOPAMIDOL 100 ML: 612 INJECTION, SOLUTION INTRAVENOUS at 09:35

## 2021-09-27 LAB — CREAT BLDA-MCNC: 1.2 MG/DL (ref 0.6–1.3)

## 2021-09-29 NOTE — PROGRESS NOTES
MEDICAL ONCOLOGY PROGRESS NOTE    Pt Name: Sarkis Bland  MRN: [id number]  YOB: 1940  Date of evaluation: 10/1/2021    HISTORY OF PRESENT ILLNESS:    Reason for MD visitdisease management/toxicity assessment  The patient is a very pleasant 79years old male who has a diagnosis of stage IIIa non-small cell lung cancer. He was initially diagnosed in December 2020. In addition he has had a several comorbidities to include systolic heart failure, hypertension. He has completed chemoradiation in June 2021 and has had severe deconditioning. He is accompanied by his daughter today. He has persistent fatigue and decreased mobility. The daughter reports that he spent most of the time in the wheelchair and does not move much around the house. He is currently on immunotherapy with durvalumab. He reports occasional nausea. Denies any vomiting. Denies any diarrhea. Denies any skin rash or new respiratory symptoms. Diagnosis  · RUL adenocarcinoma, NSCLC, Dec 2020  · [id number], stage IIIA  · Systolic heart failure  · Hypertension, controlled  · Not a surgical candidate    Treatment Summary  · 4/21/20215/26/21 completed concurrent chemoradiation with carboplatin/Taxol  · 4/22/21-6/3/21- 6600 cGy radiation therapy completed  · 7/8/21- initiate maintenance Durvalumab 1500mg every 4 weeks x12 cycles    Hematology/Cancer History:  Na Vital was first seen by me on 4/7/2021 referred by Dr. Shasha Stoll, pulmonary [facility] for findings of non-small cell lung cancer, adenocarcinoma type. He has several comorbidities including history of type 2 diabetes, hypertension COPD. History of smoking in the past.  Quit many years ago. History of coronary artery disease followed by cardiology biopsy. Last EF 70% in 2018. He was seen by his primary care provider in November 2020. He has complaint of chest pain. Chest x-ray showed a 4 cm mass in the right upper lung.   · 12/1/20 CT chest [facility]): Large right upper lobe mass concerning for primary neoplasm. Enlarged right hilar lymph node concerning for metastatic disease. Additional noncalcified pulmonary nodules bilaterally for which follow-up CT is recommended in 3-6 months. Atherosclerosis of the aorta and coronary arteries. · 12/16/2020bronchoscopy with bronchoalveolar lavage was nondiagnostic  · 2/23/21 CT cheat w/o [facility]): Probable right upper lobe mass, as described. This appears slightly larger in size and is likely neoplastic. Consider PET CT follow-up. There is new surrounding infiltrate that extends inferiorly into the right upper lobe. The new may be infectious or inflammatory. Pulmonary hemorrhage possible. Other areas of nodularity and groundglass opacity/nodularity are stable in appearance. · 3/5/2021navigational bronchoscopy with biopsy was nondiagnostic  · 3/5/21 CT chest w/o [facility]): Limestone soft tissue mass within the right upper lobe highly suspicious for neoplasm. There is associated postobstructive pneumonitis within the right upper lobe showing mild progression from the previous study of 2/23/2021. There is no associated hilar or mediastinal lymphadenopathy. Today's study is performed as part of a navigational bronchoscopy exam. Other scattered nodules including groundglass nodules are noted within the lungs all stable from the previous exam warranting follow-up. Heavy atheromatous calcification of the thoracic aorta and proximal great vessels. Coronary calcifications are present. · 3/23/21 PET scan [facility]): Markedly FDG avid right upper lobe mass in the right upper lobe measuring 7.2 cm triangular opacity with maximum SUV 12.18, highly concerning for malignancy. Adjacent groundglass opacities,  similar compared to 3/5/2021, which could represent additional  malignancy or infection/inflammation.  Adjacent right upper lobe groundglass opacities are similar compared to 3/5/2021, which could represent additional malignancy or infection/inflammation. Other non-FDG avid pulmonary findings as above. No evidence of jasmin or distant metastatic disease. · 3/29/21 Navigational bronchoscopy-Dr. Samson Berman: Bronchoalveolar lavage RUL consistent with adenocarcinoma. RUL transbronchial bipsy consistent with adenocarcinoma. FNA biopsy of several jasmin station negative for metastatic disease. · 4/7/2021he was first seen by me. · 4/15/21 2D echo: Normal left ventricular size with reduced global systolic function EF 62-73%. Mild concentric left ventricular hypertrophy with mild [grade 1] diastolic dysfunction. Normal left atrial size. Mild thickening of a poorly visualized aortic valve without evidence of stenosis or insufficiency. Mild thickening of a normally mobile mitral valve with mild regurgitation. Nonvisualization pulmonic valve. Poorly visualized but apparently normal size right-sided chambers with preserved right ventricular systolic function. No tricuspid regurgitation with which to estimate RVSP. No significant pericardial effusion. Aortic root and ascending segments measured within normal limits. · 4/17/21 MRI brain: No acute intracranial process. No metastatic disease identified in the CNS. · 4/21/2021initiation of carboplatin/Taxol weekly. Reviewed MRI brain 2D echo. · 4/21/20215/26/21 completed concurrent chemoradiation with carboplatin/taxol to be followed by immunotherapy  · 4/22/21-6/3/21 6600 cGy radiation therapy completed  · 6/30/21-CT CHEST W CONTRAST A large spiculated mass in the right upper lobe posterior segment represent a neoplastic process. The groundglass opacity/infiltrate in the right upper lobe and superior segments of the lower lobes bilaterally may represent an inflammatory or neoplastic/metastatic process. A single enlarged abnormal lymph node in the right hilum may represent a metastatic node.    · 07/08/21-reviewed CT chest with perform radiologist.  · 7/8/21- initiate maintenance Durvalumab 1500mg every 4 weeks x12 cycles  · 9/24/2021 CT Chest w/ Contrast([facility]) Decreased size of right upper lobe triangular opacity with surrounding probable posttreatment changes. Similar size left upper lobe 1 cm groundglass and solid pulmonary opacity/nodule compared to 3/23/2021. Right hilum with a 1.4 x 1.2 cm lymph node or conglomeration of lymph nodes, which appears similar to 11/30/2020. Diffusely heterogeneous bone mineralization, which is nonspecific. This could be seen with aggressive osteopenia or infiltrative process. · 10/01/21-I reviewed the results CT chest.  Interval treatment response. Continue adjuvant durvalumab.     Past Medical History:  Past Medical History:   Diagnosis Date    Allergic rhinitis     Bronchitis, chronic (HCC)     Carotid artery stenosis     GERD (gastroesophageal reflux disease)     Heart attack ([facility] [address]     Hemorrhoids     Hypercholesterolemia     Type II or unspecified type diabetes mellitus without mention of complication, not stated as uncontrolled        Past Surgical History:    Past Surgical History:   Procedure Laterality Date    CARDIAC SURGERY      balloon surgery (angioplasty)    PORT SURGERY Right 04/01/2021       Social History:   Marital status: , 2 daughters  Smoking status: Former smoker for ~30 years, 1.5 ppd  ETOH status: No  Resides: [place], [place]    Family History:   Family History   Problem Relation Age of Onset    Diabetes Mother     High Blood Pressure Mother     Cancer Father         Lung     Current Hospital Medications:    Current Outpatient Medications   Medication Sig Dispense Refill    ondansetron (ZOFRAN ODT) 4 MG disintegrating tablet Take 1 tablet by mouth every 6 hours as needed for Nausea or Vomiting 30 tablet 5    simvastatin (ZOCOR) 40 MG tablet TAKE 1 TABLET AT BEDTIME FOR CHOLESTEROL 90 tablet 3    omeprazole (PRILOSEC) 40 MG delayed release capsule TAKE 1 CAPSULE DAILY FOR STOMACH 90 capsule 3    propranolol (INDERAL) 60 MG tablet Take 1 tablet by mouth 2 times daily For blood pressure and tremor 180 tablet 3    HYDROcodone-acetaminophen (NORCO) 7.5-325 MG per tablet       metoprolol tartrate (LOPRESSOR) 50 MG tablet Take 50 mg by mouth daily      montelukast (SINGULAIR) 10 MG tablet       ondansetron (ZOFRAN) 8 MG tablet Take 1 tablet by mouth every 8 hours as needed for Nausea or Vomiting 30 tablet 3    lidocaine-prilocaine (EMLA) 2.5-2.5 % cream Apply topically to port area and cover with plastic wrap one hour prior to treatment. 1 Tube 1    furosemide (LASIX) 40 MG tablet TAKE 1 TABLET DAILY FOR FLUID 90 tablet 3    metFORMIN (GLUCOPHAGE) 1000 MG tablet TAKE 1 TABLET TWICE A DAY FOR DIABETES 180 tablet 3    blood glucose test strips (TRUE METRIX BLOOD GLUCOSE TEST) strip TEST BLOOD SUGAR ONCE DAILY *E11.9* 100 strip 5    isosorbide mononitrate (IMDUR) 60 MG extended release tablet Take 60 mg by mouth      losartan (COZAAR) 25 MG tablet Take 25 mg by mouth      umeclidinium-vilanterol (ANORO ELLIPTA) 62.5-25 MCG/INH AEPB inhaler Inhale 1 puff into the lungs daily      primidone (MYSOLINE) 50 MG tablet 1 tab po BID for tremor 180 tablet 3    triamcinolone (KENALOG) 0.1 % cream Apply topically 2 times daily. To lower leg dry skin 3 Tube 3    tamsulosin (FLOMAX) 0.4 MG capsule Take 1 capsule by mouth daily. 90 capsule 3    aspirin 325 MG tablet Take 325 mg by mouth daily. No current facility-administered medications for this visit.      Facility-Administered Medications Ordered in Other Visits   Medication Dose Route Frequency Provider Last Rate Last Admin    heparin flush 100 UNIT/ML injection 500 Units  500 Units IntraCATHeter PRN Oscar Smyth MD   500 Units at 10/01/21 1500       Allergies: No Known Allergies      Subjective   REVIEW OF SYSTEMS:   CONSTITUTIONAL: no fever, no night sweats, decreased mobility, severe fatigue;  HEENT: no blurring of vision, no double vision, no hearing difficulty, no tinnitus, no ulceration, no dysplasia, no epistaxis;   LUNGS: no cough, no hemoptysis, no wheeze,  no shortness of breath;  CARDIOVASCULAR: no palpitation, no chest pain, no shortness of breath;  GI: no abdominal pain,  nausea, no vomiting, no diarrhea, no constipation;  JESSIKA: no dysuria, no hematuria, no frequency or urgency, no nephrolithiasis;  MUSCULOSKELETAL: no joint pain, no swelling, no stiffness;  ENDOCRINE: no polyuria, no polydipsia, no cold or heat intolerance;  HEMATOLOGY: no easy bruising or bleeding, no history of clotting disorder;  DERMATOLOGY: no skin rash, no eczema, no pruritus;  PSYCHIATRY: no depression, no anxiety, no panic attacks, no suicidal ideation, no homicidal ideation;  NEUROLOGY: no syncope, no seizures, no numbness or tingling of hands, no numbness or tingling of feet, no paresis;       Objective   BP (!) 184/75   Pulse 112   Temp 98.1 °F (36.7 °C) (Oral)   Ht 5' 10\" (1.778 m)   Wt 189 lb 14.4 oz (86.1 kg)   SpO2 96%   BMI 27.25 kg/m²       PHYSICAL EXAM:  CONSTITUTIONAL: Alert, appropriate, no acute distress  EYES: Non icteric, EOM intact, pupils equal round   ENT: Mucus membranes moist, no oral pharyngeal lesions, external inspection of ears and nose are normal  NECK: Supple, no masses. No palpable thyroid mass  CHEST/LUNGS: CTA bilaterally, normal respiratory effort   CARDIOVASCULAR: Tachycardic, no murmurs. No lower extremity edema  ABDOMEN: soft non-tender, active bowel sounds, no HSM. No palpable masses  EXTREMITIES: warm, full ROM in all 4 extremities, no focal weakness. SKIN: warm, dry with no rashes or lesions  LYMPH: No cervical, clavicular, axillary, or inguinal lymphadenopathy  NEUROLOGIC: follows commands, non focal   PSYCH: mood and affect appropriate.   Alert and oriented to time, place, person      LABORATORY RESULTS REVIEWED/ANALYZED BY ME:  Lab Results   Component Value Date    WBC 8.06 10/01/2021    HGB 11.1 (L) 10/01/2021    HCT 33.1 (L) 10/01/2021    MCV 94. 3 (H) 10/01/2021     10/01/2021     Lab Results   Component Value Date    NEUTROABS 5.56 10/01/2021     Lab Results   Component Value Date     10/01/2021    K 4.6 10/01/2021    CL 99 10/01/2021    CO2 29 10/01/2021    BUN 25 (H) 10/01/2021    CREATININE 1.0 10/01/2021    GLUCOSE 198 (H) 10/01/2021    CALCIUM 9.7 10/01/2021    PROT 8.0 10/01/2021    LABALBU 4.4 10/01/2021    BILITOT 0.5 10/01/2021    ALKPHOS 110 10/01/2021    AST 21 10/01/2021    ALT 14 (L) 10/01/2021    LABGLOM >60 10/01/2021    GFRAA 54 (L) 06/03/2021    GLOB 3.6 10/01/2021     Lab Results   Component Value Date    TSH 2.740 10/01/2021         RADIOLOGY STUDIES REVIEWED BY ME:  9/24/2021 CT Chest w/ Contrast([facility]) Decreased size of right upper lobe triangular opacity with surrounding probable posttreatment changes. Similar size left upper lobe 1 cm groundglass and solid pulmonary opacity/nodule compared to 3/23/2021. Right hilum with a 1.4 x 1.2 cm lymph node or conglomeration of lymph nodes, which appears similar to 11/30/2020. Diffusely heterogeneous bone mineralization, which is nonspecific. This could be seen with aggressive osteopenia or infiltrative process. Calcified atherosclerosis. ASSESSMENT:    No orders of the defined types were placed in this encounter. Pao Gloria was seen today for cancer. Diagnoses and all orders for this visit:    Adenocarcinoma of right lung (HCC)  -     ondansetron (ZOFRAN ODT) 4 MG disintegrating tablet; Take 1 tablet by mouth every 6 hours as needed for Nausea or Vomiting    Hypothyroidism due to medication    Encounter for antineoplastic immunotherapy    Exercise counseling    Physical deconditioning    Chemotherapy-induced nausea  -     ondansetron (ZOFRAN ODT) 4 MG disintegrating tablet;  Take 1 tablet by mouth every 6 hours as needed for Nausea or Vomiting    Antineoplastic chemotherapy induced anemia       [id number], stge IIIA, NSCLC, adenocarcinoma subtype  Essentially stage III disease. The patient is not a surgical candidate. Recommend chemoradiation followed by immunotherapy. Status post completion of chemoradiation. Currently receiving adjuvant durvalumab. Reviewed CT chest that showed interval response. Continue durvalumab. Status post completion of chemoradiation. CT chest showed interval response   Recommended:  Durvalumab 1500 mg IV every 4 weeks x12 dose    Proceed cycle #4. Discussed with the patient and also daughter results of CT scans and further treatment plan. Diabetes mellitus type 2/hypertensionuncontrolled. Discussed with PCP office. Systolic heart failureEF 45%  2D echo-LVEF 40-45%. Mild LVH, Mild [grade 1] diastolic dysfunction. Uncontrolled hypertension Followed by PCP and Cardiology. 184/75 mmHg. Patient was recommended to take his medication today. He was also recommended to monitor this further at home and communicate with primary care provider. At risk thyroid disorderTSH 2.74 on 10/1/2021     Physical deconditioninghe was encouraged to increase exercise. He was also offered PT/OT at home but declined at this time. PLAN:  · C# 4 /12 monthly durvalumab today  · Monitor BP at home and record  · Follow-up with PCP for BP  · RTC 4 weeks with MD in treatment room  · Durvalumab every 4 weeks  · CBC, TSH, CMP today  · Recommend Zofran ODT every 6 hrs as needed-script sent  · Repeat CT chest around 9/30/2021    IShad, am scribing for Verónica Mei MD. Electronically signed by Shad Aparicio RN on 10/1/2021 at 1:13 PM CDT. I, Dr Kendrick Blount, personally performed the services described in this documentation as scribed by Shad Aparicio RN in my presence and is both accurate and complete. I have seen, examined and reviewed this patient medication list, appropriate labs and imaging studies. I reviewed relevant medical records and others physicians notes. I discussed the plans of care with the patient.  I answered all the questions to the patients satisfaction. I have also reviewed the chief complaint (CC) and part of the history (History of Present Illness (HPI), Past Family Social History [facility]), or Review of Systems (ROS) and made changes when appropriated.        (Please note that portions of this note were completed with a voice recognition program. Efforts were made to edit the dictations but occasionally words are mis-transcribed.)      Katerina Mota MD    10/01/21  3:20 PM

## 2021-10-01 ENCOUNTER — OFFICE VISIT (OUTPATIENT)
Dept: HEMATOLOGY | Age: 81
End: 2021-10-01
Payer: MEDICARE

## 2021-10-01 ENCOUNTER — HOSPITAL ENCOUNTER (OUTPATIENT)
Dept: INFUSION THERAPY | Age: 81
Discharge: HOME | End: 2021-10-01
Payer: MEDICARE

## 2021-10-01 VITALS
OXYGEN SATURATION: 96 % | WEIGHT: 189.9 LBS | TEMPERATURE: 98.1 F | HEIGHT: 70 IN | SYSTOLIC BLOOD PRESSURE: 184 MMHG | DIASTOLIC BLOOD PRESSURE: 75 MMHG | HEART RATE: 112 BPM | BODY MASS INDEX: 27.19 KG/M2

## 2021-10-01 DIAGNOSIS — Z51.12 ENCOUNTER FOR ANTINEOPLASTIC IMMUNOTHERAPY: ICD-10-CM

## 2021-10-01 DIAGNOSIS — C34.91 ADENOCARCINOMA OF RIGHT LUNG (HCC): Primary | ICD-10-CM

## 2021-10-01 DIAGNOSIS — C34.91 ADENOCARCINOMA OF RIGHT LUNG (HCC): ICD-10-CM

## 2021-10-01 DIAGNOSIS — E03.2 HYPOTHYROIDISM DUE TO MEDICATION: ICD-10-CM

## 2021-10-01 DIAGNOSIS — Z71.82 EXERCISE COUNSELING: ICD-10-CM

## 2021-10-01 DIAGNOSIS — R11.0 CHEMOTHERAPY-INDUCED NAUSEA: ICD-10-CM

## 2021-10-01 DIAGNOSIS — E03.2 HYPOTHYROIDISM DUE TO MEDICATION: Primary | ICD-10-CM

## 2021-10-01 DIAGNOSIS — T45.1X5A ANTINEOPLASTIC CHEMOTHERAPY INDUCED ANEMIA: ICD-10-CM

## 2021-10-01 DIAGNOSIS — T45.1X5A CHEMOTHERAPY-INDUCED NAUSEA: ICD-10-CM

## 2021-10-01 DIAGNOSIS — D64.81 ANTINEOPLASTIC CHEMOTHERAPY INDUCED ANEMIA: ICD-10-CM

## 2021-10-01 DIAGNOSIS — R53.81 PHYSICAL DECONDITIONING: ICD-10-CM

## 2021-10-01 LAB
ALBUMIN SERPL-MCNC: 4.4 G/DL (ref 3.5–5.2)
ALP BLD-CCNC: 110 U/L (ref 40–130)
ALT SERPL-CCNC: 14 U/L (ref 21–72)
ANION GAP SERPL CALCULATED.3IONS-SCNC: 11 MMOL/L (ref 7–19)
AST SERPL-CCNC: 21 U/L (ref 17–59)
BASOPHILS ABSOLUTE: 0.04 K/UL (ref 0.01–0.08)
BASOPHILS RELATIVE PERCENT: 0.5 % (ref 0.1–1.2)
BILIRUB SERPL-MCNC: 0.5 MG/DL (ref 0.2–1.3)
BUN BLDV-MCNC: 25 MG/DL (ref 9–20)
CALCIUM SERPL-MCNC: 9.7 MG/DL (ref 8.4–10.2)
CHLORIDE BLD-SCNC: 99 MMOL/L (ref 98–111)
CO2: 29 MMOL/L (ref 22–29)
CREAT SERPL-MCNC: 1 MG/DL (ref 0.6–1.2)
EOSINOPHILS ABSOLUTE: 0.21 K/UL (ref 0.04–0.54)
EOSINOPHILS RELATIVE PERCENT: 2.6 % (ref 0.7–7)
GFR NON-AFRICAN AMERICAN: >60
GLOBULIN: 3.6 G/DL
GLUCOSE BLD-MCNC: 198 MG/DL (ref 74–106)
HCT VFR BLD CALC: 33.1 % (ref 40.1–51)
HEMOGLOBIN: 11.1 G/DL (ref 13.7–17.5)
LYMPHOCYTES ABSOLUTE: 1.6 K/UL (ref 1.18–3.74)
LYMPHOCYTES RELATIVE PERCENT: 19.9 % (ref 19.3–53.1)
MCH RBC QN AUTO: 31.6 PG (ref 25.7–32.2)
MCHC RBC AUTO-ENTMCNC: 33.5 G/DL (ref 32.3–36.5)
MCV RBC AUTO: 94.3 FL (ref 79–92.2)
MONOCYTES ABSOLUTE: 0.65 K/UL (ref 0.24–0.82)
MONOCYTES RELATIVE PERCENT: 8.1 % (ref 4.7–12.5)
NEUTROPHILS ABSOLUTE: 5.56 K/UL (ref 1.56–6.13)
NEUTROPHILS RELATIVE PERCENT: 68.9 % (ref 34–71.1)
PDW BLD-RTO: 12.9 % (ref 11.6–14.4)
PLATELET # BLD: 291 K/UL (ref 163–337)
PMV BLD AUTO: 9 FL (ref 7.4–10.4)
POTASSIUM SERPL-SCNC: 4.6 MMOL/L (ref 3.5–5.1)
RBC # BLD: 3.51 M/UL (ref 4.63–6.08)
SODIUM BLD-SCNC: 139 MMOL/L (ref 137–145)
TOTAL PROTEIN: 8 G/DL (ref 6.3–8.2)
TSH SERPL DL<=0.05 MIU/L-ACNC: 2.74 UIU/ML (ref 0.47–4.68)
WBC # BLD: 8.06 K/UL (ref 4.23–9.07)

## 2021-10-01 PROCEDURE — 84443 ASSAY THYROID STIM HORMONE: CPT

## 2021-10-01 PROCEDURE — 6360000002 HC RX W HCPCS: Performed by: INTERNAL MEDICINE

## 2021-10-01 PROCEDURE — G8484 FLU IMMUNIZE NO ADMIN: HCPCS | Performed by: INTERNAL MEDICINE

## 2021-10-01 PROCEDURE — 85025 COMPLETE CBC W/AUTO DIFF WBC: CPT

## 2021-10-01 PROCEDURE — G8428 CUR MEDS NOT DOCUMENT: HCPCS | Performed by: INTERNAL MEDICINE

## 2021-10-01 PROCEDURE — 1123F ACP DISCUSS/DSCN MKR DOCD: CPT | Performed by: INTERNAL MEDICINE

## 2021-10-01 PROCEDURE — 1036F TOBACCO NON-USER: CPT | Performed by: INTERNAL MEDICINE

## 2021-10-01 PROCEDURE — 80053 COMPREHEN METABOLIC PANEL: CPT

## 2021-10-01 PROCEDURE — 99214 OFFICE O/P EST MOD 30 MIN: CPT | Performed by: INTERNAL MEDICINE

## 2021-10-01 PROCEDURE — 2580000003 HC RX 258: Performed by: INTERNAL MEDICINE

## 2021-10-01 PROCEDURE — 36415 COLL VENOUS BLD VENIPUNCTURE: CPT

## 2021-10-01 PROCEDURE — 4040F PNEUMOC VAC/ADMIN/RCVD: CPT | Performed by: INTERNAL MEDICINE

## 2021-10-01 PROCEDURE — 96413 CHEMO IV INFUSION 1 HR: CPT

## 2021-10-01 PROCEDURE — G8417 CALC BMI ABV UP PARAM F/U: HCPCS | Performed by: INTERNAL MEDICINE

## 2021-10-01 RX ORDER — SODIUM CHLORIDE 0.9 % (FLUSH) 0.9 %
5-40 SYRINGE (ML) INJECTION PRN
Status: CANCELLED | OUTPATIENT
Start: 2021-10-01

## 2021-10-01 RX ORDER — HEPARIN SODIUM (PORCINE) LOCK FLUSH IV SOLN 100 UNIT/ML 100 UNIT/ML
500 SOLUTION INTRAVENOUS PRN
Status: DISCONTINUED | OUTPATIENT
Start: 2021-10-01 | End: 2021-10-02 | Stop reason: HOSPADM

## 2021-10-01 RX ORDER — SODIUM CHLORIDE 9 MG/ML
INJECTION, SOLUTION INTRAVENOUS CONTINUOUS
Status: CANCELLED | OUTPATIENT
Start: 2021-10-01

## 2021-10-01 RX ORDER — EPINEPHRINE 1 MG/ML
0.3 INJECTION, SOLUTION, CONCENTRATE INTRAVENOUS PRN
Status: CANCELLED | OUTPATIENT
Start: 2021-10-01

## 2021-10-01 RX ORDER — SODIUM CHLORIDE 9 MG/ML
20 INJECTION, SOLUTION INTRAVENOUS ONCE
Status: CANCELLED | OUTPATIENT
Start: 2021-10-01 | End: 2021-10-01

## 2021-10-01 RX ORDER — METHYLPREDNISOLONE SODIUM SUCCINATE 125 MG/2ML
125 INJECTION, POWDER, LYOPHILIZED, FOR SOLUTION INTRAMUSCULAR; INTRAVENOUS ONCE
Status: CANCELLED | OUTPATIENT
Start: 2021-10-01 | End: 2021-10-01

## 2021-10-01 RX ORDER — HEPARIN SODIUM (PORCINE) LOCK FLUSH IV SOLN 100 UNIT/ML 100 UNIT/ML
500 SOLUTION INTRAVENOUS PRN
Status: CANCELLED | OUTPATIENT
Start: 2021-10-01

## 2021-10-01 RX ORDER — SODIUM CHLORIDE 9 MG/ML
25 INJECTION, SOLUTION INTRAVENOUS PRN
Status: CANCELLED | OUTPATIENT
Start: 2021-10-01

## 2021-10-01 RX ORDER — ONDANSETRON 4 MG/1
4 TABLET, ORALLY DISINTEGRATING ORAL EVERY 6 HOURS PRN
Qty: 30 TABLET | Refills: 5 | Status: SHIPPED | OUTPATIENT
Start: 2021-10-01

## 2021-10-01 RX ORDER — DIPHENHYDRAMINE HYDROCHLORIDE 50 MG/ML
50 INJECTION INTRAMUSCULAR; INTRAVENOUS ONCE
Status: CANCELLED | OUTPATIENT
Start: 2021-10-01 | End: 2021-10-01

## 2021-10-01 RX ADMIN — HEPARIN 500 UNITS: 100 SYRINGE at 15:00

## 2021-10-01 RX ADMIN — SODIUM CHLORIDE 1500 MG: 9 INJECTION, SOLUTION INTRAVENOUS at 13:50

## 2021-10-14 DIAGNOSIS — I10 ESSENTIAL HYPERTENSION: ICD-10-CM

## 2021-10-14 RX ORDER — LOSARTAN POTASSIUM 25 MG/1
TABLET ORAL
Qty: 90 TABLET | Refills: 0 | Status: SHIPPED | OUTPATIENT
Start: 2021-10-14 | End: 2022-01-12 | Stop reason: SDUPTHER

## 2021-10-19 ENCOUNTER — NURSE ONLY (OUTPATIENT)
Dept: PRIMARY CARE CLINIC | Age: 81
End: 2021-10-19
Payer: MEDICARE

## 2021-10-19 DIAGNOSIS — R53.1 GENERALIZED WEAKNESS: Primary | ICD-10-CM

## 2021-10-19 DIAGNOSIS — Z23 NEED FOR INFLUENZA VACCINATION: Primary | ICD-10-CM

## 2021-10-19 DIAGNOSIS — C34.91 ADENOCARCINOMA OF RIGHT LUNG (HCC): ICD-10-CM

## 2021-10-19 PROCEDURE — 90694 VACC AIIV4 NO PRSRV 0.5ML IM: CPT | Performed by: FAMILY MEDICINE

## 2021-10-19 PROCEDURE — G0008 ADMIN INFLUENZA VIRUS VAC: HCPCS | Performed by: FAMILY MEDICINE

## 2021-10-19 RX ORDER — DEXTROMETHORPHAN HYDROBROMIDE AND PROMETHAZINE HYDROCHLORIDE 15; 6.25 MG/5ML; MG/5ML
SYRUP ORAL
Qty: 240 ML | Refills: 0 | Status: SHIPPED | OUTPATIENT
Start: 2021-10-19 | End: 2021-11-30 | Stop reason: SDUPTHER

## 2021-10-25 ENCOUNTER — OFFICE VISIT (OUTPATIENT)
Dept: CARDIOLOGY | Facility: CLINIC | Age: 81
End: 2021-10-25

## 2021-10-25 VITALS
WEIGHT: 186 LBS | HEIGHT: 70 IN | BODY MASS INDEX: 26.63 KG/M2 | HEART RATE: 66 BPM | DIASTOLIC BLOOD PRESSURE: 82 MMHG | SYSTOLIC BLOOD PRESSURE: 160 MMHG

## 2021-10-25 DIAGNOSIS — I10 ESSENTIAL HYPERTENSION: ICD-10-CM

## 2021-10-25 DIAGNOSIS — E66.3 OVERWEIGHT: ICD-10-CM

## 2021-10-25 DIAGNOSIS — E11.9 TYPE 2 DIABETES MELLITUS WITHOUT COMPLICATION, UNSPECIFIED WHETHER LONG TERM INSULIN USE (HCC): ICD-10-CM

## 2021-10-25 DIAGNOSIS — I25.10 CORONARY ARTERY DISEASE INVOLVING NATIVE CORONARY ARTERY OF NATIVE HEART WITHOUT ANGINA PECTORIS: Primary | ICD-10-CM

## 2021-10-25 DIAGNOSIS — E78.2 MIXED HYPERLIPIDEMIA: ICD-10-CM

## 2021-10-25 PROCEDURE — 93000 ELECTROCARDIOGRAM COMPLETE: CPT | Performed by: NURSE PRACTITIONER

## 2021-10-25 PROCEDURE — 99214 OFFICE O/P EST MOD 30 MIN: CPT | Performed by: NURSE PRACTITIONER

## 2021-10-25 NOTE — PROGRESS NOTES
Subjective:     Encounter Date:10/25/2021      Patient ID: Mina Ratliff is a 81 y.o. male with coronary artery disease, hypertension, hyperlipidemia, type II diabetes and obesity     Chief Complaint: 1 year follow up  Coronary Artery Disease  Presents for follow-up visit. Pertinent negatives include no chest pain, chest pressure, chest tightness, dizziness, leg swelling, muscle weakness, palpitations or shortness of breath. Risk factors include hyperlipidemia and obesity. The symptoms have been stable. Compliance with diet is good. Compliance with exercise is good. Compliance with medications is good.   Hypertension  This is a chronic problem. Associated symptoms include malaise/fatigue. Pertinent negatives include no anxiety, chest pain, neck pain, orthopnea, palpitations, peripheral edema, PND or shortness of breath. Risk factors for coronary artery disease include dyslipidemia, obesity and male gender. Current antihypertension treatment includes beta blockers, angiotensin blockers and diuretics. There are no compliance problems.  Hypertensive end-organ damage includes CAD/MI.   Hyperlipidemia  This is a chronic problem. The current episode started more than 1 year ago. Exacerbating diseases include obesity. Pertinent negatives include no chest pain or shortness of breath. Current antihyperlipidemic treatment includes statins. Risk factors for coronary artery disease include dyslipidemia, male sex, hypertension and obesity.     Patient presents today for management of coronary artery disease, hypertension and hyperlipidemia. Patient reports that he has been doing good from a cardiac standpoint. Patient has been diagnosed with lung cancer and has undergone radiation and chemo. He reports that he remains on a once monthly immunotherapy chemo for the next year or more. He reports that he has had been battling fatigue, loss of appetite and weakness. He denies any chest pain. He reports chronic dyspnea with  moderate exertion that has remained stable. He reports blood pressure is usually 140-150 systolic. He denies any heart racing, palpitations or dizziness. He reports some swelling in his legs R>L that is intermittent and resolves with rest and elevation. Patient denies any orthopnea or PND. Patient denies any bleeding problems. Patient follows with Dr Franks as PCP.     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    - Lower extremity US (9/21/17) negative for any DVT or thrombophlebitis.   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 with mild miranda-infarct ischemia, EF > 70%  - Lipid panel (4/8/2019) total cholesterol 192, HDL 46, LDL 88, triglycerides 292  - Lipid panel (5/27/2020) total cholesterol 174, HDL 46, LDL 71, triglycerides 284  - Hemoglobin A1c (5/27/2020) 7.5%    The following portions of the patient's history were reviewed and updated as appropriate: allergies, current medications, past family history, past medical history, past social history, past surgical history and problem list.    No Known Allergies    Current Outpatient Medications:   •  aspirin  MG tablet, Take 325 mg by mouth Daily., Disp: , Rfl:   •  diphenhydrAMINE-acetaminophen (TYLENOL PM)  MG tablet per tablet, Take 1 tablet by mouth At Night As Needed for Sleep., Disp: , Rfl:   •  furosemide (LASIX) 40 MG tablet, Take 40 mg by mouth Daily., Disp: , Rfl:   •  HYDROcodone-acetaminophen (NORCO) 7.5-325 MG per tablet, Take 1 tablet by mouth Every 4 (Four) Hours As Needed for Moderate Pain  (Pain)., Disp: 15 tablet, Rfl: 0  •  losartan (COZAAR) 25 MG tablet, TAKE 1 TABLET DAILY, Disp: 90 tablet, Rfl: 0  •  metFORMIN (GLUCOPHAGE) 1000 MG tablet, Take 1,000 mg by mouth 2 (Two) Times a Day  With Meals., Disp: , Rfl:   •  metoprolol tartrate (LOPRESSOR) 50 MG tablet, Take 50 mg by mouth Daily., Disp: , Rfl:   •  montelukast (SINGULAIR) 10 MG tablet, Take 1 tablet by mouth Every Night., Disp: 90 tablet, Rfl: 3  •  Multiple Vitamins-Minerals (MULTIVITAMIN ADULT PO), Take 1 tablet by mouth Daily., Disp: , Rfl:   •  omeprazole (priLOSEC) 40 MG capsule, Take 40 mg by mouth Daily., Disp: , Rfl:   •  primidone (MYSOLINE) 50 MG tablet, Take 50 mg by mouth Every Night., Disp: , Rfl:   •  propranolol (INDERAL) 40 MG tablet, Take 40 mg by mouth Daily., Disp: , Rfl:   •  simvastatin (ZOCOR) 40 MG tablet, Take 40 mg by mouth Every Night., Disp: , Rfl:   •  tamsulosin (FLOMAX) 0.4 MG capsule 24 hr capsule, TAKE 1 CAPSULE EVERY NIGHT, Disp: 90 capsule, Rfl: 3  •  umeclidinium-vilanterol (ANORO ELLIPTA) 62.5-25 MCG/INH aerosol powder  inhaler, Inhale 1 puff Daily., Disp: 3 each, Rfl: 3  Past Medical History:   Diagnosis Date   • Asthma    • Atherosclerosis of native coronary artery of native heart without angina pectoris    • BPH with obstruction/lower urinary tract symptoms    • CAD (coronary artery disease)    • Cancer (HCC)    • Chest pain    • Chronic airway obstruction ([facility])    • COPD (chronic obstructive pulmonary disease) ([facility])    • Diabetes mellitus (HCC)    • Dizziness    • GERD (gastroesophageal reflux disease)    • HTN (hypertension)    • Hyperlipidemia    • Malaise and fatigue    • Mass of right lung    • Multiple gastric ulcers    • Myocardial infarction, old    • 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,  Radiation, silica   • Old myocardial infarction    • Stage 3 severe COPD by GOLD classification ([facility]) 3/13/2015     Social History     Socioeconomic History   • Marital status:    Tobacco Use   • Smoking status: Former Smoker     Packs/day: 1.50     Years: 25.00     Pack years: 37.50     Types: Cigarettes     Quit date:      Years since quittin.8   • Smokeless tobacco:  Never Used   Vaping Use   • Vaping Use: Never used   Substance and Sexual Activity   • Alcohol use: No   • Drug use: No   • Sexual activity: Defer       Review of Systems   Constitutional: Positive for decreased appetite, malaise/fatigue and weight loss.   Cardiovascular: Positive for dyspnea on exertion (chronic and unchanged). Negative for chest pain, irregular heartbeat, leg swelling, orthopnea, palpitations and paroxysmal nocturnal dyspnea.   Respiratory: Negative for chest tightness and shortness of breath.    Musculoskeletal: Negative for muscle weakness and neck pain.   Neurological: Positive for weakness. Negative for dizziness.   All other systems reviewed and are negative.         Objective:     Vitals reviewed.   Constitutional:       General: Not in acute distress.     Appearance: Normal appearance. Well-developed.   Eyes:      Pupils: Pupils are equal, round, and reactive to light.   HENT:      Head: Normocephalic and atraumatic.      Nose: Nose normal.   Neck:      Vascular: No carotid bruit.   Pulmonary:      Effort: Pulmonary effort is normal. No respiratory distress.      Breath sounds: Normal breath sounds. No wheezing. No rales.   Cardiovascular:      Normal rate. Regular rhythm.      Murmurs: There is no murmur.   Edema:     Peripheral edema absent.   Abdominal:      General: There is no distension.      Palpations: Abdomen is soft.   Musculoskeletal: Normal range of motion.      Cervical back: Normal range of motion and neck supple. Skin:     General: Skin is warm.      Findings: No erythema or rash.   Neurological:      General: No focal deficit present.      Mental Status: Alert and oriented to person, place, and time.   Psychiatric:         Attention and Perception: Attention normal.         Mood and Affect: Mood normal.         Speech: Speech normal.         Behavior: Behavior normal.         Thought Content: Thought content normal.         Judgment: Judgment normal.         /82    "Pulse 66   Ht 177.8 cm (70\")   Wt 84.4 kg (186 lb)   BMI 26.69 kg/m²       ECG 12 Lead    Date/Time: 10/25/2021 1:42 PM  Performed by: Tristian Barragan APRN  Authorized by: Tristian Barragan APRN   Comparison: compared with previous ECG from 3/5/2021  Similar to previous ECG  Rhythm: sinus rhythm  Rate: normal  BPM: 77              Lab Review:       Lab Results   Component Value Date    CHLPL 185 05/25/2021    TRIG 459 (H) 05/25/2021    HDL 36 (L) 05/25/2021    LDL see below 05/25/2021     Lab Results   Component Value Date    HGBA1C 7.7 (H) 05/25/2021       I have personally reviewed labs, and past office notes prior to patients visit  Assessment:          Diagnosis Plan   1. Coronary artery disease involving native coronary artery of native heart without angina pectoris     2. Essential hypertension     3. Mixed hyperlipidemia     4. Type 2 diabetes mellitus without complication, unspecified whether long term insulin use (HCC)     5. Overweight            Plan:       1. 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 Patient remains chest pain free. Discussed with patient to decrease to aspirin 81 mg after he has used what he has at home. Continue aspirin, simvastatin and metoprolol.     2. Hypertension: slightly elevated. Continue current medications. Continue to monitor    3. Hyperlipidemia: LDL not able to be calculated due to elevated Triglycerides 5/2021. Continue simvastatin    4. Type II DM: A1C 7.7 on 5/2021. Managed and followed by PCP    5. Overweight: Patient's Body mass index is 26.69 kg/m². indicating that he is overweight (BMI 25-29.9). Obesity-related health conditions include the following: hypertension, coronary heart disease and dyslipidemias. Obesity is improving with lifestyle modifications. BMI is is above average; no BMI management plan is appropriate. We discussed " portion control and increasing exercise.    I spent 34 minutes caring for Mina on this date of service. This time includes time spent by me in the following activities: preparing for the visit, reviewing tests, obtaining and/or reviewing a separately obtained history, performing a medically appropriate examination and/or evaluation, counseling and educating the patient/family/caregiver and documenting information in the medical record     I spent 2 minutes on the separately reported service of EKG. This time is not included in the time used to support the E/M service also reported today.      Patient is to follow up in 1 year or sooner if needed

## 2021-10-28 NOTE — PROGRESS NOTES
MEDICAL ONCOLOGY PROGRESS NOTE    Pt Name: Cassie Neff  MRN: [id number]  YOB: 1940  Date of evaluation: 10/30/2021    HISTORY OF PRESENT ILLNESS:    Reason for MD visitdisease management/toxicity assessment  The patient is a very pleasant 79years old male who has been diagnosed with stage IIIa non-small cell lung cancer, adenocarcinoma subtype. He is a status post completion of chemoradiation in June 2021. He tolerated treatment poorly with complaints of severe fatigue/deconditioning and decreased mobility. The patient is accompanied by his daughter today. He is currently receiving adjuvant immunotherapy with durvalumab. He has been tolerated treatment well except for complains of fatigue. He has complaints of occasional diarrhea every other day 2 episodes a day. He denies any blood, pus. Denies any nausea vomiting. Denies any new skin rash. Denies any worsening respiratory symptoms. He gets short of breath on exertion. Diagnosis  · RUL adenocarcinoma, NSCLC, Dec 2020  · [id number], stage IIIA  · Systolic heart failure  · Hypertension, controlled  · Not a surgical candidate    Treatment Summary  · 4/21/20215/26/21 completed concurrent chemoradiation with carboplatin/Taxol  · 4/22/21-6/3/21- 6600 cGy radiation therapy completed  · 7/8/21- initiate maintenance Durvalumab 1500mg every 4 weeks x12 cycles    Hematology/Cancer History:  Hermilo Bonilla was first seen by me on 4/7/2021 referred by Dr. Porfirio Kong, [facility] [place] for findings of non-small cell lung cancer, adenocarcinoma type. He has several comorbidities including history of type 2 diabetes, hypertension COPD. History of smoking in the past.  Quit many years ago. History of coronary artery disease followed by cardiology biopsy. Last EF 70% in 2018. He was seen by his primary care provider in November 2020. He has complaint of chest pain. Chest x-ray showed a 4 cm mass in the right upper lung.   · 12/1/20 CT chest [facility]): Large right upper lobe mass concerning for primary neoplasm. Enlarged right hilar lymph node concerning for metastatic disease. Additional noncalcified pulmonary nodules bilaterally for which follow-up CT is recommended in 3-6 months. Atherosclerosis of the aorta and coronary arteries. · 12/16/2020bronchoscopy with bronchoalveolar lavage was nondiagnostic  · 2/23/21 CT cheat w/o [facility]): Probable right upper lobe mass, as described. This appears slightly larger in size and is likely neoplastic. Consider PET CT follow-up. There is new surrounding infiltrate that extends inferiorly into the right upper lobe. The new may be infectious or inflammatory. Pulmonary hemorrhage possible. Other areas of nodularity and groundglass opacity/nodularity are stable in appearance. · 3/5/2021navigational bronchoscopy with biopsy was nondiagnostic  · 3/5/21 CT chest w/o [facility]): [place] soft tissue mass within the right upper lobe highly suspicious for neoplasm. There is associated postobstructive pneumonitis within the right upper lobe showing mild progression from the previous study of 2/23/2021. There is no associated hilar or mediastinal lymphadenopathy. Today's study is performed as part of a navigational bronchoscopy exam. Other scattered nodules including groundglass nodules are noted within the lungs all stable from the previous exam warranting follow-up. Heavy atheromatous calcification of the thoracic aorta and proximal great vessels. Coronary calcifications are present. · 3/23/21 PET scan [facility]): Markedly FDG avid right upper lobe mass in the right upper lobe measuring 7.2 cm triangular opacity with maximum SUV 12.18, highly concerning for malignancy. Adjacent groundglass opacities,  similar compared to 3/5/2021, which could represent additional  malignancy or infection/inflammation.  Adjacent right upper lobe groundglass opacities are similar compared to 3/5/2021, which could represent additional malignancy or infection/inflammation. Other non-FDG avid pulmonary findings as above. No evidence of jasmin or distant metastatic disease. · 3/29/21 Navigational bronchoscopy-Dr. Steff Burnham: Bronchoalveolar lavage RUL consistent with adenocarcinoma. RUL transbronchial bipsy consistent with adenocarcinoma. FNA biopsy of several jasmin station negative for metastatic disease. · 4/7/2021he was first seen by me. · 4/15/21 2D echo: Normal left ventricular size with reduced global systolic function EF 46-08%. Mild concentric left ventricular hypertrophy with mild [grade 1] diastolic dysfunction. Normal left atrial size. Mild thickening of a poorly visualized aortic valve without evidence of stenosis or insufficiency. Mild thickening of a normally mobile mitral valve with mild regurgitation. Nonvisualization pulmonic valve. Poorly visualized but apparently normal size right-sided chambers with preserved right ventricular systolic function. No tricuspid regurgitation with which to estimate RVSP. No significant pericardial effusion. Aortic root and ascending segments measured within normal limits. · 4/17/21 MRI brain: No acute intracranial process. No metastatic disease identified in the CNS. · 4/21/2021initiation of carboplatin/Taxol weekly. Reviewed MRI brain 2D echo. · 4/21/20215/26/21 completed concurrent chemoradiation with carboplatin/taxol to be followed by immunotherapy  · 4/22/21-6/3/21 6600 cGy radiation therapy completed  · 6/30/21-CT CHEST W CONTRAST A large spiculated mass in the right upper lobe posterior segment represent a neoplastic process. The groundglass opacity/infiltrate in the right upper lobe and superior segments of the lower lobes bilaterally may represent an inflammatory or neoplastic/metastatic process. A single enlarged abnormal lymph node in the right hilum may represent a metastatic node.    · 07/08/21-reviewed CT chest with perform radiologist.  · 7/8/21- initiate maintenance Durvalumab 1500mg every 4 weeks x12 cycles  · 9/24/2021 CT Chest w/ Contrast([facility]) Decreased size of right upper lobe triangular opacity with surrounding probable posttreatment changes. Similar size left upper lobe 1 cm groundglass and solid pulmonary opacity/nodule compared to 3/23/2021. Right hilum with a 1.4 x 1.2 cm lymph node or conglomeration of lymph nodes, which appears similar to 11/30/2020. Diffusely heterogeneous bone mineralization, which is nonspecific. This could be seen with aggressive osteopenia or infiltrative process. · 10/01/21-I reviewed the results CT chest.  Interval treatment response. Continue adjuvant durvalumab.     Past Medical History:  Past Medical History:   Diagnosis Date    Allergic rhinitis     Bronchitis, chronic (HCC)     Carotid artery stenosis     GERD (gastroesophageal reflux disease)     Heart attack ([facility] [address]     Hemorrhoids     Hypercholesterolemia     Type II or unspecified type diabetes mellitus without mention of complication, not stated as uncontrolled        Past Surgical History:    Past Surgical History:   Procedure Laterality Date    CARDIAC SURGERY      balloon surgery (angioplasty)    PORT SURGERY Right 04/01/2021       Social History:   Marital status: , 2 daughters  Smoking status: Former smoker for ~30 years, 1.5 ppd  ETOH status: No  Resides: [facility]    Family History:   Family History   Problem Relation Age of Onset    Diabetes Mother     High Blood Pressure Mother     Cancer Father         Lung     Current Hospital Medications:    Current Outpatient Medications   Medication Sig Dispense Refill    promethazine-dextromethorphan (PROMETHAZINE-DM) 6.25-15 MG/5ML syrup 1 teaspoon by mouth at bedtime as needed for severe cough 240 mL 0    ondansetron (ZOFRAN ODT) 4 MG disintegrating tablet Take 1 tablet by mouth every 6 hours as needed for Nausea or Vomiting 30 tablet 5    simvastatin (ZOCOR) 40 MG tablet TAKE 1 TABLET AT BEDTIME FOR CHOLESTEROL 90 tablet 3    omeprazole (PRILOSEC) 40 MG delayed release capsule TAKE 1 CAPSULE DAILY FOR STOMACH 90 capsule 3    propranolol (INDERAL) 60 MG tablet Take 1 tablet by mouth 2 times daily For blood pressure and tremor 180 tablet 3    HYDROcodone-acetaminophen (NORCO) 7.5-325 MG per tablet       metoprolol tartrate (LOPRESSOR) 50 MG tablet Take 50 mg by mouth daily      montelukast (SINGULAIR) 10 MG tablet       ondansetron (ZOFRAN) 8 MG tablet Take 1 tablet by mouth every 8 hours as needed for Nausea or Vomiting 30 tablet 3    lidocaine-prilocaine (EMLA) 2.5-2.5 % cream Apply topically to port area and cover with plastic wrap one hour prior to treatment. 1 Tube 1    furosemide (LASIX) 40 MG tablet TAKE 1 TABLET DAILY FOR FLUID 90 tablet 3    metFORMIN (GLUCOPHAGE) 1000 MG tablet TAKE 1 TABLET TWICE A DAY FOR DIABETES 180 tablet 3    blood glucose test strips (TRUE METRIX BLOOD GLUCOSE TEST) strip TEST BLOOD SUGAR ONCE DAILY *E11.9* 100 strip 5    isosorbide mononitrate (IMDUR) 60 MG extended release tablet Take 60 mg by mouth      losartan (COZAAR) 25 MG tablet Take 25 mg by mouth      umeclidinium-vilanterol (ANORO ELLIPTA) 62.5-25 MCG/INH AEPB inhaler Inhale 1 puff into the lungs daily      primidone (MYSOLINE) 50 MG tablet 1 tab po BID for tremor 180 tablet 3    triamcinolone (KENALOG) 0.1 % cream Apply topically 2 times daily. To lower leg dry skin 3 Tube 3    tamsulosin (FLOMAX) 0.4 MG capsule Take 1 capsule by mouth daily. 90 capsule 3    aspirin 325 MG tablet Take 325 mg by mouth daily. No current facility-administered medications for this visit.        Allergies: No Known Allergies      Subjective   REVIEW OF SYSTEMS:   CONSTITUTIONAL: no fever, no night sweats, fatigue;  HEENT: no blurring of vision, no double vision, no hearing difficulty, no tinnitus, no ulceration, no dysplasia, no epistaxis;   LUNGS: no cough, no hemoptysis, no wheeze,   exertional shortness of breath;  CARDIOVASCULAR: no palpitation, no chest pain, exertional shortness of breath;  GI: no abdominal pain, no nausea, no vomiting, occasional diarrhea, no constipation;  JESSIKA: no dysuria, no hematuria, no frequency or urgency, no nephrolithiasis;  MUSCULOSKELETAL: no joint pain, no swelling, no stiffness;  ENDOCRINE: no polyuria, no polydipsia, no cold or heat intolerance;  HEMATOLOGY: no easy bruising or bleeding, no history of clotting disorder;  DERMATOLOGY: no skin rash, no eczema, no pruritus;  PSYCHIATRY: no depression, no anxiety, no panic attacks, no suicidal ideation, no homicidal ideation;  NEUROLOGY: no syncope, no seizures, no numbness or tingling of hands, no numbness or tingling of feet, no paresis;     Objective   BP (!) 161/75 (Site: Right Upper Arm, Position: Sitting)   Pulse 81   Temp 98.3 °F (36.8 °C)   Resp 16   Ht 5' 10\" (1.778 m)   Wt 188 lb (85.3 kg)   SpO2 100%   BMI 26.98 kg/m²     PHYSICAL EXAM:  CONSTITUTIONAL: Alert, appropriate, no acute distress  EYES: Non icteric, EOM intact, pupils equal round   ENT: Mucus membranes moist, no oral pharyngeal lesions, external inspection of ears and nose are normal.  NECK: Supple, no masses. No palpable thyroid mass  CHEST/LUNGS: CTA bilaterally, normal respiratory effort   CARDIOVASCULAR: RRR, no murmurs. No lower extremity edema  ABDOMEN: soft non-tender, active bowel sounds, no HSM. No palpable masses  EXTREMITIES: warm, full ROM in all 4 extremities, no focal weakness. SKIN: warm, dry with no rashes or lesions  LYMPH: No cervical, clavicular, axillary, or inguinal lymphadenopathy  NEUROLOGIC: follows commands, non focal   PSYCH: mood and affect appropriate.   Alert and oriented to time, place, person      LABORATORY RESULTS REVIEWED/ANALYZED BY ME:  Lab Results   Component Value Date    WBC 8.60 10/29/2021    HGB 9.8 (L) 10/29/2021    HCT 29.5 (L) 10/29/2021    MCV 93.1 (H) 10/29/2021     (H) 10/29/2021     Lab Results Component Value Date    NEUTROABS 6.40 (H) 10/29/2021     Lab Results   Component Value Date     10/29/2021    K 4.9 10/29/2021     10/29/2021    CO2 25 10/29/2021    BUN 17 10/29/2021    CREATININE 1.1 10/29/2021    GLUCOSE 186 (H) 10/29/2021    CALCIUM 9.1 10/29/2021    PROT 7.5 10/29/2021    LABALBU 3.9 10/29/2021    BILITOT 0.6 10/29/2021    ALKPHOS 119 10/29/2021    AST 37 10/29/2021    ALT 15 (L) 10/29/2021    LABGLOM >60 10/29/2021    GFRAA 54 (L) 06/03/2021    GLOB 3.6 10/29/2021     Lab Results   Component Value Date    TSH 2.740 10/01/2021         RADIOLOGY STUDIES REVIEWED BY ME:  None        ASSESSMENT:    Orders Placed This Encounter   Procedures    CBC Auto Differential     Standing Status:   Future     Number of Occurrences:   1     Standing Expiration Date:   10/29/2022    Comprehensive Metabolic Panel     Standing Status:   Future     Number of Occurrences:   1     Standing Expiration Date:   10/29/2022    Iron and TIBC     Standing Status:   Future     Number of Occurrences:   1     Standing Expiration Date:   10/29/2022     Order Specific Question:   Is Patient Fasting? Answer:   no     Order Specific Question:   No of Hours? Answer:   0    Ferritin     Standing Status:   Future     Number of Occurrences:   1     Standing Expiration Date:   10/29/2022    Vitamin B12     Standing Status:   Future     Number of Occurrences:   1     Standing Expiration Date:   10/29/2022    Blood Occult Stool Screen #1     Standing Status:   Future     Standing Expiration Date:   10/29/2022    Blood Occult Stool Screen #2     Standing Status:   Future     Standing Expiration Date:   10/29/2022    Blood Occult Stool Screen #3     Standing Status:   Future     Number of Occurrences:   1     Standing Expiration Date:   10/29/2022      Fei Taylor was seen today for cancer.     Diagnoses and all orders for this visit:    Adenocarcinoma of right lung ([facility] [address]  -     CBC Auto Differential; Future  - Comprehensive Metabolic Panel; Future    Encounter for antineoplastic immunotherapy    Antineoplastic chemotherapy induced anemia  -     Iron and TIBC; Future  -     Ferritin; Future  -     Vitamin B12; Future  -     Blood Occult Stool Screen #1; Future  -     Blood Occult Stool Screen #2; Future  -     Blood Occult Stool Screen #3; Future    Care plan discussed with patient    Chemotherapy-induced fatigue    Diarrhea, unspecified type    Iron deficiency anemia, unspecified iron deficiency anemia type       [id number], stge IIIA, NSCLC, adenocarcinoma subtype  Essentially stage III disease. The patient is not a surgical candidate. Recommend chemoradiation followed by immunotherapy. Status post completion of chemoradiation. Currently receiving adjuvant durvalumab. Reviewed CT chest that showed interval response. Continue durvalumab. Status post completion of chemoradiation. CT chest showed interval response   Recommended:  Durvalumab 1500 mg IV every 4 weeks x12 dose    Proceed cycle #5/12. Discussed treatment plan with the patient and daughter. Good tolerance to treatment except for mild fatigue and occasional diarrhea    Treatment related side effectsoccasional diarrhea, grade 1, fatigue    Diabetes mellitus type 2/hypertensionuncontrolled. Discussed with PCP office. Systolic heart failureEF 45%  2D echo-LVEF 40-45%. Mild LVH, Mild [grade 1] diastolic dysfunction. Uncontrolled hypertension Followed by PCP and Cardiology. BP (!) 161/75 (Site: Right Upper Arm, Position: Sitting)   Pulse 81   Temp 98.3 °F (36.8 °C)   Resp 16   Ht 5' 10\" (1.778 m)   Wt 188 lb (85.3 kg)   SpO2 100%   BMI 26.98 kg/m²    Follow-up with PCP for better blood pressure control    At risk thyroid disorder  Lab Results   Component Value Date    TSH 2.740 10/01/2021     Physical deconditioningslowly improving. Encouraged increased exercise.     Normocytic anemialikely multifactorial anemia to include anemia of inflammation/iron deficiency. Cannot rule out GI bleed given history taking aspirin and NSAIDs toxicity  Hemoglobin 9.8/MCV 93  Ferritin 72, iron saturation 14%, iron 33, TIBC 240, vitamin B12 249  Occult blood stool x3pending  Of note, patient has been on high-dose aspirin 325 mg p.o. daily. He was recently seen by cardiology and request to take daily aspirin only. PLAN:  · RTC 4 weeks with MD in treatment room  · C# 5/12 monthly durvalumab today and every 4 weeks  · Monitor BP at home and record  · Continue Follow-up appointments with PCP for BP  · CBC, CMP, B12, Iron studies, Ferritin today  · Stool occult blood x3 today  · Repeat scans in Dec 2021  · Continue  Zofran ODT every 6 hrs as needed  · Recommend OTC Imodium for diarrhea as needed  · Check methylmalonic acid during next visit  · Arrange for IV iron, Venofer 500 mg x 2 doses    Sushant SIMS am scribing for Spencer Garza MD. Electronically signed by Sushant Calabrese RN on 10/30/2021 at 12:42 PM CDT. I, Dr Saeid Adrian, personally performed the services described in this documentation as scribed by Sushant Calabrese RN in my presence and is both accurate and complete. I have seen, examined and reviewed this patient medication list, appropriate labs and imaging studies. I reviewed relevant medical records and others physicians notes. I discussed the plans of care with the patient. I answered all the questions to the patients satisfaction. I have also reviewed the chief complaint (CC) and part of the history (History of Present Illness (HPI), Past Family Social History [facility]), or Review of Systems (ROS) and made changes when appropriated.        (Please note that portions of this note were completed with a voice recognition program. Efforts were made to edit the dictations but occasionally words are mis-transcribed.)    Electronically signed by Spencer Garza MD on 10/30/2021 at 9:35 AM

## 2021-10-29 ENCOUNTER — HOSPITAL ENCOUNTER (OUTPATIENT)
Dept: INFUSION THERAPY | Age: 81
Discharge: HOME | End: 2021-10-29
Payer: MEDICARE

## 2021-10-29 ENCOUNTER — OFFICE VISIT (OUTPATIENT)
Dept: HEMATOLOGY | Age: 81
End: 2021-10-29
Payer: MEDICARE

## 2021-10-29 VITALS
TEMPERATURE: 98.3 F | HEART RATE: 81 BPM | OXYGEN SATURATION: 100 % | RESPIRATION RATE: 16 BRPM | WEIGHT: 188 LBS | HEIGHT: 70 IN | DIASTOLIC BLOOD PRESSURE: 75 MMHG | SYSTOLIC BLOOD PRESSURE: 161 MMHG | BODY MASS INDEX: 26.92 KG/M2

## 2021-10-29 VITALS
DIASTOLIC BLOOD PRESSURE: 75 MMHG | HEIGHT: 70 IN | BODY MASS INDEX: 26.92 KG/M2 | HEART RATE: 81 BPM | WEIGHT: 188 LBS | OXYGEN SATURATION: 100 % | TEMPERATURE: 98.3 F | SYSTOLIC BLOOD PRESSURE: 161 MMHG | RESPIRATION RATE: 16 BRPM

## 2021-10-29 DIAGNOSIS — R19.7 DIARRHEA, UNSPECIFIED TYPE: ICD-10-CM

## 2021-10-29 DIAGNOSIS — T45.1X5A ANTINEOPLASTIC CHEMOTHERAPY INDUCED ANEMIA: ICD-10-CM

## 2021-10-29 DIAGNOSIS — R53.83 CHEMOTHERAPY-INDUCED FATIGUE: ICD-10-CM

## 2021-10-29 DIAGNOSIS — D64.81 ANTINEOPLASTIC CHEMOTHERAPY INDUCED ANEMIA: ICD-10-CM

## 2021-10-29 DIAGNOSIS — C34.91 ADENOCARCINOMA OF RIGHT LUNG (HCC): Primary | ICD-10-CM

## 2021-10-29 DIAGNOSIS — T45.1X5A CHEMOTHERAPY-INDUCED FATIGUE: ICD-10-CM

## 2021-10-29 DIAGNOSIS — Z51.12 ENCOUNTER FOR ANTINEOPLASTIC IMMUNOTHERAPY: ICD-10-CM

## 2021-10-29 DIAGNOSIS — D50.9 IRON DEFICIENCY ANEMIA, UNSPECIFIED IRON DEFICIENCY ANEMIA TYPE: ICD-10-CM

## 2021-10-29 DIAGNOSIS — Z71.89 CARE PLAN DISCUSSED WITH PATIENT: ICD-10-CM

## 2021-10-29 LAB
ALBUMIN SERPL-MCNC: 3.9 G/DL (ref 3.5–5.2)
ALP BLD-CCNC: 119 U/L (ref 40–130)
ALT SERPL-CCNC: 15 U/L (ref 21–72)
ANION GAP SERPL CALCULATED.3IONS-SCNC: 10 MMOL/L (ref 7–19)
AST SERPL-CCNC: 37 U/L (ref 17–59)
BASOPHILS ABSOLUTE: 0.04 K/UL (ref 0.01–0.08)
BASOPHILS RELATIVE PERCENT: 0.5 % (ref 0.1–1.2)
BILIRUB SERPL-MCNC: 0.6 MG/DL (ref 0.2–1.3)
BUN BLDV-MCNC: 17 MG/DL (ref 9–20)
CALCIUM SERPL-MCNC: 9.1 MG/DL (ref 8.4–10.2)
CHLORIDE BLD-SCNC: 103 MMOL/L (ref 98–111)
CO2: 25 MMOL/L (ref 22–29)
CREAT SERPL-MCNC: 1.1 MG/DL (ref 0.6–1.2)
EOSINOPHILS ABSOLUTE: 0.19 K/UL (ref 0.04–0.54)
EOSINOPHILS RELATIVE PERCENT: 2.2 % (ref 0.7–7)
FERRITIN: 72.6 NG/ML (ref 17.9–464)
GFR NON-AFRICAN AMERICAN: >60
GLOBULIN: 3.6 G/DL
GLUCOSE BLD-MCNC: 186 MG/DL (ref 74–106)
HCT VFR BLD CALC: 29.5 % (ref 40.1–51)
HEMOGLOBIN: 9.8 G/DL (ref 13.7–17.5)
IRON SATURATION: 14 % (ref 14–50)
IRON: 33 UG/DL (ref 49–181)
LYMPHOCYTES ABSOLUTE: 1.36 K/UL (ref 1.18–3.74)
LYMPHOCYTES RELATIVE PERCENT: 15.8 % (ref 19.3–53.1)
MCH RBC QN AUTO: 30.9 PG (ref 25.7–32.2)
MCHC RBC AUTO-ENTMCNC: 33.2 G/DL (ref 32.3–36.5)
MCV RBC AUTO: 93.1 FL (ref 79–92.2)
MONOCYTES ABSOLUTE: 0.61 K/UL (ref 0.24–0.82)
MONOCYTES RELATIVE PERCENT: 7.1 % (ref 4.7–12.5)
NEUTROPHILS ABSOLUTE: 6.4 K/UL (ref 1.56–6.13)
NEUTROPHILS RELATIVE PERCENT: 74.4 % (ref 34–71.1)
PDW BLD-RTO: 13.4 % (ref 11.6–14.4)
PLATELET # BLD: 388 K/UL (ref 163–337)
PMV BLD AUTO: 8.2 FL (ref 7.4–10.4)
POTASSIUM SERPL-SCNC: 4.9 MMOL/L (ref 3.5–5.1)
RBC # BLD: 3.17 M/UL (ref 4.63–6.08)
SODIUM BLD-SCNC: 138 MMOL/L (ref 137–145)
TOTAL IRON BINDING CAPACITY: 240 UG/DL (ref 261–462)
TOTAL PROTEIN: 7.5 G/DL (ref 6.3–8.2)
VITAMIN B-12: 249 PG/ML (ref 239–931)
WBC # BLD: 8.6 K/UL (ref 4.23–9.07)

## 2021-10-29 PROCEDURE — 82607 VITAMIN B-12: CPT

## 2021-10-29 PROCEDURE — 1123F ACP DISCUSS/DSCN MKR DOCD: CPT | Performed by: INTERNAL MEDICINE

## 2021-10-29 PROCEDURE — 6360000002 HC RX W HCPCS: Performed by: INTERNAL MEDICINE

## 2021-10-29 PROCEDURE — 83540 ASSAY OF IRON: CPT

## 2021-10-29 PROCEDURE — G8427 DOCREV CUR MEDS BY ELIG CLIN: HCPCS | Performed by: INTERNAL MEDICINE

## 2021-10-29 PROCEDURE — 2580000003 HC RX 258: Performed by: INTERNAL MEDICINE

## 2021-10-29 PROCEDURE — G8484 FLU IMMUNIZE NO ADMIN: HCPCS | Performed by: INTERNAL MEDICINE

## 2021-10-29 PROCEDURE — G8417 CALC BMI ABV UP PARAM F/U: HCPCS | Performed by: INTERNAL MEDICINE

## 2021-10-29 PROCEDURE — 4040F PNEUMOC VAC/ADMIN/RCVD: CPT | Performed by: INTERNAL MEDICINE

## 2021-10-29 PROCEDURE — 96413 CHEMO IV INFUSION 1 HR: CPT

## 2021-10-29 PROCEDURE — 1036F TOBACCO NON-USER: CPT | Performed by: INTERNAL MEDICINE

## 2021-10-29 PROCEDURE — 82728 ASSAY OF FERRITIN: CPT

## 2021-10-29 PROCEDURE — 99214 OFFICE O/P EST MOD 30 MIN: CPT | Performed by: INTERNAL MEDICINE

## 2021-10-29 PROCEDURE — 80053 COMPREHEN METABOLIC PANEL: CPT

## 2021-10-29 PROCEDURE — 85025 COMPLETE CBC W/AUTO DIFF WBC: CPT

## 2021-10-29 PROCEDURE — 83550 IRON BINDING TEST: CPT

## 2021-10-29 RX ORDER — HEPARIN SODIUM (PORCINE) LOCK FLUSH IV SOLN 100 UNIT/ML 100 UNIT/ML
500 SOLUTION INTRAVENOUS PRN
Status: DISCONTINUED | OUTPATIENT
Start: 2021-10-29 | End: 2021-10-30 | Stop reason: HOSPADM

## 2021-10-29 RX ORDER — SODIUM CHLORIDE 0.9 % (FLUSH) 0.9 %
5-40 SYRINGE (ML) INJECTION PRN
Status: DISCONTINUED | OUTPATIENT
Start: 2021-10-29 | End: 2021-10-30 | Stop reason: HOSPADM

## 2021-10-29 RX ORDER — METHYLPREDNISOLONE SODIUM SUCCINATE 125 MG/2ML
125 INJECTION, POWDER, LYOPHILIZED, FOR SOLUTION INTRAMUSCULAR; INTRAVENOUS ONCE
Status: CANCELLED | OUTPATIENT
Start: 2021-10-29 | End: 2021-10-29

## 2021-10-29 RX ORDER — SODIUM CHLORIDE 0.9 % (FLUSH) 0.9 %
5-40 SYRINGE (ML) INJECTION PRN
Status: CANCELLED | OUTPATIENT
Start: 2021-10-29

## 2021-10-29 RX ORDER — EPINEPHRINE 1 MG/ML
0.3 INJECTION, SOLUTION, CONCENTRATE INTRAVENOUS PRN
Status: CANCELLED | OUTPATIENT
Start: 2021-10-29

## 2021-10-29 RX ORDER — SODIUM CHLORIDE 9 MG/ML
25 INJECTION, SOLUTION INTRAVENOUS PRN
Status: CANCELLED | OUTPATIENT
Start: 2021-10-29

## 2021-10-29 RX ORDER — HEPARIN SODIUM (PORCINE) LOCK FLUSH IV SOLN 100 UNIT/ML 100 UNIT/ML
500 SOLUTION INTRAVENOUS PRN
Status: CANCELLED | OUTPATIENT
Start: 2021-10-29

## 2021-10-29 RX ORDER — DIPHENHYDRAMINE HYDROCHLORIDE 50 MG/ML
50 INJECTION INTRAMUSCULAR; INTRAVENOUS ONCE
Status: CANCELLED | OUTPATIENT
Start: 2021-10-29 | End: 2021-10-29

## 2021-10-29 RX ORDER — SODIUM CHLORIDE 9 MG/ML
INJECTION, SOLUTION INTRAVENOUS CONTINUOUS
Status: CANCELLED | OUTPATIENT
Start: 2021-10-29

## 2021-10-29 RX ORDER — SODIUM CHLORIDE 9 MG/ML
20 INJECTION, SOLUTION INTRAVENOUS ONCE
Status: CANCELLED | OUTPATIENT
Start: 2021-10-29 | End: 2021-10-29

## 2021-10-29 RX ADMIN — SODIUM CHLORIDE 1500 MG: 9 INJECTION, SOLUTION INTRAVENOUS at 12:54

## 2021-10-29 RX ADMIN — Medication 500 UNITS: at 14:00

## 2021-10-29 RX ADMIN — SODIUM CHLORIDE, PRESERVATIVE FREE 10 ML: 5 INJECTION INTRAVENOUS at 14:00

## 2021-10-29 NOTE — PROGRESS NOTES
Patient was given 3 occult cards, and was instructed on use. Daughter present and both verbalize understanding.  Electronically signed by Victor Manuel Welch RN on 10/29/2021 at 2:52 PM

## 2021-10-31 DIAGNOSIS — D50.9 IRON DEFICIENCY ANEMIA, UNSPECIFIED IRON DEFICIENCY ANEMIA TYPE: ICD-10-CM

## 2021-11-04 ENCOUNTER — TELEPHONE (OUTPATIENT)
Dept: INFUSION THERAPY | Age: 81
End: 2021-11-04

## 2021-11-04 DIAGNOSIS — D50.9 IRON DEFICIENCY ANEMIA, UNSPECIFIED IRON DEFICIENCY ANEMIA TYPE: Primary | ICD-10-CM

## 2021-11-04 NOTE — TELEPHONE ENCOUNTER
PT scheduled for Venofer infusion at [facility] outpatient infusion center on November 10th and November 17th at 1000 am.  This RN attempted to contact PT to let him know about the appointments. PT's sister came to office and appointments given to her at this time.

## 2021-11-10 ENCOUNTER — HOSPITAL ENCOUNTER (OUTPATIENT)
Dept: INFUSION THERAPY | Age: 81
Setting detail: INFUSION SERIES
Discharge: HOME | End: 2021-11-10
Payer: MEDICARE

## 2021-11-10 VITALS
RESPIRATION RATE: 17 BRPM | TEMPERATURE: 97.5 F | HEART RATE: 87 BPM | SYSTOLIC BLOOD PRESSURE: 170 MMHG | DIASTOLIC BLOOD PRESSURE: 81 MMHG | OXYGEN SATURATION: 95 %

## 2021-11-10 DIAGNOSIS — D50.9 IRON DEFICIENCY ANEMIA, UNSPECIFIED IRON DEFICIENCY ANEMIA TYPE: Primary | ICD-10-CM

## 2021-11-10 PROCEDURE — 2580000003 HC RX 258: Performed by: INTERNAL MEDICINE

## 2021-11-10 PROCEDURE — 83921 ORGANIC ACID SINGLE QUANT: CPT

## 2021-11-10 PROCEDURE — 96366 THER/PROPH/DIAG IV INF ADDON: CPT

## 2021-11-10 PROCEDURE — 36591 DRAW BLOOD OFF VENOUS DEVICE: CPT

## 2021-11-10 PROCEDURE — 96365 THER/PROPH/DIAG IV INF INIT: CPT

## 2021-11-10 PROCEDURE — 6360000002 HC RX W HCPCS: Performed by: INTERNAL MEDICINE

## 2021-11-10 RX ORDER — SODIUM CHLORIDE 0.9 % (FLUSH) 0.9 %
5-40 SYRINGE (ML) INJECTION PRN
Status: CANCELLED | OUTPATIENT
Start: 2021-11-17

## 2021-11-10 RX ORDER — SODIUM CHLORIDE 9 MG/ML
INJECTION, SOLUTION INTRAVENOUS CONTINUOUS
Status: CANCELLED | OUTPATIENT
Start: 2021-11-17

## 2021-11-10 RX ORDER — HEPARIN SODIUM (PORCINE) LOCK FLUSH IV SOLN 100 UNIT/ML 100 UNIT/ML
500 SOLUTION INTRAVENOUS PRN
Status: DISCONTINUED | OUTPATIENT
Start: 2021-11-10 | End: 2021-11-11 | Stop reason: HOSPADM

## 2021-11-10 RX ORDER — SODIUM CHLORIDE 0.9 % (FLUSH) 0.9 %
5-40 SYRINGE (ML) INJECTION PRN
Status: DISCONTINUED | OUTPATIENT
Start: 2021-11-10 | End: 2021-11-11 | Stop reason: HOSPADM

## 2021-11-10 RX ORDER — HEPARIN SODIUM (PORCINE) LOCK FLUSH IV SOLN 100 UNIT/ML 100 UNIT/ML
500 SOLUTION INTRAVENOUS PRN
Status: CANCELLED | OUTPATIENT
Start: 2021-11-17

## 2021-11-10 RX ORDER — SODIUM CHLORIDE 9 MG/ML
INJECTION, SOLUTION INTRAVENOUS CONTINUOUS
Status: ACTIVE | OUTPATIENT
Start: 2021-11-10 | End: 2021-11-10

## 2021-11-10 RX ADMIN — SODIUM CHLORIDE, PRESERVATIVE FREE 20 ML: 5 INJECTION INTRAVENOUS at 15:00

## 2021-11-10 RX ADMIN — IRON SUCROSE 500 MG: 20 INJECTION, SOLUTION INTRAVENOUS at 11:16

## 2021-11-10 RX ADMIN — SODIUM CHLORIDE: 9 INJECTION, SOLUTION INTRAVENOUS at 11:16

## 2021-11-10 RX ADMIN — HEPARIN 500 UNITS: 100 SYRINGE at 15:00

## 2021-11-12 LAB — METHYLMALONIC ACID: 0.19 UMOL/L (ref 0–0.4)

## 2021-11-17 ENCOUNTER — HOSPITAL ENCOUNTER (OUTPATIENT)
Dept: INFUSION THERAPY | Age: 81
Setting detail: INFUSION SERIES
Discharge: HOME | End: 2021-11-17
Payer: MEDICARE

## 2021-11-17 VITALS
HEART RATE: 83 BPM | TEMPERATURE: 98.4 F | SYSTOLIC BLOOD PRESSURE: 162 MMHG | DIASTOLIC BLOOD PRESSURE: 66 MMHG | OXYGEN SATURATION: 95 % | RESPIRATION RATE: 17 BRPM

## 2021-11-17 DIAGNOSIS — D50.9 IRON DEFICIENCY ANEMIA, UNSPECIFIED IRON DEFICIENCY ANEMIA TYPE: Primary | ICD-10-CM

## 2021-11-17 PROCEDURE — 2580000003 HC RX 258: Performed by: INTERNAL MEDICINE

## 2021-11-17 PROCEDURE — 96366 THER/PROPH/DIAG IV INF ADDON: CPT

## 2021-11-17 PROCEDURE — 96365 THER/PROPH/DIAG IV INF INIT: CPT

## 2021-11-17 PROCEDURE — 6360000002 HC RX W HCPCS: Performed by: INTERNAL MEDICINE

## 2021-11-17 RX ORDER — SODIUM CHLORIDE 9 MG/ML
INJECTION, SOLUTION INTRAVENOUS CONTINUOUS
Status: CANCELLED | OUTPATIENT
Start: 2021-11-17

## 2021-11-17 RX ORDER — HEPARIN SODIUM (PORCINE) LOCK FLUSH IV SOLN 100 UNIT/ML 100 UNIT/ML
500 SOLUTION INTRAVENOUS PRN
Status: CANCELLED | OUTPATIENT
Start: 2021-11-17

## 2021-11-17 RX ORDER — SODIUM CHLORIDE 0.9 % (FLUSH) 0.9 %
5-40 SYRINGE (ML) INJECTION PRN
Status: DISCONTINUED | OUTPATIENT
Start: 2021-11-17 | End: 2021-11-18 | Stop reason: HOSPADM

## 2021-11-17 RX ORDER — HEPARIN SODIUM (PORCINE) LOCK FLUSH IV SOLN 100 UNIT/ML 100 UNIT/ML
500 SOLUTION INTRAVENOUS PRN
Status: DISCONTINUED | OUTPATIENT
Start: 2021-11-17 | End: 2021-11-18 | Stop reason: HOSPADM

## 2021-11-17 RX ORDER — SODIUM CHLORIDE 0.9 % (FLUSH) 0.9 %
5-40 SYRINGE (ML) INJECTION PRN
Status: CANCELLED | OUTPATIENT
Start: 2021-11-17

## 2021-11-17 RX ORDER — SODIUM CHLORIDE 9 MG/ML
INJECTION, SOLUTION INTRAVENOUS CONTINUOUS
Status: ACTIVE | OUTPATIENT
Start: 2021-11-17 | End: 2021-11-17

## 2021-11-17 RX ADMIN — SODIUM CHLORIDE: 9 INJECTION, SOLUTION INTRAVENOUS at 10:49

## 2021-11-17 RX ADMIN — HEPARIN 500 UNITS: 100 SYRINGE at 14:35

## 2021-11-17 RX ADMIN — IRON SUCROSE 500 MG: 20 INJECTION, SOLUTION INTRAVENOUS at 10:49

## 2021-11-17 RX ADMIN — SODIUM CHLORIDE, PRESERVATIVE FREE 20 ML: 5 INJECTION INTRAVENOUS at 14:35

## 2021-11-29 DIAGNOSIS — C34.91 ADENOCARCINOMA OF RIGHT LUNG (HCC): Primary | ICD-10-CM

## 2021-11-30 ENCOUNTER — OFFICE VISIT (OUTPATIENT)
Dept: PRIMARY CARE CLINIC | Age: 81
End: 2021-11-30
Payer: MEDICARE

## 2021-11-30 ENCOUNTER — HOSPITAL ENCOUNTER (OUTPATIENT)
Dept: CT IMAGING | Age: 81
Discharge: HOME | End: 2021-11-30
Payer: MEDICARE

## 2021-11-30 VITALS
TEMPERATURE: 96.7 F | DIASTOLIC BLOOD PRESSURE: 74 MMHG | SYSTOLIC BLOOD PRESSURE: 121 MMHG | HEIGHT: 70 IN | BODY MASS INDEX: 26.63 KG/M2 | OXYGEN SATURATION: 98 % | RESPIRATION RATE: 18 BRPM | HEART RATE: 79 BPM | WEIGHT: 186 LBS

## 2021-11-30 DIAGNOSIS — C34.91 ADENOCARCINOMA OF RIGHT LUNG (HCC): ICD-10-CM

## 2021-11-30 DIAGNOSIS — Z00.00 ROUTINE GENERAL MEDICAL EXAMINATION AT A HEALTH CARE FACILITY: Primary | ICD-10-CM

## 2021-11-30 DIAGNOSIS — E78.00 HYPERCHOLESTEROLEMIA: ICD-10-CM

## 2021-11-30 DIAGNOSIS — K21.9 GASTROESOPHAGEAL REFLUX DISEASE WITHOUT ESOPHAGITIS: ICD-10-CM

## 2021-11-30 DIAGNOSIS — I10 ESSENTIAL HYPERTENSION: ICD-10-CM

## 2021-11-30 DIAGNOSIS — E11.9 TYPE 2 DIABETES MELLITUS WITHOUT COMPLICATION, WITHOUT LONG-TERM CURRENT USE OF INSULIN (HCC): ICD-10-CM

## 2021-11-30 PROCEDURE — 74177 CT ABD & PELVIS W/CONTRAST: CPT

## 2021-11-30 PROCEDURE — G8417 CALC BMI ABV UP PARAM F/U: HCPCS | Performed by: FAMILY MEDICINE

## 2021-11-30 PROCEDURE — G8484 FLU IMMUNIZE NO ADMIN: HCPCS | Performed by: FAMILY MEDICINE

## 2021-11-30 PROCEDURE — G0439 PPPS, SUBSEQ VISIT: HCPCS | Performed by: FAMILY MEDICINE

## 2021-11-30 PROCEDURE — 99214 OFFICE O/P EST MOD 30 MIN: CPT | Performed by: FAMILY MEDICINE

## 2021-11-30 PROCEDURE — 1123F ACP DISCUSS/DSCN MKR DOCD: CPT | Performed by: FAMILY MEDICINE

## 2021-11-30 PROCEDURE — G8427 DOCREV CUR MEDS BY ELIG CLIN: HCPCS | Performed by: FAMILY MEDICINE

## 2021-11-30 PROCEDURE — 1036F TOBACCO NON-USER: CPT | Performed by: FAMILY MEDICINE

## 2021-11-30 PROCEDURE — 6360000004 HC RX CONTRAST MEDICATION: Performed by: INTERNAL MEDICINE

## 2021-11-30 PROCEDURE — 71260 CT THORAX DX C+: CPT

## 2021-11-30 PROCEDURE — 3051F HG A1C>EQUAL 7.0%<8.0%: CPT | Performed by: FAMILY MEDICINE

## 2021-11-30 PROCEDURE — 4040F PNEUMOC VAC/ADMIN/RCVD: CPT | Performed by: FAMILY MEDICINE

## 2021-11-30 RX ORDER — DEXTROMETHORPHAN HYDROBROMIDE AND PROMETHAZINE HYDROCHLORIDE 15; 6.25 MG/5ML; MG/5ML
SYRUP ORAL
Qty: 240 ML | Refills: 0 | Status: SHIPPED | OUTPATIENT
Start: 2021-11-30

## 2021-11-30 RX ADMIN — IOPAMIDOL 75 ML: 755 INJECTION, SOLUTION INTRAVENOUS at 13:49

## 2021-11-30 ASSESSMENT — PATIENT HEALTH QUESTIONNAIRE - PHQ9
SUM OF ALL RESPONSES TO PHQ QUESTIONS 1-9: 0
SUM OF ALL RESPONSES TO PHQ9 QUESTIONS 1 & 2: 0
2. FEELING DOWN, DEPRESSED OR HOPELESS: 0
1. LITTLE INTEREST OR PLEASURE IN DOING THINGS: 0
SUM OF ALL RESPONSES TO PHQ QUESTIONS 1-9: 0
SUM OF ALL RESPONSES TO PHQ QUESTIONS 1-9: 0

## 2021-11-30 ASSESSMENT — LIFESTYLE VARIABLES: HOW OFTEN DO YOU HAVE A DRINK CONTAINING ALCOHOL: 0

## 2021-11-30 NOTE — PATIENT INSTRUCTIONS
Patient Education        Irritable Bowel Syndrome: Care Instructions  Your Care Instructions  Irritable bowel syndrome, or IBS, is a problem with the intestines that causes belly pain, bloating, gas, constipation, and diarrhea. The cause of IBS is not well known. IBS can last for many years, but it does not get worse over time or lead to serious disease. Most people can control their symptoms by changing their diet and reducing stress. Follow-up care is a key part of your treatment and safety. Be sure to make and go to all appointments, and call your doctor if you are having problems. It's also a good idea to know your test results and keep a list of the medicines you take. How can you care for yourself at home? · Prevent diarrhea:  ? Limit the amount of high-fiber foods you eat. This includes vegetables, fruits, whole-grain breads and pasta, high-fiber cereal, and brown rice. ? Limit dairy products. ? Limit artificial sweeteners such as sorbitol and xylitol. · Avoid constipation:  ? Include fruits, vegetables, beans, and whole grains in your diet each day. These foods are high in fiber. ? Drink plenty of fluids. If you have kidney, heart, or liver disease and have to limit fluids, talk with your doctor before you increase the amount of fluids you drink. ? Get some exercise every day. Build up slowly to 30 to 60 minutes a day on 5 or more days of the week. ? Take a fiber supplement, such as Citrucel or Metamucil, every day if needed. Read and follow all instructions on the label. ? Schedule time each day for a bowel movement. Having a daily routine may help. Take your time and do not strain when having a bowel movement. · To help relieve bloating or gas, avoid foods such as beans, cabbage, cauliflower, or broccoli. · Keep a daily diary of what you eat and what symptoms you have. This may help find foods that cause you problems. · Eat slowly. Try to make mealtime relaxing.   · Find ways to reduce stress. When should you call for help? Call your doctor now or seek immediate medical care if:    · Your pain is different than usual or occurs with fever.     · You lose weight without trying, or you lose your appetite and you do not know why.     · Your symptoms often wake you from sleep.     · Your stools are black and tarlike or have streaks of blood. Watch closely for changes in your health, and be sure to contact your doctor if:    · Your IBS symptoms get worse or begin to disrupt your day-to-day life.     · You become more tired than usual.     · Your home treatment stops working. Where can you learn more? Go to https://I-StandpeAcunueb.Jacked. org and sign in to your MOG account. Enter V187 in the [employer] box to learn more about \"Irritable Bowel Syndrome: Care Instructions. \"     If you do not have an account, please click on the \"Sign Up Now\" link. Current as of: February 10, 2021               Content Version: 13.0  © 2006-2021 [employer]. Care instructions adapted under license by [facility] ([facility]). If you have questions about a medical condition or this instruction, always ask your healthcare professional. [place] [address] any warranty or liability for your use of this information. Patient Education        Diet for Irritable Bowel Syndrome: Care Instructions  Overview     Irritable bowel syndrome, or IBS, is a problem with the intestines. IBS can cause belly pain, bloating, gas, constipation, and diarrhea. Most people can control their symptoms by changing their diet and easing stress. No specific foods cause everyone with IBS to have symptoms. Many people find that they feel better by limiting or eliminating foods that may bring on symptoms. Make sure you don't stop eating all foods from any one food group without talking with a dietitian. You need to make sure you are still getting all the nutrients you need.   Follow-up care is a key part of your treatment and safety. Be sure to make and go to all appointments, and call your doctor if you are having problems. It's also a good idea to know your test results and keep a list of the medicines you take. How can you care for yourself at home? To reduce constipation  · Check with your doctor about increasing the amount of fiber you eat. If your doctor recommends more fiber:  ? Slowly increase the amount of fiber you eat. For some people who have IBS, eating more fiber may make some symptoms worse. This includes bloating. Adding fiber slowly may help you avoid these problems. ? Include fruits, vegetables, beans, and whole grains in your diet each day. These foods are high in fiber. ? Take a fiber supplement, such as Citrucel or Metamucil, every day if needed. Read and follow all instructions on the label. · Drink plenty of fluids. If you have kidney, heart, or liver disease and have to limit fluids, talk with your doctor before you increase the amount of fluids you drink. · Get some exercise every day. Build up slowly to 30 to 60 minutes a day on 5 or more days of the week. · Schedule time each day for a bowel movement. Having a daily routine may help. Take your time and do not strain when having a bowel movement. To reduce diarrhea  You may try giving up foods or drinks one at a time to see whether symptoms improve. Limit or avoid the following:  · Alcohol  · Caffeine, which is found in coffee, tea, cola drinks, and chocolate  · Nicotine, from smoking or chewing tobacco  · Gas-producing foods, such as beans, broccoli, cabbage, and apples  · Dairy products that contain lactose (milk sugar), such as ice cream and milk.   · Foods and drinks high in sugar, especially fruit juice, soda, candy, and other packaged sweets (such as cookies)  · Foods high in fat, including zamora, sausage, butter, oils, and anything deep-fried  · Sorbitol and xylitol, artificial sweeteners found in some sugarless candies and chewing gum  Keep track of foods  · Some people with IBS use a daily food diary to keep track of what they eat and whether they have any symptoms after eating certain foods. The diary also can be a good way to record what is going on in your life. · Stress plays a role in IBS. So if you are aware that certain stresses bring on symptoms, you can try to reduce those stresses. Keep mealtimes pleasant  · Try to maintain a pleasant environment when you eat. This may reduce stress that can make symptoms likely to occur. · Give yourself plenty of time to eat, rather than eating on the go. Chew your food slowly. Try not to swallow air, which can cause bloating. Where can you learn more? Go to https://Unocoin.ChiScan. org and sign in to your [employer] account. Enter Y639 in the Awesome.me box to learn more about \"Diet for Irritable Bowel Syndrome: Care Instructions. \"     If you do not have an account, please click on the \"Sign Up Now\" link. Current as of: December 17, 2020               Content Version: 13.0  © 0330-6936 Healthwise, [employer]. Care instructions adapted under license by [facility] ([facility]). If you have questions about a medical condition or this instruction, always ask your healthcare professional. [address] any warranty or liability for your use of this information. Personalized Preventive Plan for Kenneth Faulkner - 11/30/2021  Medicare offers a range of preventive health benefits. Some of the tests and screenings are paid in full while other may be subject to a deductible, co-insurance, and/or copay. Some of these benefits include a comprehensive review of your medical history including lifestyle, illnesses that may run in your family, and various assessments and screenings as appropriate.     After reviewing your medical record and screening and assessments performed today your provider may have ordered immunizations, labs, imaging, and/or referrals for you. A list of these orders (if applicable) as well as your Preventive Care list are included within your After Visit Summary for your review. Other Preventive Recommendations:    · A preventive eye exam performed by an eye specialist is recommended every 1-2 years to screen for glaucoma; cataracts, macular degeneration, and other eye disorders. · A preventive dental visit is recommended every 6 months. · Try to get at least 150 minutes of exercise per week or 10,000 steps per day on a pedometer . · Order or download the FREE \"Exercise & Physical Activity: Your Everyday Guide\" from The [employer] on Aging. Call 7-222.550.7866 or search [employer] A[employer] online. · You need 8190-1788 mg of calcium and 2910-7624 IU of vitamin D per day. It is possible to meet your calcium requirement with diet alone, but a vitamin D supplement is usually necessary to meet this goal.  · When exposed to the sun, use a sunscreen that protects against both UVA and UVB radiation with an SPF of 30 or greater. Reapply every 2 to 3 hours or after sweating, drying off with a towel, or swimming. · Always wear a seat belt when traveling in a car. Always wear a helmet when riding a bicycle or motorcycle. Heart-Healthy Diet   Sodium, Fat, and Cholesterol Controlled Diet       What Is a Heart Healthy Diet? A heart-healthy diet is one that limits sodium , certain types of fat , and cholesterol . This type of diet is recommended for:   People with any form of cardiovascular disease (eg, coronary heart disease , peripheral vascular disease , previous heart attack , previous stroke )   People with risk factors for cardiovascular disease, such as high blood pressure , high cholesterol , or diabetes   Anyone who wants to lower their risk of developing cardiovascular disease   Sodium    Sodium is a mineral found in many foods. In general, most people consume much more sodium than they need. Diets high in sodium can increase blood pressure and lead to edema (water retention). On a heart-healthy diet, you should consume no more than 2,300 mg (milligrams) of sodium per dayabout the amount in one teaspoon of table salt. The foods highest in sodium include table salt (about 50% sodium), processed foods, convenience foods, and preserved foods. Cholesterol    Cholesterol is a fat-like, waxy substance in your blood. Our bodies make some cholesterol. It is also found in animal products, with the highest amounts in fatty meat, egg yolks, whole milk, cheese, shellfish, and organ meats. On a heart-healthy diet, you should limit your cholesterol intake to less than 200 mg per day. It is normal and important to have some cholesterol in your bloodstream. But too much cholesterol can cause plaque to build up within your arteries, which can eventually lead to a heart attack or stroke. The two types of cholesterol that are most commonly referred to are:   Low-density lipoprotein (LDL) cholesterol  Also known as bad cholesterol, this is the cholesterol that tends to build up along your arteries. Bad cholesterol levels are increased by eating fats that are saturated or hydrogenated. Optimal level of this cholesterol is less than 100. Over 130 starts to get risky for heart disease. High-density lipoprotein (HDL) cholesterol  Also known as good cholesterol, this type of cholesterol actually carries cholesterol away from your arteries and may, therefore, help lower your risk of having a heart attack. You want this level to be high (ideally greater than 60). It is a risk to have a level less than 40. You can raise this good cholesterol by eating olive oil, canola oil, avocados, or nuts. Exercise raises this level, too. Fat    Fat is calorie dense and packs a lot of calories into a small amount of food. Even though fats should be limited due to their high calorie content, not all fats are bad.  In fact, some fats are quite healthful. Fat can be broken down into four main types. The good-for-you fats are:   Monounsaturated fat  found in oils such as olive and canola, avocados, and nuts and natural nut butters; can decrease cholesterol levels, while keeping levels of HDL cholesterol high   Polyunsaturated fat  found in oils such as safflower, sunflower, soybean, corn, and sesame; can decrease total cholesterol and LDL cholesterol   Omega-3 fatty acids  particularly those found in fatty fish (such as salmon, trout, tuna, mackerel, herring, and sardines); can decrease risk of arrhythmias, decrease triglyceride levels, and slightly lower blood pressure   The fats that you want to limit are:   Saturated fat  found in animal products, many fast foods, and a few vegetables; increases total blood cholesterol, including LDL levels   Animal fats that are saturated include: butter, lard, whole-milk dairy products, meat fat, and poultry skin   Vegetable fats that are saturated include: hydrogenated shortening, palm oil, coconut oil, cocoa butter   Hydrogenated or trans fat  found in margarine and vegetable shortening, most shelf stable snack foods, and fried foods; increases LDL and decreases HDL     It is generally recommended that you limit your total fat for the day to less than 30% of your total calories. If you follow an 1800-calorie heart healthy diet, for example, this would mean 60 grams of fat or less per day. Saturated fat and trans fat in your diet raises your blood cholesterol the most, much more than dietary cholesterol does. For this reason, on a heart-healthy diet, less than 7% of your calories should come from saturated fat and ideally 0% from trans fat. On an 1800-calorie diet, this translates into less than 14 grams of saturated fat per day, leaving 46 grams of fat to come from mono- and polyunsaturated fats.    Food Choices on a Heart Healthy Diet   Food Category   Foods Recommended   Foods to Avoid   Grains   Breads and rolls without salted tops Most dry and cooked cereals Unsalted crackers and breadsticks Low-sodium or homemade breadcrumbs or stuffing All rice and pastas   Breads, rolls, and crackers with salted tops High-fat baked goods (eg, muffins, donuts, pastries) Quick breads, self-rising flour, and biscuit mixes Regular bread crumbs Instant hot cereals Commercially prepared rice, pasta, or stuffing mixes   Vegetables   Most fresh, frozen, and low-sodium canned vegetables Low-sodium and salt-free vegetable juices Canned vegetables if unsalted or rinsed   Regular canned vegetables and juices, including sauerkraut and pickled vegetables Frozen vegetables with sauces Commercially prepared potato and vegetable mixes   Fruits   Most fresh, frozen, and canned fruits All fruit juices   Fruits processed with salt or sodium   Milk   Nonfat or low-fat (1%) milk Nonfat or low-fat yogurt Cottage cheese, low-fat ricotta, cheeses labeled as low-fat and low-sodium   Whole milk Reduced-fat (2%) milk Malted and chocolate milk Full fat yogurt Most cheeses (unless low-fat and low salt) Buttermilk (no more than 1 cup per week)   Meats and Beans   Lean cuts of fresh or frozen beef, veal, lamb, or pork (look for the word loin) Fresh or frozen poultry without the skin Fresh or frozen fish and some shellfish Egg whites and egg substitutes (Limit whole eggs to three per week) Tofu Nuts or seeds (unsalted, dry-roasted), low-sodium peanut butter Dried peas, beans, and lentils   Any smoked, cured, salted, or canned meat, fish, or poultry (including zamora, chipped beef, cold cuts, hot dogs, sausages, sardines, and anchovies) Poultry skins Breaded and/or fried fish or meats Canned peas, beans, and lentils Salted nuts   Fats and Oils   Olive oil and canola oil Low-sodium, low-fat salad dressings and mayonnaise   Butter, margarine, coconut and palm oils, zamora fat   Snacks, Sweets, and Condiments   Low-sodium or unsalted versions of broths, soups, soy sauce, and condiments Pepper, herbs, and spices; vinegar, lemon, or lime juice Low-fat frozen desserts (yogurt, sherbet, fruit bars) Sugar, cocoa powder, honey, syrup, jam, and preserves Low-fat, trans-fat free cookies, cakes, and pies Mahendra and animal crackers, fig bars, vargas snaps   High-fat desserts Broth, soups, gravies, and sauces, made from instant mixes or other high-sodium ingredients Salted snack foods Canned olives Meat tenderizers, seasoning salt, and most flavored vinegars   Beverages   Low-sodium carbonated beverages Tea and coffee in moderation Soy milk   Commercially softened water   Suggestions   Make whole grains, fruits, and vegetables the base of your diet. Choose heart-healthy fats such as canola, olive, and flaxseed oil, and foods high in heart-healthy fats, such as nuts, seeds, soybeans, tofu, and fish. Eat fish at least twice per week; the fish highest in omega-3 fatty acids and lowest in mercury include salmon, herring, mackerel, sardines, and canned chunk light tuna. If you eat fish less than twice per week or have high triglycerides, talk to your doctor about taking fish oil supplements. Read food labels. For products low in fat and cholesterol, look for fat free, low-fat, cholesterol free, saturated fat free, and trans fat freeAlso scan the Nutrition Facts Label, which lists saturated fat, trans fat, and cholesterol amounts. For products low in sodium, look for sodium free, very low sodium, low sodium, no added salt, and unsalted   Skip the salt when cooking or at the table; if food needs more flavor, get creative and try out different herbs and spices. Garlic and onion also add substantial flavor to foods. Trim any visible fat off meat and poultry before cooking, and drain the fat off after santa. Use cooking methods that require little or no added fat, such as grilling, boiling, baking, poaching, broiling, roasting, steaming, stir-frying, and sauting.     Avoid fast food and convenience food. They tend to be high in saturated and trans fat and have a lot of added salt. Talk to a registered dietitian for individualized diet advice. Last Reviewed: March 2011 Dallas Harmon MS, MPH, RD   Updated: 3/29/2011   ·     Keeping Home a [facility]       As we get older, changes in balance, gait, strength, vision, hearing, and cognition make even the most youthful senior more prone to accidents. Falls are one of the leading health risks for older people. This increased risk of falling is related to:   Aging process (eg, decreased muscle strength, slowed reflexes)   Higher incidence of chronic health problems (eg, arthritis, diabetes) that may limit mobility, agility or sensory awareness   Side effects of medicine (eg, dizziness, blurred vision)especially medicines like prescription pain medicines and drugs used to treat mental health conditions   Depending on the brittleness of your bones, the consequences of a fall can be serious and long lasting. Home Life   Research by the Association of Aging [facility]) shows that some home accidents among older adults can be prevented by making simple lifestyle changes and basic modifications and repairs to the home environment. Here are some lifestyle changes that experts recommend:   Have your hearing and vision checked regularly. Be sure to wear prescription glasses that are right for you. Speak to your doctor or pharmacist about the possible side effects of your medicines. A number of medicines can cause dizziness. If you have problems with sleep, talk to your doctor. Limit your intake of alcohol. If necessary, use a cane or walker to help maintain your balance. Wear supportive, rubber-soled shoes, even at home. If you live in a region that gets wintry weather, you may want to put special cleats on your shoes to prevent you from slipping on the snow and ice. Exercise regularly to help maintain muscle tone, agility, and balance. Always hold the banister when going up or down stairs. Also, use  bars when getting in or out of the bath or shower, or using the toilet. To avoid dizziness, get up slowly from a lying down position. Sit up first, dangling your legs for a minute or two before rising to a standing position. Overall Home Safety Check   According to the Consumer Product Safety Commision's \"Older Consumer Home Safety Checklist,\" it is important to check for potential hazards in each room. And remember, proper lighting is an essential factor in home safety. If you cannot see clearly, you are more likely to fall. Important questions to ask yourself include:   Are lamp, electric, extension, and telephone cords placed out of the flow of traffic and maintained in good condition? Have frayed cords been replaced? Are all small rugs and runners slip resistant? If not, you can secure them to the floor with a special double-sided carpet tape. Are smoke detectors properly locatedone on every floor of your home and one outside of every sleeping area? Are they in good working order? Are batteries replaced at least once a year? Do you have a well-maintained carbon monoxide detector outside every sleeping are in your home? Does your furniture layout leave plenty of space to maneuver between and around chairs, tables, beds, and sofas? Are hallways, stairs and passages between rooms well lit? Can you reach a lamp without getting out of bed? Are floor surfaces well maintained? Shag rugs, high-pile carpeting, tile floors, and polished wood floors can be particularly slippery. Stairs should always have handrails and be carpeted or fitted with a non-skid tread. Is your telephone easily reachable. Is the cord safely tucked away? Room by Room   According to the Association of Aging, bathrooms and mary are the two most potentially hazardous rooms in your home.    In the Kitchen    Be sure your stove is in proper working order and always make sure burners and the oven are off before you go out or go to sleep. Keep pots on the back burners, turn handles away from the front of the stove, and keep stove clean and free of grease build-up. Kitchen ventilation systems and range exhausts should be working properly. Keep flammable objects such as towels and pot holders away from the cooking area except when in use. Make sure kitchen curtains are tied back. Move cords and appliances away from the sink and hot surfaces. If extension cords are needed, install wiring guides so they do not hang over the sink, range, or working areas. Look for coffee pots, kettles and toaster ovens with automatic shut-offs. Keep a mop handy in the kitchen so you can wipe up spills instantly. You should also have a small fire extinguisher. Arrange your kitchen with frequently used items on lower shelves to avoid the need to stand on a stepstool to reach them. Make sure countertops are well-lit to avoid injuries while cutting and preparing food. In the Bathroom    Use a non-slip mat or decals in the tub and shower, since wet, soapy tile or porcelain surfaces are extremely slippery. Make sure bathroom rugs are non-skid or tape them firmly to the floor. Bathtubs should have at least one, preferably two, grab bars, firmly attached to structural supports in the wall. (Do not use built-in soap holders or glass shower doors as grab bars.)    Tub seats fitted with non-slip material on the legs allow you to wash sitting down. For people with limited mobility, bathtub transfer benches allow you to slide safely into the tub. Raised toilet seats and toilet safety rails are helpful for those with knee or hip problems. In the [facility]    Make sure you use a nightlight and that the area around your bed is clear of potential obstacles.     Be careful with electric blankets and never go to sleep with a heating pad, which can cause serious burns even if on a low setting. Use fire-resistant mattress covers and pillows, and NEVER smoke in bed. Keep a phone next to the bed that is programmed to dial 911 at the push of a button. If you have a chronic condition, you may want to sign on with an automatic call-in service. Typically the system includes a small pendant that connects directly to an emergency medical voice-response system. You should also make arrangements to stay in contact with someonefriend, neighbor, family memberon a regular schedule. Fire Prevention   According to the [employer] ([employer]) [address] [place] [address], senior citizens are one of the two highest risk groups for death and serious injuries due to residential fires. When cooking, wear short-sleeved items, never a bulky long-sleeved robe. The [facility]'s Safety Checklist for Older Consumers emphasizes the importance of checking basements, garages, workshops and storage areas for fire hazards, such as volatile liquids, piles of old rags or clothing and overloaded circuits. Never smoke in bed or when lying down on a couch or recliner chair. Small portable electric or kerosene heaters are responsible for many home fires and should be used cautiously if at all. If you do use one, be sure to keep them away from flammable materials. In case of fire, make sure you have a pre-established emergency exit plan. Have a professional check your fireplace and other fuel-burning appliances yearly. [employer]   Baby boomers entering the burgess years will continue to see the development of new products to help older adults live safely and independently in spite of age-related changes. Making Life More Livable  , by Sharyle Mires, lists over 1,000 products for \"living well in the mature years,\" such as bathing and mobility aids, household security devices, ergonomically designed knives and peelers, and faucet valves and knobs for temperature control.  Medical supply stores and organizations are good sources of information about products that improve your quality of life and insure your safety.      Last Reviewed: November 2009 Tatyana Medrano MD   Updated: 3/7/2011     ·

## 2021-12-02 NOTE — PROGRESS NOTES
MEDICAL ONCOLOGY PROGRESS NOTE    Pt Name: Daija Johns  MRN: [id number]  YOB: 1940  Date of evaluation: 12/3/2021    HISTORY OF PRESENT ILLNESS:    Reason for MD visit-disease management/toxicity assessment  The patient is a very pleasant 79years old male who has been diagnosed with stage IIIa non-small cell lung cancer, adenocarcinoma subtype. He is a status post completion of chemoradiation in June 2021. He tolerated chemoradiation treatment poorly with complaints of severe fatigue/deconditioning and decreased mobility. The patient is accompanied by his sister today. He is currently receiving adjuvant immunotherapy with durvalumab. He has been tolerating treatment well except for complains of persistent fatigue. His wife has been diagnosed with dementia. He has had poor sleep due to this. Respiratory symptoms. He had CT scans to assess disease response. Diagnosis  · RUL adenocarcinoma, NSCLC, Dec 2020  · [id number], stage IIIA  · Systolic heart failure  · Hypertension, controlled  · Not a surgical candidate    Treatment Summary  · 4/21/2021-5/26/21 completed concurrent chemoradiation with carboplatin/Taxol  · 4/22/21-6/3/21- 6600 cGy radiation therapy completed  · 7/8/21- initiate maintenance Durvalumab 1500mg every 4 weeks x12 cycles    Hematology/Cancer History:  Bruno Cuenca was first seen by me on 4/7/2021 referred by Dr. Ketty Vaughn, pulmonary [facility] [place] for findings of non-small cell lung cancer, adenocarcinoma type. He has several comorbidities including history of type 2 diabetes, hypertension COPD. History of smoking in the past.  Quit many years ago. History of coronary artery disease followed by cardiology biopsy. Last EF 70% in 2018. He was seen by his primary care provider in November 2020. He has complaint of chest pain. Chest x-ray showed a 4 cm mass in the right upper lung.   · 12/1/20 CT chest [facility]): Large right upper lobe mass concerning for primary neoplasm. Enlarged right hilar lymph node concerning for metastatic disease. Additional noncalcified pulmonary nodules bilaterally for which follow-up CT is recommended in 3-6 months. Atherosclerosis of the aorta and coronary arteries. · 12/16/2020-bronchoscopy with bronchoalveolar lavage was nondiagnostic  · 2/23/21 CT cheat w/o [facility]): Probable right upper lobe mass, as described. This appears slightly larger in size and is likely neoplastic. Consider PET CT follow-up. There is new surrounding infiltrate that extends inferiorly into the right upper lobe. The new may be infectious or inflammatory. Pulmonary hemorrhage possible. Other areas of nodularity and groundglass opacity/nodularity are stable in appearance. · 3/5/2021-navigational bronchoscopy with biopsy was nondiagnostic  · 3/5/21 CT chest w/o [facility]): [place] soft tissue mass within the right upper lobe highly suspicious for neoplasm. There is associated postobstructive pneumonitis within the right upper lobe showing mild progression from the previous study of 2/23/2021. There is no associated hilar or mediastinal lymphadenopathy. Today's study is performed as part of a navigational bronchoscopy exam. Other scattered nodules including groundglass nodules are noted within the lungs all stable from the previous exam warranting follow-up. Heavy atheromatous calcification of the thoracic aorta and proximal great vessels. Coronary calcifications are present. · 3/23/21 PET scan [facility]): Markedly FDG avid right upper lobe mass in the right upper lobe measuring 7.2 cm triangular opacity with maximum SUV 12.18, highly concerning for malignancy. Adjacent groundglass opacities,  similar compared to 3/5/2021, which could represent additional  malignancy or infection/inflammation. Adjacent right upper lobe groundglass opacities are similar compared to 3/5/2021, which could represent additional malignancy or infection/inflammation.  Other non-FDG avid pulmonary findings as Decreased size of right upper lobe triangular opacity with surrounding probable posttreatment changes. Similar size left upper lobe 1 cm groundglass and solid pulmonary opacity/nodule compared to 3/23/2021. Right hilum with a 1.4 x 1.2 cm lymph node or conglomeration of lymph nodes, which appears similar to 11/30/2020. Diffusely heterogeneous bone mineralization, which is nonspecific. This could be seen with aggressive osteopenia or infiltrative process. · 10/01/21-I reviewed the results CT chest.  Interval treatment response. Continue adjuvant durvalumab. · 10/26/2021 Anemia Labs: Iron 33, TIBC 240, Iron Sat 14, Ferritin 72.6, Vit ,  · 11/4/2021 Blood Occult Studies 3/3 Negative  · 11/10/2021 MMA 0.19  · 11/30/2021 CT Chest w/ ContrastThe right upper lobe neoplasm is not as well-defined on the current study with increased consolidation within the adjacent lung parenchyma as well as adjacent consolidation with air bronchograms. I suspect this represents postradiation change with adjacent post radiation pneumonitis. It extends to the level of the upper pleura with associated thickening of the visceral and parietal pleura. I see no evidence of khai chest wall invasion. As noted on the previous examination there has been some extension of the neoplasm across the posterior aspect of the major fissure into the superior segment of the right lower lobe with this component of the neoplasm also demonstrating increased consolidation tracking along the lower margin of the major fissure. Stable foci of groundglass consolidation within the left lung. No new lesions are present. There are changes of centrilobular emphysema with hyperinflation of the lung parenchyma. 3. Heavy coronary artery calcifications are present. No new enlarged mediastinal or axillary adenopathy is demonstrated. .  · 11/30/2021 CT Abd/Pelvis w/ IV Contrast (oral)Changes of centrilobular emphysema within the lung bases.  Coronary calcifications are present. There is a trace pericardial effusion or pericardial thickening demonstrated. 2. No evidence of metastatic disease to the abdomen or pelvis. The adrenals are unremarkable. 3. There is a small 2 mm distal right ureteral stone just above the UVJ. This does not result in significant obstructive uropathy without evidence of asymmetric distention of the ureter or asymmetric enhancement of the right kidney in comparison with the left. No evidence of nephrolithiasis within the upper tracts of either kidney. The left ureter is decompressed and normal in appearance. 4. Mild constipation. There is no obstruction or free air. The appendix is identified and is normal in appearance. 5. Small fat-containing periumbilical hernia. Small bilateral  fat-containing inguinal hernias are also present. .   · 12/3/2021-I reviewed CT chest/abdomen/pelvis. Essentially no gross evidence of disease progression.   Continue durvalumab and repeat CT scan in 6 weeks    Past Medical History:  Past Medical History:   Diagnosis Date    Allergic rhinitis     Bronchitis, chronic (HCC)     Carotid artery stenosis     GERD (gastroesophageal reflux disease)     Heart attack ([address]     Hemorrhoids     Hypercholesterolemia     Type II or unspecified type diabetes mellitus without mention of complication, not stated as uncontrolled        Past Surgical History:    Past Surgical History:   Procedure Laterality Date    CARDIAC SURGERY      balloon surgery (angioplasty)    PORT SURGERY Right 04/01/2021       Social History:   Marital status: , 2 daughters  Smoking status: Former smoker for ~30 years, 1.5 ppd  ETOH status: No  Resides: [place], [place]    Family History:   Family History   Problem Relation Age of Onset    Diabetes Mother     High Blood Pressure Mother     Cancer Father         Lung     Current Hospital Medications:    Current Outpatient Medications   Medication Sig Dispense Refill    promethazine-dextromethorphan (PROMETHAZINE-DM) 6.25-15 MG/5ML syrup 1 teaspoon by mouth at bedtime as needed for severe cough 240 mL 0    ondansetron (ZOFRAN ODT) 4 MG disintegrating tablet Take 1 tablet by mouth every 6 hours as needed for Nausea or Vomiting 30 tablet 5    simvastatin (ZOCOR) 40 MG tablet TAKE 1 TABLET AT BEDTIME FOR CHOLESTEROL 90 tablet 3    omeprazole (PRILOSEC) 40 MG delayed release capsule TAKE 1 CAPSULE DAILY FOR STOMACH 90 capsule 3    propranolol (INDERAL) 60 MG tablet Take 1 tablet by mouth 2 times daily For blood pressure and tremor 180 tablet 3    HYDROcodone-acetaminophen (NORCO) 7.5-325 MG per tablet       metoprolol tartrate (LOPRESSOR) 50 MG tablet Take 50 mg by mouth daily      montelukast (SINGULAIR) 10 MG tablet       ondansetron (ZOFRAN) 8 MG tablet Take 1 tablet by mouth every 8 hours as needed for Nausea or Vomiting 30 tablet 3    lidocaine-prilocaine (EMLA) 2.5-2.5 % cream Apply topically to port area and cover with plastic wrap one hour prior to treatment. 1 Tube 1    furosemide (LASIX) 40 MG tablet TAKE 1 TABLET DAILY FOR FLUID 90 tablet 3    metFORMIN (GLUCOPHAGE) 1000 MG tablet TAKE 1 TABLET TWICE A DAY FOR DIABETES 180 tablet 3    blood glucose test strips (TRUE METRIX BLOOD GLUCOSE TEST) strip TEST BLOOD SUGAR ONCE DAILY *E11.9* 100 strip 5    isosorbide mononitrate (IMDUR) 60 MG extended release tablet Take 60 mg by mouth      losartan (COZAAR) 25 MG tablet Take 25 mg by mouth      umeclidinium-vilanterol (ANORO ELLIPTA) 62.5-25 MCG/INH AEPB inhaler Inhale 1 puff into the lungs daily      primidone (MYSOLINE) 50 MG tablet 1 tab po BID for tremor 180 tablet 3    triamcinolone (KENALOG) 0.1 % cream Apply topically 2 times daily. To lower leg dry skin 3 Tube 3    tamsulosin (FLOMAX) 0.4 MG capsule Take 1 capsule by mouth daily. 90 capsule 3    aspirin 325 MG tablet Take 325 mg by mouth daily. No current facility-administered medications for this visit. Facility-Administered Medications Ordered in Other Visits   Medication Dose Route Frequency Provider Last Rate Last Admin    0.9 % sodium chloride infusion  20 mL/hr IntraVENous Once Sahil Talavera MD        sodium chloride flush 0.9 % injection 5-40 mL  5-40 mL IntraVENous PRN Sahil Talavera MD   10 mL at 12/03/21 1509    heparin flush 100 UNIT/ML injection 500 Units  500 Units IntraCATHeter PRN Sahil Talavera MD   500 Units at 12/03/21 1509       Allergies: No Known Allergies      Subjective   REVIEW OF SYSTEMS:   CONSTITUTIONAL: no fever, no night sweats,  fatigue;  HEENT: no blurring of vision, no double vision, no hearing difficulty, no tinnitus, no ulceration, no dysplasia, no epistaxis;   LUNGS: cough, no hemoptysis, no wheeze,  no shortness of breath;  CARDIOVASCULAR: no palpitation, no chest pain, no shortness of breath;  GI: no abdominal pain, no nausea, no vomiting, no diarrhea, no constipation;  JESSIKA: no dysuria, no hematuria, no frequency or urgency, no nephrolithiasis;  MUSCULOSKELETAL: no joint pain, no swelling, no stiffness;  ENDOCRINE: no polyuria, no polydipsia, no cold or heat intolerance;  HEMATOLOGY: no easy bruising or bleeding, no history of clotting disorder;  DERMATOLOGY: no skin rash, no eczema, no pruritus;  PSYCHIATRY: no depression, no anxiety, no panic attacks, no suicidal ideation, no homicidal ideation;  NEUROLOGY: no syncope, no seizures, no numbness or tingling of hands, no numbness or tingling of feet, no paresis;        Objective   BP (!) 148/66   Pulse 78   Temp 98.8 °F (37.1 °C)   Resp 20   Ht 5' 10\" (1.778 m)   Wt 186 lb 6.4 oz (84.6 kg)   SpO2 96%   BMI 26.75 kg/m²     PHYSICAL EXAM:  CONSTITUTIONAL: Alert, appropriate, no acute distress  EYES: Non icteric, EOM intact, pupils equal round   ENT: Mucus membranes moist, no oral pharyngeal lesions, external inspection of ears and nose are normal.  NECK: Supple, no masses.   No palpable thyroid has been some extension of the neoplasm across the posterior aspect of the major fissure into the superior segment of the right lower lobe with this component of the neoplasm also demonstrating increased consolidation tracking along the lower margin of the major fissure. 2. Stable foci of groundglass consolidation within the left lung. No new lesions are present. There are changes of centrilobular emphysema with hyperinflation of the lung parenchyma. 3. Heavy coronary artery calcifications are present. No new enlarged  mediastinal or axillary adenopathy is demonstrated. .    11/30/2021 CT Abd/Pelvis w/ IV Contrast (oral)Changes of centrilobular emphysema within the lung bases. Coronary  calcifications are present. There is a trace pericardial effusion or  pericardial thickening demonstrated. 2. No evidence of metastatic disease to the abdomen or pelvis. The adrenals are unremarkable. 3. There is a small 2 mm distal right ureteral stone just above the  UVJ. This does not result in significant obstructive uropathy without  evidence of asymmetric distention of the ureter or asymmetric  enhancement of the right kidney in comparison with the left. No  evidence of nephrolithiasis within the upper tracts of either kidney. The left ureter is decompressed and normal in appearance. 4. Mild constipation. There is no obstruction or free air. The  appendix is identified and is normal in appearance. 5. Small fat-containing periumbilical hernia. Small bilateral  fat-containing inguinal hernias are also present. .         ASSESSMENT:    Orders Placed This Encounter   Procedures    CBC Auto Differential     Standing Status:   Future     Number of Occurrences:   1     Standing Expiration Date:   12/3/2022    Comprehensive Metabolic Panel     Standing Status:   Future     Number of Occurrences:   1     Standing Expiration Date:   12/3/2022    TSH without Reflex     Standing Status:   Future     Number of Occurrences:   1 Standing Expiration Date:   12/3/2022      Tiffany Son was seen today for cancer. Diagnoses and all orders for this visit:    Adenocarcinoma of right lung ([address]  -     CBC Auto Differential; Future  -     Comprehensive Metabolic Panel; Future    Fatigue due to treatment  -     TSH without Reflex; Future    Encounter for antineoplastic immunotherapy    Antineoplastic drugs causing adverse effect, sequela    Antineoplastic chemotherapy induced anemia       [id number], stge IIIA, NSCLC, adenocarcinoma subtype  Essentially stage III disease. The patient is not a surgical candidate. Recommend chemoradiation followed by immunotherapy. Status post completion of chemoradiation. Currently receiving adjuvant durvalumab. Reviewed CT chest that showed interval response. Continue durvalumab. Status post completion of chemoradiation. CT chest showed interval response   Recommended:  Durvalumab 1500 mg IV every 4 weeks x12 dose    Proceed cycle #6/12. Discussed treatment plan with the patient and daughter. Good tolerance to treatment except for mild fatigue. Treatment related side effects-occasional diarrhea, grade 1, fatigue    Diabetes mellitus type 2/hypertension-uncontrolled. Discussed with PCP office. Systolic heart failure-EF 45%  2D echo-LVEF 40-45%. Mild LVH, Mild [grade 1] diastolic dysfunction. Uncontrolled hypertension-controlled today  BP (!) 148/66   Pulse 78   Temp 98.8 °F (37.1 °C)   Resp 20   Ht 5' 10\" (1.778 m)   Wt 186 lb 6.4 oz (84.6 kg)   SpO2 96%   BMI 26.75 kg/m²       At risk thyroid disorder-  Lab Results   Component Value Date    TSH 1.790 12/03/2021     Physical deconditioning-slowly improving. Encouraged increased exercise. Normocytic anemia-likely multifactorial anemia to include anemia of inflammation/iron deficiency.   Cannot rule out GI bleed given history taking aspirin and NSAIDs toxicity  Hemoglobin 9.8/MCV 93  Ferritin 72, iron saturation 14%, iron 33, TIBC 240, vitamin B12 249  Occult blood stool x3-all 3 negative  Of note, patient has been on high-dose aspirin 325 mg p.o. daily. He was recently seen by cardiology and request to take daily aspirin only. 10/29/2021-ferritin 72, iron saturation 14%, TIBC 240, vitamin B12 249, methylmalonic acid normal 0.19.  12/3/2012-Hemoglobin 10.5. Improved    PLAN:  · RTC with MD 1/7/2021  · Repeat CT Chest mid January  · C# 6/12 monthly durvalumab today and every 4 weeks  · Monitor BP at home and record  · Continue Follow-up appointments with PCP for BP     I have seen, examined and reviewed this patient medication list, appropriate labs and imaging studies. I reviewed relevant medical records and others physicians notes. I discussed the plans of care with the patient. I answered all the questions to the patients satisfaction. I have also reviewed the chief complaint (CC) and part of the history (History of Present Illness (HPI), Past Family Social History [facility]), or Review of Systems (ROS) and made changes when appropriated.        (Please note that portions of this note were completed with a voice recognition program. Efforts were made to edit the dictations but occasionally words are mis-transcribed.)    Electronically signed by Cary Garcia MD on 12/3/2021 at 7:03 PM

## 2021-12-03 ENCOUNTER — HOSPITAL ENCOUNTER (OUTPATIENT)
Dept: INFUSION THERAPY | Age: 81
Discharge: HOME | End: 2021-12-03
Payer: MEDICARE

## 2021-12-03 ENCOUNTER — OFFICE VISIT (OUTPATIENT)
Dept: HEMATOLOGY | Age: 81
End: 2021-12-03
Payer: MEDICARE

## 2021-12-03 VITALS
SYSTOLIC BLOOD PRESSURE: 148 MMHG | TEMPERATURE: 98.8 F | RESPIRATION RATE: 20 BRPM | DIASTOLIC BLOOD PRESSURE: 66 MMHG | HEART RATE: 78 BPM | WEIGHT: 186.4 LBS | BODY MASS INDEX: 26.69 KG/M2 | HEIGHT: 70 IN | OXYGEN SATURATION: 96 %

## 2021-12-03 DIAGNOSIS — D64.81 ANTINEOPLASTIC CHEMOTHERAPY INDUCED ANEMIA: ICD-10-CM

## 2021-12-03 DIAGNOSIS — T45.1X5S ANTINEOPLASTIC DRUGS CAUSING ADVERSE EFFECT, SEQUELA: ICD-10-CM

## 2021-12-03 DIAGNOSIS — R53.83 FATIGUE DUE TO TREATMENT: Primary | ICD-10-CM

## 2021-12-03 DIAGNOSIS — Z51.12 ENCOUNTER FOR ANTINEOPLASTIC IMMUNOTHERAPY: ICD-10-CM

## 2021-12-03 DIAGNOSIS — R53.83 FATIGUE DUE TO TREATMENT: ICD-10-CM

## 2021-12-03 DIAGNOSIS — C34.91 ADENOCARCINOMA OF RIGHT LUNG (HCC): Primary | ICD-10-CM

## 2021-12-03 DIAGNOSIS — C34.91 ADENOCARCINOMA OF RIGHT LUNG (HCC): ICD-10-CM

## 2021-12-03 DIAGNOSIS — T45.1X5A ANTINEOPLASTIC CHEMOTHERAPY INDUCED ANEMIA: ICD-10-CM

## 2021-12-03 LAB
ALBUMIN SERPL-MCNC: 4.2 G/DL (ref 3.5–5.2)
ALP BLD-CCNC: 127 U/L (ref 40–130)
ALT SERPL-CCNC: 18 U/L (ref 21–72)
ANION GAP SERPL CALCULATED.3IONS-SCNC: 8 MMOL/L (ref 7–19)
AST SERPL-CCNC: 50 U/L (ref 17–59)
BASOPHILS ABSOLUTE: 0.05 K/UL (ref 0.01–0.08)
BASOPHILS RELATIVE PERCENT: 0.6 % (ref 0.1–1.2)
BILIRUB SERPL-MCNC: 0.6 MG/DL (ref 0.2–1.3)
BUN BLDV-MCNC: 21 MG/DL (ref 9–20)
CALCIUM SERPL-MCNC: 9.7 MG/DL (ref 8.4–10.2)
CHLORIDE BLD-SCNC: 100 MMOL/L (ref 98–111)
CO2: 29 MMOL/L (ref 22–29)
CREAT SERPL-MCNC: 1 MG/DL (ref 0.6–1.2)
EOSINOPHILS ABSOLUTE: 0.2 K/UL (ref 0.04–0.54)
EOSINOPHILS RELATIVE PERCENT: 2.6 % (ref 0.7–7)
GFR NON-AFRICAN AMERICAN: >60
GLOBULIN: 3.6 G/DL
GLUCOSE BLD-MCNC: 208 MG/DL (ref 74–106)
HCT VFR BLD CALC: 32.4 % (ref 40.1–51)
HEMOGLOBIN: 10.5 G/DL (ref 13.7–17.5)
LYMPHOCYTES ABSOLUTE: 1.29 K/UL (ref 1.18–3.74)
LYMPHOCYTES RELATIVE PERCENT: 16.5 % (ref 19.3–53.1)
MCH RBC QN AUTO: 30.8 PG (ref 25.7–32.2)
MCHC RBC AUTO-ENTMCNC: 32.4 G/DL (ref 32.3–36.5)
MCV RBC AUTO: 95 FL (ref 79–92.2)
MONOCYTES ABSOLUTE: 0.6 K/UL (ref 0.24–0.82)
MONOCYTES RELATIVE PERCENT: 7.7 % (ref 4.7–12.5)
NEUTROPHILS ABSOLUTE: 5.68 K/UL (ref 1.56–6.13)
NEUTROPHILS RELATIVE PERCENT: 72.6 % (ref 34–71.1)
PDW BLD-RTO: 16.7 % (ref 11.6–14.4)
PLATELET # BLD: 334 K/UL (ref 163–337)
PMV BLD AUTO: 8.9 FL (ref 7.4–10.4)
POTASSIUM SERPL-SCNC: 5 MMOL/L (ref 3.5–5.1)
RBC # BLD: 3.41 M/UL (ref 4.63–6.08)
SODIUM BLD-SCNC: 137 MMOL/L (ref 137–145)
TOTAL PROTEIN: 7.8 G/DL (ref 6.3–8.2)
TSH SERPL DL<=0.05 MIU/L-ACNC: 1.79 UIU/ML (ref 0.47–4.68)
WBC # BLD: 7.82 K/UL (ref 4.23–9.07)

## 2021-12-03 PROCEDURE — 84443 ASSAY THYROID STIM HORMONE: CPT

## 2021-12-03 PROCEDURE — 85025 COMPLETE CBC W/AUTO DIFF WBC: CPT

## 2021-12-03 PROCEDURE — 6360000002 HC RX W HCPCS: Performed by: INTERNAL MEDICINE

## 2021-12-03 PROCEDURE — 99214 OFFICE O/P EST MOD 30 MIN: CPT | Performed by: INTERNAL MEDICINE

## 2021-12-03 PROCEDURE — 2580000003 HC RX 258: Performed by: INTERNAL MEDICINE

## 2021-12-03 PROCEDURE — G8417 CALC BMI ABV UP PARAM F/U: HCPCS | Performed by: INTERNAL MEDICINE

## 2021-12-03 PROCEDURE — G8484 FLU IMMUNIZE NO ADMIN: HCPCS | Performed by: INTERNAL MEDICINE

## 2021-12-03 PROCEDURE — 1123F ACP DISCUSS/DSCN MKR DOCD: CPT | Performed by: INTERNAL MEDICINE

## 2021-12-03 PROCEDURE — 96413 CHEMO IV INFUSION 1 HR: CPT

## 2021-12-03 PROCEDURE — 36415 COLL VENOUS BLD VENIPUNCTURE: CPT

## 2021-12-03 PROCEDURE — 4040F PNEUMOC VAC/ADMIN/RCVD: CPT | Performed by: INTERNAL MEDICINE

## 2021-12-03 PROCEDURE — G8427 DOCREV CUR MEDS BY ELIG CLIN: HCPCS | Performed by: INTERNAL MEDICINE

## 2021-12-03 PROCEDURE — 80053 COMPREHEN METABOLIC PANEL: CPT

## 2021-12-03 PROCEDURE — 1036F TOBACCO NON-USER: CPT | Performed by: INTERNAL MEDICINE

## 2021-12-03 RX ORDER — METHYLPREDNISOLONE SODIUM SUCCINATE 125 MG/2ML
125 INJECTION, POWDER, LYOPHILIZED, FOR SOLUTION INTRAMUSCULAR; INTRAVENOUS ONCE
Status: CANCELLED | OUTPATIENT
Start: 2021-12-03 | End: 2021-12-03

## 2021-12-03 RX ORDER — EPINEPHRINE 1 MG/ML
0.3 INJECTION, SOLUTION, CONCENTRATE INTRAVENOUS PRN
Status: CANCELLED | OUTPATIENT
Start: 2021-12-03

## 2021-12-03 RX ORDER — HEPARIN SODIUM (PORCINE) LOCK FLUSH IV SOLN 100 UNIT/ML 100 UNIT/ML
500 SOLUTION INTRAVENOUS PRN
Status: DISCONTINUED | OUTPATIENT
Start: 2021-12-03 | End: 2021-12-04 | Stop reason: HOSPADM

## 2021-12-03 RX ORDER — SODIUM CHLORIDE 9 MG/ML
20 INJECTION, SOLUTION INTRAVENOUS ONCE
Status: CANCELLED | OUTPATIENT
Start: 2021-12-03 | End: 2021-12-03

## 2021-12-03 RX ORDER — SODIUM CHLORIDE 9 MG/ML
20 INJECTION, SOLUTION INTRAVENOUS ONCE
Status: DISCONTINUED | OUTPATIENT
Start: 2021-12-03 | End: 2021-12-05 | Stop reason: HOSPADM

## 2021-12-03 RX ORDER — DIPHENHYDRAMINE HYDROCHLORIDE 50 MG/ML
50 INJECTION INTRAMUSCULAR; INTRAVENOUS ONCE
Status: CANCELLED | OUTPATIENT
Start: 2021-12-03 | End: 2021-12-03

## 2021-12-03 RX ORDER — SODIUM CHLORIDE 9 MG/ML
INJECTION, SOLUTION INTRAVENOUS CONTINUOUS
Status: CANCELLED | OUTPATIENT
Start: 2021-12-03

## 2021-12-03 RX ORDER — SODIUM CHLORIDE 9 MG/ML
25 INJECTION, SOLUTION INTRAVENOUS PRN
Status: CANCELLED | OUTPATIENT
Start: 2021-12-03

## 2021-12-03 RX ORDER — HEPARIN SODIUM (PORCINE) LOCK FLUSH IV SOLN 100 UNIT/ML 100 UNIT/ML
500 SOLUTION INTRAVENOUS PRN
Status: CANCELLED | OUTPATIENT
Start: 2021-12-03

## 2021-12-03 RX ORDER — SODIUM CHLORIDE 0.9 % (FLUSH) 0.9 %
5-40 SYRINGE (ML) INJECTION PRN
Status: CANCELLED | OUTPATIENT
Start: 2021-12-03

## 2021-12-03 RX ORDER — SODIUM CHLORIDE 0.9 % (FLUSH) 0.9 %
5-40 SYRINGE (ML) INJECTION PRN
Status: DISCONTINUED | OUTPATIENT
Start: 2021-12-03 | End: 2021-12-04 | Stop reason: HOSPADM

## 2021-12-03 RX ADMIN — SODIUM CHLORIDE, PRESERVATIVE FREE 10 ML: 5 INJECTION INTRAVENOUS at 15:09

## 2021-12-03 RX ADMIN — HEPARIN 500 UNITS: 100 SYRINGE at 15:09

## 2021-12-03 RX ADMIN — SODIUM CHLORIDE 1500 MG: 9 INJECTION, SOLUTION INTRAVENOUS at 14:05

## 2021-12-10 ASSESSMENT — ENCOUNTER SYMPTOMS
RESPIRATORY NEGATIVE: 1
DIARRHEA: 0

## 2021-12-11 NOTE — ASSESSMENT & PLAN NOTE
LDL 96 in July 2021, will repeat in 7 months. Continue simvastatin 40 mg 1 tab daily. It is not causing any problems.

## 2021-12-11 NOTE — ASSESSMENT & PLAN NOTE
Continue omeprazole 40 mg 1 tablet once a day. GERD is well controlled. He does not need a refill at this time.

## 2021-12-11 NOTE — PROGRESS NOTES
Medicare Annual Wellness Visit  Name: Paul Hinson Date: 12/10/2021   MRN: [id number] Sex: Male   Age: 79 y.o. Ethnicity: Non- / Non    : 1940 Race: White (non-)      Daisy Mckeon is here for Medicare AWV    Screenings for behavioral, psychosocial and functional/safety risks, and cognitive dysfunction are all negative except as indicated below. These results, as well as other patient data from the [address] form, are documented in Flowsheets linked to this Encounter. No Known Allergies    Prior to Visit Medications    Medication Sig Taking? Authorizing Provider   ondansetron (ZOFRAN ODT) 4 MG disintegrating tablet Take 1 tablet by mouth every 6 hours as needed for Nausea or Vomiting Yes Andie Taylor MD   simvastatin (ZOCOR) 40 MG tablet TAKE 1 TABLET AT BEDTIME FOR CHOLESTEROL Yes Ketty Jauregui MD   omeprazole (PRILOSEC) 40 MG delayed release capsule TAKE 1 CAPSULE DAILY FOR STOMACH Yes Ketty Jauregui MD   propranolol (INDERAL) 60 MG tablet Take 1 tablet by mouth 2 times daily For blood pressure and tremor Yes Ketty aJuregui MD   HYDROcodone-acetaminophen (Orvel Meckel) 7.5-325 MG per tablet  Yes Historical Provider, MD   metoprolol tartrate (LOPRESSOR) 50 MG tablet Take 50 mg by mouth daily Yes Historical Provider, MD   montelukast (SINGULAIR) 10 MG tablet  Yes Historical Provider, MD   ondansetron (ZOFRAN) 8 MG tablet Take 1 tablet by mouth every 8 hours as needed for Nausea or Vomiting Yes Andie Taylor MD   lidocaine-prilocaine (EMLA) 2.5-2.5 % cream Apply topically to port area and cover with plastic wrap one hour prior to treatment.  Yes Andie Taylor MD   furosemide (LASIX) 40 MG tablet TAKE 1 TABLET DAILY FOR FLUID Yes Francia Rodriguez MD   metFORMIN (GLUCOPHAGE) 1000 MG tablet TAKE 1 TABLET TWICE A DAY FOR DIABETES Yes Ketty Jauregui MD   blood glucose test strips (TRUE METRIX BLOOD GLUCOSE TEST) strip TEST BLOOD SUGAR ONCE DAILY *E11.9* Yes Lizbeth Fernandes MD   isosorbide mononitrate (IMDUR) 60 MG extended release tablet Take 60 mg by mouth Yes Historical Provider, MD   losartan (COZAAR) 25 MG tablet Take 25 mg by mouth Yes Historical Provider, MD   umeclidinium-vilanterol (ANORO ELLIPTA) 62.5-25 MCG/INH AEPB inhaler Inhale 1 puff into the lungs daily Yes Historical Provider, MD   primidone (MYSOLINE) 50 MG tablet 1 tab po BID for tremor Yes Lizbeth Fernandes MD   triamcinolone (KENALOG) 0.1 % cream Apply topically 2 times daily. To lower leg dry skin Yes Lizbeth Fernandes MD   tamsulosin (FLOMAX) 0.4 MG capsule Take 1 capsule by mouth daily. Yes Lizbeth Fernandes MD   aspirin 325 MG tablet Take 325 mg by mouth daily.    Yes Historical Provider, MD   promethazine-dextromethorphan (PROMETHAZINE-DM) 6.25-15 MG/5ML syrup 1 teaspoon by mouth at bedtime as needed for severe cough  Lizbeth Fernandes MD   potassium chloride (K-DUR) 10 MEQ tablet TAKE 1 TABLET TWICE A DAY  Lizbeth Fernandes MD       Past Medical History:   Diagnosis Date    Allergic rhinitis     Bronchitis, chronic (HCC)     Carotid artery stenosis     GERD (gastroesophageal reflux disease)     Heart attack ([facility] [address]     Hemorrhoids     Hypercholesterolemia     Type II or unspecified type diabetes mellitus without mention of complication, not stated as uncontrolled        Past Surgical History:   Procedure Laterality Date    CARDIAC SURGERY      balloon surgery (angioplasty)    PORT SURGERY Right 04/01/2021       Family History   Problem Relation Age of Onset    Diabetes Mother     High Blood Pressure Mother     Cancer Father         Lung       CareTeam (Including outside providers/suppliers regularly involved in providing care):   Patient Care Team:  Lizbeth Fernandes MD as PCP - General (Family Medicine)  Lizbeth Fernandes MD as PCP - [facility] Empaneled Provider    Wt Readings from Last 3 Encounters:   12/03/21 186 lb 6.4 oz (84.6 kg)   11/30/21 186 lb (84.4 kg)   10/29/21 188 lb (85.3 kg)     Vitals:    11/30/21 1058   BP: 121/74   Pulse: 79   Resp: 18   Temp: 96.7 °F (35.9 °C)   SpO2: 98%   Weight: 186 lb (84.4 kg)   Height: 5' 10\" (1.778 m)     Body mass index is 26.69 kg/m². Based upon direct observation of the patient, evaluation of cognition reveals recent and remote memory intact. Patient's complete Health Risk Assessment and screening values have been reviewed and are found in Flowsheets. The following problems were reviewed today and where indicated follow up appointments were made and/or referrals ordered. Positive Risk Factor Screenings with Interventions:          General Health and ACP:  General  In general, how would you say your health is?: Fair  In the past 7 days, have you experienced any of the following? New or Increased Pain, New or Increased Fatigue, Loneliness, Social Isolation, Stress or Anger?: (!) New or Increased Fatigue, Social Isolation  Do you get the social and emotional support that you need?: Yes  Do you have a Living Will?: Yes  Advance Directives     Power of [address] [place] [address] Will ACP-Advance Directive ACP-Power of     Not on File Not on File Not on File Not on File      General Health Risk Interventions:  · Fatigue: He is dealing with lung cancer and has been going through chemotherapy. · Social isolation: He and his wife have been staying in since Covid. This has been very hard on both of them.     Health Habits/Nutrition:  Health Habits/Nutrition  Do you exercise for at least 20 minutes 2-3 times per week?: Yes  Have you lost any weight without trying in the past 3 months?: (!) Yes  Do you eat only one meal per day?: No  Have you seen the dentist within the past year?: Yes  Body mass index: (!) 26.69  Health Habits/Nutrition Interventions:  · Nutritional issues:  educational materials for healthy, well-balanced diet provided    Hearing/Vision:  No exam data present  Hearing/Vision  Do you or your family notice any trouble with your hearing that hasn't been managed with hearing aids?: (!) Yes  Do you have difficulty driving, watching TV, or doing any of your daily activities because of your eyesight?: No  Have you had an eye exam within the past year?: Yes  Hearing/Vision Interventions:  · Hearing concerns:  If hearing worsens, he will follow up with his audiologist.    Safety:  Safety  Do you have working smoke detectors?: Yes  Have all throw rugs been removed or fastened?: Yes  Do you have non-slip mats or surfaces in all bathtubs/showers?: Yes  Do all of your stairways have a railing or banister?: Yes  Are your doorways, halls and stairs free of clutter?: (!) No  Do you always fasten your seatbelt when you are in a car?: Yes  Safety Interventions:  · Home safety tips provided     Personalized Preventive Plan   Current Health Maintenance Status  Immunization History   Administered Date(s) Administered    COVID-19, Elizabeth Danielson, Primary or Immunocompromised, PF, 100mcg/0.5mL 02/16/2021, 03/16/2021    Influenza Vaccine, unspecified formulation 10/04/2018    Influenza Whole 10/08/2015    Influenza, High Dose (Fluzone 65 yrs and older) 08/07/2015, 10/01/2016, 11/03/2017, 09/10/2019    Influenza, Scot Spark, IM, (6 mo and older Fluzone, Flulaval, Fluarix and 3 yrs and older Afluria) 10/04/2018    Influenza, Quadv, adjuvanted, 65 yrs +, IM, PF (Fluad) 11/25/2020, 10/19/2021    Influenza, Triv, inactivated, subunit, adjuvanted, IM (Fluad 65 yrs and older) 10/16/2019    Pneumococcal Conjugate 13-valent ([id number]) 10/08/2015    Pneumococcal Polysaccharide ([id number]) 03/28/2017        Health Maintenance   Topic Date Due    DTaP/Tdap/Td vaccine (1 - Tdap) Never done    COVID-19 Vaccine (3 - Moderna risk 4-dose series) 04/13/2021    Annual Wellness Visit (AWV)  11/26/2021    Shingles Vaccine (1 of 2) 04/22/2022 (Originally 2/20/1990)    Lipid screen  05/25/2022    Potassium monitoring  12/03/2022    Creatinine monitoring  12/03/2022    Flu vaccine Completed    Pneumococcal 65+ years Vaccine  Completed    Hepatitis A vaccine  Aged Out    Hib vaccine  Aged Out    Meningococcal (ACWY) vaccine  Aged Out     Recommendations for [email or web address] Due: see orders and patient instructions/AVS.  . Recommended screening schedule for the next 5-10 years is provided to the patient in written form: see Patient Instructions/AVS.    There are no diagnoses linked to this encounter.

## 2021-12-11 NOTE — PROGRESS NOTES
SUBJECTIVE:    Leopoldo Sills is 79 y.o.male who comes in complaining of Medicare AWV   .       HPI: Mr. Sabillon comes in today for a Medicare annual wellness but he has other problems we follow including essential hypertension, GERD and type 2 diabetes. He also has a history of hypercholesterolemia. His biggest concern right now is his wife's health. She is having some memory problems. He also has received chemotherapy for adenocarcinoma of the right lung and is receiving infusions for iron deficiency anemia. He is on simvastatin 40 mg 1 tablet once a day. This is not causing any muscle pain. His heartburn and GERD is well controlled on omeprazole 40 mg 1 daily. He is on Metformin for his diabetes. He is not checking his sugars all the time. He denies any episodes of hypoglycemia. He has a little bit of numbness and tingling in his feet. No Known Allergies    Social History     Socioeconomic History    Marital status:      Spouse name: None    Number of children: None    Years of education: None    Highest education level: None   Occupational History    None   Tobacco Use    Smoking status: Former Smoker     Packs/day: 1.50     Years: 30.00     Pack years: 45.00     Types: Cigarettes     Quit date:      Years since quittin.9    Smokeless tobacco: Never Used   Substance and Sexual Activity    Alcohol use: No    Drug use: No    Sexual activity: None   Other Topics Concern    None   Social History Narrative    None     Social Determinants of Health     Financial Resource Strain:     Difficulty of Paying Living Expenses: Not on file   Food Insecurity:     Worried About Running Out of Food in the Last Year: Not on file    Sylvia of Food in the Last Year: Not on file   Transportation Needs:     Lack of Transportation (Medical): Not on file    Lack of Transportation (Non-Medical):  Not on file   Physical Activity:     Days of Exercise per Week: Not on file    Minutes of Exercise per Session: Not on file   Stress:     Feeling of Stress : Not on file   Social Connections:     Frequency of Communication with Friends and Family: Not on file    Frequency of Social Gatherings with Friends and Family: Not on file    Attends [employer] Services: Not on file    Active Member of Clubs or Organizations: Not on file    Attends Club or Organization Meetings: Not on file    Marital Status: Not on file   Intimate Partner Violence:     Fear of Current or Ex-Partner: Not on file    Emotionally Abused: Not on file    Physically Abused: Not on file    Sexually Abused: Not on file   Housing Stability:     Unable to Pay for Housing in the Last Year: Not on file    Number of [place] in the Last Year: Not on file    Unstable Housing in the Last Year: Not on file       Review of Systems   Constitutional: Negative for activity change and fatigue. Eyes: Negative for visual disturbance. Respiratory: Negative. Cardiovascular: Negative. Gastrointestinal: Negative for diarrhea. Endocrine: Negative for polydipsia, polyphagia and polyuria. Genitourinary: Negative for dysuria. Musculoskeletal: Positive for arthralgias. Neurological: Negative for weakness and numbness. Hematological: Bruises/bleeds easily. Psychiatric/Behavioral: Negative.           Current Outpatient Medications on File Prior to Visit   Medication Sig Dispense Refill    ondansetron (ZOFRAN ODT) 4 MG disintegrating tablet Take 1 tablet by mouth every 6 hours as needed for Nausea or Vomiting 30 tablet 5    simvastatin (ZOCOR) 40 MG tablet TAKE 1 TABLET AT BEDTIME FOR CHOLESTEROL 90 tablet 3    omeprazole (PRILOSEC) 40 MG delayed release capsule TAKE 1 CAPSULE DAILY FOR STOMACH 90 capsule 3    propranolol (INDERAL) 60 MG tablet Take 1 tablet by mouth 2 times daily For blood pressure and tremor 180 tablet 3    HYDROcodone-acetaminophen (NORCO) 7.5-325 MG per tablet       metoprolol tartrate (LOPRESSOR) 50 MG tablet Take 50 mg by mouth daily      montelukast (SINGULAIR) 10 MG tablet       ondansetron (ZOFRAN) 8 MG tablet Take 1 tablet by mouth every 8 hours as needed for Nausea or Vomiting 30 tablet 3    lidocaine-prilocaine (EMLA) 2.5-2.5 % cream Apply topically to port area and cover with plastic wrap one hour prior to treatment. 1 Tube 1    furosemide (LASIX) 40 MG tablet TAKE 1 TABLET DAILY FOR FLUID 90 tablet 3    metFORMIN (GLUCOPHAGE) 1000 MG tablet TAKE 1 TABLET TWICE A DAY FOR DIABETES 180 tablet 3    blood glucose test strips (TRUE METRIX BLOOD GLUCOSE TEST) strip TEST BLOOD SUGAR ONCE DAILY *E11.9* 100 strip 5    isosorbide mononitrate (IMDUR) 60 MG extended release tablet Take 60 mg by mouth      losartan (COZAAR) 25 MG tablet Take 25 mg by mouth      umeclidinium-vilanterol (ANORO ELLIPTA) 62.5-25 MCG/INH AEPB inhaler Inhale 1 puff into the lungs daily      primidone (MYSOLINE) 50 MG tablet 1 tab po BID for tremor 180 tablet 3    triamcinolone (KENALOG) 0.1 % cream Apply topically 2 times daily. To lower leg dry skin 3 Tube 3    tamsulosin (FLOMAX) 0.4 MG capsule Take 1 capsule by mouth daily. 90 capsule 3    aspirin 325 MG tablet Take 325 mg by mouth daily.  [DISCONTINUED] potassium chloride (K-DUR) 10 MEQ tablet TAKE 1 TABLET TWICE A  tablet 2     No current facility-administered medications on file prior to visit. OBJECTIVE:    Wt Readings from Last 3 Encounters:   12/03/21 186 lb 6.4 oz (84.6 kg)   11/30/21 186 lb (84.4 kg)   10/29/21 188 lb (85.3 kg)       /74   Pulse 79   Temp 96.7 °F (35.9 °C)   Resp 18   Ht 5' 10\" (1.778 m)   Wt 186 lb (84.4 kg)   SpO2 98%   BMI 26.69 kg/m²     Physical Exam  Vitals and nursing note reviewed. Constitutional:       General: He is not in acute distress. Appearance: Normal appearance. He is well-developed. He is not ill-appearing. HENT:      Head: Normocephalic.       Right Ear: Tympanic membrane, ear canal and external ear normal.      Left Ear: Tympanic membrane, ear canal and external ear normal.      Nose: Nose normal. No rhinorrhea. Mouth/Throat:      Mouth: Mucous membranes are moist.      Pharynx: No posterior oropharyngeal erythema. Eyes:      Extraocular Movements: Extraocular movements intact. Conjunctiva/sclera: Conjunctivae normal.      Pupils: Pupils are equal, round, and reactive to light. Cardiovascular:      Rate and Rhythm: Normal rate and regular rhythm. Pulses: Normal pulses. Heart sounds: Normal heart sounds. Pulmonary:      Effort: Pulmonary effort is normal.      Breath sounds: Normal breath sounds. Musculoskeletal:         General: Normal range of motion. Cervical back: Normal range of motion and neck supple. Comments: Trace edema in ankles. No acute inflammatory joint changes although he does have osteoarthritis. Lymphadenopathy:      Cervical: No cervical adenopathy. Skin:     General: Skin is warm and dry. Neurological:      General: No focal deficit present. Mental Status: He is alert and oriented to person, place, and time. Psychiatric:         Mood and Affect: Mood normal.         Behavior: Behavior normal.         Thought Content: Thought content normal.         Judgment: Judgment normal.         ASSESSMENT:    1. Routine general medical examination at a health care facility    2. Gastroesophageal reflux disease without esophagitis    3. Essential hypertension    4. Type 2 diabetes mellitus without complication, without long-term current use of insulin ([address]    5. Hypercholesterolemia          PLAN:  1. Routine general medical examination at a health care facility  2. Gastroesophageal reflux disease without esophagitis  Assessment & Plan:  Continue omeprazole 40 mg 1 tablet once a day. GERD is well controlled. He does not need a refill at this time.   3. Essential hypertension  Assessment & Plan:  Essential hypertension well-controlled. Continue Inderal 60 mg 1 tablet twice a day along with furosemide 40 mg 1 tablet once a day. He is also on losartan 25 mg 1 daily which probably impacts blood pressure although it is also to protect his kidney. 4. Type 2 diabetes mellitus without complication, without long-term current use of insulin ([facility] [place] [facility]  Assessment & Plan: We need to do a hemoglobin A1c. He will return but does not have to be fasting. It is of uncertain status until we get the results. Continue Metformin 1000 mg 1 tablet twice a day. Orders:  -     Hemoglobin A1C; Future  5. Hypercholesterolemia  Assessment & Plan:  LDL 96 in July 2021, will repeat in 7 months. Continue simvastatin 40 mg 1 tab daily. It is not causing any problems. He did not need a refill on any of his medications at the present time. He will be having lab work including a CBC and CMP done by the oncologist next week so we will await those results. He may very well have an element of IBS and a handout was given. I did not think he needed any further treatment for this at the present time. Follow-up:  Return in 6 months (on 5/30/2022) for recheck diabetes. PATIENT INSTRUCTIONS:  Patient Instructions       Patient Education        Irritable Bowel Syndrome: Care Instructions  Your Care Instructions  Irritable bowel syndrome, or IBS, is a problem with the intestines that causes belly pain, bloating, gas, constipation, and diarrhea. The cause of IBS is not well known. IBS can last for many years, but it does not get worse over time or lead to serious disease. Most people can control their symptoms by changing their diet and reducing stress. Follow-up care is a key part of your treatment and safety. Be sure to make and go to all appointments, and call your doctor if you are having problems. It's also a good idea to know your test results and keep a list of the medicines you take. How can you care for yourself at home?   · Prevent diarrhea:  ? Limit the amount of high-fiber foods you eat. This includes vegetables, fruits, whole-grain breads and pasta, high-fiber cereal, and brown rice. ? Limit dairy products. ? Limit artificial sweeteners such as sorbitol and xylitol. · Avoid constipation:  ? Include fruits, vegetables, beans, and whole grains in your diet each day. These foods are high in fiber. ? Drink plenty of fluids. If you have kidney, heart, or liver disease and have to limit fluids, talk with your doctor before you increase the amount of fluids you drink. ? Get some exercise every day. Build up slowly to 30 to 60 minutes a day on 5 or more days of the week. ? Take a fiber supplement, such as Citrucel or Metamucil, every day if needed. Read and follow all instructions on the label. ? Schedule time each day for a bowel movement. Having a daily routine may help. Take your time and do not strain when having a bowel movement. · To help relieve bloating or gas, avoid foods such as beans, cabbage, cauliflower, or broccoli. · Keep a daily diary of what you eat and what symptoms you have. This may help find foods that cause you problems. · Eat slowly. Try to make mealtime relaxing. · Find ways to reduce stress. When should you call for help? Call your doctor now or seek immediate medical care if:    · Your pain is different than usual or occurs with fever.     · You lose weight without trying, or you lose your appetite and you do not know why.     · Your symptoms often wake you from sleep.     · Your stools are black and tarlike or have streaks of blood. Watch closely for changes in your health, and be sure to contact your doctor if:    · Your IBS symptoms get worse or begin to disrupt your day-to-day life.     · You become more tired than usual.     · Your home treatment stops working. Where can you learn more? Go to [email or web address] org and sign in to your [employer] account.  Enter F855 in the [address] Information box to learn more about \"Irritable Bowel Syndrome: Care Instructions. \"     If you do not have an account, please click on the \"Sign Up Now\" link. Current as of: February 10, 2021               Content Version: 13.0  © 7865-9874 [employer]. Care instructions adapted under license by [facility] ([facility]). If you have questions about a medical condition or this instruction, always ask your healthcare professional. [place] [address] any warranty or liability for your use of this information. Patient Education        Diet for Irritable Bowel Syndrome: Care Instructions  Overview     Irritable bowel syndrome, or IBS, is a problem with the intestines. IBS can cause belly pain, bloating, gas, constipation, and diarrhea. Most people can control their symptoms by changing their diet and easing stress. No specific foods cause everyone with IBS to have symptoms. Many people find that they feel better by limiting or eliminating foods that may bring on symptoms. Make sure you don't stop eating all foods from any one food group without talking with a dietitian. You need to make sure you are still getting all the nutrients you need. Follow-up care is a key part of your treatment and safety. Be sure to make and go to all appointments, and call your doctor if you are having problems. It's also a good idea to know your test results and keep a list of the medicines you take. How can you care for yourself at home? To reduce constipation  · Check with your doctor about increasing the amount of fiber you eat. If your doctor recommends more fiber:  ? Slowly increase the amount of fiber you eat. For some people who have IBS, eating more fiber may make some symptoms worse. This includes bloating. Adding fiber slowly may help you avoid these problems. ? Include fruits, vegetables, beans, and whole grains in your diet each day. These foods are high in fiber.   ? Take a fiber supplement, such as and sign in to your [employer] account. Enter U493 in the [facility] box to learn more about \"Diet for Irritable Bowel Syndrome: Care Instructions. \"     If you do not have an account, please click on the \"Sign Up Now\" link. Current as of: December 17, 2020               Content Version: 13.0  © 3786-4552 [employer]. Care instructions adapted under license by [facility] ([facility]). If you have questions about a medical condition or this instruction, always ask your healthcare professional. [address] any warranty or liability for your use of this information. Personalized Preventive Plan for Sarah Gupta - 11/30/2021  Medicare offers a range of preventive health benefits. Some of the tests and screenings are paid in full while other may be subject to a deductible, co-insurance, and/or copay. Some of these benefits include a comprehensive review of your medical history including lifestyle, illnesses that may run in your family, and various assessments and screenings as appropriate. After reviewing your medical record and screening and assessments performed today your provider may have ordered immunizations, labs, imaging, and/or referrals for you. A list of these orders (if applicable) as well as your Preventive Care list are included within your After Visit Summary for your review. Other Preventive Recommendations:    · A preventive eye exam performed by an eye specialist is recommended every 1-2 years to screen for glaucoma; cataracts, macular degeneration, and other eye disorders. · A preventive dental visit is recommended every 6 months. · Try to get at least 150 minutes of exercise per week or 10,000 steps per day on a pedometer . · Order or download the FREE \"Exercise & Physical Activity: Your Everyday Guide\" from [employer]. Call 9-998.829.3600 or search [employer] online.   · You need 1347-1121 mg of bloodstream. But too much cholesterol can cause plaque to build up within your arteries, which can eventually lead to a heart attack or stroke. The two types of cholesterol that are most commonly referred to are:   Low-density lipoprotein (LDL) cholesterol  Also known as bad cholesterol, this is the cholesterol that tends to build up along your arteries. Bad cholesterol levels are increased by eating fats that are saturated or hydrogenated. Optimal level of this cholesterol is less than 100. Over 130 starts to get risky for heart disease. High-density lipoprotein (HDL) cholesterol  Also known as good cholesterol, this type of cholesterol actually carries cholesterol away from your arteries and may, therefore, help lower your risk of having a heart attack. You want this level to be high (ideally greater than 60). It is a risk to have a level less than 40. You can raise this good cholesterol by eating olive oil, canola oil, avocados, or nuts. Exercise raises this level, too. Fat    Fat is calorie dense and packs a lot of calories into a small amount of food. Even though fats should be limited due to their high calorie content, not all fats are bad. In fact, some fats are quite healthful. Fat can be broken down into four main types.    The good-for-you fats are:   Monounsaturated fat  found in oils such as olive and canola, avocados, and nuts and natural nut butters; can decrease cholesterol levels, while keeping levels of HDL cholesterol high   Polyunsaturated fat  found in oils such as safflower, sunflower, soybean, corn, and sesame; can decrease total cholesterol and LDL cholesterol   Omega-3 fatty acids  particularly those found in fatty fish (such as salmon, trout, tuna, mackerel, herring, and sardines); can decrease risk of arrhythmias, decrease triglyceride levels, and slightly lower blood pressure   The fats that you want to limit are:   Saturated fat  found in animal products, many fast foods, and a few vegetables; increases total blood cholesterol, including LDL levels   Animal fats that are saturated include: butter, lard, whole-milk dairy products, meat fat, and poultry skin   Vegetable fats that are saturated include: hydrogenated shortening, palm oil, coconut oil, cocoa butter   Hydrogenated or trans fat  found in margarine and vegetable shortening, most shelf stable snack foods, and fried foods; increases LDL and decreases HDL     It is generally recommended that you limit your total fat for the day to less than 30% of your total calories. If you follow an 1800-calorie heart healthy diet, for example, this would mean 60 grams of fat or less per day. Saturated fat and trans fat in your diet raises your blood cholesterol the most, much more than dietary cholesterol does. For this reason, on a heart-healthy diet, less than 7% of your calories should come from saturated fat and ideally 0% from trans fat. On an 1800-calorie diet, this translates into less than 14 grams of saturated fat per day, leaving 46 grams of fat to come from mono- and polyunsaturated fats.    Food Choices on a Heart Healthy Diet   Food Category   Foods Recommended   Foods to Avoid   Grains   Breads and rolls without salted tops Most dry and cooked cereals Unsalted crackers and breadsticks Low-sodium or homemade breadcrumbs or stuffing All rice and pastas   Breads, rolls, and crackers with salted tops High-fat baked goods (eg, muffins, donuts, pastries) Quick breads, self-rising flour, and biscuit mixes Regular bread crumbs Instant hot cereals Commercially prepared rice, pasta, or stuffing mixes   Vegetables   Most fresh, frozen, and low-sodium canned vegetables Low-sodium and salt-free vegetable juices Canned vegetables if unsalted or rinsed   Regular canned vegetables and juices, including sauerkraut and pickled vegetables Frozen vegetables with sauces Commercially prepared potato and vegetable mixes   Fruits   Most fresh, frozen, and canned fruits All fruit juices   Fruits processed with salt or sodium   Milk   Nonfat or low-fat (1%) milk Nonfat or low-fat yogurt Cottage cheese, low-fat ricotta, cheeses labeled as low-fat and low-sodium   Whole milk Reduced-fat (2%) milk Malted and chocolate milk Full fat yogurt Most cheeses (unless low-fat and low salt) Buttermilk (no more than 1 cup per week)   Meats and Beans   Lean cuts of fresh or frozen beef, veal, lamb, or pork (look for the word loin) Fresh or frozen poultry without the skin Fresh or frozen fish and some shellfish Egg whites and egg substitutes (Limit whole eggs to three per week) Tofu Nuts or seeds (unsalted, dry-roasted), low-sodium peanut butter Dried peas, beans, and lentils   Any smoked, cured, salted, or canned meat, fish, or poultry (including zamora, chipped beef, cold cuts, hot dogs, sausages, sardines, and anchovies) Poultry skins Breaded and/or fried fish or meats Canned peas, beans, and lentils Salted nuts   Fats and Oils   Olive oil and canola oil Low-sodium, low-fat salad dressings and mayonnaise   Butter, margarine, coconut and palm oils, zamora fat   Snacks, Sweets, and Condiments   Low-sodium or unsalted versions of broths, soups, soy sauce, and condiments Pepper, herbs, and spices; vinegar, lemon, or lime juice Low-fat frozen desserts (yogurt, sherbet, fruit bars) Sugar, cocoa powder, honey, syrup, jam, and preserves Low-fat, trans-fat free cookies, cakes, and pies Mahendra and animal crackers, fig bars, vargas snaps   High-fat desserts Broth, soups, gravies, and sauces, made from instant mixes or other high-sodium ingredients Salted snack foods Canned olives Meat tenderizers, seasoning salt, and most flavored vinegars   Beverages   Low-sodium carbonated beverages Tea and coffee in moderation Soy milk   Commercially softened water   Suggestions   Make whole grains, fruits, and vegetables the base of your diet.     Choose heart-healthy fats such as canola, olive, and flaxseed oil, and foods high in heart-healthy fats, such as nuts, seeds, soybeans, tofu, and fish. Eat fish at least twice per week; the fish highest in omega-3 fatty acids and lowest in mercury include salmon, herring, mackerel, sardines, and canned chunk light tuna. If you eat fish less than twice per week or have high triglycerides, talk to your doctor about taking fish oil supplements. Read food labels. For products low in fat and cholesterol, look for fat free, low-fat, cholesterol free, saturated fat free, and trans fat freeAlso scan the Nutrition Facts Label, which lists saturated fat, trans fat, and cholesterol amounts. For products low in sodium, look for sodium free, very low sodium, low sodium, no added salt, and unsalted   Skip the salt when cooking or at the table; if food needs more flavor, get creative and try out different herbs and spices. Garlic and onion also add substantial flavor to foods. Trim any visible fat off meat and poultry before cooking, and drain the fat off after santa. Use cooking methods that require little or no added fat, such as grilling, boiling, baking, poaching, broiling, roasting, steaming, stir-frying, and sauting. Avoid fast food and convenience food. They tend to be high in saturated and trans fat and have a lot of added salt. Talk to a registered dietitian for individualized diet advice. Last Reviewed: March 2011 Ayala Suarez MS, MPH, RD   Updated: 3/29/2011   ·     Keeping Home a [facility]       As we get older, changes in balance, gait, strength, vision, hearing, and cognition make even the most youthful senior more prone to accidents. Falls are one of the leading health risks for older people.  This increased risk of falling is related to:   Aging process (eg, decreased muscle strength, slowed reflexes)   Higher incidence of chronic health problems (eg, arthritis, diabetes) that may limit mobility, agility or sensory awareness   Side effects of medicine (eg, dizziness, blurred vision)especially medicines like prescription pain medicines and drugs used to treat mental health conditions   Depending on the brittleness of your bones, the consequences of a fall can be serious and long lasting. Home Life   Research by the Association of Aging [facility]) shows that some home accidents among older adults can be prevented by making simple lifestyle changes and basic modifications and repairs to the home environment. Here are some lifestyle changes that experts recommend:   Have your hearing and vision checked regularly. Be sure to wear prescription glasses that are right for you. Speak to your doctor or pharmacist about the possible side effects of your medicines. A number of medicines can cause dizziness. If you have problems with sleep, talk to your doctor. Limit your intake of alcohol. If necessary, use a cane or walker to help maintain your balance. Wear supportive, rubber-soled shoes, even at home. If you live in a region that gets wintry weather, you may want to put special cleats on your shoes to prevent you from slipping on the snow and ice. Exercise regularly to help maintain muscle tone, agility, and balance. Always hold the banister when going up or down stairs. Also, use  bars when getting in or out of the bath or shower, or using the toilet. To avoid dizziness, get up slowly from a lying down position. Sit up first, dangling your legs for a minute or two before rising to a standing position. Overall Home Safety Check   According to the [employer]'s \"Older Consumer Home Safety Checklist,\" it is important to check for potential hazards in each room. And remember, proper lighting is an essential factor in home safety. If you cannot see clearly, you are more likely to fall.    Important questions to ask yourself include:   Are lamp, electric, extension, and telephone cords placed out of the flow of traffic and maintained in good condition? Have frayed cords been replaced? Are all small rugs and runners slip resistant? If not, you can secure them to the floor with a special double-sided carpet tape. Are smoke detectors properly locatedone on every floor of your home and one outside of every sleeping area? Are they in good working order? Are batteries replaced at least once a year? Do you have a well-maintained carbon monoxide detector outside every sleeping are in your home? Does your furniture layout leave plenty of space to maneuver between and around chairs, tables, beds, and sofas? Are hallways, stairs and passages between rooms well lit? Can you reach a lamp without getting out of bed? Are floor surfaces well maintained? Shag rugs, high-pile carpeting, tile floors, and polished wood floors can be particularly slippery. Stairs should always have handrails and be carpeted or fitted with a non-skid tread. Is your telephone easily reachable. Is the cord safely tucked away? Room by Room   According to the [employer], bathrooms and mary are the two most potentially hazardous rooms in your home. In the Kitchen    Be sure your stove is in proper working order and always make sure burners and the oven are off before you go out or go to sleep. Keep pots on the back burners, turn handles away from the front of the stove, and keep stove clean and free of grease build-up. Kitchen ventilation systems and range exhausts should be working properly. Keep flammable objects such as towels and pot holders away from the cooking area except when in use. Make sure kitchen curtains are tied back. Move cords and appliances away from the sink and hot surfaces. If extension cords are needed, install wiring guides so they do not hang over the sink, range, or working areas. Look for coffee pots, kettles and toaster ovens with automatic shut-offs.     Keep a mop handy in the kitchen so you can wipe up spills instantly. You should also have a small fire extinguisher. Arrange your kitchen with frequently used items on lower shelves to avoid the need to stand on a stepstool to reach them. Make sure countertops are well-lit to avoid injuries while cutting and preparing food. In the Bathroom    Use a non-slip mat or decals in the tub and shower, since wet, soapy tile or porcelain surfaces are extremely slippery. Make sure bathroom rugs are non-skid or tape them firmly to the floor. Bathtubs should have at least one, preferably two, grab bars, firmly attached to structural supports in the wall. (Do not use built-in soap holders or glass shower doors as grab bars.)    Tub seats fitted with non-slip material on the legs allow you to wash sitting down. For people with limited mobility, bathtub transfer benches allow you to slide safely into the tub. Raised toilet seats and toilet safety rails are helpful for those with knee or hip problems. In the [facility]    Make sure you use a nightlight and that the area around your bed is clear of potential obstacles. Be careful with electric blankets and never go to sleep with a heating pad, which can cause serious burns even if on a low setting. Use fire-resistant mattress covers and pillows, and NEVER smoke in bed. Keep a phone next to the bed that is programmed to dial 911 at the push of a button. If you have a chronic condition, you may want to sign on with an automatic call-in service. Typically the system includes a small pendant that connects directly to an emergency medical voice-response system. You should also make arrangements to stay in contact with someonefriend, neighbor, family memberon a regular schedule. Fire Prevention   According to the [employer] ([employer]) [address] [place] [address], senior citizens are one of the two highest risk groups for death and serious injuries due to residential fires.    When cooking, wear

## 2021-12-11 NOTE — ASSESSMENT & PLAN NOTE
We need to do a hemoglobin A1c. He will return but does not have to be fasting. It is of uncertain status until we get the results. Continue Metformin 1000 mg 1 tablet twice a day.

## 2021-12-12 NOTE — ASSESSMENT & PLAN NOTE
Essential hypertension well-controlled. Continue Inderal 60 mg 1 tablet twice a day along with furosemide 40 mg 1 tablet once a day. He is also on losartan 25 mg 1 daily which probably impacts blood pressure although it is also to protect his kidney.

## 2022-01-03 DIAGNOSIS — E11.9 TYPE 2 DIABETES MELLITUS WITHOUT COMPLICATION, WITHOUT LONG-TERM CURRENT USE OF INSULIN (HCC): ICD-10-CM

## 2022-01-03 DIAGNOSIS — C34.91 ADENOCARCINOMA OF RIGHT LUNG (HCC): ICD-10-CM

## 2022-01-03 LAB
BASOPHILS ABSOLUTE: 0.1 K/UL (ref 0–0.2)
BASOPHILS RELATIVE PERCENT: 1.3 % (ref 0–1)
EOSINOPHILS ABSOLUTE: 0.3 K/UL (ref 0–0.6)
EOSINOPHILS RELATIVE PERCENT: 4.4 % (ref 0–5)
HBA1C MFR BLD: 6.9 % (ref 4–6)
HCT VFR BLD CALC: 38.9 % (ref 42–52)
HEMOGLOBIN: 12 G/DL (ref 14–18)
IMMATURE GRANULOCYTES #: 0 K/UL
LYMPHOCYTES ABSOLUTE: 1.7 K/UL (ref 1.1–4.5)
LYMPHOCYTES RELATIVE PERCENT: 21.5 % (ref 20–40)
MCH RBC QN AUTO: 30.3 PG (ref 27–31)
MCHC RBC AUTO-ENTMCNC: 30.8 G/DL (ref 33–37)
MCV RBC AUTO: 98.2 FL (ref 80–94)
MONOCYTES ABSOLUTE: 0.5 K/UL (ref 0–0.9)
MONOCYTES RELATIVE PERCENT: 6.6 % (ref 0–10)
NEUTROPHILS ABSOLUTE: 5.1 K/UL (ref 1.5–7.5)
NEUTROPHILS RELATIVE PERCENT: 65.9 % (ref 50–65)
PDW BLD-RTO: 16.9 % (ref 11.5–14.5)
PLATELET # BLD: 301 K/UL (ref 130–400)
PMV BLD AUTO: 10.1 FL (ref 9.4–12.4)
RBC # BLD: 3.96 M/UL (ref 4.7–6.1)
WBC # BLD: 7.7 K/UL (ref 4.8–10.8)

## 2022-01-07 ENCOUNTER — HOSPITAL ENCOUNTER (OUTPATIENT)
Dept: INFUSION THERAPY | Age: 82
End: 2022-01-07

## 2022-01-12 DIAGNOSIS — I10 ESSENTIAL HYPERTENSION: ICD-10-CM

## 2022-01-12 RX ORDER — LOSARTAN POTASSIUM 25 MG/1
TABLET ORAL
Qty: 90 TABLET | Refills: 3 | Status: SHIPPED | OUTPATIENT
Start: 2022-01-12 | End: 2023-01-09

## 2022-01-14 ENCOUNTER — HOSPITAL ENCOUNTER (OUTPATIENT)
Dept: INFUSION THERAPY | Age: 82
Discharge: HOME | End: 2022-01-14
Payer: MEDICARE

## 2022-01-14 VITALS
HEART RATE: 83 BPM | HEIGHT: 70 IN | WEIGHT: 186.4 LBS | TEMPERATURE: 98 F | SYSTOLIC BLOOD PRESSURE: 167 MMHG | DIASTOLIC BLOOD PRESSURE: 75 MMHG | BODY MASS INDEX: 26.69 KG/M2 | RESPIRATION RATE: 18 BRPM | OXYGEN SATURATION: 98 %

## 2022-01-14 DIAGNOSIS — R53.83 FATIGUE DUE TO TREATMENT: Primary | ICD-10-CM

## 2022-01-14 DIAGNOSIS — C34.91 ADENOCARCINOMA OF RIGHT LUNG (HCC): ICD-10-CM

## 2022-01-14 DIAGNOSIS — C34.91 ADENOCARCINOMA OF RIGHT LUNG (HCC): Primary | ICD-10-CM

## 2022-01-14 DIAGNOSIS — R53.83 FATIGUE DUE TO TREATMENT: ICD-10-CM

## 2022-01-14 LAB
ALBUMIN SERPL-MCNC: 4.5 G/DL (ref 3.5–5.2)
ALP BLD-CCNC: 82 U/L (ref 40–130)
ALT SERPL-CCNC: 17 U/L (ref 21–72)
ANION GAP SERPL CALCULATED.3IONS-SCNC: 11 MMOL/L (ref 7–19)
AST SERPL-CCNC: 29 U/L (ref 17–59)
BASOPHILS ABSOLUTE: 0.05 K/UL (ref 0.01–0.08)
BASOPHILS RELATIVE PERCENT: 0.7 % (ref 0.1–1.2)
BILIRUB SERPL-MCNC: 0.4 MG/DL (ref 0.2–1.3)
BUN BLDV-MCNC: 23 MG/DL (ref 9–20)
CALCIUM SERPL-MCNC: 9 MG/DL (ref 8.4–10.2)
CHLORIDE BLD-SCNC: 102 MMOL/L (ref 98–111)
CO2: 27 MMOL/L (ref 22–29)
CREAT SERPL-MCNC: 1.2 MG/DL (ref 0.6–1.2)
EOSINOPHILS ABSOLUTE: 0.33 K/UL (ref 0.04–0.54)
EOSINOPHILS RELATIVE PERCENT: 4.7 % (ref 0.7–7)
GFR NON-AFRICAN AMERICAN: 58
GLOBULIN: 3.4 G/DL
GLUCOSE BLD-MCNC: 159 MG/DL (ref 74–106)
HCT VFR BLD CALC: 33.6 % (ref 40.1–51)
HEMOGLOBIN: 11.1 G/DL (ref 13.7–17.5)
LYMPHOCYTES ABSOLUTE: 1.63 K/UL (ref 1.18–3.74)
LYMPHOCYTES RELATIVE PERCENT: 23.1 % (ref 19.3–53.1)
MCH RBC QN AUTO: 31.4 PG (ref 25.7–32.2)
MCHC RBC AUTO-ENTMCNC: 33 G/DL (ref 32.3–36.5)
MCV RBC AUTO: 95.2 FL (ref 79–92.2)
MONOCYTES ABSOLUTE: 0.62 K/UL (ref 0.24–0.82)
MONOCYTES RELATIVE PERCENT: 8.8 % (ref 4.7–12.5)
NEUTROPHILS ABSOLUTE: 4.42 K/UL (ref 1.56–6.13)
NEUTROPHILS RELATIVE PERCENT: 62.7 % (ref 34–71.1)
PDW BLD-RTO: 16.3 % (ref 11.6–14.4)
PLATELET # BLD: 264 K/UL (ref 163–337)
PMV BLD AUTO: 9.9 FL (ref 7.4–10.4)
POTASSIUM SERPL-SCNC: 4.3 MMOL/L (ref 3.5–5.1)
RBC # BLD: 3.53 M/UL (ref 4.63–6.08)
SODIUM BLD-SCNC: 140 MMOL/L (ref 137–145)
TOTAL PROTEIN: 7.9 G/DL (ref 6.3–8.2)
TSH SERPL DL<=0.05 MIU/L-ACNC: 2.7 UIU/ML (ref 0.27–4.2)
WBC # BLD: 7.05 K/UL (ref 4.23–9.07)

## 2022-01-14 PROCEDURE — 6360000002 HC RX W HCPCS: Performed by: INTERNAL MEDICINE

## 2022-01-14 PROCEDURE — 80053 COMPREHEN METABOLIC PANEL: CPT

## 2022-01-14 PROCEDURE — 36415 COLL VENOUS BLD VENIPUNCTURE: CPT

## 2022-01-14 PROCEDURE — 85025 COMPLETE CBC W/AUTO DIFF WBC: CPT

## 2022-01-14 PROCEDURE — 2580000003 HC RX 258: Performed by: INTERNAL MEDICINE

## 2022-01-14 PROCEDURE — 96413 CHEMO IV INFUSION 1 HR: CPT

## 2022-01-14 RX ORDER — SODIUM CHLORIDE 0.9 % (FLUSH) 0.9 %
5-40 SYRINGE (ML) INJECTION PRN
Status: DISCONTINUED | OUTPATIENT
Start: 2022-01-14 | End: 2022-01-15 | Stop reason: HOSPADM

## 2022-01-14 RX ORDER — HEPARIN SODIUM (PORCINE) LOCK FLUSH IV SOLN 100 UNIT/ML 100 UNIT/ML
500 SOLUTION INTRAVENOUS PRN
Status: DISCONTINUED | OUTPATIENT
Start: 2022-01-14 | End: 2022-01-15 | Stop reason: HOSPADM

## 2022-01-14 RX ADMIN — HEPARIN 500 UNITS: 100 SYRINGE at 13:20

## 2022-01-14 RX ADMIN — SODIUM CHLORIDE, PRESERVATIVE FREE 10 ML: 5 INJECTION INTRAVENOUS at 13:20

## 2022-01-14 RX ADMIN — SODIUM CHLORIDE 1500 MG: 9 INJECTION, SOLUTION INTRAVENOUS at 12:17

## 2022-01-26 ENCOUNTER — TELEPHONE (OUTPATIENT)
Dept: RADIATION ONCOLOGY | Facility: HOSPITAL | Age: 82
End: 2022-01-26

## 2022-01-26 ENCOUNTER — HOSPITAL ENCOUNTER (OUTPATIENT)
Dept: RADIATION ONCOLOGY | Facility: HOSPITAL | Age: 82
Setting detail: RADIATION/ONCOLOGY SERIES
End: 2022-01-26

## 2022-02-03 ENCOUNTER — TELEPHONE (OUTPATIENT)
Dept: HEMATOLOGY | Age: 82
End: 2022-02-03

## 2022-02-03 NOTE — TELEPHONE ENCOUNTER
Call to pt at [phone] and spoke to Mrs Sullivanl Gonzalez to inform her that the office would be closed on 2/4 and that Mr Bebeto's treatment and Dr. Brannon Minor appts have been r/s to [facility] 2/11 at 130. Mrs Winchesterquecruz Gonzalez voiced understanding.

## 2022-02-03 NOTE — PROGRESS NOTES
MEDICAL ONCOLOGY PROGRESS NOTE    Pt Name: Freddy Aquino  MRN: [id number]  YOB: 1940  Date of evaluation: 2/11/2022    HISTORY OF PRESENT ILLNESS:    Reason for MD visit-disease management/toxicity assessment  The patient is a very pleasant 79years old male who has been diagnosed with stage IIIa non-small cell lung cancer, adenocarcinoma subtype. He is a status post completion of chemoradiation in June 2021. He tolerated chemoradiation treatment poorly with complaints of severe fatigue/deconditioning and decreased mobility. The patient is accompanied by his sister today. He is currently receiving adjuvant immunotherapy with durvalumab. He had CT scans to assess disease response. Diagnosis  · RUL adenocarcinoma, NSCLC, Dec 2020  · [id number], stage IIIA  · Systolic heart failure  · Hypertension, controlled  · Not a surgical candidate    Treatment Summary  · 4/21/2021-5/26/21 completed concurrent chemoradiation with carboplatin/Taxol  · 4/22/21-6/3/21- 6600 cGy radiation therapy completed  · 7/8/21- initiate maintenance Durvalumab 1500mg every 4 weeks x12 cycles    Hematology/Cancer History:  Margaret Roe was first seen by me on 4/7/2021 referred by Dr. Percy Rendon, pulmonary [facility] for findings of non-small cell lung cancer, adenocarcinoma type. He has several comorbidities including history of type 2 diabetes, hypertension COPD. History of smoking in the past.  Quit many years ago. History of coronary artery disease followed by cardiology biopsy. Last EF 70% in 2018. He was seen by his primary care provider in November 2020. He has complaint of chest pain. Chest x-ray showed a 4 cm mass in the right upper lung. · 12/1/20 CT chest [facility]): Large right upper lobe mass concerning for primary neoplasm. Enlarged right hilar lymph node concerning for metastatic disease. Additional noncalcified pulmonary nodules bilaterally for which follow-up CT is recommended in 3-6 months. Atherosclerosis of the aorta and coronary arteries. · 12/16/2020-bronchoscopy with bronchoalveolar lavage was nondiagnostic  · 2/23/21 CT cheat w/o [facility]): Probable right upper lobe mass, as described. This appears slightly larger in size and is likely neoplastic. Consider PET CT follow-up. There is new surrounding infiltrate that extends inferiorly into the right upper lobe. The new may be infectious or inflammatory. Pulmonary hemorrhage possible. Other areas of nodularity and groundglass opacity/nodularity are stable in appearance. · 3/5/2021-navigational bronchoscopy with biopsy was nondiagnostic  · 3/5/21 CT chest w/o [facility]): [place] soft tissue mass within the right upper lobe highly suspicious for neoplasm. There is associated postobstructive pneumonitis within the right upper lobe showing mild progression from the previous study of 2/23/2021. There is no associated hilar or mediastinal lymphadenopathy. Today's study is performed as part of a navigational bronchoscopy exam. Other scattered nodules including groundglass nodules are noted within the lungs all stable from the previous exam warranting follow-up. Heavy atheromatous calcification of the thoracic aorta and proximal great vessels. Coronary calcifications are present. · 3/23/21 PET scan [facility]): Markedly FDG avid right upper lobe mass in the right upper lobe measuring 7.2 cm triangular opacity with maximum SUV 12.18, highly concerning for malignancy. Adjacent groundglass opacities,  similar compared to 3/5/2021, which could represent additional  malignancy or infection/inflammation. Adjacent right upper lobe groundglass opacities are similar compared to 3/5/2021, which could represent additional malignancy or infection/inflammation. Other non-FDG avid pulmonary findings as above. No evidence of jasmin or distant metastatic disease. · 3/29/21 Navigational bronchoscopy-Dr. Jace Sepulveda: Bronchoalveolar lavage RUL consistent with adenocarcinoma.  RUL transbronchial bipsy consistent with adenocarcinoma. FNA biopsy of several jasmin station negative for metastatic disease. · 4/7/2021-he was first seen by me. · 4/15/21 2D echo: Normal left ventricular size with reduced global systolic function EF 12-66%. Mild concentric left ventricular hypertrophy with mild [grade 1] diastolic dysfunction. Normal left atrial size. Mild thickening of a poorly visualized aortic valve without evidence of stenosis or insufficiency. Mild thickening of a normally mobile mitral valve with mild regurgitation. Nonvisualization pulmonic valve. Poorly visualized but apparently normal size right-sided chambers with preserved right ventricular systolic function. No tricuspid regurgitation with which to estimate RVSP. No significant pericardial effusion. Aortic root and ascending segments measured within normal limits. · 4/17/21 MRI brain: No acute intracranial process. No metastatic disease identified in the CNS. · 4/21/2021-initiation of carboplatin/Taxol weekly. Reviewed MRI brain 2D echo. · 4/21/2021-5/26/21 completed concurrent chemoradiation with carboplatin/taxol to be followed by immunotherapy  · 4/22/21-6/3/21 6600 cGy radiation therapy completed  · 6/30/21-CT CHEST W CONTRAST A large spiculated mass in the right upper lobe posterior segment represent a neoplastic process. The groundglass opacity/infiltrate in the right upper lobe and superior segments of the lower lobes bilaterally may represent an inflammatory or neoplastic/metastatic process. A single enlarged abnormal lymph node in the right hilum may represent a metastatic node. · 07/08/21-reviewed CT chest with perform radiologist.  · 7/8/21- initiate maintenance Durvalumab 1500mg every 4 weeks x12 cycles  · 9/24/2021 CT Chest w/ Contrast([facility]) Decreased size of right upper lobe triangular opacity with surrounding probable posttreatment changes.   Similar size left upper lobe 1 cm groundglass and solid pulmonary opacity/nodule compared to 3/23/2021. Right hilum with a 1.4 x 1.2 cm lymph node or conglomeration of lymph nodes, which appears similar to 11/30/2020. Diffusely heterogeneous bone mineralization, which is nonspecific. This could be seen with aggressive osteopenia or infiltrative process. · 10/01/21-I reviewed the results CT chest.  Interval treatment response. Continue adjuvant durvalumab. · 10/26/2021 Anemia Labs: Iron 33, TIBC 240, Iron Sat 14, Ferritin 72.6, Vit ,  · 11/4/2021 Blood Occult Studies 3/3 Negative  · 11/10/2021 MMA 0.19  · 11/30/2021 CT Chest w/ ContrastThe right upper lobe neoplasm is not as well-defined on the current study with increased consolidation within the adjacent lung parenchyma as well as adjacent consolidation with air bronchograms. I suspect this represents postradiation change with adjacent post radiation pneumonitis. It extends to the level of the upper pleura with associated thickening of the visceral and parietal pleura. I see no evidence of khai chest wall invasion. As noted on the previous examination there has been some extension of the neoplasm across the posterior aspect of the major fissure into the superior segment of the right lower lobe with this component of the neoplasm also demonstrating increased consolidation tracking along the lower margin of the major fissure. Stable foci of groundglass consolidation within the left lung. No new lesions are present. There are changes of centrilobular emphysema with hyperinflation of the lung parenchyma. 3. Heavy coronary artery calcifications are present. No new enlarged mediastinal or axillary adenopathy is demonstrated. .  · 11/30/2021 CT Abd/Pelvis w/ IV Contrast (oral)Changes of centrilobular emphysema within the lung bases. Coronary calcifications are present. There is a trace pericardial effusion or pericardial thickening demonstrated. 2. No evidence of metastatic disease to the abdomen or pelvis.  The adrenals are unremarkable. 3. There is a small 2 mm distal right ureteral stone just above the UVJ. This does not result in significant obstructive uropathy without evidence of asymmetric distention of the ureter or asymmetric enhancement of the right kidney in comparison with the left. No evidence of nephrolithiasis within the upper tracts of either kidney. The left ureter is decompressed and normal in appearance. 4. Mild constipation. There is no obstruction or free air. The appendix is identified and is normal in appearance. 5. Small fat-containing periumbilical hernia. Small bilateral  fat-containing inguinal hernias are also present. .   · 12/3/2021-I reviewed CT chest/abdomen/pelvis. Essentially no gross evidence of disease progression.   Continue durvalumab and repeat CT scan in 6 weeks      Past Medical History:  Past Medical History:   Diagnosis Date    Allergic rhinitis     Bronchitis, chronic (HCC)     Carotid artery stenosis     GERD (gastroesophageal reflux disease)     Heart attack ([facility] [address]     Hemorrhoids     Hypercholesterolemia     Type II or unspecified type diabetes mellitus without mention of complication, not stated as uncontrolled        Past Surgical History:    Past Surgical History:   Procedure Laterality Date    CARDIAC SURGERY      balloon surgery (angioplasty)    PORT SURGERY Right 04/01/2021       Social History:   Marital status: , 2 daughters  Smoking status: Former smoker for ~30 years, 1.5 ppd  ETOH status: No  Resides: [place], [place]    Family History:   Family History   Problem Relation Age of Onset    Diabetes Mother     High Blood Pressure Mother     Cancer Father         Lung     Current Hospital Medications:    Current Outpatient Medications   Medication Sig Dispense Refill    metFORMIN (GLUCOPHAGE) 1000 MG tablet TAKE 1 TABLET TWICE A DAY FOR DIABETES 180 tablet 3    promethazine-dextromethorphan (PROMETHAZINE-DM) 6.25-15 MG/5ML syrup 1 teaspoon by mouth at bedtime as needed for severe cough 240 mL 0    ondansetron (ZOFRAN ODT) 4 MG disintegrating tablet Take 1 tablet by mouth every 6 hours as needed for Nausea or Vomiting 30 tablet 5    simvastatin (ZOCOR) 40 MG tablet TAKE 1 TABLET AT BEDTIME FOR CHOLESTEROL 90 tablet 3    omeprazole (PRILOSEC) 40 MG delayed release capsule TAKE 1 CAPSULE DAILY FOR STOMACH 90 capsule 3    propranolol (INDERAL) 60 MG tablet Take 1 tablet by mouth 2 times daily For blood pressure and tremor 180 tablet 3    HYDROcodone-acetaminophen (NORCO) 7.5-325 MG per tablet       metoprolol tartrate (LOPRESSOR) 50 MG tablet Take 50 mg by mouth daily      montelukast (SINGULAIR) 10 MG tablet       ondansetron (ZOFRAN) 8 MG tablet Take 1 tablet by mouth every 8 hours as needed for Nausea or Vomiting 30 tablet 3    lidocaine-prilocaine (EMLA) 2.5-2.5 % cream Apply topically to port area and cover with plastic wrap one hour prior to treatment. 1 Tube 1    furosemide (LASIX) 40 MG tablet TAKE 1 TABLET DAILY FOR FLUID 90 tablet 3    blood glucose test strips (TRUE METRIX BLOOD GLUCOSE TEST) strip TEST BLOOD SUGAR ONCE DAILY *E11.9* 100 strip 5    isosorbide mononitrate (IMDUR) 60 MG extended release tablet Take 60 mg by mouth      losartan (COZAAR) 25 MG tablet Take 25 mg by mouth      umeclidinium-vilanterol (ANORO ELLIPTA) 62.5-25 MCG/INH AEPB inhaler Inhale 1 puff into the lungs daily      primidone (MYSOLINE) 50 MG tablet 1 tab po BID for tremor 180 tablet 3    triamcinolone (KENALOG) 0.1 % cream Apply topically 2 times daily. To lower leg dry skin 3 Tube 3    tamsulosin (FLOMAX) 0.4 MG capsule Take 1 capsule by mouth daily. 90 capsule 3    aspirin 325 MG tablet Take 325 mg by mouth daily. No current facility-administered medications for this visit.        Allergies: No Known Allergies      Subjective   REVIEW OF SYSTEMS:   CONSTITUTIONAL: no fever, no night sweats, fatigue;  HEENT: no blurring of vision, no double vision, no hearing difficulty, no tinnitus, no ulceration, no dysplasia, no epistaxis;   LUNGS: no cough, no hemoptysis, no wheeze, exertional shortness of breath;  CARDIOVASCULAR: no palpitation, no chest pain, exertional shortness of breath;  GI: no abdominal pain, no nausea, no vomiting, no diarrhea, no constipation;  JESSIKA: no dysuria, no hematuria, no frequency or urgency, no nephrolithiasis;  MUSCULOSKELETAL: no joint pain, no swelling, no stiffness;  ENDOCRINE: no polyuria, no polydipsia, no cold or heat intolerance;  HEMATOLOGY: no easy bruising or bleeding, no history of clotting disorder;  DERMATOLOGY: no skin rash, no eczema, no pruritus;  PSYCHIATRY: no depression, no anxiety, no panic attacks, no suicidal ideation, no homicidal ideation;  NEUROLOGY: no syncope, no seizures, no numbness or tingling of hands, no numbness or tingling of feet, no paresis;        Objective   There were no vitals taken for this visit. Wt Readings from Last 3 Encounters:   01/14/22 186 lb 6.4 oz (84.6 kg)   12/03/21 186 lb 6.4 oz (84.6 kg)   11/30/21 186 lb (84.4 kg)   There were no vitals taken for this visit. PHYSICAL EXAM:  CONSTITUTIONAL: Alert, appropriate, no acute distress  EYES: Non icteric, EOM intact, pupils equal round   ENT: Mucus membranes moist, no oral pharyngeal lesions, external inspection of ears and nose are normal.  NECK: Supple, no masses. No palpable thyroid mass  CHEST/LUNGS: CTA bilaterally, normal respiratory effort   CARDIOVASCULAR: RRR, no murmurs. No lower extremity edema  ABDOMEN: soft non-tender, active bowel sounds, no HSM. No palpable masses  EXTREMITIES: warm, full ROM in all 4 extremities, no focal weakness. SKIN: warm, dry with no rashes or lesions  LYMPH: No cervical, clavicular, axillary, or inguinal lymphadenopathy  NEUROLOGIC: follows commands, non focal   PSYCH: mood and affect appropriate.   Alert and oriented to time, place, person      LABORATORY RESULTS REVIEWED/ANALYZED BY ME:  Lab Results Component Value Date    TSH 2.700 01/14/2022 2/11/22 CBC  WBC 7.48  HGB 11.6  HCT 34.8    Neut 4.92  Lab Results   Component Value Date     02/11/2022    K 4.6 02/11/2022     02/11/2022    CO2 27 02/11/2022    BUN 23 (H) 02/11/2022    CREATININE 1.1 02/11/2022    GLUCOSE 115 (H) 02/11/2022    CALCIUM 9.1 02/11/2022    PROT 7.5 02/11/2022    LABALBU 4.2 02/11/2022    BILITOT <0.2 02/11/2022    ALKPHOS 96 02/11/2022    AST 25 02/11/2022    ALT 15 (L) 02/11/2022    LABGLOM >60 02/11/2022    GFRAA 54 (L) 06/03/2021    GLOB 3.3 02/11/2022           RADIOLOGY STUDIES REVIEWED BY ME:  none    ASSESSMENT:    Orders Placed This Encounter   Procedures    CT ABDOMEN PELVIS W IV CONTRAST Additional Contrast? Oral     Standing Status:   Future     Standing Expiration Date:   2/11/2023     Order Specific Question:   Additional Contrast?     Answer:   Oral     Order Specific Question:   Reason for exam:     Answer:   assess response to treatment    CT CHEST W CONTRAST     Standing Status:   Future     Standing Expiration Date:   4/11/2022     Order Specific Question:   Reason for exam:     Answer:   assess response to treatment    CBC Auto Differential     Standing Status:   Future     Number of Occurrences:   1     Standing Expiration Date:   2/11/2023    Comprehensive Metabolic Panel     Standing Status:   Future     Number of Occurrences:   1     Standing Expiration Date:   2/11/2023    TSH without Reflex     Standing Status:   Future     Number of Occurrences:   1     Standing Expiration Date:   2/11/2023      Diagnoses and all orders for this visit:    Adenocarcinoma of right lung ([facility] [address]  -     CBC Auto Differential; Future  -     Comprehensive Metabolic Panel; Future  -     CT ABDOMEN PELVIS W IV CONTRAST Additional Contrast? Oral; Future  -     CT CHEST W CONTRAST; Future    Fatigue due to treatment  -     TSH without Reflex;  Future    Encounter for antineoplastic immunotherapy    Care plan discussed with patient         [id number], stge IIIA, NSCLC, adenocarcinoma subtype  Essentially stage III disease. The patient is not a surgical candidate. Recommend chemoradiation followed by immunotherapy. Status post completion of chemoradiation. Currently receiving adjuvant durvalumab. Reviewed CT chest that showed interval response. Continue durvalumab. Status post completion of chemoradiation. CT chest showed interval response   Recommended:  Durvalumab 1500 mg IV every 4 weeks x12 dose    Proceed cycle #8/12. Discussed treatment plan with the patient and daughter. Good tolerance to treatment except for mild fatigue. Repeat CT chest abdomen pelvis 2/18/2022    Treatment related side effects-occasional diarrhea, grade 1, fatigue    Systolic heart failure-EF 45%  2D echo-LVEF 40-45%. Mild LVH, Mild [grade 1] diastolic dysfunction. Uncontrolled hypertension-  There were no vitals taken for this visit. As per nurse staff      At risk thyroid disorder-  Lab Results   Component Value Date    TSH 3.350 02/11/2022     Normocytic anemia-likely multifactorial anemia to include anemia of inflammation/iron deficiency. Cannot rule out GI bleed given history taking aspirin and NSAIDs toxicity  Hemoglobin 9.8/MCV 93  Ferritin 72, iron saturation 14%, iron 33, TIBC 240, vitamin B12 249  Occult blood stool x3-all 3 negative  Of note, patient has been on high-dose aspirin 325 mg p.o. daily. He was recently seen by cardiology and request to take daily aspirin only. 10/29/2021-ferritin 72, iron saturation 14%, TIBC 240, vitamin B12 249, methylmalonic acid normal 0.19. November 2021-IV Venofer 1000 mg  2/11/2022 -Hemoglobin 11. 6. Improved    PLAN:  · RTC with MD in treatment room in 4 weeks  · C# 8/12 monthly durvalumab today and every 4 weeks  · CT chest, abd/pelvis- 1st available appointment on a Fri  · Monitor BP at home and record  · Continue Follow-up appointments with PCP for BP   · Continue to monitor CBC/CMP for toxicity    I, Lucero Benitez am pre-charting as a registered nurse for Vinay Stone MD. Electronically signed by Lucero Benitez RN on 2/11/2022 at 9:03 AM CST. Kaleigh Ruth am scribing for Vinay Stone MD. Electronically signed by Lucero Benitez RN on 2/11/2022 at 2:19 PM CST. I, Dr Terrie Candelaria, personally performed the services described in this documentation as scribed by Lucero Benitez RN in my presence and is both accurate and complete. I have seen, examined and reviewed this patient medication list, appropriate labs and imaging studies. I reviewed relevant medical records and others physicians notes. I discussed the plans of care with the patient. I answered all the questions to the patients satisfaction. I have also reviewed the chief complaint (CC) and part of the history (History of Present Illness (HPI), Past Family Social History [facility]), or Review of Systems (ROS) and made changes when appropriated.        (Please note that portions of this note were completed with a voice recognition program. Efforts were made to edit the dictations but occasionally words are mis-transcribed.)    Electronically signed by Vinay Stone MD on 2/11/2022 at 2:20 PM

## 2022-02-08 DIAGNOSIS — C34.91 ADENOCARCINOMA OF RIGHT LUNG (HCC): Primary | ICD-10-CM

## 2022-02-11 ENCOUNTER — OFFICE VISIT (OUTPATIENT)
Dept: HEMATOLOGY | Age: 82
End: 2022-02-11
Payer: MEDICARE

## 2022-02-11 ENCOUNTER — HOSPITAL ENCOUNTER (OUTPATIENT)
Dept: INFUSION THERAPY | Age: 82
Discharge: HOME | End: 2022-02-11
Payer: MEDICARE

## 2022-02-11 DIAGNOSIS — T45.1X5A ANTINEOPLASTIC CHEMOTHERAPY INDUCED ANEMIA: ICD-10-CM

## 2022-02-11 DIAGNOSIS — C34.91 ADENOCARCINOMA OF RIGHT LUNG (HCC): ICD-10-CM

## 2022-02-11 DIAGNOSIS — C34.91 ADENOCARCINOMA OF RIGHT LUNG (HCC): Primary | ICD-10-CM

## 2022-02-11 DIAGNOSIS — R53.83 FATIGUE DUE TO TREATMENT: Primary | ICD-10-CM

## 2022-02-11 DIAGNOSIS — Z51.12 ENCOUNTER FOR ANTINEOPLASTIC IMMUNOTHERAPY: ICD-10-CM

## 2022-02-11 DIAGNOSIS — D64.81 ANTINEOPLASTIC CHEMOTHERAPY INDUCED ANEMIA: ICD-10-CM

## 2022-02-11 DIAGNOSIS — R53.83 FATIGUE DUE TO TREATMENT: ICD-10-CM

## 2022-02-11 DIAGNOSIS — Z71.89 CARE PLAN DISCUSSED WITH PATIENT: ICD-10-CM

## 2022-02-11 LAB
ALBUMIN SERPL-MCNC: 4.2 G/DL (ref 3.5–5.2)
ALP BLD-CCNC: 96 U/L (ref 40–130)
ALT SERPL-CCNC: 15 U/L (ref 21–72)
ANION GAP SERPL CALCULATED.3IONS-SCNC: 13 MMOL/L (ref 7–19)
AST SERPL-CCNC: 25 U/L (ref 17–59)
BASOPHILS ABSOLUTE: 0.04 K/UL (ref 0.01–0.08)
BASOPHILS RELATIVE PERCENT: 0.5 % (ref 0.1–1.2)
BILIRUB SERPL-MCNC: <0.2 MG/DL (ref 0.2–1.3)
BUN BLDV-MCNC: 23 MG/DL (ref 9–20)
CALCIUM SERPL-MCNC: 9.1 MG/DL (ref 8.4–10.2)
CHLORIDE BLD-SCNC: 100 MMOL/L (ref 98–111)
CO2: 27 MMOL/L (ref 22–29)
CREAT SERPL-MCNC: 1.1 MG/DL (ref 0.6–1.2)
EOSINOPHILS ABSOLUTE: 0.18 K/UL (ref 0.04–0.54)
EOSINOPHILS RELATIVE PERCENT: 2.4 % (ref 0.7–7)
GFR NON-AFRICAN AMERICAN: >60
GLOBULIN: 3.3 G/DL
GLUCOSE BLD-MCNC: 115 MG/DL (ref 74–106)
HCT VFR BLD CALC: 34.8 % (ref 40.1–51)
HEMOGLOBIN: 11.6 G/DL (ref 13.7–17.5)
LYMPHOCYTES ABSOLUTE: 1.73 K/UL (ref 1.18–3.74)
LYMPHOCYTES RELATIVE PERCENT: 23.1 % (ref 19.3–53.1)
MCH RBC QN AUTO: 31.8 PG (ref 25.7–32.2)
MCHC RBC AUTO-ENTMCNC: 33.3 G/DL (ref 32.3–36.5)
MCV RBC AUTO: 95.3 FL (ref 79–92.2)
MONOCYTES ABSOLUTE: 0.61 K/UL (ref 0.24–0.82)
MONOCYTES RELATIVE PERCENT: 8.2 % (ref 4.7–12.5)
NEUTROPHILS ABSOLUTE: 4.92 K/UL (ref 1.56–6.13)
NEUTROPHILS RELATIVE PERCENT: 65.8 % (ref 34–71.1)
PDW BLD-RTO: 15.4 % (ref 11.6–14.4)
PLATELET # BLD: 249 K/UL (ref 163–337)
PMV BLD AUTO: 9.2 FL (ref 7.4–10.4)
POTASSIUM SERPL-SCNC: 4.6 MMOL/L (ref 3.5–5.1)
RBC # BLD: 3.65 M/UL (ref 4.63–6.08)
SODIUM BLD-SCNC: 140 MMOL/L (ref 137–145)
TOTAL PROTEIN: 7.5 G/DL (ref 6.3–8.2)
TSH SERPL DL<=0.05 MIU/L-ACNC: 3.35 UIU/ML (ref 0.27–4.2)
WBC # BLD: 7.48 K/UL (ref 4.23–9.07)

## 2022-02-11 PROCEDURE — 4040F PNEUMOC VAC/ADMIN/RCVD: CPT | Performed by: INTERNAL MEDICINE

## 2022-02-11 PROCEDURE — 99214 OFFICE O/P EST MOD 30 MIN: CPT | Performed by: INTERNAL MEDICINE

## 2022-02-11 PROCEDURE — G8484 FLU IMMUNIZE NO ADMIN: HCPCS | Performed by: INTERNAL MEDICINE

## 2022-02-11 PROCEDURE — 96413 CHEMO IV INFUSION 1 HR: CPT

## 2022-02-11 PROCEDURE — G8428 CUR MEDS NOT DOCUMENT: HCPCS | Performed by: INTERNAL MEDICINE

## 2022-02-11 PROCEDURE — 1123F ACP DISCUSS/DSCN MKR DOCD: CPT | Performed by: INTERNAL MEDICINE

## 2022-02-11 PROCEDURE — G8417 CALC BMI ABV UP PARAM F/U: HCPCS | Performed by: INTERNAL MEDICINE

## 2022-02-11 PROCEDURE — 80053 COMPREHEN METABOLIC PANEL: CPT

## 2022-02-11 PROCEDURE — 2580000003 HC RX 258: Performed by: INTERNAL MEDICINE

## 2022-02-11 PROCEDURE — 85025 COMPLETE CBC W/AUTO DIFF WBC: CPT

## 2022-02-11 PROCEDURE — 1036F TOBACCO NON-USER: CPT | Performed by: INTERNAL MEDICINE

## 2022-02-11 PROCEDURE — 6360000002 HC RX W HCPCS: Performed by: INTERNAL MEDICINE

## 2022-02-11 PROCEDURE — 36415 COLL VENOUS BLD VENIPUNCTURE: CPT

## 2022-02-11 RX ORDER — EPINEPHRINE 1 MG/ML
0.3 INJECTION, SOLUTION, CONCENTRATE INTRAVENOUS PRN
Status: CANCELLED | OUTPATIENT
Start: 2022-02-11

## 2022-02-11 RX ORDER — SODIUM CHLORIDE 0.9 % (FLUSH) 0.9 %
5-40 SYRINGE (ML) INJECTION PRN
Status: CANCELLED | OUTPATIENT
Start: 2022-02-11

## 2022-02-11 RX ORDER — METHYLPREDNISOLONE SODIUM SUCCINATE 125 MG/2ML
125 INJECTION, POWDER, LYOPHILIZED, FOR SOLUTION INTRAMUSCULAR; INTRAVENOUS ONCE
Status: CANCELLED | OUTPATIENT
Start: 2022-02-11 | End: 2022-02-11

## 2022-02-11 RX ORDER — SODIUM CHLORIDE 9 MG/ML
INJECTION, SOLUTION INTRAVENOUS CONTINUOUS
Status: CANCELLED | OUTPATIENT
Start: 2022-02-11

## 2022-02-11 RX ORDER — DIPHENHYDRAMINE HYDROCHLORIDE 50 MG/ML
50 INJECTION INTRAMUSCULAR; INTRAVENOUS ONCE
Status: CANCELLED | OUTPATIENT
Start: 2022-02-11 | End: 2022-02-11

## 2022-02-11 RX ORDER — HEPARIN SODIUM (PORCINE) LOCK FLUSH IV SOLN 100 UNIT/ML 100 UNIT/ML
500 SOLUTION INTRAVENOUS PRN
Status: DISCONTINUED | OUTPATIENT
Start: 2022-02-11 | End: 2022-02-12 | Stop reason: HOSPADM

## 2022-02-11 RX ORDER — SODIUM CHLORIDE 0.9 % (FLUSH) 0.9 %
5-40 SYRINGE (ML) INJECTION PRN
Status: DISCONTINUED | OUTPATIENT
Start: 2022-02-11 | End: 2022-02-12 | Stop reason: HOSPADM

## 2022-02-11 RX ORDER — HEPARIN SODIUM (PORCINE) LOCK FLUSH IV SOLN 100 UNIT/ML 100 UNIT/ML
500 SOLUTION INTRAVENOUS PRN
Status: CANCELLED | OUTPATIENT
Start: 2022-02-11

## 2022-02-11 RX ORDER — SODIUM CHLORIDE 9 MG/ML
20 INJECTION, SOLUTION INTRAVENOUS ONCE
Status: DISCONTINUED | OUTPATIENT
Start: 2022-02-11 | End: 2022-02-13 | Stop reason: HOSPADM

## 2022-02-11 RX ORDER — SODIUM CHLORIDE 9 MG/ML
25 INJECTION, SOLUTION INTRAVENOUS PRN
Status: CANCELLED | OUTPATIENT
Start: 2022-02-11

## 2022-02-11 RX ORDER — SODIUM CHLORIDE 9 MG/ML
20 INJECTION, SOLUTION INTRAVENOUS ONCE
Status: CANCELLED | OUTPATIENT
Start: 2022-02-11 | End: 2022-02-11

## 2022-02-11 RX ADMIN — SODIUM CHLORIDE 1500 MG: 9 INJECTION, SOLUTION INTRAVENOUS at 14:45

## 2022-02-18 ENCOUNTER — HOSPITAL ENCOUNTER (OUTPATIENT)
Dept: CT IMAGING | Age: 82
Discharge: HOME | End: 2022-02-18
Payer: MEDICARE

## 2022-02-18 DIAGNOSIS — C34.91 ADENOCARCINOMA OF RIGHT LUNG (HCC): ICD-10-CM

## 2022-02-18 PROCEDURE — 71260 CT THORAX DX C+: CPT

## 2022-02-18 PROCEDURE — 6360000004 HC RX CONTRAST MEDICATION: Performed by: INTERNAL MEDICINE

## 2022-02-18 PROCEDURE — 74177 CT ABD & PELVIS W/CONTRAST: CPT

## 2022-02-18 RX ADMIN — IOPAMIDOL 75 ML: 755 INJECTION, SOLUTION INTRAVENOUS at 14:57

## 2022-03-07 NOTE — PROGRESS NOTES
MEDICAL ONCOLOGY PROGRESS NOTE    Pt Name: Dago Frazier  MRN: [id number]  YOB: 1940  Date of evaluation: 3/11/2022    HISTORY OF PRESENT ILLNESS:    Reason for MD visit-disease management/toxicity assessment  The patient is a very pleasant 79years old male who has been diagnosed with stage IIIa non-small cell lung cancer, adenocarcinoma subtype. He is a status post completion of chemoradiation in June 2021. He tolerated chemoradiation treatment poorly with complaints of severe fatigue/deconditioning and decreased mobility. The patient is accompanied by a member today. He is currently 0 point immunotherapies durvalumab. He had a repeat CT scan to assess response. Of note, he had a right upper lobe consolidation on the right upper lobe on the CT scan. Denies any new respiratory symptoms. Denies any fever, chills. Denies any productive cough. Weight has remained stable    Diagnosis  · RUL adenocarcinoma, NSCLC, Dec 2020  · [id number], stage IIIA  · Systolic heart failure  · Hypertension, controlled  · Not a surgical candidate    Treatment Summary  · 4/21/2021-5/26/21 completed concurrent chemoradiation with carboplatin/Taxol  · 4/22/21-6/3/21- 6600 cGy radiation therapy completed  · 7/8/21- initiate maintenance Durvalumab 1500mg every 4 weeks x12 cycles    Hematology/Cancer History:  Kevin Da Silva was first seen by me on 4/7/2021 referred by Dr. Shahzad Rausch, [facility] for findings of non-small cell lung cancer, adenocarcinoma type. He has several comorbidities including history of type 2 diabetes, hypertension COPD. History of smoking in the past.  Quit many years ago. History of coronary artery disease followed by cardiology biopsy. Last EF 70% in 2018. He was seen by his primary care provider in November 2020. He has complaint of chest pain. Chest x-ray showed a 4 cm mass in the right upper lung.   · 12/1/20 CT chest [facility]): Large right upper lobe mass concerning for primary neoplasm. Enlarged right hilar lymph node concerning for metastatic disease. Additional noncalcified pulmonary nodules bilaterally for which follow-up CT is recommended in 3-6 months. Atherosclerosis of the aorta and coronary arteries. · 12/16/2020-bronchoscopy with bronchoalveolar lavage was nondiagnostic  · 2/23/21 CT cheat w/o [facility]): Probable right upper lobe mass, as described. This appears slightly larger in size and is likely neoplastic. Consider PET CT follow-up. There is new surrounding infiltrate that extends inferiorly into the right upper lobe. The new may be infectious or inflammatory. Pulmonary hemorrhage possible. Other areas of nodularity and groundglass opacity/nodularity are stable in appearance. · 3/5/2021-navigational bronchoscopy with biopsy was nondiagnostic  · 3/5/21 CT chest w/o [facility]): [place] soft tissue mass within the right upper lobe highly suspicious for neoplasm. There is associated postobstructive pneumonitis within the right upper lobe showing mild progression from the previous study of 2/23/2021. There is no associated hilar or mediastinal lymphadenopathy. Today's study is performed as part of a navigational bronchoscopy exam. Other scattered nodules including groundglass nodules are noted within the lungs all stable from the previous exam warranting follow-up. Heavy atheromatous calcification of the thoracic aorta and proximal great vessels. Coronary calcifications are present. · 3/23/21 PET scan [facility]): Markedly FDG avid right upper lobe mass in the right upper lobe measuring 7.2 cm triangular opacity with maximum SUV 12.18, highly concerning for malignancy. Adjacent groundglass opacities,  similar compared to 3/5/2021, which could represent additional  malignancy or infection/inflammation. Adjacent right upper lobe groundglass opacities are similar compared to 3/5/2021, which could represent additional malignancy or infection/inflammation.  Other non-FDG avid pulmonary findings as above. No evidence of jasmin or distant metastatic disease. · 3/29/21 Navigational bronchoscopy-Dr. Ennis Mort: Bronchoalveolar lavage RUL consistent with adenocarcinoma. RUL transbronchial bipsy consistent with adenocarcinoma. FNA biopsy of several jasmin station negative for metastatic disease. · 4/7/2021-he was first seen by me. · 4/15/21 2D echo: Normal left ventricular size with reduced global systolic function EF 05-74%. Mild concentric left ventricular hypertrophy with mild [grade 1] diastolic dysfunction. Normal left atrial size. Mild thickening of a poorly visualized aortic valve without evidence of stenosis or insufficiency. Mild thickening of a normally mobile mitral valve with mild regurgitation. Nonvisualization pulmonic valve. Poorly visualized but apparently normal size right-sided chambers with preserved right ventricular systolic function. No tricuspid regurgitation with which to estimate RVSP. No significant pericardial effusion. Aortic root and ascending segments measured within normal limits. · 4/17/21 MRI brain: No acute intracranial process. No metastatic disease identified in the CNS. · 4/21/2021-initiation of carboplatin/Taxol weekly. Reviewed MRI brain 2D echo. · 4/21/2021-5/26/21 completed concurrent chemoradiation with carboplatin/taxol to be followed by immunotherapy  · 4/22/21-6/3/21 6600 cGy radiation therapy completed  · 6/30/21-CT CHEST W CONTRAST A large spiculated mass in the right upper lobe posterior segment represent a neoplastic process. The groundglass opacity/infiltrate in the right upper lobe and superior segments of the lower lobes bilaterally may represent an inflammatory or neoplastic/metastatic process. A single enlarged abnormal lymph node in the right hilum may represent a metastatic node.    · 07/08/21-reviewed CT chest with perform radiologist.  · 7/8/21- initiate maintenance Durvalumab 1500mg every 4 weeks x12 cycles  · 9/24/2021 CT Chest w/ Contrast([facility]) Decreased size of right upper lobe triangular opacity with surrounding probable posttreatment changes. Similar size left upper lobe 1 cm groundglass and solid pulmonary opacity/nodule compared to 3/23/2021. Right hilum with a 1.4 x 1.2 cm lymph node or conglomeration of lymph nodes, which appears similar to 11/30/2020. Diffusely heterogeneous bone mineralization, which is nonspecific. This could be seen with aggressive osteopenia or infiltrative process. · 10/01/21-I reviewed the results CT chest.  Interval treatment response. Continue adjuvant durvalumab. · 10/26/2021 Anemia Labs: Iron 33, TIBC 240, Iron Sat 14, Ferritin 72.6, Vit ,  · 11/4/2021 Blood Occult Studies 3/3 Negative  · 11/10/2021 MMA 0.19  · 11/30/2021 CT Chest w/ ContrastThe right upper lobe neoplasm is not as well-defined on the current study with increased consolidation within the adjacent lung parenchyma as well as adjacent consolidation with air bronchograms. I suspect this represents postradiation change with adjacent post radiation pneumonitis. It extends to the level of the upper pleura with associated thickening of the visceral and parietal pleura. I see no evidence of khai chest wall invasion. As noted on the previous examination there has been some extension of the neoplasm across the posterior aspect of the major fissure into the superior segment of the right lower lobe with this component of the neoplasm also demonstrating increased consolidation tracking along the lower margin of the major fissure. Stable foci of groundglass consolidation within the left lung. No new lesions are present. There are changes of centrilobular emphysema with hyperinflation of the lung parenchyma. 3. Heavy coronary artery calcifications are present. No new enlarged mediastinal or axillary adenopathy is demonstrated. .  · 11/30/2021 CT Abd/Pelvis w/ IV Contrast (oral)Changes of centrilobular emphysema within the lung bases.  Coronary calcifications are present. There is a trace pericardial effusion or pericardial thickening demonstrated. 2. No evidence of metastatic disease to the abdomen or pelvis. The adrenals are unremarkable. 3. There is a small 2 mm distal right ureteral stone just above the UVJ. This does not result in significant obstructive uropathy without evidence of asymmetric distention of the ureter or asymmetric enhancement of the right kidney in comparison with the left. No evidence of nephrolithiasis within the upper tracts of either kidney. The left ureter is decompressed and normal in appearance. 4. Mild constipation. There is no obstruction or free air. The appendix is identified and is normal in appearance. 5. Small fat-containing periumbilical hernia. Small bilateral  fat-containing inguinal hernias are also present. .   · 12/3/2021-I reviewed CT chest/abdomen/pelvis. Essentially no gross evidence of disease progression. Continue durvalumab and repeat CT scan in 6 weeks  · 2/18/2022 CT Chest W ContrastStable right upper lobe volume loss. There is decreasing right perihilar consolidation. Stable 1 cm short axis diameter right superior hilar lymph node. No mediastinal lymphadenopathy is seen. Stable areas of groundglass opacity in the left upper lobe and left lower lobe. Continued follow-up recommended. New moderate patchy groundglass infiltrates in the right lower lobe may be infectious or inflammatory. Post radiation change considered as well although the infiltrates are somewhat peripherally located. Atheromatous disease of the thoracic aorta and coronary arteries. Cardiomegaly. Please see the abdomen and pelvis CT report separately. · 2/18/2022 CT Abd/Pelvis W IV Contrast(Oral) No acute abnormality of the abdomen are pelvis particularly, no findings to suggest a neoplastic process or metastatic disease. A groundglass infiltrate in the right lower lung which was not seen in there previous study.  This is represent an acute or subacute inflammatory/infectious process. This data be further evaluated. A persistent and unchanged 2 mm non obstructing calculus in the right distal ureter, 5 mm from the right UV junction. No change. · 3/11/2022-I reviewed results of CT chest abdomen pelvis. Essentially, decreasing right perihilar consolidation. No evidence of progressive disease. New onset consolidation right lower lobe. As per radiology report it looks inflammatory/infectious. Patient prescribed a trial of Levaquin 750 mg x 10 days. We will repeat CT scans in 2 months. Continue durvalumab.       Past Medical History:  Past Medical History:   Diagnosis Date    Allergic rhinitis     Bronchitis, chronic (HCC)     Carotid artery stenosis     GERD (gastroesophageal reflux disease)     Heart attack ([facility] [address]     Hemorrhoids     Hypercholesterolemia     Type II or unspecified type diabetes mellitus without mention of complication, not stated as uncontrolled        Past Surgical History:    Past Surgical History:   Procedure Laterality Date    CARDIAC SURGERY      balloon surgery (angioplasty)    PORT SURGERY Right 04/01/2021       Social History:   Marital status: , 2 daughters  Smoking status: Former smoker for ~30 years, 1.5 ppd  ETOH status: No  Resides: [place], [place]    Family History:   Family History   Problem Relation Age of Onset    Diabetes Mother     High Blood Pressure Mother     Cancer Father         Lung     Current Hospital Medications:    Current Outpatient Medications   Medication Sig Dispense Refill    metFORMIN (GLUCOPHAGE) 1000 MG tablet TAKE 1 TABLET TWICE A DAY FOR DIABETES 180 tablet 3    promethazine-dextromethorphan (PROMETHAZINE-DM) 6.25-15 MG/5ML syrup 1 teaspoon by mouth at bedtime as needed for severe cough 240 mL 0    ondansetron (ZOFRAN ODT) 4 MG disintegrating tablet Take 1 tablet by mouth every 6 hours as needed for Nausea or Vomiting 30 tablet 5    simvastatin (ZOCOR) 40 MG tablet TAKE 1 TABLET AT BEDTIME FOR CHOLESTEROL 90 tablet 3    omeprazole (PRILOSEC) 40 MG delayed release capsule TAKE 1 CAPSULE DAILY FOR STOMACH 90 capsule 3    propranolol (INDERAL) 60 MG tablet Take 1 tablet by mouth 2 times daily For blood pressure and tremor 180 tablet 3    HYDROcodone-acetaminophen (NORCO) 7.5-325 MG per tablet       metoprolol tartrate (LOPRESSOR) 50 MG tablet Take 50 mg by mouth daily      montelukast (SINGULAIR) 10 MG tablet       ondansetron (ZOFRAN) 8 MG tablet Take 1 tablet by mouth every 8 hours as needed for Nausea or Vomiting 30 tablet 3    lidocaine-prilocaine (EMLA) 2.5-2.5 % cream Apply topically to port area and cover with plastic wrap one hour prior to treatment. 1 Tube 1    furosemide (LASIX) 40 MG tablet TAKE 1 TABLET DAILY FOR FLUID 90 tablet 3    blood glucose test strips (TRUE METRIX BLOOD GLUCOSE TEST) strip TEST BLOOD SUGAR ONCE DAILY *E11.9* 100 strip 5    isosorbide mononitrate (IMDUR) 60 MG extended release tablet Take 60 mg by mouth      losartan (COZAAR) 25 MG tablet Take 25 mg by mouth      umeclidinium-vilanterol (ANORO ELLIPTA) 62.5-25 MCG/INH AEPB inhaler Inhale 1 puff into the lungs daily      primidone (MYSOLINE) 50 MG tablet 1 tab po BID for tremor 180 tablet 3    triamcinolone (KENALOG) 0.1 % cream Apply topically 2 times daily. To lower leg dry skin 3 Tube 3    tamsulosin (FLOMAX) 0.4 MG capsule Take 1 capsule by mouth daily. 90 capsule 3    aspirin 325 MG tablet Take 325 mg by mouth daily. No current facility-administered medications for this visit.        Allergies: No Known Allergies      Subjective   REVIEW OF SYSTEMS:   CONSTITUTIONAL: no fever, no night sweats,  fatigue;  HEENT: no blurring of vision, no double vision, no hearing difficulty, no tinnitus, no ulceration, no dysplasia, no epistaxis;   LUNGS: cough, no hemoptysis, no wheeze, exertional shortness of breath;  CARDIOVASCULAR: no palpitation, no chest pain, exertional shortness of breath;  GI: no abdominal pain, no nausea, no vomiting, no diarrhea, no constipation;  JESSIKA: no dysuria, no hematuria, no frequency or urgency, no nephrolithiasis;  MUSCULOSKELETAL: no joint pain, no swelling, no stiffness;  ENDOCRINE: no polyuria, no polydipsia, no cold or heat intolerance;  HEMATOLOGY: no easy bruising or bleeding, no history of clotting disorder;  DERMATOLOGY: no skin rash, no eczema, no pruritus;  PSYCHIATRY: no depression, no anxiety, no panic attacks, no suicidal ideation, no homicidal ideation;  NEUROLOGY: no syncope, no seizures, no numbness or tingling of hands, no numbness or tingling of feet, no paresis;        Objective   BP (!) 150/69   Pulse 84   Temp 98.9 °F (37.2 °C)   Ht 5' 10\" (1.778 m)   Wt 185 lb 14.4 oz (84.3 kg)   SpO2 100%   BMI 26.67 kg/m²     Wt Readings from Last 3 Encounters:   03/11/22 185 lb 14.4 oz (84.3 kg)   01/14/22 186 lb 6.4 oz (84.6 kg)   12/03/21 186 lb 6.4 oz (84.6 kg)         PHYSICAL EXAM:  CONSTITUTIONAL: Alert, appropriate, no acute distress  EYES: Non icteric, EOM intact, pupils equal round   ENT: Mucus membranes moist, no oral pharyngeal lesions, external inspection of ears and nose are normal.  NECK: Supple, no masses. No palpable thyroid mass  CHEST/LUNGS: CTA bilaterally, normal respiratory effort   CARDIOVASCULAR: RRR, no murmurs. No lower extremity edema  ABDOMEN: soft non-tender, active bowel sounds, no HSM. No palpable masses  EXTREMITIES: warm, full ROM in all 4 extremities, no focal weakness. SKIN: warm, dry with no rashes or lesions  LYMPH: No cervical, clavicular, axillary, or inguinal lymphadenopathy  NEUROLOGIC: follows commands, non focal   PSYCH: mood and affect appropriate.   Alert and oriented to time, place, person      LABORATORY RESULTS REVIEWED/ANALYZED BY ME:  Lab Results   Component Value Date    TSH 3.350 02/11/2022     Lab Results   Component Value Date    WBC 7.25 03/11/2022    HGB 11.7 (L) 03/11/2022    HCT 34.0 (L) 03/11/2022    MCV 95.0 (H) 03/11/2022     03/11/2022     Lab Results   Component Value Date    NEUTROABS 4.62 03/11/2022     Lab Results   Component Value Date     03/11/2022    K 4.5 03/11/2022     03/11/2022    CO2 26 03/11/2022    BUN 23 (H) 03/11/2022    CREATININE 1.2 03/11/2022    GLUCOSE 187 (H) 03/11/2022    CALCIUM 8.9 03/11/2022    PROT 7.6 03/11/2022    LABALBU 4.4 03/11/2022    BILITOT <0.2 03/11/2022    ALKPHOS 91 03/11/2022    AST 28 03/11/2022    ALT 15 (L) 03/11/2022    LABGLOM 58 (A) 03/11/2022    GFRAA 54 (L) 06/03/2021    GLOB 3.2 03/11/2022           RADIOLOGY STUDIES REVIEWED BY ME:  2/18/2022 CT Chest W ContrastStable right upper lobe volume loss. There is decreasing right perihilar consolidation. Stable 1 cm short axis diameter right superior hilar lymph node. No mediastinal lymphadenopathy is seen. Stable areas of groundglass opacity in the left upper lobe and left lower lobe. Continued follow-up recommended. New moderate patchy groundglass infiltrates in the right lower lobe may be infectious or inflammatory. Post radiation change considered as well although the infiltrates are somewhat peripherally located. Atheromatous disease of the thoracic aorta and coronary arteries. Cardiomegaly. Please see the abdomen and pelvis CT report separately. 2/18/2022 CT Abd/Pelvis W IV Contrast(Oral) No acute abnormality of the abdomen are pelvis particularly, no findings to suggest a neoplastic process or metastatic disease. A groundglass infiltrate in the right lower lung which was not seen in there previous study. This is represent an acute or subacute inflammatory/infectious process. This data be further evaluated. A persistent and unchanged 2 mm non obstructing calculus in the right distal ureter, 5 mm from the right UV junction. No change.         ASSESSMENT:    Orders Placed This Encounter   Procedures    CBC with Auto Differential     Standing Status:   Future     Number of diarrhea, grade 1, fatigue    Systolic heart failure-EF 45%  2D echo-LVEF 40-45%. Mild LVH, Mild [grade 1] diastolic dysfunction. Uncontrolled hypertension-  BP (!) 150/69   Pulse 84   Temp 98.9 °F (37.2 °C)   Ht 5' 10\" (1.778 m)   Wt 185 lb 14.4 oz (84.3 kg)   SpO2 100%   BMI 26.67 kg/m²    As per nurse staff      At risk thyroid disorder-  Lab Results   Component Value Date    TSH 2.960 03/11/2022   Continue to monitor    Normocytic anemia-likely multifactorial anemia to include anemia of inflammation/iron deficiency. Cannot rule out GI bleed given history taking aspirin and NSAIDs toxicity  Hemoglobin 9.8/MCV 93  Ferritin 72, iron saturation 14%, iron 33, TIBC 240, vitamin B12 249  Occult blood stool x3-all 3 negative  Of note, patient has been on high-dose aspirin 325 mg p.o. daily. He was recently seen by cardiology and request to take daily aspirin only. 10/29/2021-ferritin 72, iron saturation 14%, TIBC 240, vitamin B12 249, methylmalonic acid normal 0.19. November 2021-IV Venofer 1000 mg  2/11/2022 -Hemoglobin 11. 6. Improved  3/11/2022-hemoglobin 11.7    Right lower lobe consolidation-trial of Levaquin x10 days. Differential diagnosis also to include immunotherapy pneumonitis versus radiation pneumonitis. If no improvement with antibiotics then a trial of steroids may be pursued. Ice throughout the patient and daughter to call me if any worsening of his shortness of breath. PLAN:  · RTC with MD in treatment room in 4 weeks  · C# 9/12 monthly durvalumab today and every 4 weeks  · Recommend Levaquin 750mg daily-script sent  · CT chest, abd/pelvis- 2 months  · Monitor BP at home and record  · Repeat CT chest in 4 weeks before next visit  · Continue Follow-up appointments with PCP for BP   · Continue to monitor CBC/CMP for toxicity    I have seen, examined and reviewed this patient medication list, appropriate labs and imaging studies.  I reviewed relevant medical records and others [id number]

## 2022-03-11 ENCOUNTER — OFFICE VISIT (OUTPATIENT)
Dept: HEMATOLOGY | Age: 82
End: 2022-03-11
Payer: MEDICARE

## 2022-03-11 ENCOUNTER — HOSPITAL ENCOUNTER (OUTPATIENT)
Dept: INFUSION THERAPY | Age: 82
Discharge: HOME | End: 2022-03-11
Payer: MEDICARE

## 2022-03-11 VITALS
TEMPERATURE: 98.9 F | SYSTOLIC BLOOD PRESSURE: 150 MMHG | WEIGHT: 185.9 LBS | OXYGEN SATURATION: 100 % | HEIGHT: 70 IN | HEART RATE: 84 BPM | BODY MASS INDEX: 26.61 KG/M2 | DIASTOLIC BLOOD PRESSURE: 69 MMHG

## 2022-03-11 DIAGNOSIS — Z51.11 CHEMOTHERAPY MANAGEMENT, ENCOUNTER FOR: Primary | ICD-10-CM

## 2022-03-11 DIAGNOSIS — D50.9 IRON DEFICIENCY ANEMIA, UNSPECIFIED IRON DEFICIENCY ANEMIA TYPE: ICD-10-CM

## 2022-03-11 DIAGNOSIS — C34.91 ADENOCARCINOMA OF RIGHT LUNG (HCC): ICD-10-CM

## 2022-03-11 DIAGNOSIS — R91.8 RIGHT LOWER LOBE PULMONARY INFILTRATE: ICD-10-CM

## 2022-03-11 DIAGNOSIS — Z71.89 CARE PLAN DISCUSSED WITH PATIENT: ICD-10-CM

## 2022-03-11 DIAGNOSIS — R53.83 FATIGUE DUE TO TREATMENT: ICD-10-CM

## 2022-03-11 DIAGNOSIS — Z51.11 CHEMOTHERAPY MANAGEMENT, ENCOUNTER FOR: ICD-10-CM

## 2022-03-11 DIAGNOSIS — D64.81 ANTINEOPLASTIC CHEMOTHERAPY INDUCED ANEMIA: ICD-10-CM

## 2022-03-11 DIAGNOSIS — Z51.12 ENCOUNTER FOR ANTINEOPLASTIC IMMUNOTHERAPY: ICD-10-CM

## 2022-03-11 DIAGNOSIS — T45.1X5A ANTINEOPLASTIC CHEMOTHERAPY INDUCED ANEMIA: ICD-10-CM

## 2022-03-11 LAB
ALBUMIN SERPL-MCNC: 4.4 G/DL (ref 3.5–5.2)
ALP BLD-CCNC: 91 U/L (ref 40–130)
ALT SERPL-CCNC: 15 U/L (ref 21–72)
ANION GAP SERPL CALCULATED.3IONS-SCNC: 13 MMOL/L (ref 7–19)
AST SERPL-CCNC: 28 U/L (ref 17–59)
BASOPHILS ABSOLUTE: 0.04 K/UL (ref 0.01–0.08)
BASOPHILS RELATIVE PERCENT: 0.6 % (ref 0.1–1.2)
BILIRUB SERPL-MCNC: <0.2 MG/DL (ref 0.2–1.3)
BUN BLDV-MCNC: 23 MG/DL (ref 9–20)
CALCIUM SERPL-MCNC: 8.9 MG/DL (ref 8.4–10.2)
CHLORIDE BLD-SCNC: 104 MMOL/L (ref 98–111)
CO2: 26 MMOL/L (ref 22–29)
CREAT SERPL-MCNC: 1.2 MG/DL (ref 0.6–1.2)
EOSINOPHILS ABSOLUTE: 0.29 K/UL (ref 0.04–0.54)
EOSINOPHILS RELATIVE PERCENT: 4 % (ref 0.7–7)
GFR NON-AFRICAN AMERICAN: 58
GLOBULIN: 3.2 G/DL
GLUCOSE BLD-MCNC: 187 MG/DL (ref 74–106)
HCT VFR BLD CALC: 34 % (ref 40.1–51)
HEMOGLOBIN: 11.7 G/DL (ref 13.7–17.5)
LYMPHOCYTES ABSOLUTE: 1.76 K/UL (ref 1.18–3.74)
LYMPHOCYTES RELATIVE PERCENT: 24.3 % (ref 19.3–53.1)
MCH RBC QN AUTO: 32.7 PG (ref 25.7–32.2)
MCHC RBC AUTO-ENTMCNC: 34.4 G/DL (ref 32.3–36.5)
MCV RBC AUTO: 95 FL (ref 79–92.2)
MONOCYTES ABSOLUTE: 0.54 K/UL (ref 0.24–0.82)
MONOCYTES RELATIVE PERCENT: 7.4 % (ref 4.7–12.5)
NEUTROPHILS ABSOLUTE: 4.62 K/UL (ref 1.56–6.13)
NEUTROPHILS RELATIVE PERCENT: 63.7 % (ref 34–71.1)
PDW BLD-RTO: 14.6 % (ref 11.6–14.4)
PLATELET # BLD: 238 K/UL (ref 163–337)
PMV BLD AUTO: 9.3 FL (ref 7.4–10.4)
POTASSIUM SERPL-SCNC: 4.5 MMOL/L (ref 3.5–5.1)
RBC # BLD: 3.58 M/UL (ref 4.63–6.08)
SODIUM BLD-SCNC: 143 MMOL/L (ref 137–145)
TOTAL PROTEIN: 7.6 G/DL (ref 6.3–8.2)
TSH SERPL DL<=0.05 MIU/L-ACNC: 2.96 UIU/ML (ref 0.27–4.2)
WBC # BLD: 7.25 K/UL (ref 4.23–9.07)

## 2022-03-11 PROCEDURE — 6360000002 HC RX W HCPCS: Performed by: NURSE PRACTITIONER

## 2022-03-11 PROCEDURE — 36415 COLL VENOUS BLD VENIPUNCTURE: CPT

## 2022-03-11 PROCEDURE — 85025 COMPLETE CBC W/AUTO DIFF WBC: CPT

## 2022-03-11 PROCEDURE — 96413 CHEMO IV INFUSION 1 HR: CPT

## 2022-03-11 PROCEDURE — 1036F TOBACCO NON-USER: CPT | Performed by: INTERNAL MEDICINE

## 2022-03-11 PROCEDURE — 1123F ACP DISCUSS/DSCN MKR DOCD: CPT | Performed by: INTERNAL MEDICINE

## 2022-03-11 PROCEDURE — G8428 CUR MEDS NOT DOCUMENT: HCPCS | Performed by: INTERNAL MEDICINE

## 2022-03-11 PROCEDURE — G8417 CALC BMI ABV UP PARAM F/U: HCPCS | Performed by: INTERNAL MEDICINE

## 2022-03-11 PROCEDURE — 99214 OFFICE O/P EST MOD 30 MIN: CPT | Performed by: INTERNAL MEDICINE

## 2022-03-11 PROCEDURE — 80053 COMPREHEN METABOLIC PANEL: CPT

## 2022-03-11 PROCEDURE — 4040F PNEUMOC VAC/ADMIN/RCVD: CPT | Performed by: INTERNAL MEDICINE

## 2022-03-11 PROCEDURE — 2580000003 HC RX 258: Performed by: NURSE PRACTITIONER

## 2022-03-11 PROCEDURE — G8484 FLU IMMUNIZE NO ADMIN: HCPCS | Performed by: INTERNAL MEDICINE

## 2022-03-11 RX ORDER — HEPARIN SODIUM (PORCINE) LOCK FLUSH IV SOLN 100 UNIT/ML 100 UNIT/ML
500 SOLUTION INTRAVENOUS PRN
Status: DISCONTINUED | OUTPATIENT
Start: 2022-03-11 | End: 2022-03-12 | Stop reason: HOSPADM

## 2022-03-11 RX ORDER — SODIUM CHLORIDE 0.9 % (FLUSH) 0.9 %
5-40 SYRINGE (ML) INJECTION PRN
Status: CANCELLED | OUTPATIENT
Start: 2022-03-11

## 2022-03-11 RX ORDER — SODIUM CHLORIDE 0.9 % (FLUSH) 0.9 %
5-40 SYRINGE (ML) INJECTION PRN
Status: DISCONTINUED | OUTPATIENT
Start: 2022-03-11 | End: 2022-03-12 | Stop reason: HOSPADM

## 2022-03-11 RX ORDER — EPINEPHRINE 1 MG/ML
0.3 INJECTION, SOLUTION, CONCENTRATE INTRAVENOUS PRN
Status: CANCELLED | OUTPATIENT
Start: 2022-03-11

## 2022-03-11 RX ORDER — SODIUM CHLORIDE 9 MG/ML
20 INJECTION, SOLUTION INTRAVENOUS ONCE
Status: CANCELLED | OUTPATIENT
Start: 2022-03-11 | End: 2022-03-11

## 2022-03-11 RX ORDER — SODIUM CHLORIDE 9 MG/ML
INJECTION, SOLUTION INTRAVENOUS CONTINUOUS
Status: CANCELLED | OUTPATIENT
Start: 2022-03-11

## 2022-03-11 RX ORDER — METHYLPREDNISOLONE SODIUM SUCCINATE 125 MG/2ML
125 INJECTION, POWDER, LYOPHILIZED, FOR SOLUTION INTRAMUSCULAR; INTRAVENOUS ONCE
Status: CANCELLED | OUTPATIENT
Start: 2022-03-11 | End: 2022-03-11

## 2022-03-11 RX ORDER — SODIUM CHLORIDE 9 MG/ML
25 INJECTION, SOLUTION INTRAVENOUS PRN
Status: CANCELLED | OUTPATIENT
Start: 2022-03-11

## 2022-03-11 RX ORDER — HEPARIN SODIUM (PORCINE) LOCK FLUSH IV SOLN 100 UNIT/ML 100 UNIT/ML
500 SOLUTION INTRAVENOUS PRN
Status: CANCELLED | OUTPATIENT
Start: 2022-03-11

## 2022-03-11 RX ORDER — SODIUM CHLORIDE 9 MG/ML
20 INJECTION, SOLUTION INTRAVENOUS ONCE
Status: COMPLETED | OUTPATIENT
Start: 2022-03-11 | End: 2022-03-12

## 2022-03-11 RX ORDER — DIPHENHYDRAMINE HYDROCHLORIDE 50 MG/ML
50 INJECTION INTRAMUSCULAR; INTRAVENOUS ONCE
Status: CANCELLED | OUTPATIENT
Start: 2022-03-11 | End: 2022-03-11

## 2022-03-11 RX ADMIN — SODIUM CHLORIDE 20 ML/HR: 9 INJECTION, SOLUTION INTRAVENOUS at 14:22

## 2022-03-11 RX ADMIN — HEPARIN 500 UNITS: 100 SYRINGE at 15:29

## 2022-03-11 RX ADMIN — SODIUM CHLORIDE, PRESERVATIVE FREE 10 ML: 5 INJECTION INTRAVENOUS at 15:29

## 2022-03-11 RX ADMIN — SODIUM CHLORIDE 1500 MG: 9 INJECTION, SOLUTION INTRAVENOUS at 14:24

## 2022-03-12 RX ORDER — LEVOFLOXACIN 750 MG/1
750 TABLET ORAL DAILY
Qty: 10 TABLET | Refills: 0 | Status: SHIPPED | OUTPATIENT
Start: 2022-03-12 | End: 2022-03-22

## 2022-03-17 LAB — METHYLMALONIC ACID: 0.19 UMOL/L (ref 0–0.4)

## 2022-04-04 NOTE — PROGRESS NOTES
MEDICAL ONCOLOGY PROGRESS NOTE    Pt Name: Evelyn Mendosa  MRN: [id number]  YOB: 1940  Date of evaluation: 4/9/2022    HISTORY OF PRESENT ILLNESS:    Reason for MD visit-disease management/toxicity assessment  The patient is a very pleasant 79years old male who has been diagnosed with stage IIIa non-small cell lung cancer, adenocarcinoma subtype. He is a status post completion of chemoradiation in June 2021. He has been tolerating treatment with complaints of persistent fatigue. Reports weight loss 4 pounds. Diagnosis  · RUL adenocarcinoma, NSCLC, Dec 2020  · [id number], stage IIIA  · Systolic heart failure  · Hypertension, controlled  · Not a surgical candidate    Treatment Summary  · 4/21/2021-5/26/21 completed concurrent chemoradiation with carboplatin/Taxol  · 4/22/21-6/3/21- 6600 cGy radiation therapy completed  · 7/8/21- initiate maintenance Durvalumab 1500mg every 4 weeks x12 cycles    Hematology/Cancer History:  Willy Hodges was first seen by me on 4/7/2021 referred by Dr. Romain Randall, [facility] [place] for findings of non-small cell lung cancer, adenocarcinoma type. He has several comorbidities including history of type 2 diabetes, hypertension COPD. History of smoking in the past.  Quit many years ago. History of coronary artery disease followed by cardiology biopsy. Last EF 70% in 2018. He was seen by his primary care provider in November 2020. He has complaint of chest pain. Chest x-ray showed a 4 cm mass in the right upper lung. · 12/1/20 CT chest [facility]): Large right upper lobe mass concerning for primary neoplasm. Enlarged right hilar lymph node concerning for metastatic disease. Additional noncalcified pulmonary nodules bilaterally for which follow-up CT is recommended in 3-6 months. Atherosclerosis of the aorta and coronary arteries.   · 12/16/2020-bronchoscopy with bronchoalveolar lavage was nondiagnostic  · 2/23/21 CT cheat w/o [facility]): Probable right upper lobe mass, as described. This appears slightly larger in size and is likely neoplastic. Consider PET CT follow-up. There is new surrounding infiltrate that extends inferiorly into the right upper lobe. The new may be infectious or inflammatory. Pulmonary hemorrhage possible. Other areas of nodularity and groundglass opacity/nodularity are stable in appearance. · 3/5/2021-navigational bronchoscopy with biopsy was nondiagnostic  · 3/5/21 CT chest w/o [facility]): [place] soft tissue mass within the right upper lobe highly suspicious for neoplasm. There is associated postobstructive pneumonitis within the right upper lobe showing mild progression from the previous study of 2/23/2021. There is no associated hilar or mediastinal lymphadenopathy. Today's study is performed as part of a navigational bronchoscopy exam. Other scattered nodules including groundglass nodules are noted within the lungs all stable from the previous exam warranting follow-up. Heavy atheromatous calcification of the thoracic aorta and proximal great vessels. Coronary calcifications are present. · 3/23/21 PET scan [facility]): Markedly FDG avid right upper lobe mass in the right upper lobe measuring 7.2 cm triangular opacity with maximum SUV 12.18, highly concerning for malignancy. Adjacent groundglass opacities,  similar compared to 3/5/2021, which could represent additional  malignancy or infection/inflammation. Adjacent right upper lobe groundglass opacities are similar compared to 3/5/2021, which could represent additional malignancy or infection/inflammation. Other non-FDG avid pulmonary findings as above. No evidence of jasmin or distant metastatic disease. · 3/29/21 Navigational bronchoscopy-Dr. Alisa Molina: Bronchoalveolar lavage RUL consistent with adenocarcinoma. RUL transbronchial bipsy consistent with adenocarcinoma. FNA biopsy of several jasmin station negative for metastatic disease. · 4/7/2021-he was first seen by me.   · 4/15/21 2D echo: Normal left ventricular size with reduced global systolic function EF 05-16%. Mild concentric left ventricular hypertrophy with mild [grade 1] diastolic dysfunction. Normal left atrial size. Mild thickening of a poorly visualized aortic valve without evidence of stenosis or insufficiency. Mild thickening of a normally mobile mitral valve with mild regurgitation. Nonvisualization pulmonic valve. Poorly visualized but apparently normal size right-sided chambers with preserved right ventricular systolic function. No tricuspid regurgitation with which to estimate RVSP. No significant pericardial effusion. Aortic root and ascending segments measured within normal limits. · 4/17/21 MRI brain: No acute intracranial process. No metastatic disease identified in the CNS. · 4/21/2021-initiation of carboplatin/Taxol weekly. Reviewed MRI brain 2D echo. · 4/21/2021-5/26/21 completed concurrent chemoradiation with carboplatin/taxol to be followed by immunotherapy  · 4/22/21-6/3/21 6600 cGy radiation therapy completed  · 6/30/21-CT CHEST W CONTRAST A large spiculated mass in the right upper lobe posterior segment represent a neoplastic process. The groundglass opacity/infiltrate in the right upper lobe and superior segments of the lower lobes bilaterally may represent an inflammatory or neoplastic/metastatic process. A single enlarged abnormal lymph node in the right hilum may represent a metastatic node. · 07/08/21-reviewed CT chest with perform radiologist.  · 7/8/21- initiate maintenance Durvalumab 1500mg every 4 weeks x12 cycles  · 9/24/2021 CT Chest w/ Contrast([facility]) Decreased size of right upper lobe triangular opacity with surrounding probable posttreatment changes. Similar size left upper lobe 1 cm groundglass and solid pulmonary opacity/nodule compared to 3/23/2021. Right hilum with a 1.4 x 1.2 cm lymph node or conglomeration of lymph nodes, which appears similar to 11/30/2020.   Diffusely heterogeneous bone mineralization, which is nonspecific. This could be seen with aggressive osteopenia or infiltrative process. · 10/01/21-I reviewed the results CT chest.  Interval treatment response. Continue adjuvant durvalumab. · 10/26/2021 Anemia Labs: Iron 33, TIBC 240, Iron Sat 14, Ferritin 72.6, Vit ,  · 11/4/2021 Blood Occult Studies 3/3 Negative  · 11/10/2021 MMA 0.19  · 11/30/2021 CT Chest w/ ContrastThe right upper lobe neoplasm is not as well-defined on the current study with increased consolidation within the adjacent lung parenchyma as well as adjacent consolidation with air bronchograms. I suspect this represents postradiation change with adjacent post radiation pneumonitis. It extends to the level of the upper pleura with associated thickening of the visceral and parietal pleura. I see no evidence of khai chest wall invasion. As noted on the previous examination there has been some extension of the neoplasm across the posterior aspect of the major fissure into the superior segment of the right lower lobe with this component of the neoplasm also demonstrating increased consolidation tracking along the lower margin of the major fissure. Stable foci of groundglass consolidation within the left lung. No new lesions are present. There are changes of centrilobular emphysema with hyperinflation of the lung parenchyma. 3. Heavy coronary artery calcifications are present. No new enlarged mediastinal or axillary adenopathy is demonstrated. .  · 11/30/2021 CT Abd/Pelvis w/ IV Contrast (oral)Changes of centrilobular emphysema within the lung bases. Coronary calcifications are present. There is a trace pericardial effusion or pericardial thickening demonstrated. 2. No evidence of metastatic disease to the abdomen or pelvis. The adrenals are unremarkable. 3. There is a small 2 mm distal right ureteral stone just above the UVJ.  This does not result in significant obstructive uropathy without evidence of asymmetric distention of the ureter or asymmetric enhancement of the right kidney in comparison with the left. No evidence of nephrolithiasis within the upper tracts of either kidney. The left ureter is decompressed and normal in appearance. 4. Mild constipation. There is no obstruction or free air. The appendix is identified and is normal in appearance. 5. Small fat-containing periumbilical hernia. Small bilateral  fat-containing inguinal hernias are also present. .   · 12/3/2021-I reviewed CT chest/abdomen/pelvis. Essentially no gross evidence of disease progression. Continue durvalumab and repeat CT scan in 6 weeks  · 2/18/2022 CT Chest W ContrastStable right upper lobe volume loss. There is decreasing right perihilar consolidation. Stable 1 cm short axis diameter right superior hilar lymph node. No mediastinal lymphadenopathy is seen. Stable areas of groundglass opacity in the left upper lobe and left lower lobe. Continued follow-up recommended. New moderate patchy groundglass infiltrates in the right lower lobe may be infectious or inflammatory. Post radiation change considered as well although the infiltrates are somewhat peripherally located. Atheromatous disease of the thoracic aorta and coronary arteries. Cardiomegaly. Please see the abdomen and pelvis CT report separately. · 2/18/2022 CT Abd/Pelvis W IV Contrast(Oral) No acute abnormality of the abdomen are pelvis particularly, no findings to suggest a neoplastic process or metastatic disease. A groundglass infiltrate in the right lower lung which was not seen in there previous study. This is represent an acute or subacute inflammatory/infectious process. This data be further evaluated. A persistent and unchanged 2 mm non obstructing calculus in the right distal ureter, 5 mm from the right UV junction. No change. · 3/11/2022-I reviewed results of CT chest abdomen pelvis. Essentially, decreasing right perihilar consolidation. No evidence of progressive disease.   New onset consolidation right lower lobe. As per radiology report it looks inflammatory/infectious. Patient prescribed a trial of Levaquin 750 mg x 10 days. We will repeat CT scans in 2 months. Continue durvalumab.       Past Medical History:  Past Medical History:   Diagnosis Date    Allergic rhinitis     Bronchitis, chronic (HCC)     Carotid artery stenosis     GERD (gastroesophageal reflux disease)     Heart attack ([facility] [address]     Hemorrhoids     Hypercholesterolemia     Type II or unspecified type diabetes mellitus without mention of complication, not stated as uncontrolled        Past Surgical History:    Past Surgical History:   Procedure Laterality Date    CARDIAC SURGERY      balloon surgery (angioplasty)    PORT SURGERY Right 04/01/2021       Social History:   Marital status: , 2 daughters  Smoking status: Former smoker for ~30 years, 1.5 ppd  ETOH status: No  Resides: [place], [place]    Family History:   Family History   Problem Relation Age of Onset    Diabetes Mother     High Blood Pressure Mother     Cancer Father         Lung     Current Hospital Medications:    Current Outpatient Medications   Medication Sig Dispense Refill    metFORMIN (GLUCOPHAGE) 1000 MG tablet TAKE 1 TABLET TWICE A DAY FOR DIABETES 180 tablet 3    promethazine-dextromethorphan (PROMETHAZINE-DM) 6.25-15 MG/5ML syrup 1 teaspoon by mouth at bedtime as needed for severe cough 240 mL 0    ondansetron (ZOFRAN ODT) 4 MG disintegrating tablet Take 1 tablet by mouth every 6 hours as needed for Nausea or Vomiting 30 tablet 5    simvastatin (ZOCOR) 40 MG tablet TAKE 1 TABLET AT BEDTIME FOR CHOLESTEROL 90 tablet 3    omeprazole (PRILOSEC) 40 MG delayed release capsule TAKE 1 CAPSULE DAILY FOR STOMACH 90 capsule 3    propranolol (INDERAL) 60 MG tablet Take 1 tablet by mouth 2 times daily For blood pressure and tremor 180 tablet 3    HYDROcodone-acetaminophen (NORCO) 7.5-325 MG per tablet       metoprolol tartrate (LOPRESSOR) 50 MG tablet Take 50 mg by mouth daily      montelukast (SINGULAIR) 10 MG tablet       ondansetron (ZOFRAN) 8 MG tablet Take 1 tablet by mouth every 8 hours as needed for Nausea or Vomiting 30 tablet 3    lidocaine-prilocaine (EMLA) 2.5-2.5 % cream Apply topically to port area and cover with plastic wrap one hour prior to treatment. 1 Tube 1    furosemide (LASIX) 40 MG tablet TAKE 1 TABLET DAILY FOR FLUID 90 tablet 3    blood glucose test strips (TRUE METRIX BLOOD GLUCOSE TEST) strip TEST BLOOD SUGAR ONCE DAILY *E11.9* 100 strip 5    isosorbide mononitrate (IMDUR) 60 MG extended release tablet Take 60 mg by mouth      losartan (COZAAR) 25 MG tablet Take 25 mg by mouth      umeclidinium-vilanterol (ANORO ELLIPTA) 62.5-25 MCG/INH AEPB inhaler Inhale 1 puff into the lungs daily      primidone (MYSOLINE) 50 MG tablet 1 tab po BID for tremor 180 tablet 3    triamcinolone (KENALOG) 0.1 % cream Apply topically 2 times daily. To lower leg dry skin 3 Tube 3    tamsulosin (FLOMAX) 0.4 MG capsule Take 1 capsule by mouth daily. 90 capsule 3    aspirin 325 MG tablet Take 325 mg by mouth daily. No current facility-administered medications for this visit.        Allergies: No Known Allergies      Subjective     REVIEW OF SYSTEMS:   CONSTITUTIONAL: no fever, no night sweats, fatigue; weight loss 4 pounds  HEENT: no blurring of vision, no double vision, no hearing difficulty, no tinnitus, no ulceration, no dysplasia, no epistaxis;   LUNGS: no cough, no hemoptysis, no wheeze,  no shortness of breath;  CARDIOVASCULAR: no palpitation, no chest pain, no shortness of breath;  GI: no abdominal pain, no nausea, no vomiting, no diarrhea, no constipation;  JESSIKA: no dysuria, no hematuria, no frequency or urgency, no nephrolithiasis;  MUSCULOSKELETAL: no joint pain, no swelling, no stiffness;  ENDOCRINE: no polyuria, no polydipsia, no cold or heat intolerance;  HEMATOLOGY: no easy bruising or bleeding, no history of clotting disorder;  DERMATOLOGY: no skin rash, no eczema, no pruritus;  PSYCHIATRY: no depression, no anxiety, no panic attacks, no suicidal ideation, no homicidal ideation;  NEUROLOGY: no syncope, no seizures, no numbness or tingling of hands, no numbness or tingling of feet, no paresis;        Objective   BP (!) 173/73   Pulse 78   Temp 98.5 °F (36.9 °C)   Resp 18   Ht 5' 10\" (1.778 m)   Wt 181 lb (82.1 kg)   SpO2 100%   BMI 25.97 kg/m²     Wt Readings from Last 3 Encounters:   04/08/22 181 lb (82.1 kg)   03/11/22 185 lb 14.4 oz (84.3 kg)   01/14/22 186 lb 6.4 oz (84.6 kg)         PHYSICAL EXAM:  CONSTITUTIONAL: Alert, appropriate, no acute distress  EYES: Non icteric, EOM intact, pupils equal round   ENT: Mucus membranes moist, no oral pharyngeal lesions, external inspection of ears and nose are normal.  NECK: Supple, no masses. No palpable thyroid mass  CHEST/LUNGS: CTA bilaterally, normal respiratory effort   CARDIOVASCULAR: RRR, no murmurs. No lower extremity edema  ABDOMEN: soft non-tender, active bowel sounds, no HSM. No palpable masses  EXTREMITIES: warm, full ROM in all 4 extremities, no focal weakness. SKIN: warm, dry with no rashes or lesions  LYMPH: No cervical, clavicular, axillary, or inguinal lymphadenopathy  NEUROLOGIC: follows commands, non focal   PSYCH: mood and affect appropriate.   Alert and oriented to time, place, person      LABORATORY RESULTS REVIEWED/ANALYZED BY ME:  Lab Results   Component Value Date    WBC 7.50 04/08/2022    HGB 11.6 (L) 04/08/2022    HCT 34.8 (L) 04/08/2022    MCV 95.9 (H) 04/08/2022     04/08/2022     Lab Results   Component Value Date    NEUTROABS 4.62 03/11/2022     Lab Results   Component Value Date     04/08/2022    K 4.8 04/08/2022     04/08/2022    CO2 24 04/08/2022    BUN 24 (H) 04/08/2022    CREATININE 1.1 04/08/2022    GLUCOSE 112 (H) 04/08/2022    CALCIUM 9.3 04/08/2022    PROT 7.6 04/08/2022    LABALBU 4.3 04/08/2022    BILITOT 0.6 04/08/2022    ALKPHOS 81 04/08/2022    AST 25 04/08/2022    ALT 17 (L) 04/08/2022    LABGLOM >60 04/08/2022    GFRAA 54 (L) 06/03/2021    GLOB 3.3 04/08/2022         RADIOLOGY STUDIES REVIEWED BY ME:  None    ASSESSMENT:    Orders Placed This Encounter   Procedures    CT ABDOMEN PELVIS W IV CONTRAST Additional Contrast? Oral     Standing Status:   Future     Standing Expiration Date:   4/8/2023     Order Specific Question:   Additional Contrast?     Answer:   Oral     Order Specific Question:   STAT Creatinine as needed:     Answer:   No     Order Specific Question:   Reason for exam:     Answer:   assess response to treatment    CT CHEST W CONTRAST     Standing Status:   Future     Standing Expiration Date:   4/8/2023     Order Specific Question:   STAT Creatinine as needed:     Answer:   No     Order Specific Question:   Reason for exam:     Answer:   assess response to treatment      Clemente Suarez was seen today for lung cancer. Diagnoses and all orders for this visit:    Adenocarcinoma of right lung ([facility] [address]  -     CT ABDOMEN PELVIS W IV CONTRAST Additional Contrast? Oral; Future  -     CT CHEST W CONTRAST; Future    Fatigue due to treatment    Encounter for antineoplastic immunotherapy    Weight loss       [id number], stge IIIA, NSCLC, adenocarcinoma subtype  Essentially stage III disease. The patient is not a surgical candidate. Recommend chemoradiation followed by immunotherapy. Status post completion of chemoradiation. Currently receiving adjuvant durvalumab. Reviewed CT chest that showed interval response. Continue durvalumab. Status post completion of chemoradiation. CT chest showed interval response   Recommended:  Durvalumab 1500 mg IV every 4 weeks x12 dose    Proceed cycle #8/12. Discussed treatment plan with the patient and daughter. Good tolerance to treatment except for mild fatigue. 3/11/2022-I reviewed results of CT chest abdomen pelvis. Essentially, decreasing right perihilar consolidation. No evidence of progressive disease. New onset consolidation right lower lobe. As per radiology report it looks inflammatory/infectious. Patient prescribed a trial of Levaquin 750 mg x 10 days. We will repeat CT scans in 2 months (May). Continue durvalumab. Treatment related side effects-occasional diarrhea, grade 1, fatigue    Systolic heart failure-EF 45%  2D echo-LVEF 40-45%. Mild LVH, Mild [grade 1] diastolic dysfunction. Uncontrolled hypertension-  BP (!) 173/73   Pulse 78   Temp 98.5 °F (36.9 °C)   Resp 18   Ht 5' 10\" (1.778 m)   Wt 181 lb (82.1 kg)   SpO2 100%   BMI 25.97 kg/m²      At risk thyroid disorder-  Lab Results   Component Value Date    TSH 2.960 03/11/2022   Continue to monitor    Normocytic anemia-likely multifactorial anemia to include anemia of inflammation/iron deficiency. Cannot rule out GI bleed given history taking aspirin and NSAIDs toxicity  Hemoglobin 9.8/MCV 93  Ferritin 72, iron saturation 14%, iron 33, TIBC 240, vitamin B12 249  Occult blood stool x3-all 3 negative  Of note, patient has been on high-dose aspirin 325 mg p.o. daily. He was recently seen by cardiology and request to take daily aspirin only. 10/29/2021-ferritin 72, iron saturation 14%, TIBC 240, vitamin B12 249, methylmalonic acid normal 0.19. November 2021-IV Venofer 1000 mg  2/11/2022 -Hemoglobin 11. 6. Improved  3/11/2022-hemoglobin 11.6  Lab Results   Component Value Date    WBC 7.50 04/08/2022    HGB 11.6 (L) 04/08/2022    HCT 34.8 (L) 04/08/2022    MCV 95.9 (H) 04/08/2022     04/08/2022         Right lower lobe consolidation-trial of Levaquin x10 days with very poor tolerance. Differential diagnosis also to include immunotherapy pneumonitis versus radiation pneumonitis vs recurrence. If no improvement with antibiotics then a trial of steroids may be pursued after PET scan r/o malignancy.      PLAN:  · RTC with MD in treatment room in 4 weeks  · C#10/12 Durvalumab today and every 4 weeks  · CBC CMP today  · CT chest, abd/pelvis prior to next treatment  · Monitor BP at home and record  · Continue Follow-up appointments with PCP for BP          IOri am pre charting  as Medical Assistant for Prasad Aquino MD. Electronically signed by Ori Logan MA on 4/9/2022 at 3:08 PM CDT. Susan Jacobson am scribing for Prasad Aquino MD. Electronically signed by Desire Newman RN on 4/9/2022 at 2:26 PM CDT. I, Dr Kirk Ruiz, personally performed the services described in this documentation as scribed by Desire Newman, NANCY in my presence and is both accurate and complete. I have seen, examined and reviewed this patient medication list, appropriate labs and imaging studies. I reviewed relevant medical records and others physicians notes. I discussed the plans of care with the patient. I answered all the questions to the patients satisfaction. I have also reviewed the chief complaint (CC) and part of the history (History of Present Illness (HPI), Past Family Social History [facility]), or Review of Systems (ROS) and made changes when appropriated.        (Please note that portions of this note were completed with a voice recognition program. Efforts were made to edit the dictations but occasionally words are mis-transcribed.)    Electronically signed by Prasad Aquino MD on 4/9/2022 at 9:09 AM

## 2022-04-08 ENCOUNTER — HOSPITAL ENCOUNTER (OUTPATIENT)
Dept: INFUSION THERAPY | Age: 82
Discharge: HOME | End: 2022-04-08
Payer: MEDICARE

## 2022-04-08 ENCOUNTER — OFFICE VISIT (OUTPATIENT)
Dept: HEMATOLOGY | Age: 82
End: 2022-04-08
Payer: MEDICARE

## 2022-04-08 VITALS
HEIGHT: 70 IN | SYSTOLIC BLOOD PRESSURE: 173 MMHG | RESPIRATION RATE: 18 BRPM | WEIGHT: 181 LBS | OXYGEN SATURATION: 100 % | DIASTOLIC BLOOD PRESSURE: 73 MMHG | HEART RATE: 78 BPM | BODY MASS INDEX: 25.91 KG/M2 | TEMPERATURE: 98.5 F

## 2022-04-08 DIAGNOSIS — Z51.12 ENCOUNTER FOR ANTINEOPLASTIC IMMUNOTHERAPY: ICD-10-CM

## 2022-04-08 DIAGNOSIS — C34.91 ADENOCARCINOMA OF RIGHT LUNG (HCC): Primary | ICD-10-CM

## 2022-04-08 DIAGNOSIS — R63.4 WEIGHT LOSS: ICD-10-CM

## 2022-04-08 DIAGNOSIS — R53.83 FATIGUE DUE TO TREATMENT: ICD-10-CM

## 2022-04-08 LAB
ALBUMIN SERPL-MCNC: 4.3 G/DL (ref 3.5–5.2)
ALP BLD-CCNC: 81 U/L (ref 40–130)
ALT SERPL-CCNC: 17 U/L (ref 21–72)
ANION GAP SERPL CALCULATED.3IONS-SCNC: 9 MMOL/L (ref 7–19)
AST SERPL-CCNC: 25 U/L (ref 17–59)
BILIRUB SERPL-MCNC: 0.6 MG/DL (ref 0.2–1.3)
BUN BLDV-MCNC: 24 MG/DL (ref 9–20)
CALCIUM SERPL-MCNC: 9.3 MG/DL (ref 8.4–10.2)
CHLORIDE BLD-SCNC: 107 MMOL/L (ref 98–111)
CO2: 24 MMOL/L (ref 22–29)
CREAT SERPL-MCNC: 1.1 MG/DL (ref 0.6–1.2)
GFR NON-AFRICAN AMERICAN: >60
GLOBULIN: 3.3 G/DL
GLUCOSE BLD-MCNC: 112 MG/DL (ref 74–106)
HCT VFR BLD CALC: 34.8 % (ref 40.1–51)
HEMOGLOBIN: 11.6 G/DL (ref 13.7–17.5)
MCH RBC QN AUTO: 32 PG (ref 25.7–32.2)
MCHC RBC AUTO-ENTMCNC: 33.3 G/DL (ref 32.3–36.5)
MCV RBC AUTO: 95.9 FL (ref 79–92.2)
PDW BLD-RTO: 13.9 % (ref 11.6–14.4)
PLATELET # BLD: 271 K/UL (ref 163–337)
PMV BLD AUTO: 8.7 FL (ref 7.4–10.4)
POTASSIUM SERPL-SCNC: 4.8 MMOL/L (ref 3.5–5.1)
RBC # BLD: 3.63 M/UL (ref 4.63–6.08)
SODIUM BLD-SCNC: 140 MMOL/L (ref 137–145)
TOTAL PROTEIN: 7.6 G/DL (ref 6.3–8.2)
WBC # BLD: 7.5 K/UL (ref 4.23–9.07)

## 2022-04-08 PROCEDURE — 85027 COMPLETE CBC AUTOMATED: CPT

## 2022-04-08 PROCEDURE — 2580000003 HC RX 258: Performed by: INTERNAL MEDICINE

## 2022-04-08 PROCEDURE — 99214 OFFICE O/P EST MOD 30 MIN: CPT | Performed by: INTERNAL MEDICINE

## 2022-04-08 PROCEDURE — 4040F PNEUMOC VAC/ADMIN/RCVD: CPT | Performed by: INTERNAL MEDICINE

## 2022-04-08 PROCEDURE — 36415 COLL VENOUS BLD VENIPUNCTURE: CPT

## 2022-04-08 PROCEDURE — 1123F ACP DISCUSS/DSCN MKR DOCD: CPT | Performed by: INTERNAL MEDICINE

## 2022-04-08 PROCEDURE — 1036F TOBACCO NON-USER: CPT | Performed by: INTERNAL MEDICINE

## 2022-04-08 PROCEDURE — 6360000002 HC RX W HCPCS: Performed by: INTERNAL MEDICINE

## 2022-04-08 PROCEDURE — G8417 CALC BMI ABV UP PARAM F/U: HCPCS | Performed by: INTERNAL MEDICINE

## 2022-04-08 PROCEDURE — 96413 CHEMO IV INFUSION 1 HR: CPT

## 2022-04-08 PROCEDURE — G8428 CUR MEDS NOT DOCUMENT: HCPCS | Performed by: INTERNAL MEDICINE

## 2022-04-08 PROCEDURE — 80053 COMPREHEN METABOLIC PANEL: CPT

## 2022-04-08 RX ORDER — SODIUM CHLORIDE 9 MG/ML
20 INJECTION, SOLUTION INTRAVENOUS ONCE
Status: CANCELLED | OUTPATIENT
Start: 2022-04-08 | End: 2022-04-08

## 2022-04-08 RX ORDER — SODIUM CHLORIDE 9 MG/ML
INJECTION, SOLUTION INTRAVENOUS CONTINUOUS
Status: CANCELLED | OUTPATIENT
Start: 2022-04-08

## 2022-04-08 RX ORDER — EPINEPHRINE 1 MG/ML
0.3 INJECTION, SOLUTION, CONCENTRATE INTRAVENOUS PRN
Status: CANCELLED | OUTPATIENT
Start: 2022-04-08

## 2022-04-08 RX ORDER — SODIUM CHLORIDE 0.9 % (FLUSH) 0.9 %
5-40 SYRINGE (ML) INJECTION PRN
Status: CANCELLED | OUTPATIENT
Start: 2022-04-08

## 2022-04-08 RX ORDER — METHYLPREDNISOLONE SODIUM SUCCINATE 125 MG/2ML
125 INJECTION, POWDER, LYOPHILIZED, FOR SOLUTION INTRAMUSCULAR; INTRAVENOUS ONCE
Status: CANCELLED | OUTPATIENT
Start: 2022-04-08 | End: 2022-04-08

## 2022-04-08 RX ORDER — HEPARIN SODIUM (PORCINE) LOCK FLUSH IV SOLN 100 UNIT/ML 100 UNIT/ML
500 SOLUTION INTRAVENOUS PRN
Status: CANCELLED | OUTPATIENT
Start: 2022-04-08

## 2022-04-08 RX ORDER — SODIUM CHLORIDE 9 MG/ML
25 INJECTION, SOLUTION INTRAVENOUS PRN
Status: CANCELLED | OUTPATIENT
Start: 2022-04-08

## 2022-04-08 RX ORDER — DIPHENHYDRAMINE HYDROCHLORIDE 50 MG/ML
50 INJECTION INTRAMUSCULAR; INTRAVENOUS ONCE
Status: CANCELLED | OUTPATIENT
Start: 2022-04-08 | End: 2022-04-08

## 2022-04-08 RX ORDER — HEPARIN SODIUM (PORCINE) LOCK FLUSH IV SOLN 100 UNIT/ML 100 UNIT/ML
500 SOLUTION INTRAVENOUS PRN
Status: DISCONTINUED | OUTPATIENT
Start: 2022-04-08 | End: 2022-04-09 | Stop reason: HOSPADM

## 2022-04-08 RX ORDER — SODIUM CHLORIDE 0.9 % (FLUSH) 0.9 %
5-40 SYRINGE (ML) INJECTION PRN
Status: DISCONTINUED | OUTPATIENT
Start: 2022-04-08 | End: 2022-04-09 | Stop reason: HOSPADM

## 2022-04-08 RX ORDER — SODIUM CHLORIDE 9 MG/ML
20 INJECTION, SOLUTION INTRAVENOUS ONCE
Status: COMPLETED | OUTPATIENT
Start: 2022-04-08 | End: 2022-04-09

## 2022-04-08 RX ADMIN — SODIUM CHLORIDE, PRESERVATIVE FREE 10 ML: 5 INJECTION INTRAVENOUS at 15:44

## 2022-04-08 RX ADMIN — HEPARIN 500 UNITS: 100 SYRINGE at 15:44

## 2022-04-08 RX ADMIN — SODIUM CHLORIDE 1500 MG: 9 INJECTION, SOLUTION INTRAVENOUS at 14:41

## 2022-04-08 RX ADMIN — SODIUM CHLORIDE 20 ML/HR: 9 INJECTION, SOLUTION INTRAVENOUS at 14:39

## 2022-04-12 RX ORDER — FUROSEMIDE 40 MG/1
TABLET ORAL
Qty: 90 TABLET | Refills: 3 | Status: SHIPPED | OUTPATIENT
Start: 2022-04-12

## 2022-04-20 ENCOUNTER — OFFICE VISIT (OUTPATIENT)
Dept: PRIMARY CARE CLINIC | Age: 82
End: 2022-04-20
Payer: MEDICARE

## 2022-04-20 VITALS
WEIGHT: 181 LBS | HEIGHT: 70 IN | HEART RATE: 76 BPM | RESPIRATION RATE: 16 BRPM | TEMPERATURE: 97.7 F | DIASTOLIC BLOOD PRESSURE: 60 MMHG | OXYGEN SATURATION: 97 % | SYSTOLIC BLOOD PRESSURE: 130 MMHG | BODY MASS INDEX: 25.91 KG/M2

## 2022-04-20 DIAGNOSIS — L98.9 CHANGING SKIN LESION: Primary | ICD-10-CM

## 2022-04-20 PROCEDURE — 11104 PUNCH BX SKIN SINGLE LESION: CPT | Performed by: FAMILY MEDICINE

## 2022-04-20 ASSESSMENT — ENCOUNTER SYMPTOMS: RESPIRATORY NEGATIVE: 1

## 2022-04-20 NOTE — PROGRESS NOTES
SUBJECTIVE:    Aleja Britton is 79 y.o.male who comes in complaining of Lesion(s) (right forearm)   . HPI: Mr. Dayne Pickard came in today with his wife but he is concerned because there is a lesion on his right forearm that is been there for over a year. It is not going away and it really bothers him. He would like it removed. He feels good today. He denies any problems with local anesthetic agents. No Known Allergies    Social History     Socioeconomic History    Marital status:      Spouse name: Not on file    Number of children: Not on file    Years of education: Not on file    Highest education level: Not on file   Occupational History    Not on file   Tobacco Use    Smoking status: Former Smoker     Packs/day: 1.50     Years: 30.00     Pack years: 45.00     Types: Cigarettes     Quit date:      Years since quittin.3    Smokeless tobacco: Never Used   Substance and Sexual Activity    Alcohol use: No    Drug use: No    Sexual activity: Not on file   Other Topics Concern    Not on file   Social History Narrative    Not on file     Social Determinants of Health     Financial Resource Strain:     Difficulty of Paying Living Expenses: Not on file   Food Insecurity:     Worried About [address] in the Last Year: Not on file    [address] in the Last Year: Not on file   Transportation Needs:     Lack of Transportation (Medical): Not on file    Lack of Transportation (Non-Medical):  Not on file   Physical Activity:     Days of Exercise per Week: Not on file    Minutes of Exercise per Session: Not on file   Stress:     Feeling of Stress : Not on file   Social Connections:     Frequency of Communication with Friends and Family: Not on file    Frequency of Social Gatherings with Friends and Family: Not on file    Attends [employer] Services: Not on file    Active Member of Clubs or Organizations: Not on file    Attends Club or Organization Meetings: Not on file    Marital Status: Not on file   Intimate Partner Violence:     Fear of Current or Ex-Partner: Not on file    Emotionally Abused: Not on file    Physically Abused: Not on file    Sexually Abused: Not on file   Housing Stability:     Unable to Pay for Housing in the Last Year: Not on file    Number of [place] in the Last Year: Not on file    Unstable Housing in the Last Year: Not on file       Review of Systems   Constitutional: Negative for fever. Respiratory: Negative. Cardiovascular: Negative. Allergic/Immunologic: Negative for environmental allergies. Hematological: Does not bruise/bleed easily. Current Outpatient Medications on File Prior to Visit   Medication Sig Dispense Refill    furosemide (LASIX) 40 MG tablet TAKE 1 TABLET DAILY FOR FLUID 90 tablet 3    metFORMIN (GLUCOPHAGE) 1000 MG tablet TAKE 1 TABLET TWICE A DAY FOR DIABETES 180 tablet 3    promethazine-dextromethorphan (PROMETHAZINE-DM) 6.25-15 MG/5ML syrup 1 teaspoon by mouth at bedtime as needed for severe cough 240 mL 0    ondansetron (ZOFRAN ODT) 4 MG disintegrating tablet Take 1 tablet by mouth every 6 hours as needed for Nausea or Vomiting 30 tablet 5    simvastatin (ZOCOR) 40 MG tablet TAKE 1 TABLET AT BEDTIME FOR CHOLESTEROL 90 tablet 3    omeprazole (PRILOSEC) 40 MG delayed release capsule TAKE 1 CAPSULE DAILY FOR STOMACH 90 capsule 3    propranolol (INDERAL) 60 MG tablet Take 1 tablet by mouth 2 times daily For blood pressure and tremor 180 tablet 3    HYDROcodone-acetaminophen (NORCO) 7.5-325 MG per tablet       metoprolol tartrate (LOPRESSOR) 50 MG tablet Take 50 mg by mouth daily      montelukast (SINGULAIR) 10 MG tablet       ondansetron (ZOFRAN) 8 MG tablet Take 1 tablet by mouth every 8 hours as needed for Nausea or Vomiting 30 tablet 3    lidocaine-prilocaine (EMLA) 2.5-2.5 % cream Apply topically to port area and cover with plastic wrap one hour prior to treatment.  1 Tube 1    blood glucose test strips (TRUE METRIX BLOOD GLUCOSE TEST) strip TEST BLOOD SUGAR ONCE DAILY *E11.9* 100 strip 5    isosorbide mononitrate (IMDUR) 60 MG extended release tablet Take 60 mg by mouth      losartan (COZAAR) 25 MG tablet Take 25 mg by mouth      umeclidinium-vilanterol (ANORO ELLIPTA) 62.5-25 MCG/INH AEPB inhaler Inhale 1 puff into the lungs daily      primidone (MYSOLINE) 50 MG tablet 1 tab po BID for tremor 180 tablet 3    triamcinolone (KENALOG) 0.1 % cream Apply topically 2 times daily. To lower leg dry skin 3 Tube 3    tamsulosin (FLOMAX) 0.4 MG capsule Take 1 capsule by mouth daily. 90 capsule 3    aspirin 325 MG tablet Take 325 mg by mouth daily.  [DISCONTINUED] potassium chloride (K-DUR) 10 MEQ tablet TAKE 1 TABLET TWICE A  tablet 2     No current facility-administered medications on file prior to visit. OBJECTIVE:    Wt Readings from Last 3 Encounters:   04/20/22 181 lb (82.1 kg)   04/08/22 181 lb (82.1 kg)   03/11/22 185 lb 14.4 oz (84.3 kg)       /60   Pulse 76   Temp 97.7 °F (36.5 °C) (Temporal)   Resp 16   Ht 5' 10\" (1.778 m)   Wt 181 lb (82.1 kg)   SpO2 97%   BMI 25.97 kg/m²     Physical Exam  Vitals and nursing note reviewed. Constitutional:       General: He is not in acute distress. Appearance: He is well-developed. Cardiovascular:      Rate and Rhythm: Normal rate and regular rhythm. Heart sounds: Normal heart sounds. Pulmonary:      Effort: Pulmonary effort is normal.      Breath sounds: Normal breath sounds. Skin:     General: Skin is warm and dry. Neurological:      Mental Status: He is alert and oriented to person, place, and time. Psychiatric:         Behavior: Behavior normal.         Thought Content: Thought content normal.         Judgment: Judgment normal.         ASSESSMENT:    1. Changing skin lesion          PLAN:  1.  Changing skin lesion  -     66181-[id number] PUNCH BIOPSY SKIN SINGLE LESION  -     Surgical Pathology     Risks and benefits of the procedure were explained to the patient. The area was cleansed and anesthetized with 1-1/2 cc of 1% Xylocaine with epinephrine. A 10 mm punch biopsy was done. The lesion and the base were removed entirely. It was sent for pathology. 3 sutures of 5-0 Ethilon placed with good wound edge approximation. Patient tolerated procedure well. We will contact him as soon as we get results of pathology. Steri-Strips. return in 12 days for suture removal.    Follow-up:  Return in about 12 days (around 5/2/2022) for suture removal nurse. PATIENT INSTRUCTIONS:  Patient Instructions     We are committed to providing you with the best care possible. In order to help us achieve these goals please remember to bring all medications, herbal products, and over the counter supplements with you to each visit. If your provider has ordered testing for you, please be sure to follow up with our office if you have not received results within 7 days after the testing took place. *If you receive a survey after visiting one of our offices, please take time to share your experience concerning your physician office visit. These surveys are confidential and no health information about you is shared. We are eager to improve for you and we are counting on your feedback to help make that happen. Patient Education        Cuts Closed With Stitches: Care Instructions  Your Care Instructions  A cut can happen anywhere on your body. The doctor used stitches to close the cut. Using stitches also helps the cut heal and reduces scarring. Sometimes pieces of tape called Steri-Strips are putover the stitches. If the cut went deep and through the skin, the doctor may have put in two layers of stitches. The deeper layer brings the deep part of the cut together. These stitches will dissolve and don't need to be removed. The stitches in theupper layer are the ones you see on the cut.   You will probably have a bandage over the stitches. You will need to have thestitches removed, usually in 7 to 14 days. The doctor has checked you carefully, but problems can develop later. If you notice any problems or new symptoms, get medical treatment right away. Follow-up care is a key part of your treatment and safety. Be sure to make and go to all appointments, and call your doctor if you are having problems. It's also a good idea to know your test results and keep alist of the medicines you take. How can you care for yourself at home?  Keep the cut dry for the first 24 to 48 hours. After this, you can shower if your doctor okays it. Pat the cut dry.  Don't soak the cut, such as in a bathtub. Your doctor will tell you when it's safe to get the cut wet.  If your doctor told you how to care for your cut, follow your doctor's instructions. If you did not get instructions, follow this general advice:  ? After the first 24 to 48 hours, wash around the cut with clean water 2 times a day. Don't use hydrogen peroxide or alcohol, which can slow healing. ? You may cover the cut with a thin layer of petroleum jelly, such as Vaseline, and a nonstick bandage. ? Apply more petroleum jelly and replace the bandage as needed.  Prop up the sore area on a pillow anytime you sit or lie down during the next 3 days. Try to keep it above the level of your heart. This will help reduce swelling.  Avoid any activity that could cause your cut to reopen.  Do not remove the stitches on your own. Your doctor will tell you when to come back to have the stitches removed.  Leave Steri-Strips on until they fall off.  Be safe with medicines. Read and follow all instructions on the label. ? If the doctor gave you a prescription medicine for pain, take it as prescribed. ? If you are not taking a prescription pain medicine, ask your doctor if you can take an over-the-counter medicine. When should you call for help?    Call your doctor now or seek immediate medical care if:     You have new pain, or your pain gets worse.      The skin near the cut is cold or pale or changes color.      You have tingling, weakness, or numbness near the cut.      The cut starts to bleed, and blood soaks through the bandage. Oozing small amounts of blood is normal.      You have trouble moving the area near the cut.      You have symptoms of infection, such as:  ? Increased pain, swelling, warmth, or redness around the cut.  ? Red streaks leading from the cut.  ? Pus draining from the cut.  ? A fever. Watch closely for changes in your health, and be sure to contact your doctor if:     The cut reopens.      You do not get better as expected. Where can you learn more? Go to [email or web address] org and sign in to your [employer] account. Enter R217 in the [facility] box to learn more about \"Cuts Closed With Stitches: Care Instructions. \"     If you do not have an account, please click on the \"Sign Up Now\" link. Current as of: July 1, 2021               Content Version: 13.2  © 9221-2845 Healthwise, Incorporated. Care instructions adapted under license by [facility] ([facility]). If you have questions about a medical condition or this instruction, always ask your healthcare professional. [address] any warranty or liability for your use of this information. EMR Dragon/transcription disclaimer:  Much of this encounter note is electronic transcription/translation of spoken language to printed texts. The electronic translation of spoken language may be erroneous, or at times, nonsensical words or phrases may beinadvertently transcribed.   Although I have reviewed the note for such errors, some may still exist.

## 2022-04-20 NOTE — PATIENT INSTRUCTIONS
We are committed to providing you with the best care possible. In order to help us achieve these goals please remember to bring all medications, herbal products, and over the counter supplements with you to each visit. If your provider has ordered testing for you, please be sure to follow up with our office if you have not received results within 7 days after the testing took place. *If you receive a survey after visiting one of our offices, please take time to share your experience concerning your physician office visit. These surveys are confidential and no health information about you is shared. We are eager to improve for you and we are counting on your feedback to help make that happen. Patient Education        Cuts Closed With Stitches: Care Instructions  Your Care Instructions  A cut can happen anywhere on your body. The doctor used stitches to close the cut. Using stitches also helps the cut heal and reduces scarring. Sometimes pieces of tape called Steri-Strips are putover the stitches. If the cut went deep and through the skin, the doctor may have put in two layers of stitches. The deeper layer brings the deep part of the cut together. These stitches will dissolve and don't need to be removed. The stitches in theupper layer are the ones you see on the cut. You will probably have a bandage over the stitches. You will need to have thestitches removed, usually in 7 to 14 days. The doctor has checked you carefully, but problems can develop later. If you notice any problems or new symptoms, get medical treatment right away. Follow-up care is a key part of your treatment and safety. Be sure to make and go to all appointments, and call your doctor if you are having problems. It's also a good idea to know your test results and keep alist of the medicines you take. How can you care for yourself at home?  Keep the cut dry for the first 24 to 48 hours.  After this, you can shower if your doctor okays it. Pat the cut dry.  Don't soak the cut, such as in a bathtub. Your doctor will tell you when it's safe to get the cut wet.  If your doctor told you how to care for your cut, follow your doctor's instructions. If you did not get instructions, follow this general advice:  ? After the first 24 to 48 hours, wash around the cut with clean water 2 times a day. Don't use hydrogen peroxide or alcohol, which can slow healing. ? You may cover the cut with a thin layer of petroleum jelly, such as Vaseline, and a nonstick bandage. ? Apply more petroleum jelly and replace the bandage as needed.  Prop up the sore area on a pillow anytime you sit or lie down during the next 3 days. Try to keep it above the level of your heart. This will help reduce swelling.  Avoid any activity that could cause your cut to reopen.  Do not remove the stitches on your own. Your doctor will tell you when to come back to have the stitches removed.  Leave Steri-Strips on until they fall off.  Be safe with medicines. Read and follow all instructions on the label. ? If the doctor gave you a prescription medicine for pain, take it as prescribed. ? If you are not taking a prescription pain medicine, ask your doctor if you can take an over-the-counter medicine. When should you call for help? Call your doctor now or seek immediate medical care if:     You have new pain, or your pain gets worse.      The skin near the cut is cold or pale or changes color.      You have tingling, weakness, or numbness near the cut.      The cut starts to bleed, and blood soaks through the bandage. Oozing small amounts of blood is normal.      You have trouble moving the area near the cut.      You have symptoms of infection, such as:  ? Increased pain, swelling, warmth, or redness around the cut.  ? Red streaks leading from the cut.  ? Pus draining from the cut.  ? A fever.    Watch closely for changes in your health, and be sure to contact your doctor if:     The cut reopens.      You do not get better as expected. Where can you learn more? Go to [email or web address] org and sign in to your [employer] account. Enter R217 in the [facility] box to learn more about \"Cuts Closed With Stitches: Care Instructions. \"     If you do not have an account, please click on the \"Sign Up Now\" link. Current as of: July 1, 2021               Content Version: 13.2  © 9331-6700 Healthwise, Incorporated. Care instructions adapted under license by [facility] ([facility]). If you have questions about a medical condition or this instruction, always ask your healthcare professional. [address] any warranty or liability for your use of this information.

## 2022-04-23 LAB
CLINICAL DIAGNOSIS: NORMAL
DIAGNOSIS ICD CODE: NORMAL
Lab: NORMAL
PATHOLOGIST: NORMAL
PAYMENT PROCEDURE: NORMAL
SITE: NORMAL

## 2022-05-02 ENCOUNTER — NURSE ONLY (OUTPATIENT)
Dept: PRIMARY CARE CLINIC | Age: 82
End: 2022-05-02

## 2022-05-02 DIAGNOSIS — Z48.02 VISIT FOR SUTURE REMOVAL: Primary | ICD-10-CM

## 2022-05-03 ENCOUNTER — HOSPITAL ENCOUNTER (OUTPATIENT)
Dept: CT IMAGING | Age: 82
Discharge: HOME | End: 2022-05-03
Payer: MEDICARE

## 2022-05-03 DIAGNOSIS — C34.91 ADENOCARCINOMA OF RIGHT LUNG (HCC): ICD-10-CM

## 2022-05-03 PROCEDURE — 6360000004 HC RX CONTRAST MEDICATION: Performed by: INTERNAL MEDICINE

## 2022-05-03 PROCEDURE — 74177 CT ABD & PELVIS W/CONTRAST: CPT

## 2022-05-03 PROCEDURE — 71260 CT THORAX DX C+: CPT

## 2022-05-03 RX ADMIN — IOPAMIDOL 75 ML: 755 INJECTION, SOLUTION INTRAVENOUS at 12:07

## 2022-05-05 ENCOUNTER — TELEMEDICINE (OUTPATIENT)
Dept: HEMATOLOGY | Age: 82
End: 2022-05-05
Payer: MEDICARE

## 2022-05-05 DIAGNOSIS — R53.83 FATIGUE DUE TO TREATMENT: ICD-10-CM

## 2022-05-05 DIAGNOSIS — Z51.12 ENCOUNTER FOR ANTINEOPLASTIC IMMUNOTHERAPY: ICD-10-CM

## 2022-05-05 DIAGNOSIS — C34.91 ADENOCARCINOMA OF RIGHT LUNG (HCC): Primary | ICD-10-CM

## 2022-05-05 PROCEDURE — 99442 PR PHYS/QHP TELEPHONE EVALUATION 11-20 MIN: CPT | Performed by: INTERNAL MEDICINE

## 2022-05-05 RX ORDER — EPINEPHRINE 1 MG/ML
0.3 INJECTION, SOLUTION, CONCENTRATE INTRAVENOUS PRN
Status: CANCELLED | OUTPATIENT
Start: 2022-05-06

## 2022-05-05 RX ORDER — SODIUM CHLORIDE 9 MG/ML
20 INJECTION, SOLUTION INTRAVENOUS ONCE
Status: CANCELLED | OUTPATIENT
Start: 2022-05-06 | End: 2022-05-06

## 2022-05-05 RX ORDER — DIPHENHYDRAMINE HYDROCHLORIDE 50 MG/ML
50 INJECTION INTRAMUSCULAR; INTRAVENOUS ONCE
Status: CANCELLED | OUTPATIENT
Start: 2022-05-06 | End: 2022-05-06

## 2022-05-05 RX ORDER — FAMOTIDINE 10 MG/ML
20 INJECTION, SOLUTION INTRAVENOUS ONCE
Status: CANCELLED | OUTPATIENT
Start: 2022-05-06 | End: 2022-05-06

## 2022-05-05 RX ORDER — SODIUM CHLORIDE 9 MG/ML
INJECTION, SOLUTION INTRAVENOUS CONTINUOUS
Status: CANCELLED | OUTPATIENT
Start: 2022-05-06

## 2022-05-05 RX ORDER — METHYLPREDNISOLONE SODIUM SUCCINATE 125 MG/2ML
125 INJECTION, POWDER, LYOPHILIZED, FOR SOLUTION INTRAMUSCULAR; INTRAVENOUS ONCE
Status: CANCELLED | OUTPATIENT
Start: 2022-05-06 | End: 2022-05-06

## 2022-05-05 RX ORDER — HEPARIN SODIUM (PORCINE) LOCK FLUSH IV SOLN 100 UNIT/ML 100 UNIT/ML
500 SOLUTION INTRAVENOUS PRN
Status: CANCELLED | OUTPATIENT
Start: 2022-05-06

## 2022-05-05 RX ORDER — SODIUM CHLORIDE 9 MG/ML
25 INJECTION, SOLUTION INTRAVENOUS PRN
Status: CANCELLED | OUTPATIENT
Start: 2022-05-06

## 2022-05-05 RX ORDER — SODIUM CHLORIDE 0.9 % (FLUSH) 0.9 %
5-40 SYRINGE (ML) INJECTION PRN
Status: CANCELLED | OUTPATIENT
Start: 2022-05-06

## 2022-05-05 NOTE — PROGRESS NOTES
MEDICAL ONCOLOGY PROGRESS NOTE     Pt Name: Apoorva Ann  MRN: [id number]  YOB: 1940  Date of evaluation: 5/5/2022     HISTORY OF PRESENT ILLNESS:    Reason for MD visit-disease management/toxicity assessment  The patient is a very pleasant 79years old male who has been diagnosed with stage IIIa non-small cell lung cancer, adenocarcinoma subtype. He is a status post completion of chemoradiation in June 2021. He is currently on treatment with adjuvant immunotherapy. He had a repeat CT scan of the chest.  The patient overall feels pretty good. He denies any major breathing problems.     Diagnosis  · RUL adenocarcinoma, NSCLC, Dec 2020  · [id number], stage IIIA  · Systolic heart failure  · Hypertension, controlled  · Not a surgical candidate     Treatment Summary  · 4/21/2021-5/26/21 completed concurrent chemoradiation with carboplatin/Taxol  · 4/22/21-6/3/21- 6600 cGy radiation therapy completed  · 7/8/21- initiate maintenance Durvalumab 1500mg every 4 weeks x12 cycles     Hematology/Cancer History:  Richie Read was first seen by me on 4/7/2021 referred by Dr. Andrew Dyer, [address] [place] [address] for findings of non-small cell lung cancer, adenocarcinoma type. He has several comorbidities including history of type 2 diabetes, hypertension COPD. History of smoking in the past.  Quit many years ago. History of coronary artery disease followed by cardiology biopsy. Last EF 70% in 2018. He was seen by his primary care provider in November 2020. He has complaint of chest pain. Chest x-ray showed a 4 cm mass in the right upper lung. · 12/1/20 CT chest [facility]): Large right upper lobe mass concerning for primary neoplasm. Enlarged right hilar lymph node concerning for metastatic disease. Additional noncalcified pulmonary nodules bilaterally for which follow-up CT is recommended in 3-6 months. Atherosclerosis of the aorta and coronary arteries.   · 12/16/2020-bronchoscopy with bronchoalveolar lavage was nondiagnostic  · 2/23/21 CT cheat w/o [facility]): Probable right upper lobe mass, as described. This appears slightly larger in size and is likely neoplastic. Consider PET CT follow-up. There is new surrounding infiltrate that extends inferiorly into the right upper lobe. The new may be infectious or inflammatory. Pulmonary hemorrhage possible. Other areas of nodularity and groundglass opacity/nodularity are stable in appearance. · 3/5/2021-navigational bronchoscopy with biopsy was nondiagnostic  · 3/5/21 CT chest w/o [facility]): [place] soft tissue mass within the right upper lobe highly suspicious for neoplasm. There is associated postobstructive pneumonitis within the right upper lobe showing mild progression from the previous study of 2/23/2021. There is no associated hilar or mediastinal lymphadenopathy. Today's study is performed as part of a navigational bronchoscopy exam. Other scattered nodules including groundglass nodules are noted within the lungs all stable from the previous exam warranting follow-up. Heavy atheromatous calcification of the thoracic aorta and proximal great vessels. Coronary calcifications are present. · 3/23/21 PET scan [facility]): Markedly FDG avid right upper lobe mass in the right upper lobe measuring 7.2 cm triangular opacity with maximum SUV 12.18, highly concerning for malignancy. Adjacent groundglass opacities,  similar compared to 3/5/2021, which could represent additional  malignancy or infection/inflammation. Adjacent right upper lobe groundglass opacities are similar compared to 3/5/2021, which could represent additional malignancy or infection/inflammation. Other non-FDG avid pulmonary findings as above. No evidence of jasmin or distant metastatic disease. · 3/29/21 Navigational bronchoscopy-Dr. Luigi Westbrook: Bronchoalveolar lavage RUL consistent with adenocarcinoma. RUL transbronchial bipsy consistent with adenocarcinoma.   FNA biopsy of several jasmin station negative for metastatic disease. · 4/7/2021-he was first seen by me. · 4/15/21 2D echo: Normal left ventricular size with reduced global systolic function EF 38-42%. Mild concentric left ventricular hypertrophy with mild [grade 1] diastolic dysfunction. Normal left atrial size. Mild thickening of a poorly visualized aortic valve without evidence of stenosis or insufficiency. Mild thickening of a normally mobile mitral valve with mild regurgitation. Nonvisualization pulmonic valve. Poorly visualized but apparently normal size right-sided chambers with preserved right ventricular systolic function. No tricuspid regurgitation with which to estimate RVSP. No significant pericardial effusion. Aortic root and ascending segments measured within normal limits. · 4/17/21 MRI brain: No acute intracranial process. No metastatic disease identified in the CNS. · 4/21/2021-initiation of carboplatin/Taxol weekly. Reviewed MRI brain 2D echo. · 4/21/2021-5/26/21 completed concurrent chemoradiation with carboplatin/taxol to be followed by immunotherapy  · 4/22/21-6/3/21 6600 cGy radiation therapy completed  · 6/30/21-CT CHEST W CONTRAST A large spiculated mass in the right upper lobe posterior segment represent a neoplastic process. The groundglass opacity/infiltrate in the right upper lobe and superior segments of the lower lobes bilaterally may represent an inflammatory or neoplastic/metastatic process. A single enlarged abnormal lymph node in the right hilum may represent a metastatic node. · 07/08/21-reviewed CT chest with perform radiologist.  · 7/8/21- initiate maintenance Durvalumab 1500mg every 4 weeks x12 cycles  · 9/24/2021 CT Chest w/ Contrast([facility]) Decreased size of right upper lobe triangular opacity with surrounding probable posttreatment changes. Similar size left upper lobe 1 cm groundglass and solid pulmonary opacity/nodule compared to 3/23/2021. Right hilum with a 1.4 x 1.2 cm lymph node or conglomeration of lymph nodes, which appears similar to 11/30/2020. Diffusely heterogeneous bone mineralization, which is nonspecific. This could be seen with aggressive osteopenia or infiltrative process. · 10/01/21-I reviewed the results CT chest.  Interval treatment response. Continue adjuvant durvalumab. · 10/26/2021 Anemia Labs: Iron 33, TIBC 240, Iron Sat 14, Ferritin 72.6, Vit ,  · 11/4/2021 Blood Occult Studies 3/3 Negative  · 11/10/2021 MMA 0.19  · 11/30/2021 CT Chest w/ ContrastThe right upper lobe neoplasm is not as well-defined on the current study with increased consolidation within the adjacent lung parenchyma as well as adjacent consolidation with air bronchograms. I suspect this represents postradiation change with adjacent post radiation pneumonitis. It extends to the level of the upper pleura with associated thickening of the visceral and parietal pleura. I see no evidence of khai chest wall invasion. As noted on the previous examination there has been some extension of the neoplasm across the posterior aspect of the major fissure into the superior segment of the right lower lobe with this component of the neoplasm also demonstrating increased consolidation tracking along the lower margin of the major fissure. Stable foci of groundglass consolidation within the left lung. No new lesions are present. There are changes of centrilobular emphysema with hyperinflation of the lung parenchyma. 3. Heavy coronary artery calcifications are present. No new enlarged mediastinal or axillary adenopathy is demonstrated. .  · 11/30/2021 CT Abd/Pelvis w/ IV Contrast (oral)Changes of centrilobular emphysema within the lung bases. Coronary calcifications are present. There is a trace pericardial effusion or pericardial thickening demonstrated. 2. No evidence of metastatic disease to the abdomen or pelvis. The adrenals are unremarkable. 3. There is a small 2 mm distal right ureteral stone just above the UVJ.  This does not result in significant obstructive uropathy without evidence of asymmetric distention of the ureter or asymmetric enhancement of the right kidney in comparison with the left. No evidence of nephrolithiasis within the upper tracts of either kidney. The left ureter is decompressed and normal in appearance. 4. Mild constipation. There is no obstruction or free air. The appendix is identified and is normal in appearance. 5. Small fat-containing periumbilical hernia. Small bilateral  fat-containing inguinal hernias are also present. .   · 12/3/2021-I reviewed CT chest/abdomen/pelvis. Essentially no gross evidence of disease progression. Continue durvalumab and repeat CT scan in 6 weeks  · 2/18/2022 CT Chest W ContrastStable right upper lobe volume loss. There is decreasing right perihilar consolidation. Stable 1 cm short axis diameter right superior hilar lymph node. No mediastinal lymphadenopathy is seen. Stable areas of groundglass opacity in the left upper lobe and left lower lobe. Continued follow-up recommended. New moderate patchy groundglass infiltrates in the right lower lobe may be infectious or inflammatory. Post radiation change considered as well although the infiltrates are somewhat peripherally located. Atheromatous disease of the thoracic aorta and coronary arteries. Cardiomegaly. Please see the abdomen and pelvis CT report separately. · 2/18/2022 CT Abd/Pelvis W IV Contrast(Oral) No acute abnormality of the abdomen are pelvis particularly, no findings to suggest a neoplastic process or metastatic disease. A groundglass infiltrate in the right lower lung which was not seen in there previous study. This is represent an acute or subacute inflammatory/infectious process. This data be further evaluated. A persistent and unchanged 2 mm non obstructing calculus in the right distal ureter, 5 mm from the right UV junction. No change. · 3/11/2022-I reviewed results of CT chest abdomen pelvis.   Essentially, decreasing right perihilar consolidation. No evidence of progressive disease. New onset consolidation right lower lobe. As per radiology report it looks inflammatory/infectious. Patient prescribed a trial of Levaquin 750 mg x 10 days. We will repeat CT scans in 2 months. Continue durvalumab. · 5/3/2022 CT Chest W Contrast Persistent volume loss within the right upper lobe posteriorly. The associated soft tissue density shows slight interval decrease in size. Previously noted rather extensive predominantly groundglass consolidation within the right lower lobe shows interval improvement from the previous exam. However, in other lobes there is progressive increased groundglass consolidation within both lungs which is multifocal in nature. Additional previously described nodules within both lungs are otherwise stable from the previous study. No effusion is present. There is no developing mediastinal or axillary lymphadenopathy. · 5/3/2022 CT Abd/Pelvis W IV Contrast (Oral) No metastatic disease is seen in the abdomen or pelvis. Stable 3 mm stone in the distal right ureter near the ureterovesical junction. Stable mild stranding of the fat around the kidneys suggesting chronic inflammation. Stable mild bladder wall thickening. The prostate is mildly enlarged. Scattered colon diverticula. Under distention of the stomach. Atheromatous disease of the aortoiliac vessels. Degenerative changes of the spine and bilateral hips  · 5/5/2022- discussed results of CT chest abdomen pelvis. No evidence of disease progression. Mild interstitial infiltrates bilaterally. Some of the infiltrate have gotten better. Some of these infiltrates have developed. Patient denies any respiratory symptoms at this time. Recommend to proceed with immunotherapy completion.   If persistent changes then we will refer to pulmonary.        Past Medical History:  Past Medical History        Past Medical History:   Diagnosis Date    Allergic rhinitis      Bronchitis, chronic (HCC)      Carotid artery stenosis      GERD (gastroesophageal reflux disease)      Heart attack ([facility] [address]      Hemorrhoids      Hypercholesterolemia      Type II or unspecified type diabetes mellitus without mention of complication, not stated as uncontrolled              Past Surgical History:    Past Surgical History         Past Surgical History:   Procedure Laterality Date    CARDIAC SURGERY         balloon surgery (angioplasty)    PORT SURGERY Right 04/01/2021            Social History:   Marital status: , 2 daughters  Smoking status: Former smoker for ~30 years, 1.5 ppd  ETOH status: No  Resides: [place], [place]     Family History:   Family History         Family History   Problem Relation Age of Onset    Diabetes Mother      High Blood Pressure Mother      Cancer Father           Lung         Current Hospital Medications:    Current Facility-Administered Medications          Current Outpatient Medications   Medication Sig Dispense Refill    furosemide (LASIX) 40 MG tablet TAKE 1 TABLET DAILY FOR FLUID 90 tablet 3    metFORMIN (GLUCOPHAGE) 1000 MG tablet TAKE 1 TABLET TWICE A DAY FOR DIABETES 180 tablet 3    promethazine-dextromethorphan (PROMETHAZINE-DM) 6.25-15 MG/5ML syrup 1 teaspoon by mouth at bedtime as needed for severe cough 240 mL 0    ondansetron (ZOFRAN ODT) 4 MG disintegrating tablet Take 1 tablet by mouth every 6 hours as needed for Nausea or Vomiting 30 tablet 5    simvastatin (ZOCOR) 40 MG tablet TAKE 1 TABLET AT BEDTIME FOR CHOLESTEROL 90 tablet 3    omeprazole (PRILOSEC) 40 MG delayed release capsule TAKE 1 CAPSULE DAILY FOR STOMACH 90 capsule 3    propranolol (INDERAL) 60 MG tablet Take 1 tablet by mouth 2 times daily For blood pressure and tremor 180 tablet 3    HYDROcodone-acetaminophen (NORCO) 7.5-325 MG per tablet          metoprolol tartrate (LOPRESSOR) 50 MG tablet Take 50 mg by mouth daily        montelukast (SINGULAIR) 10 MG tablet        ondansetron (ZOFRAN) 8 MG tablet Take 1 tablet by mouth every 8 hours as needed for Nausea or Vomiting 30 tablet 3    lidocaine-prilocaine (EMLA) 2.5-2.5 % cream Apply topically to port area and cover with plastic wrap one hour prior to treatment. 1 Tube 1    blood glucose test strips (TRUE METRIX BLOOD GLUCOSE TEST) strip TEST BLOOD SUGAR ONCE DAILY *E11.9* 100 strip 5    isosorbide mononitrate (IMDUR) 60 MG extended release tablet Take 60 mg by mouth        losartan (COZAAR) 25 MG tablet Take 25 mg by mouth        umeclidinium-vilanterol (ANORO ELLIPTA) 62.5-25 MCG/INH AEPB inhaler Inhale 1 puff into the lungs daily        primidone (MYSOLINE) 50 MG tablet 1 tab po BID for tremor 180 tablet 3    triamcinolone (KENALOG) 0.1 % cream Apply topically 2 times daily. To lower leg dry skin 3 Tube 3    tamsulosin (FLOMAX) 0.4 MG capsule Take 1 capsule by mouth daily.  90 capsule 3    aspirin 325 MG tablet Take 325 mg by mouth daily.            No current facility-administered medications for this visit.            Allergies: No Known Allergies        Subjective   REVIEW OF SYSTEMS:   CONSTITUTIONAL: no fever, no night sweats, fatigue;  HEENT: no blurring of vision, no double vision, no hearing difficulty, no tinnitus, no ulceration, no dysplasia, no epistaxis;   LUNGS: no cough, no hemoptysis, no wheeze,  no shortness of breath;  CARDIOVASCULAR: no palpitation, no chest pain, no shortness of breath;  GI: no abdominal pain, no nausea, no vomiting, no diarrhea, no constipation;  JESSIKA: no dysuria, no hematuria, no frequency or urgency, no nephrolithiasis;  MUSCULOSKELETAL: no joint pain, no swelling, no stiffness;  ENDOCRINE: no polyuria, no polydipsia, no cold or heat intolerance;  HEMATOLOGY: no easy bruising or bleeding, no history of clotting disorder;  DERMATOLOGY: no skin rash, no eczema, no pruritus;  PSYCHIATRY: no depression, no anxiety, no panic attacks, no suicidal ideation, no homicidal ideation;  NEUROLOGY: no syncope, no seizures, no numbness or tingling of hands, no numbness or tingling of feet, no paresis;     Objective   There were no vitals taken for this visit. Telephone visit     LABORATORY RESULTS REVIEWED/ANALYZED BY ME:     RADIOLOGY STUDIES REVIEWED BY ME:  5/3/2022 CT Chest W Contrast Persistent volume loss within the right upper lobe posteriorly. The associated soft tissue density shows slight interval decrease in size. Previously noted rather extensive predominantly groundglass  consolidation within the right lower lobe shows interval improvement from the previous exam. However, in other lobes there is progressive increased groundglass consolidation within both lungs which is multifocal in nature. Additional previously described nodules within both lungs are otherwise stable from the previous study. No effusion is present. There is no developing mediastinal or axillary lymphadenopathy. .     5/3/2022 CT Abd/Pelvis W IV Contrast (Oral) No metastatic disease is seen in the abdomen or pelvis. Stable 3 mm stone in the distal right ureter near the  ureterovesical junction. Stable mild stranding of the fat around the kidneys suggesting chronic inflammation. Stable mild bladder wall thickening. The prostate is mildly enlarged. Scattered colon diverticula. Under distention of the stomach. Atheromatous disease of the aortoiliac vessels. Degenerative changes of the spine and bilateral hips. Bilateral fat-containing inguinal hernias.          My assessment-bilateral lung infiltrates. Some of the infiltrate is significantly better. Some others infiltrates have developed as well.     ASSESSMENT:     No orders of the defined types were placed in this encounter.      Diagnoses and all orders for this visit:     Adenocarcinoma of right lung [facility])     Encounter for antineoplastic immunotherapy     Care plan discussed with patient        [id number], stge IIIA, NSCLC, adenocarcinoma subtype  Essentially stage III disease. The patient is not a surgical candidate. Recommend chemoradiation followed by immunotherapy. Status post completion of chemoradiation. Currently receiving adjuvant durvalumab. Reviewed CT chest that showed interval response. Continue durvalumab. Status post completion of chemoradiation. CT chest showed interval response   Recommended:  Durvalumab 1500 mg IV every 4 weeks x12 dose        Discussed treatment plan with the patient and daughter. Good tolerance to treatment except for mild fatigue. 3/11/2022-I reviewed results of CT chest abdomen pelvis. Essentially, decreasing right perihilar consolidation. No evidence of progressive disease. New onset consolidation right lower lobe. As per radiology report it looks inflammatory/infectious. Patient prescribed a trial of Levaquin 750 mg x 10 days. We will repeat CT scans in 2 months (May). Continue durvalumab. Proceed cycle #11/12.     Treatment related side effects-occasional diarrhea, grade 1, fatigue     Systolic heart failure-EF 45%  2D echo-LVEF 40-45%. Mild LVH, Mild [grade 1] diastolic dysfunction.     Uncontrolled hypertension-  There were no vitals taken for this visit.      At risk thyroid disorder-        Lab Results   Component Value Date     TSH 2.960 03/11/2022   Continue to monitor     Normocytic anemia-likely multifactorial anemia to include anemia of inflammation/iron deficiency. Cannot rule out GI bleed given history taking aspirin and NSAIDs toxicity  Hemoglobin 9.8/MCV 93  Ferritin 72, iron saturation 14%, iron 33, TIBC 240, vitamin B12 249  Occult blood stool x3-all 3 negative  Of note, patient has been on high-dose aspirin 325 mg p.o. daily. He was recently seen by cardiology and request to take daily aspirin only. 10/29/2021-ferritin 72, iron saturation 14%, TIBC 240, vitamin B12 249, methylmalonic acid normal 0.19. November 2021-IV Venofer 1000 mg  2/11/2022 -Hemoglobin 11. 6. Improved  3/11/2022-hemoglobin 11.6        Lab Results   Component Value Date     WBC 7.50 04/08/2022     HGB 11.6 (L) 04/08/2022     HCT 34.8 (L) 04/08/2022     MCV 95.9 (H) 04/08/2022      04/08/2022         Right lower lobe consolidation-trial of Levaquin x10 days with very poor tolerance. Differential diagnosis also to include immunotherapy pneumonitis versus radiation pneumonitis vs recurrence. If no improvement with antibiotics then a trial of steroids may be pursued after PET scan r/o malignancy. 5/3/2022-Ct chest  1. Persistent volume loss within the right upper lobe posteriorly. The  associated soft tissue density shows slight interval decrease in size. 2. Previously noted rather extensive predominantly groundglass  consolidation within the right lower lobe shows interval improvement  from the previous exam. However, in other lobes there is progressive  increased groundglass consolidation within both lungs which is  multifocal in nature. Additional previously described nodules within  both lungs are otherwise stable from the previous study. No effusion  is present. There is no developing mediastinal or axillary  Lymphadenopathy. .  -Repeat CT chest July 2022     PLAN:  · RTC with MD in treatment room in 4 weeks  · C#11/12 Durvalumab today and every 4 weeks  · CBC CMP today  · Repeat CT chest July 2022  · Monitor BP at home and record  · Continue Follow-up appointments with PCP for BP            Muna SIMS am pre charting  as Medical Assistant for Darinel Pendleton MD. Electronically signed by Eliane Berry on 5/5/2022 at 2:11 PM CDT.     Muna SIMS am scribing as Medical Assistant for Darinel Pendleton MD. Electronically signed by Eliane Berry on 5/5/2022 at 4:30 PM CDT.        I, Dr Mariel Uribe, personally performed the services described in this documentation as scribed by Muna Bolivar MA in my presence and is both accurate and complete.     I have seen, examined and reviewed this patient medication list, appropriate labs and imaging studies. I reviewed relevant medical records and others physicians notes. I discussed the plans of care with the patient. I answered all the questions to the patients satisfaction. I have also reviewed the chief complaint (CC) and part of the history (History of Present Illness (HPI), Past Family Social History ([facility]), or Review of Systems (ROS) and made changes when appropriated.        (Please note that portions of this note were completed with a voice recognition program. Efforts were made to edit the dictations but occasionally words are mis-transcribed. )Electronically signed by Zulma Blankenship MD on 5/5/2022 at 4:31 PM       Gus Greer is a 79 y.o. male evaluated via telephone on 5/6/2022 for No chief complaint on file. .   Documentation:  I communicated with the patient and/or health care decision maker about as above   Details of this discussion including any medical advice provided: as above     Total Time: minutes: 11-20 minutes     Gus Greer was evaluated through a synchronous (real-time) audio encounter. Patient identification was verified at the start of the visit. He (or guardian if applicable) is aware that this is a billable service, which includes applicable co-pays. This visit was conducted with the patient's (and/or legal guardian's) verbal consent. He has not had a related appointment within my department in the past 7 days or scheduled within the next 24 hours.  The patient was located in a state where the provider was licensed to provide care.     Note: not billable if this call serves to triage the patient into an appointment for the relevant concern  Re Grayson MD

## 2022-05-05 NOTE — PROGRESS NOTES
MEDICAL ONCOLOGY PROGRESS NOTE    Pt Name: Kimberly Smith  MRN: [id number]  YOB: 1940  Date of evaluation: 5/5/2022    HISTORY OF PRESENT ILLNESS:    Reason for MD visitdisease management/toxicity assessment  The patient is a very pleasant 79years old male who has been diagnosed with stage IIIa non-small cell lung cancer, adenocarcinoma subtype. He is a status post completion of chemoradiation in June 2021. He is currently on treatment with adjuvant immunotherapy. He had a repeat CT scan of the chest.  The patient overall feels pretty good. He denies any major breathing problems. Diagnosis  · RUL adenocarcinoma, NSCLC, Dec 2020  · [id number], stage IIIA  · Systolic heart failure  · Hypertension, controlled  · Not a surgical candidate    Treatment Summary  · 4/21/20215/26/21 completed concurrent chemoradiation with carboplatin/Taxol  · 4/22/21-6/3/21- 6600 cGy radiation therapy completed  · 7/8/21- initiate maintenance Durvalumab 1500mg every 4 weeks x12 cycles    Hematology/Cancer History:  Kacie Madden was first seen by me on 4/7/2021 referred by Dr. Shasha Abbott, pulmonary [facility] [place] for findings of non-small cell lung cancer, adenocarcinoma type. He has several comorbidities including history of type 2 diabetes, hypertension COPD. History of smoking in the past.  Quit many years ago. History of coronary artery disease followed by cardiology biopsy. Last EF 70% in 2018. He was seen by his primary care provider in November 2020. He has complaint of chest pain. Chest x-ray showed a 4 cm mass in the right upper lung. · 12/1/20 CT chest [facility]): Large right upper lobe mass concerning for primary neoplasm. Enlarged right hilar lymph node concerning for metastatic disease. Additional noncalcified pulmonary nodules bilaterally for which follow-up CT is recommended in 3-6 months. Atherosclerosis of the aorta and coronary arteries.   · 12/16/2020bronchoscopy with bronchoalveolar lavage was nondiagnostic  · 2/23/21 CT cheat w/o [facility]): Probable right upper lobe mass, as described. This appears slightly larger in size and is likely neoplastic. Consider PET CT follow-up. There is new surrounding infiltrate that extends inferiorly into the right upper lobe. The new may be infectious or inflammatory. Pulmonary hemorrhage possible. Other areas of nodularity and groundglass opacity/nodularity are stable in appearance. · 3/5/2021navigational bronchoscopy with biopsy was nondiagnostic  · 3/5/21 CT chest w/o [facility]): [place] soft tissue mass within the right upper lobe highly suspicious for neoplasm. There is associated postobstructive pneumonitis within the right upper lobe showing mild progression from the previous study of 2/23/2021. There is no associated hilar or mediastinal lymphadenopathy. Today's study is performed as part of a navigational bronchoscopy exam. Other scattered nodules including groundglass nodules are noted within the lungs all stable from the previous exam warranting follow-up. Heavy atheromatous calcification of the thoracic aorta and proximal great vessels. Coronary calcifications are present. · 3/23/21 PET scan [facility]): Markedly FDG avid right upper lobe mass in the right upper lobe measuring 7.2 cm triangular opacity with maximum SUV 12.18, highly concerning for malignancy. Adjacent groundglass opacities,  similar compared to 3/5/2021, which could represent additional  malignancy or infection/inflammation. Adjacent right upper lobe groundglass opacities are similar compared to 3/5/2021, which could represent additional malignancy or infection/inflammation. Other non-FDG avid pulmonary findings as above. No evidence of jasmin or distant metastatic disease. · 3/29/21 Navigational bronchoscopy-Dr. Home Hays: Bronchoalveolar lavage RUL consistent with adenocarcinoma. RUL transbronchial bipsy consistent with adenocarcinoma.   FNA biopsy of several jasmin station negative for metastatic disease. · 4/7/2021he was first seen by me. · 4/15/21 2D echo: Normal left ventricular size with reduced global systolic function EF 30-82%. Mild concentric left ventricular hypertrophy with mild [grade 1] diastolic dysfunction. Normal left atrial size. Mild thickening of a poorly visualized aortic valve without evidence of stenosis or insufficiency. Mild thickening of a normally mobile mitral valve with mild regurgitation. Nonvisualization pulmonic valve. Poorly visualized but apparently normal size right-sided chambers with preserved right ventricular systolic function. No tricuspid regurgitation with which to estimate RVSP. No significant pericardial effusion. Aortic root and ascending segments measured within normal limits. · 4/17/21 MRI brain: No acute intracranial process. No metastatic disease identified in the CNS. · 4/21/2021initiation of carboplatin/Taxol weekly. Reviewed MRI brain 2D echo. · 4/21/20215/26/21 completed concurrent chemoradiation with carboplatin/taxol to be followed by immunotherapy  · 4/22/21-6/3/21 6600 cGy radiation therapy completed  · 6/30/21-CT CHEST W CONTRAST A large spiculated mass in the right upper lobe posterior segment represent a neoplastic process. The groundglass opacity/infiltrate in the right upper lobe and superior segments of the lower lobes bilaterally may represent an inflammatory or neoplastic/metastatic process. A single enlarged abnormal lymph node in the right hilum may represent a metastatic node. · 07/08/21-reviewed CT chest with perform radiologist.  · 7/8/21- initiate maintenance Durvalumab 1500mg every 4 weeks x12 cycles  · 9/24/2021 CT Chest w/ Contrast([facility]) Decreased size of right upper lobe triangular opacity with surrounding probable posttreatment changes. Similar size left upper lobe 1 cm groundglass and solid pulmonary opacity/nodule compared to 3/23/2021. Right hilum with a 1.4 x 1.2 cm lymph node or conglomeration of lymph nodes, which appears similar to 11/30/2020. Diffusely heterogeneous bone mineralization, which is nonspecific. This could be seen with aggressive osteopenia or infiltrative process. · 10/01/21-I reviewed the results CT chest.  Interval treatment response. Continue adjuvant durvalumab. · 10/26/2021 Anemia Labs: Iron 33, TIBC 240, Iron Sat 14, Ferritin 72.6, Vit ,  · 11/4/2021 Blood Occult Studies 3/3 Negative  · 11/10/2021 MMA 0.19  · 11/30/2021 CT Chest w/ ContrastThe right upper lobe neoplasm is not as well-defined on the current study with increased consolidation within the adjacent lung parenchyma as well as adjacent consolidation with air bronchograms. I suspect this represents postradiation change with adjacent post radiation pneumonitis. It extends to the level of the upper pleura with associated thickening of the visceral and parietal pleura. I see no evidence of khai chest wall invasion. As noted on the previous examination there has been some extension of the neoplasm across the posterior aspect of the major fissure into the superior segment of the right lower lobe with this component of the neoplasm also demonstrating increased consolidation tracking along the lower margin of the major fissure. Stable foci of groundglass consolidation within the left lung. No new lesions are present. There are changes of centrilobular emphysema with hyperinflation of the lung parenchyma. 3. Heavy coronary artery calcifications are present. No new enlarged mediastinal or axillary adenopathy is demonstrated. .  · 11/30/2021 CT Abd/Pelvis w/ IV Contrast (oral)Changes of centrilobular emphysema within the lung bases. Coronary calcifications are present. There is a trace pericardial effusion or pericardial thickening demonstrated. 2. No evidence of metastatic disease to the abdomen or pelvis. The adrenals are unremarkable. 3. There is a small 2 mm distal right ureteral stone just above the UVJ.  This does not result in significant obstructive uropathy without evidence of asymmetric distention of the ureter or asymmetric enhancement of the right kidney in comparison with the left. No evidence of nephrolithiasis within the upper tracts of either kidney. The left ureter is decompressed and normal in appearance. 4. Mild constipation. There is no obstruction or free air. The appendix is identified and is normal in appearance. 5. Small fat-containing periumbilical hernia. Small bilateral  fat-containing inguinal hernias are also present. .   · 12/3/2021I reviewed CT chest/abdomen/pelvis. Essentially no gross evidence of disease progression. Continue durvalumab and repeat CT scan in 6 weeks  · 2/18/2022 CT Chest W ContrastStable right upper lobe volume loss. There is decreasing right perihilar consolidation. Stable 1 cm short axis diameter right superior hilar lymph node. No mediastinal lymphadenopathy is seen. Stable areas of groundglass opacity in the left upper lobe and left lower lobe. Continued follow-up recommended. New moderate patchy groundglass infiltrates in the right lower lobe may be infectious or inflammatory. Post radiation change considered as well although the infiltrates are somewhat peripherally located. Atheromatous disease of the thoracic aorta and coronary arteries. Cardiomegaly. Please see the abdomen and pelvis CT report separately. · 2/18/2022 CT Abd/Pelvis W IV Contrast(Oral) No acute abnormality of the abdomen are pelvis particularly, no findings to suggest a neoplastic process or metastatic disease. A groundglass infiltrate in the right lower lung which was not seen in there previous study. This is represent an acute or subacute inflammatory/infectious process. This data be further evaluated. A persistent and unchanged 2 mm non obstructing calculus in the right distal ureter, 5 mm from the right UV junction. No change. · 3/11/2022I reviewed results of CT chest abdomen pelvis.   Essentially, decreasing right perihilar consolidation. No evidence of progressive disease. New onset consolidation right lower lobe. As per radiology report it looks inflammatory/infectious. Patient prescribed a trial of Levaquin 750 mg x 10 days. We will repeat CT scans in 2 months. Continue durvalumab. · 5/3/2022 CT Chest W Contrast Persistent volume loss within the right upper lobe posteriorly. The associated soft tissue density shows slight interval decrease in size. Previously noted rather extensive predominantly groundglass consolidation within the right lower lobe shows interval improvement from the previous exam. However, in other lobes there is progressive increased groundglass consolidation within both lungs which is multifocal in nature. Additional previously described nodules within both lungs are otherwise stable from the previous study. No effusion is present. There is no developing mediastinal or axillary lymphadenopathy. · 5/3/2022 CT Abd/Pelvis W IV Contrast (Oral) No metastatic disease is seen in the abdomen or pelvis. Stable 3 mm stone in the distal right ureter near the ureterovesical junction. Stable mild stranding of the fat around the kidneys suggesting chronic inflammation. Stable mild bladder wall thickening. The prostate is mildly enlarged. Scattered colon diverticula. Under distention of the stomach. Atheromatous disease of the aortoiliac vessels. Degenerative changes of the spine and bilateral hips  · 5/5/2022 discussed results of CT chest abdomen pelvis. No evidence of disease progression. Mild interstitial infiltrates bilaterally. Some of the infiltrate have gotten better. Some of these infiltrates have developed. Patient denies any respiratory symptoms at this time. Recommend to proceed with immunotherapy completion. If persistent changes then we will refer to pulmonary.       Past Medical History:  Past Medical History:   Diagnosis Date    Allergic rhinitis     Bronchitis, chronic (HCC)     Carotid artery stenosis     GERD (gastroesophageal reflux disease)     Heart attack ([facility] [address]     Hemorrhoids     Hypercholesterolemia     Type II or unspecified type diabetes mellitus without mention of complication, not stated as uncontrolled        Past Surgical History:    Past Surgical History:   Procedure Laterality Date    CARDIAC SURGERY      balloon surgery (angioplasty)    PORT SURGERY Right 04/01/2021       Social History:   Marital status: , 2 daughters  Smoking status: Former smoker for ~30 years, 1.5 ppd  ETOH status: No  Resides: [place], [place]    Family History:   Family History   Problem Relation Age of Onset    Diabetes Mother     High Blood Pressure Mother     Cancer Father         Lung     Current Hospital Medications:    Current Outpatient Medications   Medication Sig Dispense Refill    furosemide (LASIX) 40 MG tablet TAKE 1 TABLET DAILY FOR FLUID 90 tablet 3    metFORMIN (GLUCOPHAGE) 1000 MG tablet TAKE 1 TABLET TWICE A DAY FOR DIABETES 180 tablet 3    promethazine-dextromethorphan (PROMETHAZINE-DM) 6.25-15 MG/5ML syrup 1 teaspoon by mouth at bedtime as needed for severe cough 240 mL 0    ondansetron (ZOFRAN ODT) 4 MG disintegrating tablet Take 1 tablet by mouth every 6 hours as needed for Nausea or Vomiting 30 tablet 5    simvastatin (ZOCOR) 40 MG tablet TAKE 1 TABLET AT BEDTIME FOR CHOLESTEROL 90 tablet 3    omeprazole (PRILOSEC) 40 MG delayed release capsule TAKE 1 CAPSULE DAILY FOR STOMACH 90 capsule 3    propranolol (INDERAL) 60 MG tablet Take 1 tablet by mouth 2 times daily For blood pressure and tremor 180 tablet 3    HYDROcodone-acetaminophen (NORCO) 7.5-325 MG per tablet       metoprolol tartrate (LOPRESSOR) 50 MG tablet Take 50 mg by mouth daily      montelukast (SINGULAIR) 10 MG tablet       ondansetron (ZOFRAN) 8 MG tablet Take 1 tablet by mouth every 8 hours as needed for Nausea or Vomiting 30 tablet 3    lidocaine-prilocaine (EMLA) 2.5-2.5 % cream Apply topically to port area and cover with plastic wrap one hour prior to treatment. 1 Tube 1    blood glucose test strips (TRUE METRIX BLOOD GLUCOSE TEST) strip TEST BLOOD SUGAR ONCE DAILY *E11.9* 100 strip 5    isosorbide mononitrate (IMDUR) 60 MG extended release tablet Take 60 mg by mouth      losartan (COZAAR) 25 MG tablet Take 25 mg by mouth      umeclidinium-vilanterol (ANORO ELLIPTA) 62.5-25 MCG/INH AEPB inhaler Inhale 1 puff into the lungs daily      primidone (MYSOLINE) 50 MG tablet 1 tab po BID for tremor 180 tablet 3    triamcinolone (KENALOG) 0.1 % cream Apply topically 2 times daily. To lower leg dry skin 3 Tube 3    tamsulosin (FLOMAX) 0.4 MG capsule Take 1 capsule by mouth daily. 90 capsule 3    aspirin 325 MG tablet Take 325 mg by mouth daily. No current facility-administered medications for this visit. Allergies: No Known Allergies      Subjective   REVIEW OF SYSTEMS:   CONSTITUTIONAL: no fever, no night sweats, fatigue;  HEENT: no blurring of vision, no double vision, no hearing difficulty, no tinnitus, no ulceration, no dysplasia, no epistaxis;   LUNGS: no cough, no hemoptysis, no wheeze,  no shortness of breath;  CARDIOVASCULAR: no palpitation, no chest pain, no shortness of breath;  GI: no abdominal pain, no nausea, no vomiting, no diarrhea, no constipation;  JESSIKA: no dysuria, no hematuria, no frequency or urgency, no nephrolithiasis;  MUSCULOSKELETAL: no joint pain, no swelling, no stiffness;  ENDOCRINE: no polyuria, no polydipsia, no cold or heat intolerance;  HEMATOLOGY: no easy bruising or bleeding, no history of clotting disorder;  DERMATOLOGY: no skin rash, no eczema, no pruritus;  PSYCHIATRY: no depression, no anxiety, no panic attacks, no suicidal ideation, no homicidal ideation;  NEUROLOGY: no syncope, no seizures, no numbness or tingling of hands, no numbness or tingling of feet, no paresis;     Objective   There were no vitals taken for this visit.   Telephone visit    LABORATORY RESULTS REVIEWED/ANALYZED BY ME:    RADIOLOGY STUDIES REVIEWED BY ME:  5/3/2022 CT Chest W Contrast Persistent volume loss within the right upper lobe posteriorly. The associated soft tissue density shows slight interval decrease in size. Previously noted rather extensive predominantly groundglass  consolidation within the right lower lobe shows interval improvement from the previous exam. However, in other lobes there is progressive increased groundglass consolidation within both lungs which is multifocal in nature. Additional previously described nodules within both lungs are otherwise stable from the previous study. No effusion is present. There is no developing mediastinal or axillary lymphadenopathy. .    5/3/2022 CT Abd/Pelvis W IV Contrast (Oral) No metastatic disease is seen in the abdomen or pelvis. Stable 3 mm stone in the distal right ureter near the  ureterovesical junction. Stable mild stranding of the fat around the kidneys suggesting chronic inflammation. Stable mild bladder wall thickening. The prostate is mildly enlarged. Scattered colon diverticula. Under distention of the stomach. Atheromatous disease of the aortoiliac vessels. Degenerative changes of the spine and bilateral hips. Bilateral fat-containing inguinal hernias. My assessment-bilateral lung infiltrates. Some of the infiltrate is significantly better. Some others infiltrates have developed as well. ASSESSMENT:    No orders of the defined types were placed in this encounter. Diagnoses and all orders for this visit:    Adenocarcinoma of right lung [facility])    Encounter for antineoplastic immunotherapy    Care plan discussed with patient       [id number], stge IIIA, NSCLC, adenocarcinoma subtype  Essentially stage III disease. The patient is not a surgical candidate. Recommend chemoradiation followed by immunotherapy. Status post completion of chemoradiation. Currently receiving adjuvant durvalumab.   Reviewed CT chest that showed interval response. Continue durvalumab. Status post completion of chemoradiation. CT chest showed interval response   Recommended:  Durvalumab 1500 mg IV every 4 weeks x12 dose      Discussed treatment plan with the patient and daughter. Good tolerance to treatment except for mild fatigue. 3/11/2022I reviewed results of CT chest abdomen pelvis. Essentially, decreasing right perihilar consolidation. No evidence of progressive disease. New onset consolidation right lower lobe. As per radiology report it looks inflammatory/infectious. Patient prescribed a trial of Levaquin 750 mg x 10 days. We will repeat CT scans in 2 months (May). Continue durvalumab. Proceed cycle #11/12. Treatment related side effectsoccasional diarrhea, grade 1, fatigue    Systolic heart failureEF 45%  2D echo-LVEF 40-45%. Mild LVH, Mild [grade 1] diastolic dysfunction. Uncontrolled hypertension  There were no vitals taken for this visit. At risk thyroid disorder  Lab Results   Component Value Date    TSH 2.960 03/11/2022   Continue to monitor    Normocytic anemialikely multifactorial anemia to include anemia of inflammation/iron deficiency. Cannot rule out GI bleed given history taking aspirin and NSAIDs toxicity  Hemoglobin 9.8/MCV 93  Ferritin 72, iron saturation 14%, iron 33, TIBC 240, vitamin B12 249  Occult blood stool x3all 3 negative  Of note, patient has been on high-dose aspirin 325 mg p.o. daily. He was recently seen by cardiology and request to take daily aspirin only. 10/29/2021ferritin 72, iron saturation 14%, TIBC 240, vitamin B12 249, methylmalonic acid normal 0.19. November 2021IV Venofer 1000 mg  2/11/2022 Hemoglobin 11. 6. Improved  3/11/2022hemoglobin 11.6  Lab Results   Component Value Date    WBC 7.50 04/08/2022    HGB 11.6 (L) 04/08/2022    HCT 34.8 (L) 04/08/2022    MCV 95.9 (H) 04/08/2022     04/08/2022       Right lower lobe consolidationtrial of Levaquin x10 days with very poor tolerance. Differential diagnosis also to include immunotherapy pneumonitis versus radiation pneumonitis vs recurrence. If no improvement with antibiotics then a trial of steroids may be pursued after PET scan r/o malignancy. 5/3/2022Ct chest  1. Persistent volume loss within the right upper lobe posteriorly. The  associated soft tissue density shows slight interval decrease in size. 2. Previously noted rather extensive predominantly groundglass  consolidation within the right lower lobe shows interval improvement  from the previous exam. However, in other lobes there is progressive  increased groundglass consolidation within both lungs which is  multifocal in nature. Additional previously described nodules within  both lungs are otherwise stable from the previous study. No effusion  is present. There is no developing mediastinal or axillary  Lymphadenopathy. .  -Repeat CT chest July 2022    PLAN:  · RTC with MD in treatment room in 4 weeks  · C#11/12 Durvalumab today and every 4 weeks  · CBC CMP today  · Repeat CT chest July 2022  · Monitor BP at home and record  · Continue Follow-up appointments with PCP for BP         IGamal am pre charting  as Medical Assistant for Heri Martinez MD. Electronically signed by Gamal Schuster MA on 5/5/2022 at 2:11 PM CDT. Ren Cash am scribing as Medical Assistant for Heri Martinez MD. Electronically signed by Gamal Schuster MA on 5/5/2022 at 4:30 PM CDT. I, Dr Lorenda Lennox, personally performed the services described in this documentation as scribed by Gamal Schuster MA in my presence and is both accurate and complete. I have seen, examined and reviewed this patient medication list, appropriate labs and imaging studies. I reviewed relevant medical records and others physicians notes. I discussed the plans of care with the patient. I answered all the questions to the patients satisfaction.  I have also reviewed the chief complaint (CC) and part of the history (History of Present Illness (HPI), Past Family Social History ([facility]), or Review of Systems (ROS) and made changes when appropriated. (Please note that portions of this note were completed with a voice recognition program. Efforts were made to edit the dictations but occasionally words are mis-transcribed. )Electronically signed by Zulma Blankenship MD on 5/5/2022 at 4:31 PM      Gus Greer is a 79 y.o. male evaluated via telephone on 5/6/2022 for No chief complaint on file. .   Documentation:  I communicated with the patient and/or health care decision maker about as above   Details of this discussion including any medical advice provided: as above    Total Time: minutes: 11-20 minutes    Gus Greer was evaluated through a synchronous (real-time) audio encounter. Patient identification was verified at the start of the visit. He (or guardian if applicable) is aware that this is a billable service, which includes applicable co-pays. This visit was conducted with the patient's (and/or legal guardian's) verbal consent. He has not had a related appointment within my department in the past 7 days or scheduled within the next 24 hours. The patient was located in a state where the provider was licensed to provide care.     Note: not billable if this call serves to triage the patient into an appointment for the relevant concern    Zulma Blankenship MD

## 2022-05-06 ENCOUNTER — HOSPITAL ENCOUNTER (OUTPATIENT)
Dept: INFUSION THERAPY | Age: 82
Discharge: HOME | End: 2022-05-06
Payer: MEDICARE

## 2022-05-06 ENCOUNTER — OFFICE VISIT (OUTPATIENT)
Dept: HEMATOLOGY | Age: 82
End: 2022-05-06
Payer: MEDICARE

## 2022-05-06 VITALS
OXYGEN SATURATION: 100 % | SYSTOLIC BLOOD PRESSURE: 145 MMHG | DIASTOLIC BLOOD PRESSURE: 66 MMHG | HEART RATE: 81 BPM | RESPIRATION RATE: 16 BRPM | BODY MASS INDEX: 26.37 KG/M2 | HEIGHT: 70 IN | WEIGHT: 184.2 LBS | TEMPERATURE: 98.4 F

## 2022-05-06 DIAGNOSIS — Z51.12 ENCOUNTER FOR ANTINEOPLASTIC IMMUNOTHERAPY: ICD-10-CM

## 2022-05-06 DIAGNOSIS — Z71.89 CARE PLAN DISCUSSED WITH PATIENT: ICD-10-CM

## 2022-05-06 DIAGNOSIS — C34.91 ADENOCARCINOMA OF RIGHT LUNG (HCC): Primary | ICD-10-CM

## 2022-05-06 DIAGNOSIS — R53.83 FATIGUE DUE TO TREATMENT: ICD-10-CM

## 2022-05-06 DIAGNOSIS — C34.91 ADENOCARCINOMA OF RIGHT LUNG (HCC): ICD-10-CM

## 2022-05-06 DIAGNOSIS — Z51.11 CHEMOTHERAPY MANAGEMENT, ENCOUNTER FOR: Primary | ICD-10-CM

## 2022-05-06 DIAGNOSIS — Z51.11 CHEMOTHERAPY MANAGEMENT, ENCOUNTER FOR: ICD-10-CM

## 2022-05-06 LAB
ALBUMIN SERPL-MCNC: 4.2 G/DL (ref 3.5–5.2)
ALP BLD-CCNC: 97 U/L (ref 40–130)
ALT SERPL-CCNC: 23 U/L (ref 21–72)
ANION GAP SERPL CALCULATED.3IONS-SCNC: 13 MMOL/L (ref 7–19)
AST SERPL-CCNC: 30 U/L (ref 17–59)
BASOPHILS ABSOLUTE: 0.09 K/UL (ref 0.01–0.08)
BASOPHILS RELATIVE PERCENT: 1.2 % (ref 0.1–1.2)
BILIRUB SERPL-MCNC: 0.3 MG/DL (ref 0.2–1.3)
BUN BLDV-MCNC: 24 MG/DL (ref 9–20)
CALCIUM SERPL-MCNC: 9 MG/DL (ref 8.4–10.2)
CHLORIDE BLD-SCNC: 100 MMOL/L (ref 98–111)
CO2: 26 MMOL/L (ref 22–29)
CREAT SERPL-MCNC: 1.5 MG/DL (ref 0.6–1.2)
EOSINOPHILS ABSOLUTE: 0.27 K/UL (ref 0.04–0.54)
EOSINOPHILS RELATIVE PERCENT: 3.7 % (ref 0.7–7)
GFR NON-AFRICAN AMERICAN: 45
GLOBULIN: 3.3 G/DL
GLUCOSE BLD-MCNC: 213 MG/DL (ref 74–106)
HCT VFR BLD CALC: 32.1 % (ref 40.1–51)
HEMOGLOBIN: 10.4 G/DL (ref 13.7–17.5)
LYMPHOCYTES ABSOLUTE: 1.79 K/UL (ref 1.18–3.74)
LYMPHOCYTES RELATIVE PERCENT: 24.5 % (ref 19.3–53.1)
MCH RBC QN AUTO: 31.5 PG (ref 25.7–32.2)
MCHC RBC AUTO-ENTMCNC: 32.4 G/DL (ref 32.3–36.5)
MCV RBC AUTO: 97.3 FL (ref 79–92.2)
MONOCYTES ABSOLUTE: 0.66 K/UL (ref 0.24–0.82)
MONOCYTES RELATIVE PERCENT: 9 % (ref 4.7–12.5)
NEUTROPHILS ABSOLUTE: 4.43 K/UL (ref 1.56–6.13)
NEUTROPHILS RELATIVE PERCENT: 60.8 % (ref 34–71.1)
PDW BLD-RTO: 14.2 % (ref 11.6–14.4)
PLATELET # BLD: 222 K/UL (ref 163–337)
PMV BLD AUTO: 10 FL (ref 7.4–10.4)
POTASSIUM SERPL-SCNC: 4.5 MMOL/L (ref 3.5–5.1)
RBC # BLD: 3.3 M/UL (ref 4.63–6.08)
SODIUM BLD-SCNC: 139 MMOL/L (ref 137–145)
TOTAL PROTEIN: 7.5 G/DL (ref 6.3–8.2)
WBC # BLD: 7.3 K/UL (ref 4.23–9.07)

## 2022-05-06 PROCEDURE — 96413 CHEMO IV INFUSION 1 HR: CPT

## 2022-05-06 PROCEDURE — 6360000002 HC RX W HCPCS: Performed by: INTERNAL MEDICINE

## 2022-05-06 PROCEDURE — 85025 COMPLETE CBC W/AUTO DIFF WBC: CPT

## 2022-05-06 PROCEDURE — 80053 COMPREHEN METABOLIC PANEL: CPT

## 2022-05-06 PROCEDURE — 99442 PR PHYS/QHP TELEPHONE EVALUATION 11-20 MIN: CPT | Performed by: INTERNAL MEDICINE

## 2022-05-06 PROCEDURE — 2580000003 HC RX 258: Performed by: INTERNAL MEDICINE

## 2022-05-06 RX ORDER — HEPARIN SODIUM (PORCINE) LOCK FLUSH IV SOLN 100 UNIT/ML 100 UNIT/ML
500 SOLUTION INTRAVENOUS PRN
Status: DISCONTINUED | OUTPATIENT
Start: 2022-05-06 | End: 2022-05-07 | Stop reason: HOSPADM

## 2022-05-06 RX ORDER — SODIUM CHLORIDE 0.9 % (FLUSH) 0.9 %
5-40 SYRINGE (ML) INJECTION PRN
Status: DISCONTINUED | OUTPATIENT
Start: 2022-05-06 | End: 2022-05-07 | Stop reason: HOSPADM

## 2022-05-06 RX ADMIN — SODIUM CHLORIDE, PRESERVATIVE FREE 10 ML: 5 INJECTION INTRAVENOUS at 14:14

## 2022-05-06 RX ADMIN — HEPARIN 500 UNITS: 100 SYRINGE at 14:14

## 2022-05-06 RX ADMIN — SODIUM CHLORIDE 1500 MG: 9 INJECTION, SOLUTION INTRAVENOUS at 13:07

## 2022-05-16 DIAGNOSIS — N13.8 BPH WITH OBSTRUCTION/LOWER URINARY TRACT SYMPTOMS: ICD-10-CM

## 2022-05-16 DIAGNOSIS — J45.40 MODERATE PERSISTENT ASTHMA WITHOUT COMPLICATION: ICD-10-CM

## 2022-05-16 DIAGNOSIS — N40.1 BPH WITH OBSTRUCTION/LOWER URINARY TRACT SYMPTOMS: ICD-10-CM

## 2022-05-16 RX ORDER — TAMSULOSIN HYDROCHLORIDE 0.4 MG/1
CAPSULE ORAL
Qty: 90 CAPSULE | Refills: 0 | Status: SHIPPED | OUTPATIENT
Start: 2022-05-16

## 2022-05-16 RX ORDER — MONTELUKAST SODIUM 10 MG/1
TABLET ORAL
Qty: 90 TABLET | Refills: 3 | OUTPATIENT
Start: 2022-05-16

## 2022-05-16 RX ORDER — OMEPRAZOLE 40 MG/1
CAPSULE, DELAYED RELEASE ORAL
Qty: 90 CAPSULE | Refills: 3 | Status: SHIPPED | OUTPATIENT
Start: 2022-05-16

## 2022-05-16 NOTE — TELEPHONE ENCOUNTER
Pharmacy sent a request for refills on Singulair.   Rx Refill Note  Requested Prescriptions     Pending Prescriptions Disp Refills   • montelukast (SINGULAIR) 10 MG tablet [Pharmacy Med Name: MONTELUKAST SODIUM TABS 10MG] 90 tablet 3     Sig: TAKE 1 TABLET EVERY NIGHT      Last office visit with prescribing clinician: 3/10/2021      Next office visit with prescribing clinician: Visit date not found            Omid Rankin CMA  05/16/22, 09:23 CDT

## 2022-06-02 NOTE — PROGRESS NOTES
MEDICAL ONCOLOGY PROGRESS NOTE    Pt Name: Raimundo Devi  MRN: [id number]  YOB: 1940  Date of evaluation: 6/3/2022    HISTORY OF PRESENT ILLNESS:    Reason for MD visit-disease management/toxicity assessment  The patient is a very pleasant 79years old male who has been diagnosed with stage IIIa non-small cell lung cancer, adenocarcinoma subtype. He is a status post completion of chemoradiation in June 2021. He is currently on treatment with adjuvant immunotherapy. He presents today for his last treatment. He is a accompanied by his daughter. Overall, he feels that his stamina has gotten better. He denies any respiratory symptoms at the present time. Diagnosis  · RUL adenocarcinoma, NSCLC, Dec 2020  · [id number], stage IIIA  · Systolic heart failure  · Hypertension, controlled  · Not a surgical candidate    Treatment Summary  · 4/21/2021-5/26/21 completed concurrent chemoradiation with carboplatin/Taxol  · 4/22/21-6/3/21- 6600 cGy radiation therapy completed  · 7/8/21-6/3/2022-completion of maintenance Durvalumab 1500mg every 4 weeks x12 cycles    Hematology/Cancer History:  Darryl Craig was first seen by me on 4/7/2021 referred by Dr. Gracy Yousif, [facility] [place] for findings of non-small cell lung cancer, adenocarcinoma type. He has several comorbidities including history of type 2 diabetes, hypertension COPD. History of smoking in the past.  Quit many years ago. History of coronary artery disease followed by cardiology biopsy. Last EF 70% in 2018. He was seen by his primary care provider in November 2020. He has complaint of chest pain. Chest x-ray showed a 4 cm mass in the right upper lung. · 12/1/20 CT chest [facility]): Large right upper lobe mass concerning for primary neoplasm. Enlarged right hilar lymph node concerning for metastatic disease. Additional noncalcified pulmonary nodules bilaterally for which follow-up CT is recommended in 3-6 months.  Atherosclerosis of the aorta and coronary arteries. · 12/16/2020-bronchoscopy with bronchoalveolar lavage was nondiagnostic  · 2/23/21 CT cheat w/o [facility]): Probable right upper lobe mass, as described. This appears slightly larger in size and is likely neoplastic. Consider PET CT follow-up. There is new surrounding infiltrate that extends inferiorly into the right upper lobe. The new may be infectious or inflammatory. Pulmonary hemorrhage possible. Other areas of nodularity and groundglass opacity/nodularity are stable in appearance. · 3/5/2021-navigational bronchoscopy with biopsy was nondiagnostic  · 3/5/21 CT chest w/o [facility]): [place] soft tissue mass within the right upper lobe highly suspicious for neoplasm. There is associated postobstructive pneumonitis within the right upper lobe showing mild progression from the previous study of 2/23/2021. There is no associated hilar or mediastinal lymphadenopathy. Today's study is performed as part of a navigational bronchoscopy exam. Other scattered nodules including groundglass nodules are noted within the lungs all stable from the previous exam warranting follow-up. Heavy atheromatous calcification of the thoracic aorta and proximal great vessels. Coronary calcifications are present. · 3/23/21 PET scan [facility]): Markedly FDG avid right upper lobe mass in the right upper lobe measuring 7.2 cm triangular opacity with maximum SUV 12.18, highly concerning for malignancy. Adjacent groundglass opacities,  similar compared to 3/5/2021, which could represent additional  malignancy or infection/inflammation. Adjacent right upper lobe groundglass opacities are similar compared to 3/5/2021, which could represent additional malignancy or infection/inflammation. Other non-FDG avid pulmonary findings as above. No evidence of jasmin or distant metastatic disease. · 3/29/21 Navigational bronchoscopy-Dr. Doshi Cone: Bronchoalveolar lavage RUL consistent with adenocarcinoma.  RUL transbronchial bipsy consistent with adenocarcinoma. FNA biopsy of several jasmin station negative for metastatic disease. · 4/7/2021-he was first seen by me. · 4/15/21 2D echo: Normal left ventricular size with reduced global systolic function EF 51-24%. Mild concentric left ventricular hypertrophy with mild [grade 1] diastolic dysfunction. Normal left atrial size. Mild thickening of a poorly visualized aortic valve without evidence of stenosis or insufficiency. Mild thickening of a normally mobile mitral valve with mild regurgitation. Nonvisualization pulmonic valve. Poorly visualized but apparently normal size right-sided chambers with preserved right ventricular systolic function. No tricuspid regurgitation with which to estimate RVSP. No significant pericardial effusion. Aortic root and ascending segments measured within normal limits. · 4/17/21 MRI brain: No acute intracranial process. No metastatic disease identified in the CNS. · 4/21/2021-initiation of carboplatin/Taxol weekly. Reviewed MRI brain 2D echo. · 4/21/2021-5/26/21 completed concurrent chemoradiation with carboplatin/taxol to be followed by immunotherapy  · 4/22/21-6/3/21 6600 cGy radiation therapy completed  · 6/30/21-CT CHEST W CONTRAST A large spiculated mass in the right upper lobe posterior segment represent a neoplastic process. The groundglass opacity/infiltrate in the right upper lobe and superior segments of the lower lobes bilaterally may represent an inflammatory or neoplastic/metastatic process. A single enlarged abnormal lymph node in the right hilum may represent a metastatic node. · 07/08/21-reviewed CT chest with perform radiologist.  · 7/8/21- initiate maintenance Durvalumab 1500mg every 4 weeks x12 cycles  · 9/24/2021 CT Chest w/ Contrast([facility]) Decreased size of right upper lobe triangular opacity with surrounding probable posttreatment changes.   Similar size left upper lobe 1 cm groundglass and solid pulmonary opacity/nodule compared to 3/23/2021. Right hilum with a 1.4 x 1.2 cm lymph node or conglomeration of lymph nodes, which appears similar to 11/30/2020. Diffusely heterogeneous bone mineralization, which is nonspecific. This could be seen with aggressive osteopenia or infiltrative process. · 10/01/21-I reviewed the results CT chest.  Interval treatment response. Continue adjuvant durvalumab. · 10/26/2021 Anemia Labs: Iron 33, TIBC 240, Iron Sat 14, Ferritin 72.6, Vit ,  · 11/4/2021 Blood Occult Studies 3/3 Negative  · 11/10/2021 MMA 0.19  · 11/30/2021 CT Chest w/ ContrastThe right upper lobe neoplasm is not as well-defined on the current study with increased consolidation within the adjacent lung parenchyma as well as adjacent consolidation with air bronchograms. I suspect this represents postradiation change with adjacent post radiation pneumonitis. It extends to the level of the upper pleura with associated thickening of the visceral and parietal pleura. I see no evidence of khai chest wall invasion. As noted on the previous examination there has been some extension of the neoplasm across the posterior aspect of the major fissure into the superior segment of the right lower lobe with this component of the neoplasm also demonstrating increased consolidation tracking along the lower margin of the major fissure. Stable foci of groundglass consolidation within the left lung. No new lesions are present. There are changes of centrilobular emphysema with hyperinflation of the lung parenchyma. 3. Heavy coronary artery calcifications are present. No new enlarged mediastinal or axillary adenopathy is demonstrated. .  · 11/30/2021 CT Abd/Pelvis w/ IV Contrast (oral)Changes of centrilobular emphysema within the lung bases. Coronary calcifications are present. There is a trace pericardial effusion or pericardial thickening demonstrated. 2. No evidence of metastatic disease to the abdomen or pelvis. The adrenals are unremarkable. 3.  There is a small 2 mm distal right ureteral stone just above the UVJ. This does not result in significant obstructive uropathy without evidence of asymmetric distention of the ureter or asymmetric enhancement of the right kidney in comparison with the left. No evidence of nephrolithiasis within the upper tracts of either kidney. The left ureter is decompressed and normal in appearance. 4. Mild constipation. There is no obstruction or free air. The appendix is identified and is normal in appearance. 5. Small fat-containing periumbilical hernia. Small bilateral  fat-containing inguinal hernias are also present. .   · 12/3/2021-I reviewed CT chest/abdomen/pelvis. Essentially no gross evidence of disease progression. Continue durvalumab and repeat CT scan in 6 weeks  · 2/18/2022 CT Chest W ContrastStable right upper lobe volume loss. There is decreasing right perihilar consolidation. Stable 1 cm short axis diameter right superior hilar lymph node. No mediastinal lymphadenopathy is seen. Stable areas of groundglass opacity in the left upper lobe and left lower lobe. Continued follow-up recommended. New moderate patchy groundglass infiltrates in the right lower lobe may be infectious or inflammatory. Post radiation change considered as well although the infiltrates are somewhat peripherally located. Atheromatous disease of the thoracic aorta and coronary arteries. Cardiomegaly. Please see the abdomen and pelvis CT report separately. · 2/18/2022 CT Abd/Pelvis W IV Contrast(Oral) No acute abnormality of the abdomen are pelvis particularly, no findings to suggest a neoplastic process or metastatic disease. A groundglass infiltrate in the right lower lung which was not seen in there previous study. This is represent an acute or subacute inflammatory/infectious process. This data be further evaluated. A persistent and unchanged 2 mm non obstructing calculus in the right distal ureter, 5 mm from the right UV junction.  No change. · 3/11/2022-I reviewed results of CT chest abdomen pelvis. Essentially, decreasing right perihilar consolidation. No evidence of progressive disease. New onset consolidation right lower lobe. As per radiology report it looks inflammatory/infectious. Patient prescribed a trial of Levaquin 750 mg x 10 days. We will repeat CT scans in 2 months. Continue durvalumab. · 5/3/2022 CT Chest W Contrast Persistent volume loss within the right upper lobe posteriorly. The associated soft tissue density shows slight interval decrease in size. Previously noted rather extensive predominantly groundglass consolidation within the right lower lobe shows interval improvement from the previous exam. However, in other lobes there is progressive increased groundglass consolidation within both lungs which is multifocal in nature. Additional previously described nodules within both lungs are otherwise stable from the previous study. No effusion is present. There is no developing mediastinal or axillary lymphadenopathy. · 5/3/2022 CT Abd/Pelvis W IV Contrast (Oral) No metastatic disease is seen in the abdomen or pelvis. Stable 3 mm stone in the distal right ureter near the ureterovesical junction. Stable mild stranding of the fat around the kidneys suggesting chronic inflammation. Stable mild bladder wall thickening. The prostate is mildly enlarged. Scattered colon diverticula. Under distention of the stomach. Atheromatous disease of the aortoiliac vessels. Degenerative changes of the spine and bilateral hips  · 5/5/2022- discussed results of CT chest abdomen pelvis. No evidence of disease progression. Mild interstitial infiltrates bilaterally. Some of the infiltrate have gotten better. Some of these infiltrates have developed. Patient denies any respiratory symptoms at this time. Recommend to proceed with immunotherapy completion. If persistent changes then we will refer to pulmonary.   · 6/3/2022-completion of 1 year of durvalumab therapy      Past Medical History:  Past Medical History:   Diagnosis Date    Allergic rhinitis     Bronchitis, chronic (HCC)     Carotid artery stenosis     GERD (gastroesophageal reflux disease)     Heart attack ([facility] [address]     Hemorrhoids     Hypercholesterolemia     Type II or unspecified type diabetes mellitus without mention of complication, not stated as uncontrolled        Past Surgical History:    Past Surgical History:   Procedure Laterality Date    CARDIAC SURGERY      balloon surgery (angioplasty)    PORT SURGERY Right 04/01/2021       Social History:   Marital status: , 2 daughters  Smoking status: Former smoker for ~30 years, 1.5 ppd  ETOH status: No  Resides: [address]    Family History:   Family History   Problem Relation Age of Onset    Diabetes Mother     High Blood Pressure Mother     Cancer Father         Lung     Current Hospital Medications:    Current Outpatient Medications   Medication Sig Dispense Refill    omeprazole (PRILOSEC) 40 MG delayed release capsule TAKE 1 CAPSULE DAILY FOR STOMACH 90 capsule 3    furosemide (LASIX) 40 MG tablet TAKE 1 TABLET DAILY FOR FLUID 90 tablet 3    metFORMIN (GLUCOPHAGE) 1000 MG tablet TAKE 1 TABLET TWICE A DAY FOR DIABETES 180 tablet 3    promethazine-dextromethorphan (PROMETHAZINE-DM) 6.25-15 MG/5ML syrup 1 teaspoon by mouth at bedtime as needed for severe cough 240 mL 0    ondansetron (ZOFRAN ODT) 4 MG disintegrating tablet Take 1 tablet by mouth every 6 hours as needed for Nausea or Vomiting 30 tablet 5    simvastatin (ZOCOR) 40 MG tablet TAKE 1 TABLET AT BEDTIME FOR CHOLESTEROL 90 tablet 3    propranolol (INDERAL) 60 MG tablet Take 1 tablet by mouth 2 times daily For blood pressure and tremor 180 tablet 3    HYDROcodone-acetaminophen (NORCO) 7.5-325 MG per tablet       metoprolol tartrate (LOPRESSOR) 50 MG tablet Take 50 mg by mouth daily      montelukast (SINGULAIR) 10 MG tablet       ondansetron (ZOFRAN) 8 MG tablet Take 1 tablet by mouth every 8 hours as needed for Nausea or Vomiting 30 tablet 3    lidocaine-prilocaine (EMLA) 2.5-2.5 % cream Apply topically to port area and cover with plastic wrap one hour prior to treatment. 1 Tube 1    blood glucose test strips (TRUE METRIX BLOOD GLUCOSE TEST) strip TEST BLOOD SUGAR ONCE DAILY *E11.9* 100 strip 5    isosorbide mononitrate (IMDUR) 60 MG extended release tablet Take 60 mg by mouth      losartan (COZAAR) 25 MG tablet Take 25 mg by mouth      umeclidinium-vilanterol (ANORO ELLIPTA) 62.5-25 MCG/INH AEPB inhaler Inhale 1 puff into the lungs daily      primidone (MYSOLINE) 50 MG tablet 1 tab po BID for tremor 180 tablet 3    triamcinolone (KENALOG) 0.1 % cream Apply topically 2 times daily. To lower leg dry skin 3 Tube 3    tamsulosin (FLOMAX) 0.4 MG capsule Take 1 capsule by mouth daily. 90 capsule 3    aspirin 325 MG tablet Take 325 mg by mouth daily. No current facility-administered medications for this visit.      Facility-Administered Medications Ordered in Other Visits   Medication Dose Route Frequency Provider Last Rate Last Admin    sodium chloride flush 0.9 % injection 5-40 mL  5-40 mL IntraVENous PRN Jareth Garcia MD   10 mL at 06/03/22 1346    heparin flush 100 UNIT/ML injection 500 Units  500 Units IntraCATHeter PRN Jareth Garcia MD   500 Units at 06/03/22 1348       Allergies: No Known Allergies      Subjective   REVIEW OF SYSTEMS:   CONSTITUTIONAL: no fever, no night sweats,  fatigue;  HEENT: no blurring of vision, no double vision, no hearing difficulty, no tinnitus, no ulceration, no dysplasia, no epistaxis;   LUNGS: no cough, no hemoptysis, no wheeze,  no shortness of breath;  CARDIOVASCULAR: no palpitation, no chest pain, no shortness of breath;  GI: no abdominal pain, no nausea, no vomiting, no diarrhea, no constipation;  JESSIKA: no dysuria, no hematuria, no frequency or urgency, no nephrolithiasis;  MUSCULOSKELETAL: no joint pain, no swelling, no stiffness;  ENDOCRINE: no polyuria, no polydipsia, no cold or heat intolerance;  HEMATOLOGY: no easy bruising or bleeding, no history of clotting disorder;  DERMATOLOGY: no skin rash, no eczema, no pruritus;  PSYCHIATRY: no depression, no anxiety, no panic attacks, no suicidal ideation, no homicidal ideation;  NEUROLOGY: no syncope, no seizures, no numbness or tingling of hands, no numbness or tingling of feet, no paresis;     Objective   /70   Pulse 80   Temp 98.2 °F (36.8 °C) (Oral)   Resp 16   Ht 5' 10\" (1.778 m)   Wt 185 lb 3.2 oz (84 kg)   SpO2 100%   BMI 26.57 kg/m²      PHYSICAL EXAM:  CONSTITUTIONAL: Alert, appropriate, no acute distress  EYES: Non icteric, EOM intact, pupils equal round   ENT: Mucus membranes moist, no oral pharyngeal lesions, external inspection of ears and nose are normal.  NECK: Supple, no masses. No palpable thyroid mass  CHEST/LUNGS: CTA bilaterally, normal respiratory effort   CARDIOVASCULAR: RRR, no murmurs. No lower extremity edema  ABDOMEN: soft non-tender, active bowel sounds, no HSM. No palpable masses  EXTREMITIES: warm, full ROM in all 4 extremities, no focal weakness. SKIN: warm, dry with no rashes or lesions  LYMPH: No cervical, clavicular, axillary, or inguinal lymphadenopathy  NEUROLOGIC: follows commands, non focal   PSYCH: mood and affect appropriate.   Alert and oriented to time, place, person    LABORATORY RESULTS REVIEWED/ANALYZED BY ME:  Lab Results   Component Value Date    WBC 6.70 06/03/2022    HGB 10.7 (L) 06/03/2022    HCT 32.8 (L) 06/03/2022    MCV 98.5 (H) 06/03/2022     06/03/2022     Lab Results   Component Value Date    NEUTROABS 4.11 06/03/2022     Lab Results   Component Value Date     06/03/2022    K 4.5 06/03/2022     06/03/2022    CO2 24 06/03/2022    BUN 30 (H) 06/03/2022    CREATININE 1.3 (H) 06/03/2022    GLUCOSE 184 (H) 06/03/2022    CALCIUM 9.3 06/03/2022    PROT 7.4 06/03/2022    LABALBU 4.4 06/03/2022    BILITOT 0.4 06/03/2022    ALKPHOS 83 06/03/2022    AST 28 06/03/2022    ALT 20 (L) 06/03/2022    LABGLOM 53 (A) 06/03/2022    GFRAA 54 (L) 06/03/2021    GLOB 3.0 06/03/2022   6/3/2022-ferritin 435, iron 90, iron saturation 38%, TIBC 239, vitamin B12 401    RADIOLOGY STUDIES REVIEWED BY ME:  None    ASSESSMENT:    Orders Placed This Encounter   Procedures    CT CHEST W CONTRAST     Standing Status:   Future     Standing Expiration Date:   6/3/2023     Scheduling Instructions:      sched around 7/20/22     Order Specific Question:   STAT Creatinine as needed:     Answer:   No     Order Specific Question:   Reason for exam:     Answer:   COMPARE TO PREVIOUS SCANS-to assess response to treatment    CBC with Auto Differential     Standing Status:   Future     Number of Occurrences:   1     Standing Expiration Date:   6/3/2023    Comprehensive Metabolic Panel     Standing Status:   Future     Number of Occurrences:   1     Standing Expiration Date:   6/3/2023    TSH     Standing Status:   Future     Number of Occurrences:   1     Standing Expiration Date:   6/3/2023    Iron and TIBC     Standing Status:   Future     Number of Occurrences:   1     Standing Expiration Date:   6/3/2023     Order Specific Question:   Is Patient Fasting? Answer:   no     Order Specific Question:   No of Hours? Answer:   0    Ferritin     Standing Status:   Future     Number of Occurrences:   1     Standing Expiration Date:   6/3/2023    Vitamin B12     Standing Status:   Future     Number of Occurrences:   1     Standing Expiration Date:   6/3/2023      Chandni Shetty was seen today for lung cancer. Diagnoses and all orders for this visit:    Adenocarcinoma of right lung ([facility] [address]  -     CBC with Auto Differential; Future  -     Comprehensive Metabolic Panel; Future  -     TSH; Future  -     CT CHEST W CONTRAST;  Future    Fatigue due to treatment  -     CBC with Auto Differential; Future  -     Comprehensive Metabolic Panel; Future  -     TSH; Future    Encounter for antineoplastic immunotherapy    Care plan discussed with patient    Antineoplastic chemotherapy induced anemia  -     Iron and TIBC; Future  -     Ferritin; Future  -     Vitamin B12; Future    Iron deficiency anemia, unspecified iron deficiency anemia type  -     Iron and TIBC; Future  -     Ferritin; Future  -     Vitamin B12; Future       [id number], stge IIIA, NSCLC, adenocarcinoma subtype  Essentially stage III disease. The patient is not a surgical candidate. Recommend chemoradiation followed by immunotherapy. Status post completion of chemoradiation. 6/3/2022-completion of 1 year of maintenance durvalumab. -Repeat CT chest July 2022      Treatment related side effects-occasional diarrhea, grade 1, fatigue    Systolic heart failure-EF 45%  2D echo-LVEF 40-45%. Mild LVH, Mild [grade 1] diastolic dysfunction. Controlled hypertension-  /70   Pulse 80   Temp 98.2 °F (36.8 °C) (Oral)   Resp 16   Ht 5' 10\" (1.778 m)   Wt 185 lb 3.2 oz (84 kg)   SpO2 100%   BMI 26.57 kg/m²      At risk thyroid disorder-  Lab Results   Component Value Date    TSH 4.430 (H) 06/03/2022   Continue to monitor    Normocytic anemia-likely multifactorial anemia to include anemia of inflammation/iron deficiency. Cannot rule out GI bleed given history taking aspirin and NSAIDs toxicity  Hemoglobin 9.8/MCV 93  Ferritin 72, iron saturation 14%, iron 33, TIBC 240, vitamin B12 249  Occult blood stool x3-all 3 negative  Of note, patient has been on high-dose aspirin 325 mg p.o. daily. He was recently seen by cardiology and request to take daily aspirin only. 10/29/2021-ferritin 72, iron saturation 14%, TIBC 240, vitamin B12 249, methylmalonic acid normal 0.19. November 2021-IV Venofer 1000 mg  2/11/2022 -Hemoglobin 11. 6. Improved  3/11/2022-hemoglobin 11.6  Lab Results   Component Value Date    WBC 6.70 06/03/2022    HGB 10.7 (L) 06/03/2022    HCT 32.8 (L) 06/03/2022    MCV 98.5 (H) 06/03/2022     06/03/2022   -6/3/2022-ferritin 435, iron 90, iron saturation 38, TIBC 239, vitamin B12 401    Right lower lobe consolidation-trial of Levaquin x10 days with very poor tolerance. Differential diagnosis also to include immunotherapy pneumonitis versus radiation pneumonitis vs recurrence. If no improvement with antibiotics then a trial of steroids may be pursued after PET scan r/o malignancy. 5/3/2022-Ct chest  1. Persistent volume loss within the right upper lobe posteriorly. The  associated soft tissue density shows slight interval decrease in size. 2. Previously noted rather extensive predominantly groundglass  consolidation within the right lower lobe shows interval improvement  from the previous exam. However, in other lobes there is progressive  increased groundglass consolidation within both lungs which is  multifocal in nature. Additional previously described nodules within  both lungs are otherwise stable from the previous study. No effusion  is present. There is no developing mediastinal or axillary  Lymphadenopathy.  -Repeat CT chest July 2022    PLAN:  · RTC with MD 7 weeks-after CT chest  · C#12/12 Durvalumab today and every 4 weeks  · CBC, CMP, TSH, Iron profile, Ferritin, B12 today  · Repeat CT chest July 2022  · Monitor BP at home and record  · Continue Follow-up appointments with PCP for BP       IEdmund am pre charting  as Medical Assistant for Jovi Aceves MD. Electronically signed by Edmund Galo MA on 6/3/2022 at 10:36 AM CDT. Zana Castro am scribing for Jovi Aceves MD. Electronically signed by Yadira Duran RN on 6/3/2022 at 12:30 PM CDT. I, Dr Deepti Boyce, personally performed the services described in this documentation as scribed by Yadira Duran RN in my presence and is both accurate and complete.     I have seen, examined and reviewed this patient medication list, appropriate labs and imaging studies. I reviewed relevant medical records and others physicians notes. I discussed the plans of care with the patient. I answered all the questions to the patients satisfaction. I have also reviewed the chief complaint (CC) and part of the history (History of Present Illness (HPI), Past Family Social History [facility]), or Review of Systems (ROS) and made changes when appropriated.        (Please note that portions of this note were completed with a voice recognition program. Efforts were made to edit the dictations but occasionally words are mis-transcribed.)    Electronically signed by Elysia Hensley MD on 6/3/2022 at 6:19 PM

## 2022-06-03 ENCOUNTER — OFFICE VISIT (OUTPATIENT)
Dept: HEMATOLOGY | Age: 82
End: 2022-06-03
Payer: MEDICARE

## 2022-06-03 ENCOUNTER — HOSPITAL ENCOUNTER (OUTPATIENT)
Dept: INFUSION THERAPY | Age: 82
Discharge: HOME | End: 2022-06-03
Payer: MEDICARE

## 2022-06-03 VITALS
OXYGEN SATURATION: 100 % | TEMPERATURE: 98.2 F | WEIGHT: 185.2 LBS | HEIGHT: 70 IN | BODY MASS INDEX: 26.51 KG/M2 | HEART RATE: 80 BPM | SYSTOLIC BLOOD PRESSURE: 122 MMHG | DIASTOLIC BLOOD PRESSURE: 70 MMHG | RESPIRATION RATE: 16 BRPM

## 2022-06-03 DIAGNOSIS — R53.83 FATIGUE DUE TO TREATMENT: ICD-10-CM

## 2022-06-03 DIAGNOSIS — T45.1X5A ANTINEOPLASTIC CHEMOTHERAPY INDUCED ANEMIA: ICD-10-CM

## 2022-06-03 DIAGNOSIS — D50.9 IRON DEFICIENCY ANEMIA, UNSPECIFIED IRON DEFICIENCY ANEMIA TYPE: ICD-10-CM

## 2022-06-03 DIAGNOSIS — R53.83 FATIGUE DUE TO TREATMENT: Primary | ICD-10-CM

## 2022-06-03 DIAGNOSIS — Z51.12 ENCOUNTER FOR ANTINEOPLASTIC IMMUNOTHERAPY: ICD-10-CM

## 2022-06-03 DIAGNOSIS — C34.91 ADENOCARCINOMA OF RIGHT LUNG (HCC): Primary | ICD-10-CM

## 2022-06-03 DIAGNOSIS — C34.91 ADENOCARCINOMA OF RIGHT LUNG (HCC): ICD-10-CM

## 2022-06-03 DIAGNOSIS — D64.81 ANTINEOPLASTIC CHEMOTHERAPY INDUCED ANEMIA: ICD-10-CM

## 2022-06-03 DIAGNOSIS — Z71.89 CARE PLAN DISCUSSED WITH PATIENT: ICD-10-CM

## 2022-06-03 LAB
ALBUMIN SERPL-MCNC: 4.4 G/DL (ref 3.5–5.2)
ALP BLD-CCNC: 83 U/L (ref 40–130)
ALT SERPL-CCNC: 20 U/L (ref 21–72)
ANION GAP SERPL CALCULATED.3IONS-SCNC: 12 MMOL/L (ref 7–19)
AST SERPL-CCNC: 28 U/L (ref 17–59)
BILIRUB SERPL-MCNC: 0.4 MG/DL (ref 0.2–1.3)
BUN BLDV-MCNC: 30 MG/DL (ref 9–20)
CALCIUM SERPL-MCNC: 9.3 MG/DL (ref 8.4–10.2)
CHLORIDE BLD-SCNC: 102 MMOL/L (ref 98–111)
CO2: 24 MMOL/L (ref 22–29)
CREAT SERPL-MCNC: 1.3 MG/DL (ref 0.6–1.2)
FERRITIN: 435.3 NG/ML (ref 30–400)
GFR NON-AFRICAN AMERICAN: 53
GLOBULIN: 3 G/DL
GLUCOSE BLD-MCNC: 184 MG/DL (ref 74–106)
HCT VFR BLD CALC: 32.8 % (ref 40.1–51)
HEMOGLOBIN: 10.7 G/DL (ref 13.7–17.5)
IRON SATURATION: 38 % (ref 14–50)
IRON: 90 UG/DL (ref 59–158)
LYMPHOCYTES ABSOLUTE: 1.68 K/UL (ref 1.18–3.74)
LYMPHOCYTES RELATIVE PERCENT: 25.1 % (ref 19.3–53.1)
MCH RBC QN AUTO: 32.1 PG (ref 25.7–32.2)
MCHC RBC AUTO-ENTMCNC: 32.6 G/DL (ref 32.3–36.5)
MCV RBC AUTO: 98.5 FL (ref 79–92.2)
MONOCYTES ABSOLUTE: 0.47 K/UL (ref 0.24–0.82)
MONOCYTES RELATIVE PERCENT: 7 % (ref 4.7–12.5)
NEUTROPHILS ABSOLUTE: 4.11 K/UL (ref 1.56–6.13)
NEUTROPHILS RELATIVE PERCENT: 61.4 % (ref 34–71.1)
PDW BLD-RTO: 13.8 % (ref 11.6–14.4)
PLATELET # BLD: 213 K/UL (ref 163–337)
PMV BLD AUTO: 9.5 FL (ref 7.4–10.4)
POTASSIUM SERPL-SCNC: 4.5 MMOL/L (ref 3.5–5.1)
RBC # BLD: 3.33 M/UL (ref 4.63–6.08)
SODIUM BLD-SCNC: 138 MMOL/L (ref 137–145)
TOTAL IRON BINDING CAPACITY: 239 UG/DL (ref 250–400)
TOTAL PROTEIN: 7.4 G/DL (ref 6.3–8.2)
TSH SERPL DL<=0.05 MIU/L-ACNC: 4.43 UIU/ML (ref 0.27–4.2)
VITAMIN B-12: 401 PG/ML (ref 211–946)
WBC # BLD: 6.7 K/UL (ref 4.23–9.07)

## 2022-06-03 PROCEDURE — 96413 CHEMO IV INFUSION 1 HR: CPT

## 2022-06-03 PROCEDURE — 1123F ACP DISCUSS/DSCN MKR DOCD: CPT | Performed by: INTERNAL MEDICINE

## 2022-06-03 PROCEDURE — G8428 CUR MEDS NOT DOCUMENT: HCPCS | Performed by: INTERNAL MEDICINE

## 2022-06-03 PROCEDURE — 85025 COMPLETE CBC W/AUTO DIFF WBC: CPT

## 2022-06-03 PROCEDURE — 99214 OFFICE O/P EST MOD 30 MIN: CPT | Performed by: INTERNAL MEDICINE

## 2022-06-03 PROCEDURE — 1036F TOBACCO NON-USER: CPT | Performed by: INTERNAL MEDICINE

## 2022-06-03 PROCEDURE — 80053 COMPREHEN METABOLIC PANEL: CPT

## 2022-06-03 PROCEDURE — 6360000002 HC RX W HCPCS: Performed by: INTERNAL MEDICINE

## 2022-06-03 PROCEDURE — 2580000003 HC RX 258: Performed by: INTERNAL MEDICINE

## 2022-06-03 PROCEDURE — G8417 CALC BMI ABV UP PARAM F/U: HCPCS | Performed by: INTERNAL MEDICINE

## 2022-06-03 RX ORDER — SODIUM CHLORIDE 0.9 % (FLUSH) 0.9 %
5-40 SYRINGE (ML) INJECTION PRN
Status: DISCONTINUED | OUTPATIENT
Start: 2022-06-03 | End: 2022-06-04 | Stop reason: HOSPADM

## 2022-06-03 RX ORDER — HEPARIN SODIUM (PORCINE) LOCK FLUSH IV SOLN 100 UNIT/ML 100 UNIT/ML
500 SOLUTION INTRAVENOUS PRN
Status: DISCONTINUED | OUTPATIENT
Start: 2022-06-03 | End: 2022-06-04 | Stop reason: HOSPADM

## 2022-06-03 RX ORDER — FAMOTIDINE 10 MG/ML
20 INJECTION, SOLUTION INTRAVENOUS ONCE
OUTPATIENT
Start: 2022-06-03 | End: 2022-06-03

## 2022-06-03 RX ORDER — SODIUM CHLORIDE 9 MG/ML
INJECTION, SOLUTION INTRAVENOUS CONTINUOUS
OUTPATIENT
Start: 2022-06-03

## 2022-06-03 RX ORDER — DIPHENHYDRAMINE HYDROCHLORIDE 50 MG/ML
50 INJECTION INTRAMUSCULAR; INTRAVENOUS ONCE
OUTPATIENT
Start: 2022-06-03 | End: 2022-06-03

## 2022-06-03 RX ORDER — SODIUM CHLORIDE 9 MG/ML
20 INJECTION, SOLUTION INTRAVENOUS ONCE
OUTPATIENT
Start: 2022-06-03 | End: 2022-06-03

## 2022-06-03 RX ORDER — EPINEPHRINE 1 MG/ML
0.3 INJECTION, SOLUTION, CONCENTRATE INTRAVENOUS PRN
OUTPATIENT
Start: 2022-06-03

## 2022-06-03 RX ORDER — METHYLPREDNISOLONE SODIUM SUCCINATE 125 MG/2ML
125 INJECTION, POWDER, LYOPHILIZED, FOR SOLUTION INTRAMUSCULAR; INTRAVENOUS ONCE
OUTPATIENT
Start: 2022-06-03 | End: 2022-06-03

## 2022-06-03 RX ORDER — HEPARIN SODIUM (PORCINE) LOCK FLUSH IV SOLN 100 UNIT/ML 100 UNIT/ML
500 SOLUTION INTRAVENOUS PRN
Status: CANCELLED | OUTPATIENT
Start: 2022-06-03

## 2022-06-03 RX ORDER — SODIUM CHLORIDE 9 MG/ML
25 INJECTION, SOLUTION INTRAVENOUS PRN
OUTPATIENT
Start: 2022-06-03

## 2022-06-03 RX ORDER — SODIUM CHLORIDE 0.9 % (FLUSH) 0.9 %
5-40 SYRINGE (ML) INJECTION PRN
Status: CANCELLED | OUTPATIENT
Start: 2022-06-03

## 2022-06-03 RX ADMIN — SODIUM CHLORIDE, PRESERVATIVE FREE 10 ML: 5 INJECTION INTRAVENOUS at 13:46

## 2022-06-03 RX ADMIN — HEPARIN 500 UNITS: 100 SYRINGE at 13:48

## 2022-06-03 RX ADMIN — SODIUM CHLORIDE 1500 MG: 9 INJECTION, SOLUTION INTRAVENOUS at 12:40

## 2022-06-07 RX ORDER — PROPRANOLOL HYDROCHLORIDE 60 MG/1
60 TABLET ORAL 2 TIMES DAILY
Qty: 180 TABLET | Refills: 3 | Status: SHIPPED | OUTPATIENT
Start: 2022-06-07 | End: 2022-09-14 | Stop reason: SDUPTHER

## 2022-07-11 RX ORDER — SIMVASTATIN 40 MG
TABLET ORAL
Qty: 90 TABLET | Refills: 3 | Status: SHIPPED | OUTPATIENT
Start: 2022-07-11

## 2022-07-20 ENCOUNTER — HOSPITAL ENCOUNTER (OUTPATIENT)
Dept: CT IMAGING | Age: 82
Discharge: HOME | End: 2022-07-20
Payer: MEDICARE

## 2022-07-20 DIAGNOSIS — C34.91 ADENOCARCINOMA OF RIGHT LUNG (HCC): ICD-10-CM

## 2022-07-20 PROCEDURE — 6360000004 HC RX CONTRAST MEDICATION: Performed by: INTERNAL MEDICINE

## 2022-07-20 PROCEDURE — 71260 CT THORAX DX C+: CPT

## 2022-07-20 PROCEDURE — 71260 CT THORAX DX C+: CPT | Performed by: RADIOLOGY

## 2022-07-20 RX ADMIN — IOPAMIDOL 50 ML: 755 INJECTION, SOLUTION INTRAVENOUS at 10:42

## 2022-08-04 DIAGNOSIS — C34.91 ADENOCARCINOMA OF RIGHT LUNG (HCC): Primary | ICD-10-CM

## 2022-08-04 NOTE — PROGRESS NOTES
MEDICAL ONCOLOGY PROGRESS NOTE    Pt Name: Bjorn Gupta  MRN: [id number]  YOB: 1940  Date of evaluation: 8/6/2022    HISTORY OF PRESENT ILLNESS:    Reason for MD visit-disease management/toxicity assessment  The patient is a very pleasant 79years old male who has been diagnosed with stage III non-small cell lung cancer, adenocarcinoma subtype. He is status post completion CRT followed by 1 year durvalumab completed June 2022. Patient is currently on clinical/image surveillance. He denies any new complaints. He has gained some weight since last visit. Denies any new respiratory symptoms. In fact, his exertional dyspnea has improved somewhat. He is now able to walk longer distance. Office. He is accompanied by his daughter today. Diagnosis  RUL adenocarcinoma, NSCLC, Dec 2020  [id number], stage IIIA  Systolic heart failure  Hypertension, controlled  Not a surgical candidate    Treatment Summary  4/21/2021-5/26/21 completed concurrent chemoradiation with carboplatin/Taxol  4/22/21-6/3/21- 6600 cGy radiation therapy completed  7/8/21-6/3/2022-completion of maintenance Durvalumab 1500mg every 4 weeks x12 cycles    Hematology/Cancer History:  Sophia Eldridge was first seen by me on 4/7/2021 referred by Dr. Angelina Pate, [facility] [place] for findings of non-small cell lung cancer, adenocarcinoma type. He has several comorbidities including history of type 2 diabetes, hypertension COPD. History of smoking in the past.  Quit many years ago. History of coronary artery disease followed by cardiology biopsy. Last EF 70% in 2018. He was seen by his primary care provider in November 2020. He has complaint of chest pain. Chest x-ray showed a 4 cm mass in the right upper lung. 12/1/20 CT chest [facility]): Large right upper lobe mass concerning for primary neoplasm. Enlarged right hilar lymph node concerning for metastatic disease.  Additional noncalcified pulmonary nodules bilaterally for which follow-up CT is recommended in 3-6 months. Atherosclerosis of the aorta and coronary arteries. 12/16/2020-bronchoscopy with bronchoalveolar lavage was nondiagnostic  2/23/21 CT cheat w/o [facility]): Probable right upper lobe mass, as described. This appears slightly larger in size and is likely neoplastic. Consider PET CT follow-up. There is new surrounding infiltrate that extends inferiorly into the right upper lobe. The new may be infectious or inflammatory. Pulmonary hemorrhage possible. Other areas of nodularity and groundglass opacity/nodularity are stable in appearance. 3/5/2021-navigational bronchoscopy with biopsy was nondiagnostic  3/5/21 CT chest w/o [facility]): [place] soft tissue mass within the right upper lobe highly suspicious for neoplasm. There is associated postobstructive pneumonitis within the right upper lobe showing mild progression from the previous study of 2/23/2021. There is no associated hilar or mediastinal lymphadenopathy. Today's study is performed as part of a navigational bronchoscopy exam. Other scattered nodules including groundglass nodules are noted within the lungs all stable from the previous exam warranting follow-up. Heavy atheromatous calcification of the thoracic aorta and proximal great vessels. Coronary calcifications are present. 3/23/21 PET scan [facility]): Markedly FDG avid right upper lobe mass in the right upper lobe measuring 7.2 cm triangular opacity with maximum SUV 12.18, highly concerning for malignancy. Adjacent groundglass opacities,  similar compared to 3/5/2021, which could represent additional  malignancy or infection/inflammation. Adjacent right upper lobe groundglass opacities are similar compared to 3/5/2021, which could represent additional malignancy or infection/inflammation. Other non-FDG avid pulmonary findings as above. No evidence of jasmin or distant metastatic disease.   3/29/21 Navigational bronchoscopy-Dr. Robles Romero: Bronchoalveolar lavage RUL consistent with adenocarcinoma. RUL transbronchial bipsy consistent with adenocarcinoma. FNA biopsy of several jasmin station negative for metastatic disease. 4/7/2021-he was first seen by me. 4/15/21 2D echo: Normal left ventricular size with reduced global systolic function EF 38-61%. Mild concentric left ventricular hypertrophy with mild [grade 1] diastolic dysfunction. Normal left atrial size. Mild thickening of a poorly visualized aortic valve without evidence of stenosis or insufficiency. Mild thickening of a normally mobile mitral valve with mild regurgitation. Nonvisualization pulmonic valve. Poorly visualized but apparently normal size right-sided chambers with preserved right ventricular systolic function. No tricuspid regurgitation with which to estimate RVSP. No significant pericardial effusion. Aortic root and ascending segments measured within normal limits. 4/17/21 MRI brain: No acute intracranial process. No metastatic disease identified in the CNS.   4/21/2021-initiation of carboplatin/Taxol weekly. Reviewed MRI brain 2D echo. 4/21/2021-5/26/21 completed concurrent chemoradiation with carboplatin/taxol to be followed by immunotherapy  4/22/21-6/3/21 6600 cGy radiation therapy completed  6/30/21-CT CHEST W CONTRAST A large spiculated mass in the right upper lobe posterior segment represent a neoplastic process. The groundglass opacity/infiltrate in the right upper lobe and superior segments of the lower lobes bilaterally may represent an inflammatory or neoplastic/metastatic process. A single enlarged abnormal lymph node in the right hilum may represent a metastatic node. 07/08/21-reviewed CT chest with perform radiologist.  7/8/21- initiate maintenance Durvalumab 1500mg every 4 weeks x12 cycles  9/24/2021 CT Chest w/ Contrast([facility]) Decreased size of right upper lobe triangular opacity with surrounding probable posttreatment changes.   Similar size left upper lobe 1 cm groundglass and solid pulmonary opacity/nodule compared to 3/23/2021. Right hilum with a 1.4 x 1.2 cm lymph node or conglomeration of lymph nodes, which appears similar to 11/30/2020. Diffusely heterogeneous bone mineralization, which is nonspecific. This could be seen with aggressive osteopenia or infiltrative process. 10/01/21-I reviewed the results CT chest.  Interval treatment response. Continue adjuvant durvalumab.  10/26/2021 Anemia Labs: Iron 33, TIBC 240, Iron Sat 14, Ferritin 72.6, Vit ,  11/4/2021 Blood Occult Studies 3/3 Negative  11/10/2021 MMA 0.19  11/30/2021 CT Chest w/ ContrastThe right upper lobe neoplasm is not as well-defined on the current study with increased consolidation within the adjacent lung parenchyma as well as adjacent consolidation with air bronchograms. I suspect this represents postradiation change with adjacent post radiation pneumonitis. It extends to the level of the upper pleura with associated thickening of the visceral and parietal pleura. I see no evidence of khai chest wall invasion. As noted on the previous examination there has been some extension of the neoplasm across the posterior aspect of the major fissure into the superior segment of the right lower lobe with this component of the neoplasm also demonstrating increased consolidation tracking along the lower margin of the major fissure. Stable foci of groundglass consolidation within the left lung. No new lesions are present. There are changes of centrilobular emphysema with hyperinflation of the lung parenchyma. 3. Heavy coronary artery calcifications are present. No new enlarged mediastinal or axillary adenopathy is demonstrated. .  11/30/2021 CT Abd/Pelvis w/ IV Contrast (oral)Changes of centrilobular emphysema within the lung bases. Coronary calcifications are present. There is a trace pericardial effusion or pericardial thickening demonstrated. 2. No evidence of metastatic disease to the abdomen or pelvis.  The adrenals are unremarkable. 3. There is a small 2 mm distal right ureteral stone just above the UVJ. This does not result in significant obstructive uropathy without evidence of asymmetric distention of the ureter or asymmetric enhancement of the right kidney in comparison with the left. No evidence of nephrolithiasis within the upper tracts of either kidney. The left ureter is decompressed and normal in appearance. 4. Mild constipation. There is no obstruction or free air. The appendix is identified and is normal in appearance. 5. Small fat-containing periumbilical hernia. Small bilateral  fat-containing inguinal hernias are also present. .   12/3/2021-I reviewed CT chest/abdomen/pelvis. Essentially no gross evidence of disease progression. Continue durvalumab and repeat CT scan in 6 weeks  2/18/2022 CT Chest W ContrastStable right upper lobe volume loss. There is decreasing right perihilar consolidation. Stable 1 cm short axis diameter right superior hilar lymph node. No mediastinal lymphadenopathy is seen. Stable areas of groundglass opacity in the left upper lobe and left lower lobe. Continued follow-up recommended. New moderate patchy groundglass infiltrates in the right lower lobe may be infectious or inflammatory. Post radiation change considered as well although the infiltrates are somewhat peripherally located. Atheromatous disease of the thoracic aorta and coronary arteries. Cardiomegaly. Please see the abdomen and pelvis CT report separately. 2/18/2022 CT Abd/Pelvis W IV Contrast(Oral) No acute abnormality of the abdomen are pelvis particularly, no findings to suggest a neoplastic process or metastatic disease. A groundglass infiltrate in the right lower lung which was not seen in there previous study. This is represent an acute or subacute inflammatory/infectious process. This data be further evaluated. A persistent and unchanged 2 mm non obstructing calculus in the right distal ureter, 5 mm from the right UV junction.  No change. 3/11/2022-I reviewed results of CT chest abdomen pelvis. Essentially, decreasing right perihilar consolidation. No evidence of progressive disease. New onset consolidation right lower lobe. As per radiology report it looks inflammatory/infectious. Patient prescribed a trial of Levaquin 750 mg x 10 days. We will repeat CT scans in 2 months. Continue durvalumab. 5/3/2022 CT Chest W Contrast Persistent volume loss within the right upper lobe posteriorly. The associated soft tissue density shows slight interval decrease in size. Previously noted rather extensive predominantly groundglass consolidation within the right lower lobe shows interval improvement from the previous exam. However, in other lobes there is progressive increased groundglass consolidation within both lungs which is multifocal in nature. Additional previously described nodules within both lungs are otherwise stable from the previous study. No effusion is present. There is no developing mediastinal or axillary lymphadenopathy. 5/3/2022 CT Abd/Pelvis W IV Contrast (Oral) No metastatic disease is seen in the abdomen or pelvis. Stable 3 mm stone in the distal right ureter near the ureterovesical junction. Stable mild stranding of the fat around the kidneys suggesting chronic inflammation. Stable mild bladder wall thickening. The prostate is mildly enlarged. Scattered colon diverticula. Under distention of the stomach. Atheromatous disease of the aortoiliac vessels. Degenerative changes of the spine and bilateral hips  5/5/2022- discussed results of CT chest abdomen pelvis. No evidence of disease progression. Mild interstitial infiltrates bilaterally. Some of the infiltrate have gotten better. Some of these infiltrates have developed. Patient denies any respiratory symptoms at this time. Recommend to proceed with immunotherapy completion. If persistent changes then we will refer to pulmonary.   6/3/2022-completion of 1 year of durvalumab therapy  6/3/2022 Iron Studies: Iron 90, TIBC 2393, Iron Sat 38,Ferritin 453.3, Vitamin B12- 401  7/20/2022 CT Chest W Contrast 6.2 x 3.2 x 5.5 cm right supra hilar soft tissue mass. Bilateral ill-defined groundglass opacities. Differential considerations include inflammatory/infectious/atypical etiology. No pulmonary infiltrates, pleural effusion or pneumothorax.       Past Medical History:  Past Medical History:   Diagnosis Date    Allergic rhinitis     Bronchitis, chronic (HCC)     Carotid artery stenosis     GERD (gastroesophageal reflux disease)     Heart attack ([facility] [address]     Hemorrhoids     Hypercholesterolemia     Type II or unspecified type diabetes mellitus without mention of complication, not stated as uncontrolled        Past Surgical History:    Past Surgical History:   Procedure Laterality Date    CARDIAC SURGERY      balloon surgery (angioplasty)    PORT SURGERY Right 04/01/2021       Social History:   Marital status: , 2 daughters  Smoking status: Former smoker for ~30 years, 1.5 ppd  ETOH status: No  Resides: [place], [place]    Family History:   Family History   Problem Relation Age of Onset    Diabetes Mother     High Blood Pressure Mother     Cancer Father         Lung     Current Hospital Medications:    Current Outpatient Medications   Medication Sig Dispense Refill    simvastatin (ZOCOR) 40 MG tablet TAKE 1 TABLET AT BEDTIME FOR CHOLESTEROL 90 tablet 3    propranolol (INDERAL) 60 MG tablet Take 1 tablet by mouth 2 times daily For blood pressure and tremor 180 tablet 3    omeprazole (PRILOSEC) 40 MG delayed release capsule TAKE 1 CAPSULE DAILY FOR STOMACH 90 capsule 3    furosemide (LASIX) 40 MG tablet TAKE 1 TABLET DAILY FOR FLUID 90 tablet 3    metFORMIN (GLUCOPHAGE) 1000 MG tablet TAKE 1 TABLET TWICE A DAY FOR DIABETES 180 tablet 3    promethazine-dextromethorphan (PROMETHAZINE-DM) 6.25-15 MG/5ML syrup 1 teaspoon by mouth at bedtime as needed for severe cough 240 mL 0 ondansetron (ZOFRAN ODT) 4 MG disintegrating tablet Take 1 tablet by mouth every 6 hours as needed for Nausea or Vomiting 30 tablet 5    HYDROcodone-acetaminophen (NORCO) 7.5-325 MG per tablet       metoprolol tartrate (LOPRESSOR) 50 MG tablet Take 50 mg by mouth daily      montelukast (SINGULAIR) 10 MG tablet       ondansetron (ZOFRAN) 8 MG tablet Take 1 tablet by mouth every 8 hours as needed for Nausea or Vomiting 30 tablet 3    lidocaine-prilocaine (EMLA) 2.5-2.5 % cream Apply topically to port area and cover with plastic wrap one hour prior to treatment. 1 Tube 1    blood glucose test strips (TRUE METRIX BLOOD GLUCOSE TEST) strip TEST BLOOD SUGAR ONCE DAILY *E11.9* 100 strip 5    isosorbide mononitrate (IMDUR) 60 MG extended release tablet Take 60 mg by mouth      losartan (COZAAR) 25 MG tablet Take 25 mg by mouth      umeclidinium-vilanterol (ANORO ELLIPTA) 62.5-25 MCG/INH AEPB inhaler Inhale 1 puff into the lungs daily      primidone (MYSOLINE) 50 MG tablet 1 tab po BID for tremor 180 tablet 3    triamcinolone (KENALOG) 0.1 % cream Apply topically 2 times daily. To lower leg dry skin 3 Tube 3    tamsulosin (FLOMAX) 0.4 MG capsule Take 1 capsule by mouth daily. 90 capsule 3    aspirin 325 MG tablet Take 325 mg by mouth daily. No current facility-administered medications for this visit.        Allergies: No Known Allergies      Subjective   REVIEW OF SYSTEMS:   CONSTITUTIONAL: no fever, no night sweats, weakness,fatigue;  HEENT: no blurring of vision, no double vision, hearing difficulty, no tinnitus, no ulceration, no dysplasia, no epistaxis;   LUNGS: no cough, no hemoptysis, no wheeze,  no shortness of breath;  CARDIOVASCULAR: no palpitation, no chest pain, no shortness of breath;  GI: no abdominal pain, no nausea, no vomiting, no diarrhea, no constipation;  JESSIKA: no dysuria, no hematuria, no frequency or urgency, no nephrolithiasis;  MUSCULOSKELETAL: no joint pain, no swelling, no stiffness;  ENDOCRINE: no polyuria, no polydipsia, no cold or heat intolerance;  HEMATOLOGY: easy bruising, no bleeding, no history of clotting disorder;  DERMATOLOGY: no skin rash, no eczema, no pruritus;  PSYCHIATRY: no depression, no anxiety, no panic attacks, no suicidal ideation, no homicidal ideation;  NEUROLOGY: no syncope, no seizures, no numbness or tingling of hands, numbness or tingling of feet, no paresis;     Objective   BP (!) 130/58   Pulse 67   Ht 5' 10\" (1.778 m)   Wt 188 lb (85.3 kg)   SpO2 97%   BMI 26.98 kg/m²      Wt Readings from Last 3 Encounters:   08/05/22 188 lb (85.3 kg)   06/03/22 185 lb 3.2 oz (84 kg)   05/06/22 184 lb 3.2 oz (83.6 kg)       PHYSICAL EXAM:  CONSTITUTIONAL: Alert, appropriate, no acute distress  EYES: Non icteric, EOM intact, pupils equal round   ENT: Mucus membranes moist, no oral pharyngeal lesions, external inspection of ears and nose are normal.  NECK: Supple, no masses. No palpable thyroid mass  CHEST/LUNGS: CTA bilaterally, normal respiratory effort   CARDIOVASCULAR: RRR, no murmurs. No lower extremity edema  ABDOMEN: soft non-tender, active bowel sounds, no HSM. No palpable masses  EXTREMITIES: warm, full ROM in all 4 extremities, no focal weakness. SKIN: warm, dry with no rashes or lesions  LYMPH: No cervical, clavicular, axillary, or inguinal lymphadenopathy  NEUROLOGIC: follows commands, non focal   PSYCH: mood and affect appropriate. Alert and oriented to time, place, person    LABORATORY RESULTS REVIEWED/ANALYZED BY ME:  6/3/2022 Iron Studies: Iron 90, TIBC 2393, Iron Sat 38,Ferritin 453.3, Vitamin B12- 401    8/5/2022 CBC  WBC 8.45  HGB 10.5    Neut 4.77    RADIOLOGY STUDIES REVIEWED BY ME:  7/20/2022 CT Chest W Contrast 6.2 x 3.2 x 5.5 cm right supra hilar soft tissue mass. Bilateral ill-defined groundglass opacities. Differential considerations include inflammatory/infectious/atypical etiology. No pulmonary infiltrates, pleural effusion or disorder-  Lab Results   Component Value Date    TSH 4.430 (H) 06/03/2022   Continue to monitor    Normocytic anemia-likely multifactorial anemia to include anemia of inflammation/iron deficiency. Cannot rule out GI bleed given history taking aspirin and NSAIDs toxicity  Hemoglobin 9.8/MCV 93  Ferritin 72, iron saturation 14%, iron 33, TIBC 240, vitamin B12 249  Occult blood stool x3-all 3 negative  Of note, patient has been on high-dose aspirin 325 mg p.o. daily. He was recently seen by cardiology and request to take daily aspirin only. 10/29/2021-ferritin 72, iron saturation 14%, TIBC 240, vitamin B12 249, methylmalonic acid normal 0.19. November 2021-IV Venofer 1000 mg  2/11/2022 -Hemoglobin 11. 6. Improved  3/11/2022-hemoglobin 11.6  -6/3/2022-ferritin 435, iron 90, iron saturation 38, TIBC 239, vitamin B12 401  Lab Results   Component Value Date    WBC 8.45 08/05/2022    HGB 10.6 (L) 08/05/2022    HCT 35.3 (L) 08/05/2022    .1 (H) 08/05/2022     08/05/2022   Likely anemia of chronic disease. PLAN:  RTC with MD 3 moths after scans  Repeat CT Chest, Abdomen, Pelvis in 3 months  Monitor BP at home and record  Continue Follow-up appointments with PCP for BP continue to monitor anemia. Roderick Hancock am pre charting  as Medical Assistant for Margy Mendez MD. Electronically signed by Mel Kiran MA on 8/6/2022 at 7:47 AM CDT. Roderick Hancock am scribing as Medical Assistant for Margy Mendez MD. Electronically signed by Mel Kiran MA on 8/6/2022 at 1:55 PM CDT. I, Dr Burgess Moore, personally performed the services described in this documentation as scribed by Mel Kiran MA in my presence and is both accurate and complete. I have seen, examined and reviewed this patient medication list, appropriate labs and imaging studies. I reviewed relevant medical records and others physicians notes. I discussed the plans of care with the patient.  I answered all the questions to the patients satisfaction. I have also reviewed the chief complaint (CC) and part of the history (History of Present Illness (HPI), Past Family Social History [facility]), or Review of Systems (ROS) and made changes when appropriated.        (Please note that portions of this note were completed with a voice recognition program. Efforts were made to edit the dictations but occasionally words are mis-transcribed.)  Electronically signed by Bismark Greco MD on 8/6/2022 at 12:32 PM

## 2022-08-05 ENCOUNTER — OFFICE VISIT (OUTPATIENT)
Dept: HEMATOLOGY | Age: 82
End: 2022-08-05
Payer: MEDICARE

## 2022-08-05 ENCOUNTER — HOSPITAL ENCOUNTER (OUTPATIENT)
Dept: INFUSION THERAPY | Age: 82
Discharge: HOME | End: 2022-08-05
Payer: MEDICARE

## 2022-08-05 VITALS
DIASTOLIC BLOOD PRESSURE: 58 MMHG | OXYGEN SATURATION: 97 % | HEART RATE: 67 BPM | SYSTOLIC BLOOD PRESSURE: 130 MMHG | BODY MASS INDEX: 26.92 KG/M2 | WEIGHT: 188 LBS | HEIGHT: 70 IN

## 2022-08-05 DIAGNOSIS — Z85.118 ENCOUNTER FOR FOLLOW-UP SURVEILLANCE OF LUNG CANCER: ICD-10-CM

## 2022-08-05 DIAGNOSIS — C34.91 ADENOCARCINOMA OF RIGHT LUNG (HCC): ICD-10-CM

## 2022-08-05 DIAGNOSIS — C34.91 ADENOCARCINOMA OF RIGHT LUNG (HCC): Primary | ICD-10-CM

## 2022-08-05 DIAGNOSIS — Z71.89 CARE PLAN DISCUSSED WITH PATIENT: ICD-10-CM

## 2022-08-05 DIAGNOSIS — Z08 ENCOUNTER FOR FOLLOW-UP SURVEILLANCE OF LUNG CANCER: ICD-10-CM

## 2022-08-05 DIAGNOSIS — D64.9 NORMOCYTIC ANEMIA: ICD-10-CM

## 2022-08-05 LAB
BASOPHILS ABSOLUTE: 0.13 K/UL (ref 0.01–0.08)
BASOPHILS RELATIVE PERCENT: 1.5 % (ref 0.1–1.2)
EOSINOPHILS ABSOLUTE: 0.42 K/UL (ref 0.04–0.54)
EOSINOPHILS RELATIVE PERCENT: 5 % (ref 0.7–7)
HCT VFR BLD CALC: 35.3 % (ref 40.1–51)
HEMOGLOBIN: 10.6 G/DL (ref 13.7–17.5)
LYMPHOCYTES ABSOLUTE: 2.38 K/UL (ref 1.18–3.74)
LYMPHOCYTES RELATIVE PERCENT: 28.2 % (ref 19.3–53.1)
MCH RBC QN AUTO: 31.3 PG (ref 25.7–32.2)
MCHC RBC AUTO-ENTMCNC: 30 G/DL (ref 32.3–36.5)
MCV RBC AUTO: 104.1 FL (ref 79–92.2)
MONOCYTES ABSOLUTE: 0.69 K/UL (ref 0.24–0.82)
MONOCYTES RELATIVE PERCENT: 8.2 % (ref 4.7–12.5)
NEUTROPHILS ABSOLUTE: 4.77 K/UL (ref 1.56–6.13)
NEUTROPHILS RELATIVE PERCENT: 56.4 % (ref 34–71.1)
PDW BLD-RTO: 13.5 % (ref 11.6–14.4)
PLATELET # BLD: 264 K/UL (ref 163–337)
PMV BLD AUTO: 10 FL (ref 7.4–10.4)
RBC # BLD: 3.39 M/UL (ref 4.63–6.08)
WBC # BLD: 8.45 K/UL (ref 4.23–9.07)

## 2022-08-05 PROCEDURE — 99212 OFFICE O/P EST SF 10 MIN: CPT

## 2022-08-05 PROCEDURE — G8427 DOCREV CUR MEDS BY ELIG CLIN: HCPCS | Performed by: INTERNAL MEDICINE

## 2022-08-05 PROCEDURE — 1036F TOBACCO NON-USER: CPT | Performed by: INTERNAL MEDICINE

## 2022-08-05 PROCEDURE — 36415 COLL VENOUS BLD VENIPUNCTURE: CPT

## 2022-08-05 PROCEDURE — G8417 CALC BMI ABV UP PARAM F/U: HCPCS | Performed by: INTERNAL MEDICINE

## 2022-08-05 PROCEDURE — 99214 OFFICE O/P EST MOD 30 MIN: CPT | Performed by: INTERNAL MEDICINE

## 2022-08-05 PROCEDURE — 1123F ACP DISCUSS/DSCN MKR DOCD: CPT | Performed by: INTERNAL MEDICINE

## 2022-08-05 PROCEDURE — 85025 COMPLETE CBC W/AUTO DIFF WBC: CPT

## 2022-08-06 PROBLEM — I50.22 CHRONIC SYSTOLIC (CONGESTIVE) HEART FAILURE (HCC): Status: ACTIVE | Noted: 2022-08-06

## 2022-08-12 DIAGNOSIS — N40.1 BPH WITH OBSTRUCTION/LOWER URINARY TRACT SYMPTOMS: ICD-10-CM

## 2022-08-12 DIAGNOSIS — N13.8 BPH WITH OBSTRUCTION/LOWER URINARY TRACT SYMPTOMS: ICD-10-CM

## 2022-08-12 RX ORDER — TAMSULOSIN HYDROCHLORIDE 0.4 MG/1
CAPSULE ORAL
Qty: 90 CAPSULE | Refills: 3 | OUTPATIENT
Start: 2022-08-12

## 2022-09-14 RX ORDER — PROPRANOLOL HYDROCHLORIDE 60 MG/1
60 TABLET ORAL 2 TIMES DAILY
Qty: 30 TABLET | Refills: 5 | Status: SHIPPED | OUTPATIENT
Start: 2022-09-14

## 2022-10-31 ENCOUNTER — OFFICE VISIT (OUTPATIENT)
Dept: CARDIOLOGY | Facility: CLINIC | Age: 82
End: 2022-10-31

## 2022-10-31 VITALS
SYSTOLIC BLOOD PRESSURE: 140 MMHG | WEIGHT: 184 LBS | RESPIRATION RATE: 18 BRPM | OXYGEN SATURATION: 98 % | HEART RATE: 73 BPM | DIASTOLIC BLOOD PRESSURE: 75 MMHG | HEIGHT: 70 IN | BODY MASS INDEX: 26.34 KG/M2

## 2022-10-31 DIAGNOSIS — E66.3 OVERWEIGHT: ICD-10-CM

## 2022-10-31 DIAGNOSIS — E78.2 MIXED HYPERLIPIDEMIA: ICD-10-CM

## 2022-10-31 DIAGNOSIS — E11.9 TYPE 2 DIABETES MELLITUS WITHOUT COMPLICATION, UNSPECIFIED WHETHER LONG TERM INSULIN USE: ICD-10-CM

## 2022-10-31 DIAGNOSIS — I10 ESSENTIAL HYPERTENSION: ICD-10-CM

## 2022-10-31 DIAGNOSIS — I25.10 CORONARY ARTERY DISEASE INVOLVING NATIVE CORONARY ARTERY OF NATIVE HEART WITHOUT ANGINA PECTORIS: Primary | ICD-10-CM

## 2022-10-31 PROCEDURE — 93000 ELECTROCARDIOGRAM COMPLETE: CPT | Performed by: NURSE PRACTITIONER

## 2022-10-31 PROCEDURE — 99214 OFFICE O/P EST MOD 30 MIN: CPT | Performed by: NURSE PRACTITIONER

## 2022-10-31 RX ORDER — ASPIRIN 81 MG/1
81 TABLET, CHEWABLE ORAL DAILY
COMMUNITY

## 2022-10-31 NOTE — PROGRESS NOTES
Subjective:     Encounter Date:10/31/2022      Patient ID: Mina Ratliff is a 82 y.o. male with coronary artery disease, hypertension, hyperlipidemia, type II diabetes and obesity     Chief Complaint: routine yearly follow up  Coronary Artery Disease  Presents for follow-up visit. Pertinent negatives include no chest pain, chest pressure, chest tightness, dizziness, leg swelling, muscle weakness, palpitations or shortness of breath. Risk factors include hyperlipidemia and obesity. The symptoms have been stable. Compliance with diet is good. Compliance with exercise is good. Compliance with medications is good.   Hypertension  This is a chronic problem. Associated symptoms include malaise/fatigue. Pertinent negatives include no anxiety, chest pain, neck pain, orthopnea, palpitations, peripheral edema, PND or shortness of breath. Risk factors for coronary artery disease include dyslipidemia, obesity and male gender. Current antihypertension treatment includes beta blockers, angiotensin blockers and diuretics. There are no compliance problems.  Hypertensive end-organ damage includes CAD/MI.   Hyperlipidemia  This is a chronic problem. The current episode started more than 1 year ago. Exacerbating diseases include obesity. Pertinent negatives include no chest pain or shortness of breath. Current antihyperlipidemic treatment includes statins. Risk factors for coronary artery disease include dyslipidemia, male sex, hypertension and obesity.     Patient presents today for management of coronary artery disease, hypertension and hyperlipidemia. Patient reports that he has been doing good since last office visit.   He reports that he is fatigued when trying to walk a long way. He reports back pain and leg weakness. He reports that this has been ongoing since he completed 32 radiation and 12 chemo treatments for his lung cancer. He has upcoming scans in 11/2022 and a follow up with Dr Madrid. He denies any chest pain.  He reports chronic dyspnea with moderate exertion that has remained stable. He reports blood pressure is usually 140-150 systolic. He denies any heart racing, palpitations or dizziness. He reports some swelling in his legs R>L that is intermittent and resolves with rest and elevation. Patient denies any orthopnea or PND. Patient denies any bleeding problems. Patient follows with Dr Franks as PCP.     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    - Lower extremity US (9/21/17) negative for any DVT or thrombophlebitis.   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 with mild miranda-infarct ischemia, EF > 70%  - Lipid panel (4/8/2019) total cholesterol 192, HDL 46, LDL 88, triglycerides 292  - Lipid panel (5/27/2020) total cholesterol 174, HDL 46, LDL 71, triglycerides 284  - Hemoglobin A1c (5/27/2020) 7.5%  - Hemoglobin A1c (1/03/2022) 6.9%    The following portions of the patient's history were reviewed and updated as appropriate: allergies, current medications, past family history, past medical history, past social history, past surgical history and problem list.    No Known Allergies    Current Outpatient Medications:   •  aspirin  MG tablet, Take 325 mg by mouth Daily., Disp: , Rfl:   •  diphenhydrAMINE-acetaminophen (TYLENOL PM)  MG tablet per tablet, Take 1 tablet by mouth At Night As Needed for Sleep., Disp: , Rfl:   •  furosemide (LASIX) 40 MG tablet, Take 40 mg by mouth Daily., Disp: , Rfl:   •  HYDROcodone-acetaminophen (NORCO) 7.5-325 MG per tablet, Take 1 tablet by mouth Every 4 (Four) Hours As Needed for Moderate Pain  (Pain)., Disp: 15 tablet, Rfl: 0  •  losartan (COZAAR) 25 MG tablet, TAKE 1 TABLET DAILY, Disp: 90 tablet, Rfl: 3  •  metFORMIN  (GLUCOPHAGE) 1000 MG tablet, Take 1,000 mg by mouth 2 (Two) Times a Day With Meals., Disp: , Rfl:   •  metoprolol tartrate (LOPRESSOR) 50 MG tablet, Take 50 mg by mouth Daily., Disp: , Rfl:   •  montelukast (SINGULAIR) 10 MG tablet, Take 1 tablet by mouth Every Night., Disp: 90 tablet, Rfl: 3  •  Multiple Vitamins-Minerals (MULTIVITAMIN ADULT PO), Take 1 tablet by mouth Daily., Disp: , Rfl:   •  omeprazole (priLOSEC) 40 MG capsule, Take 40 mg by mouth Daily., Disp: , Rfl:   •  primidone (MYSOLINE) 50 MG tablet, Take 50 mg by mouth Every Night., Disp: , Rfl:   •  propranolol (INDERAL) 40 MG tablet, Take 40 mg by mouth Daily., Disp: , Rfl:   •  simvastatin (ZOCOR) 40 MG tablet, Take 40 mg by mouth Every Night., Disp: , Rfl:   •  tamsulosin (FLOMAX) 0.4 MG capsule 24 hr capsule, TAKE 1 CAPSULE EVERY NIGHT, Disp: 90 capsule, Rfl: 0  •  umeclidinium-vilanterol (ANORO ELLIPTA) 62.5-25 MCG/INH aerosol powder  inhaler, Inhale 1 puff Daily., Disp: 3 each, Rfl: 3  Past Medical History:   Diagnosis Date   • Asthma    • Atherosclerosis of native coronary artery of native heart without angina pectoris    • BPH with obstruction/lower urinary tract symptoms    • CAD (coronary artery disease)    • Cancer ([facility])    • Chest pain    • Chronic airway obstruction ([facility])    • COPD (chronic obstructive pulmonary disease) ([facility])    • Diabetes mellitus ([facility])    • Dizziness    • GERD (gastroesophageal reflux disease)    • HTN (hypertension)    • Hyperlipidemia    • Malaise and fatigue    • Mass of right lung    • Multiple gastric ulcers    • Myocardial infarction, old    • 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,  Radiation, silica   • Old myocardial infarction    • Stage 3 severe COPD by GOLD classification ([facility]) 3/13/2015     Social History     Socioeconomic History   • Marital status:    Tobacco Use   • Smoking status: Former     Packs/day: 1.50     Years: 25.00     Pack years: 37.50     Types: Cigarettes     Quit  date:      Years since quittin.8   • Smokeless tobacco: Never   Vaping Use   • Vaping Use: Never used   Substance and Sexual Activity   • Alcohol use: No   • Drug use: No   • Sexual activity: Defer       Review of Systems   Constitutional: Positive for malaise/fatigue.   HENT: Negative for nosebleeds.    Cardiovascular: Positive for dyspnea on exertion (chronic and unchanged). Negative for chest pain, irregular heartbeat, leg swelling, orthopnea, palpitations and paroxysmal nocturnal dyspnea.   Respiratory: Negative for chest tightness and shortness of breath.    Musculoskeletal: Negative for muscle weakness and neck pain.   Genitourinary: Negative for hematuria.   Neurological: Negative for dizziness and weakness.   All other systems reviewed and are negative.         Objective:     Vitals reviewed.   Constitutional:       General: Not in acute distress.     Appearance: Normal appearance. Well-developed.   Eyes:      Pupils: Pupils are equal, round, and reactive to light.   HENT:      Head: Normocephalic and atraumatic.      Nose: Nose normal.   Neck:      Vascular: No carotid bruit.   Pulmonary:      Effort: Pulmonary effort is normal. No respiratory distress.      Breath sounds: Normal breath sounds. No wheezing. No rales.   Cardiovascular:      Normal rate. Regular rhythm.      Murmurs: There is no murmur.   Edema:     Peripheral edema absent.   Abdominal:      General: There is no distension.      Palpations: Abdomen is soft.   Musculoskeletal: Normal range of motion.      Cervical back: Normal range of motion and neck supple. Skin:     General: Skin is warm.      Findings: No erythema or rash.   Neurological:      General: No focal deficit present.      Mental Status: Alert and oriented to person, place, and time.   Psychiatric:         Attention and Perception: Attention normal.         Mood and Affect: Mood normal.         Speech: Speech normal.         Behavior: Behavior normal.         Thought  "Content: Thought content normal.         Judgment: Judgment normal.         /75 (BP Location: Right arm, Patient Position: Sitting, Cuff Size: Adult)   Pulse 73   Resp 18   Ht 177.8 cm (70\")   Wt 83.5 kg (184 lb)   SpO2 98%   BMI 26.40 kg/m²       ECG 12 Lead    Date/Time: 10/31/2022 1:22 PM  Performed by: Tristian Barragan APRN  Authorized by: Tristian Barragan APRN   Comparison: compared with previous ECG from 10/25/2021  Similar to previous ECG  Rhythm: sinus rhythm  Rate: normal  BPM: 73              Lab Review:       Lab Results   Component Value Date    CHLPL 185 05/25/2021    TRIG 459 (H) 05/25/2021    HDL 36 (L) 05/25/2021    LDL 96 (L) 05/25/2021    LDL see below 05/25/2021     Lab Results   Component Value Date    HGBA1C 6.9 (H) 01/03/2022       I have personally reviewed labs, and past office notes prior to patients visit  Assessment:          Diagnosis Plan   1. Coronary artery disease involving native coronary artery of native heart without angina pectoris        2. Essential hypertension        3. Mixed hyperlipidemia  Lipid Panel      4. Type 2 diabetes mellitus without complication, unspecified whether long term insulin use (HCC)        5. Overweight               Plan:       1. 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 Patient remains chest pain free. Continue aspirin, simvastatin and metoprolol.     2. Hypertension: Controlled. Continue current medications. Continue to monitor    3. Hyperlipidemia: LDL not able to be calculated due to elevated Triglycerides 5/2021. Continue simvastatin. Order today.     4. Type II DM: A1C 6.9 on 1/2022. Managed and followed by PCP    5. Overweight: BMI is >= 25 and <30. (Overweight) The following options were offered after discussion;: exercise counseling/recommendations and nutrition counseling/recommendations    I attest " that all portions of this note reviewed and all information has been updated by myself to reflect the patient's current status.      Patient is to follow up in 1 year or sooner if needed

## 2022-11-03 ENCOUNTER — HOSPITAL ENCOUNTER (OUTPATIENT)
Dept: CT IMAGING | Age: 82
Discharge: HOME | End: 2022-11-03
Payer: MEDICARE

## 2022-11-03 DIAGNOSIS — C34.91 ADENOCARCINOMA OF RIGHT LUNG (HCC): ICD-10-CM

## 2022-11-03 PROCEDURE — 74177 CT ABD & PELVIS W/CONTRAST: CPT

## 2022-11-03 PROCEDURE — 71260 CT THORAX DX C+: CPT

## 2022-11-03 PROCEDURE — 6360000004 HC RX CONTRAST MEDICATION: Performed by: INTERNAL MEDICINE

## 2022-11-03 RX ADMIN — IOPAMIDOL 75 ML: 755 INJECTION, SOLUTION INTRAVENOUS at 12:01

## 2022-11-10 NOTE — PROGRESS NOTES
MEDICAL ONCOLOGY PROGRESS NOTE    Pt Name: Sam Haynes  MRN: [id number]  YOB: 1940  Date of evaluation: 11/11/2022    HISTORY OF PRESENT ILLNESS:    Reason for MD visit-disease management/toxicity assessment  The patient is a very pleasant 79years old male who has been diagnosed with stage III non-small cell lung cancer, adenocarcinoma subtype. He is status post completion CRT followed by 1 year durvalumab completed June 2022. He denies any new complaints. He is accompanied by his daughter today. He has persistent fatigue. Diagnosis  RUL adenocarcinoma, NSCLC, Dec 2020  [id number], stage IIIA  Systolic heart failure  Hypertension, controlled  Not a surgical candidate    Treatment Summary  4/21/2021-5/26/21 completed concurrent chemoradiation with carboplatin/Taxol  4/22/21-6/3/21- 6600 cGy radiation therapy completed  7/8/21-6/3/2022-completion of maintenance Durvalumab 1500mg every 4 weeks x12 cycles    Hematology/Cancer History:  Slava Rod was first seen by me on 4/7/2021 referred by Dr. Megan Godfrey, pulmonary [facility] [place] for findings of non-small cell lung cancer, adenocarcinoma type. He has several comorbidities including history of type 2 diabetes, hypertension COPD. History of smoking in the past.  Quit many years ago. History of coronary artery disease followed by cardiology biopsy. Last EF 70% in 2018. He was seen by his primary care provider in November 2020. He has complaint of chest pain. Chest x-ray showed a 4 cm mass in the right upper lung. 12/1/20 CT chest [facility]): Large right upper lobe mass concerning for primary neoplasm. Enlarged right hilar lymph node concerning for metastatic disease. Additional noncalcified pulmonary nodules bilaterally for which follow-up CT is recommended in 3-6 months. Atherosclerosis of the aorta and coronary arteries.   12/16/2020-bronchoscopy with bronchoalveolar lavage was nondiagnostic  2/23/21 CT cheat w/o [facility]): Probable right upper lobe mass, as described. This appears slightly larger in size and is likely neoplastic. Consider PET CT follow-up. There is new surrounding infiltrate that extends inferiorly into the right upper lobe. The new may be infectious or inflammatory. Pulmonary hemorrhage possible. Other areas of nodularity and groundglass opacity/nodularity are stable in appearance. 3/5/2021-navigational bronchoscopy with biopsy was nondiagnostic  3/5/21 CT chest w/o [facility]): [place] soft tissue mass within the right upper lobe highly suspicious for neoplasm. There is associated postobstructive pneumonitis within the right upper lobe showing mild progression from the previous study of 2/23/2021. There is no associated hilar or mediastinal lymphadenopathy. Today's study is performed as part of a navigational bronchoscopy exam. Other scattered nodules including groundglass nodules are noted within the lungs all stable from the previous exam warranting follow-up. Heavy atheromatous calcification of the thoracic aorta and proximal great vessels. Coronary calcifications are present. 3/23/21 PET scan [facility]): Markedly FDG avid right upper lobe mass in the right upper lobe measuring 7.2 cm triangular opacity with maximum SUV 12.18, highly concerning for malignancy. Adjacent groundglass opacities,  similar compared to 3/5/2021, which could represent additional  malignancy or infection/inflammation. Adjacent right upper lobe groundglass opacities are similar compared to 3/5/2021, which could represent additional malignancy or infection/inflammation. Other non-FDG avid pulmonary findings as above. No evidence of jasmin or distant metastatic disease. 3/29/21 Navigational bronchoscopy-Dr. Bertha Chavis: Bronchoalveolar lavage RUL consistent with adenocarcinoma. RUL transbronchial bipsy consistent with adenocarcinoma. FNA biopsy of several jasmin station negative for metastatic disease. 4/7/2021-he was first seen by me.   4/15/21 2D echo: Normal left ventricular size with reduced global systolic function EF 64-53%. Mild concentric left ventricular hypertrophy with mild [grade 1] diastolic dysfunction. Normal left atrial size. Mild thickening of a poorly visualized aortic valve without evidence of stenosis or insufficiency. Mild thickening of a normally mobile mitral valve with mild regurgitation. Nonvisualization pulmonic valve. Poorly visualized but apparently normal size right-sided chambers with preserved right ventricular systolic function. No tricuspid regurgitation with which to estimate RVSP. No significant pericardial effusion. Aortic root and ascending segments measured within normal limits. 4/17/21 MRI brain: No acute intracranial process. No metastatic disease identified in the CNS.   4/21/2021-initiation of carboplatin/Taxol weekly. Reviewed MRI brain 2D echo. 4/21/2021-5/26/21 completed concurrent chemoradiation with carboplatin/taxol to be followed by immunotherapy  4/22/21-6/3/21 6600 cGy radiation therapy completed  6/30/21-CT CHEST W CONTRAST A large spiculated mass in the right upper lobe posterior segment represent a neoplastic process. The groundglass opacity/infiltrate in the right upper lobe and superior segments of the lower lobes bilaterally may represent an inflammatory or neoplastic/metastatic process. A single enlarged abnormal lymph node in the right hilum may represent a metastatic node. 07/08/21-reviewed CT chest with perform radiologist.  7/8/21- initiate maintenance Durvalumab 1500mg every 4 weeks x12 cycles  9/24/2021 CT Chest w/ Contrast([facility]) Decreased size of right upper lobe triangular opacity with surrounding probable posttreatment changes. Similar size left upper lobe 1 cm groundglass and solid pulmonary opacity/nodule compared to 3/23/2021. Right hilum with a 1.4 x 1.2 cm lymph node or conglomeration of lymph nodes, which appears similar to 11/30/2020.   Diffusely heterogeneous bone mineralization, which is nonspecific. This could be seen with aggressive osteopenia or infiltrative process. 10/01/21-I reviewed the results CT chest.  Interval treatment response. Continue adjuvant durvalumab.  10/26/2021 Anemia Labs: Iron 33, TIBC 240, Iron Sat 14, Ferritin 72.6, Vit ,  11/4/2021 Blood Occult Studies 3/3 Negative  11/10/2021 MMA 0.19  11/30/2021 CT Chest w/ ContrastThe right upper lobe neoplasm is not as well-defined on the current study with increased consolidation within the adjacent lung parenchyma as well as adjacent consolidation with air bronchograms. I suspect this represents postradiation change with adjacent post radiation pneumonitis. It extends to the level of the upper pleura with associated thickening of the visceral and parietal pleura. I see no evidence of khai chest wall invasion. As noted on the previous examination there has been some extension of the neoplasm across the posterior aspect of the major fissure into the superior segment of the right lower lobe with this component of the neoplasm also demonstrating increased consolidation tracking along the lower margin of the major fissure. Stable foci of groundglass consolidation within the left lung. No new lesions are present. There are changes of centrilobular emphysema with hyperinflation of the lung parenchyma. 3. Heavy coronary artery calcifications are present. No new enlarged mediastinal or axillary adenopathy is demonstrated. .  11/30/2021 CT Abd/Pelvis w/ IV Contrast (oral)Changes of centrilobular emphysema within the lung bases. Coronary calcifications are present. There is a trace pericardial effusion or pericardial thickening demonstrated. 2. No evidence of metastatic disease to the abdomen or pelvis. The adrenals are unremarkable. 3. There is a small 2 mm distal right ureteral stone just above the UVJ.  This does not result in significant obstructive uropathy without evidence of asymmetric distention of the ureter or asymmetric enhancement of the right kidney in comparison with the left. No evidence of nephrolithiasis within the upper tracts of either kidney. The left ureter is decompressed and normal in appearance. 4. Mild constipation. There is no obstruction or free air. The appendix is identified and is normal in appearance. 5. Small fat-containing periumbilical hernia. Small bilateral  fat-containing inguinal hernias are also present. .   12/3/2021-I reviewed CT chest/abdomen/pelvis. Essentially no gross evidence of disease progression. Continue durvalumab and repeat CT scan in 6 weeks  2/18/2022 CT Chest W ContrastStable right upper lobe volume loss. There is decreasing right perihilar consolidation. Stable 1 cm short axis diameter right superior hilar lymph node. No mediastinal lymphadenopathy is seen. Stable areas of groundglass opacity in the left upper lobe and left lower lobe. Continued follow-up recommended. New moderate patchy groundglass infiltrates in the right lower lobe may be infectious or inflammatory. Post radiation change considered as well although the infiltrates are somewhat peripherally located. Atheromatous disease of the thoracic aorta and coronary arteries. Cardiomegaly. Please see the abdomen and pelvis CT report separately. 2/18/2022 CT Abd/Pelvis W IV Contrast(Oral) No acute abnormality of the abdomen are pelvis particularly, no findings to suggest a neoplastic process or metastatic disease. A groundglass infiltrate in the right lower lung which was not seen in there previous study. This is represent an acute or subacute inflammatory/infectious process. This data be further evaluated. A persistent and unchanged 2 mm non obstructing calculus in the right distal ureter, 5 mm from the right UV junction. No change. 3/11/2022-I reviewed results of CT chest abdomen pelvis. Essentially, decreasing right perihilar consolidation. No evidence of progressive disease. New onset consolidation right lower lobe.   As per groundglass opacities. Differential considerations include inflammatory/infectious/atypical etiology. No pulmonary infiltrates, pleural effusion or pneumothorax. 9/24/22 CT Chest W Contrast [facility]) Decreased size of right upper lobe triangular opacity with surrounding probable posttreatment changes. Similar size left upper lobe 1 cm groundglass and solid pulmonary opacity/nodule compared to 3/23/2021. Right hilum with a 1.4 x 1.2 cm lymph node or conglomeration of lymph nodes, which appears similar to 11/30/2020. Diffusely heterogeneous bone mineralization, which is nonspecific. This could be seen with aggressive osteopenia or infiltrative process. Calcified atherosclerosis. 11/3/2022 CT Chest W Contrast Right upper lung paraspinal posterior apices mass measuring 7.0 x 5.3 x 5.7 cm. This was seen on prior CT and appears to be the adenocarcinoma of the right upper lung. Unchanged from prior CT.  11/3/2022 CT Abd/Pelvis W IV Contrast (oral) Brachytherapy seeds within the prostate. Thoracolumbar spondylosis. Perinephric inflammatory changes bilaterally. Atherosclerosis. 11/11/2022-I reviewed results CT chest abdomen pelvis. No evidence of disease progression. Consolidation in the right upper lobe is stable and likely related to posttreatment change. Continue clinical/radiological follow-up.       Past Medical History:  Past Medical History:   Diagnosis Date    Allergic rhinitis     Bronchitis, chronic (HCC)     Carotid artery stenosis     GERD (gastroesophageal reflux disease)     Heart attack ([facility] [address]     Hemorrhoids     Hypercholesterolemia     Type II or unspecified type diabetes mellitus without mention of complication, not stated as uncontrolled        Past Surgical History:    Past Surgical History:   Procedure Laterality Date    CARDIAC SURGERY      balloon surgery (angioplasty)    PORT SURGERY Right 04/01/2021       Social History:   Marital status: , 2 daughters  Smoking status: Former smoker for ~30 years, 1.5 ppd  ETOH status: No  Resides: [place], [place]    Family History:   Family History   Problem Relation Age of Onset    Diabetes Mother     High Blood Pressure Mother     Cancer Father         Lung     Current Hospital Medications:    Current Outpatient Medications   Medication Sig Dispense Refill    propranolol (INDERAL) 60 MG tablet Take 1 tablet by mouth 2 times daily For blood pressure and tremor 30 tablet 5    simvastatin (ZOCOR) 40 MG tablet TAKE 1 TABLET AT BEDTIME FOR CHOLESTEROL 90 tablet 3    omeprazole (PRILOSEC) 40 MG delayed release capsule TAKE 1 CAPSULE DAILY FOR STOMACH 90 capsule 3    furosemide (LASIX) 40 MG tablet TAKE 1 TABLET DAILY FOR FLUID 90 tablet 3    metFORMIN (GLUCOPHAGE) 1000 MG tablet TAKE 1 TABLET TWICE A DAY FOR DIABETES 180 tablet 3    promethazine-dextromethorphan (PROMETHAZINE-DM) 6.25-15 MG/5ML syrup 1 teaspoon by mouth at bedtime as needed for severe cough 240 mL 0    ondansetron (ZOFRAN ODT) 4 MG disintegrating tablet Take 1 tablet by mouth every 6 hours as needed for Nausea or Vomiting 30 tablet 5    HYDROcodone-acetaminophen (NORCO) 7.5-325 MG per tablet       metoprolol tartrate (LOPRESSOR) 50 MG tablet Take 50 mg by mouth daily      montelukast (SINGULAIR) 10 MG tablet       ondansetron (ZOFRAN) 8 MG tablet Take 1 tablet by mouth every 8 hours as needed for Nausea or Vomiting 30 tablet 3    lidocaine-prilocaine (EMLA) 2.5-2.5 % cream Apply topically to port area and cover with plastic wrap one hour prior to treatment.  1 Tube 1    blood glucose test strips (TRUE METRIX BLOOD GLUCOSE TEST) strip TEST BLOOD SUGAR ONCE DAILY *E11.9* 100 strip 5    isosorbide mononitrate (IMDUR) 60 MG extended release tablet Take 60 mg by mouth      losartan (COZAAR) 25 MG tablet Take 25 mg by mouth      umeclidinium-vilanterol (ANORO ELLIPTA) 62.5-25 MCG/INH AEPB inhaler Inhale 1 puff into the lungs daily      primidone (MYSOLINE) 50 MG tablet 1 tab po BID for tremor 180 tablet 3 triamcinolone (KENALOG) 0.1 % cream Apply topically 2 times daily. To lower leg dry skin 3 Tube 3    tamsulosin (FLOMAX) 0.4 MG capsule Take 1 capsule by mouth daily. 90 capsule 3    aspirin 325 MG tablet Take 325 mg by mouth daily. No current facility-administered medications for this visit. Allergies: No Known Allergies      Subjective   REVIEW OF SYSTEMS:   CONSTITUTIONAL: no fever, no night sweats, weakness,fatigue;  HEENT: no blurring of vision, no double vision, no hearing difficulty, no tinnitus, no ulceration, no dysplasia, no epistaxis;   LUNGS: no cough, no hemoptysis, no wheeze,  no shortness of breath;  CARDIOVASCULAR: no palpitation, no chest pain, no shortness of breath;  GI: no abdominal pain, no nausea, no vomiting, no diarrhea, no constipation;  JESSIKA: no dysuria, no hematuria, no frequency or urgency, no nephrolithiasis;  MUSCULOSKELETAL: no joint pain, no swelling, no stiffness;  ENDOCRINE: no polyuria, no polydipsia, no cold or heat intolerance;  HEMATOLOGY: no easy bruising or bleeding, no history of clotting disorder;  DERMATOLOGY: no skin rash, no eczema, no pruritus;  PSYCHIATRY: no depression, no anxiety, no panic attacks, no suicidal ideation, no homicidal ideation;  NEUROLOGY: no syncope, no seizures,neuropathy in toes and fingers, no numbness or tingling of hands, no numbness or tingling of feet, no paresis;     Objective   /68   Pulse 71   Wt 189 lb (85.7 kg)   SpO2 99%   BMI 27.12 kg/m²      PHYSICAL EXAM:  CONSTITUTIONAL: Alert, appropriate, no acute distress  EYES: Non icteric, EOM intact, pupils equal round   ENT: Mucus membranes moist, no oral pharyngeal lesions, external inspection of ears and nose are normal.  NECK: Supple, no masses. No palpable thyroid mass  CHEST/LUNGS: CTA bilaterally, normal respiratory effort   CARDIOVASCULAR: RRR, no murmurs. No lower extremity edema  ABDOMEN: soft non-tender, active bowel sounds, no HSM.   No palpable masses  EXTREMITIES: warm, full ROM in all 4 extremities, no focal weakness. SKIN: warm, dry with no rashes or lesions  LYMPH: No cervical, clavicular, axillary, or inguinal lymphadenopathy  NEUROLOGIC: follows commands, non focal   PSYCH: mood and affect appropriate. Alert and oriented to time, place, person    LABORATORY RESULTS REVIEWED/ANALYZED BY ME:  Lab Results   Component Value Date    WBC 8.26 11/11/2022    HGB 10.5 (L) 11/11/2022    HCT 33.4 (L) 11/11/2022    MCV 97.9 (H) 11/11/2022     11/11/2022     Lab Results   Component Value Date    NEUTROABS 4.45 11/11/2022     RADIOLOGY STUDIES REVIEWED BY ME:  11/3/2022 CT Chest W Contrast Right upper lung paraspinal posterior apices mass measuring 7.0 x 5.3 x 5.7 cm. This was seen on prior CT and appears to be the adenocarcinoma of the right upper lung. Unchanged from prior CT.    11/3/2022 CT Abd/Pelvis W IV Contrast (oral) Brachytherapy seeds within the prostate. Thoracolumbar spondylosis. Perinephric inflammatory changes bilaterally. Atherosclerosis. ASSESSMENT:    Orders Placed This Encounter   Procedures    CT CHEST W CONTRAST     Standing Status:   Future     Standing Expiration Date:   11/11/2023     Order Specific Question:   STAT Creatinine as needed:     Answer:   Yes     Order Specific Question:   Reason for exam:     Answer:   Assess for survillence for lung cancer          Ama Adkins was seen today for follow-up. Diagnoses and all orders for this visit:    Adenocarcinoma of right lung [facility])  -     [address]; Future    Encounter for follow-up surveillance of lung cancer    Care plan discussed with patient    Chronic anemia         [id number], stge IIIA, NSCLC, adenocarcinoma subtype  Essentially stage III disease. The patient is not a surgical candidate. Recommend chemoradiation followed by immunotherapy, completed June 2022. Status post completion of chemoradiation. 6/3/2022-completion of 1 year of maintenance durvalumab.   -Repeat CT chest 11/3/2022-no evidence of disease progression. Posttreatment changes. Stable findings.  -Continue clinical/radiologic surveillance. Treatment related side effects-occasional diarrhea, grade 1, fatigue    Systolic heart failure-EF 45%  2D echo-LVEF 40-45%. Mild LVH, Mild [grade 1] diastolic dysfunction. Controlled hypertension-  /68   Pulse 71   Wt 189 lb (85.7 kg)   SpO2 99%   BMI 27.12 kg/m²      At risk thyroid disorder-  Lab Results   Component Value Date    TSH 4.430 (H) 06/03/2022   Continue to monitor    Normocytic anemia-likely multifactorial anemia to include anemia of inflammation/iron deficiency. Cannot rule out GI bleed given history taking aspirin and NSAIDs toxicity  Hemoglobin 9.8/MCV 93  Ferritin 72, iron saturation 14%, iron 33, TIBC 240, vitamin B12 249  Occult blood stool x3-all 3 negative  Of note, patient has been on high-dose aspirin 325 mg p.o. daily. He was recently seen by cardiology and request to take daily aspirin only. 10/29/2021-ferritin 72, iron saturation 14%, TIBC 240, vitamin B12 249, methylmalonic acid normal 0.19. November 2021-IV Venofer 1000 mg  2/11/2022 -Hemoglobin 11. 6. Improved  3/11/2022-hemoglobin 11.6  -6/3/2022-ferritin 435, iron 90, iron saturation 38, TIBC 239, vitamin B12 401  Lab Results   Component Value Date    WBC 8.26 11/11/2022    HGB 10.5 (L) 11/11/2022    HCT 33.4 (L) 11/11/2022    MCV 97.9 (H) 11/11/2022     11/11/2022   Likely anemia of chronic disease. PLAN:  RTC with MD 4 months after scans  Repeat CT Chest in 4 months  Monitor BP at home and record  Continue Follow-up appointments with PCP for BP continue to monitor anemia. Michaela Ellington am pre charting  as Medical Assistant for Osiel Carrasco MD. Electronically signed by Krzysztof Boyle MA on 11/11/2022 at 7:56 AM CST.     Michaela Ellington am scribing as Medical Assistant for Osiel Carrasco MD. Electronically signed by Krzysztof Boyle MA on 11/11/2022 at 11:44 AM CST. I, Dr Brandon Pichardo, personally performed the services described in this documentation as scribed by Ulisses Mendoza MA in my presence and is both accurate and complete. I have seen, examined and reviewed this patient medication list, appropriate labs and imaging studies. I reviewed relevant medical records and others physicians notes. I discussed the plans of care with the patient. I answered all the questions to the patients satisfaction. I have also reviewed the chief complaint (CC) and part of the history (History of Present Illness (HPI), Past Family Social History [facility]), or Review of Systems (ROS) and made changes when appropriated.        (Please note that portions of this note were completed with a voice recognition program. Efforts were made to edit the dictations but occasionally words are mis-transcribed.)  Electronically signed by Ibeth Capellan MD on 11/11/2022 at 5:51 PM

## 2022-11-11 ENCOUNTER — HOSPITAL ENCOUNTER (OUTPATIENT)
Dept: INFUSION THERAPY | Age: 82
Discharge: HOME | End: 2022-11-11
Payer: MEDICARE

## 2022-11-11 ENCOUNTER — OFFICE VISIT (OUTPATIENT)
Dept: HEMATOLOGY | Age: 82
End: 2022-11-11
Payer: MEDICARE

## 2022-11-11 VITALS
HEART RATE: 71 BPM | WEIGHT: 189 LBS | BODY MASS INDEX: 27.12 KG/M2 | OXYGEN SATURATION: 99 % | DIASTOLIC BLOOD PRESSURE: 68 MMHG | SYSTOLIC BLOOD PRESSURE: 124 MMHG

## 2022-11-11 DIAGNOSIS — Z85.118 ENCOUNTER FOR FOLLOW-UP SURVEILLANCE OF LUNG CANCER: ICD-10-CM

## 2022-11-11 DIAGNOSIS — Z08 ENCOUNTER FOR FOLLOW-UP SURVEILLANCE OF LUNG CANCER: ICD-10-CM

## 2022-11-11 DIAGNOSIS — Z71.89 CARE PLAN DISCUSSED WITH PATIENT: ICD-10-CM

## 2022-11-11 DIAGNOSIS — C34.91 ADENOCARCINOMA OF RIGHT LUNG (HCC): ICD-10-CM

## 2022-11-11 DIAGNOSIS — D64.9 CHRONIC ANEMIA: ICD-10-CM

## 2022-11-11 DIAGNOSIS — C34.91 ADENOCARCINOMA OF RIGHT LUNG (HCC): Primary | ICD-10-CM

## 2022-11-11 LAB
BASOPHILS ABSOLUTE: 0.09 K/UL (ref 0.01–0.08)
BASOPHILS RELATIVE PERCENT: 1.1 % (ref 0.1–1.2)
EOSINOPHILS ABSOLUTE: 0.3 K/UL (ref 0.04–0.54)
EOSINOPHILS RELATIVE PERCENT: 3.6 % (ref 0.7–7)
HCT VFR BLD CALC: 33.4 % (ref 40.1–51)
HEMOGLOBIN: 10.5 G/DL (ref 13.7–17.5)
LYMPHOCYTES ABSOLUTE: 2.64 K/UL (ref 1.18–3.74)
LYMPHOCYTES RELATIVE PERCENT: 32 % (ref 19.3–53.1)
MCH RBC QN AUTO: 30.8 PG (ref 25.7–32.2)
MCHC RBC AUTO-ENTMCNC: 31.4 G/DL (ref 32.3–36.5)
MCV RBC AUTO: 97.9 FL (ref 79–92.2)
MONOCYTES ABSOLUTE: 0.77 K/UL (ref 0.24–0.82)
MONOCYTES RELATIVE PERCENT: 9.3 % (ref 4.7–12.5)
NEUTROPHILS ABSOLUTE: 4.45 K/UL (ref 1.56–6.13)
NEUTROPHILS RELATIVE PERCENT: 53.9 % (ref 34–71.1)
PDW BLD-RTO: 14.1 % (ref 11.6–14.4)
PLATELET # BLD: 186 K/UL (ref 163–337)
PMV BLD AUTO: 10 FL (ref 7.4–10.4)
RBC # BLD: 3.41 M/UL (ref 4.63–6.08)
WBC # BLD: 8.26 K/UL (ref 4.23–9.07)

## 2022-11-11 PROCEDURE — 36415 COLL VENOUS BLD VENIPUNCTURE: CPT

## 2022-11-11 PROCEDURE — 1036F TOBACCO NON-USER: CPT | Performed by: INTERNAL MEDICINE

## 2022-11-11 PROCEDURE — G8484 FLU IMMUNIZE NO ADMIN: HCPCS | Performed by: INTERNAL MEDICINE

## 2022-11-11 PROCEDURE — 1123F ACP DISCUSS/DSCN MKR DOCD: CPT | Performed by: INTERNAL MEDICINE

## 2022-11-11 PROCEDURE — 99214 OFFICE O/P EST MOD 30 MIN: CPT | Performed by: INTERNAL MEDICINE

## 2022-11-11 PROCEDURE — 3074F SYST BP LT 130 MM HG: CPT | Performed by: INTERNAL MEDICINE

## 2022-11-11 PROCEDURE — 99212 OFFICE O/P EST SF 10 MIN: CPT

## 2022-11-11 PROCEDURE — 85025 COMPLETE CBC W/AUTO DIFF WBC: CPT

## 2022-11-11 PROCEDURE — G8427 DOCREV CUR MEDS BY ELIG CLIN: HCPCS | Performed by: INTERNAL MEDICINE

## 2022-11-11 PROCEDURE — 3078F DIAST BP <80 MM HG: CPT | Performed by: INTERNAL MEDICINE

## 2022-11-11 PROCEDURE — G8417 CALC BMI ABV UP PARAM F/U: HCPCS | Performed by: INTERNAL MEDICINE

## 2022-12-05 ENCOUNTER — OFFICE VISIT (OUTPATIENT)
Dept: PRIMARY CARE CLINIC | Age: 82
End: 2022-12-05
Payer: MEDICARE

## 2022-12-05 VITALS
DIASTOLIC BLOOD PRESSURE: 68 MMHG | HEIGHT: 70 IN | HEART RATE: 57 BPM | OXYGEN SATURATION: 100 % | SYSTOLIC BLOOD PRESSURE: 128 MMHG | WEIGHT: 187.4 LBS | TEMPERATURE: 97.5 F | BODY MASS INDEX: 26.83 KG/M2 | RESPIRATION RATE: 18 BRPM

## 2022-12-05 DIAGNOSIS — D70.1 CHEMOTHERAPY-INDUCED NEUTROPENIA (HCC): ICD-10-CM

## 2022-12-05 DIAGNOSIS — H61.23 IMPACTED CERUMEN, BILATERAL: ICD-10-CM

## 2022-12-05 DIAGNOSIS — E78.00 HYPERCHOLESTEROLEMIA: ICD-10-CM

## 2022-12-05 DIAGNOSIS — N18.30 BENIGN HYPERTENSIVE HEART AND KIDNEY DISEASE WITH DIASTOLIC CHF, NYHA CLASS II AND CKD STAGE III (HCC): ICD-10-CM

## 2022-12-05 DIAGNOSIS — E11.9 TYPE 2 DIABETES MELLITUS WITHOUT COMPLICATION, WITHOUT LONG-TERM CURRENT USE OF INSULIN (HCC): ICD-10-CM

## 2022-12-05 DIAGNOSIS — I50.30 BENIGN HYPERTENSIVE HEART AND KIDNEY DISEASE WITH DIASTOLIC CHF, NYHA CLASS II AND CKD STAGE III (HCC): ICD-10-CM

## 2022-12-05 DIAGNOSIS — D50.9 IRON DEFICIENCY ANEMIA, UNSPECIFIED IRON DEFICIENCY ANEMIA TYPE: ICD-10-CM

## 2022-12-05 DIAGNOSIS — T45.1X5A CHEMOTHERAPY-INDUCED NEUTROPENIA (HCC): ICD-10-CM

## 2022-12-05 DIAGNOSIS — I10 ESSENTIAL HYPERTENSION: ICD-10-CM

## 2022-12-05 DIAGNOSIS — J44.9 CHRONIC OBSTRUCTIVE PULMONARY DISEASE, UNSPECIFIED COPD TYPE (HCC): ICD-10-CM

## 2022-12-05 DIAGNOSIS — Z00.00 MEDICARE ANNUAL WELLNESS VISIT, SUBSEQUENT: Primary | ICD-10-CM

## 2022-12-05 DIAGNOSIS — I13.0 BENIGN HYPERTENSIVE HEART AND KIDNEY DISEASE WITH DIASTOLIC CHF, NYHA CLASS II AND CKD STAGE III (HCC): ICD-10-CM

## 2022-12-05 DIAGNOSIS — I25.119 CORONARY ARTERY DISEASE INVOLVING NATIVE HEART WITH ANGINA PECTORIS, UNSPECIFIED VESSEL OR LESION TYPE (HCC): ICD-10-CM

## 2022-12-05 LAB
ALBUMIN SERPL-MCNC: 4.2 G/DL (ref 3.5–5.2)
ALP BLD-CCNC: 91 U/L (ref 40–130)
ALT SERPL-CCNC: 15 U/L (ref 5–41)
ANION GAP SERPL CALCULATED.3IONS-SCNC: 12 MMOL/L (ref 7–19)
AST SERPL-CCNC: 18 U/L (ref 5–40)
BILIRUB SERPL-MCNC: 0.3 MG/DL (ref 0.2–1.2)
BUN BLDV-MCNC: 27 MG/DL (ref 8–23)
CALCIUM SERPL-MCNC: 9.9 MG/DL (ref 8.8–10.2)
CHLORIDE BLD-SCNC: 99 MMOL/L (ref 98–111)
CHOLESTEROL, TOTAL: 187 MG/DL (ref 160–199)
CO2: 27 MMOL/L (ref 22–29)
CREAT SERPL-MCNC: 1.4 MG/DL (ref 0.5–1.2)
GFR SERPL CREATININE-BSD FRML MDRD: 50 ML/MIN/{1.73_M2}
GLUCOSE BLD-MCNC: 134 MG/DL (ref 74–109)
HBA1C MFR BLD: 6 % (ref 4–6)
HCT VFR BLD CALC: 34.7 % (ref 42–52)
HDLC SERPL-MCNC: 43 MG/DL (ref 55–121)
HEMOGLOBIN: 11 G/DL (ref 14–18)
LDL CHOLESTEROL CALCULATED: 81 MG/DL
MCH RBC QN AUTO: 30.8 PG (ref 27–31)
MCHC RBC AUTO-ENTMCNC: 31.7 G/DL (ref 33–37)
MCV RBC AUTO: 97.2 FL (ref 80–94)
PDW BLD-RTO: 14 % (ref 11.5–14.5)
PLATELET # BLD: 237 K/UL (ref 130–400)
PMV BLD AUTO: 10 FL (ref 9.4–12.4)
POTASSIUM SERPL-SCNC: 5.4 MMOL/L (ref 3.5–5)
RBC # BLD: 3.57 M/UL (ref 4.7–6.1)
SODIUM BLD-SCNC: 138 MMOL/L (ref 136–145)
TOTAL PROTEIN: 7.7 G/DL (ref 6.6–8.7)
TRIGL SERPL-MCNC: 316 MG/DL (ref 0–149)
WBC # BLD: 7.8 K/UL (ref 4.8–10.8)

## 2022-12-05 PROCEDURE — 1123F ACP DISCUSS/DSCN MKR DOCD: CPT | Performed by: FAMILY MEDICINE

## 2022-12-05 PROCEDURE — G0439 PPPS, SUBSEQ VISIT: HCPCS | Performed by: FAMILY MEDICINE

## 2022-12-05 PROCEDURE — 3078F DIAST BP <80 MM HG: CPT | Performed by: FAMILY MEDICINE

## 2022-12-05 PROCEDURE — 3044F HG A1C LEVEL LT 7.0%: CPT | Performed by: FAMILY MEDICINE

## 2022-12-05 PROCEDURE — G8417 CALC BMI ABV UP PARAM F/U: HCPCS | Performed by: FAMILY MEDICINE

## 2022-12-05 PROCEDURE — 3023F SPIROM DOC REV: CPT | Performed by: FAMILY MEDICINE

## 2022-12-05 PROCEDURE — G8427 DOCREV CUR MEDS BY ELIG CLIN: HCPCS | Performed by: FAMILY MEDICINE

## 2022-12-05 PROCEDURE — G8484 FLU IMMUNIZE NO ADMIN: HCPCS | Performed by: FAMILY MEDICINE

## 2022-12-05 PROCEDURE — 3074F SYST BP LT 130 MM HG: CPT | Performed by: FAMILY MEDICINE

## 2022-12-05 PROCEDURE — 99214 OFFICE O/P EST MOD 30 MIN: CPT | Performed by: FAMILY MEDICINE

## 2022-12-05 PROCEDURE — 1036F TOBACCO NON-USER: CPT | Performed by: FAMILY MEDICINE

## 2022-12-05 RX ORDER — KETOCONAZOLE 20 MG/G
CREAM TOPICAL
COMMUNITY
Start: 2022-11-21

## 2022-12-05 SDOH — ECONOMIC STABILITY: FOOD INSECURITY: WITHIN THE PAST 12 MONTHS, THE FOOD YOU BOUGHT JUST DIDN'T LAST AND YOU DIDN'T HAVE MONEY TO GET MORE.: NEVER TRUE

## 2022-12-05 SDOH — ECONOMIC STABILITY: FOOD INSECURITY: WITHIN THE PAST 12 MONTHS, YOU WORRIED THAT YOUR FOOD WOULD RUN OUT BEFORE YOU GOT MONEY TO BUY MORE.: NEVER TRUE

## 2022-12-05 ASSESSMENT — LIFESTYLE VARIABLES
HOW OFTEN DO YOU HAVE A DRINK CONTAINING ALCOHOL: NEVER
HOW MANY STANDARD DRINKS CONTAINING ALCOHOL DO YOU HAVE ON A TYPICAL DAY: PATIENT DOES NOT DRINK

## 2022-12-05 ASSESSMENT — PATIENT HEALTH QUESTIONNAIRE - PHQ9
SUM OF ALL RESPONSES TO PHQ QUESTIONS 1-9: 0
1. LITTLE INTEREST OR PLEASURE IN DOING THINGS: 0
SUM OF ALL RESPONSES TO PHQ9 QUESTIONS 1 & 2: 0
SUM OF ALL RESPONSES TO PHQ QUESTIONS 1-9: 0
2. FEELING DOWN, DEPRESSED OR HOPELESS: 0
SUM OF ALL RESPONSES TO PHQ QUESTIONS 1-9: 0
SUM OF ALL RESPONSES TO PHQ QUESTIONS 1-9: 0

## 2022-12-05 ASSESSMENT — SOCIAL DETERMINANTS OF HEALTH (SDOH): HOW HARD IS IT FOR YOU TO PAY FOR THE VERY BASICS LIKE FOOD, HOUSING, MEDICAL CARE, AND HEATING?: NOT HARD AT ALL

## 2022-12-05 NOTE — PATIENT INSTRUCTIONS
We are committed to providing you with the best care possible. In order to help us achieve these goals please remember to bring all medications, herbal products, and over the counter supplements with you to each visit. If your provider has ordered testing for you, please be sure to follow up with our office if you have not received results within 7 days after the testing took place. *If you receive a survey after visiting one of our offices, please take time to share your experience concerning your physician office visit. These surveys are confidential and no health information about you is shared. We are eager to improve for you and we are counting on your feedback to help make that happen. Wt Readings from Last 3 Encounters:   12/05/22 187 lb 6.4 oz (85 kg)   11/11/22 189 lb (85.7 kg)   08/05/22 188 lb (85.3 kg)    Personalized Preventive Plan for Federico Montoya - 12/5/2022  Medicare offers a range of preventive health benefits. Some of the tests and screenings are paid in full while other may be subject to a deductible, co-insurance, and/or copay. Some of these benefits include a comprehensive review of your medical history including lifestyle, illnesses that may run in your family, and various assessments and screenings as appropriate. After reviewing your medical record and screening and assessments performed today your provider may have ordered immunizations, labs, imaging, and/or referrals for you. A list of these orders (if applicable) as well as your Preventive Care list are included within your After Visit Summary for your review. Other Preventive Recommendations:    A preventive eye exam performed by an eye specialist is recommended every 1-2 years to screen for glaucoma; cataracts, macular degeneration, and other eye disorders. A preventive dental visit is recommended every 6 months.   Try to get at least 150 minutes of exercise per week or 10,000 steps per day on a pedometer . Order or download the FREE \"Exercise & Physical Activity: Your Everyday Guide\" from [employer]. Call 2-438.996.1332 or search [employer] online. You need 2013-2396 mg of calcium and 7024-6392 IU of vitamin D per day. It is possible to meet your calcium requirement with diet alone, but a vitamin D supplement is usually necessary to meet this goal.  When exposed to the sun, use a sunscreen that protects against both UVA and UVB radiation with an SPF of 30 or greater. Reapply every 2 to 3 hours or after sweating, drying off with a towel, or swimming. Always wear a seat belt when traveling in a car. Always wear a helmet when riding a bicycle or motorcycle. Heart-Healthy Diet   Sodium, Fat, and Cholesterol Controlled Diet       What Is a Heart Healthy Diet? A heart-healthy diet is one that limits sodium , certain types of fat , and cholesterol . This type of diet is recommended for:   People with any form of cardiovascular disease (eg, coronary heart disease , peripheral vascular disease , previous heart attack , previous stroke )   People with risk factors for cardiovascular disease, such as high blood pressure , high cholesterol , or diabetes   Anyone who wants to lower their risk of developing cardiovascular disease   Sodium    Sodium is a mineral found in many foods. In general, most people consume much more sodium than they need. Diets high in sodium can increase blood pressure and lead to edema (water retention). On a heart-healthy diet, you should consume no more than 2,300 mg (milligrams) of sodium per dayabout the amount in one teaspoon of table salt. The foods highest in sodium include table salt (about 50% sodium), processed foods, convenience foods, and preserved foods. Cholesterol    Cholesterol is a fat-like, waxy substance in your blood. Our bodies make some cholesterol.  It is also found in animal products, with the highest amounts in fatty meat, egg yolks, whole milk, cheese, shellfish, and organ meats. On a heart-healthy diet, you should limit your cholesterol intake to less than 200 mg per day. It is normal and important to have some cholesterol in your bloodstream. But too much cholesterol can cause plaque to build up within your arteries, which can eventually lead to a heart attack or stroke. The two types of cholesterol that are most commonly referred to are:   Low-density lipoprotein (LDL) cholesterol  Also known as bad cholesterol, this is the cholesterol that tends to build up along your arteries. Bad cholesterol levels are increased by eating fats that are saturated or hydrogenated. Optimal level of this cholesterol is less than 100. Over 130 starts to get risky for heart disease. High-density lipoprotein (HDL) cholesterol  Also known as good cholesterol, this type of cholesterol actually carries cholesterol away from your arteries and may, therefore, help lower your risk of having a heart attack. You want this level to be high (ideally greater than 60). It is a risk to have a level less than 40. You can raise this good cholesterol by eating olive oil, canola oil, avocados, or nuts. Exercise raises this level, too. Fat    Fat is calorie dense and packs a lot of calories into a small amount of food. Even though fats should be limited due to their high calorie content, not all fats are bad. In fact, some fats are quite healthful. Fat can be broken down into four main types.    The good-for-you fats are:   Monounsaturated fat  found in oils such as olive and canola, avocados, and nuts and natural nut butters; can decrease cholesterol levels, while keeping levels of HDL cholesterol high   Polyunsaturated fat  found in oils such as safflower, sunflower, soybean, corn, and sesame; can decrease total cholesterol and LDL cholesterol   Omega-3 fatty acids  particularly those found in fatty fish (such as salmon, trout, tuna, mackerel, herring, and sardines); can decrease risk of arrhythmias, decrease triglyceride levels, and slightly lower blood pressure   The fats that you want to limit are:   Saturated fat  found in animal products, many fast foods, and a few vegetables; increases total blood cholesterol, including LDL levels   Animal fats that are saturated include: butter, lard, whole-milk dairy products, meat fat, and poultry skin   Vegetable fats that are saturated include: hydrogenated shortening, palm oil, coconut oil, cocoa butter   Hydrogenated or trans fat  found in margarine and vegetable shortening, most shelf stable snack foods, and fried foods; increases LDL and decreases HDL     It is generally recommended that you limit your total fat for the day to less than 30% of your total calories. If you follow an 1800-calorie heart healthy diet, for example, this would mean 60 grams of fat or less per day. Saturated fat and trans fat in your diet raises your blood cholesterol the most, much more than dietary cholesterol does. For this reason, on a heart-healthy diet, less than 7% of your calories should come from saturated fat and ideally 0% from trans fat. On an 1800-calorie diet, this translates into less than 14 grams of saturated fat per day, leaving 46 grams of fat to come from mono- and polyunsaturated fats.    Food Choices on a Heart Healthy Diet   Food Category   Foods Recommended   Foods to Avoid   Grains   Breads and rolls without salted tops Most dry and cooked cereals Unsalted crackers and breadsticks Low-sodium or homemade breadcrumbs or stuffing All rice and pastas   Breads, rolls, and crackers with salted tops High-fat baked goods (eg, muffins, donuts, pastries) Quick breads, self-rising flour, and biscuit mixes Regular bread crumbs Instant hot cereals Commercially prepared rice, pasta, or stuffing mixes   Vegetables   Most fresh, frozen, and low-sodium canned vegetables Low-sodium and salt-free vegetable juices Canned vegetables if unsalted or rinsed   Regular canned vegetables and juices, including sauerkraut and pickled vegetables Frozen vegetables with sauces Commercially prepared potato and vegetable mixes   Fruits   Most fresh, frozen, and canned fruits All fruit juices   Fruits processed with salt or sodium   Milk   Nonfat or low-fat (1%) milk Nonfat or low-fat yogurt Cottage cheese, low-fat ricotta, cheeses labeled as low-fat and low-sodium   Whole milk Reduced-fat (2%) milk Malted and chocolate milk Full fat yogurt Most cheeses (unless low-fat and low salt) Buttermilk (no more than 1 cup per week)   Meats and Beans   Lean cuts of fresh or frozen beef, veal, lamb, or pork (look for the word loin) Fresh or frozen poultry without the skin Fresh or frozen fish and some shellfish Egg whites and egg substitutes (Limit whole eggs to three per week) Tofu Nuts or seeds (unsalted, dry-roasted), low-sodium peanut butter Dried peas, beans, and lentils   Any smoked, cured, salted, or canned meat, fish, or poultry (including zamora, chipped beef, cold cuts, hot dogs, sausages, sardines, and anchovies) Poultry skins Breaded and/or fried fish or meats Canned peas, beans, and lentils Salted nuts   Fats and Oils   Olive oil and canola oil Low-sodium, low-fat salad dressings and mayonnaise   Butter, margarine, coconut and palm oils, zamora fat   Snacks, Sweets, and Condiments   Low-sodium or unsalted versions of broths, soups, soy sauce, and condiments Pepper, herbs, and spices; vinegar, lemon, or lime juice Low-fat frozen desserts (yogurt, sherbet, fruit bars) Sugar, cocoa powder, honey, syrup, jam, and preserves Low-fat, trans-fat free cookies, cakes, and pies Mahendra and animal crackers, fig bars, vargas snaps   High-fat desserts Broth, soups, gravies, and sauces, made from instant mixes or other high-sodium ingredients Salted snack foods Canned olives Meat tenderizers, seasoning salt, and most flavored vinegars   Beverages   Low-sodium carbonated beverages Tea and coffee in moderation Soy milk   Commercially softened water   Suggestions   Make whole grains, fruits, and vegetables the base of your diet. Choose heart-healthy fats such as canola, olive, and flaxseed oil, and foods high in heart-healthy fats, such as nuts, seeds, soybeans, tofu, and fish. Eat fish at least twice per week; the fish highest in omega-3 fatty acids and lowest in mercury include salmon, herring, mackerel, sardines, and canned chunk light tuna. If you eat fish less than twice per week or have high triglycerides, talk to your doctor about taking fish oil supplements. Read food labels. For products low in fat and cholesterol, look for fat free, low-fat, cholesterol free, saturated fat free, and trans fat freeAlso scan the Nutrition Facts Label, which lists saturated fat, trans fat, and cholesterol amounts. For products low in sodium, look for sodium free, very low sodium, low sodium, no added salt, and unsalted   Skip the salt when cooking or at the table; if food needs more flavor, get creative and try out different herbs and spices. Garlic and onion also add substantial flavor to foods. Trim any visible fat off meat and poultry before cooking, and drain the fat off after santa. Use cooking methods that require little or no added fat, such as grilling, boiling, baking, poaching, broiling, roasting, steaming, stir-frying, and sauting. Avoid fast food and convenience food. They tend to be high in saturated and trans fat and have a lot of added salt. Talk to a registered dietitian for individualized diet advice. Last Reviewed: March 2011 Buddy Elias MS, MPH, RD   Updated: 3/29/2011     Keeping Home a 1101        As we get older, changes in balance, gait, strength, vision, hearing, and cognition make even the most youthful senior more prone to accidents. Falls are one of the leading health risks for older people.  This increased risk of falling is related to:   Aging process (eg, decreased muscle strength, slowed reflexes)   Higher incidence of chronic health problems (eg, arthritis, diabetes) that may limit mobility, agility or sensory awareness   Side effects of medicine (eg, dizziness, blurred vision)especially medicines like prescription pain medicines and drugs used to treat mental health conditions   Depending on the brittleness of your bones, the consequences of a fall can be serious and long lasting. Home Life   Research by the Association of Aging [facility]) shows that some home accidents among older adults can be prevented by making simple lifestyle changes and basic modifications and repairs to the home environment. Here are some lifestyle changes that experts recommend:   Have your hearing and vision checked regularly. Be sure to wear prescription glasses that are right for you. Speak to your doctor or pharmacist about the possible side effects of your medicines. A number of medicines can cause dizziness. If you have problems with sleep, talk to your doctor. Limit your intake of alcohol. If necessary, use a cane or walker to help maintain your balance. Wear supportive, rubber-soled shoes, even at home. If you live in a region that gets wintry weather, you may want to put special cleats on your shoes to prevent you from slipping on the snow and ice. Exercise regularly to help maintain muscle tone, agility, and balance. Always hold the banister when going up or down stairs. Also, use  bars when getting in or out of the bath or shower, or using the toilet. To avoid dizziness, get up slowly from a lying down position. Sit up first, dangling your legs for a minute or two before rising to a standing position. Overall Home Safety Check   According to the Consumer Product Safety Commision's \"Older Consumer Home Safety Checklist,\" it is important to check for potential hazards in each room.  And remember, proper lighting is an essential factor in home safety. If you cannot see clearly, you are more likely to fall. Important questions to ask yourself include:   Are lamp, electric, extension, and telephone cords placed out of the flow of traffic and maintained in good condition? Have frayed cords been replaced? Are all small rugs and runners slip resistant? If not, you can secure them to the floor with a special double-sided carpet tape. Are smoke detectors properly locatedone on every floor of your home and one outside of every sleeping area? Are they in good working order? Are batteries replaced at least once a year? Do you have a well-maintained carbon monoxide detector outside every sleeping are in your home? Does your furniture layout leave plenty of space to maneuver between and around chairs, tables, beds, and sofas? Are hallways, stairs and passages between rooms well lit? Can you reach a lamp without getting out of bed? Are floor surfaces well maintained? Shag rugs, high-pile carpeting, tile floors, and polished wood floors can be particularly slippery. Stairs should always have handrails and be carpeted or fitted with a non-skid tread. Is your telephone easily reachable. Is the cord safely tucked away? Room by Room   According to the Association of Aging, bathrooms and mary are the two most potentially hazardous rooms in your home. In the Kitchen    Be sure your stove is in proper working order and always make sure burners and the oven are off before you go out or go to sleep. Keep pots on the back burners, turn handles away from the front of the stove, and keep stove clean and free of grease build-up. Kitchen ventilation systems and range exhausts should be working properly. Keep flammable objects such as towels and pot holders away from the cooking area except when in use. Make sure kitchen curtains are tied back. Move cords and appliances away from the sink and hot surfaces.  If extension cords are needed, install wiring guides so they do not hang over the sink, range, or working areas. Look for coffee pots, kettles and toaster ovens with automatic shut-offs. Keep a mop handy in the kitchen so you can wipe up spills instantly. You should also have a small fire extinguisher. Arrange your kitchen with frequently used items on lower shelves to avoid the need to stand on a stepstool to reach them. Make sure countertops are well-lit to avoid injuries while cutting and preparing food. In the Bathroom    Use a non-slip mat or decals in the tub and shower, since wet, soapy tile or porcelain surfaces are extremely slippery. Make sure bathroom rugs are non-skid or tape them firmly to the floor. Bathtubs should have at least one, preferably two, grab bars, firmly attached to structural supports in the wall. (Do not use built-in soap holders or glass shower doors as grab bars.)    Tub seats fitted with non-slip material on the legs allow you to wash sitting down. For people with limited mobility, bathtub transfer benches allow you to slide safely into the tub. Raised toilet seats and toilet safety rails are helpful for those with knee or hip problems. In the [facility]    Make sure you use a nightlight and that the area around your bed is clear of potential obstacles. Be careful with electric blankets and never go to sleep with a heating pad, which can cause serious burns even if on a low setting. Use fire-resistant mattress covers and pillows, and NEVER smoke in bed. Keep a phone next to the bed that is programmed to dial 911 at the push of a button. If you have a chronic condition, you may want to sign on with an automatic call-in service. Typically the system includes a small pendant that connects directly to an emergency medical voice-response system. You should also make arrangements to stay in contact with someonefriend, neighbor, family memberon a regular schedule. Fire Prevention   According to the [employer]. (Smoke Alarms for Every) [address], senior citizens are one of the two highest risk groups for death and serious injuries due to residential fires. When cooking, wear short-sleeved items, never a bulky long-sleeved robe. The [facility]'s Safety Checklist for Older Consumers emphasizes the importance of checking basements, garages, workshops and storage areas for fire hazards, such as volatile liquids, piles of old rags or clothing and overloaded circuits. Never smoke in bed or when lying down on a couch or recliner chair. Small portable electric or kerosene heaters are responsible for many home fires and should be used cautiously if at all. If you do use one, be sure to keep them away from flammable materials. In case of fire, make sure you have a pre-established emergency exit plan. Have a professional check your fireplace and other fuel-burning appliances yearly. [employer]   Baby boomers entering the burgess years will continue to see the development of new products to help older adults live safely and independently in spite of age-related changes. Making Life More Livable  , by [employer], lists over 1,000 products for \"living well in the mature years,\" such as bathing and mobility aids, household security devices, ergonomically designed knives and peelers, and faucet valves and knobs for temperature control. Medical supply stores and organizations are good sources of information about products that improve your quality of life and insure your safety. Last Reviewed: November 2009 Sonia Saab MD   Updated: 3/7/2011            Learning About Living [place] Setting  What is a living will? A living will, also called a declaration, is a legal form. It tells your family and your doctor your wishes when you can't speak for yourself.  It's used by the health professionals who will treat you as you near the end of your life or if you get seriously hurt or ill. If you put your wishes in writing, your loved ones and others will know what kind of care you want. They won't need to guess. This can ease your mind and be helpful to others. And you can change or cancel your living will at any time. A living will is not the same as an estate or property will. An estate will explains what you want to happen with your money and property after you die. How do you use it? Keep these facts in mind about how a living will is used. Your living will is used only if you can't speak or make decisions for yourself. Most often, one or more doctors must certify that you can't speak or decide for yourself before your living will takes effect. If you get better and can speak for yourself again, you can accept or refuse any treatment. It doesn't matter what you said in your living will. Some states may limit your right to refuse treatment in certain cases. For example, you may need to clearly state in your living will that you don't want artificial hydration and nutrition, such as being fed through a tube. Is a living will a legal document? A living will is a legal document. Each state has its own laws about living [place]. And a living will may be called something else in your state. Here are some things to know about living ir[place]. You don't need an  to complete a living will. But legal advice can be helpful if your state's laws are unclear. It can also help if your health history is complicated or your family can't agree on what should be in your living will. You can change your living will at any time. Some people find that their wishes about end-of-life care change as their health changes. If you make big changes to your living will, complete a new form. If you move to another state, make sure that your living will is legal in the state where you now live.  In most cases, doctors will respect your wishes even if you have a form from a different state.  You might use a universal form that has been approved by many states. This kind of form can sometimes be filled out and stored online. Your digital copy will then be available wherever you have a connection to the internet. The doctors and nurses who need to treat you can find it right away. Your state may offer an online registry. This is another place where you can store your living will online. It's a good idea to get your living will notarized. This means using a person called a saperatec to watch two people sign, or witness, your living will. What should you know when you create a living will? Here are some questions to ask yourself as you make your living will. Do you know enough about life support methods that might be used? If not, talk to your doctor so you know what might be done if you can't breathe on your own, your heart stops, or you can't swallow. What things would you still want to be able to do after you receive life-support methods? Would you want to be able to walk? To speak? To eat on your own? To live without the help of machines? Do you want certain Confucianism practices performed if you become very ill? If you have a choice, where do you want to be cared for? In your home? At a hospital or nursing home? If you have a choice at the end of your life, where would you prefer to die? At home? In a hospital or nursing home? Somewhere else? Would you prefer to be buried or cremated? Do you want your organs to be donated after you die? What should you do with your living will? Make sure that your family members and your health care agent have copies of your living will (also called a declaration). Give your doctor a copy of your living will. Ask to have it kept as part of your medical record. If you have more than one doctor, make sure that each one has a copy. Put a copy of your living will where it can be easily found.  For example, some people may put a copy on their refrigerator door. If you are using a digital copy, be sure your doctor, family members, and health care agent know how to find and access it. Where can you learn more? Go to https://[employer]. org and sign in to your [employer] account. Enter M296 in the [employer]back box to learn more about \"Learning About Living [place]. \"     If you do not have an account, please click on the \"Sign Up Now\" link. Current as of: June 16, 2022               Content Version: 13.4  © 6246-7057 Healthwise, [employer]. Care instructions adapted under license by [facility] ([facility]). If you have questions about a medical condition or this instruction, always ask your healthcare professional. [address] any warranty or liability for your use of this information.

## 2022-12-05 NOTE — ASSESSMENT & PLAN NOTE
Stable. He is having no angina at the present time. He is followed by [facility] cardiology.

## 2022-12-05 NOTE — PROGRESS NOTES
Medicare Annual Wellness Visit    Lyssa Mendoza is here for Medicare AWV (Pt is here for annual wellness visit. No concerns. Pt is fasting. )    Assessment & Plan   Medicare annual wellness visit, subsequent  Essential hypertension  Assessment & Plan:  Essential hypertension is well controlled. Continue propranolol 60 mg 1 a day. He is also on losartan and Lopressor but these are prescribed by his cardiologist.  Orders:  -     Comprehensive Metabolic Panel  -     CBC  Type 2 diabetes mellitus without complication, without long-term current use of insulin ([facility] [address]  Assessment & Plan:  Uncertain status. He is on metformin 1000 mg 1 tablet twice a day. He does not check his glucoses regularly. He denies any numbness or tingling in his feet. He does not wear diabetic shoes. He has not been exercising. We did discuss diet and exercise. I will contact him as soon as we get results of his hemoglobin A1c and we will adjust medications as needed. Orders:  -     Hemoglobin A1C  Hypercholesterolemia  Assessment & Plan:  Patient is currently on simvastatin 40 mg 1 daily. He denies muscle pain. We will check a fasting lipid today. This is of uncertain status until we get results. We did discuss diet and exercise. Orders:  -     Lipid Panel  Iron deficiency anemia, unspecified iron deficiency anemia type  Assessment & Plan:  Uncertain status. We will check a CBC today. He does have a history of iron deficiency anemia but this may have been related to his treatment for lung cancer. He denies any increased fatigue. No bright red blood per rectum or dark tarry stools. Orders:  -     CBC  Chronic obstructive pulmonary disease, unspecified COPD type ([address]  Chemotherapy-induced neutropenia ([facility] [address]  Assessment & Plan:  He is followed by oncology but we will check a CBC to make sure that this is better.   Coronary artery disease involving native heart with angina pectoris, unspecified vessel or lesion type [facility])  Assessment & Plan:  Stable. He is having no angina at the present time. He is followed by [facility] cardiology. 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 Continue to follow-up with [facility] cardiology. Impacted cerumen, bilateral    Recommendations for Preventive Services Due: see orders and patient instructions/AVS.      Ceruminosis is noted. Wax is removed by syringing and manual debridement. Instructions for home care to prevent wax buildup are given. Was done by the MA so it does not generate another charge. I get the ears after they cleaned them. There was no signs of irritation. TMs appeared normal.      Recommended screening schedule for the next 5-10 years is provided to the patient in written form: see Patient Instructions/AVS.     Return in 6 months (on 6/5/2023) for Diabetes recheck. Subjective   He says he is doing fairly well. He thinks his lung cancer is in remission but he is to follow-up with oncology and they will be doing a CT scan soon. He occasionally will have a cough but it is not productive or severe. He is short of breath with exertion at times but he says he really just sits in the recliner and watches basketball and [employer] movies. He is under some stress because his wife has dementia and it is slowly worsening. He feels like his memory is okay. He is complaining of trouble with his hearing. He has bilateral hearing aids but it seems to be getting worse. He does have type 2 diabetes and is on metformin but he does not check his sugars regularly. He denies any increased numbness or tingling in his feet or legs. He has no episodes of hypoglycemia. He denies polyuria polyphagia or polydipsia. He does have a history of COPD and also hypertensive heart disease. In the past he has had iron deficiency anemia which may have been related to his chemotherapy.     Blood pressure is usually well controlled. Patient's complete Health Risk Assessment and screening values have been reviewed and are found in Flowsheets. The following problems were reviewed today and where indicated follow up appointments were made and/or referrals ordered.     Positive Risk Factor Screenings with Interventions:             General Health and ACP:  General  In general, how would you say your health is?: Good  In the past 7 days, have you experienced any of the following: New or Increased Pain, New or Increased Fatigue, Loneliness, Social Isolation, Stress or Anger?: No  Do you get the social and emotional support that you need?: Yes  Do you have a Living Will?: (!) No    Advance Directives       Power of  Living Will ACP-Advance Directive ACP-Power of     Not on File Not on File Not on File Not on File        General Health Risk Interventions:  No Living Will: Advance Care Planning addressed with patient today    Health Habits/Nutrition:  Physical Activity: Insufficiently Active    Days of Exercise per Week: 1 day    Minutes of Exercise per Session: 20 min     Have you lost any weight without trying in the past 3 months?: (!) Yes  Body mass index: (!) 26.89  Have you seen the dentist within the past year?: Yes  Health Habits/Nutrition Interventions:  Nutritional issues:  educational materials for healthy, well-balanced diet provided     Safety:  Do you have working smoke detectors?: Yes  Do you have any tripping hazards - loose or unsecured carpets or rugs?: No  Do you have any tripping hazards - clutter in doorways, halls, or stairs?: No  Do you have either shower bars, grab bars, non-slip mats or non-slip surfaces in your shower or bathtub?: (!) No  Do all of your stairways have a railing or banister?: Yes  Do you always fasten your seatbelt when you are in a car?: Yes  Safety Interventions:  Home safety tips provided           Objective   Vitals:    12/05/22 1109   BP: 128/68   Site: Left Upper Arm   Position: Sitting   Cuff Size: Medium Adult   Pulse: 57   Resp: 18   Temp: 97.5 °F (36.4 °C)   TempSrc: Temporal   SpO2: 100%   Weight: 187 lb 6.4 oz (85 kg)   Height: 5' 10\" (1.778 m)      Body mass index is 26.89 kg/m². General Appearance: alert and oriented to person, place and time, well developed and well- nourished, in no acute distress  Skin: warm and dry, no rash or erythema  Head: normocephalic and atraumatic  Eyes: pupils equal, round, and reactive to light, extraocular eye movements intact, conjunctivae normal  ENT: Both TMs are completely obscured by thick cerumen. Normal external ear and ear canal normal bilaterally, nose without deformity, nasal mucosa and turbinates normal without polyps  Neck: supple and non-tender without mass, no thyromegaly or thyroid nodules, no cervical lymphadenopathy  Pulmonary/Chest: clear to auscultation bilaterally- no wheezes, rales or rhonchi, normal air movement, no respiratory distress  Cardiovascular: normal rate, regular rhythm, normal S1 and S2, no murmurs, rubs, clicks, or gallops, distal pulses intact, no carotid bruits  Abdomen: soft, non-tender, non-distended, normal bowel sounds, no masses or organomegaly  Extremities: no cyanosis, clubbing or edema  Musculoskeletal: normal range of motion, no joint swelling, deformity or tenderness  Neurologic: reflexes normal and symmetric, no cranial nerve deficit, gait, coordination and speech normal       No Known Allergies  Prior to Visit Medications    Medication Sig Taking?  Authorizing Provider   ketoconazole (NIZORAL) 2 % cream  Yes Historical Provider, MD   propranolol (INDERAL) 60 MG tablet Take 1 tablet by mouth 2 times daily For blood pressure and tremor Yes Blanka Gonzalez MD   simvastatin (ZOCOR) 40 MG tablet TAKE 1 TABLET AT BEDTIME FOR CHOLESTEROL Yes Blanka Gonzalez MD   omeprazole (PRILOSEC) 40 MG delayed release capsule TAKE 1 CAPSULE DAILY FOR STOMACH Yes Blanka Gonzalez MD   furosemide (LASIX) 40 MG tablet TAKE 1 TABLET DAILY FOR FLUID Yes Susy Marlow MD   metFORMIN (GLUCOPHAGE) 1000 MG tablet TAKE 1 TABLET TWICE A DAY FOR DIABETES Yes Grady Munguia MD   promethazine-dextromethorphan (PROMETHAZINE-DM) 6.25-15 MG/5ML syrup 1 teaspoon by mouth at bedtime as needed for severe cough Yes Grady Munguia MD   ondansetron (ZOFRAN ODT) 4 MG disintegrating tablet Take 1 tablet by mouth every 6 hours as needed for Nausea or Vomiting Yes Reynold Peña MD   metoprolol tartrate (LOPRESSOR) 50 MG tablet Take 50 mg by mouth daily Yes Historical Provider, MD   montelukast (SINGULAIR) 10 MG tablet  Yes Historical Provider, MD   ondansetron (ZOFRAN) 8 MG tablet Take 1 tablet by mouth every 8 hours as needed for Nausea or Vomiting Yes Reynold Peña MD   lidocaine-prilocaine (EMLA) 2.5-2.5 % cream Apply topically to port area and cover with plastic wrap one hour prior to treatment. Yes Reynold Peña MD   blood glucose test strips (TRUE METRIX BLOOD GLUCOSE TEST) strip TEST BLOOD SUGAR ONCE DAILY *E11.9* Yes Grady Munguia MD   isosorbide mononitrate (IMDUR) 60 MG extended release tablet Take 60 mg by mouth Yes Historical Provider, MD   losartan (COZAAR) 25 MG tablet Take 25 mg by mouth Yes Historical Provider, MD   umeclidinium-vilanterol (ANORO ELLIPTA) 62.5-25 MCG/INH AEPB inhaler Inhale 1 puff into the lungs daily Yes Historical Provider, MD   primidone (MYSOLINE) 50 MG tablet 1 tab po BID for tremor Yes Grady Munguia MD   triamcinolone (KENALOG) 0.1 % cream Apply topically 2 times daily. To lower leg dry skin Yes Grady Munguia MD   tamsulosin (FLOMAX) 0.4 MG capsule Take 1 capsule by mouth daily. Yes Grady Munguia MD   aspirin 325 MG tablet Take 325 mg by mouth daily.    Yes Historical Provider, MD   potassium chloride (K-DUR) 10 MEQ tablet TAKE 1 TABLET TWICE A DAY  Grady Munguia MD       CareTe (Including outside providers/suppliers regularly involved in providing care): Patient Care Team:  Frederick Cottrell MD as PCP - General (Family Medicine)  Frederick Cottrell MD as PCP - [facility] Empaneled Provider     Reviewed and updated this visit:  Tobacco  Allergies  Meds  Problems  Med Hx  Surg Hx  Soc Hx  Fam Hx

## 2022-12-05 NOTE — ASSESSMENT & PLAN NOTE
Patient is currently on simvastatin 40 mg 1 daily. He denies muscle pain. We will check a fasting lipid today. This is of uncertain status until we get results. We did discuss diet and exercise.

## 2022-12-05 NOTE — ASSESSMENT & PLAN NOTE
Uncertain status. We will check a CBC today. He does have a history of iron deficiency anemia but this may have been related to his treatment for lung cancer. He denies any increased fatigue. No bright red blood per rectum or dark tarry stools.

## 2022-12-05 NOTE — ASSESSMENT & PLAN NOTE
Essential hypertension is well controlled. Continue propranolol 60 mg 1 a day.   He is also on losartan and Lopressor but these are prescribed by his cardiologist.

## 2022-12-05 NOTE — ASSESSMENT & PLAN NOTE
Uncertain status. He is on metformin 1000 mg 1 tablet twice a day. He does not check his glucoses regularly. He denies any numbness or tingling in his feet. He does not wear diabetic shoes. He has not been exercising. We did discuss diet and exercise. I will contact him as soon as we get results of his hemoglobin A1c and we will adjust medications as needed.

## 2023-01-09 DIAGNOSIS — I10 ESSENTIAL HYPERTENSION: ICD-10-CM

## 2023-01-09 RX ORDER — LOSARTAN POTASSIUM 25 MG/1
TABLET ORAL
Qty: 90 TABLET | Refills: 3 | Status: SHIPPED | OUTPATIENT
Start: 2023-01-09

## 2023-03-03 ENCOUNTER — TELEPHONE (OUTPATIENT)
Dept: HEMATOLOGY | Age: 83
End: 2023-03-03

## 2023-03-03 ENCOUNTER — HOSPITAL ENCOUNTER (OUTPATIENT)
Dept: GENERAL RADIOLOGY | Age: 83
Discharge: HOME | End: 2023-03-03
Payer: MEDICARE

## 2023-03-03 DIAGNOSIS — C34.91 ADENOCARCINOMA OF RIGHT LUNG (HCC): ICD-10-CM

## 2023-03-03 PROCEDURE — 71250 CT THORAX DX C-: CPT

## 2023-03-03 NOTE — TELEPHONE ENCOUNTER
[facility] CALLED AND STATED THAT MR. BOATENG WAS THERE TO DO HIS CT CHEST W CONT AND WHILE THERE THEY TOOK CREAT. 1.8 AND GFR WAS 33, WAS WONDERING IF HE NEEDED WITH OR WITH OUT CONT?  AFTER TALKING WITH RADHA CARLSON, SHE CONFIRMED TO GO AHEAD AND DO TESTING WO CONT FOR PT.

## 2023-03-08 NOTE — PROGRESS NOTES
MEDICAL ONCOLOGY PROGRESS NOTE    Pt Name: April Cortés  MRN: [id number]  YOB: 1940  Date of evaluation: 3/10/2023    HISTORY OF PRESENT ILLNESS:    Reason for MD visit-disease management/toxicity assessment  The patient is a very pleasant 79years old male who has been diagnosed with stage III non-small cell lung cancer, adenocarcinoma subtype. He is status post completion CRT followed by 1 year durvalumab completed June 2022. He denies any new complaints. He is accompanied by his daughter today. He has persistent fatigue. Diagnosis  RUL adenocarcinoma, NSCLC, Dec 2020  [id number], stage IIIA  Systolic heart failure  Hypertension, controlled  Not a surgical candidate    Treatment Summary  4/21/2021-5/26/21 completed concurrent chemoradiation with carboplatin/Taxol  4/22/21-6/3/21- 6600 cGy radiation therapy completed  7/8/21-6/3/2022-completion of maintenance Durvalumab 1500mg every 4 weeks x12 cycles    Hematology/Cancer History:  Ulysses Serum was first seen by me on 4/7/2021 referred by Dr. Chad Valdes, pulmonary [facility] [place] for findings of non-small cell lung cancer, adenocarcinoma type. He has several comorbidities including history of type 2 diabetes, hypertension COPD. History of smoking in the past.  Quit many years ago. History of coronary artery disease followed by cardiology biopsy. Last EF 70% in 2018. He was seen by his primary care provider in November 2020. He has complaint of chest pain. Chest x-ray showed a 4 cm mass in the right upper lung. 12/1/20 CT chest [facility]): Large right upper lobe mass concerning for primary neoplasm. Enlarged right hilar lymph node concerning for metastatic disease. Additional noncalcified pulmonary nodules bilaterally for which follow-up CT is recommended in 3-6 months. Atherosclerosis of the aorta and coronary arteries.   12/16/2020-bronchoscopy with bronchoalveolar lavage was nondiagnostic  2/23/21 CT cheat w/o [facility]): Probable right upper lobe mass, as described. This appears slightly larger in size and is likely neoplastic. Consider PET CT follow-up. There is new surrounding infiltrate that extends inferiorly into the right upper lobe. The new may be infectious or inflammatory. Pulmonary hemorrhage possible. Other areas of nodularity and groundglass opacity/nodularity are stable in appearance. 3/5/2021-navigational bronchoscopy with biopsy was nondiagnostic  3/5/21 CT chest w/o [facility]): [place] soft tissue mass within the right upper lobe highly suspicious for neoplasm. There is associated postobstructive pneumonitis within the right upper lobe showing mild progression from the previous study of 2/23/2021. There is no associated hilar or mediastinal lymphadenopathy. Today's study is performed as part of a navigational bronchoscopy exam. Other scattered nodules including groundglass nodules are noted within the lungs all stable from the previous exam warranting follow-up. Heavy atheromatous calcification of the thoracic aorta and proximal great vessels. Coronary calcifications are present. 3/23/21 PET scan [facility]): Markedly FDG avid right upper lobe mass in the right upper lobe measuring 7.2 cm triangular opacity with maximum SUV 12.18, highly concerning for malignancy. Adjacent groundglass opacities,  similar compared to 3/5/2021, which could represent additional  malignancy or infection/inflammation. Adjacent right upper lobe groundglass opacities are similar compared to 3/5/2021, which could represent additional malignancy or infection/inflammation. Other non-FDG avid pulmonary findings as above. No evidence of jasmin or distant metastatic disease. 3/29/21 Navigational bronchoscopy-Dr. Zheng Nicholson: Bronchoalveolar lavage RUL consistent with adenocarcinoma. RUL transbronchial bipsy consistent with adenocarcinoma. FNA biopsy of several jasmin station negative for metastatic disease. 4/7/2021-he was first seen by me.   4/15/21 2D echo: Normal left ventricular size with reduced global systolic function EF 07-17%. Mild concentric left ventricular hypertrophy with mild [grade 1] diastolic dysfunction. Normal left atrial size. Mild thickening of a poorly visualized aortic valve without evidence of stenosis or insufficiency. Mild thickening of a normally mobile mitral valve with mild regurgitation. Nonvisualization pulmonic valve. Poorly visualized but apparently normal size right-sided chambers with preserved right ventricular systolic function. No tricuspid regurgitation with which to estimate RVSP. No significant pericardial effusion. Aortic root and ascending segments measured within normal limits. 4/17/21 MRI brain: No acute intracranial process. No metastatic disease identified in the CNS.   4/21/2021-initiation of carboplatin/Taxol weekly. Reviewed MRI brain 2D echo. 4/21/2021-5/26/21 completed concurrent chemoradiation with carboplatin/taxol to be followed by immunotherapy  4/22/21-6/3/21 6600 cGy radiation therapy completed  6/30/21-CT CHEST W CONTRAST A large spiculated mass in the right upper lobe posterior segment represent a neoplastic process. The groundglass opacity/infiltrate in the right upper lobe and superior segments of the lower lobes bilaterally may represent an inflammatory or neoplastic/metastatic process. A single enlarged abnormal lymph node in the right hilum may represent a metastatic node. 07/08/21-reviewed CT chest with perform radiologist.  7/8/21- initiate maintenance Durvalumab 1500mg every 4 weeks x12 cycles  9/24/2021 CT Chest w/ Contrast([facility]) Decreased size of right upper lobe triangular opacity with surrounding probable posttreatment changes. Similar size left upper lobe 1 cm groundglass and solid pulmonary opacity/nodule compared to 3/23/2021. Right hilum with a 1.4 x 1.2 cm lymph node or conglomeration of lymph nodes, which appears similar to 11/30/2020.   Diffusely heterogeneous bone mineralization, which is nonspecific. This could be seen with aggressive osteopenia or infiltrative process. 10/01/21-I reviewed the results CT chest.  Interval treatment response. Continue adjuvant durvalumab.  10/26/2021 Anemia Labs: Iron 33, TIBC 240, Iron Sat 14, Ferritin 72.6, Vit ,  11/4/2021 Blood Occult Studies 3/3 Negative  11/10/2021 MMA 0.19  11/30/2021 CT Chest w/ ContrastThe right upper lobe neoplasm is not as well-defined on the current study with increased consolidation within the adjacent lung parenchyma as well as adjacent consolidation with air bronchograms. I suspect this represents postradiation change with adjacent post radiation pneumonitis. It extends to the level of the upper pleura with associated thickening of the visceral and parietal pleura. I see no evidence of khai chest wall invasion. As noted on the previous examination there has been some extension of the neoplasm across the posterior aspect of the major fissure into the superior segment of the right lower lobe with this component of the neoplasm also demonstrating increased consolidation tracking along the lower margin of the major fissure. Stable foci of groundglass consolidation within the left lung. No new lesions are present. There are changes of centrilobular emphysema with hyperinflation of the lung parenchyma. 3. Heavy coronary artery calcifications are present. No new enlarged mediastinal or axillary adenopathy is demonstrated. .  11/30/2021 CT Abd/Pelvis w/ IV Contrast (oral)Changes of centrilobular emphysema within the lung bases. Coronary calcifications are present. There is a trace pericardial effusion or pericardial thickening demonstrated. 2. No evidence of metastatic disease to the abdomen or pelvis. The adrenals are unremarkable. 3. There is a small 2 mm distal right ureteral stone just above the UVJ.  This does not result in significant obstructive uropathy without evidence of asymmetric distention of the ureter or asymmetric enhancement of the right kidney in comparison with the left. No evidence of nephrolithiasis within the upper tracts of either kidney. The left ureter is decompressed and normal in appearance. 4. Mild constipation. There is no obstruction or free air. The appendix is identified and is normal in appearance. 5. Small fat-containing periumbilical hernia. Small bilateral  fat-containing inguinal hernias are also present. .   12/3/2021-I reviewed CT chest/abdomen/pelvis. Essentially no gross evidence of disease progression. Continue durvalumab and repeat CT scan in 6 weeks  2/18/2022 CT Chest W ContrastStable right upper lobe volume loss. There is decreasing right perihilar consolidation. Stable 1 cm short axis diameter right superior hilar lymph node. No mediastinal lymphadenopathy is seen. Stable areas of groundglass opacity in the left upper lobe and left lower lobe. Continued follow-up recommended. New moderate patchy groundglass infiltrates in the right lower lobe may be infectious or inflammatory. Post radiation change considered as well although the infiltrates are somewhat peripherally located. Atheromatous disease of the thoracic aorta and coronary arteries. Cardiomegaly. Please see the abdomen and pelvis CT report separately. 2/18/2022 CT Abd/Pelvis W IV Contrast(Oral) No acute abnormality of the abdomen are pelvis particularly, no findings to suggest a neoplastic process or metastatic disease. A groundglass infiltrate in the right lower lung which was not seen in there previous study. This is represent an acute or subacute inflammatory/infectious process. This data be further evaluated. A persistent and unchanged 2 mm non obstructing calculus in the right distal ureter, 5 mm from the right UV junction. No change. 3/11/2022-I reviewed results of CT chest abdomen pelvis. Essentially, decreasing right perihilar consolidation. No evidence of progressive disease. New onset consolidation right lower lobe.   As per radiology report it looks inflammatory/infectious. Patient prescribed a trial of Levaquin 750 mg x 10 days. We will repeat CT scans in 2 months. Continue durvalumab. 5/3/2022 CT Chest W Contrast Persistent volume loss within the right upper lobe posteriorly. The associated soft tissue density shows slight interval decrease in size. Previously noted rather extensive predominantly groundglass consolidation within the right lower lobe shows interval improvement from the previous exam. However, in other lobes there is progressive increased groundglass consolidation within both lungs which is multifocal in nature. Additional previously described nodules within both lungs are otherwise stable from the previous study. No effusion is present. There is no developing mediastinal or axillary lymphadenopathy. 5/3/2022 CT Abd/Pelvis W IV Contrast (Oral) No metastatic disease is seen in the abdomen or pelvis. Stable 3 mm stone in the distal right ureter near the ureterovesical junction. Stable mild stranding of the fat around the kidneys suggesting chronic inflammation. Stable mild bladder wall thickening. The prostate is mildly enlarged. Scattered colon diverticula. Under distention of the stomach. Atheromatous disease of the aortoiliac vessels. Degenerative changes of the spine and bilateral hips  5/5/2022- discussed results of CT chest abdomen pelvis. No evidence of disease progression. Mild interstitial infiltrates bilaterally. Some of the infiltrate have gotten better. Some of these infiltrates have developed. Patient denies any respiratory symptoms at this time. Recommend to proceed with immunotherapy completion. If persistent changes then we will refer to pulmonary. 6/3/2022-completion of 1 year of durvalumab therapy  6/3/2022 Iron Studies: Iron 90, TIBC 2393, Iron Sat 38,Ferritin 453.3, Vitamin B12- 401  7/20/2022 CT Chest W Contrast 6.2 x 3.2 x 5.5 cm right supra hilar soft tissue mass.  Bilateral ill-defined groundglass opacities. Differential considerations include inflammatory/infectious/atypical etiology. No pulmonary infiltrates, pleural effusion or pneumothorax. 9/24/22 CT Chest W Contrast [facility]) Decreased size of right upper lobe triangular opacity with surrounding probable posttreatment changes. Similar size left upper lobe 1 cm groundglass and solid pulmonary opacity/nodule compared to 3/23/2021. Right hilum with a 1.4 x 1.2 cm lymph node or conglomeration of lymph nodes, which appears similar to 11/30/2020. Diffusely heterogeneous bone mineralization, which is nonspecific. This could be seen with aggressive osteopenia or infiltrative process. Calcified atherosclerosis. 11/3/2022 CT Chest W Contrast Right upper lung paraspinal posterior apices mass measuring 7.0 x 5.3 x 5.7 cm. This was seen on prior CT and appears to be the adenocarcinoma of the right upper lung. Unchanged from prior CT.  11/3/2022 CT Abd/Pelvis W IV Contrast (oral) Brachytherapy seeds within the prostate. Thoracolumbar spondylosis. Perinephric inflammatory changes bilaterally. Atherosclerosis. 11/11/2022-I reviewed results CT chest abdomen pelvis. No evidence of disease progression. Consolidation in the right upper lobe is stable and likely related to posttreatment change. Continue clinical/radiological follow-up. 3/3/23 CT Chest WO Contrast Since November 3, 2022; Stable appearance right paraspinal mass measuring 6.1 x 2.7 x 4.2 cm. No mediastinal or hilar lymphadenopathy. Stable appearance additional bilateral nodular densities, as described above.       Past Medical History:  Past Medical History:   Diagnosis Date    Allergic rhinitis     Bronchitis, chronic (HCC)     Carotid artery stenosis     GERD (gastroesophageal reflux disease)     Heart attack ([facility] [address]     Hemorrhoids     Hypercholesterolemia     Type II or unspecified type diabetes mellitus without mention of complication, not stated as uncontrolled        Past Surgical History:    Past Surgical History:   Procedure Laterality Date    CARDIAC SURGERY      balloon surgery (angioplasty)    PORT SURGERY Right 04/01/2021       Social History:   Marital status: , 2 daughters  Smoking status: Former smoker for ~30 years, 1.5 ppd  ETOH status: No  Resides: [place], [place]    Family History:   Family History   Problem Relation Age of Onset    Diabetes Mother     High Blood Pressure Mother     Cancer Father         Lung     Current Hospital Medications:    Current Outpatient Medications   Medication Sig Dispense Refill    metFORMIN (GLUCOPHAGE) 1000 MG tablet TAKE 1 TABLET TWICE A DAY FOR DIABETES 180 tablet 3    ketoconazole (NIZORAL) 2 % cream       propranolol (INDERAL) 60 MG tablet Take 1 tablet by mouth 2 times daily For blood pressure and tremor 30 tablet 5    simvastatin (ZOCOR) 40 MG tablet TAKE 1 TABLET AT BEDTIME FOR CHOLESTEROL 90 tablet 3    omeprazole (PRILOSEC) 40 MG delayed release capsule TAKE 1 CAPSULE DAILY FOR STOMACH 90 capsule 3    furosemide (LASIX) 40 MG tablet TAKE 1 TABLET DAILY FOR FLUID 90 tablet 3    promethazine-dextromethorphan (PROMETHAZINE-DM) 6.25-15 MG/5ML syrup 1 teaspoon by mouth at bedtime as needed for severe cough 240 mL 0    ondansetron (ZOFRAN ODT) 4 MG disintegrating tablet Take 1 tablet by mouth every 6 hours as needed for Nausea or Vomiting 30 tablet 5    metoprolol tartrate (LOPRESSOR) 50 MG tablet Take 50 mg by mouth daily      montelukast (SINGULAIR) 10 MG tablet       ondansetron (ZOFRAN) 8 MG tablet Take 1 tablet by mouth every 8 hours as needed for Nausea or Vomiting 30 tablet 3    lidocaine-prilocaine (EMLA) 2.5-2.5 % cream Apply topically to port area and cover with plastic wrap one hour prior to treatment.  1 Tube 1    blood glucose test strips (TRUE METRIX BLOOD GLUCOSE TEST) strip TEST BLOOD SUGAR ONCE DAILY *E11.9* 100 strip 5    isosorbide mononitrate (IMDUR) 60 MG extended release tablet Take 60 mg by mouth      losartan (COZAAR) 25 MG tablet Take 25 mg by mouth      umeclidinium-vilanterol (ANORO ELLIPTA) 62.5-25 MCG/INH AEPB inhaler Inhale 1 puff into the lungs daily      primidone (MYSOLINE) 50 MG tablet 1 tab po BID for tremor 180 tablet 3    triamcinolone (KENALOG) 0.1 % cream Apply topically 2 times daily. To lower leg dry skin 3 Tube 3    tamsulosin (FLOMAX) 0.4 MG capsule Take 1 capsule by mouth daily. 90 capsule 3    aspirin 325 MG tablet Take 325 mg by mouth daily. No current facility-administered medications for this visit.      Facility-Administered Medications Ordered in Other Visits   Medication Dose Route Frequency Provider Last Rate Last Admin    sodium chloride flush 0.9 % injection 5-40 mL  5-40 mL IntraVENous PRN Kathryn Wilkins MD        heparin flush 100 UNIT/ML injection 500 Units  500 Units IntraCATHeter PRN Kathryn Wilkins MD           Allergies: No Known Allergies      Subjective   REVIEW OF SYSTEMS:   CONSTITUTIONAL: no fever, no night sweats, weakness,fatigue;  HEENT: no blurring of vision, no double vision, no hearing difficulty, no tinnitus, no ulceration, no dysplasia, no epistaxis;   LUNGS: no cough, no hemoptysis, no wheeze,  no shortness of breath;  CARDIOVASCULAR: no palpitation, no chest pain, no shortness of breath;  GI: no abdominal pain, no nausea, no vomiting, no diarrhea, no constipation;  JESSIKA: no dysuria, no hematuria, no frequency or urgency, no nephrolithiasis;  MUSCULOSKELETAL: no joint pain, no swelling, no stiffness;  ENDOCRINE: no polyuria, no polydipsia, no cold or heat intolerance;  HEMATOLOGY: no easy bruising or bleeding, no history of clotting disorder;  DERMATOLOGY: no skin rash, no eczema, no pruritus;  PSYCHIATRY: no depression, no anxiety, no panic attacks, no suicidal ideation, no homicidal ideation;  NEUROLOGY: no syncope, no seizures, no numbness or tingling of hands, no numbness or tingling of feet, no paresis;     Objective   BP (!) 124/54   Pulse 78   Ht 5' 10\" (1.778 m) Wt 187 lb 4.8 oz (85 kg)   SpO2 99%   BMI 26.87 kg/m²   Wt Readings from Last 3 Encounters:   03/10/23 187 lb 4.8 oz (85 kg)   12/05/22 187 lb 6.4 oz (85 kg)   11/11/22 189 lb (85.7 kg)       PHYSICAL EXAM:  CONSTITUTIONAL: Alert, appropriate, no acute distress  EYES: Non icteric, EOM intact, pupils equal round   ENT: Mucus membranes moist, no oral pharyngeal lesions, external inspection of ears and nose are normal.  NECK: Supple, no masses. No palpable thyroid mass  CHEST/LUNGS: CTA bilaterally, normal respiratory effort   CARDIOVASCULAR: RRR, no murmurs. No lower extremity edema  ABDOMEN: soft non-tender, active bowel sounds, no HSM. No palpable masses  EXTREMITIES: warm, full ROM in all 4 extremities, no focal weakness. SKIN: warm, dry with no rashes or lesions  LYMPH: No cervical, clavicular, axillary, or inguinal lymphadenopathy  NEUROLOGIC: follows commands, non focal   PSYCH: mood and affect appropriate. Alert and oriented to time, place, person    LABORATORY RESULTS REVIEWED/ANALYZED BY ME:  3/10/23 CBC  WBC 8.42  HGB 10.2    Neut 4.01    RADIOLOGY STUDIES REVIEWED BY ME:  3/3/23 CT Chest WO Contrast Since November 3, 2022; Stable appearance right paraspinal mass measuring 6.1 x 2.7 x 4.2 cm. No mediastinal or hilar lymphadenopathy. Stable appearance additional bilateral nodular densities, as described above. ASSESSMENT:    No orders of the defined types were placed in this encounter. Diomedes Hammond was seen today for follow-up. Diagnoses and all orders for this visit:    Adenocarcinoma of right lung [facility])    Encounter for follow-up surveillance of lung cancer    Care plan discussed with patient      [id number], stge IIIA, NSCLC, adenocarcinoma subtype  Essentially stage III disease. The patient is not a surgical candidate. Recommend chemoradiation followed by immunotherapy, completed June 2022. Status post completion of chemoradiation.   6/3/2022-completion of 1 year of maintenance durvalumab. -Repeat CT chest 11/3/2022-no evidence of disease progression. Posttreatment changes. Stable findings.  -Continue clinical/radiologic surveillance. Treatment related side effects-occasional diarrhea, grade 1, fatigue    Systolic heart failure-EF 45%  2D echo-LVEF 40-45%. Mild LVH, Mild [grade 1] diastolic dysfunction. Controlled hypertension-  BP (!) 124/54   Pulse 78   Ht 5' 10\" (1.778 m)   Wt 187 lb 4.8 oz (85 kg)   SpO2 99%   BMI 26.87 kg/m²      At risk thyroid disorder-  Lab Results   Component Value Date    TSH 4.430 (H) 06/03/2022   Continue to monitor    Normocytic anemia-likely multifactorial anemia to include anemia of inflammation/iron deficiency. Cannot rule out GI bleed given history taking aspirin and NSAIDs toxicity  Hemoglobin 9.8/MCV 93  Ferritin 72, iron saturation 14%, iron 33, TIBC 240, vitamin B12 249  Occult blood stool x3-all 3 negative  Of note, patient has been on high-dose aspirin 325 mg p.o. daily. He was recently seen by cardiology and request to take daily aspirin only. 10/29/2021-ferritin 72, iron saturation 14%, TIBC 240, vitamin B12 249, methylmalonic acid normal 0.19. November 2021-IV Venofer 1000 mg  2/11/2022 -Hemoglobin 11. 6. Improved  3/11/2022-hemoglobin 11.6  -6/3/2022-ferritin 435, iron 90, iron saturation 38, TIBC 239, vitamin B12 401  Lab Results   Component Value Date    WBC 7.8 12/05/2022    HGB 11.0 (L) 12/05/2022    HCT 34.7 (L) 12/05/2022    MCV 97.2 (H) 12/05/2022     12/05/2022   Likely anemia of chronic disease. PLAN:  RTC with MD 4 months after scans  Port flush every 8 weeks  Repeat CT Chest in 4 months July 2023  Monitor BP at home and record  Continue Follow-up appointments with PCP for BP continue to monitor anemia. Julianne Strong am pre charting  as Medical Assistant for Spencer Garza MD. Electronically signed by Jason Mancini MA on 3/10/2023 at 2:09 PM CST.     aPyal Hoang am scribing for [employer] Cindie Kocher, MD. Electronically signed by Rashaun Liu RN on 3/10/2023 at 12:09 PM CST. I, Dr Inocencio Rothman, personally performed the services described in this documentation as scribed by Rashaun Liu RN in my presence and is both accurate and complete. I have seen, examined and reviewed this patient medication list, appropriate labs and imaging studies. I reviewed relevant medical records and others physicians notes. I discussed the plans of care with the patient. I answered all the questions to the patients satisfaction. I have also reviewed the chief complaint (CC) and part of the history (History of Present Illness (HPI), Past Family Social History [facility]), or Review of Systems (ROS) and made changes when appropriated. (Please note that portions of this note were completed with a voice recognition program. Efforts were made to edit the dictations but occasionally words are mis-transcribed. )Electronically signed by Charan Fontenot MD on 3/10/2023 at 12:09 PM

## 2023-03-10 ENCOUNTER — OFFICE VISIT (OUTPATIENT)
Dept: HEMATOLOGY | Age: 83
End: 2023-03-10
Payer: MEDICARE

## 2023-03-10 ENCOUNTER — HOSPITAL ENCOUNTER (OUTPATIENT)
Dept: INFUSION THERAPY | Age: 83
Discharge: HOME | End: 2023-03-10
Payer: MEDICARE

## 2023-03-10 VITALS
WEIGHT: 187.3 LBS | DIASTOLIC BLOOD PRESSURE: 54 MMHG | SYSTOLIC BLOOD PRESSURE: 124 MMHG | HEART RATE: 78 BPM | BODY MASS INDEX: 26.81 KG/M2 | HEIGHT: 70 IN | OXYGEN SATURATION: 99 %

## 2023-03-10 DIAGNOSIS — Z08 ENCOUNTER FOR FOLLOW-UP SURVEILLANCE OF LUNG CANCER: ICD-10-CM

## 2023-03-10 DIAGNOSIS — Z71.89 CARE PLAN DISCUSSED WITH PATIENT: ICD-10-CM

## 2023-03-10 DIAGNOSIS — C34.91 ADENOCARCINOMA OF RIGHT LUNG (HCC): Primary | ICD-10-CM

## 2023-03-10 DIAGNOSIS — Z85.118 ENCOUNTER FOR FOLLOW-UP SURVEILLANCE OF LUNG CANCER: ICD-10-CM

## 2023-03-10 DIAGNOSIS — Z45.2 ENCOUNTER FOR CENTRAL LINE CARE: ICD-10-CM

## 2023-03-10 LAB
BASOPHILS ABSOLUTE: 0.09 K/UL (ref 0.01–0.08)
BASOPHILS RELATIVE PERCENT: 1.1 % (ref 0.1–1.2)
EOSINOPHILS ABSOLUTE: 0.36 K/UL (ref 0.04–0.54)
EOSINOPHILS RELATIVE PERCENT: 4.3 % (ref 0.7–7)
HCT VFR BLD CALC: 33.4 % (ref 40.1–51)
HEMOGLOBIN: 10.2 G/DL (ref 13.7–17.5)
LYMPHOCYTES ABSOLUTE: 3.19 K/UL (ref 1.18–3.74)
LYMPHOCYTES RELATIVE PERCENT: 37.9 % (ref 19.3–53.1)
MCH RBC QN AUTO: 31.3 PG (ref 25.7–32.2)
MCHC RBC AUTO-ENTMCNC: 30.5 G/DL (ref 32.3–36.5)
MCV RBC AUTO: 102.5 FL (ref 79–92.2)
MONOCYTES ABSOLUTE: 0.74 K/UL (ref 0.24–0.82)
MONOCYTES RELATIVE PERCENT: 8.8 % (ref 4.7–12.5)
NEUTROPHILS ABSOLUTE: 4.01 K/UL (ref 1.56–6.13)
NEUTROPHILS RELATIVE PERCENT: 47.5 % (ref 34–71.1)
PDW BLD-RTO: 13.3 % (ref 11.6–14.4)
PLATELET # BLD: 244 K/UL (ref 163–337)
PMV BLD AUTO: 9.9 FL (ref 7.4–10.4)
RBC # BLD: 3.26 M/UL (ref 4.63–6.08)
WBC # BLD: 8.42 K/UL (ref 4.23–9.07)

## 2023-03-10 PROCEDURE — 99212 OFFICE O/P EST SF 10 MIN: CPT

## 2023-03-10 PROCEDURE — G8417 CALC BMI ABV UP PARAM F/U: HCPCS | Performed by: INTERNAL MEDICINE

## 2023-03-10 PROCEDURE — 2580000003 HC RX 258: Performed by: INTERNAL MEDICINE

## 2023-03-10 PROCEDURE — 96523 IRRIG DRUG DELIVERY DEVICE: CPT

## 2023-03-10 PROCEDURE — G8427 DOCREV CUR MEDS BY ELIG CLIN: HCPCS | Performed by: INTERNAL MEDICINE

## 2023-03-10 PROCEDURE — 99214 OFFICE O/P EST MOD 30 MIN: CPT | Performed by: INTERNAL MEDICINE

## 2023-03-10 PROCEDURE — 1036F TOBACCO NON-USER: CPT | Performed by: INTERNAL MEDICINE

## 2023-03-10 PROCEDURE — 36415 COLL VENOUS BLD VENIPUNCTURE: CPT

## 2023-03-10 PROCEDURE — G8484 FLU IMMUNIZE NO ADMIN: HCPCS | Performed by: INTERNAL MEDICINE

## 2023-03-10 PROCEDURE — 6360000002 HC RX W HCPCS: Performed by: INTERNAL MEDICINE

## 2023-03-10 PROCEDURE — 85025 COMPLETE CBC W/AUTO DIFF WBC: CPT

## 2023-03-10 PROCEDURE — 3078F DIAST BP <80 MM HG: CPT | Performed by: INTERNAL MEDICINE

## 2023-03-10 PROCEDURE — 3074F SYST BP LT 130 MM HG: CPT | Performed by: INTERNAL MEDICINE

## 2023-03-10 PROCEDURE — 1123F ACP DISCUSS/DSCN MKR DOCD: CPT | Performed by: INTERNAL MEDICINE

## 2023-03-10 RX ORDER — SODIUM CHLORIDE 0.9 % (FLUSH) 0.9 %
5-40 SYRINGE (ML) INJECTION PRN
OUTPATIENT
Start: 2023-03-10

## 2023-03-10 RX ORDER — HEPARIN SODIUM (PORCINE) LOCK FLUSH IV SOLN 100 UNIT/ML 100 UNIT/ML
500 SOLUTION INTRAVENOUS PRN
Status: DISCONTINUED | OUTPATIENT
Start: 2023-03-10 | End: 2023-03-11 | Stop reason: HOSPADM

## 2023-03-10 RX ORDER — SODIUM CHLORIDE 0.9 % (FLUSH) 0.9 %
5-40 SYRINGE (ML) INJECTION PRN
Status: DISCONTINUED | OUTPATIENT
Start: 2023-03-10 | End: 2023-03-11 | Stop reason: HOSPADM

## 2023-03-10 RX ORDER — SODIUM CHLORIDE 9 MG/ML
25 INJECTION, SOLUTION INTRAVENOUS PRN
OUTPATIENT
Start: 2023-03-10

## 2023-03-10 RX ORDER — HEPARIN SODIUM (PORCINE) LOCK FLUSH IV SOLN 100 UNIT/ML 100 UNIT/ML
500 SOLUTION INTRAVENOUS PRN
OUTPATIENT
Start: 2023-03-10

## 2023-03-10 RX ADMIN — HEPARIN 500 UNITS: 100 SYRINGE at 12:17

## 2023-03-10 RX ADMIN — SODIUM CHLORIDE, PRESERVATIVE FREE 10 ML: 5 INJECTION INTRAVENOUS at 12:17

## 2023-04-03 ENCOUNTER — OFFICE VISIT (OUTPATIENT)
Dept: PRIMARY CARE CLINIC | Age: 83
End: 2023-04-03

## 2023-04-03 VITALS
HEIGHT: 70 IN | DIASTOLIC BLOOD PRESSURE: 80 MMHG | BODY MASS INDEX: 26.63 KG/M2 | TEMPERATURE: 97.1 F | RESPIRATION RATE: 16 BRPM | OXYGEN SATURATION: 97 % | SYSTOLIC BLOOD PRESSURE: 148 MMHG | WEIGHT: 186 LBS | HEART RATE: 73 BPM

## 2023-04-03 DIAGNOSIS — R07.89 CHEST TIGHTNESS: Primary | ICD-10-CM

## 2023-04-03 DIAGNOSIS — J44.9 CHRONIC OBSTRUCTIVE PULMONARY DISEASE, UNSPECIFIED COPD TYPE (HCC): ICD-10-CM

## 2023-04-03 DIAGNOSIS — D50.9 IRON DEFICIENCY ANEMIA, UNSPECIFIED IRON DEFICIENCY ANEMIA TYPE: ICD-10-CM

## 2023-04-03 DIAGNOSIS — I10 ESSENTIAL HYPERTENSION: ICD-10-CM

## 2023-04-03 LAB
BASOPHILS # BLD: 0.1 K/UL (ref 0–0.2)
BASOPHILS NFR BLD: 0.9 % (ref 0–1)
EOSINOPHIL # BLD: 0.3 K/UL (ref 0–0.6)
EOSINOPHIL NFR BLD: 4.9 % (ref 0–5)
ERYTHROCYTE [DISTWIDTH] IN BLOOD BY AUTOMATED COUNT: 13.8 % (ref 11.5–14.5)
HCT VFR BLD AUTO: 32.4 % (ref 42–52)
HGB BLD-MCNC: 10.4 G/DL (ref 14–18)
IMM GRANULOCYTES # BLD: 0 K/UL
LYMPHOCYTES # BLD: 2.2 K/UL (ref 1.1–4.5)
LYMPHOCYTES NFR BLD: 31.9 % (ref 20–40)
MCH RBC QN AUTO: 32.1 PG (ref 27–31)
MCHC RBC AUTO-ENTMCNC: 32.1 G/DL (ref 33–37)
MCV RBC AUTO: 100 FL (ref 80–94)
MONOCYTES # BLD: 0.5 K/UL (ref 0–0.9)
MONOCYTES NFR BLD: 7.1 % (ref 0–10)
NEUTROPHILS # BLD: 3.8 K/UL (ref 1.5–7.5)
NEUTS SEG NFR BLD: 54.8 % (ref 50–65)
PLATELET # BLD AUTO: 251 K/UL (ref 130–400)
PMV BLD AUTO: 10.3 FL (ref 9.4–12.4)
RBC # BLD AUTO: 3.24 M/UL (ref 4.7–6.1)
WBC # BLD AUTO: 6.9 K/UL (ref 4.8–10.8)

## 2023-04-03 RX ORDER — ALBUTEROL SULFATE 90 UG/1
2 AEROSOL, METERED RESPIRATORY (INHALATION) 4 TIMES DAILY PRN
Qty: 18 G | Refills: 2 | Status: SHIPPED | OUTPATIENT
Start: 2023-04-03

## 2023-04-03 RX ORDER — PREDNISONE 10 MG/1
10 TABLET ORAL DAILY
Qty: 7 TABLET | Refills: 0 | Status: SHIPPED | OUTPATIENT
Start: 2023-04-03 | End: 2023-04-10

## 2023-04-03 SDOH — ECONOMIC STABILITY: FOOD INSECURITY: WITHIN THE PAST 12 MONTHS, THE FOOD YOU BOUGHT JUST DIDN'T LAST AND YOU DIDN'T HAVE MONEY TO GET MORE.: NEVER TRUE

## 2023-04-03 SDOH — ECONOMIC STABILITY: FOOD INSECURITY: WITHIN THE PAST 12 MONTHS, YOU WORRIED THAT YOUR FOOD WOULD RUN OUT BEFORE YOU GOT MONEY TO BUY MORE.: NEVER TRUE

## 2023-04-03 SDOH — ECONOMIC STABILITY: INCOME INSECURITY: HOW HARD IS IT FOR YOU TO PAY FOR THE VERY BASICS LIKE FOOD, HOUSING, MEDICAL CARE, AND HEATING?: NOT HARD AT ALL

## 2023-04-03 SDOH — ECONOMIC STABILITY: HOUSING INSECURITY
IN THE LAST 12 MONTHS, WAS THERE A TIME WHEN YOU DID NOT HAVE A STEADY PLACE TO SLEEP OR SLEPT IN A SHELTER (INCLUDING NOW)?: NO

## 2023-04-03 ASSESSMENT — PATIENT HEALTH QUESTIONNAIRE - PHQ9
SUM OF ALL RESPONSES TO PHQ9 QUESTIONS 1 & 2: 2
SUM OF ALL RESPONSES TO PHQ QUESTIONS 1-9: 2
SUM OF ALL RESPONSES TO PHQ QUESTIONS 1-9: 2
1. LITTLE INTEREST OR PLEASURE IN DOING THINGS: 1
SUM OF ALL RESPONSES TO PHQ QUESTIONS 1-9: 2
SUM OF ALL RESPONSES TO PHQ QUESTIONS 1-9: 2
2. FEELING DOWN, DEPRESSED OR HOPELESS: 1

## 2023-04-07 RX ORDER — FUROSEMIDE 40 MG/1
TABLET ORAL
Qty: 90 TABLET | Refills: 3 | Status: SHIPPED | OUTPATIENT
Start: 2023-04-07

## 2023-04-07 ASSESSMENT — ENCOUNTER SYMPTOMS
COUGH: 1
SHORTNESS OF BREATH: 1
WHEEZING: 1
CHEST TIGHTNESS: 1
GASTROINTESTINAL NEGATIVE: 1

## 2023-04-07 NOTE — PROGRESS NOTES
office visit. These surveys are confidential and no health information about you is shared. We are eager to improve for you and we are counting on your feedback to help make that happen. Wt Readings from Last 3 Encounters:   04/03/23 186 lb (84.4 kg)   03/10/23 187 lb 4.8 oz (85 kg)   12/05/22 187 lb 6.4 oz (85 kg)              EMR Dragon/transcription disclaimer:  Much of this encounter note is electronic transcription/translation of spoken language to printed texts. The electronic translation of spoken language may be erroneous, or at times, nonsensical words or phrases may beinadvertently transcribed.   Although I have reviewed the note for such errors, some may still exist.

## 2023-04-07 NOTE — PATIENT INSTRUCTIONS
We are committed to providing you with the best care possible. In order to help us achieve these goals please remember to bring all medications, herbal products, and over the counter supplements with you to each visit. If your provider has ordered testing for you, please be sure to follow up with our office if you have not received results within 7 days after the testing took place. *If you receive a survey after visiting one of our offices, please take time to share your experience concerning your physician office visit. These surveys are confidential and no health information about you is shared. We are eager to improve for you and we are counting on your feedback to help make that happen.        Wt Readings from Last 3 Encounters:   04/03/23 186 lb (84.4 kg)   03/10/23 187 lb 4.8 oz (85 kg)   12/05/22 187 lb 6.4 oz (85 kg)

## 2023-04-07 NOTE — ASSESSMENT & PLAN NOTE
He has a history of COPD as well as adenocarcinoma of the lung which is currently in remission. He is followed by Dr. Liz Rick. He has a Ventolin inhaler at home as needed for wheezing. He sees the pulmonologist regularly. This is stable but not controlled.   He is also on Anoro

## 2023-05-09 RX ORDER — OMEPRAZOLE 40 MG/1
CAPSULE, DELAYED RELEASE ORAL
Qty: 90 CAPSULE | Refills: 3 | Status: SHIPPED | OUTPATIENT
Start: 2023-05-09

## 2023-05-26 ENCOUNTER — HOSPITAL ENCOUNTER (OUTPATIENT)
Dept: INFUSION THERAPY | Age: 83
Discharge: HOME | End: 2023-05-26
Payer: MEDICARE

## 2023-05-26 DIAGNOSIS — C34.91 ADENOCARCINOMA OF RIGHT LUNG (HCC): ICD-10-CM

## 2023-05-26 DIAGNOSIS — Z45.2 ENCOUNTER FOR CENTRAL LINE CARE: Primary | ICD-10-CM

## 2023-05-26 PROCEDURE — 2580000003 HC RX 258: Performed by: INTERNAL MEDICINE

## 2023-05-26 PROCEDURE — 96523 IRRIG DRUG DELIVERY DEVICE: CPT

## 2023-05-26 PROCEDURE — 6360000002 HC RX W HCPCS: Performed by: INTERNAL MEDICINE

## 2023-05-26 RX ORDER — HEPARIN SODIUM (PORCINE) LOCK FLUSH IV SOLN 100 UNIT/ML 100 UNIT/ML
500 SOLUTION INTRAVENOUS PRN
OUTPATIENT
Start: 2023-05-26

## 2023-05-26 RX ORDER — HEPARIN SODIUM (PORCINE) LOCK FLUSH IV SOLN 100 UNIT/ML 100 UNIT/ML
500 SOLUTION INTRAVENOUS PRN
Status: DISCONTINUED | OUTPATIENT
Start: 2023-05-26 | End: 2023-05-27 | Stop reason: HOSPADM

## 2023-05-26 RX ORDER — SODIUM CHLORIDE 0.9 % (FLUSH) 0.9 %
5-40 SYRINGE (ML) INJECTION PRN
Status: DISCONTINUED | OUTPATIENT
Start: 2023-05-26 | End: 2023-05-27 | Stop reason: HOSPADM

## 2023-05-26 RX ORDER — SODIUM CHLORIDE 0.9 % (FLUSH) 0.9 %
5-40 SYRINGE (ML) INJECTION PRN
OUTPATIENT
Start: 2023-05-26

## 2023-05-26 RX ADMIN — SODIUM CHLORIDE, PRESERVATIVE FREE 10 ML: 5 INJECTION INTRAVENOUS at 13:15

## 2023-05-26 RX ADMIN — HEPARIN 500 UNITS: 100 SYRINGE at 13:15

## 2023-06-05 ENCOUNTER — OFFICE VISIT (OUTPATIENT)
Dept: PRIMARY CARE CLINIC | Age: 83
End: 2023-06-05

## 2023-06-05 VITALS
DIASTOLIC BLOOD PRESSURE: 64 MMHG | TEMPERATURE: 97.4 F | WEIGHT: 189 LBS | SYSTOLIC BLOOD PRESSURE: 120 MMHG | HEIGHT: 70 IN | RESPIRATION RATE: 20 BRPM | BODY MASS INDEX: 27.06 KG/M2 | OXYGEN SATURATION: 99 % | HEART RATE: 74 BPM

## 2023-06-05 DIAGNOSIS — E11.9 TYPE 2 DIABETES MELLITUS WITHOUT COMPLICATION, WITHOUT LONG-TERM CURRENT USE OF INSULIN (HCC): Primary | ICD-10-CM

## 2023-06-05 LAB
ALBUMIN SERPL-MCNC: 4.4 G/DL (ref 3.5–5.2)
ALP SERPL-CCNC: 79 U/L (ref 40–130)
ALT SERPL-CCNC: 13 U/L (ref 5–41)
ANION GAP SERPL CALCULATED.3IONS-SCNC: 10 MMOL/L (ref 7–19)
AST SERPL-CCNC: 16 U/L (ref 5–40)
BILIRUB SERPL-MCNC: <0.2 MG/DL (ref 0.2–1.2)
BUN SERPL-MCNC: 27 MG/DL (ref 8–23)
CALCIUM SERPL-MCNC: 9.7 MG/DL (ref 8.8–10.2)
CHLORIDE SERPL-SCNC: 100 MMOL/L (ref 98–111)
CO2 SERPL-SCNC: 26 MMOL/L (ref 22–29)
CREAT SERPL-MCNC: 1.8 MG/DL (ref 0.5–1.2)
CREAT UR-MCNC: 74.2 MG/DL (ref 4.2–622)
GLUCOSE SERPL-MCNC: 130 MG/DL (ref 74–109)
HBA1C MFR BLD: 6 % (ref 4–6)
MICROALBUMIN UR-MCNC: 2.1 MG/DL (ref 0–19)
MICROALBUMIN/CREAT UR-RTO: 28.3 MG/G
POTASSIUM SERPL-SCNC: 5.2 MMOL/L (ref 3.5–5)
PROT SERPL-MCNC: 7.6 G/DL (ref 6.6–8.7)
SODIUM SERPL-SCNC: 136 MMOL/L (ref 136–145)

## 2023-06-05 RX ORDER — PROPRANOLOL HYDROCHLORIDE 60 MG/1
TABLET ORAL
Qty: 180 TABLET | Refills: 3 | Status: SHIPPED | OUTPATIENT
Start: 2023-06-05

## 2023-06-05 RX ORDER — MONTELUKAST SODIUM 10 MG/1
10 TABLET ORAL NIGHTLY
Qty: 90 TABLET | Refills: 3 | Status: SHIPPED | OUTPATIENT
Start: 2023-06-05 | End: 2024-06-04

## 2023-06-05 ASSESSMENT — ENCOUNTER SYMPTOMS
RESPIRATORY NEGATIVE: 1
DIARRHEA: 0

## 2023-06-05 NOTE — PATIENT INSTRUCTIONS
Wt Readings from Last 3 Encounters:   06/05/23 189 lb (85.7 kg)   04/03/23 186 lb (84.4 kg)   03/10/23 187 lb 4.8 oz (85 kg)          We are committed to providing you with the best care possible. In order to help us achieve these goals please remember to bring all medications, herbal products, and over the counter supplements with you to each visit. If your provider has ordered testing for you, please be sure to follow up with our office if you have not received results within 7 days after the testing took place. *If you receive a survey after visiting one of our offices, please take time to share your experience concerning your physician office visit. These surveys are confidential and no health information about you is shared. We are eager to improve for you and we are counting on your feedback to help make that happen.

## 2023-06-06 DIAGNOSIS — N18.32 STAGE 3B CHRONIC KIDNEY DISEASE (HCC): Primary | ICD-10-CM

## 2023-06-06 NOTE — PROGRESS NOTES
puff into the lungs daily      [DISCONTINUED] potassium chloride (K-DUR) 10 MEQ tablet TAKE 1 TABLET TWICE A  tablet 2     No current facility-administered medications on file prior to visit. OBJECTIVE:    Wt Readings from Last 3 Encounters:   06/05/23 189 lb (85.7 kg)   04/03/23 186 lb (84.4 kg)   03/10/23 187 lb 4.8 oz (85 kg)       /64 (Site: Left Upper Arm, Position: Sitting, Cuff Size: Large Adult)   Pulse 74   Temp 97.4 °F (36.3 °C)   Resp 20   Ht 5' 10\" (1.778 m)   Wt 189 lb (85.7 kg)   SpO2 99%   BMI 27.12 kg/m²     Physical Exam  Vitals and nursing note reviewed. Constitutional:       General: He is not in acute distress. Appearance: Normal appearance. He is well-developed. He is obese. He is not ill-appearing. Comments: Patient actually looks fairly good. Slightly pale but he is more talkative. HENT:      Head: Normocephalic. Right Ear: Tympanic membrane, ear canal and external ear normal.      Left Ear: Tympanic membrane, ear canal and external ear normal.      Nose: Nose normal. No rhinorrhea. Mouth/Throat:      Mouth: Mucous membranes are moist.      Pharynx: No posterior oropharyngeal erythema. Eyes:      Extraocular Movements: Extraocular movements intact. Conjunctiva/sclera: Conjunctivae normal.      Pupils: Pupils are equal, round, and reactive to light. Cardiovascular:      Rate and Rhythm: Normal rate and regular rhythm. Pulses: Normal pulses. Heart sounds: Normal heart sounds. Pulmonary:      Effort: Pulmonary effort is normal.      Breath sounds: Normal breath sounds. Musculoskeletal:         General: No swelling. Cervical back: Normal range of motion and neck supple. Lymphadenopathy:      Cervical: No cervical adenopathy. Skin:     General: Skin is warm and dry. Neurological:      General: No focal deficit present. Mental Status: He is alert and oriented to person, place, and time.    Psychiatric:

## 2023-06-07 ENCOUNTER — TELEPHONE (OUTPATIENT)
Dept: PRIMARY CARE CLINIC | Age: 83
End: 2023-06-07

## 2023-06-07 NOTE — TELEPHONE ENCOUNTER
Andrei Johns called wanting to discuss her father's decreased kidney function. I explained that the GFR had dropped from 50-37. I explained that we may need to stop metformin but since he has hypertension, has been a diabetic and had chemotherapy for his lung cancer I thought it might be a good idea for nephrology to look at him. This did not really become abnormal until May 2022.

## 2023-06-19 RX ORDER — LOSARTAN POTASSIUM 25 MG/1
25 TABLET ORAL DAILY
Qty: 14 TABLET | Refills: 0 | Status: SHIPPED | OUTPATIENT
Start: 2023-06-19

## 2023-07-05 ENCOUNTER — HOSPITAL ENCOUNTER (OUTPATIENT)
Dept: GENERAL RADIOLOGY | Age: 83
Discharge: HOME | End: 2023-07-05
Payer: MEDICARE

## 2023-07-05 DIAGNOSIS — C34.91 ADENOCARCINOMA OF RIGHT LUNG (HCC): ICD-10-CM

## 2023-07-05 PROCEDURE — 71260 CT THORAX DX C+: CPT

## 2023-07-05 PROCEDURE — 6360000004 HC RX CONTRAST MEDICATION: Performed by: INTERNAL MEDICINE

## 2023-07-05 RX ADMIN — IOPAMIDOL 65 ML: 755 INJECTION, SOLUTION INTRAVENOUS at 10:47

## 2023-07-06 RX ORDER — SIMVASTATIN 40 MG
TABLET ORAL
Qty: 90 TABLET | Refills: 3 | Status: SHIPPED | OUTPATIENT
Start: 2023-07-06

## 2023-07-21 ENCOUNTER — OFFICE VISIT (OUTPATIENT)
Dept: HEMATOLOGY | Age: 83
End: 2023-07-21
Payer: MEDICARE

## 2023-07-21 ENCOUNTER — HOSPITAL ENCOUNTER (OUTPATIENT)
Dept: INFUSION THERAPY | Age: 83
Discharge: HOME | End: 2023-07-21
Payer: MEDICARE

## 2023-07-21 VITALS
OXYGEN SATURATION: 99 % | DIASTOLIC BLOOD PRESSURE: 60 MMHG | WEIGHT: 189.5 LBS | HEART RATE: 79 BPM | TEMPERATURE: 97.5 F | SYSTOLIC BLOOD PRESSURE: 128 MMHG | BODY MASS INDEX: 27.19 KG/M2

## 2023-07-21 DIAGNOSIS — Z45.2 ENCOUNTER FOR CENTRAL LINE CARE: Primary | ICD-10-CM

## 2023-07-21 DIAGNOSIS — Z71.89 CARE PLAN DISCUSSED WITH PATIENT: ICD-10-CM

## 2023-07-21 DIAGNOSIS — Z85.118 ENCOUNTER FOR FOLLOW-UP SURVEILLANCE OF LUNG CANCER: ICD-10-CM

## 2023-07-21 DIAGNOSIS — C34.91 ADENOCARCINOMA OF RIGHT LUNG (HCC): Primary | ICD-10-CM

## 2023-07-21 DIAGNOSIS — Z08 ENCOUNTER FOR FOLLOW-UP SURVEILLANCE OF LUNG CANCER: ICD-10-CM

## 2023-07-21 DIAGNOSIS — C34.91 ADENOCARCINOMA OF RIGHT LUNG (HCC): ICD-10-CM

## 2023-07-21 LAB
ERYTHROCYTE [DISTWIDTH] IN BLOOD BY AUTOMATED COUNT: 13.6 % (ref 11.6–14.4)
HCT VFR BLD AUTO: 31.2 % (ref 40.1–51)
HGB BLD-MCNC: 10.4 G/DL (ref 13.7–17.5)
LYMPHOCYTES # BLD: 1.92 K/UL (ref 1.18–3.74)
LYMPHOCYTES NFR BLD: 27.9 % (ref 19.3–53.1)
MCH RBC QN AUTO: 32.4 PG (ref 25.7–32.2)
MCHC RBC AUTO-ENTMCNC: 33.3 G/DL (ref 32.3–36.5)
MCV RBC AUTO: 97.2 FL (ref 79–92.2)
MONOCYTES # BLD: 0.52 K/UL (ref 0.24–0.82)
MONOCYTES NFR BLD: 7.6 % (ref 4.7–12.5)
NEUTROPHILS # BLD: 3.99 K/UL (ref 1.56–6.13)
NEUTS SEG NFR BLD: 58.1 % (ref 34–71.1)
PLATELET # BLD AUTO: 194 K/UL (ref 163–337)
PMV BLD AUTO: 10.2 FL (ref 7.4–10.4)
RBC # BLD AUTO: 3.21 M/UL (ref 4.63–6.08)
WBC # BLD AUTO: 6.87 K/UL (ref 4.23–9.07)

## 2023-07-21 PROCEDURE — 1123F ACP DISCUSS/DSCN MKR DOCD: CPT | Performed by: INTERNAL MEDICINE

## 2023-07-21 PROCEDURE — G8427 DOCREV CUR MEDS BY ELIG CLIN: HCPCS | Performed by: INTERNAL MEDICINE

## 2023-07-21 PROCEDURE — 1036F TOBACCO NON-USER: CPT | Performed by: INTERNAL MEDICINE

## 2023-07-21 PROCEDURE — G8417 CALC BMI ABV UP PARAM F/U: HCPCS | Performed by: INTERNAL MEDICINE

## 2023-07-21 PROCEDURE — 6360000002 HC RX W HCPCS: Performed by: INTERNAL MEDICINE

## 2023-07-21 PROCEDURE — 99213 OFFICE O/P EST LOW 20 MIN: CPT | Performed by: INTERNAL MEDICINE

## 2023-07-21 PROCEDURE — 96523 IRRIG DRUG DELIVERY DEVICE: CPT

## 2023-07-21 PROCEDURE — 2580000003 HC RX 258: Performed by: INTERNAL MEDICINE

## 2023-07-21 PROCEDURE — 85025 COMPLETE CBC W/AUTO DIFF WBC: CPT

## 2023-07-21 PROCEDURE — 3074F SYST BP LT 130 MM HG: CPT | Performed by: INTERNAL MEDICINE

## 2023-07-21 PROCEDURE — 3078F DIAST BP <80 MM HG: CPT | Performed by: INTERNAL MEDICINE

## 2023-07-21 PROCEDURE — 36415 COLL VENOUS BLD VENIPUNCTURE: CPT

## 2023-07-21 RX ORDER — HEPARIN 100 UNIT/ML
500 SYRINGE INTRAVENOUS PRN
OUTPATIENT
Start: 2023-07-21

## 2023-07-21 RX ORDER — HEPARIN 100 UNIT/ML
500 SYRINGE INTRAVENOUS PRN
Status: DISCONTINUED | OUTPATIENT
Start: 2023-07-21 | End: 2023-07-22 | Stop reason: HOSPADM

## 2023-07-21 RX ORDER — SODIUM CHLORIDE 0.9 % (FLUSH) 0.9 %
5-40 SYRINGE (ML) INJECTION PRN
OUTPATIENT
Start: 2023-07-21

## 2023-07-21 RX ORDER — SODIUM CHLORIDE 0.9 % (FLUSH) 0.9 %
5-40 SYRINGE (ML) INJECTION PRN
Status: DISCONTINUED | OUTPATIENT
Start: 2023-07-21 | End: 2023-07-22 | Stop reason: HOSPADM

## 2023-07-21 RX ADMIN — HEPARIN 500 UNITS: 100 SYRINGE at 10:46

## 2023-07-21 RX ADMIN — SODIUM CHLORIDE, PRESERVATIVE FREE 10 ML: 5 INJECTION INTRAVENOUS at 10:46

## 2023-08-28 LAB
ALBUMIN SERPL-MCNC: 4.5 G/DL (ref 3.5–5.2)
ALP SERPL-CCNC: 66 U/L (ref 40–130)
ALT SERPL-CCNC: 10 U/L (ref 5–41)
ANION GAP SERPL CALCULATED.3IONS-SCNC: 15 MMOL/L (ref 7–19)
AST SERPL-CCNC: 16 U/L (ref 5–40)
BASOPHILS # BLD: 0.1 K/UL (ref 0–0.2)
BASOPHILS NFR BLD: 1.2 % (ref 0–1)
BILIRUB SERPL-MCNC: <0.2 MG/DL (ref 0.2–1.2)
BILIRUB UR QL STRIP: NEGATIVE
BUN SERPL-MCNC: 15 MG/DL (ref 8–23)
CALCIUM SERPL-MCNC: 9.2 MG/DL (ref 8.8–10.2)
CHLORIDE SERPL-SCNC: 99 MMOL/L (ref 98–111)
CLARITY UR: CLEAR
CO2 SERPL-SCNC: 27 MMOL/L (ref 22–29)
COLOR UR: YELLOW
CREAT SERPL-MCNC: 1.3 MG/DL (ref 0.5–1.2)
CREAT UR-MCNC: 44.9 MG/DL (ref 39–259)
EOSINOPHIL # BLD: 0.3 K/UL (ref 0–0.6)
EOSINOPHIL NFR BLD: 4.1 % (ref 0–5)
ERYTHROCYTE [DISTWIDTH] IN BLOOD BY AUTOMATED COUNT: 13.3 % (ref 11.5–14.5)
GLUCOSE SERPL-MCNC: 165 MG/DL (ref 74–109)
GLUCOSE UR STRIP.AUTO-MCNC: NEGATIVE MG/DL
HCT VFR BLD AUTO: 33 % (ref 42–52)
HGB BLD-MCNC: 10.7 G/DL (ref 14–18)
HGB UR STRIP.AUTO-MCNC: NEGATIVE MG/L
IMM GRANULOCYTES # BLD: 0 K/UL
KETONES UR STRIP.AUTO-MCNC: NEGATIVE MG/DL
LEUKOCYTE ESTERASE UR QL STRIP.AUTO: NEGATIVE
LYMPHOCYTES # BLD: 2.2 K/UL (ref 1.1–4.5)
LYMPHOCYTES NFR BLD: 28.8 % (ref 20–40)
MAGNESIUM SERPL-MCNC: 1.3 MG/DL (ref 1.6–2.4)
MCH RBC QN AUTO: 32 PG (ref 27–31)
MCHC RBC AUTO-ENTMCNC: 32.4 G/DL (ref 33–37)
MCV RBC AUTO: 98.8 FL (ref 80–94)
MONOCYTES # BLD: 0.5 K/UL (ref 0–0.9)
MONOCYTES NFR BLD: 7 % (ref 0–10)
NEUTROPHILS # BLD: 4.4 K/UL (ref 1.5–7.5)
NEUTS SEG NFR BLD: 58.4 % (ref 50–65)
NITRITE UR QL STRIP.AUTO: NEGATIVE
PH UR STRIP.AUTO: 7 [PH] (ref 5–8)
PHOSPHATE SERPL-MCNC: 3.8 MG/DL (ref 2.5–4.5)
PLATELET # BLD AUTO: 246 K/UL (ref 130–400)
PMV BLD AUTO: 10.2 FL (ref 9.4–12.4)
POTASSIUM SERPL-SCNC: 5.1 MMOL/L (ref 3.5–5)
PROT SERPL-MCNC: 7.3 G/DL (ref 6.6–8.7)
PROT UR STRIP.AUTO-MCNC: NEGATIVE MG/DL
PROT UR-MCNC: 11 MG/DL (ref 15–45)
RBC # BLD AUTO: 3.34 M/UL (ref 4.7–6.1)
SODIUM SERPL-SCNC: 141 MMOL/L (ref 136–145)
SP GR UR STRIP.AUTO: 1.01 (ref 1–1.03)
URATE SERPL-MCNC: 6 MG/DL (ref 3.4–7)
UROBILINOGEN UR STRIP.AUTO-MCNC: 0.2 E.U./DL
WBC # BLD AUTO: 7.6 K/UL (ref 4.8–10.8)

## 2023-10-06 ENCOUNTER — HOSPITAL ENCOUNTER (OUTPATIENT)
Dept: INFUSION THERAPY | Age: 83
Discharge: HOME | End: 2023-10-06
Payer: MEDICARE

## 2023-10-06 DIAGNOSIS — C34.91 ADENOCARCINOMA OF RIGHT LUNG (HCC): ICD-10-CM

## 2023-10-06 DIAGNOSIS — Z45.2 ENCOUNTER FOR CENTRAL LINE CARE: Primary | ICD-10-CM

## 2023-10-06 PROCEDURE — 96523 IRRIG DRUG DELIVERY DEVICE: CPT

## 2023-10-06 PROCEDURE — 6360000002 HC RX W HCPCS: Performed by: INTERNAL MEDICINE

## 2023-10-06 PROCEDURE — 2580000003 HC RX 258: Performed by: INTERNAL MEDICINE

## 2023-10-06 RX ORDER — SODIUM CHLORIDE 0.9 % (FLUSH) 0.9 %
5-40 SYRINGE (ML) INJECTION PRN
Status: DISCONTINUED | OUTPATIENT
Start: 2023-10-06 | End: 2023-10-07 | Stop reason: HOSPADM

## 2023-10-06 RX ORDER — HEPARIN 100 UNIT/ML
500 SYRINGE INTRAVENOUS PRN
OUTPATIENT
Start: 2023-10-06

## 2023-10-06 RX ORDER — HEPARIN 100 UNIT/ML
500 SYRINGE INTRAVENOUS PRN
Status: DISCONTINUED | OUTPATIENT
Start: 2023-10-06 | End: 2023-10-07 | Stop reason: HOSPADM

## 2023-10-06 RX ORDER — SODIUM CHLORIDE 0.9 % (FLUSH) 0.9 %
5-40 SYRINGE (ML) INJECTION PRN
OUTPATIENT
Start: 2023-10-06

## 2023-10-06 RX ADMIN — HEPARIN 500 UNITS: 100 SYRINGE at 14:25

## 2023-10-06 RX ADMIN — SODIUM CHLORIDE, PRESERVATIVE FREE 10 ML: 5 INJECTION INTRAVENOUS at 14:25

## 2023-11-01 ENCOUNTER — OFFICE VISIT (OUTPATIENT)
Dept: CARDIOLOGY | Facility: CLINIC | Age: 83
End: 2023-11-01
Payer: MEDICARE

## 2023-11-01 VITALS
HEIGHT: 70 IN | SYSTOLIC BLOOD PRESSURE: 132 MMHG | OXYGEN SATURATION: 98 % | WEIGHT: 180 LBS | HEART RATE: 73 BPM | BODY MASS INDEX: 25.77 KG/M2 | DIASTOLIC BLOOD PRESSURE: 84 MMHG

## 2023-11-01 DIAGNOSIS — I25.10 CORONARY ARTERY DISEASE INVOLVING NATIVE CORONARY ARTERY OF NATIVE HEART WITHOUT ANGINA PECTORIS: Primary | ICD-10-CM

## 2023-11-01 DIAGNOSIS — C34.91 ADENOCARCINOMA OF RIGHT LUNG: Chronic | ICD-10-CM

## 2023-11-01 DIAGNOSIS — I10 ESSENTIAL HYPERTENSION: ICD-10-CM

## 2023-11-01 DIAGNOSIS — E78.2 MIXED HYPERLIPIDEMIA: ICD-10-CM

## 2023-11-01 DIAGNOSIS — E11.9 TYPE 2 DIABETES MELLITUS WITHOUT COMPLICATION, UNSPECIFIED WHETHER LONG TERM INSULIN USE: ICD-10-CM

## 2023-11-01 DIAGNOSIS — R06.09 DYSPNEA ON EXERTION: ICD-10-CM

## 2023-11-01 NOTE — PROGRESS NOTES
Subjective:     Encounter Date: 11/01/2023      Patient ID: Mina Ratliff is a 83 y.o. male with coronary artery disease, lung cancer, hypertension, hyperlipidemia, and type II diabetes     Chief Complaint: routine yearly follow up  Coronary Artery Disease  Presents for follow-up visit. Pertinent negatives include no chest pain, chest pressure, chest tightness, dizziness, leg swelling, muscle weakness, palpitations or shortness of breath. Risk factors include hyperlipidemia and obesity. The symptoms have been stable. Compliance with diet is good. Compliance with exercise is good. Compliance with medications is good.   Hypertension  This is a chronic problem. Associated symptoms include malaise/fatigue. Pertinent negatives include no anxiety, chest pain, neck pain, orthopnea, palpitations, peripheral edema, PND or shortness of breath. Risk factors for coronary artery disease include dyslipidemia, obesity and male gender. Current antihypertension treatment includes beta blockers, angiotensin blockers and diuretics. There are no compliance problems.  Hypertensive end-organ damage includes CAD/MI.   Hyperlipidemia  This is a chronic problem. The current episode started more than 1 year ago. Exacerbating diseases include obesity. Pertinent negatives include no chest pain or shortness of breath. Current antihyperlipidemic treatment includes statins. Risk factors for coronary artery disease include dyslipidemia, male sex, hypertension and obesity.     Patient presents today for management of coronary artery disease, hypertension and hyperlipidemia. Patient is in office with wife today. He reports that he has noticed some decreased energy with exertion. He reports that walking into office today he has had some dyspnea on exertion over the past 6 months. He denies any chest pain. He denies any heart racing or palpitations. He denies any near syncope. He reports some dizziness if he gets up real quick. He reports some  feet and leg swelling from time to time.  Patient denies any orthopnea or PND. He denies monitoring his BP routinely. He has completed his chemo therapy and radiation treatments for his lung cancer. He is seeing Dr Madrid every 4 months. He reports that his LOV he got a good report. Patient denies any bleeding problems. Patient follows with Dr Franks as PCP.     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    - Lower extremity US (9/21/17) negative for any DVT or thrombophlebitis.   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 with mild miranda-infarct ischemia, EF > 70%  - Lipid panel (4/8/2019) total cholesterol 192, HDL 46, LDL 88, triglycerides 292  - Lipid panel (5/27/2020) total cholesterol 174, HDL 46, LDL 71, triglycerides 284  - Hemoglobin A1c (5/27/2020) 7.5%  - Hemoglobin A1c (1/03/2022) 6.9%    The following portions of the patient's history were reviewed and updated as appropriate: allergies, current medications, past family history, past medical history, past social history, past surgical history and problem list.    No Known Allergies    Current Outpatient Medications:     diphenhydrAMINE-acetaminophen (TYLENOL PM)  MG tablet per tablet, Take 1 tablet by mouth At Night As Needed for Sleep., Disp: , Rfl:     furosemide (LASIX) 40 MG tablet, Take 1 tablet by mouth Daily., Disp: , Rfl:     HYDROcodone-acetaminophen (NORCO) 7.5-325 MG per tablet, Take 1 tablet by mouth Every 4 (Four) Hours As Needed for Moderate Pain  (Pain)., Disp: 15 tablet, Rfl: 0    losartan (COZAAR) 25 MG tablet, TAKE 1 TABLET DAILY, Disp: 90 tablet, Rfl: 3    metFORMIN (GLUCOPHAGE) 1000 MG tablet, Take 1 tablet by mouth 2 (Two) Times a Day With Meals., Disp: , Rfl:     metoprolol  tartrate (LOPRESSOR) 50 MG tablet, Take 1 tablet by mouth Daily., Disp: , Rfl:     montelukast (SINGULAIR) 10 MG tablet, Take 1 tablet by mouth Every Night., Disp: 90 tablet, Rfl: 3    Multiple Vitamins-Minerals (MULTIVITAMIN ADULT PO), Take 1 tablet by mouth Daily., Disp: , Rfl:     omeprazole (priLOSEC) 40 MG capsule, Take 1 capsule by mouth Daily., Disp: , Rfl:     primidone (MYSOLINE) 50 MG tablet, Take 1 tablet by mouth Every Night., Disp: , Rfl:     propranolol (INDERAL) 40 MG tablet, Take 1 tablet by mouth Daily., Disp: , Rfl:     simvastatin (ZOCOR) 40 MG tablet, Take 1 tablet by mouth Every Night., Disp: , Rfl:     tamsulosin (FLOMAX) 0.4 MG capsule 24 hr capsule, TAKE 1 CAPSULE EVERY NIGHT, Disp: 90 capsule, Rfl: 0    umeclidinium-vilanterol (ANORO ELLIPTA) 62.5-25 MCG/INH aerosol powder  inhaler, Inhale 1 puff Daily., Disp: 3 each, Rfl: 3    aspirin 81 MG chewable tablet, Chew 1 tablet Daily., Disp: , Rfl:   Past Medical History:   Diagnosis Date    Asthma     Atherosclerosis of native coronary artery of native heart without angina pectoris     BPH with obstruction/lower urinary tract symptoms     CAD (coronary artery disease)     Cancer     Chest pain     Chronic airway obstruction     COPD (chronic obstructive pulmonary disease)     Diabetes mellitus     Dizziness     GERD (gastroesophageal reflux disease)     HTN (hypertension)     Hyperlipidemia     Malaise and fatigue     Mass of right lung     Multiple gastric ulcers     Myocardial infarction, old     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,  Radiation, silica    Old myocardial infarction     Stage 3 severe COPD by GOLD classification 3/13/2015     Social History     Socioeconomic History    Marital status:    Tobacco Use    Smoking status: Former     Packs/day: 1.50     Years: 25.00     Additional pack years: 0.00     Total pack years: 37.50     Types: Cigarettes     Quit date:      Years since quittin.8    Smokeless  tobacco: Never   Vaping Use    Vaping Use: Never used   Substance and Sexual Activity    Alcohol use: No    Drug use: No    Sexual activity: Defer       Review of Systems   Constitutional: Positive for malaise/fatigue.   HENT:  Negative for nosebleeds.    Cardiovascular:  Positive for dyspnea on exertion. Negative for chest pain, irregular heartbeat, leg swelling, orthopnea, palpitations and paroxysmal nocturnal dyspnea.   Respiratory:  Negative for chest tightness and shortness of breath.    Musculoskeletal:  Negative for muscle weakness and neck pain.   Genitourinary:  Negative for hematuria.   Neurological:  Negative for dizziness and weakness.   All other systems reviewed and are negative.         Objective:     Vitals reviewed.   Constitutional:       General: Not in acute distress.     Appearance: Normal appearance. Well-developed.   Eyes:      Pupils: Pupils are equal, round, and reactive to light.   HENT:      Head: Normocephalic and atraumatic.      Nose: Nose normal.   Neck:      Vascular: No carotid bruit.   Pulmonary:      Effort: Pulmonary effort is normal. No respiratory distress.      Breath sounds: Normal breath sounds. No wheezing. No rales.   Cardiovascular:      Normal rate. Regular rhythm.      Murmurs: There is no murmur.   Edema:     Peripheral edema absent.   Abdominal:      General: There is no distension.      Palpations: Abdomen is soft.   Musculoskeletal: Normal range of motion.      Cervical back: Normal range of motion and neck supple. Skin:     General: Skin is warm.      Findings: No erythema or rash.   Neurological:      General: No focal deficit present.      Mental Status: Alert and oriented to person, place, and time.   Psychiatric:         Attention and Perception: Attention normal.         Mood and Affect: Mood normal.         Speech: Speech normal.         Behavior: Behavior normal.         Thought Content: Thought content normal.         Judgment: Judgment normal.         BP  "132/84   Pulse 73   Ht 177.8 cm (70\")   Wt 81.6 kg (180 lb)   SpO2 98%   BMI 25.83 kg/m²       ECG 12 Lead    Date/Time: 11/1/2023 10:44 AM  Performed by: Tristian Barragan APRN    Authorized by: Tristian Barragan APRN  Comparison: compared with previous ECG from 10/31/2022  Similar to previous ECG  Rhythm: sinus rhythm  Rate: normal  BPM: 73  Other findings: T wave abnormality          Lab Review:       Lab Results   Component Value Date    CHLPL 187 12/05/2022    TRIG 316 (H) 12/05/2022    HDL 43 (L) 12/05/2022    LDL 81 12/05/2022     Lab Results   Component Value Date    HGBA1C 6.0 06/05/2023       I have personally reviewed labs, and past office notes prior to patients visit  Assessment:          Diagnosis Plan   1. Coronary artery disease involving native coronary artery of native heart without angina pectoris  ECG 12 Lead      2. Dyspnea on exertion  Adult Transthoracic Echo Complete w/ Color, Spectral and Contrast if necessary per protocol      3. Essential hypertension        4. Mixed hyperlipidemia        5. Type 2 diabetes mellitus without complication, unspecified whether long term insulin use        6. Adenocarcinoma of right lung                 Plan:       1. 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 Patient remains chest pain free. Continue aspirin, simvastatin and metoprolol.     2. Dyspnea on exertion: discussed possibly etiologies of heart, lungs or deconditioning. Will order an echo for further evaluation.     3. Hypertension: Controlled. Continue current medications. Continue to monitor periodically     4. Hyperlipidemia: managed and followed by PCP. Will obtain most recent lipid    5. Type II DM: A1C 6.9 on 1/2022. Managed and followed by PCP    6. Lung cancer: patient completed chemo and radiation therapy. He routinely follows with Dr Madrid every 4 months; " reports last visit he got a good report    Advance Care Planning   ACP discussion was held with the patient during this visit. Patient does not have an advance directive, information provided.     I attest that all portions of this note reviewed and all information has been updated by myself to reflect the patient's current status.      I spent 37 minutes caring for Mina on this date of service. This time includes time spent by me in the following activities:preparing for the visit, reviewing tests, obtaining and/or reviewing a separately obtained history, performing a medically appropriate examination and/or evaluation , counseling and educating the patient/family/caregiver, ordering medications, tests, or procedures, and documenting information in the medical record    I spent 2 minutes on the separately reported service of EKG. This time is not included in the time used to support the E/M service also reported today.    Patient is to follow up in 6 months or sooner if needed

## 2023-11-10 DIAGNOSIS — E78.00 HYPERCHOLESTEROLEMIA: Primary | ICD-10-CM

## 2023-11-10 DIAGNOSIS — E78.1 HYPERTRIGLYCERIDEMIA: ICD-10-CM

## 2023-11-14 ENCOUNTER — HOSPITAL ENCOUNTER (OUTPATIENT)
Dept: CARDIOLOGY | Facility: HOSPITAL | Age: 83
Discharge: HOME | End: 2023-11-14
Admitting: NURSE PRACTITIONER
Payer: MEDICARE

## 2023-11-14 VITALS
WEIGHT: 180 LBS | HEIGHT: 70 IN | BODY MASS INDEX: 25.77 KG/M2 | SYSTOLIC BLOOD PRESSURE: 132 MMHG | DIASTOLIC BLOOD PRESSURE: 84 MMHG

## 2023-11-14 DIAGNOSIS — R06.09 DYSPNEA ON EXERTION: ICD-10-CM

## 2023-11-14 LAB
BH CV ECHO MEAS - AO MAX PG: 6.8 MMHG
BH CV ECHO MEAS - AO MEAN PG: 4 MMHG
BH CV ECHO MEAS - AO ROOT DIAM: 3.4 CM
BH CV ECHO MEAS - AO V2 MAX: 130 CM/SEC
BH CV ECHO MEAS - AO V2 VTI: 27.9 CM
BH CV ECHO MEAS - AVA(I,D): 2.36 CM2
BH CV ECHO MEAS - EDV(CUBED): 79 ML
BH CV ECHO MEAS - EDV(MOD-SP2): 71.5 ML
BH CV ECHO MEAS - EDV(MOD-SP4): 59.5 ML
BH CV ECHO MEAS - EF(MOD-BP): 51.4 %
BH CV ECHO MEAS - EF(MOD-SP2): 51 %
BH CV ECHO MEAS - EF(MOD-SP4): 55.3 %
BH CV ECHO MEAS - ESV(CUBED): 60.2 ML
BH CV ECHO MEAS - ESV(MOD-SP2): 35 ML
BH CV ECHO MEAS - ESV(MOD-SP4): 26.6 ML
BH CV ECHO MEAS - FS: 8.6 %
BH CV ECHO MEAS - IVS/LVPW: 1.29 CM
BH CV ECHO MEAS - IVSD: 1.17 CM
BH CV ECHO MEAS - LA DIMENSION: 3.5 CM
BH CV ECHO MEAS - LAT PEAK E' VEL: 7.7 CM/SEC
BH CV ECHO MEAS - LV DIASTOLIC VOL/BSA (35-75): 29.8 CM2
BH CV ECHO MEAS - LV MASS(C)D: 149.4 GRAMS
BH CV ECHO MEAS - LV MAX PG: 2.9 MMHG
BH CV ECHO MEAS - LV MEAN PG: 1 MMHG
BH CV ECHO MEAS - LV SYSTOLIC VOL/BSA (12-30): 13.3 CM2
BH CV ECHO MEAS - LV V1 MAX: 85.7 CM/SEC
BH CV ECHO MEAS - LV V1 VTI: 17.3 CM
BH CV ECHO MEAS - LVIDD: 4.3 CM
BH CV ECHO MEAS - LVIDS: 3.9 CM
BH CV ECHO MEAS - LVOT AREA: 3.8 CM2
BH CV ECHO MEAS - LVOT DIAM: 2.2 CM
BH CV ECHO MEAS - LVPWD: 0.9 CM
BH CV ECHO MEAS - MED PEAK E' VEL: 4.9 CM/SEC
BH CV ECHO MEAS - MV A MAX VEL: 120 CM/SEC
BH CV ECHO MEAS - MV DEC SLOPE: 502 CM/SEC2
BH CV ECHO MEAS - MV DEC TIME: 0.19 SEC
BH CV ECHO MEAS - MV E MAX VEL: 93.6 CM/SEC
BH CV ECHO MEAS - MV E/A: 0.78
BH CV ECHO MEAS - MV MAX PG: 6.4 MMHG
BH CV ECHO MEAS - MV MEAN PG: 3 MMHG
BH CV ECHO MEAS - MV V2 VTI: 30.8 CM
BH CV ECHO MEAS - MVA(VTI): 2.14 CM2
BH CV ECHO MEAS - PA V2 MAX: 84.6 CM/SEC
BH CV ECHO MEAS - RAP SYSTOLE: 3 MMHG
BH CV ECHO MEAS - RV MAX PG: 2.14 MMHG
BH CV ECHO MEAS - RV V1 MAX: 73.2 CM/SEC
BH CV ECHO MEAS - RV V1 VTI: 14.7 CM
BH CV ECHO MEAS - RVDD: 3.6 CM
BH CV ECHO MEAS - RVSP: 47 MMHG
BH CV ECHO MEAS - SI(MOD-SP2): 18.3 ML/M2
BH CV ECHO MEAS - SI(MOD-SP4): 16.5 ML/M2
BH CV ECHO MEAS - SV(LVOT): 65.8 ML
BH CV ECHO MEAS - SV(MOD-SP2): 36.5 ML
BH CV ECHO MEAS - SV(MOD-SP4): 32.9 ML
BH CV ECHO MEAS - TR MAX PG: 44 MMHG
BH CV ECHO MEAS - TR MAX VEL: 348 CM/SEC
BH CV ECHO MEASUREMENTS AVERAGE E/E' RATIO: 14.86
BH CV XLRA - TDI S': 24.9 CM/SEC
LEFT ATRIUM VOLUME INDEX: 22.1 ML/M2
LEFT ATRIUM VOLUME: 47.8 ML

## 2023-11-14 PROCEDURE — 93306 TTE W/DOPPLER COMPLETE: CPT

## 2023-11-16 ENCOUNTER — NURSE ONLY (OUTPATIENT)
Dept: PRIMARY CARE CLINIC | Age: 83
End: 2023-11-16

## 2023-11-16 DIAGNOSIS — E78.1 HYPERTRIGLYCERIDEMIA: ICD-10-CM

## 2023-11-16 DIAGNOSIS — Z23 NEED FOR INFLUENZA VACCINATION: Primary | ICD-10-CM

## 2023-11-16 DIAGNOSIS — E78.00 HYPERCHOLESTEROLEMIA: ICD-10-CM

## 2023-11-16 LAB
CHOLEST SERPL-MCNC: 157 MG/DL (ref 160–199)
HDLC SERPL-MCNC: 38 MG/DL (ref 55–121)
LDLC SERPL CALC-MCNC: 59 MG/DL
TRIGL SERPL-MCNC: 301 MG/DL (ref 0–149)

## 2023-11-17 ENCOUNTER — HOSPITAL ENCOUNTER (OUTPATIENT)
Dept: GENERAL RADIOLOGY | Age: 83
Discharge: HOME | End: 2023-11-17
Attending: INTERNAL MEDICINE

## 2023-11-17 DIAGNOSIS — C34.91 ADENOCARCINOMA OF RIGHT LUNG (HCC): ICD-10-CM

## 2023-12-01 ENCOUNTER — TELEPHONE (OUTPATIENT)
Dept: HEMATOLOGY | Age: 83
End: 2023-12-01

## 2023-12-01 NOTE — TELEPHONE ENCOUNTER
Called patient and reminded patient of their appointment on 12/04/2023. A detailed voicemail with all appointment details was left for patient. Melolabial Transposition Flap Text: The defect edges were debeveled with a #15 scalpel blade.  Given the location of the defect and the proximity to free margins a melolabial flap was deemed most appropriate.  Using a sterile surgical marker, an appropriate melolabial transposition flap was drawn incorporating the defect.    The area thus outlined was incised deep to adipose tissue with a #15 scalpel blade.  The skin margins were undermined to an appropriate distance in all directions utilizing iris scissors.

## 2023-12-01 NOTE — PROGRESS NOTES
11/30/2020.  Diffusely heterogeneous bone mineralization, which is nonspecific. This could be seen with aggressive osteopenia or infiltrative process.   10/01/21-I reviewed the results CT chest.  Interval treatment response.  Continue adjuvant durvalumab.  10/26/2021 Anemia Labs: Iron 33, TIBC 240, Iron Sat 14, Ferritin 72.6, Vit ,  11/4/2021 Blood Occult Studies 3/3 Negative  11/10/2021 MMA 0.19  11/30/2021 CT Chest w/ ContrastThe right upper lobe neoplasm is not as well-defined on the current study with increased consolidation within the adjacent lung parenchyma as well as adjacent consolidation with air bronchograms. I suspect this represents postradiation change with adjacent post radiation pneumonitis. It extends to the level of the upper pleura with associated thickening of the visceral and parietal pleura. I see no evidence of khai chest wall invasion. As noted on the previous examination there has been some extension of the neoplasm across the posterior aspect of the major fissure into the superior segment of the right lower lobe with this component of the neoplasm also demonstrating increased consolidation tracking along the lower margin of the major fissure.  Stable foci of groundglass consolidation within the left lung. No new lesions are present. There are changes of centrilobular emphysema with hyperinflation of the lung parenchyma.3. Heavy coronary artery calcifications are present. No new enlarged mediastinal or axillary adenopathy is demonstrated..  11/30/2021 CT Abd/Pelvis w/ IV Contrast (oral)Changes of centrilobular emphysema within the lung bases. Coronary calcifications are present. There is a trace pericardial effusion or pericardial thickening demonstrated.2. No evidence of metastatic disease to the abdomen or pelvis. The adrenals are unremarkable.3. There is a small 2 mm distal right ureteral stone just above the UVJ. This does not result in significant obstructive uropathy without

## 2023-12-04 ENCOUNTER — TELEPHONE (OUTPATIENT)
Dept: HEMATOLOGY | Age: 83
End: 2023-12-04

## 2023-12-04 DIAGNOSIS — C34.91 ADENOCARCINOMA OF RIGHT LUNG (HCC): Primary | ICD-10-CM

## 2023-12-04 RX ORDER — MONTELUKAST SODIUM 10 MG/1
10 TABLET ORAL NIGHTLY
Qty: 90 TABLET | Refills: 1 | Status: SHIPPED | OUTPATIENT
Start: 2023-12-04 | End: 2024-12-03

## 2023-12-04 RX ORDER — LOSARTAN POTASSIUM 25 MG/1
25 TABLET ORAL DAILY
Qty: 90 TABLET | Refills: 1 | Status: SHIPPED | OUTPATIENT
Start: 2023-12-04

## 2023-12-04 NOTE — TELEPHONE ENCOUNTER
Notified of CT Chest scheduled downstairs on 1/5 at 8:00 her at [facility] 1st floor. Follow Up and port flush scheduled for January 12. Patient aware of both appointments.

## 2024-01-05 ENCOUNTER — HOSPITAL ENCOUNTER (OUTPATIENT)
Dept: CT IMAGING | Age: 84
Discharge: HOME | End: 2024-01-05
Payer: MEDICARE

## 2024-01-05 DIAGNOSIS — C34.91 ADENOCARCINOMA OF RIGHT LUNG (HCC): ICD-10-CM

## 2024-01-05 LAB — CREAT SERPL-MCNC: 1.9 MG/DL (ref 0.3–1.3)

## 2024-01-05 PROCEDURE — 6360000004 HC RX CONTRAST MEDICATION: Performed by: INTERNAL MEDICINE

## 2024-01-05 PROCEDURE — 71260 CT THORAX DX C+: CPT

## 2024-01-05 PROCEDURE — 82565 ASSAY OF CREATININE: CPT

## 2024-01-05 RX ADMIN — IOPAMIDOL 40 ML: 755 INJECTION, SOLUTION INTRAVENOUS at 08:16

## 2024-01-11 ENCOUNTER — TELEPHONE (OUTPATIENT)
Dept: HEMATOLOGY | Age: 84
End: 2024-01-11

## 2024-01-12 ENCOUNTER — HOSPITAL ENCOUNTER (OUTPATIENT)
Dept: INFUSION THERAPY | Age: 84
Discharge: HOME | End: 2024-01-12
Payer: MEDICARE

## 2024-01-12 ENCOUNTER — OFFICE VISIT (OUTPATIENT)
Dept: HEMATOLOGY | Age: 84
End: 2024-01-12
Payer: MEDICARE

## 2024-01-12 ENCOUNTER — TELEPHONE (OUTPATIENT)
Dept: HEMATOLOGY | Age: 84
End: 2024-01-12

## 2024-01-12 VITALS
BODY MASS INDEX: 25.34 KG/M2 | HEART RATE: 83 BPM | OXYGEN SATURATION: 97 % | TEMPERATURE: 97.5 F | WEIGHT: 177 LBS | SYSTOLIC BLOOD PRESSURE: 150 MMHG | DIASTOLIC BLOOD PRESSURE: 70 MMHG | HEIGHT: 70 IN

## 2024-01-12 DIAGNOSIS — C34.91 ADENOCARCINOMA OF RIGHT LUNG (HCC): Primary | ICD-10-CM

## 2024-01-12 DIAGNOSIS — D64.9 NORMOCYTIC ANEMIA: ICD-10-CM

## 2024-01-12 DIAGNOSIS — Z85.118 ENCOUNTER FOR FOLLOW-UP SURVEILLANCE OF LUNG CANCER: ICD-10-CM

## 2024-01-12 DIAGNOSIS — Z78.9 WEIGHT LOSS ADVISED: ICD-10-CM

## 2024-01-12 DIAGNOSIS — N28.9 RENAL IMPAIRMENT: ICD-10-CM

## 2024-01-12 DIAGNOSIS — Z71.89 CARE PLAN DISCUSSED WITH PATIENT: ICD-10-CM

## 2024-01-12 DIAGNOSIS — Z45.2 ENCOUNTER FOR CENTRAL LINE CARE: ICD-10-CM

## 2024-01-12 DIAGNOSIS — Z08 ENCOUNTER FOR FOLLOW-UP SURVEILLANCE OF LUNG CANCER: ICD-10-CM

## 2024-01-12 LAB
ALBUMIN SERPL-MCNC: 4.5 G/DL (ref 3.5–5.2)
ALP SERPL-CCNC: 79 U/L (ref 40–130)
ALT SERPL-CCNC: 17 U/L (ref 21–72)
ANION GAP SERPL CALCULATED.3IONS-SCNC: 7 MMOL/L (ref 7–19)
AST SERPL-CCNC: 25 U/L (ref 17–59)
BASOPHILS # BLD: 0.08 K/UL (ref 0.01–0.08)
BASOPHILS NFR BLD: 1.3 % (ref 0.1–1.2)
BILIRUB SERPL-MCNC: 0.3 MG/DL (ref 0.2–1.3)
BUN SERPL-MCNC: 29 MG/DL (ref 9–20)
CALCIUM SERPL-MCNC: 9.2 MG/DL (ref 8.4–10.2)
CHLORIDE SERPL-SCNC: 108 MMOL/L (ref 98–111)
CO2 SERPL-SCNC: 28 MMOL/L (ref 22–29)
CREAT SERPL-MCNC: 1.9 MG/DL (ref 0.6–1.2)
EOSINOPHIL # BLD: 0.26 K/UL (ref 0.04–0.54)
EOSINOPHIL NFR BLD: 4.1 % (ref 0.7–7)
ERYTHROCYTE [DISTWIDTH] IN BLOOD BY AUTOMATED COUNT: 14 % (ref 11.6–14.4)
GLOBULIN: 3.2 G/DL
GLUCOSE SERPL-MCNC: 172 MG/DL (ref 74–106)
HCT VFR BLD AUTO: 33.1 % (ref 40.1–51)
HGB BLD-MCNC: 10.8 G/DL (ref 13.7–17.5)
LYMPHOCYTES # BLD: 1.93 K/UL (ref 1.18–3.74)
LYMPHOCYTES NFR BLD: 30.3 % (ref 19.3–53.1)
MCH RBC QN AUTO: 31.1 PG (ref 25.7–32.2)
MCHC RBC AUTO-ENTMCNC: 32.6 G/DL (ref 32.3–36.5)
MCV RBC AUTO: 95.4 FL (ref 79–92.2)
MONOCYTES # BLD: 0.39 K/UL (ref 0.24–0.82)
MONOCYTES NFR BLD: 6.1 % (ref 4.7–12.5)
NEUTROPHILS # BLD: 3.67 K/UL (ref 1.56–6.13)
NEUTS SEG NFR BLD: 57.6 % (ref 34–71.1)
PLATELET # BLD AUTO: 275 K/UL (ref 163–337)
PMV BLD AUTO: 10.8 FL (ref 7.4–10.4)
POTASSIUM SERPL-SCNC: 5 MMOL/L (ref 3.5–5.1)
PROT SERPL-MCNC: 7.7 G/DL (ref 6.3–8.2)
RBC # BLD AUTO: 3.47 M/UL (ref 4.63–6.08)
SODIUM SERPL-SCNC: 143 MMOL/L (ref 137–145)
WBC # BLD AUTO: 6.37 K/UL (ref 4.23–9.07)

## 2024-01-12 PROCEDURE — 99212 OFFICE O/P EST SF 10 MIN: CPT

## 2024-01-12 PROCEDURE — G8427 DOCREV CUR MEDS BY ELIG CLIN: HCPCS | Performed by: INTERNAL MEDICINE

## 2024-01-12 PROCEDURE — 1036F TOBACCO NON-USER: CPT | Performed by: INTERNAL MEDICINE

## 2024-01-12 PROCEDURE — 36415 COLL VENOUS BLD VENIPUNCTURE: CPT

## 2024-01-12 PROCEDURE — G8417 CALC BMI ABV UP PARAM F/U: HCPCS | Performed by: INTERNAL MEDICINE

## 2024-01-12 PROCEDURE — G8484 FLU IMMUNIZE NO ADMIN: HCPCS | Performed by: INTERNAL MEDICINE

## 2024-01-12 PROCEDURE — 85025 COMPLETE CBC W/AUTO DIFF WBC: CPT

## 2024-01-12 PROCEDURE — 99213 OFFICE O/P EST LOW 20 MIN: CPT | Performed by: INTERNAL MEDICINE

## 2024-01-12 PROCEDURE — 80053 COMPREHEN METABOLIC PANEL: CPT

## 2024-01-12 PROCEDURE — 1123F ACP DISCUSS/DSCN MKR DOCD: CPT | Performed by: INTERNAL MEDICINE

## 2024-01-12 PROCEDURE — 3077F SYST BP >= 140 MM HG: CPT | Performed by: INTERNAL MEDICINE

## 2024-01-12 PROCEDURE — 3078F DIAST BP <80 MM HG: CPT | Performed by: INTERNAL MEDICINE

## 2024-01-12 RX ORDER — SODIUM CHLORIDE 0.9 % (FLUSH) 0.9 %
5-40 SYRINGE (ML) INJECTION PRN
OUTPATIENT
Start: 2024-01-12

## 2024-01-12 RX ORDER — HEPARIN 100 UNIT/ML
500 SYRINGE INTRAVENOUS PRN
OUTPATIENT
Start: 2024-01-12

## 2024-01-12 RX ORDER — HEPARIN 100 UNIT/ML
500 SYRINGE INTRAVENOUS PRN
Status: DISCONTINUED | OUTPATIENT
Start: 2024-01-12 | End: 2024-01-13 | Stop reason: HOSPADM

## 2024-01-12 RX ORDER — SODIUM CHLORIDE 0.9 % (FLUSH) 0.9 %
5-40 SYRINGE (ML) INJECTION PRN
Status: DISCONTINUED | OUTPATIENT
Start: 2024-01-12 | End: 2024-01-13 | Stop reason: HOSPADM

## 2024-01-12 RX ORDER — LANOLIN ALCOHOL/MO/W.PET/CERES
CREAM (GRAM) TOPICAL
COMMUNITY
Start: 2024-01-05

## 2024-01-12 NOTE — TELEPHONE ENCOUNTER
I have spoken to patient regarding creatinine level being elevated. Per 's recommendation to increase fluid intake. Patient has verbalized understanding of recommendations.

## 2024-03-08 ENCOUNTER — HOSPITAL ENCOUNTER (OUTPATIENT)
Dept: INFUSION THERAPY | Age: 84
Discharge: HOME | End: 2024-03-08
Payer: MEDICARE

## 2024-03-08 DIAGNOSIS — C34.91 ADENOCARCINOMA OF RIGHT LUNG (HCC): Primary | ICD-10-CM

## 2024-03-08 DIAGNOSIS — Z45.2 ENCOUNTER FOR CENTRAL LINE CARE: ICD-10-CM

## 2024-03-08 PROCEDURE — 96523 IRRIG DRUG DELIVERY DEVICE: CPT

## 2024-03-08 PROCEDURE — 2580000003 HC RX 258: Performed by: INTERNAL MEDICINE

## 2024-03-08 PROCEDURE — 6360000002 HC RX W HCPCS: Performed by: INTERNAL MEDICINE

## 2024-03-08 RX ORDER — SODIUM CHLORIDE 0.9 % (FLUSH) 0.9 %
5-40 SYRINGE (ML) INJECTION PRN
Status: DISCONTINUED | OUTPATIENT
Start: 2024-03-08 | End: 2024-03-09 | Stop reason: HOSPADM

## 2024-03-08 RX ORDER — HEPARIN 100 UNIT/ML
500 SYRINGE INTRAVENOUS PRN
Status: DISCONTINUED | OUTPATIENT
Start: 2024-03-08 | End: 2024-03-09 | Stop reason: HOSPADM

## 2024-03-08 RX ORDER — HEPARIN 100 UNIT/ML
500 SYRINGE INTRAVENOUS PRN
OUTPATIENT
Start: 2024-03-08

## 2024-03-08 RX ORDER — SODIUM CHLORIDE 0.9 % (FLUSH) 0.9 %
5-40 SYRINGE (ML) INJECTION PRN
OUTPATIENT
Start: 2024-03-08

## 2024-03-08 RX ADMIN — SODIUM CHLORIDE, PRESERVATIVE FREE 10 ML: 5 INJECTION INTRAVENOUS at 11:51

## 2024-03-08 RX ADMIN — HEPARIN 500 UNITS: 100 SYRINGE at 11:50

## 2024-04-01 RX ORDER — FUROSEMIDE 40 MG/1
TABLET ORAL
Qty: 90 TABLET | Refills: 1 | Status: SHIPPED | OUTPATIENT
Start: 2024-04-01

## 2024-05-03 RX ORDER — OMEPRAZOLE 40 MG/1
CAPSULE, DELAYED RELEASE ORAL
Qty: 90 CAPSULE | Refills: 3 | Status: SHIPPED | OUTPATIENT
Start: 2024-05-03

## 2024-05-28 RX ORDER — PROPRANOLOL HYDROCHLORIDE 60 MG/1
TABLET ORAL
Qty: 180 TABLET | Refills: 3 | Status: SHIPPED | OUTPATIENT
Start: 2024-05-28

## 2024-06-07 RX ORDER — LOSARTAN POTASSIUM 25 MG/1
25 TABLET ORAL DAILY
Qty: 90 TABLET | Refills: 0 | Status: SHIPPED | OUTPATIENT
Start: 2024-06-07

## 2024-07-01 RX ORDER — SIMVASTATIN 40 MG
TABLET ORAL
Qty: 90 TABLET | Refills: 3 | Status: SHIPPED | OUTPATIENT
Start: 2024-07-01

## 2024-07-12 ENCOUNTER — HOSPITAL ENCOUNTER (OUTPATIENT)
Dept: CT IMAGING | Age: 84
Discharge: HOME | End: 2024-07-12
Attending: INTERNAL MEDICINE
Payer: MEDICARE

## 2024-07-12 DIAGNOSIS — C34.91 ADENOCARCINOMA OF RIGHT LUNG (HCC): ICD-10-CM

## 2024-07-12 DIAGNOSIS — C34.91 ADENOCARCINOMA OF RIGHT LUNG (HCC): Primary | ICD-10-CM

## 2024-07-12 LAB — CREAT SERPL-MCNC: 2.1 MG/DL (ref 0.3–1.3)

## 2024-07-12 PROCEDURE — 71250 CT THORAX DX C-: CPT

## 2024-07-12 PROCEDURE — 82565 ASSAY OF CREATININE: CPT

## 2024-07-16 ENCOUNTER — TELEPHONE (OUTPATIENT)
Dept: HEMATOLOGY | Age: 84
End: 2024-07-16

## 2024-07-18 DIAGNOSIS — C34.91 ADENOCARCINOMA OF RIGHT LUNG (HCC): Primary | ICD-10-CM

## 2024-07-18 RX ORDER — SODIUM CHLORIDE 0.9 % (FLUSH) 0.9 %
5-40 SYRINGE (ML) INJECTION PRN
Status: CANCELLED | OUTPATIENT
Start: 2024-07-18

## 2024-07-18 RX ORDER — HEPARIN SODIUM (PORCINE) LOCK FLUSH IV SOLN 100 UNIT/ML 100 UNIT/ML
500 SOLUTION INTRAVENOUS PRN
Status: CANCELLED | OUTPATIENT
Start: 2024-07-18

## 2024-07-18 NOTE — PROGRESS NOTES
MEDICAL ONCOLOGY PROGRESS NOTE    Pt Name: Jose Tate  MRN: [id number]  YOB: 1940  Date of evaluation: 7/19/2024    HISTORY OF PRESENT ILLNESS:    Reason for MD visit-disease management/toxicity assessment  The patient is a very pleasant 84 years old male who has been diagnosed with stage III non-small cell lung cancer, adenocarcinoma subtype.  He is status post completion CRT followed by 1 year durvalumab completed June 2022.  He denies any new complaints.  He is accompanied by his daughter today.  He denies any new complaints/issues other than persistent fatigue.    Diagnosis  RUL adenocarcinoma, NSCLC, Dec 2020  [id number], stage IIIA  Systolic heart failure  Hypertension, controlled  Not a surgical candidate    Treatment Summary  4/21/2021-5/26/21 completed concurrent chemoradiation with carboplatin/Taxol  4/22/21-6/3/21- 6600 cGy radiation therapy completed  7/8/21-6/3/2022-completion of maintenance Durvalumab 1500mg every 4 weeks x12 cycles    Hematology/Cancer History:  Jose Tate was first seen by me on 4/7/2021 referred by Dr. Ho Cardona, [facility] for findings of non-small cell lung cancer, adenocarcinoma type.  He has several comorbidities including history of type 2 diabetes, hypertension COPD.  History of smoking in the past.  Quit many years ago.  History of coronary artery disease followed by cardiology biopsy.  Last EF 70% in 2018.  He was seen by his primary care provider in November 2020.  He has complaint of chest pain.  Chest x-ray showed a 4 cm mass in the right upper lung.  12/1/20 CT chest (P): Large right upper lobe mass concerning for primary neoplasm. Enlarged right hilar lymph node concerning for metastatic disease. Additional noncalcified pulmonary nodules bilaterally for which follow-up CT is recommended in 3-6 months. Atherosclerosis of the aorta and coronary arteries.  12/16/2020-bronchoscopy with bronchoalveolar lavage was nondiagnostic  2/23/21

## 2024-07-19 ENCOUNTER — HOSPITAL ENCOUNTER (OUTPATIENT)
Dept: INFUSION THERAPY | Age: 84
Discharge: HOME | End: 2024-07-19
Payer: MEDICARE

## 2024-07-19 ENCOUNTER — OFFICE VISIT (OUTPATIENT)
Dept: HEMATOLOGY | Age: 84
End: 2024-07-19

## 2024-07-19 VITALS
HEIGHT: 70 IN | TEMPERATURE: 98.7 F | SYSTOLIC BLOOD PRESSURE: 132 MMHG | OXYGEN SATURATION: 93 % | HEART RATE: 77 BPM | BODY MASS INDEX: 24.08 KG/M2 | DIASTOLIC BLOOD PRESSURE: 78 MMHG | WEIGHT: 168.2 LBS

## 2024-07-19 DIAGNOSIS — R63.4 UNINTENTIONAL WEIGHT LOSS: ICD-10-CM

## 2024-07-19 DIAGNOSIS — C34.91 ADENOCARCINOMA OF RIGHT LUNG (HCC): Primary | ICD-10-CM

## 2024-07-19 DIAGNOSIS — C34.91 ADENOCARCINOMA OF RIGHT LUNG (HCC): ICD-10-CM

## 2024-07-19 DIAGNOSIS — Z08 ENCOUNTER FOR FOLLOW-UP SURVEILLANCE OF LUNG CANCER: ICD-10-CM

## 2024-07-19 DIAGNOSIS — Z45.2 ENCOUNTER FOR CENTRAL LINE CARE: Primary | ICD-10-CM

## 2024-07-19 DIAGNOSIS — Z71.89 CARE PLAN DISCUSSED WITH PATIENT: ICD-10-CM

## 2024-07-19 DIAGNOSIS — N28.9 RENAL IMPAIRMENT: ICD-10-CM

## 2024-07-19 DIAGNOSIS — Z85.118 ENCOUNTER FOR FOLLOW-UP SURVEILLANCE OF LUNG CANCER: ICD-10-CM

## 2024-07-19 DIAGNOSIS — R53.0 NEOPLASTIC (MALIGNANT) RELATED FATIGUE: ICD-10-CM

## 2024-07-19 LAB
ALBUMIN SERPL-MCNC: 4.5 G/DL (ref 3.5–5.2)
ALP SERPL-CCNC: 78 U/L (ref 40–130)
ALT SERPL-CCNC: 16 U/L (ref 21–72)
ANION GAP SERPL CALCULATED.3IONS-SCNC: 10 MMOL/L (ref 7–19)
AST SERPL-CCNC: 23 U/L (ref 17–59)
BASOPHILS # BLD: 0.07 K/UL (ref 0.01–0.08)
BASOPHILS NFR BLD: 0.9 % (ref 0.1–1.2)
BILIRUB SERPL-MCNC: 0.6 MG/DL (ref 0.2–1.3)
BUN SERPL-MCNC: 39 MG/DL (ref 9–20)
CALCIUM SERPL-MCNC: 9.3 MG/DL (ref 8.4–10.2)
CHLORIDE SERPL-SCNC: 105 MMOL/L (ref 98–111)
CO2 SERPL-SCNC: 26 MMOL/L (ref 22–29)
CREAT SERPL-MCNC: 2.1 MG/DL (ref 0.6–1.2)
EOSINOPHIL # BLD: 0.27 K/UL (ref 0.04–0.54)
EOSINOPHIL NFR BLD: 3.6 % (ref 0.7–7)
ERYTHROCYTE [DISTWIDTH] IN BLOOD BY AUTOMATED COUNT: 13.4 % (ref 11.6–14.4)
GLOBULIN: 3 G/DL
GLUCOSE SERPL-MCNC: 109 MG/DL (ref 74–106)
HCT VFR BLD AUTO: 31.2 % (ref 40.1–51)
HGB BLD-MCNC: 10.4 G/DL (ref 13.7–17.5)
LYMPHOCYTES # BLD: 1.97 K/UL (ref 1.18–3.74)
LYMPHOCYTES NFR BLD: 26.1 % (ref 19.3–53.1)
MCH RBC QN AUTO: 31.7 PG (ref 25.7–32.2)
MCHC RBC AUTO-ENTMCNC: 33.3 G/DL (ref 32.3–36.5)
MCV RBC AUTO: 95.1 FL (ref 79–92.2)
MONOCYTES # BLD: 0.45 K/UL (ref 0.24–0.82)
MONOCYTES NFR BLD: 6 % (ref 4.7–12.5)
NEUTROPHILS # BLD: 4.76 K/UL (ref 1.56–6.13)
NEUTS SEG NFR BLD: 62.9 % (ref 34–71.1)
PLATELET # BLD AUTO: 239 K/UL (ref 163–337)
PMV BLD AUTO: 10.5 FL (ref 7.4–10.4)
POTASSIUM SERPL-SCNC: 5 MMOL/L (ref 3.5–5.1)
PROT SERPL-MCNC: 7.5 G/DL (ref 6.3–8.2)
RBC # BLD AUTO: 3.28 M/UL (ref 4.63–6.08)
SODIUM SERPL-SCNC: 141 MMOL/L (ref 137–145)
TSH SERPL DL<=0.005 MIU/L-ACNC: 1.62 UIU/ML (ref 0.27–4.2)
WBC # BLD AUTO: 7.56 K/UL (ref 4.23–9.07)

## 2024-07-19 PROCEDURE — 6360000002 HC RX W HCPCS: Performed by: INTERNAL MEDICINE

## 2024-07-19 PROCEDURE — 96523 IRRIG DRUG DELIVERY DEVICE: CPT

## 2024-07-19 PROCEDURE — 99213 OFFICE O/P EST LOW 20 MIN: CPT

## 2024-07-19 PROCEDURE — 2580000003 HC RX 258: Performed by: INTERNAL MEDICINE

## 2024-07-19 PROCEDURE — 36415 COLL VENOUS BLD VENIPUNCTURE: CPT

## 2024-07-19 PROCEDURE — 80053 COMPREHEN METABOLIC PANEL: CPT

## 2024-07-19 PROCEDURE — 85025 COMPLETE CBC W/AUTO DIFF WBC: CPT

## 2024-07-19 RX ORDER — HEPARIN 100 UNIT/ML
500 SYRINGE INTRAVENOUS PRN
Status: DISCONTINUED | OUTPATIENT
Start: 2024-07-19 | End: 2024-07-20 | Stop reason: HOSPADM

## 2024-07-19 RX ORDER — SODIUM CHLORIDE 0.9 % (FLUSH) 0.9 %
5-40 SYRINGE (ML) INJECTION PRN
OUTPATIENT
Start: 2024-07-19

## 2024-07-19 RX ORDER — SODIUM CHLORIDE 0.9 % (FLUSH) 0.9 %
5-40 SYRINGE (ML) INJECTION PRN
Status: DISCONTINUED | OUTPATIENT
Start: 2024-07-19 | End: 2024-07-20 | Stop reason: HOSPADM

## 2024-07-19 RX ORDER — HEPARIN 100 UNIT/ML
500 SYRINGE INTRAVENOUS PRN
OUTPATIENT
Start: 2024-07-19

## 2024-07-19 RX ADMIN — HEPARIN 500 UNITS: 100 SYRINGE at 12:51

## 2024-07-19 RX ADMIN — SODIUM CHLORIDE, PRESERVATIVE FREE 20 ML: 5 INJECTION INTRAVENOUS at 12:51

## 2024-08-14 DIAGNOSIS — R11.2 NAUSEA AND VOMITING, UNSPECIFIED VOMITING TYPE: Primary | ICD-10-CM

## 2024-08-14 RX ORDER — PROMETHAZINE HYDROCHLORIDE 25 MG/1
25 TABLET ORAL 3 TIMES DAILY PRN
Qty: 12 TABLET | Refills: 0 | Status: ON HOLD | OUTPATIENT
Start: 2024-08-14 | End: 2024-08-21

## 2024-08-15 ENCOUNTER — APPOINTMENT (OUTPATIENT)
Dept: GENERAL RADIOLOGY | Age: 84
DRG: 243 | End: 2024-08-15
Attending: EMERGENCY MEDICINE
Payer: MEDICARE

## 2024-08-15 ENCOUNTER — APPOINTMENT (OUTPATIENT)
Dept: GENERAL RADIOLOGY | Age: 84
DRG: 243 | End: 2024-08-15
Payer: MEDICARE

## 2024-08-15 ENCOUNTER — HOSPITAL ENCOUNTER (INPATIENT)
Age: 84
LOS: 5 days | Discharge: NURSING, REHAB OR LONG-TERM CARE | DRG: 243 | End: 2024-08-20
Attending: EMERGENCY MEDICINE | Admitting: HOSPITALIST
Payer: MEDICARE

## 2024-08-15 ENCOUNTER — OFFICE VISIT (OUTPATIENT)
Dept: PRIMARY CARE CLINIC | Age: 84
End: 2024-08-15

## 2024-08-15 VITALS
RESPIRATION RATE: 18 BRPM | OXYGEN SATURATION: 92 % | TEMPERATURE: 96.9 F | BODY MASS INDEX: 24.02 KG/M2 | SYSTOLIC BLOOD PRESSURE: 120 MMHG | HEIGHT: 70 IN | WEIGHT: 167.8 LBS | DIASTOLIC BLOOD PRESSURE: 70 MMHG | HEART RATE: 98 BPM

## 2024-08-15 DIAGNOSIS — I48.21 PERMANENT ATRIAL FIBRILLATION (HCC): ICD-10-CM

## 2024-08-15 DIAGNOSIS — I49.5 TACHY-BRADY SYNDROME (HCC): ICD-10-CM

## 2024-08-15 DIAGNOSIS — I48.91 ATRIAL FIBRILLATION WITH RAPID VENTRICULAR RESPONSE (HCC): Primary | ICD-10-CM

## 2024-08-15 DIAGNOSIS — C34.91 ADENOCARCINOMA OF RIGHT LUNG (HCC): ICD-10-CM

## 2024-08-15 DIAGNOSIS — I48.91 ATRIAL FIBRILLATION, UNSPECIFIED TYPE (HCC): ICD-10-CM

## 2024-08-15 DIAGNOSIS — I50.22 CHRONIC SYSTOLIC (CONGESTIVE) HEART FAILURE (HCC): ICD-10-CM

## 2024-08-15 DIAGNOSIS — R53.1 GENERALIZED WEAKNESS: ICD-10-CM

## 2024-08-15 DIAGNOSIS — J44.9 CHRONIC OBSTRUCTIVE PULMONARY DISEASE, UNSPECIFIED COPD TYPE (HCC): ICD-10-CM

## 2024-08-15 DIAGNOSIS — N17.9 ACUTE KIDNEY INJURY SUPERIMPOSED ON CKD (HCC): ICD-10-CM

## 2024-08-15 DIAGNOSIS — T45.1X5A CHEMOTHERAPY-INDUCED NEUTROPENIA (HCC): ICD-10-CM

## 2024-08-15 DIAGNOSIS — R00.0 TACHYCARDIA: ICD-10-CM

## 2024-08-15 DIAGNOSIS — I25.119 CORONARY ARTERY DISEASE INVOLVING NATIVE HEART WITH ANGINA PECTORIS, UNSPECIFIED VESSEL OR LESION TYPE (HCC): ICD-10-CM

## 2024-08-15 DIAGNOSIS — R11.2 NAUSEA AND VOMITING, UNSPECIFIED VOMITING TYPE: Primary | ICD-10-CM

## 2024-08-15 DIAGNOSIS — N18.30 BENIGN HYPERTENSIVE HEART AND KIDNEY DISEASE WITH DIASTOLIC CHF, NYHA CLASS II AND CKD STAGE III (HCC): ICD-10-CM

## 2024-08-15 DIAGNOSIS — D70.1 CHEMOTHERAPY-INDUCED NEUTROPENIA (HCC): ICD-10-CM

## 2024-08-15 DIAGNOSIS — Z91.81 AT HIGH RISK FOR FALLS: ICD-10-CM

## 2024-08-15 DIAGNOSIS — E83.42 HYPOMAGNESEMIA: ICD-10-CM

## 2024-08-15 DIAGNOSIS — R53.82 CHRONIC FATIGUE: ICD-10-CM

## 2024-08-15 DIAGNOSIS — N18.9 ACUTE KIDNEY INJURY SUPERIMPOSED ON CKD (HCC): ICD-10-CM

## 2024-08-15 DIAGNOSIS — I13.0 BENIGN HYPERTENSIVE HEART AND KIDNEY DISEASE WITH DIASTOLIC CHF, NYHA CLASS II AND CKD STAGE III (HCC): ICD-10-CM

## 2024-08-15 DIAGNOSIS — E11.9 TYPE 2 DIABETES MELLITUS WITHOUT COMPLICATION, WITHOUT LONG-TERM CURRENT USE OF INSULIN (HCC): ICD-10-CM

## 2024-08-15 DIAGNOSIS — I50.30 BENIGN HYPERTENSIVE HEART AND KIDNEY DISEASE WITH DIASTOLIC CHF, NYHA CLASS II AND CKD STAGE III (HCC): ICD-10-CM

## 2024-08-15 LAB
ALBUMIN SERPL-MCNC: 3.8 G/DL (ref 3.5–5.2)
ALBUMIN SERPL-MCNC: 4 G/DL (ref 3.5–5.2)
ALP SERPL-CCNC: 73 U/L (ref 40–130)
ALP SERPL-CCNC: 73 U/L (ref 40–130)
ALT SERPL-CCNC: 10 U/L (ref 5–41)
ALT SERPL-CCNC: 9 U/L (ref 5–41)
ANION GAP SERPL CALCULATED.3IONS-SCNC: 19 MMOL/L (ref 7–19)
ANION GAP SERPL CALCULATED.3IONS-SCNC: 20 MMOL/L (ref 7–19)
AST SERPL-CCNC: 16 U/L (ref 5–40)
AST SERPL-CCNC: 17 U/L (ref 5–40)
BASOPHILS # BLD: 0.1 K/UL (ref 0–0.2)
BASOPHILS # BLD: 0.1 K/UL (ref 0–0.2)
BASOPHILS NFR BLD: 0.4 % (ref 0–1)
BASOPHILS NFR BLD: 0.5 % (ref 0–1)
BILIRUB SERPL-MCNC: 0.2 MG/DL (ref 0.2–1.2)
BILIRUB SERPL-MCNC: 0.4 MG/DL (ref 0.2–1.2)
BNP BLD-MCNC: 4969 PG/ML (ref 0–449)
BNP BLD-MCNC: 5469 PG/ML (ref 0–449)
BUN SERPL-MCNC: 40 MG/DL (ref 8–23)
BUN SERPL-MCNC: 43 MG/DL (ref 8–23)
CALCIUM SERPL-MCNC: 9 MG/DL (ref 8.8–10.2)
CALCIUM SERPL-MCNC: 9.3 MG/DL (ref 8.8–10.2)
CHLORIDE SERPL-SCNC: 95 MMOL/L (ref 98–111)
CHLORIDE SERPL-SCNC: 96 MMOL/L (ref 98–111)
CK SERPL-CCNC: 191 U/L (ref 39–308)
CO2 SERPL-SCNC: 21 MMOL/L (ref 22–29)
CO2 SERPL-SCNC: 23 MMOL/L (ref 22–29)
CREAT SERPL-MCNC: 2.6 MG/DL (ref 0.5–1.2)
CREAT SERPL-MCNC: 2.7 MG/DL (ref 0.5–1.2)
CRP SERPL HS-MCNC: 30 MG/DL (ref 0–0.5)
EOSINOPHIL # BLD: 0 K/UL (ref 0–0.6)
EOSINOPHIL # BLD: 0.1 K/UL (ref 0–0.6)
EOSINOPHIL NFR BLD: 0.3 % (ref 0–5)
EOSINOPHIL NFR BLD: 0.6 % (ref 0–5)
ERYTHROCYTE [DISTWIDTH] IN BLOOD BY AUTOMATED COUNT: 13.6 % (ref 11.5–14.5)
ERYTHROCYTE [DISTWIDTH] IN BLOOD BY AUTOMATED COUNT: 13.6 % (ref 11.5–14.5)
GLUCOSE SERPL-MCNC: 195 MG/DL (ref 74–109)
GLUCOSE SERPL-MCNC: 200 MG/DL (ref 74–109)
HBA1C MFR BLD: 5.7 % (ref 4–6)
HCT VFR BLD AUTO: 30.3 % (ref 42–52)
HCT VFR BLD AUTO: 30.9 % (ref 42–52)
HGB BLD-MCNC: 10 G/DL (ref 14–18)
HGB BLD-MCNC: 9.7 G/DL (ref 14–18)
IMM GRANULOCYTES # BLD: 0.1 K/UL
IMM GRANULOCYTES # BLD: 0.1 K/UL
LYMPHOCYTES # BLD: 1.7 K/UL (ref 1.1–4.5)
LYMPHOCYTES # BLD: 2.1 K/UL (ref 1.1–4.5)
LYMPHOCYTES NFR BLD: 12.3 % (ref 20–40)
LYMPHOCYTES NFR BLD: 13.7 % (ref 20–40)
MAGNESIUM SERPL-MCNC: 1.3 MG/DL (ref 1.6–2.4)
MAGNESIUM SERPL-MCNC: 1.3 MG/DL (ref 1.6–2.4)
MCH RBC QN AUTO: 31 PG (ref 27–31)
MCH RBC QN AUTO: 32.3 PG (ref 27–31)
MCHC RBC AUTO-ENTMCNC: 31.4 G/DL (ref 33–37)
MCHC RBC AUTO-ENTMCNC: 33 G/DL (ref 33–37)
MCV RBC AUTO: 97.7 FL (ref 80–94)
MCV RBC AUTO: 98.7 FL (ref 80–94)
MONOCYTES # BLD: 1.1 K/UL (ref 0–0.9)
MONOCYTES # BLD: 1.3 K/UL (ref 0–0.9)
MONOCYTES NFR BLD: 8.1 % (ref 0–10)
MONOCYTES NFR BLD: 8.6 % (ref 0–10)
NEUTROPHILS # BLD: 10.6 K/UL (ref 1.5–7.5)
NEUTROPHILS # BLD: 11.4 K/UL (ref 1.5–7.5)
NEUTS SEG NFR BLD: 76.4 % (ref 50–65)
NEUTS SEG NFR BLD: 77.8 % (ref 50–65)
PLATELET # BLD AUTO: 270 K/UL (ref 130–400)
PLATELET # BLD AUTO: 281 K/UL (ref 130–400)
PMV BLD AUTO: 11 FL (ref 9.4–12.4)
PMV BLD AUTO: 9.9 FL (ref 9.4–12.4)
POTASSIUM SERPL-SCNC: 4.7 MMOL/L (ref 3.5–5)
POTASSIUM SERPL-SCNC: 5 MMOL/L (ref 3.5–5)
PROT SERPL-MCNC: 7.6 G/DL (ref 6.6–8.7)
PROT SERPL-MCNC: 7.8 G/DL (ref 6.6–8.7)
RBC # BLD AUTO: 3.1 M/UL (ref 4.7–6.1)
RBC # BLD AUTO: 3.13 M/UL (ref 4.7–6.1)
SODIUM SERPL-SCNC: 137 MMOL/L (ref 136–145)
SODIUM SERPL-SCNC: 137 MMOL/L (ref 136–145)
TROPONIN, HIGH SENSITIVITY: 40 NG/L (ref 0–22)
TROPONIN, HIGH SENSITIVITY: 44 NG/L (ref 0–22)
TSH SERPL DL<=0.005 MIU/L-ACNC: 2.13 UIU/ML (ref 0.27–4.2)
WBC # BLD AUTO: 13.6 K/UL (ref 4.8–10.8)
WBC # BLD AUTO: 15 K/UL (ref 4.8–10.8)

## 2024-08-15 PROCEDURE — 96374 THER/PROPH/DIAG INJ IV PUSH: CPT

## 2024-08-15 PROCEDURE — 2500000003 HC RX 250 WO HCPCS: Performed by: EMERGENCY MEDICINE

## 2024-08-15 PROCEDURE — 84484 ASSAY OF TROPONIN QUANT: CPT

## 2024-08-15 PROCEDURE — 84443 ASSAY THYROID STIM HORMONE: CPT

## 2024-08-15 PROCEDURE — 83880 ASSAY OF NATRIURETIC PEPTIDE: CPT

## 2024-08-15 PROCEDURE — 2580000003 HC RX 258: Performed by: EMERGENCY MEDICINE

## 2024-08-15 PROCEDURE — 99285 EMERGENCY DEPT VISIT HI MDM: CPT

## 2024-08-15 PROCEDURE — 1200000000 HC SEMI PRIVATE

## 2024-08-15 PROCEDURE — 71045 X-RAY EXAM CHEST 1 VIEW: CPT

## 2024-08-15 PROCEDURE — 85025 COMPLETE CBC W/AUTO DIFF WBC: CPT

## 2024-08-15 PROCEDURE — 82550 ASSAY OF CK (CPK): CPT

## 2024-08-15 PROCEDURE — 6360000002 HC RX W HCPCS: Performed by: EMERGENCY MEDICINE

## 2024-08-15 PROCEDURE — 36415 COLL VENOUS BLD VENIPUNCTURE: CPT

## 2024-08-15 PROCEDURE — 80053 COMPREHEN METABOLIC PANEL: CPT

## 2024-08-15 RX ORDER — SODIUM CHLORIDE, SODIUM LACTATE, POTASSIUM CHLORIDE, AND CALCIUM CHLORIDE .6; .31; .03; .02 G/100ML; G/100ML; G/100ML; G/100ML
500 INJECTION, SOLUTION INTRAVENOUS ONCE
Status: COMPLETED | OUTPATIENT
Start: 2024-08-15 | End: 2024-08-15

## 2024-08-15 RX ORDER — MAGNESIUM SULFATE IN WATER 40 MG/ML
2000 INJECTION, SOLUTION INTRAVENOUS ONCE
Status: COMPLETED | OUTPATIENT
Start: 2024-08-15 | End: 2024-08-15

## 2024-08-15 RX ORDER — DILTIAZEM HYDROCHLORIDE 5 MG/ML
10 INJECTION INTRAVENOUS ONCE
Status: COMPLETED | OUTPATIENT
Start: 2024-08-15 | End: 2024-08-15

## 2024-08-15 RX ADMIN — MAGNESIUM SULFATE HEPTAHYDRATE 2000 MG: 40 INJECTION, SOLUTION INTRAVENOUS at 17:08

## 2024-08-15 RX ADMIN — DILTIAZEM HYDROCHLORIDE 5 MG/HR: 5 INJECTION, SOLUTION INTRAVENOUS at 17:36

## 2024-08-15 RX ADMIN — SODIUM CHLORIDE, POTASSIUM CHLORIDE, SODIUM LACTATE AND CALCIUM CHLORIDE 500 ML: 600; 310; 30; 20 INJECTION, SOLUTION INTRAVENOUS at 17:10

## 2024-08-15 RX ADMIN — DILTIAZEM HYDROCHLORIDE 10 MG: 5 INJECTION, SOLUTION INTRAVENOUS at 16:08

## 2024-08-15 SDOH — ECONOMIC STABILITY: INCOME INSECURITY: HOW HARD IS IT FOR YOU TO PAY FOR THE VERY BASICS LIKE FOOD, HOUSING, MEDICAL CARE, AND HEATING?: NOT HARD AT ALL

## 2024-08-15 SDOH — ECONOMIC STABILITY: FOOD INSECURITY: WITHIN THE PAST 12 MONTHS, THE FOOD YOU BOUGHT JUST DIDN'T LAST AND YOU DIDN'T HAVE MONEY TO GET MORE.: NEVER TRUE

## 2024-08-15 SDOH — ECONOMIC STABILITY: FOOD INSECURITY: WITHIN THE PAST 12 MONTHS, YOU WORRIED THAT YOUR FOOD WOULD RUN OUT BEFORE YOU GOT MONEY TO BUY MORE.: NEVER TRUE

## 2024-08-15 ASSESSMENT — ENCOUNTER SYMPTOMS
ABDOMINAL PAIN: 0
SHORTNESS OF BREATH: 1
NAUSEA: 1
DIARRHEA: 1

## 2024-08-15 ASSESSMENT — PATIENT HEALTH QUESTIONNAIRE - PHQ9
SUM OF ALL RESPONSES TO PHQ QUESTIONS 1-9: 0
2. FEELING DOWN, DEPRESSED OR HOPELESS: NOT AT ALL
SUM OF ALL RESPONSES TO PHQ QUESTIONS 1-9: 0
1. LITTLE INTEREST OR PLEASURE IN DOING THINGS: NOT AT ALL
SUM OF ALL RESPONSES TO PHQ9 QUESTIONS 1 & 2: 0

## 2024-08-15 NOTE — H&P
Tenderness: There is no abdominal tenderness.   Musculoskeletal:      Right lower leg: No edema.      Left lower leg: No edema.   Skin:     General: Skin is warm and dry.      Coloration: Skin is pale.   Neurological:      Mental Status: He is alert and oriented to person, place, and time.   Psychiatric:         Mood and Affect: Mood normal.         Behavior: Behavior normal.          Diagnostic Data:  CBC:  Recent Labs     08/15/24  0820 08/15/24  1600   WBC 15.0* 13.6*   HGB 10.0* 9.7*   HCT 30.3* 30.9*    281     BMP:  Recent Labs     08/15/24  0820 08/15/24  1600    137   K 5.0 4.7   CL 95* 96*   CO2 23 21*   BUN 40* 43*   CREATININE 2.6* 2.7*   CALCIUM 9.3 9.0     Recent Labs     08/15/24  0820 08/15/24  1600   AST 16 17   ALT 9 10   BILITOT 0.4 0.2   ALKPHOS 73 73     Coag Panel: No results for input(s): \"INR\", \"PROTIME\", \"APTT\" in the last 72 hours.  Cardiac Enzymes:   Recent Labs     08/15/24  1600   CKTOTAL 191     ABGs:No results found for: \"PHART\", \"PO2ART\", \"KMY4ATI\"  Urinalysis:  Lab Results   Component Value Date/Time    NITRU Negative 08/28/2023 12:05 PM    BLOODU Negative 08/28/2023 12:05 PM    GLUCOSEU Negative 08/28/2023 12:05 PM     A1C:   Recent Labs     08/15/24  0820   LABA1C 5.7     ABG:No results for input(s): \"PHART\", \"PRO8VLK\", \"PO2ART\", \"BAC7QAB\", \"BEART\", \"HGBAE\", \"S3OZHKSJ\", \"CARBOXHGBART\" in the last 72 hours.    XR CHEST PORTABLE    Result Date: 8/15/2024  EXAM:  CHEST FRONTAL VIEW  HISTORY:  Atrial fibrillation COMPARISON:  11/25/2020    Mild density in the right base may represent atelectasis or pneumonia.  Lungs are otherwise clear.  No vascular congestion or pleural fluid.  Normal heart size.  Atherosclerotic disease noted.  Port catheter appears appropriately positioned radiographically. - - - - -    ______________________________________ Electronically signed by: DUSTY SNOWDEN M.D. Date:     08/15/2024 Time:    16:48       Assessment/Plan:  Principal Problem:    New

## 2024-08-15 NOTE — ED PROVIDER NOTES
[facility] EMERGENCY DEPT  eMERGENCY dEPARTMENT eNCOUnter      Pt Name: Jose Tate  MRN: [id number]  Birthdate 1940  Date of evaluation: 8/15/2024  Provider: RHETT NARANJO MD    CHIEF COMPLAINT       Chief Complaint   Patient presents with    Fatigue    Fall     Last night, sent by PCP.          HISTORY OF PRESENT ILLNESS   (Location/Symptom, Timing/Onset,Context/Setting, Quality, Duration, Modifying Factors, Severity)  Note limiting factors.   Jose Tate is a 84 y.o. male who presents to the emergency department for somewhat progressive generalized weakness fatigue over the past week or 2.  Last night he stood up from the toilet and tells me his legs gave out on him and he collapsed onto the floor and laid there for approximately 4 hours.  He denies any head trauma and did not pass out or lose consciousness.  He is able to ultimately get up in the night and make it back to his bed and ultimately was seen by his primary care physician this morning and was noted to have declining renal function low magnesium and other lab abnormalities and ultimately sent here with his family for further evaluation and likely admission.  He arrived here in atrial fibrillation with rapid ventricular response which she has no prior history of.  He denies any chest pain or shortness of breath.  Does admit to decreased by mouth intake over the past couple of weeks.  He has a prior history of lung adenocarcinoma but has been cancer free for approximately 2 years per the patient.    HPI    NursingNotes were reviewed.    REVIEW OF SYSTEMS    (2-9 systems for level 4, 10 or more for level 5)     Review of Systems   Constitutional:  Positive for activity change, appetite change and fatigue. Negative for chills and fever.   HENT:  Negative for rhinorrhea and sore throat.    Respiratory:  Negative for cough and shortness of breath.    Cardiovascular:  Negative for chest pain and leg swelling.   Gastrointestinal:  Negative for abdominal

## 2024-08-15 NOTE — PROGRESS NOTES
SUBJECTIVE:    Jose Tate is 84 y.o.male who comes in complaining of Diarrhea and Nausea   .       HPI: Mr. Tate is an 84-year-old male who comes in because of a 2-day history of nausea diarrhea and dry heaves.  He says he fell last night when he got up to go to the bathroom and he laid on the floor for 4 hours until he had enough energy to pull himself up.  He says that his energy level has been decreasing for several months.  He did see Dr. Malloy in July and they did do blood work.  Thyroid was normal.    He says his stamina is just decreased and he feels weak and shaky.  He has caregivers but he is starting to require more help.    He does not have any bloody diarrhea.      No Known Allergies    Social History     Socioeconomic History    Marital status:      Spouse name: None    Number of children: None    Years of education: None    Highest education level: None   Tobacco Use    Smoking status: Former     Current packs/day: 0.00     Average packs/day: 1.5 packs/day for 30.0 years (45.0 ttl pk-yrs)     Types: Cigarettes     Start date:      Quit date:      Years since quittin.6    Smokeless tobacco: Never   Vaping Use    Vaping status: Never Used   Substance and Sexual Activity    Alcohol use: No    Drug use: No    Sexual activity: Defer     Social Determinants of Health     Financial Resource Strain: Low Risk  (8/15/2024)    Overall Financial Resource Strain (CARDIA)     Difficulty of Paying Living Expenses: Not hard at all   Food Insecurity: No Food Insecurity (8/15/2024)    Hunger Vital Sign     Worried About Running Out of Food in the Last Year: Never true     Ran Out of Food in the Last Year: Never true   Transportation Needs: Unknown (8/15/2024)    PRAPARE - Transportation     Lack of Transportation (Non-Medical): No   Physical Activity: Insufficiently Active (2022)    Exercise Vital Sign     Days of Exercise per Week: 1 day     Minutes of Exercise per Session: 20

## 2024-08-16 ENCOUNTER — APPOINTMENT (OUTPATIENT)
Age: 84
DRG: 243 | End: 2024-08-16
Payer: MEDICARE

## 2024-08-16 ENCOUNTER — APPOINTMENT (OUTPATIENT)
Dept: GENERAL RADIOLOGY | Age: 84
DRG: 243 | End: 2024-08-16
Payer: MEDICARE

## 2024-08-16 PROBLEM — R06.02 SHORTNESS OF BREATH: Status: ACTIVE | Noted: 2024-08-16

## 2024-08-16 PROBLEM — R05.9 COUGH: Status: ACTIVE | Noted: 2024-08-16

## 2024-08-16 PROBLEM — Z51.5 PALLIATIVE CARE PATIENT: Status: ACTIVE | Noted: 2024-08-16

## 2024-08-16 PROBLEM — E44.1 MILD MALNUTRITION (HCC): Chronic | Status: ACTIVE | Noted: 2024-08-16

## 2024-08-16 LAB
ALBUMIN SERPL-MCNC: 3.3 G/DL (ref 3.5–5.2)
ALP SERPL-CCNC: 80 U/L (ref 40–130)
ALT SERPL-CCNC: 10 U/L (ref 5–41)
ANION GAP SERPL CALCULATED.3IONS-SCNC: 19 MMOL/L (ref 7–19)
AST SERPL-CCNC: 17 U/L (ref 5–40)
B PARAP IS1001 DNA NPH QL NAA+NON-PROBE: NOT DETECTED
B PERT.PT PRMT NPH QL NAA+NON-PROBE: NOT DETECTED
BILIRUB SERPL-MCNC: 0.3 MG/DL (ref 0.2–1.2)
BUN SERPL-MCNC: 41 MG/DL (ref 8–23)
C PNEUM DNA NPH QL NAA+NON-PROBE: NOT DETECTED
CALCIUM SERPL-MCNC: 8.9 MG/DL (ref 8.8–10.2)
CHLORIDE SERPL-SCNC: 97 MMOL/L (ref 98–111)
CO2 SERPL-SCNC: 20 MMOL/L (ref 22–29)
CREAT SERPL-MCNC: 2.4 MG/DL (ref 0.5–1.2)
D DIMER PPP FEU-MCNC: 1.24 UG/ML FEU (ref 0–0.48)
ECHO AO ASC DIAM: 2.9 CM
ECHO AO ASCENDING AORTA INDEX: 1.5 CM/M2
ECHO AO ROOT DIAM: 2.6 CM
ECHO AO ROOT INDEX: 1.35 CM/M2
ECHO AO SINUS VALSALVA DIAM: 2.8 CM
ECHO AO SINUS VALSALVA INDEX: 1.45 CM/M2
ECHO AO ST JNCT DIAM: 2.6 CM
ECHO AV AREA PEAK VELOCITY: 2 CM2
ECHO AV AREA VTI: 2.1 CM2
ECHO AV AREA/BSA PEAK VELOCITY: 1 CM2/M2
ECHO AV AREA/BSA VTI: 1.1 CM2/M2
ECHO AV MEAN GRADIENT: 4 MMHG
ECHO AV MEAN VELOCITY: 0.9 M/S
ECHO AV PEAK GRADIENT: 6 MMHG
ECHO AV PEAK VELOCITY: 1.3 M/S
ECHO AV VELOCITY RATIO: 0.62
ECHO AV VTI: 31.3 CM
ECHO BSA: 1.93 M2
ECHO IVC PROX: 1.8 CM
ECHO LA AREA 2C: 12.2 CM2
ECHO LA AREA 4C: 10.4 CM2
ECHO LA DIAMETER INDEX: 1.4 CM/M2
ECHO LA DIAMETER: 2.7 CM
ECHO LA MAJOR AXIS: 3.9 CM
ECHO LA MINOR AXIS: 4.8 CM
ECHO LA TO AORTIC ROOT RATIO: 1.04
ECHO LA VOL BP: 27 ML (ref 18–58)
ECHO LA VOL MOD A2C: 26 ML (ref 18–58)
ECHO LA VOL MOD A4C: 23 ML (ref 18–58)
ECHO LA VOL/BSA BIPLANE: 14 ML/M2 (ref 16–34)
ECHO LA VOLUME INDEX MOD A2C: 13 ML/M2 (ref 16–34)
ECHO LA VOLUME INDEX MOD A4C: 12 ML/M2 (ref 16–34)
ECHO LV E' LATERAL VELOCITY: 9 CM/S
ECHO LV E' SEPTAL VELOCITY: 6 CM/S
ECHO LV EDV A2C: 90 ML
ECHO LV EDV A4C: 77 ML
ECHO LV EDV INDEX A4C: 40 ML/M2
ECHO LV EDV NDEX A2C: 47 ML/M2
ECHO LV EJECTION FRACTION A2C: 55 %
ECHO LV EJECTION FRACTION A4C: 56 %
ECHO LV EJECTION FRACTION BIPLANE: 56 % (ref 55–100)
ECHO LV ESV A2C: 40 ML
ECHO LV ESV A4C: 34 ML
ECHO LV ESV INDEX A2C: 21 ML/M2
ECHO LV ESV INDEX A4C: 18 ML/M2
ECHO LV FRACTIONAL SHORTENING: 17 % (ref 28–44)
ECHO LV INTERNAL DIMENSION DIASTOLE INDEX: 2.49 CM/M2
ECHO LV INTERNAL DIMENSION DIASTOLIC: 4.8 CM (ref 4.2–5.9)
ECHO LV INTERNAL DIMENSION SYSTOLIC INDEX: 2.07 CM/M2
ECHO LV INTERNAL DIMENSION SYSTOLIC: 4 CM
ECHO LV IVSD: 1.2 CM (ref 0.6–1)
ECHO LV MASS 2D: 206.4 G (ref 88–224)
ECHO LV MASS INDEX 2D: 106.9 G/M2 (ref 49–115)
ECHO LV POSTERIOR WALL DIASTOLIC: 1.1 CM (ref 0.6–1)
ECHO LV RELATIVE WALL THICKNESS RATIO: 0.46
ECHO LVOT AREA: 3.1 CM2
ECHO LVOT AV VTI INDEX: 0.65
ECHO LVOT DIAM: 2 CM
ECHO LVOT MEAN GRADIENT: 2 MMHG
ECHO LVOT PEAK GRADIENT: 3 MMHG
ECHO LVOT PEAK VELOCITY: 0.8 M/S
ECHO LVOT STROKE VOLUME INDEX: 33.2 ML/M2
ECHO LVOT SV: 64.1 ML
ECHO LVOT VTI: 20.4 CM
ECHO MV A VELOCITY: 0.98 M/S
ECHO MV AREA VTI: 1.8 CM2
ECHO MV E DECELERATION TIME (DT): 151 MS
ECHO MV E VELOCITY: 0.99 M/S
ECHO MV E/A RATIO: 1.01
ECHO MV E/E' LATERAL: 11
ECHO MV E/E' RATIO (AVERAGED): 13.75
ECHO MV E/E' SEPTAL: 16.5
ECHO MV LVOT VTI INDEX: 1.78
ECHO MV MAX VELOCITY: 1.5 M/S
ECHO MV MEAN GRADIENT: 3 MMHG
ECHO MV MEAN VELOCITY: 0.7 M/S
ECHO MV PEAK GRADIENT: 9 MMHG
ECHO MV VTI: 36.4 CM
ECHO RA AREA 4C: 10.6 CM2
ECHO RA END SYSTOLIC VOLUME APICAL 4 CHAMBER INDEX BSA: 11 ML/M2
ECHO RA VOLUME: 21 ML
ECHO RV BASAL DIMENSION: 2.4 CM
ECHO RV INTERNAL DIMENSION: 1.9 CM
ECHO RV LONGITUDINAL DIMENSION: 5.9 CM
ECHO RV MID DIMENSION: 3 CM
ECHO RV TAPSE: 2.2 CM (ref 1.7–?)
FLUAV RNA NPH QL NAA+NON-PROBE: NOT DETECTED
FLUBV RNA NPH QL NAA+NON-PROBE: NOT DETECTED
GLUCOSE BLD-MCNC: 144 MG/DL (ref 70–99)
GLUCOSE BLD-MCNC: 191 MG/DL (ref 70–99)
GLUCOSE BLD-MCNC: 197 MG/DL (ref 70–99)
GLUCOSE BLD-MCNC: 199 MG/DL (ref 70–99)
GLUCOSE BLD-MCNC: 200 MG/DL (ref 70–99)
GLUCOSE SERPL-MCNC: 168 MG/DL (ref 74–109)
HADV DNA NPH QL NAA+NON-PROBE: NOT DETECTED
HCOV 229E RNA NPH QL NAA+NON-PROBE: NOT DETECTED
HCOV HKU1 RNA NPH QL NAA+NON-PROBE: NOT DETECTED
HCOV NL63 RNA NPH QL NAA+NON-PROBE: NOT DETECTED
HCOV OC43 RNA NPH QL NAA+NON-PROBE: NOT DETECTED
HMPV RNA NPH QL NAA+NON-PROBE: NOT DETECTED
HPIV1 RNA NPH QL NAA+NON-PROBE: NOT DETECTED
HPIV2 RNA NPH QL NAA+NON-PROBE: NOT DETECTED
HPIV3 RNA NPH QL NAA+NON-PROBE: NOT DETECTED
HPIV4 RNA NPH QL NAA+NON-PROBE: NOT DETECTED
INR PPP: 1.18 (ref 0.88–1.18)
M PNEUMO DNA NPH QL NAA+NON-PROBE: NOT DETECTED
MAGNESIUM SERPL-MCNC: 2.1 MG/DL (ref 1.6–2.4)
PERFORMED ON: ABNORMAL
POTASSIUM SERPL-SCNC: 4.2 MMOL/L (ref 3.5–5)
PROT SERPL-MCNC: 7.1 G/DL (ref 6.6–8.7)
PROTHROMBIN TIME: 14.7 SEC (ref 12–14.6)
RSV RNA NPH QL NAA+NON-PROBE: NOT DETECTED
RV+EV RNA NPH QL NAA+NON-PROBE: NOT DETECTED
SARS-COV-2 RNA NPH QL NAA+NON-PROBE: NOT DETECTED
SODIUM SERPL-SCNC: 136 MMOL/L (ref 136–145)
TROPONIN, HIGH SENSITIVITY: 43 NG/L (ref 0–22)
TROPONIN, HIGH SENSITIVITY: 47 NG/L (ref 0–22)

## 2024-08-16 PROCEDURE — 85610 PROTHROMBIN TIME: CPT

## 2024-08-16 PROCEDURE — 2580000003 HC RX 258

## 2024-08-16 PROCEDURE — 82962 GLUCOSE BLOOD TEST: CPT

## 2024-08-16 PROCEDURE — 94760 N-INVAS EAR/PLS OXIMETRY 1: CPT

## 2024-08-16 PROCEDURE — 36415 COLL VENOUS BLD VENIPUNCTURE: CPT

## 2024-08-16 PROCEDURE — 2140000000 HC CCU INTERMEDIATE R&B

## 2024-08-16 PROCEDURE — 6370000000 HC RX 637 (ALT 250 FOR IP): Performed by: INTERNAL MEDICINE

## 2024-08-16 PROCEDURE — 2580000003 HC RX 258: Performed by: HOSPITALIST

## 2024-08-16 PROCEDURE — 6370000000 HC RX 637 (ALT 250 FOR IP)

## 2024-08-16 PROCEDURE — 6360000004 HC RX CONTRAST MEDICATION: Performed by: HOSPITALIST

## 2024-08-16 PROCEDURE — 80053 COMPREHEN METABOLIC PANEL: CPT

## 2024-08-16 PROCEDURE — C8929 TTE W OR WO FOL WCON,DOPPLER: HCPCS

## 2024-08-16 PROCEDURE — 0202U NFCT DS 22 TRGT SARS-COV-2: CPT

## 2024-08-16 PROCEDURE — 6360000002 HC RX W HCPCS

## 2024-08-16 PROCEDURE — 83735 ASSAY OF MAGNESIUM: CPT

## 2024-08-16 PROCEDURE — 84484 ASSAY OF TROPONIN QUANT: CPT

## 2024-08-16 PROCEDURE — 93306 TTE W/DOPPLER COMPLETE: CPT | Performed by: INTERNAL MEDICINE

## 2024-08-16 PROCEDURE — 71045 X-RAY EXAM CHEST 1 VIEW: CPT

## 2024-08-16 PROCEDURE — 85379 FIBRIN DEGRADATION QUANT: CPT

## 2024-08-16 RX ORDER — SODIUM CHLORIDE 0.9 % (FLUSH) 0.9 %
5-40 SYRINGE (ML) INJECTION PRN
Status: DISCONTINUED | OUTPATIENT
Start: 2024-08-16 | End: 2024-08-20 | Stop reason: SDUPTHER

## 2024-08-16 RX ORDER — ENOXAPARIN SODIUM 100 MG/ML
1 INJECTION SUBCUTANEOUS 2 TIMES DAILY
Status: DISCONTINUED | OUTPATIENT
Start: 2024-08-17 | End: 2024-08-20 | Stop reason: HOSPADM

## 2024-08-16 RX ORDER — ENOXAPARIN SODIUM 100 MG/ML
1 INJECTION SUBCUTANEOUS DAILY
Status: DISCONTINUED | OUTPATIENT
Start: 2024-08-16 | End: 2024-08-16

## 2024-08-16 RX ORDER — ONDANSETRON 4 MG/1
4 TABLET, ORALLY DISINTEGRATING ORAL EVERY 8 HOURS PRN
Status: DISCONTINUED | OUTPATIENT
Start: 2024-08-16 | End: 2024-08-20 | Stop reason: HOSPADM

## 2024-08-16 RX ORDER — FUROSEMIDE 40 MG/1
40 TABLET ORAL DAILY
Status: DISCONTINUED | OUTPATIENT
Start: 2024-08-16 | End: 2024-08-20 | Stop reason: HOSPADM

## 2024-08-16 RX ORDER — PANTOPRAZOLE SODIUM 40 MG/1
40 TABLET, DELAYED RELEASE ORAL
Status: DISCONTINUED | OUTPATIENT
Start: 2024-08-16 | End: 2024-08-20 | Stop reason: HOSPADM

## 2024-08-16 RX ORDER — SODIUM CHLORIDE 9 MG/ML
INJECTION, SOLUTION INTRAVENOUS PRN
Status: DISCONTINUED | OUTPATIENT
Start: 2024-08-16 | End: 2024-08-20 | Stop reason: SDUPTHER

## 2024-08-16 RX ORDER — TAMSULOSIN HYDROCHLORIDE 0.4 MG/1
0.4 CAPSULE ORAL DAILY
Status: DISCONTINUED | OUTPATIENT
Start: 2024-08-16 | End: 2024-08-20 | Stop reason: HOSPADM

## 2024-08-16 RX ORDER — ACETAMINOPHEN 325 MG/1
650 TABLET ORAL EVERY 6 HOURS PRN
Status: DISCONTINUED | OUTPATIENT
Start: 2024-08-16 | End: 2024-08-20 | Stop reason: HOSPADM

## 2024-08-16 RX ORDER — ACETAMINOPHEN 650 MG/1
650 SUPPOSITORY RECTAL EVERY 6 HOURS PRN
Status: DISCONTINUED | OUTPATIENT
Start: 2024-08-16 | End: 2024-08-20 | Stop reason: HOSPADM

## 2024-08-16 RX ORDER — ONDANSETRON 2 MG/ML
4 INJECTION INTRAMUSCULAR; INTRAVENOUS EVERY 6 HOURS PRN
Status: DISCONTINUED | OUTPATIENT
Start: 2024-08-16 | End: 2024-08-20 | Stop reason: HOSPADM

## 2024-08-16 RX ORDER — SODIUM CHLORIDE, SODIUM LACTATE, POTASSIUM CHLORIDE, CALCIUM CHLORIDE 600; 310; 30; 20 MG/100ML; MG/100ML; MG/100ML; MG/100ML
INJECTION, SOLUTION INTRAVENOUS CONTINUOUS
Status: DISCONTINUED | OUTPATIENT
Start: 2024-08-16 | End: 2024-08-20 | Stop reason: HOSPADM

## 2024-08-16 RX ORDER — ATORVASTATIN CALCIUM 20 MG/1
20 TABLET, FILM COATED ORAL DAILY
Status: DISCONTINUED | OUTPATIENT
Start: 2024-08-16 | End: 2024-08-20 | Stop reason: HOSPADM

## 2024-08-16 RX ORDER — PRIMIDONE 50 MG/1
50 TABLET ORAL 2 TIMES DAILY
Status: DISCONTINUED | OUTPATIENT
Start: 2024-08-16 | End: 2024-08-20 | Stop reason: HOSPADM

## 2024-08-16 RX ORDER — MONTELUKAST SODIUM 10 MG/1
10 TABLET ORAL NIGHTLY
Status: DISCONTINUED | OUTPATIENT
Start: 2024-08-16 | End: 2024-08-20 | Stop reason: HOSPADM

## 2024-08-16 RX ORDER — GUAIFENESIN/DEXTROMETHORPHAN 100-10MG/5
5 SYRUP ORAL EVERY 4 HOURS PRN
Status: DISCONTINUED | OUTPATIENT
Start: 2024-08-16 | End: 2024-08-20 | Stop reason: HOSPADM

## 2024-08-16 RX ORDER — LEVALBUTEROL INHALATION SOLUTION 0.63 MG/3ML
0.63 SOLUTION RESPIRATORY (INHALATION) EVERY 8 HOURS PRN
Status: DISCONTINUED | OUTPATIENT
Start: 2024-08-16 | End: 2024-08-20 | Stop reason: HOSPADM

## 2024-08-16 RX ORDER — LOSARTAN POTASSIUM 25 MG/1
25 TABLET ORAL DAILY
Status: DISCONTINUED | OUTPATIENT
Start: 2024-08-16 | End: 2024-08-20 | Stop reason: HOSPADM

## 2024-08-16 RX ORDER — PROPRANOLOL HYDROCHLORIDE 20 MG/1
60 TABLET ORAL 2 TIMES DAILY
Status: DISCONTINUED | OUTPATIENT
Start: 2024-08-16 | End: 2024-08-19

## 2024-08-16 RX ORDER — POLYETHYLENE GLYCOL 3350 17 G/17G
17 POWDER, FOR SOLUTION ORAL DAILY PRN
Status: DISCONTINUED | OUTPATIENT
Start: 2024-08-16 | End: 2024-08-20 | Stop reason: HOSPADM

## 2024-08-16 RX ORDER — SODIUM CHLORIDE 0.9 % (FLUSH) 0.9 %
5-40 SYRINGE (ML) INJECTION EVERY 12 HOURS SCHEDULED
Status: DISCONTINUED | OUTPATIENT
Start: 2024-08-16 | End: 2024-08-20 | Stop reason: SDUPTHER

## 2024-08-16 RX ADMIN — PROPRANOLOL HYDROCHLORIDE 60 MG: 20 TABLET ORAL at 01:03

## 2024-08-16 RX ADMIN — ENOXAPARIN SODIUM 80 MG: 100 INJECTION SUBCUTANEOUS at 08:39

## 2024-08-16 RX ADMIN — SODIUM CHLORIDE, POTASSIUM CHLORIDE, SODIUM LACTATE AND CALCIUM CHLORIDE: 600; 310; 30; 20 INJECTION, SOLUTION INTRAVENOUS at 09:38

## 2024-08-16 RX ADMIN — ATORVASTATIN CALCIUM 20 MG: 20 TABLET, FILM COATED ORAL at 08:39

## 2024-08-16 RX ADMIN — SODIUM CHLORIDE, PRESERVATIVE FREE 1.5 ML: 5 INJECTION INTRAVENOUS at 08:14

## 2024-08-16 RX ADMIN — PRIMIDONE 50 MG: 50 TABLET ORAL at 01:03

## 2024-08-16 RX ADMIN — TAMSULOSIN HYDROCHLORIDE 0.4 MG: 0.4 CAPSULE ORAL at 08:39

## 2024-08-16 RX ADMIN — SODIUM CHLORIDE, PRESERVATIVE FREE 10 ML: 5 INJECTION INTRAVENOUS at 08:40

## 2024-08-16 RX ADMIN — ACETAMINOPHEN 650 MG: 325 TABLET ORAL at 20:23

## 2024-08-16 RX ADMIN — ACETAMINOPHEN 650 MG: 325 TABLET ORAL at 01:05

## 2024-08-16 RX ADMIN — MONTELUKAST 10 MG: 10 TABLET, FILM COATED ORAL at 01:03

## 2024-08-16 RX ADMIN — PRIMIDONE 50 MG: 50 TABLET ORAL at 20:15

## 2024-08-16 RX ADMIN — SODIUM CHLORIDE, PRESERVATIVE FREE 10 ML: 5 INJECTION INTRAVENOUS at 20:13

## 2024-08-16 RX ADMIN — PRIMIDONE 50 MG: 50 TABLET ORAL at 08:42

## 2024-08-16 RX ADMIN — PROPRANOLOL HYDROCHLORIDE 60 MG: 20 TABLET ORAL at 08:38

## 2024-08-16 RX ADMIN — MONTELUKAST 10 MG: 10 TABLET, FILM COATED ORAL at 20:13

## 2024-08-16 RX ADMIN — PROPRANOLOL HYDROCHLORIDE 60 MG: 20 TABLET ORAL at 20:13

## 2024-08-16 RX ADMIN — DILTIAZEM HYDROCHLORIDE 30 MG: 30 TABLET, FILM COATED ORAL at 20:13

## 2024-08-16 ASSESSMENT — PAIN DESCRIPTION - LOCATION: LOCATION: GENERALIZED

## 2024-08-16 NOTE — ACP (ADVANCE CARE PLANNING)
Advance Care Planning     Palliative Team Advance Care Planning (ACP) Conversation    Date of Conversation: 08/16/24    Individuals present for the conversation: Patient with decision making capacity and Daughter present     ACP documents on file prior to discussion:  -None    Healthcare Decision Maker:    Primary Decision Maker: sterling Mcmullen - Child - [phone]    Primary Decision Maker: Stefania Covarrubias - Child - [phone]     Conversation Summary:  Code status discussion resulted in pt's wish to be DNI    Resuscitation Status:   Code Status: Limited     Documentation Completed:  -No new documents completed.    I spent 30 minutes with the patient and/or surrogate decision maker discussing the patient's wishes and goals.      Jaylyn Bynum RN

## 2024-08-16 NOTE — CARE COORDINATION
Case Management Assessment  Initial Evaluation    Date/Time of Evaluation: 8/16/2024 9:37 AM  Assessment Completed by: WILMA DENT    If patient is discharged prior to next notation, then this note serves as note for discharge by case management.    Patient Name: Jose Taet                   YOB: 1940  Diagnosis: Hypomagnesemia [E83.42]  Generalized weakness [R53.1]  New onset a-fib (HCC) [I48.91]  Atrial fibrillation with rapid ventricular response (HCC) [I48.91]  Acute kidney injury superimposed on CKD (HCC) [N17.9, N18.9]  Atrial fibrillation, unspecified type (HCC) [I48.91]                   Date / Time: 8/15/2024  3:46 PM    Patient Admission Status: Inpatient   Readmission Risk (Low < 19, Mod (19-27), High > 27): Readmission Risk Score: 18.9    Current PCP: Chayo Grande MD  PCP verified by CM? (P) Yes    Chart Reviewed: Yes      History Provided by: (P) Patient  Patient Orientation: (P) Alert and Oriented, Person, Place, Situation, Self    Patient Cognition: (P) Alert    Hospitalization in the last 30 days (Readmission):  Yes    If yes, Readmission Assessment in  Navigator will be completed.    Advance Directives:      Code Status: Full Code   Patient's Primary Decision Maker is: (P) Legal Next of Kin    Primary Decision Maker: sterling Mcmullen - Child - [phone]    Discharge Planning:    Patient lives with: (P) Alone Type of Home: (P) House  Primary Care Giver: (P) Self  Patient Support Systems include: (P) Children, Family Members   Current Financial resources: (P) Medicare  Current community resources: (P) None  Current services prior to admission: (P) None            Current DME:              Type of Home Care services:  (P) PT, OT    ADLS  Prior functional level: (P) Independent in ADLs/IADLs  Current functional level: (P) Assistance with the following:, Toileting, Bathing    PT AM-PAC:   /24  OT AM-PAC:   /24    Family can provide assistance at DC: (P) Yes  Would you

## 2024-08-16 NOTE — CONSULTS
[facility]  Cardiology Consult      Requesting MD:  Karen Lang MD   Admit Status:         History obtained from:   [] Patient  [] Other (specify):     Patient:  Jose Tate  [id number]     Chief Complaint:   Chief Complaint   Patient presents with    Fatigue    Fall     Last night, sent by PCP.          HPI:  84 y.o. male who presented to [facility] ED complaining of fatigue/fall progressive over the last 2 weeks, found to be in new onset A-fib with RVR on admission.  Patient reports last night he stood up from a toilet and his legs gave way, he collapsed onto the floor, was approximately on the floor for 4 hours.  Patient denied any head trauma and states he did not pass out or lose consciousness.  He was ultimately able to get up during the night and make it to the bed and reported to his PCP this morning and was noted to have a declining renal function, low magnesium, and other lab abnormalities and was ultimately sent to ED with his family for further evaluation.  Patient denies any chest pain or shortness of breath on admission.  Does admit to decreased oral intake over the past couple weeks.  He reports a prior history of lung adenocarcinoma but is currently cancer free for approximately 2 years per patient.   Review of Systems   Constitutional: Negative.    HENT: Negative.     Eyes: Negative.    Respiratory: Negative.     Cardiovascular: Negative.    Gastrointestinal: Negative.    Endocrine: Negative.    Genitourinary: Negative.    Musculoskeletal: Negative.    Allergic/Immunologic: Negative.    Neurological: Negative.    Hematological: Negative.    Psychiatric/Behavioral: Negative.         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

## 2024-08-16 NOTE — CARE COORDINATION
Home Health request sent to [facility].  Awaiting acceptance. Electronically signed by Li Jennings RN on 8/16/2024 at 11:03 AM

## 2024-08-16 NOTE — CONSULTS
Palliative Care:   Pt has a pending referral to be admitted to Supportive Care upon discharge.   Out pt Palliative NP will follow at home.  Met with pt's 2 daughters at the bedside.  Pt was sleeping during visit.  Reviewed goals of care and pt will return home when stable to discharge.  He as some assistance in the home 3 days a week.    Plans are for [facility] to follow as well as a consult for Palliative Care to follow.          Past Medical History:        Past Medical History:   Diagnosis Date    Allergic rhinitis     Bronchitis, chronic (HCC)     Carotid artery stenosis     GERD (gastroesophageal reflux disease)     Heart attack (HCC)     Hemorrhoids     Hypercholesterolemia     Palliative care patient 08/16/2024    Type II or unspecified type diabetes mellitus without mention of complication, not stated as uncontrolled        Advance Directives:   Limited code  Discussion resulted in pt is OK with CPR and attempts to restart his heart, but does not want intubation or ventilator.                           Plan:   Continue medical treatments and monitoring.         Patient/family discussion r/t goals:           Goal is to keep pt at home with support as long as possible.  Daughters are accepting that he may require a higher level of care in the future.             Palliative Performance Scale:      70%      Palliative Care team will continue to follow and support, with ongoing review of pt's goals of care.                Electronically signed by Jaylyn Bynum RN on 8/16/2024 at 11:57 AM

## 2024-08-17 ENCOUNTER — APPOINTMENT (OUTPATIENT)
Dept: NUCLEAR MEDICINE | Age: 84
DRG: 243 | End: 2024-08-17
Payer: MEDICARE

## 2024-08-17 LAB
ALBUMIN SERPL-MCNC: 3 G/DL (ref 3.5–5.2)
ALP SERPL-CCNC: 65 U/L (ref 40–130)
ALT SERPL-CCNC: 12 U/L (ref 5–41)
ANION GAP SERPL CALCULATED.3IONS-SCNC: 13 MMOL/L (ref 7–19)
AST SERPL-CCNC: 15 U/L (ref 5–40)
BILIRUB SERPL-MCNC: <0.2 MG/DL (ref 0.2–1.2)
BUN SERPL-MCNC: 42 MG/DL (ref 8–23)
CALCIUM SERPL-MCNC: 8.2 MG/DL (ref 8.8–10.2)
CHLORIDE SERPL-SCNC: 100 MMOL/L (ref 98–111)
CO2 SERPL-SCNC: 24 MMOL/L (ref 22–29)
CREAT SERPL-MCNC: 2.2 MG/DL (ref 0.5–1.2)
GLUCOSE BLD-MCNC: 195 MG/DL (ref 70–99)
GLUCOSE SERPL-MCNC: 129 MG/DL (ref 74–109)
MAGNESIUM SERPL-MCNC: 1.8 MG/DL (ref 1.6–2.4)
PERFORMED ON: ABNORMAL
POTASSIUM SERPL-SCNC: 4.2 MMOL/L (ref 3.5–5)
PROT SERPL-MCNC: 6.1 G/DL (ref 6.6–8.7)
SODIUM SERPL-SCNC: 137 MMOL/L (ref 136–145)

## 2024-08-17 PROCEDURE — A9540 TC99M MAA: HCPCS

## 2024-08-17 PROCEDURE — 6360000002 HC RX W HCPCS: Performed by: HOSPITALIST

## 2024-08-17 PROCEDURE — 82962 GLUCOSE BLOOD TEST: CPT

## 2024-08-17 PROCEDURE — 97161 PT EVAL LOW COMPLEX 20 MIN: CPT

## 2024-08-17 PROCEDURE — 36415 COLL VENOUS BLD VENIPUNCTURE: CPT

## 2024-08-17 PROCEDURE — 83735 ASSAY OF MAGNESIUM: CPT

## 2024-08-17 PROCEDURE — 6370000000 HC RX 637 (ALT 250 FOR IP)

## 2024-08-17 PROCEDURE — 2580000003 HC RX 258

## 2024-08-17 PROCEDURE — 3430000000 HC RX DIAGNOSTIC RADIOPHARMACEUTICAL

## 2024-08-17 PROCEDURE — 78580 LUNG PERFUSION IMAGING: CPT

## 2024-08-17 PROCEDURE — 80053 COMPREHEN METABOLIC PANEL: CPT

## 2024-08-17 PROCEDURE — 94760 N-INVAS EAR/PLS OXIMETRY 1: CPT

## 2024-08-17 PROCEDURE — 2140000000 HC CCU INTERMEDIATE R&B

## 2024-08-17 PROCEDURE — 6370000000 HC RX 637 (ALT 250 FOR IP): Performed by: INTERNAL MEDICINE

## 2024-08-17 PROCEDURE — 97530 THERAPEUTIC ACTIVITIES: CPT

## 2024-08-17 RX ADMIN — SODIUM CHLORIDE, PRESERVATIVE FREE 10 ML: 5 INJECTION INTRAVENOUS at 21:01

## 2024-08-17 RX ADMIN — DILTIAZEM HYDROCHLORIDE 30 MG: 30 TABLET, FILM COATED ORAL at 20:40

## 2024-08-17 RX ADMIN — MONTELUKAST 10 MG: 10 TABLET, FILM COATED ORAL at 20:40

## 2024-08-17 RX ADMIN — ATORVASTATIN CALCIUM 20 MG: 20 TABLET, FILM COATED ORAL at 08:36

## 2024-08-17 RX ADMIN — Medication 5 MILLICURIE: at 10:22

## 2024-08-17 RX ADMIN — DILTIAZEM HYDROCHLORIDE 30 MG: 30 TABLET, FILM COATED ORAL at 08:35

## 2024-08-17 RX ADMIN — PROPRANOLOL HYDROCHLORIDE 60 MG: 20 TABLET ORAL at 08:35

## 2024-08-17 RX ADMIN — PRIMIDONE 50 MG: 50 TABLET ORAL at 20:40

## 2024-08-17 RX ADMIN — TAMSULOSIN HYDROCHLORIDE 0.4 MG: 0.4 CAPSULE ORAL at 08:35

## 2024-08-17 RX ADMIN — PROPRANOLOL HYDROCHLORIDE 60 MG: 20 TABLET ORAL at 20:40

## 2024-08-17 RX ADMIN — ENOXAPARIN SODIUM 80 MG: 100 INJECTION SUBCUTANEOUS at 20:40

## 2024-08-17 RX ADMIN — SODIUM CHLORIDE, PRESERVATIVE FREE 10 ML: 5 INJECTION INTRAVENOUS at 08:36

## 2024-08-17 RX ADMIN — ENOXAPARIN SODIUM 80 MG: 100 INJECTION SUBCUTANEOUS at 08:36

## 2024-08-17 RX ADMIN — GUAIFENESIN SYRUP AND DEXTROMETHORPHAN 5 ML: 100; 10 SYRUP ORAL at 10:01

## 2024-08-17 RX ADMIN — PANTOPRAZOLE SODIUM 40 MG: 40 TABLET, DELAYED RELEASE ORAL at 08:36

## 2024-08-17 RX ADMIN — PRIMIDONE 50 MG: 50 TABLET ORAL at 08:35

## 2024-08-18 LAB
ALBUMIN SERPL-MCNC: 2.9 G/DL (ref 3.5–5.2)
ALP SERPL-CCNC: 79 U/L (ref 40–130)
ALT SERPL-CCNC: 12 U/L (ref 5–41)
ANION GAP SERPL CALCULATED.3IONS-SCNC: 17 MMOL/L (ref 7–19)
AST SERPL-CCNC: 18 U/L (ref 5–40)
BILIRUB SERPL-MCNC: 0.2 MG/DL (ref 0.2–1.2)
BNP BLD-MCNC: 8253 PG/ML (ref 0–449)
BUN SERPL-MCNC: 37 MG/DL (ref 8–23)
CALCIUM SERPL-MCNC: 8.6 MG/DL (ref 8.8–10.2)
CHLORIDE SERPL-SCNC: 97 MMOL/L (ref 98–111)
CO2 SERPL-SCNC: 19 MMOL/L (ref 22–29)
CREAT SERPL-MCNC: 1.6 MG/DL (ref 0.5–1.2)
GLUCOSE SERPL-MCNC: 173 MG/DL (ref 74–109)
MAGNESIUM SERPL-MCNC: 1.8 MG/DL (ref 1.6–2.4)
POTASSIUM SERPL-SCNC: 4.9 MMOL/L (ref 3.5–5)
PROT SERPL-MCNC: 6.7 G/DL (ref 6.6–8.7)
SODIUM SERPL-SCNC: 133 MMOL/L (ref 136–145)

## 2024-08-18 PROCEDURE — 2580000003 HC RX 258

## 2024-08-18 PROCEDURE — 6370000000 HC RX 637 (ALT 250 FOR IP)

## 2024-08-18 PROCEDURE — 94760 N-INVAS EAR/PLS OXIMETRY 1: CPT

## 2024-08-18 PROCEDURE — 80053 COMPREHEN METABOLIC PANEL: CPT

## 2024-08-18 PROCEDURE — 83735 ASSAY OF MAGNESIUM: CPT

## 2024-08-18 PROCEDURE — 2140000000 HC CCU INTERMEDIATE R&B

## 2024-08-18 PROCEDURE — 6360000002 HC RX W HCPCS: Performed by: HOSPITALIST

## 2024-08-18 PROCEDURE — 83880 ASSAY OF NATRIURETIC PEPTIDE: CPT

## 2024-08-18 PROCEDURE — 36415 COLL VENOUS BLD VENIPUNCTURE: CPT

## 2024-08-18 PROCEDURE — 6370000000 HC RX 637 (ALT 250 FOR IP): Performed by: INTERNAL MEDICINE

## 2024-08-18 RX ORDER — HYDRALAZINE HYDROCHLORIDE 25 MG/1
25 TABLET, FILM COATED ORAL EVERY 8 HOURS SCHEDULED
Status: DISCONTINUED | OUTPATIENT
Start: 2024-08-18 | End: 2024-08-20 | Stop reason: HOSPADM

## 2024-08-18 RX ADMIN — PANTOPRAZOLE SODIUM 40 MG: 40 TABLET, DELAYED RELEASE ORAL at 06:11

## 2024-08-18 RX ADMIN — HYDRALAZINE HYDROCHLORIDE 25 MG: 25 TABLET ORAL at 16:48

## 2024-08-18 RX ADMIN — PRIMIDONE 50 MG: 50 TABLET ORAL at 20:10

## 2024-08-18 RX ADMIN — SODIUM CHLORIDE, PRESERVATIVE FREE 10 ML: 5 INJECTION INTRAVENOUS at 08:41

## 2024-08-18 RX ADMIN — PROPRANOLOL HYDROCHLORIDE 60 MG: 20 TABLET ORAL at 08:40

## 2024-08-18 RX ADMIN — TAMSULOSIN HYDROCHLORIDE 0.4 MG: 0.4 CAPSULE ORAL at 08:40

## 2024-08-18 RX ADMIN — ATORVASTATIN CALCIUM 20 MG: 20 TABLET, FILM COATED ORAL at 08:40

## 2024-08-18 RX ADMIN — DILTIAZEM HYDROCHLORIDE 30 MG: 30 TABLET, FILM COATED ORAL at 20:08

## 2024-08-18 RX ADMIN — SODIUM CHLORIDE, PRESERVATIVE FREE 10 ML: 5 INJECTION INTRAVENOUS at 21:29

## 2024-08-18 RX ADMIN — GUAIFENESIN SYRUP AND DEXTROMETHORPHAN 5 ML: 100; 10 SYRUP ORAL at 10:52

## 2024-08-18 RX ADMIN — MONTELUKAST 10 MG: 10 TABLET, FILM COATED ORAL at 20:08

## 2024-08-18 RX ADMIN — PROPRANOLOL HYDROCHLORIDE 60 MG: 20 TABLET ORAL at 20:08

## 2024-08-18 RX ADMIN — HYDRALAZINE HYDROCHLORIDE 25 MG: 25 TABLET ORAL at 20:08

## 2024-08-18 RX ADMIN — PRIMIDONE 50 MG: 50 TABLET ORAL at 08:40

## 2024-08-18 RX ADMIN — DILTIAZEM HYDROCHLORIDE 30 MG: 30 TABLET, FILM COATED ORAL at 08:40

## 2024-08-18 RX ADMIN — ENOXAPARIN SODIUM 80 MG: 100 INJECTION SUBCUTANEOUS at 08:40

## 2024-08-18 RX ADMIN — ENOXAPARIN SODIUM 80 MG: 100 INJECTION SUBCUTANEOUS at 20:08

## 2024-08-19 ENCOUNTER — APPOINTMENT (OUTPATIENT)
Dept: GENERAL RADIOLOGY | Age: 84
DRG: 243 | End: 2024-08-19
Payer: MEDICARE

## 2024-08-19 PROBLEM — I49.5 TACHY-BRADY SYNDROME (HCC): Status: ACTIVE | Noted: 2024-08-15

## 2024-08-19 LAB
ANION GAP SERPL CALCULATED.3IONS-SCNC: 13 MMOL/L (ref 7–19)
BASOPHILS # BLD: 0.1 K/UL (ref 0–0.2)
BASOPHILS NFR BLD: 0.8 % (ref 0–1)
BUN SERPL-MCNC: 25 MG/DL (ref 8–23)
CALCIUM SERPL-MCNC: 8.8 MG/DL (ref 8.8–10.2)
CHLORIDE SERPL-SCNC: 97 MMOL/L (ref 98–111)
CO2 SERPL-SCNC: 24 MMOL/L (ref 22–29)
CREAT SERPL-MCNC: 1.3 MG/DL (ref 0.5–1.2)
ECHO BSA: 1.93 M2
EOSINOPHIL # BLD: 0.3 K/UL (ref 0–0.6)
EOSINOPHIL NFR BLD: 3.1 % (ref 0–5)
ERYTHROCYTE [DISTWIDTH] IN BLOOD BY AUTOMATED COUNT: 13.2 % (ref 11.5–14.5)
GLUCOSE BLD-MCNC: 218 MG/DL (ref 70–99)
GLUCOSE BLD-MCNC: 331 MG/DL (ref 70–99)
GLUCOSE SERPL-MCNC: 202 MG/DL (ref 74–109)
HCT VFR BLD AUTO: 27.2 % (ref 42–52)
HGB BLD-MCNC: 8.8 G/DL (ref 14–18)
IMM GRANULOCYTES # BLD: 0 K/UL
LYMPHOCYTES # BLD: 1.6 K/UL (ref 1.1–4.5)
LYMPHOCYTES NFR BLD: 17.7 % (ref 20–40)
MCH RBC QN AUTO: 30.8 PG (ref 27–31)
MCHC RBC AUTO-ENTMCNC: 32.4 G/DL (ref 33–37)
MCV RBC AUTO: 95.1 FL (ref 80–94)
MONOCYTES # BLD: 0.9 K/UL (ref 0–0.9)
MONOCYTES NFR BLD: 9.3 % (ref 0–10)
NEUTROPHILS # BLD: 6.3 K/UL (ref 1.5–7.5)
NEUTS SEG NFR BLD: 68.7 % (ref 50–65)
PERFORMED ON: ABNORMAL
PERFORMED ON: ABNORMAL
PLATELET # BLD AUTO: 335 K/UL (ref 130–400)
PMV BLD AUTO: 9.5 FL (ref 9.4–12.4)
POTASSIUM SERPL-SCNC: 4.5 MMOL/L (ref 3.5–5)
RBC # BLD AUTO: 2.86 M/UL (ref 4.7–6.1)
SODIUM SERPL-SCNC: 134 MMOL/L (ref 136–145)
WBC # BLD AUTO: 9.1 K/UL (ref 4.8–10.8)

## 2024-08-19 PROCEDURE — 6370000000 HC RX 637 (ALT 250 FOR IP): Performed by: NURSE PRACTITIONER

## 2024-08-19 PROCEDURE — 82962 GLUCOSE BLOOD TEST: CPT

## 2024-08-19 PROCEDURE — C1898 LEAD, PMKR, OTHER THAN TRANS: HCPCS | Performed by: INTERNAL MEDICINE

## 2024-08-19 PROCEDURE — 6360000002 HC RX W HCPCS: Performed by: INTERNAL MEDICINE

## 2024-08-19 PROCEDURE — C1892 INTRO/SHEATH,FIXED,PEEL-AWAY: HCPCS | Performed by: INTERNAL MEDICINE

## 2024-08-19 PROCEDURE — 2580000003 HC RX 258: Performed by: INTERNAL MEDICINE

## 2024-08-19 PROCEDURE — C1785 PMKR, DUAL, RATE-RESP: HCPCS | Performed by: INTERNAL MEDICINE

## 2024-08-19 PROCEDURE — 85025 COMPLETE CBC W/AUTO DIFF WBC: CPT

## 2024-08-19 PROCEDURE — 2140000000 HC CCU INTERMEDIATE R&B

## 2024-08-19 PROCEDURE — 99153 MOD SED SAME PHYS/QHP EA: CPT | Performed by: INTERNAL MEDICINE

## 2024-08-19 PROCEDURE — 99152 MOD SED SAME PHYS/QHP 5/>YRS: CPT | Performed by: INTERNAL MEDICINE

## 2024-08-19 PROCEDURE — 02H63JZ INSERTION OF PACEMAKER LEAD INTO RIGHT ATRIUM, PERCUTANEOUS APPROACH: ICD-10-PCS | Performed by: INTERNAL MEDICINE

## 2024-08-19 PROCEDURE — 80048 BASIC METABOLIC PNL TOTAL CA: CPT

## 2024-08-19 PROCEDURE — 2500000003 HC RX 250 WO HCPCS: Performed by: INTERNAL MEDICINE

## 2024-08-19 PROCEDURE — 33208 INSRT HEART PM ATRIAL & VENT: CPT | Performed by: INTERNAL MEDICINE

## 2024-08-19 PROCEDURE — 71045 X-RAY EXAM CHEST 1 VIEW: CPT

## 2024-08-19 PROCEDURE — C1769 GUIDE WIRE: HCPCS | Performed by: INTERNAL MEDICINE

## 2024-08-19 PROCEDURE — 6360000002 HC RX W HCPCS: Performed by: HOSPITALIST

## 2024-08-19 PROCEDURE — 6370000000 HC RX 637 (ALT 250 FOR IP): Performed by: INTERNAL MEDICINE

## 2024-08-19 PROCEDURE — 94760 N-INVAS EAR/PLS OXIMETRY 1: CPT

## 2024-08-19 PROCEDURE — 0JH606Z INSERTION OF PACEMAKER, DUAL CHAMBER INTO CHEST SUBCUTANEOUS TISSUE AND FASCIA, OPEN APPROACH: ICD-10-PCS | Performed by: INTERNAL MEDICINE

## 2024-08-19 PROCEDURE — 2709999900 HC NON-CHARGEABLE SUPPLY: Performed by: INTERNAL MEDICINE

## 2024-08-19 PROCEDURE — 36415 COLL VENOUS BLD VENIPUNCTURE: CPT

## 2024-08-19 PROCEDURE — 6370000000 HC RX 637 (ALT 250 FOR IP)

## 2024-08-19 PROCEDURE — 02HK3JZ INSERTION OF PACEMAKER LEAD INTO RIGHT VENTRICLE, PERCUTANEOUS APPROACH: ICD-10-PCS | Performed by: INTERNAL MEDICINE

## 2024-08-19 PROCEDURE — 2580000003 HC RX 258

## 2024-08-19 DEVICE — IPG W1DR01 AZURE XT DR MRI USA
Type: IMPLANTABLE DEVICE | Site: CHEST | Status: FUNCTIONAL
Brand: AZURE™ XT DR MRI SURESCAN™

## 2024-08-19 DEVICE — LEAD 5076-52 MRI US RCMCRD
Type: IMPLANTABLE DEVICE | Site: CHEST | Status: FUNCTIONAL
Brand: CAPSUREFIX NOVUS MRI™ SURESCAN®

## 2024-08-19 DEVICE — LEAD 407458 MRI US BI MVC RCMRCD
Type: IMPLANTABLE DEVICE | Site: CHEST | Status: FUNCTIONAL
Brand: CAPSURE SENSE MRI™ SURESCAN™

## 2024-08-19 RX ORDER — INSULIN LISPRO 100 [IU]/ML
0-4 INJECTION, SOLUTION INTRAVENOUS; SUBCUTANEOUS NIGHTLY
Status: DISCONTINUED | OUTPATIENT
Start: 2024-08-19 | End: 2024-08-20 | Stop reason: HOSPADM

## 2024-08-19 RX ORDER — DILTIAZEM HYDROCHLORIDE 120 MG/1
120 CAPSULE, COATED, EXTENDED RELEASE ORAL DAILY
Status: DISCONTINUED | OUTPATIENT
Start: 2024-08-19 | End: 2024-08-20 | Stop reason: HOSPADM

## 2024-08-19 RX ORDER — SODIUM CHLORIDE 0.9 % (FLUSH) 0.9 %
5-40 SYRINGE (ML) INJECTION PRN
Status: DISCONTINUED | OUTPATIENT
Start: 2024-08-19 | End: 2024-08-19 | Stop reason: HOSPADM

## 2024-08-19 RX ORDER — METOPROLOL TARTRATE 50 MG/1
25 TABLET, FILM COATED ORAL 2 TIMES DAILY
Status: DISCONTINUED | OUTPATIENT
Start: 2024-08-19 | End: 2024-08-20

## 2024-08-19 RX ORDER — INSULIN LISPRO 100 [IU]/ML
0-4 INJECTION, SOLUTION INTRAVENOUS; SUBCUTANEOUS
Status: DISCONTINUED | OUTPATIENT
Start: 2024-08-19 | End: 2024-08-20 | Stop reason: HOSPADM

## 2024-08-19 RX ORDER — SODIUM CHLORIDE 9 MG/ML
INJECTION, SOLUTION INTRAVENOUS PRN
Status: DISCONTINUED | OUTPATIENT
Start: 2024-08-19 | End: 2024-08-19 | Stop reason: HOSPADM

## 2024-08-19 RX ORDER — SODIUM CHLORIDE 0.9 % (FLUSH) 0.9 %
5-40 SYRINGE (ML) INJECTION EVERY 12 HOURS SCHEDULED
Status: DISCONTINUED | OUTPATIENT
Start: 2024-08-19 | End: 2024-08-19 | Stop reason: HOSPADM

## 2024-08-19 RX ORDER — SODIUM CHLORIDE 0.9 % (FLUSH) 0.9 %
5-40 SYRINGE (ML) INJECTION PRN
Status: DISCONTINUED | OUTPATIENT
Start: 2024-08-19 | End: 2024-08-20 | Stop reason: HOSPADM

## 2024-08-19 RX ORDER — PROMETHAZINE HYDROCHLORIDE 25 MG/1
25 TABLET ORAL EVERY 6 HOURS PRN
Status: DISCONTINUED | OUTPATIENT
Start: 2024-08-19 | End: 2024-08-20 | Stop reason: HOSPADM

## 2024-08-19 RX ORDER — MIDAZOLAM HYDROCHLORIDE 1 MG/ML
INJECTION INTRAMUSCULAR; INTRAVENOUS PRN
Status: DISCONTINUED | OUTPATIENT
Start: 2024-08-19 | End: 2024-08-19 | Stop reason: HOSPADM

## 2024-08-19 RX ORDER — DEXTROSE MONOHYDRATE 100 MG/ML
INJECTION, SOLUTION INTRAVENOUS CONTINUOUS PRN
Status: DISCONTINUED | OUTPATIENT
Start: 2024-08-19 | End: 2024-08-20 | Stop reason: HOSPADM

## 2024-08-19 RX ORDER — FENTANYL CITRATE 50 UG/ML
INJECTION, SOLUTION INTRAMUSCULAR; INTRAVENOUS PRN
Status: DISCONTINUED | OUTPATIENT
Start: 2024-08-19 | End: 2024-08-19 | Stop reason: HOSPADM

## 2024-08-19 RX ORDER — SODIUM CHLORIDE 0.9 % (FLUSH) 0.9 %
5-40 SYRINGE (ML) INJECTION EVERY 12 HOURS SCHEDULED
Status: DISCONTINUED | OUTPATIENT
Start: 2024-08-19 | End: 2024-08-20 | Stop reason: HOSPADM

## 2024-08-19 RX ORDER — SODIUM CHLORIDE 9 MG/ML
INJECTION, SOLUTION INTRAVENOUS PRN
Status: DISCONTINUED | OUTPATIENT
Start: 2024-08-19 | End: 2024-08-20 | Stop reason: HOSPADM

## 2024-08-19 RX ORDER — METOPROLOL TARTRATE 1 MG/ML
INJECTION, SOLUTION INTRAVENOUS PRN
Status: DISCONTINUED | OUTPATIENT
Start: 2024-08-19 | End: 2024-08-19 | Stop reason: HOSPADM

## 2024-08-19 RX ADMIN — SODIUM CHLORIDE, PRESERVATIVE FREE 10 ML: 5 INJECTION INTRAVENOUS at 20:52

## 2024-08-19 RX ADMIN — PROMETHAZINE HYDROCHLORIDE 25 MG: 25 TABLET ORAL at 13:39

## 2024-08-19 RX ADMIN — METOPROLOL TARTRATE 25 MG: 50 TABLET, FILM COATED ORAL at 20:47

## 2024-08-19 RX ADMIN — GUAIFENESIN SYRUP AND DEXTROMETHORPHAN 5 ML: 100; 10 SYRUP ORAL at 10:58

## 2024-08-19 RX ADMIN — MONTELUKAST 10 MG: 10 TABLET, FILM COATED ORAL at 20:47

## 2024-08-19 RX ADMIN — HYDRALAZINE HYDROCHLORIDE 25 MG: 25 TABLET ORAL at 20:47

## 2024-08-19 RX ADMIN — GUAIFENESIN SYRUP AND DEXTROMETHORPHAN 5 ML: 100; 10 SYRUP ORAL at 17:22

## 2024-08-19 RX ADMIN — INSULIN LISPRO 1 UNITS: 100 INJECTION, SOLUTION INTRAVENOUS; SUBCUTANEOUS at 17:24

## 2024-08-19 RX ADMIN — ENOXAPARIN SODIUM 80 MG: 100 INJECTION SUBCUTANEOUS at 20:49

## 2024-08-19 RX ADMIN — PRIMIDONE 50 MG: 50 TABLET ORAL at 20:47

## 2024-08-19 RX ADMIN — DILTIAZEM HYDROCHLORIDE 120 MG: 120 CAPSULE, COATED, EXTENDED RELEASE ORAL at 10:52

## 2024-08-19 RX ADMIN — HYDRALAZINE HYDROCHLORIDE 25 MG: 25 TABLET ORAL at 05:38

## 2024-08-19 RX ADMIN — METOPROLOL TARTRATE 25 MG: 50 TABLET, FILM COATED ORAL at 10:58

## 2024-08-19 RX ADMIN — ONDANSETRON 4 MG: 4 TABLET, ORALLY DISINTEGRATING ORAL at 10:33

## 2024-08-19 RX ADMIN — ATORVASTATIN CALCIUM 20 MG: 20 TABLET, FILM COATED ORAL at 10:50

## 2024-08-19 RX ADMIN — PANTOPRAZOLE SODIUM 40 MG: 40 TABLET, DELAYED RELEASE ORAL at 05:38

## 2024-08-19 RX ADMIN — SODIUM CHLORIDE, PRESERVATIVE FREE 10 ML: 5 INJECTION INTRAVENOUS at 11:09

## 2024-08-19 RX ADMIN — ENOXAPARIN SODIUM 80 MG: 100 INJECTION SUBCUTANEOUS at 10:51

## 2024-08-19 RX ADMIN — INSULIN LISPRO 4 UNITS: 100 INJECTION, SOLUTION INTRAVENOUS; SUBCUTANEOUS at 20:47

## 2024-08-19 ASSESSMENT — ENCOUNTER SYMPTOMS
DIARRHEA: 0
ABDOMINAL DISTENTION: 0
VOMITING: 0
COLOR CHANGE: 0
NAUSEA: 0
COUGH: 0
SHORTNESS OF BREATH: 0
BLOOD IN STOOL: 0
ABDOMINAL PAIN: 0
CONSTIPATION: 0
WHEEZING: 0

## 2024-08-19 NOTE — CARE COORDINATION
Pt medication cost will be 267.63. will provide one time coupon  Electronically signed by WILMA DENT on 8/19/2024 at 2:26 PM

## 2024-08-19 NOTE — CONSULTS
Cardiology Consult Note    ADMISSION DAY:  8/15/2024  3:46 PM   Consult Date:  8/19/2024 8:08 AM    Reason for Consultation: AF with tachybrady syndrome    History of Present Illness: 83 yo suffered URI last week and became weak and spent 4 hrs on the floor before he made it to a chair and pulled himself up. He was found to be in AF and when he was given rate control medications he developed sinus bradycardia in the 40s. He reports weakness. He feels better today and is back in AF with RVR    No Known Allergies    Current Medications  Current Facility-Administered Medications: hydrALAZINE (APRESOLINE) tablet 25 mg, 25 mg, Oral, 3 times per day  [Held by provider] furosemide (LASIX) tablet 40 mg, 40 mg, Oral, Daily  [Held by provider] losartan (COZAAR) tablet 25 mg, 25 mg, Oral, Daily  montelukast (SINGULAIR) tablet 10 mg, 10 mg, Oral, Nightly  pantoprazole (PROTONIX) tablet 40 mg, 40 mg, Oral, QAM AC  primidone (MYSOLINE) tablet 50 mg, 50 mg, Oral, BID  propranolol (INDERAL) tablet 60 mg, 60 mg, Oral, BID  atorvastatin (LIPITOR) tablet 20 mg, 20 mg, Oral, Daily  tamsulosin (FLOMAX) capsule 0.4 mg, 0.4 mg, Oral, Daily  sodium chloride flush 0.9 % injection 5-40 mL, 5-40 mL, IntraVENous, 2 times per day  sodium chloride flush 0.9 % injection 5-40 mL, 5-40 mL, IntraVENous, PRN  0.9 % sodium chloride infusion, , IntraVENous, PRN  ondansetron (ZOFRAN-ODT) disintegrating tablet 4 mg, 4 mg, Oral, Q8H PRN **OR** ondansetron (ZOFRAN) injection 4 mg, 4 mg, IntraVENous, Q6H PRN  polyethylene glycol (GLYCOLAX) packet 17 g, 17 g, Oral, Daily PRN  acetaminophen (TYLENOL) tablet 650 mg, 650 mg, Oral, Q6H PRN **OR** acetaminophen (TYLENOL) suppository 650 mg, 650 mg, Rectal, Q6H PRN  dilTIAZem (CARDIZEM) tablet 30 mg, 30 mg, Oral, 2 times per day  lactated ringers IV soln infusion, , IntraVENous, Continuous  levalbuterol (XOPENEX) nebulization 0.63 mg, 0.63 mg, Nebulization, Q8H PRN  guaiFENesin-dextromethorphan (ROBITUSSIN DM)

## 2024-08-19 NOTE — CARE COORDINATION
GS   Feels ok  Abd  Slight distended mainly lower  For SBFT today Patient has been set up with [facility].  Please fax discharge summary/AVS at time of discharge.  [facility]  P:  [phone]  F:  [phone]  Electronically signed by Li Jennings RN on 8/19/2024 at 8:10 AM

## 2024-08-20 ENCOUNTER — HOSPITAL ENCOUNTER (INPATIENT)
Age: 84
LOS: 11 days | Discharge: HOME HEALTH | End: 2024-08-31
Attending: PSYCHIATRY & NEUROLOGY | Admitting: PSYCHIATRY & NEUROLOGY
Payer: MEDICARE

## 2024-08-20 VITALS
DIASTOLIC BLOOD PRESSURE: 87 MMHG | HEART RATE: 119 BPM | OXYGEN SATURATION: 97 % | WEIGHT: 171.13 LBS | SYSTOLIC BLOOD PRESSURE: 150 MMHG | TEMPERATURE: 97.3 F | RESPIRATION RATE: 16 BRPM | BODY MASS INDEX: 24.5 KG/M2 | HEIGHT: 70 IN

## 2024-08-20 DIAGNOSIS — E11.9 TYPE 2 DIABETES MELLITUS WITHOUT COMPLICATION, WITHOUT LONG-TERM CURRENT USE OF INSULIN (HCC): ICD-10-CM

## 2024-08-20 DIAGNOSIS — R06.02 SHORTNESS OF BREATH: ICD-10-CM

## 2024-08-20 DIAGNOSIS — I48.21 PERMANENT ATRIAL FIBRILLATION (HCC): ICD-10-CM

## 2024-08-20 DIAGNOSIS — D63.8 ANEMIA IN OTHER CHRONIC DISEASES CLASSIFIED ELSEWHERE: ICD-10-CM

## 2024-08-20 DIAGNOSIS — I50.22 CHRONIC SYSTOLIC (CONGESTIVE) HEART FAILURE (HCC): ICD-10-CM

## 2024-08-20 DIAGNOSIS — I25.119 CORONARY ARTERY DISEASE INVOLVING NATIVE HEART WITH ANGINA PECTORIS, UNSPECIFIED VESSEL OR LESION TYPE (HCC): ICD-10-CM

## 2024-08-20 DIAGNOSIS — I48.91 NEW ONSET A-FIB (HCC): ICD-10-CM

## 2024-08-20 DIAGNOSIS — N17.9 ACUTE KIDNEY INJURY (HCC): Primary | ICD-10-CM

## 2024-08-20 PROBLEM — Z95.0 PACEMAKER: Status: ACTIVE | Noted: 2024-08-20

## 2024-08-20 LAB
ANION GAP SERPL CALCULATED.3IONS-SCNC: 13 MMOL/L (ref 7–19)
BASOPHILS # BLD: 0.1 K/UL (ref 0–0.2)
BASOPHILS NFR BLD: 0.9 % (ref 0–1)
BUN SERPL-MCNC: 25 MG/DL (ref 8–23)
CALCIUM SERPL-MCNC: 8.8 MG/DL (ref 8.8–10.2)
CHLORIDE SERPL-SCNC: 99 MMOL/L (ref 98–111)
CO2 SERPL-SCNC: 24 MMOL/L (ref 22–29)
CREAT SERPL-MCNC: 1.8 MG/DL (ref 0.5–1.2)
EOSINOPHIL # BLD: 0.3 K/UL (ref 0–0.6)
EOSINOPHIL NFR BLD: 2.4 % (ref 0–5)
ERYTHROCYTE [DISTWIDTH] IN BLOOD BY AUTOMATED COUNT: 13.4 % (ref 11.5–14.5)
GLUCOSE BLD-MCNC: 230 MG/DL (ref 70–99)
GLUCOSE BLD-MCNC: 256 MG/DL (ref 70–99)
GLUCOSE BLD-MCNC: 260 MG/DL (ref 70–99)
GLUCOSE SERPL-MCNC: 128 MG/DL (ref 74–109)
HCT VFR BLD AUTO: 28.8 % (ref 42–52)
HGB BLD-MCNC: 9.3 G/DL (ref 14–18)
IMM GRANULOCYTES # BLD: 0.1 K/UL
LYMPHOCYTES # BLD: 1.7 K/UL (ref 1.1–4.5)
LYMPHOCYTES NFR BLD: 16.4 % (ref 20–40)
MCH RBC QN AUTO: 31.4 PG (ref 27–31)
MCHC RBC AUTO-ENTMCNC: 32.3 G/DL (ref 33–37)
MCV RBC AUTO: 97.3 FL (ref 80–94)
MONOCYTES # BLD: 1.2 K/UL (ref 0–0.9)
MONOCYTES NFR BLD: 11.4 % (ref 0–10)
NEUTROPHILS # BLD: 7 K/UL (ref 1.5–7.5)
NEUTS SEG NFR BLD: 68.2 % (ref 50–65)
PERFORMED ON: ABNORMAL
PLATELET # BLD AUTO: 384 K/UL (ref 130–400)
PMV BLD AUTO: 9.9 FL (ref 9.4–12.4)
POTASSIUM SERPL-SCNC: 5.3 MMOL/L (ref 3.5–5)
PREALB SERPL-MCNC: 16 MG/DL (ref 20–40)
RBC # BLD AUTO: 2.96 M/UL (ref 4.7–6.1)
SODIUM SERPL-SCNC: 136 MMOL/L (ref 136–145)
TSH SERPL DL<=0.005 MIU/L-ACNC: 1.31 UIU/ML (ref 0.27–4.2)
WBC # BLD AUTO: 10.3 K/UL (ref 4.8–10.8)

## 2024-08-20 PROCEDURE — 6370000000 HC RX 637 (ALT 250 FOR IP): Performed by: INTERNAL MEDICINE

## 2024-08-20 PROCEDURE — 1180000000 HC REHAB R&B

## 2024-08-20 PROCEDURE — 97165 OT EVAL LOW COMPLEX 30 MIN: CPT

## 2024-08-20 PROCEDURE — 36415 COLL VENOUS BLD VENIPUNCTURE: CPT

## 2024-08-20 PROCEDURE — 94760 N-INVAS EAR/PLS OXIMETRY 1: CPT

## 2024-08-20 PROCEDURE — 97530 THERAPEUTIC ACTIVITIES: CPT

## 2024-08-20 PROCEDURE — 84443 ASSAY THYROID STIM HORMONE: CPT

## 2024-08-20 PROCEDURE — 80048 BASIC METABOLIC PNL TOTAL CA: CPT

## 2024-08-20 PROCEDURE — 30233N1 TRANSFUSION OF NONAUTOLOGOUS RED BLOOD CELLS INTO PERIPHERAL VEIN, PERCUTANEOUS APPROACH: ICD-10-PCS | Performed by: SPECIALIST

## 2024-08-20 PROCEDURE — 84134 ASSAY OF PREALBUMIN: CPT

## 2024-08-20 PROCEDURE — 2580000003 HC RX 258: Performed by: INTERNAL MEDICINE

## 2024-08-20 PROCEDURE — 82607 VITAMIN B-12: CPT

## 2024-08-20 PROCEDURE — 97110 THERAPEUTIC EXERCISES: CPT

## 2024-08-20 PROCEDURE — 82962 GLUCOSE BLOOD TEST: CPT

## 2024-08-20 PROCEDURE — 97116 GAIT TRAINING THERAPY: CPT

## 2024-08-20 PROCEDURE — 6370000000 HC RX 637 (ALT 250 FOR IP)

## 2024-08-20 PROCEDURE — 6370000000 HC RX 637 (ALT 250 FOR IP): Performed by: NURSE PRACTITIONER

## 2024-08-20 PROCEDURE — 6360000002 HC RX W HCPCS: Performed by: HOSPITALIST

## 2024-08-20 PROCEDURE — 85025 COMPLETE CBC W/AUTO DIFF WBC: CPT

## 2024-08-20 PROCEDURE — 94150 VITAL CAPACITY TEST: CPT

## 2024-08-20 RX ORDER — LOSARTAN POTASSIUM 50 MG/1
25 TABLET ORAL DAILY
Status: DISCONTINUED | OUTPATIENT
Start: 2024-08-21 | End: 2024-08-21

## 2024-08-20 RX ORDER — ACETAMINOPHEN 650 MG/1
650 SUPPOSITORY RECTAL EVERY 6 HOURS PRN
Status: CANCELLED | OUTPATIENT
Start: 2024-08-20

## 2024-08-20 RX ORDER — DEXTROSE MONOHYDRATE 100 MG/ML
INJECTION, SOLUTION INTRAVENOUS CONTINUOUS PRN
Status: DISCONTINUED | OUTPATIENT
Start: 2024-08-20 | End: 2024-08-31 | Stop reason: HOSPADM

## 2024-08-20 RX ORDER — PRIMIDONE 50 MG/1
50 TABLET ORAL 2 TIMES DAILY
Status: DISCONTINUED | OUTPATIENT
Start: 2024-08-20 | End: 2024-08-31 | Stop reason: HOSPADM

## 2024-08-20 RX ORDER — INSULIN LISPRO 100 [IU]/ML
0-4 INJECTION, SOLUTION INTRAVENOUS; SUBCUTANEOUS NIGHTLY
Status: DISCONTINUED | OUTPATIENT
Start: 2024-08-20 | End: 2024-08-31 | Stop reason: HOSPADM

## 2024-08-20 RX ORDER — DILTIAZEM HYDROCHLORIDE 120 MG/1
120 CAPSULE, COATED, EXTENDED RELEASE ORAL DAILY
Status: DISCONTINUED | OUTPATIENT
Start: 2024-08-21 | End: 2024-08-31 | Stop reason: HOSPADM

## 2024-08-20 RX ORDER — ONDANSETRON 4 MG/1
4 TABLET, ORALLY DISINTEGRATING ORAL EVERY 8 HOURS PRN
Status: CANCELLED | OUTPATIENT
Start: 2024-08-20

## 2024-08-20 RX ORDER — METOPROLOL SUCCINATE 50 MG/1
50 TABLET, EXTENDED RELEASE ORAL 2 TIMES DAILY
Status: DISCONTINUED | OUTPATIENT
Start: 2024-08-20 | End: 2024-08-20 | Stop reason: HOSPADM

## 2024-08-20 RX ORDER — SODIUM CHLORIDE 0.9 % (FLUSH) 0.9 %
5-40 SYRINGE (ML) INJECTION PRN
Status: CANCELLED | OUTPATIENT
Start: 2024-08-20

## 2024-08-20 RX ORDER — PANTOPRAZOLE SODIUM 40 MG/1
40 TABLET, DELAYED RELEASE ORAL
Status: CANCELLED | OUTPATIENT
Start: 2024-08-21

## 2024-08-20 RX ORDER — METOPROLOL SUCCINATE 50 MG/1
50 TABLET, EXTENDED RELEASE ORAL 2 TIMES DAILY
Status: CANCELLED | OUTPATIENT
Start: 2024-08-20

## 2024-08-20 RX ORDER — SODIUM CHLORIDE 9 MG/ML
INJECTION, SOLUTION INTRAVENOUS PRN
Status: DISCONTINUED | OUTPATIENT
Start: 2024-08-20 | End: 2024-08-31 | Stop reason: HOSPADM

## 2024-08-20 RX ORDER — POLYETHYLENE GLYCOL 3350 17 G/17G
17 POWDER, FOR SOLUTION ORAL DAILY PRN
Status: CANCELLED | OUTPATIENT
Start: 2024-08-20

## 2024-08-20 RX ORDER — ATORVASTATIN CALCIUM 20 MG/1
20 TABLET, FILM COATED ORAL DAILY
Status: CANCELLED | OUTPATIENT
Start: 2024-08-21

## 2024-08-20 RX ORDER — ONDANSETRON 2 MG/ML
4 INJECTION INTRAMUSCULAR; INTRAVENOUS EVERY 6 HOURS PRN
Status: CANCELLED | OUTPATIENT
Start: 2024-08-20

## 2024-08-20 RX ORDER — TAMSULOSIN HYDROCHLORIDE 0.4 MG/1
0.4 CAPSULE ORAL DAILY
Status: CANCELLED | OUTPATIENT
Start: 2024-08-21

## 2024-08-20 RX ORDER — METOPROLOL SUCCINATE 50 MG/1
50 TABLET, EXTENDED RELEASE ORAL 2 TIMES DAILY
Status: DISCONTINUED | OUTPATIENT
Start: 2024-08-20 | End: 2024-08-31 | Stop reason: HOSPADM

## 2024-08-20 RX ORDER — GUAIFENESIN/DEXTROMETHORPHAN 100-10MG/5
5 SYRUP ORAL EVERY 4 HOURS PRN
Status: CANCELLED | OUTPATIENT
Start: 2024-08-20

## 2024-08-20 RX ORDER — INSULIN LISPRO 100 [IU]/ML
0-4 INJECTION, SOLUTION INTRAVENOUS; SUBCUTANEOUS
Status: CANCELLED | OUTPATIENT
Start: 2024-08-20

## 2024-08-20 RX ORDER — HYDRALAZINE HYDROCHLORIDE 25 MG/1
25 TABLET, FILM COATED ORAL EVERY 8 HOURS SCHEDULED
Status: CANCELLED | OUTPATIENT
Start: 2024-08-20

## 2024-08-20 RX ORDER — SODIUM CHLORIDE, SODIUM LACTATE, POTASSIUM CHLORIDE, CALCIUM CHLORIDE 600; 310; 30; 20 MG/100ML; MG/100ML; MG/100ML; MG/100ML
INJECTION, SOLUTION INTRAVENOUS CONTINUOUS
Status: DISCONTINUED | OUTPATIENT
Start: 2024-08-20 | End: 2024-08-20

## 2024-08-20 RX ORDER — DILTIAZEM HYDROCHLORIDE 120 MG/1
120 CAPSULE, COATED, EXTENDED RELEASE ORAL DAILY
Status: CANCELLED | OUTPATIENT
Start: 2024-08-21

## 2024-08-20 RX ORDER — SODIUM CHLORIDE 0.9 % (FLUSH) 0.9 %
5-40 SYRINGE (ML) INJECTION EVERY 12 HOURS SCHEDULED
Status: DISCONTINUED | OUTPATIENT
Start: 2024-08-20 | End: 2024-08-31 | Stop reason: HOSPADM

## 2024-08-20 RX ORDER — SODIUM CHLORIDE, SODIUM LACTATE, POTASSIUM CHLORIDE, CALCIUM CHLORIDE 600; 310; 30; 20 MG/100ML; MG/100ML; MG/100ML; MG/100ML
INJECTION, SOLUTION INTRAVENOUS CONTINUOUS
Status: CANCELLED | OUTPATIENT
Start: 2024-08-20

## 2024-08-20 RX ORDER — ACETAMINOPHEN 325 MG/1
650 TABLET ORAL EVERY 6 HOURS PRN
Status: DISCONTINUED | OUTPATIENT
Start: 2024-08-20 | End: 2024-08-30 | Stop reason: SDUPTHER

## 2024-08-20 RX ORDER — ENOXAPARIN SODIUM 100 MG/ML
1 INJECTION SUBCUTANEOUS 2 TIMES DAILY
Status: CANCELLED | OUTPATIENT
Start: 2024-08-20

## 2024-08-20 RX ORDER — MONTELUKAST SODIUM 10 MG/1
10 TABLET ORAL NIGHTLY
Status: CANCELLED | OUTPATIENT
Start: 2024-08-20

## 2024-08-20 RX ORDER — ACETAMINOPHEN 325 MG/1
650 TABLET ORAL EVERY 4 HOURS PRN
Status: DISCONTINUED | OUTPATIENT
Start: 2024-08-20 | End: 2024-08-31 | Stop reason: HOSPADM

## 2024-08-20 RX ORDER — FUROSEMIDE 40 MG/1
40 TABLET ORAL DAILY
Status: CANCELLED | OUTPATIENT
Start: 2024-08-21

## 2024-08-20 RX ORDER — DEXTROSE MONOHYDRATE 100 MG/ML
INJECTION, SOLUTION INTRAVENOUS CONTINUOUS PRN
Status: CANCELLED | OUTPATIENT
Start: 2024-08-20

## 2024-08-20 RX ORDER — ONDANSETRON 2 MG/ML
4 INJECTION INTRAMUSCULAR; INTRAVENOUS EVERY 6 HOURS PRN
Status: DISCONTINUED | OUTPATIENT
Start: 2024-08-20 | End: 2024-08-31 | Stop reason: HOSPADM

## 2024-08-20 RX ORDER — GUAIFENESIN/DEXTROMETHORPHAN 100-10MG/5
5 SYRUP ORAL EVERY 4 HOURS PRN
Status: DISCONTINUED | OUTPATIENT
Start: 2024-08-20 | End: 2024-08-31 | Stop reason: HOSPADM

## 2024-08-20 RX ORDER — POLYETHYLENE GLYCOL 3350 17 G/17G
17 POWDER, FOR SOLUTION ORAL DAILY PRN
Status: DISCONTINUED | OUTPATIENT
Start: 2024-08-20 | End: 2024-08-31 | Stop reason: HOSPADM

## 2024-08-20 RX ORDER — ONDANSETRON 4 MG/1
4 TABLET, ORALLY DISINTEGRATING ORAL EVERY 8 HOURS PRN
Status: DISCONTINUED | OUTPATIENT
Start: 2024-08-20 | End: 2024-08-31 | Stop reason: HOSPADM

## 2024-08-20 RX ORDER — ATORVASTATIN CALCIUM 20 MG/1
20 TABLET, FILM COATED ORAL DAILY
Status: DISCONTINUED | OUTPATIENT
Start: 2024-08-21 | End: 2024-08-31 | Stop reason: HOSPADM

## 2024-08-20 RX ORDER — LEVALBUTEROL INHALATION SOLUTION 0.63 MG/3ML
0.63 SOLUTION RESPIRATORY (INHALATION) EVERY 8 HOURS PRN
Status: CANCELLED | OUTPATIENT
Start: 2024-08-20

## 2024-08-20 RX ORDER — FUROSEMIDE 40 MG
40 TABLET ORAL DAILY
Status: DISCONTINUED | OUTPATIENT
Start: 2024-08-21 | End: 2024-08-24

## 2024-08-20 RX ORDER — MONTELUKAST SODIUM 10 MG/1
10 TABLET ORAL NIGHTLY
Status: DISCONTINUED | OUTPATIENT
Start: 2024-08-20 | End: 2024-08-31 | Stop reason: HOSPADM

## 2024-08-20 RX ORDER — HYDRALAZINE HYDROCHLORIDE 50 MG/1
25 TABLET, FILM COATED ORAL EVERY 8 HOURS SCHEDULED
Status: DISCONTINUED | OUTPATIENT
Start: 2024-08-20 | End: 2024-08-21

## 2024-08-20 RX ORDER — INSULIN LISPRO 100 [IU]/ML
0-4 INJECTION, SOLUTION INTRAVENOUS; SUBCUTANEOUS NIGHTLY
Status: CANCELLED | OUTPATIENT
Start: 2024-08-20

## 2024-08-20 RX ORDER — SODIUM CHLORIDE 0.9 % (FLUSH) 0.9 %
5-40 SYRINGE (ML) INJECTION PRN
Status: DISCONTINUED | OUTPATIENT
Start: 2024-08-20 | End: 2024-08-31 | Stop reason: HOSPADM

## 2024-08-20 RX ORDER — LEVALBUTEROL INHALATION SOLUTION 0.63 MG/3ML
0.63 SOLUTION RESPIRATORY (INHALATION) EVERY 8 HOURS PRN
Status: DISCONTINUED | OUTPATIENT
Start: 2024-08-20 | End: 2024-08-31 | Stop reason: HOSPADM

## 2024-08-20 RX ORDER — INSULIN LISPRO 100 [IU]/ML
0-4 INJECTION, SOLUTION INTRAVENOUS; SUBCUTANEOUS
Status: DISCONTINUED | OUTPATIENT
Start: 2024-08-21 | End: 2024-08-31 | Stop reason: HOSPADM

## 2024-08-20 RX ORDER — LOSARTAN POTASSIUM 25 MG/1
25 TABLET ORAL DAILY
Status: CANCELLED | OUTPATIENT
Start: 2024-08-21

## 2024-08-20 RX ORDER — PRIMIDONE 50 MG/1
50 TABLET ORAL 2 TIMES DAILY
Status: CANCELLED | OUTPATIENT
Start: 2024-08-20

## 2024-08-20 RX ORDER — PROMETHAZINE HYDROCHLORIDE 25 MG/1
25 TABLET ORAL EVERY 6 HOURS PRN
Status: CANCELLED | OUTPATIENT
Start: 2024-08-20

## 2024-08-20 RX ORDER — ACETAMINOPHEN 650 MG/1
650 SUPPOSITORY RECTAL EVERY 6 HOURS PRN
Status: DISCONTINUED | OUTPATIENT
Start: 2024-08-20 | End: 2024-08-31 | Stop reason: HOSPADM

## 2024-08-20 RX ORDER — TAMSULOSIN HYDROCHLORIDE 0.4 MG/1
0.4 CAPSULE ORAL DAILY
Status: DISCONTINUED | OUTPATIENT
Start: 2024-08-21 | End: 2024-08-31 | Stop reason: HOSPADM

## 2024-08-20 RX ORDER — ACETAMINOPHEN 325 MG/1
650 TABLET ORAL EVERY 6 HOURS PRN
Status: CANCELLED | OUTPATIENT
Start: 2024-08-20

## 2024-08-20 RX ORDER — PANTOPRAZOLE SODIUM 40 MG/1
40 TABLET, DELAYED RELEASE ORAL
Status: DISCONTINUED | OUTPATIENT
Start: 2024-08-21 | End: 2024-08-22

## 2024-08-20 RX ORDER — SODIUM CHLORIDE 9 MG/ML
INJECTION, SOLUTION INTRAVENOUS PRN
Status: CANCELLED | OUTPATIENT
Start: 2024-08-20

## 2024-08-20 RX ORDER — SODIUM CHLORIDE 0.9 % (FLUSH) 0.9 %
5-40 SYRINGE (ML) INJECTION EVERY 12 HOURS SCHEDULED
Status: CANCELLED | OUTPATIENT
Start: 2024-08-20

## 2024-08-20 RX ORDER — PROMETHAZINE HYDROCHLORIDE 25 MG/1
25 TABLET ORAL EVERY 6 HOURS PRN
Status: DISCONTINUED | OUTPATIENT
Start: 2024-08-20 | End: 2024-08-31 | Stop reason: HOSPADM

## 2024-08-20 RX ADMIN — METOPROLOL SUCCINATE 50 MG: 50 TABLET, EXTENDED RELEASE ORAL at 20:25

## 2024-08-20 RX ADMIN — PRIMIDONE 50 MG: 50 TABLET ORAL at 08:50

## 2024-08-20 RX ADMIN — INSULIN LISPRO 1 UNITS: 100 INJECTION, SOLUTION INTRAVENOUS; SUBCUTANEOUS at 08:50

## 2024-08-20 RX ADMIN — DILTIAZEM HYDROCHLORIDE 120 MG: 120 CAPSULE, COATED, EXTENDED RELEASE ORAL at 08:43

## 2024-08-20 RX ADMIN — PANTOPRAZOLE SODIUM 40 MG: 40 TABLET, DELAYED RELEASE ORAL at 05:58

## 2024-08-20 RX ADMIN — SODIUM CHLORIDE, PRESERVATIVE FREE 10 ML: 5 INJECTION INTRAVENOUS at 08:43

## 2024-08-20 RX ADMIN — GUAIFENESIN SYRUP AND DEXTROMETHORPHAN 5 ML: 100; 10 SYRUP ORAL at 20:33

## 2024-08-20 RX ADMIN — INSULIN LISPRO 1 UNITS: 100 INJECTION, SOLUTION INTRAVENOUS; SUBCUTANEOUS at 12:40

## 2024-08-20 RX ADMIN — PRIMIDONE 50 MG: 50 TABLET ORAL at 20:25

## 2024-08-20 RX ADMIN — METOPROLOL SUCCINATE 50 MG: 50 TABLET, EXTENDED RELEASE ORAL at 08:42

## 2024-08-20 RX ADMIN — SODIUM ZIRCONIUM CYCLOSILICATE 10 G: 10 POWDER, FOR SUSPENSION ORAL at 20:25

## 2024-08-20 RX ADMIN — GUAIFENESIN SYRUP AND DEXTROMETHORPHAN 5 ML: 100; 10 SYRUP ORAL at 05:58

## 2024-08-20 RX ADMIN — HYDRALAZINE HYDROCHLORIDE 25 MG: 50 TABLET ORAL at 20:32

## 2024-08-20 RX ADMIN — HYDRALAZINE HYDROCHLORIDE 25 MG: 25 TABLET ORAL at 05:57

## 2024-08-20 RX ADMIN — ATORVASTATIN CALCIUM 20 MG: 20 TABLET, FILM COATED ORAL at 08:43

## 2024-08-20 RX ADMIN — HYDRALAZINE HYDROCHLORIDE 25 MG: 25 TABLET ORAL at 12:40

## 2024-08-20 RX ADMIN — MONTELUKAST 10 MG: 10 TABLET, FILM COATED ORAL at 20:27

## 2024-08-20 RX ADMIN — ENOXAPARIN SODIUM 80 MG: 100 INJECTION SUBCUTANEOUS at 08:44

## 2024-08-20 RX ADMIN — APIXABAN 2.5 MG: 2.5 TABLET, FILM COATED ORAL at 20:25

## 2024-08-20 RX ADMIN — TAMSULOSIN HYDROCHLORIDE 0.4 MG: 0.4 CAPSULE ORAL at 08:42

## 2024-08-20 RX ADMIN — SODIUM ZIRCONIUM CYCLOSILICATE 10 G: 10 POWDER, FOR SUSPENSION ORAL at 15:17

## 2024-08-20 NOTE — PROGRESS NOTES
Jose Tate arrived to room # 824.   Presented with: new onset of afib, TOMER   Mental Status: Patient is oriented, alert, coherent, logical, and thought processes intact.   There were no vitals filed for this visit.  Patient safety contract and falls prevention contract reviewed with patient Yes.  Oriented Patient to room.  Call light within reach. Yes.  Needs, issues or concerns expressed at this time: yes, .      Electronically signed by Jennifer Angel RN on 8/20/2024 at 5:29 PM

## 2024-08-20 NOTE — CARE COORDINATION
08/20/24 0929   IMM Letter   IMM Letter given to Patient/Family/Significant other/Guardian/POA/by: Radha Collado   IMM Letter date given: 08/20/24   IMM Letter time given: 0927     Patient declined waiting 4 hr period prior to discharge.  Second IMM given to patient and explained with patient verbalizing understanding.  All questions and concerns addressed     Signed letter placed in pt soft chart  Electronically signed by WILMA DENT on 8/20/2024 at 9:30 AM

## 2024-08-20 NOTE — CARE COORDINATION
[facility] has been accepted to [facility] and can discharge once stable.  Electronically signed by WILMA DENT on 8/20/2024 at 1:18 PM      Sw spoke to pt and at bedside Pt daughter . If they are not able to go to [facility] they would like referrals to [facility]  PH: [phone]  F: [phone]  [facility]  Ph: [phone]    Fa: [phone]  Electronically signed by WILMA DENT on 8/20/2024 at 11:30 AM

## 2024-08-20 NOTE — CARE COORDINATION
[facility]  Notification of Admission Decision      [x] [facility]      Please write discharge orders and summary prior to discharge.    [] Patient acceptance to Rehab pending the following :    [] Eval in progress       [] Patient determined to be ineligible for services at [facility] because :       We recommend you consider        Thank you for your referral, we appreciate you. If you have any questions, please feel   free to contact me at [phone].    Electronically Signed by Jessica Rodriguez, Admissions Coordinator 8/20/2024 1:18 PM

## 2024-08-20 NOTE — DISCHARGE SUMMARY
[facility]    Discharge Summary      Jose Tate  :  1940  MRN:  [id number]    Admit date:  8/15/2024  Discharge date:    2024    Discharging Physician:  Dr. Karen Lang     Advance Directive: Limited    Consults: cardiology    Primary Care Physician:  Chayo Grande MD    Discharge Diagnoses:  Principal Problem:    New onset a-fib (HCC)  Active Problems:    Chronic systolic (congestive) heart failure    Essential hypertension    Type 2 diabetes mellitus without complication (HCC)    TOMER (acute kidney injury) (HCC)    Cough    Shortness of breath    Mild malnutrition (HCC)    Tachy-roberto syndrome (HCC)  Resolved Problems:    * No resolved hospital problems. *      Portions of this note have been copied forward, however, changed to reflect the most current clinical status of this patient.    Hospital Course:   The patient is a 84 y.o. male with PMH of  Type 2 Diabetes, MI, and GERD who presented to [facility] ED for evaluation of progressive fatigue, weakness and fall over the past couple of weeks. Reportedly had syncopal episode night prior to admission. Workup in ED revealed new onset Afib with RVR, BUN 40, Cr 2.6, Mg 1.3, CRP 30.0, BNP 5469, WBC 15.0, Hgb 10.0, chest x-ray indicated mild density in the right base may represent atelectasis or pneumonia, lungs are otherwise clear. IV Cardizem bolus and drip was initiated in ED.  Patient was admitted to hospital medicine with cardiology consultation.  Cardiology recommended low-dose Toprol, Eliquis 2.5 mg twice daily on discharge. ECHO indicated normal LV size with LVEF of 55 to 60%, mildly increased thickness, normal wall motion, normal diastolic function. Noted to have bradycardia.  Cardiology further recommended plans for pacemaker Monday. VQ scan indicated right midlung and lower lung zone small perfusion defects, biapical decreased uptake which could be related to emphysema, low to immediate probability. Currently on Lovenox 1 mg/kg BID

## 2024-08-20 NOTE — PLAN OF CARE
Problem: Discharge Planning  Goal: Discharge to home or other facility with appropriate resources  8/17/2024 1025 by Oleg Wharton RN  Outcome: Progressing  8/17/2024 0315 by Bisi Villafana RN  Outcome: Progressing     Problem: Safety - Adult  Goal: Free from fall injury  8/17/2024 1025 by Oleg Wharton RN  Outcome: Progressing  8/17/2024 0315 by Bisi Villafana RN  Outcome: Progressing     
  Problem: Discharge Planning  Goal: Discharge to home or other facility with appropriate resources  8/19/2024 0437 by Bisi Villafana RN  Outcome: Progressing  8/18/2024 1456 by Oleg Wharton RN  Outcome: Progressing     Problem: Safety - Adult  Goal: Free from fall injury  8/19/2024 0437 by Bisi Villafana RN  Outcome: Progressing  8/18/2024 1456 by Oleg Wharton RN  Outcome: Progressing     Problem: ABCDS Injury Assessment  Goal: Absence of physical injury  Outcome: Progressing     Problem: Chronic Conditions and Co-morbidities  Goal: Patient's chronic conditions and co-morbidity symptoms are monitored and maintained or improved  Outcome: Progressing     Problem: Nutrition Deficit:  Goal: Optimize nutritional status  Outcome: Progressing     Problem: Skin/Tissue Integrity  Goal: Absence of new skin breakdown  Description: 1.  Monitor for areas of redness and/or skin breakdown  2.  Assess vascular access sites hourly  3.  Every 4-6 hours minimum:  Change oxygen saturation probe site  4.  Every 4-6 hours:  If on nasal continuous positive airway pressure, respiratory therapy assess nares and determine need for appliance change or resting period.  Outcome: Progressing     
  Problem: Discharge Planning  Goal: Discharge to home or other facility with appropriate resources  Outcome: Progressing     Problem: Safety - Adult  Goal: Free from fall injury  Outcome: Progressing     
  Problem: Discharge Planning  Goal: Discharge to home or other facility with appropriate resources  Outcome: Progressing     Problem: Safety - Adult  Goal: Free from fall injury  Outcome: Progressing     Problem: ABCDS Injury Assessment  Goal: Absence of physical injury  Outcome: Progressing     Problem: Chronic Conditions and Co-morbidities  Goal: Patient's chronic conditions and co-morbidity symptoms are monitored and maintained or improved  Outcome: Progressing     Problem: Nutrition Deficit:  Goal: Optimize nutritional status  Outcome: Progressing     
  Problem: Discharge Planning  Goal: Discharge to home or other facility with appropriate resources  Outcome: Progressing     Problem: Safety - Adult  Goal: Free from fall injury  Outcome: Progressing     Problem: ABCDS Injury Assessment  Goal: Absence of physical injury  Outcome: Progressing     Problem: Chronic Conditions and Co-morbidities  Goal: Patient's chronic conditions and co-morbidity symptoms are monitored and maintained or improved  Outcome: Progressing     Problem: Nutrition Deficit:  Goal: Optimize nutritional status  Outcome: Progressing     Problem: Skin/Tissue Integrity  Goal: Absence of new skin breakdown  Outcome: Progressing     
SUBJECTIVE:    Contacted to F/U DDimer      OBJECTIVE:    BP (!) 143/57   Pulse (!) 113   Temp 97.2 °F (36.2 °C) (Temporal)   Resp 16   Ht 1.778 m (5' 10\")   Wt 75.8 kg (167 lb)   SpO2 (!) 87%   BMI 23.96 kg/m²       ASSESSMENTS & PLANS:    Elevated Ddimer: at 1.24  Lovenox Tx dosed  Consider VQ scanb in AM  
status  8/20/2024 1500 by Ellen Mobley, RN  Outcome: Completed  8/20/2024 0802 by Ellen Mobley, RN  Outcome: Progressing     Problem: Skin/Tissue Integrity  Goal: Absence of new skin breakdown  8/20/2024 1500 by Ellen Mobley, RN  Outcome: Completed  8/20/2024 0802 by Ellen Mobley, RN  Outcome: Progressing     
The patient is a 30y year old Male complaining of fall.

## 2024-08-20 NOTE — DISCHARGE INSTR - DIET

## 2024-08-20 NOTE — PLAN OF CARE
[facility]AT[facility]      Jose Tate    : 1940  [id number]  MRN: [id number]   PHYSICIAN:  Kwame Rogers MD  Primary Problem    Patient Active Problem List   Diagnosis    Hypercholesterolemia    Bronchitis, chronic (HCC)    GERD (gastroesophageal reflux disease)    COPD (chronic obstructive pulmonary disease) (HCC)    Essential hypertension    Type 2 diabetes mellitus without complication (HCC)    Adenocarcinoma of right lung (HCC)    Former smoker    Occupational exposure in workplace    Iron deficiency anemia    Chronic systolic (congestive) heart failure    Chemotherapy-induced neutropenia (HCC)    Coronary artery disease involving native heart with angina pectoris, unspecified vessel or lesion type (HCC)    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, 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)    Encounter for central line care    New onset a-fib (HCC)    TOMER (acute kidney injury) (HCC)    Palliative care patient    Cough    Shortness of breath    Mild malnutrition (HCC)    Tachy-roberto syndrome (HCC)    Pacemaker    Acute kidney injury (HCC)       Rehabilitation Diagnosis:     Acute kidney injury (HCC) [N17.9]       ADMIT DATE:2024   CARE PLAN     NURSING:  Jose Tate while on this unit will:     [x] Be continent of bowel and bladder      [x] Have an adequate number of bowel movements   [] Urinate with no urinary retention >300ml in bladder   [] Complete bladder protocol with ibarra removal   [] Maintain O2 SATs at ___%   [x] Have pain managed while on ARU        [x] Be pain free by discharge    [] Have no skin breakdown while on ARU   [] Have improved skin integrity via wound measurements   [] Have no signs/symptoms of infection at the wound site   [x] Be free from injury during hospitalization    [] Complete education with patient/family with understanding demonstrated for:   [] Adjustment    [] Other:   Nursing interventions may include  Self-Care / ADL, Home management training            IRF-DONNIE  Eating  Assistance Needed: Setup or clean-up assistance  CARE Score: 5  Discharge Goal: Independent  Oral Hygiene  Assistance Needed: Supervision or touching assistance  CARE Score: 4  Discharge Goal: Independent  Toileting Hygiene  Assistance needed: Substantial/maximal assistance  CARE Score: 2  Discharge Goal: Independent  Shower/Bathe Self  Assistance Needed: Partial/moderate assistance  Comment: Mod A with shower due to extreme fatigue and SOB.  CARE Score: 3  Discharge Goal: Independent  Upper Body Dressing  Assistance Needed: Partial/moderate assistance  Comment: Mod A  CARE Score: 3  Discharge Goal: Independent  Lower Body Dressing  Assistance Needed: Partial/moderate assistance  Comment: Mod A  CARE Score: 3  Discharge Goal: Independent  Putting On/Taking Off Footwear  Assistance Needed: Dependent  CARE Score: 1  Discharge Goal: Independent  Toilet Transfer  Assistance needed: Partial/moderate assistance  Comment: Min A  CARE Score: 3  Discharge Goal: Independent  Picking Up Object  Assistance Needed: Dependent  CARE Score: 1  Discharge Goal: Independent  Health Literacy  How often do you need to have someone help you when you read instructions, pamphlets, or other written material from your doctor or pharmacy?: Rarely        Treatments may include IADL retraining, strengthening, safety education and training, patient/caregiver education and training, equipment evaluation/ training/procurement, neuromuscular reeducation, wheelchair mobility training.    SPEECH THERAPY:   Plan of Care and Goals:   LTG    FIM Goals: Comprehension:    Expression:    Social:    Problem Solving:    Memory:                                                  IRF-DONNIE  Hearing, Speech, and Vision  Expression of Ideas and Wants: Without difficulty  Understanding Verbal and Non-Verbal Content: Understands  Ability to Hear: Adequate  Ability to See in Adequate Light:  Adequate             Plan of Care:  Frequency:   [] 5 days per week, 60 minutes per day                            [x] Not appropriate for Speech Therapy  Treatments may include speech/language/communication therapy, cognitive training, group therapy, education, and/or dysphagia therapy based on the above goals.           These goals were reviewed with this patient at the time of assessment and Jose ROBYN Tate is in agreement.    CASE MANAGEMENT:  Goals:   Assist patient/family with discharge planning, patient/family counseling,  and coordination with insurance during ARU stay.  Patient Goals: gain strength, endurance, stability, hope for best heart function               Activities Prior to Admit:                         Jose Tate will be seen a minimum of 3 hours of therapy per day/a minimum of 5 out of 7 days per week.    [] In this rare instance due to the nature of this patient's medical involvement, this patient will be seen 15 hours per week (900 minutes within a 7 day period).    Treatments may include therapeutic exercises, gait training, neuromuscular re-ed, transfer training, community reintegration, bed mobility, w/c mobility and training, self care, home mgmt, cognitive training, energy conservation,dysphagia tx, speech/language/communication therapy, group therapy, and patient/family education. In addition, dietician/nutritionist may monitor calorie count as well as intake and collaboratively work with SLP on dietary upgrades.  Neuropsychology/Psychology may evaluate and provide necessary support.    Medical issues being managed closely and that require 24 hour availability of a physician:   [] Swallowing Precautions  [x] Bowel/Bladder Fx  [] Weight bearing precautions   [x] Wound Care    [x] Pain Mgmt   [x] Infection Protection   [x] DVT Prophylaxis   [x] Fall Precautions  [x] Fluid/Electrolyte/Nutrition Balance   [] Voice Protection   [x] Respiratory  [] Other:    Medical Prognosis: [x]

## 2024-08-20 NOTE — PROGRESS NOTES
4 Eyes Skin Assessment     NAME:  Jose Tate  YOB: 1940  MEDICAL RECORD NUMBER:  [id number]    The patient is being assessed for  Admission    I agree that at least one RN has performed a thorough Head to Toe Skin Assessment on the patient. ALL assessment sites listed below have been assessed.      Areas assessed by both nurses:    Head, Face, Ears, Shoulders, Back, Chest, Arms, Elbows, Hands, Sacrum. Buttock, Coccyx, Ischium, and Legs. Feet and Heels        Does the Patient have a Wound? No noted wound(s)       Rony Prevention initiated by RN: No  Wound Care Orders initiated by RN: No    Pressure Injury (Stage 3,4, Unstageable, DTI, NWPT, and Complex wounds) if present, place Wound referral order by RN under : No    New Ostomies, if present place, Ostomy referral order under : No     Scattered ecchymosis to BUE. Pacemaker site to left chest with gauze dressing and tegaderm. Redness to buttocks, mepilex applied.     Nurse 1 eSignature: Electronically signed by Letitia Ulloa RN on 8/20/24 at 6:30 PM CDT    **SHARE this note so that the co-signing nurse can place an eSignature**    Nurse 2 eSignature: Electronically signed by Caron Coulter LPN on 8/20/24 at 6:34 PM CDT

## 2024-08-21 ENCOUNTER — APPOINTMENT (OUTPATIENT)
Dept: CT IMAGING | Age: 84
End: 2024-08-21
Attending: PSYCHIATRY & NEUROLOGY
Payer: MEDICARE

## 2024-08-21 LAB
ALBUMIN SERPL-MCNC: 3.4 G/DL (ref 3.5–5.2)
ALLENS TEST: ABNORMAL
ALP SERPL-CCNC: 77 U/L (ref 40–130)
ALT SERPL-CCNC: 11 U/L (ref 5–41)
ANION GAP SERPL CALCULATED.3IONS-SCNC: 13 MMOL/L (ref 7–19)
AST SERPL-CCNC: 16 U/L (ref 5–40)
BASE EXCESS ARTERIAL: 2.4 MMOL/L (ref -2–2)
BASOPHILS # BLD: 0.1 K/UL (ref 0–0.2)
BASOPHILS NFR BLD: 0.7 % (ref 0–1)
BILIRUB SERPL-MCNC: 0.2 MG/DL (ref 0.2–1.2)
BUN SERPL-MCNC: 21 MG/DL (ref 8–23)
CALCIUM SERPL-MCNC: 9.2 MG/DL (ref 8.8–10.2)
CARBOXYHEMOGLOBIN ARTERIAL: 0.7 % (ref 0–5)
CHLORIDE SERPL-SCNC: 98 MMOL/L (ref 98–111)
CO2 SERPL-SCNC: 25 MMOL/L (ref 22–29)
CREAT SERPL-MCNC: 1.5 MG/DL (ref 0.5–1.2)
EOSINOPHIL # BLD: 0.2 K/UL (ref 0–0.6)
EOSINOPHIL NFR BLD: 1.9 % (ref 0–5)
ERYTHROCYTE [DISTWIDTH] IN BLOOD BY AUTOMATED COUNT: 13.7 % (ref 11.5–14.5)
ERYTHROCYTE [DISTWIDTH] IN BLOOD BY AUTOMATED COUNT: 13.8 % (ref 11.5–14.5)
GLUCOSE BLD-MCNC: 162 MG/DL (ref 70–99)
GLUCOSE BLD-MCNC: 212 MG/DL (ref 70–99)
GLUCOSE BLD-MCNC: 233 MG/DL (ref 70–99)
GLUCOSE BLD-MCNC: 242 MG/DL (ref 70–99)
GLUCOSE SERPL-MCNC: 223 MG/DL (ref 74–109)
HCO3 ARTERIAL: 26.3 MMOL/L (ref 22–26)
HCT VFR BLD AUTO: 25.8 % (ref 42–52)
HCT VFR BLD AUTO: 26.1 % (ref 42–52)
HEMOGLOBIN, ART, EXTENDED: 8.3 G/DL (ref 14–18)
HGB BLD-MCNC: 8.2 G/DL (ref 14–18)
HGB BLD-MCNC: 8.6 G/DL (ref 14–18)
IMM GRANULOCYTES # BLD: 0.1 K/UL
LACTATE BLDV-SCNC: 3.2 MMOL/L (ref 0.5–1.9)
LYMPHOCYTES # BLD: 1.5 K/UL (ref 1.1–4.5)
LYMPHOCYTES NFR BLD: 14.9 % (ref 20–40)
MCH RBC QN AUTO: 30.8 PG (ref 27–31)
MCH RBC QN AUTO: 31.3 PG (ref 27–31)
MCHC RBC AUTO-ENTMCNC: 31.8 G/DL (ref 33–37)
MCHC RBC AUTO-ENTMCNC: 33 G/DL (ref 33–37)
MCV RBC AUTO: 94.9 FL (ref 80–94)
MCV RBC AUTO: 97 FL (ref 80–94)
METHEMOGLOBIN ARTERIAL: 1.2 %
MONOCYTES # BLD: 0.8 K/UL (ref 0–0.9)
MONOCYTES NFR BLD: 8.4 % (ref 0–10)
NEUTROPHILS # BLD: 7.3 K/UL (ref 1.5–7.5)
NEUTS SEG NFR BLD: 73 % (ref 50–65)
O2 CONTENT ARTERIAL: 11.6 ML/DL
O2 DELIVERY DEVICE: ABNORMAL
O2 SAT, ARTERIAL: 96.7 %
O2 THERAPY: ABNORMAL
OXYGEN FLOW: 3
PCO2 ARTERIAL: 37 MMHG (ref 35–45)
PERFORMED ON: ABNORMAL
PH ARTERIAL: 7.46 (ref 7.35–7.45)
PLATELET # BLD AUTO: 363 K/UL (ref 130–400)
PLATELET # BLD AUTO: 404 K/UL (ref 130–400)
PMV BLD AUTO: 9.3 FL (ref 9.4–12.4)
PMV BLD AUTO: 9.4 FL (ref 9.4–12.4)
PO2 ARTERIAL: 142 MMHG (ref 80–100)
POTASSIUM BLD-SCNC: 4 MMOL/L
POTASSIUM SERPL-SCNC: 4.2 MMOL/L (ref 3.5–5)
PROT SERPL-MCNC: 6.9 G/DL (ref 6.6–8.7)
RBC # BLD AUTO: 2.66 M/UL (ref 4.7–6.1)
RBC # BLD AUTO: 2.75 M/UL (ref 4.7–6.1)
SAMPLE SOURCE: ABNORMAL
SODIUM SERPL-SCNC: 136 MMOL/L (ref 136–145)
SPONT RATE(BPM): 22
VIT B12 SERPL-MCNC: 579 PG/ML (ref 211–946)
WBC # BLD AUTO: 10 K/UL (ref 4.8–10.8)
WBC # BLD AUTO: 9.6 K/UL (ref 4.8–10.8)

## 2024-08-21 PROCEDURE — 86923 COMPATIBILITY TEST ELECTRIC: CPT

## 2024-08-21 PROCEDURE — 97530 THERAPEUTIC ACTIVITIES: CPT

## 2024-08-21 PROCEDURE — 86901 BLOOD TYPING SEROLOGIC RH(D): CPT

## 2024-08-21 PROCEDURE — 6370000000 HC RX 637 (ALT 250 FOR IP): Performed by: NURSE PRACTITIONER

## 2024-08-21 PROCEDURE — 97110 THERAPEUTIC EXERCISES: CPT

## 2024-08-21 PROCEDURE — 82803 BLOOD GASES ANY COMBINATION: CPT

## 2024-08-21 PROCEDURE — 74177 CT ABD & PELVIS W/CONTRAST: CPT

## 2024-08-21 PROCEDURE — 85025 COMPLETE CBC W/AUTO DIFF WBC: CPT

## 2024-08-21 PROCEDURE — 97165 OT EVAL LOW COMPLEX 30 MIN: CPT

## 2024-08-21 PROCEDURE — 86850 RBC ANTIBODY SCREEN: CPT

## 2024-08-21 PROCEDURE — P9016 RBC LEUKOCYTES REDUCED: HCPCS

## 2024-08-21 PROCEDURE — 1180000000 HC REHAB R&B

## 2024-08-21 PROCEDURE — 2700000000 HC OXYGEN THERAPY PER DAY

## 2024-08-21 PROCEDURE — 80053 COMPREHEN METABOLIC PANEL: CPT

## 2024-08-21 PROCEDURE — 36415 COLL VENOUS BLD VENIPUNCTURE: CPT

## 2024-08-21 PROCEDURE — 2580000003 HC RX 258: Performed by: NURSE PRACTITIONER

## 2024-08-21 PROCEDURE — 86900 BLOOD TYPING SEROLOGIC ABO: CPT

## 2024-08-21 PROCEDURE — 6360000004 HC RX CONTRAST MEDICATION: Performed by: INTERNAL MEDICINE

## 2024-08-21 PROCEDURE — 99222 1ST HOSP IP/OBS MODERATE 55: CPT | Performed by: PSYCHIATRY & NEUROLOGY

## 2024-08-21 PROCEDURE — 94760 N-INVAS EAR/PLS OXIMETRY 1: CPT

## 2024-08-21 PROCEDURE — 97116 GAIT TRAINING THERAPY: CPT

## 2024-08-21 PROCEDURE — 36600 WITHDRAWAL OF ARTERIAL BLOOD: CPT

## 2024-08-21 PROCEDURE — 93005 ELECTROCARDIOGRAM TRACING: CPT | Performed by: INTERNAL MEDICINE

## 2024-08-21 PROCEDURE — 97535 SELF CARE MNGMENT TRAINING: CPT

## 2024-08-21 PROCEDURE — 97161 PT EVAL LOW COMPLEX 20 MIN: CPT

## 2024-08-21 PROCEDURE — 82962 GLUCOSE BLOOD TEST: CPT

## 2024-08-21 PROCEDURE — 85027 COMPLETE CBC AUTOMATED: CPT

## 2024-08-21 PROCEDURE — 83605 ASSAY OF LACTIC ACID: CPT

## 2024-08-21 RX ORDER — IOPAMIDOL 755 MG/ML
75 INJECTION, SOLUTION INTRAVASCULAR
Status: DISCONTINUED | OUTPATIENT
Start: 2024-08-21 | End: 2024-08-21

## 2024-08-21 RX ORDER — IOPAMIDOL 755 MG/ML
75 INJECTION, SOLUTION INTRAVASCULAR
Status: COMPLETED | OUTPATIENT
Start: 2024-08-21 | End: 2024-08-21

## 2024-08-21 RX ADMIN — METOPROLOL SUCCINATE 50 MG: 50 TABLET, EXTENDED RELEASE ORAL at 08:04

## 2024-08-21 RX ADMIN — INSULIN LISPRO 1 UNITS: 100 INJECTION, SOLUTION INTRAVENOUS; SUBCUTANEOUS at 16:56

## 2024-08-21 RX ADMIN — PRIMIDONE 50 MG: 50 TABLET ORAL at 20:59

## 2024-08-21 RX ADMIN — GUAIFENESIN SYRUP AND DEXTROMETHORPHAN 5 ML: 100; 10 SYRUP ORAL at 20:59

## 2024-08-21 RX ADMIN — FUROSEMIDE 40 MG: 40 TABLET ORAL at 08:04

## 2024-08-21 RX ADMIN — TAMSULOSIN HYDROCHLORIDE 0.4 MG: 0.4 CAPSULE ORAL at 08:04

## 2024-08-21 RX ADMIN — LOSARTAN POTASSIUM 25 MG: 50 TABLET, FILM COATED ORAL at 08:04

## 2024-08-21 RX ADMIN — APIXABAN 2.5 MG: 2.5 TABLET, FILM COATED ORAL at 08:04

## 2024-08-21 RX ADMIN — DILTIAZEM HYDROCHLORIDE 120 MG: 120 CAPSULE, COATED, EXTENDED RELEASE ORAL at 08:04

## 2024-08-21 RX ADMIN — INSULIN LISPRO 1 UNITS: 100 INJECTION, SOLUTION INTRAVENOUS; SUBCUTANEOUS at 11:33

## 2024-08-21 RX ADMIN — IOPAMIDOL 75 ML: 755 INJECTION, SOLUTION INTRAVENOUS at 18:10

## 2024-08-21 RX ADMIN — PRIMIDONE 50 MG: 50 TABLET ORAL at 08:04

## 2024-08-21 RX ADMIN — GUAIFENESIN SYRUP AND DEXTROMETHORPHAN 5 ML: 100; 10 SYRUP ORAL at 16:27

## 2024-08-21 RX ADMIN — PANTOPRAZOLE SODIUM 40 MG: 40 TABLET, DELAYED RELEASE ORAL at 05:48

## 2024-08-21 RX ADMIN — MONTELUKAST 10 MG: 10 TABLET, FILM COATED ORAL at 20:59

## 2024-08-21 RX ADMIN — HYDRALAZINE HYDROCHLORIDE 25 MG: 50 TABLET ORAL at 05:48

## 2024-08-21 RX ADMIN — SODIUM CHLORIDE, PRESERVATIVE FREE 10 ML: 5 INJECTION INTRAVENOUS at 21:00

## 2024-08-21 RX ADMIN — METOPROLOL SUCCINATE 50 MG: 50 TABLET, EXTENDED RELEASE ORAL at 20:59

## 2024-08-21 RX ADMIN — ATORVASTATIN CALCIUM 20 MG: 20 TABLET, FILM COATED ORAL at 08:04

## 2024-08-21 RX ADMIN — INSULIN LISPRO 1 UNITS: 100 INJECTION, SOLUTION INTRAVENOUS; SUBCUTANEOUS at 08:04

## 2024-08-21 NOTE — PROGRESS NOTES
Facility/Department: [facility] 8 REHAB UNIT  Occupational Therapy Treatment Note    Name: Jose Tate  : 1940  MRN: [id number]  Date of Service: 2024    Discharge Recommendations:  Continue to assess pending progress          Patient Diagnosis(es): The encounter diagnosis was Permanent atrial fibrillation (HCC).  Past Medical History:  has a past medical history of Allergic rhinitis, Bronchitis, chronic (HCC), Carotid artery stenosis, GERD (gastroesophageal reflux disease), Heart attack (HCC), Hemorrhoids, Hypercholesterolemia, Palliative care patient, and Type II or unspecified type diabetes mellitus without mention of complication, not stated as uncontrolled.  Past Surgical History:  has a past surgical history that includes Cardiac surgery; Port Surgery (Right, 2021); and ep device procedure (Left, 2024).    Treatment Diagnosis: Acute Kidney failure, Pacemaker placement on 2024    Assessment   Performance deficits / Impairments: Decreased functional mobility ;Decreased ADL status;Decreased strength;Decreased endurance;Decreased balance;Decreased high-level IADLs  Assessment: Asking patient questions about home setup and then became more and more lethargic and falling asleep during questions. PCA came in to check O2 on pulse ox and was 74%, checked with Forehead monitor and it was 64%. Nursing called who then called a rapid response due to patient not responding and transferred back into bed. Patient placed on 3 L O2 and O2 sats increased to 79%. Patient became a little more alert answering some questions, but very out of it. Left with rapid response team.  Treatment Diagnosis: Acute Kidney failure, Pacemaker placement on 2024  Activity Tolerance  Activity Tolerance: Patient limited by fatigue;Treatment limited secondary to medical complications (free text)              Plan   Occupational Therapy Plan  Current Treatment Recommendations: Strengthening, Balance training, Functional  mobility training, Endurance training, Wheelchair mobility training, Equipment evaluation, education, & procurement, Patient/Caregiver education & training, Safety education & training, Self-Care / ADL, Home management training     Restrictions  Restrictions/Precautions  Restrictions/Precautions: Weight Bearing, Fall Risk  Implants present? : Pacemaker  Lower Extremity Weight Bearing Restrictions  Right Lower Extremity Weight Bearing: Weight Bearing As Tolerated  Left Lower Extremity Weight Bearing: Weight Bearing As Tolerated  Position Activity Restriction  Other position/activity restrictions: Pacemaker precautions.       Social/Functional History  Social/Functional History  Lives With: Alone  Type of Home: House  Home Layout: One level  Home Access: Ramped entrance, Stairs to enter with rails  Bathroom Shower/Tub: Walk-in shower  Bathroom Equipment: Grab bars in shower, Grab bars around toilet, Built-in shower seat  Home Equipment: Walker - Standard, Walker - 4-Wheeled  Has the patient had two or more falls in the past year or any fall with injury in the past year?: No  ADL Assistance: Independent  Homemaking Assistance: Independent  Homemaking Responsibilities: Yes  Ambulation Assistance: Independent (No AD)  Transfer Assistance: Independent  Active : Yes  Mode of Transportation: Car  Occupation: Retired  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 Used to golf.  Additional Comments: Daughter works full time and has 2 children with disabilities, Other daughter lives with Jere.       Objective   Functional Mobility  Functional - Mobility Device: Rolling Walker  Activity: Other  Assist Level: Contact guard assistance  Functional Mobility Comments: to bathroom, but required w/c from bathroom due to very SOB and patient stated that he needed to lie down feeling like he was going to pass out. O2 sats were 83% and BP was 120/49. O2 sats

## 2024-08-21 NOTE — PROGRESS NOTES
[facility] MEDICINE  - PROGRESS NOTE    Admit Date: 8/15/2024       8/20/2024    Subjective: Feels some better but still has cough    Objective:   Vitals: BP (!) 161/59   Pulse (!) 113   Temp (!) 96.6 °F (35.9 °C)   Resp 16   Ht 1.778 m (5' 10\")   Wt 76.3 kg (168 lb 2 oz)   SpO2 97%   BMI 24.12 kg/m²   General appearance: alert, appears stated age and cooperative  Skin: Skin color, texture, turgor normal.   HEENT: Head: Normocephalic, no lesions, without obvious abnormality.  Neck: no adenopathy, no carotid bruit, no JVD, supple, symmetrical, trachea midline, and thyroid not enlarged, symmetric, no tenderness/mass/nodules  Lungs: clear to auscultation bilaterally  Heart: S1S2 irregular, tachy  Abdomen: soft, non-tender; bowel sounds normal; no masses,  no organomegaly  Extremities: extremities normal, atraumatic, no cyanosis or edema  Lymphatic: No significant lymph node enlargement papable  Neurologic: Mental status: Alert, oriented, thought content appropriate      Data:   Scheduled Meds: Reviewed  Continuous Infusions:   sodium chloride      dextrose      sodium chloride      lactated ringers IV soln 75 mL/hr at 08/16/24 0938       Intake/Output Summary (Last 24 hours) at 8/20/2024 0742  Last data filed at 8/20/2024 0434  Gross per 24 hour   Intake 0 ml   Output 1160 ml   Net -1160 ml     CBC:   Recent Labs     08/20/24  0111   WBC 10.3   HGB 9.3*        BMP:  Recent Labs     08/20/24  0111      K 5.3*   CL 99   CO2 24   BUN 25*   CREATININE 1.8*   GLUCOSE 128*     ABGs: No results found for: \"PHART\", \"PO2ART\", \"PUL3QWY\"  INR: No results for input(s): \"INR\" in the last 72 hours.  -----------------------------------------------------------------            Assessment/Plan    Patient Active Problem List:     Hypercholesterolemia     Bronchitis, chronic (HCC)     GERD (gastroesophageal reflux disease)     COPD (chronic obstructive pulmonary 
   08/20/24 1000   Subjective   Subjective Patient in recliner no C/O of pain daughter & sister present.   Pain Assessment   Pain Assessment None - Denies Pain   Vitals   O2 Device None (Room air)   Transfer Training   Transfer Training Yes   Sit to Stand Contact-guard assistance   Stand to Sit Minimum assistance  (Does not reach back for chair.)   Gait Training   Gait Training Yes   Gait   Overall Level of Assistance Minimum assistance   Distance (ft) 50 Feet  (NO TRUE LOB.  OVERALL UNSTEADY)   Assistive Device Walker, rolling   Interventions Safety awareness training;Demonstration;Manual cues;Tactile cues;Verbal cues  (Cues for proper RW placement.)   PT Exercises   A/AROM Exercises BL LE EX X 10 reps sitting in recliner.   Patient Education   Education Given To Patient;Family   Education Provided Role of Therapy;Plan of Care;Home Exercise Program;Transfer Training;Energy Conservation;Fall Prevention Strategies;Equipment;Family Education   Education Provided Comments Use of call light & staff assist.   Education Method Demonstration;Verbal   Barriers to Learning None   Education Outcome Verbalized understanding;Demonstrated understanding;Continued education needed   Other Specialty Interventions   Other Treatments/Modalities In chair call light needs in reach alarm attached  & family present.   Assessment   Activity Tolerance Patient tolerated treatment well       Electronically signed by Rashad Knapp PTA on 8/20/2024 at 10:54 AM     
  [facility]      Progress Note    Patient:  Jose Tate  YOB: 1940  Date of Service: 8/19/2024  MRN: [id number]   Acct: [id number]   Primary Care Physician: Chayo Grande MD  Advance Directive: Limited  Admit Date: 8/15/2024       Hospital Day: 4    Portions of this note have been copied forward, however, updated to reflect the most current clinical status of this patient.     CHIEF COMPLAINT Fatigue/ Fall    SUBJECTIVE:  Mr. Tate was resting in bed this morning. Denies shortness of breath or chest pain at this time.       CUMULATIVE HOSPITAL COURSE:   The patient is a 84 y.o. male with PMH of  Type 2 Diabetes, MI, and GERD who presented to [facility] ED for evaluation of progressive fatigue, weakness and fall over the past couple of weeks. Reportedly had syncopal episode night prior to admission. Workup in ED revealed new onset Afib with RVR, BUN 40, Cr 2.6, Mg 1.3, CRP 30.0, BNP 5469, WBC 15.0, Hgb 10.0, chest x-ray indicated mild density in the right base may represent atelectasis or pneumonia, lungs are otherwise clear. IV Cardizem bolus and drip was initiated in ED.  Patient was admitted to hospital medicine with cardiology consultation.  Cardiology recommended low-dose Toprol, Eliquis 2.5 mg twice daily on discharge. ECHO indicated normal LV size with LVEF of 55 to 60%, mildly increased thickness, normal wall motion, normal diastolic function. Noted to have bradycardia.  Cardiology further recommended plans for pacemaker Monday. VQ scan indicated right midlung and lower lung zone small perfusion defects, biapical decreased uptake which could be related to emphysema, low to immediate probability. Currently on Lovenox 1 mg/kg BID with plans to transition to Eliquis on discharge. Underwent Pacemaker placement this morning.         Review of Systems   Constitutional:  Negative for chills, diaphoresis, fatigue and fever.   HENT:  Negative for congestion and ear pain.    Eyes:  Negative 
  [facility]      Patient:  Jose Tate  YOB: 1940  Date of Service: 8/17/2024  MRN: [id number]   Acct: [id number]   Primary Care Physician: Chayo Grande MD  Advance Directive: Limited  Admit Date: 8/15/2024       Hospital Day: 2  Portions of this note have been copied forward, however, changed to reflect the most current clinical status of this patient.  CHIEF COMPLAINT   Fatigue/fall     SUBJECTIVE:  Patient resting in the bed, denies chest pain or shortness of breath. Complained      CUMULATIVE HOSPITAL COURSE:  The patient is a 84 y.o. male who presented to [facility] ED complaining of fatigue/fall progressive over the last 2 weeks, found to be in new onset A-fib with RVR on admission.  Patient reports last night he stood up from a toilet and his legs gave way, he collapsed onto the floor, was approximately on the floor for 4 hours.  Patient denied any head trauma and states he did not pass out or lose consciousness.  He was ultimately able to get up during the night and make it to the bed and reported to his PCP this morning and was noted to have a declining renal function, low magnesium, and other lab abnormalities and was ultimately sent to ED with his family for further evaluation.  Patient denies any chest pain or shortness of breath on admission.  Does admit to decreased oral intake over the past couple weeks.  He reports a prior history of lung adenocarcinoma but is currently cancer free for approximately 2 years per patient.     Patient was admitted to hospital services for medical management.  Cardiology consult in a.m. for new onset A-fib with RVR.     Cardiology evaluated, low dose Toprol, out patient CHAY, will start Eliquis 2.5mg BID at discharge.  Follow up with Dr Kruse at discharge. No need for Lexiscan at this time.  Creatinine 2.4, IVF for gentle hydration, normal EF per Cardiology note.  ECHO,  completed, noted normal EF.  Home health referral per daughter request.  PT/OT 
  [facility]      Patient:  Jose Tate  YOB: 1940  Date of Service: 8/16/2024  MRN: [id number]   Acct: [id number]   Primary Care Physician: Chayo Grande MD  Advance Directive: Full Code  Admit Date: 8/15/2024       Hospital Day: 1  Portions of this note have been copied forward, however, changed to reflect the most current clinical status of this patient.  CHIEF COMPLAINT   Fatigue/fall     SUBJECTIVE:  Patient resting in the bed, denies chest pain or shortness of breath.      CUMULATIVE HOSPITAL COURSE:  The patient is a 84 y.o. male who presented to [facility] ED complaining of fatigue/fall progressive over the last 2 weeks, found to be in new onset A-fib with RVR on admission.  Patient reports last night he stood up from a toilet and his legs gave way, he collapsed onto the floor, was approximately on the floor for 4 hours.  Patient denied any head trauma and states he did not pass out or lose consciousness.  He was ultimately able to get up during the night and make it to the bed and reported to his PCP this morning and was noted to have a declining renal function, low magnesium, and other lab abnormalities and was ultimately sent to ED with his family for further evaluation.  Patient denies any chest pain or shortness of breath on admission.  Does admit to decreased oral intake over the past couple weeks.  He reports a prior history of lung adenocarcinoma but is currently cancer free for approximately 2 years per patient.     Patient was admitted to hospital services for medical management.  Cardiology consult in a.m. for new onset A-fib with RVR.     Cardiology evaluated, low dose Toprol, out patient CHAY, will start Eliquis 2.5mg BID at discharge.  Follow up with Dr Kruse at discharge. No need for Lexiscan at this time.  Creatinine 2.4, IVF for gentle hydration, normal EF per Cardiology note.  ECHO,  completed.  Home health referral per daughter request.  PT/OT evaluation and 
  Pharmacy Adjustment per [facility] protocol    Jose Tate is a 84 y.o. male. Pharmacy has adjusted medications per [facility] protocol.    Recent Labs     08/15/24  0820 08/15/24  1600   BUN 40* 43*       Recent Labs     08/15/24  0820 08/15/24  1600   CREATININE 2.6* 2.7*       Estimated Creatinine Clearance: 21 mL/min (A) (based on SCr of 2.7 mg/dL (H)).    Height:   Ht Readings from Last 1 Encounters:   08/15/24 1.778 m (5' 10\")     Weight:  Wt Readings from Last 1 Encounters:   08/15/24 75.8 kg (167 lb)         Plan: Adjust the following medications based on [facility] protocol:           Enoxaparin 1 mg/kg sq daily    Electronically signed by Clarence Canales RPH on 8/16/2024 at 12:59 AM   
..4 Eyes Skin Assessment     NAME:  Jose Tate  YOB: 1940  MEDICAL RECORD NUMBER:  [id number]    The patient is being assessed for  Other    new onset of A-fib     I agree that at least one RN has performed a thorough Head to Toe Skin Assessment on the patient. ALL assessment sites listed below have been assessed.      Areas assessed by both nurses:    Head, Face, Ears, Shoulders, Back, Chest, Arms, Elbows, Hands, Sacrum. Buttock, Coccyx, Ischium, Legs. Feet and Heels, and Under Medical Devices         Does the Patient have a Wound? No noted wound(s)       Rony Prevention initiated by RN: Yes  Wound Care Orders initiated by RN: No    Pressure Injury (Stage 3,4, Unstageable, DTI, NWPT, and Complex wounds) if present, place Wound referral order by RN under : No    New Ostomies, if present place, Ostomy referral order under : No     Nurse 1 eSignature: Electronically signed by Donna Marlow RN on 8/16/24 at 2:01 AM CDT    **SHARE this note so that the co-signing nurse can place an eSignature**    Nurse 2 eSignature: Electronically signed by Renetta Dukes RN on 8/16/24 at 4:45 AM CDT    
CLINICAL PHARMACY NOTE: MEDS TO BEDS    Total # of Prescriptions Filled: 1   The following medications were delivered to the patient:  Discharge Medication List as of 8/20/2024  4:12 PM        START taking these medications    Details   apixaban (ELIQUIS) 2.5 MG TABS tablet Take 1 tablet by mouth 2 times daily, Disp-60 tablet, R-0Normal               Additional Documentation:    Delivered RX to patient daughter at bedside. No copay.  
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) 12/05/2022    Chronic systolic (congestive) heart failure 08/06/2022    Palliative care patient 08/16/2024    Cough 08/16/2024    Shortness of breath 08/16/2024    Mild malnutrition (HCC) 08/16/2024    New onset a-fib (HCC) 08/15/2024    TOMER (acute kidney injury) (HCC) 08/15/2024    Iron deficiency anemia 10/31/2021    Adenocarcinoma of right lung (HCC) 04/07/2021    Former smoker 04/07/2021    Occupational exposure in workplace 01/07/2021    Type 2 diabetes mellitus without complication (HCC) 03/23/2016    Essential hypertension 09/15/2015    COPD (chronic obstructive pulmonary disease) (HCC) 03/13/2015    Hypercholesterolemia     Bronchitis, chronic (HCC)     GERD (gastroesophageal reflux disease)        Admit Date:  8/15/2024    Admission Problem List: Present on Admission:   Type 2 diabetes mellitus without complication (HCC)   Essential hypertension   Chronic systolic (congestive) heart failure   New onset a-fib (HCC)   TOMER (acute kidney injury) (HCC)   Cough   Shortness of breath   Mild malnutrition (HCC)      Cardiac Specific Data:  Specialty Problems          Cardiology Problems    Chronic systolic (congestive) heart failure        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) 12/05/2022    Chronic systolic (congestive) heart failure 08/06/2022    Palliative care patient 08/16/2024    Cough 08/16/2024    Shortness of breath 08/16/2024    Mild malnutrition (HCC) 08/16/2024    New onset a-fib (HCC) 08/15/2024    TOMER (acute kidney injury) (HCC) 08/15/2024    Iron deficiency anemia 10/31/2021    Adenocarcinoma of right lung (HCC) 04/07/2021    Former smoker 04/07/2021    Occupational exposure in workplace 01/07/2021    Type 2 diabetes mellitus without complication (HCC) 03/23/2016    Essential hypertension 09/15/2015    COPD (chronic obstructive pulmonary disease) (HCC) 03/13/2015    Hypercholesterolemia     Bronchitis, chronic (HCC)     GERD (gastroesophageal reflux disease)        Admit Date:  8/15/2024    Admission Problem List: Present on Admission:   Type 2 diabetes mellitus without complication (HCC)   Essential hypertension   Chronic systolic (congestive) heart failure   New onset a-fib (HCC)   TOMER (acute kidney injury) (HCC)   Cough   Shortness of breath   Mild malnutrition (HCC)      Cardiac Specific Data:  Specialty Problems          Cardiology Problems    Chronic systolic (congestive) heart failure        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 
Comprehensive Nutrition Assessment    Type and Reason for Visit:  Initial, Positive Nutrition Screen    Nutrition Recommendations/Plan:   Add chocolate Glucerna ONS with lunch.  Add mild malnutrition to problem list.     Malnutrition Assessment:  Malnutrition Status:  Mild malnutrition (08/16/24 1517)    Context:  Chronic Illness     Findings of the 6 clinical characteristics of malnutrition:  Energy Intake:  75% or less estimated energy requirements for 1 month or longer  Weight Loss:  No significant weight loss     Body Fat Loss:  No significant body fat loss     Muscle Mass Loss:  No significant muscle mass loss    Fluid Accumulation:  No significant fluid accumulation     Strength:  Not Performed    Nutrition Assessment:    +NS for reported wt loss and decreased appetite. No PO intake yet documented. Currently receiving LR at 75 mL/hr= 1800 mL fluid. Visited pt after lunch; his daughter was present. Daughter stated pt ate bites of his lunch. Pt stated that his appetite has been down for about 1 month. Pt also reported 20# wt loss over 6 months between Oncology f/u appts (hx of cancer). Wt hx shows no significant changes. Pt meets criteria for mild malnutrition AEB reported intake <75% of estimated needs for 1 month. Pt's daughter stated she is going to bring him one of his favorite meals: a steak and baked potato from [employer] restaurant. Pt seemed excited for this dinner. Offered to add ONS; pt agreeable to try chocolate with lunch. Will order Glucerna for T2DM and elevated BG.    Nutrition Related Findings:    BUN 41, creat 2.4, GFR 26, CRP 30, alb 3.3, BNP 4,969, A1c 5.7%, glu 168-200.         Current Nutrition Intake & Therapies:    Average Meal Intake: 1-25%  Average Supplements Intake: None Ordered  ADULT DIET; Regular; 4 carb choices (60 gm/meal)  ADULT ORAL NUTRITION SUPPLEMENT; Lunch; Diabetic Oral Supplement    Anthropometric Measures:  Height: 177.8 cm (5' 10\")  Ideal Body Weight (IBW): 166 lbs (75 
Jose Tate arrived to room # 731.   Presented with: New onset A-fib   Mental Status: Patient is oriented, alert, coherent, logical, thought processes intact, and able to concentrate and follow conversation.   Vitals:    08/16/24 0059   BP: (!) 154/71   Pulse: (!) 117   Resp: 22   Temp: 97.3 °F (36.3 °C)   SpO2: 98%     Patient safety contract and falls prevention contract reviewed with patient Yes.  Oriented Patient to room.  Call light within reach. Yes.  Needs, issues or concerns expressed at this time: no.      Electronically signed by Donna Marlow RN on 8/16/2024 at 1:57 AM  
Occupational Therapy    Per NANCY Clinton pt on hold this date s/p pacer placement this am.  Pt now lethargic and on hold til taiwo morning.  Continue follow up.    Electronically signed by Aster Felipe OT on 8/19/2024 at 2:51 PM'    
Physical Therapy      Facility/Department: [facility] PROGRESSIVE CARE  Physical Therapy Initial Assessment    Name: Jose Tate  : 1940  MRN: [id number]  Date of Service: 2024    Discharge Recommendations:  Home with assist PRN          Patient Diagnosis(es): The primary encounter diagnosis was Atrial fibrillation with rapid ventricular response (HCC). Diagnoses of Generalized weakness, Hypomagnesemia, Atrial fibrillation, unspecified type (HCC), Acute kidney injury superimposed on CKD (HCC), and Permanent atrial fibrillation (HCC) were also pertinent to this visit.  Past Medical History:  has a past medical history of Allergic rhinitis, Bronchitis, chronic (HCC), Carotid artery stenosis, GERD (gastroesophageal reflux disease), Heart attack (HCC), Hemorrhoids, Hypercholesterolemia, Palliative care patient, and Type II or unspecified type diabetes mellitus without mention of complication, not stated as uncontrolled.  Past Surgical History:  has a past surgical history that includes Cardiac surgery and Port Surgery (Right, 2021).    Assessment   Body Structures, Functions, Activity Limitations Requiring Skilled Therapeutic Intervention: Decreased functional mobility   Assessment: Patient will benefit from continuing skilled physical therapy to improve mobility  Treatment Diagnosis: Decline in mobility  Therapy Prognosis: Good  Decision Making: Low Complexity  Requires PT Follow-Up: Yes  Activity Tolerance  Activity Tolerance: Patient tolerated evaluation without incident;Patient limited by fatigue     Plan   Physical Therapy Plan  Days Per Week: 7 Days  Therapy Duration: 2 Weeks  Current Treatment Recommendations: Strengthening, Functional mobility training, Balance training, Gait training, Therapeutic activities, Safety education & training, Patient/Caregiver education & training  Safety Devices  Type of Devices: Call light within reach, Chair alarm in place, Gait belt, Left in chair, Nurse notified 
Physical Therapy     08/19/24 1130   Subjective   Subjective attempted AM tx; pt had pacer put in this morning and nursing requested therapy hold until tomorrow morning. will continue to follow up.   Vitals   Pulse 78   Respirations 16   BP (!) 148/75   MAP (Calculated) 99     Electronically signed by Carson Moraes PTA on 8/19/2024 at 11:56 AM     
Physical Therapy  Attempted PT eval, but RN declined for a.m. due to episode of orthostatic hypotension  Electronically signed by Maria Del Rosario Moraes PT on 8/16/2024 at 1:44 PM    
Pt ambulated to the bathroom with nurse assist to attempt to have a BM. Pt stated he started getting very dizzy. Second nurse was brought into the room to safely bring the pt back to bed. BP taken, reading 132/52 with a HR of 68.   
Pt has been experiencing increased SOA. Pt stated he has had congestion and a cough since yesterday. Notified NP, new orders received.   
Report called to Soraida CRUZ on 8th floor.  
to assess  Bed Mobility Comments: up in recliner  Transfers  Stand Step Transfers: Contact guard assistance;Minimal assistance  Sit to stand: Contact guard assistance;Minimal assistance  Stand to sit: Contact guard assistance;Minimal assistance     Cognition  Overall Cognitive Status: WFL               Gross Assessment  AROM: Within functional limits  Strength: Generally decreased, functional                       Included Treatment  Tx consisted of: bed mobility; pt/family education; transfer training; DME training; activity tolerance/balance challenges; functional ambulation.   (Treatment time: 15 min)       Plan   Occupational Therapy Plan  Times Per Week: 3-5       Goals  Short Term Goals  Time Frame for Short Term Goals: 3-4 days  Short Term Goal 1: Transfer with CGA and DME.  Short Term Goal 2: Toileting skills with CGA.  Short Term Goal 3: UBD and bathing with IND.  Short Term Goal 4: LBD and bathing with min A and AE/DME.  Short Term Goal 5: Tolerate short, functional distances with CGA  in order to meet household demands.  Additional Goals?: Yes  Long Term Goals  Time Frame for Long Term Goals : 1 week  Long Term Goal 1: Pt/family will verbalize/demo: AE/DME options; surgical/WB precautions; recommended therapeutic activities; homemaking/IADL strategies; energy conservation techniques; and fall prevention strategies.          OAMR Patel/L  Electronically signed by Haylee Oden OTR/L on 8/20/2024 at 10:49 AM.  
AV Stroke Volume index 33.2 mL/m2.    Aorta: Normal sized ascending aorta.    Pericardium: No pericardial effusion.    IVC/SVC: IVC is normal (RAP ~3 mmHg).     No clinically significant valvular regurgitation or stenosis identified.  Current rhythm- sinus      Assessment/Plan   Principal Problem:    New onset a-fib (HCC)  Active Problems:    Chronic systolic (congestive) heart failure    Essential hypertension    Type 2 diabetes mellitus without complication (HCC)    TOMER (acute kidney injury) ([facility])    Cough    Shortness of breath    Mild malnutrition ([facility])  Resolved Problems:    * No resolved hospital problems. *    Principal Problem:    New onset a-fib (HCC)  Cardiology consult, recommendation given  Telemetry  Daily CMP, CBC  proBNP 4,969, repeat every 3rd day    CRP 30.0  Troponin 44, trend 40>143>47, stable  TSH 2.13  Magnesium stable  Diltiazem bolus and gtt initiated in ED, discontinued, Cardizem low dose initiated per Cardiology  Eliquis 2.5mg BID at discharge  ZIO at discharge               ECHO, reviewed, normal EF noted   Orthostatic pressures positive, cont IVF per order, repeat daily     Active Problems:    Bradycardia  Monitor telemetry  Possible pacer on Monday    Cough/Shortness of breath   Respiratory panel 8/16, negative   Repeat CXR 8/16   D dimer elevated 1.24   Lovenox 1mg/kg   VQ scan rule out PE, completed, +VQ scan, Right mid lung and lower lung zone small perfusion defects. Continue current Lovenox 1mg/kg, will transition to Eliquis.      TOMER (acute kidney injury) ([facility])Baseline appears (1.3-1.9)  Monitor renal function, improving 1.6  UA with reflex negative  500cc LR bolus in ED  Hold home diuretic for now  IVF gentle hydration LR 75cc/hr 8/16       Chronic systolic (congestive) heart failure  proBNP 4,969, repeat every 3rd day  CXR, Mild density in the right base may represent atelectasis or pneumonia.  Lungs are otherwise clear.  No vascular congestion or pleural fluid.   Repeat CXR

## 2024-08-21 NOTE — SIGNIFICANT EVENT
Rapid response called.     Pt had what appears to be near syncopal event during therapy session - he was sitting in the chair trying to work on his steak, when he became weak and pale and closed his eyes and was not responding for a few seconds. He did not fall.     During the event and shortly after we had difficulty getting a good waveform on his pulse oxymetry and his BP was noted to be low.     So I obtained an EKG - paced rhythm without acute ischemia.   ABG - reassuring results.     CBC - this shows continued Hgb decline -  9.3 - 8.6 - 8.2    No reports of evident bleeding.   Recently restarted on eliquis post pacemaker placement.     I requested eliquis to be held and FOBT to be checked.     I also noted cardiology team was up titrating his BB dose - so I will hold losartan and hydralazine till his BP stabilizes.     If his Hgb continues to decline on tomorrow am check - will need to be evaluated by GI for further endoscopic testing.     Will proceed with CT enterography given continued Hgb decline and elevated LA.       Dwaine Church MD  8/21/2024  3:50 PM

## 2024-08-21 NOTE — H&P
[facility]   History and Physical        Patient:   Jose Tate  MR#:    [id number]  Account Number:                   [id number]      Room:    [facility]24-   YOB: 1940  Date of Progress Note: 8/21/2024  Time of Note                           7:50 AM  Attending Physician:  Kwame Rogers MD        Admitting diagnosis:Acute kidney failure, unspecified    Secondary diagnoses:Unspecified atrial fibrillation,Sick sinus syndrome,Presence of cardiac pacemaker,Chronic systolic (congestive) heart failure,Essential (primary) hypertension,Type 2 diabetes mellitus without complications,Cough, unspecified,Mild protein-calorie malnutrition,Pure hypercholesterolemia, unspecified,Gastro-esophageal reflux disease without esophagitis    CHIEF COMPLAINT: Acute kidney injury      HISTORY OF PRESENT ILLNESS:   This 84 y.o. male  with history IDDM, MI, GERD, COPD, chronic cough, chronic systolic CHF, HTN, and hypercholesterolemia. He presented to [facility] ER on 8/15/24 with c/o progressive fatigue, weakness and fall over the past couple of weeks. Reportedly had syncopal episode night prior to admission. Workup in ER revealed new onset Afib with RVR, BUN 40, Cr 2.6, Mg 1.3, CRP 30.0, BNP 5469, WBC 15.0, Hgb 10.0, chest x-ray indicated mild density in the right base may represent atelectasis or pneumonia, lungs are otherwise clear. IV Cardizem bolus and drip was initiated in ER. Patient noted to have TOMER with creatine of 2.6 with no history of CKD. He was given LR in ER and diuretics were placed on hold. Patient was admitted to the Hospitalist service with consult for cardiology. He was seen by Dr. Daly, who recommended low-dose Toprol, Eliquis 2.5 mg twice daily on discharge. ECHO indicated normal LV size with LVEF of 55 to 60%, mildly increased thickness, normal wall motion, normal diastolic function. Noted to have bradycardia. VQ scan indicated right midlung and lower lung zone small perfusion defects, biapical  for input(s): \"AST\", \"ALT\", \"BILITOT\", \"ALKPHOS\" in the last 72 hours.    Invalid input(s): \"ALB\"    Lipids: No results for input(s): \"CHOL\", \"HDL\" in the last 72 hours.    Invalid input(s): \"LDLCALCU\"    INR: No results for input(s): \"INR\" in the last 72 hours.        Assessment and Plan     1.  Acute kidney injury-monitor  2.  Atrial fibrillation-Eliquis/Cardizem/metoprolol  3.  Hyperlipidemia-on statin  4.  Hypertension-on meds monitor  5.  Diabetes-monitor blood sugar  6.  GI-PPI/bowel regimen  7.  BPH-on meds monitor  8.  Coronary artery disease/CHF/status post pacemaker  9.  COPD/history of lung cancer-Singulair/Robitussin as needed  10.  PT/OT  11.  Limited code-DO NOT INTUBATE      Patient's functional assessment  Prior to hospitalization the patient was continent of bowel and continent of bladder    Current therapy  Requires PT, OT and/or speech therapy    Anticipated discharge approximately 13 days    Functional assessment  All notes from reehab data were reviewed regarding the patient's functional status.        Anticipated discharge setting  Home with home care    No clear barriers to discharge    The patient appears to be an appropriate candidate for inpatient rehabilitation    Sufficiently stable: Patient's condition is sufficiently stable at the time of admission to allow the patient to actively participate in an intensive rehabilitation program    Close medical supervision: A rehabilitation physician, or other licensed treating physician with specialized training and experience in inpatient rehabilitation, will conduct face-to-face visits with the patient a minimum of at least 3 days per week throughout the patient's stay    This patient requires close medical supervision for the active management of the ongoing conditions and potential complications stated in the admission note    Intensive rehabilitation nursing: The patient demonstrates the need for 24-hour rehabilitation nursing care for active  management of the multiple medical issues documented in the admission note    Appropriate therapy needed: The patient requires the active and ongoing therapeutic intervention of at least 2 therapeutic disciplines, one of which must be physical or occupational therapy and/or speech therapy    Intensive therapy: The patient requires and is reasonably expected to actively participate in at least 3 hours of therapy per day at least 5 days per week, and expected to make measurable improvements that will be of practical value to improve the patient's functional capacity or adaptation to impairments. In addition, therapy treatments will begin within 36 hours from midnight of the day of the patient's admission to the inpatient rehabilitation facility    Expected duration and frequency therapy: 180 minutes per day, 5 days per week    Interdisciplinary team: The patient demonstrates the need for an interdisciplinary team for active management of the following medical issues including ataxia, motor planning, balance, disease management, elimination, endurance, family training, education, independent ADLs, pain management, precautions, range of motion, safety, strength, and transfers    I have reviewed the preadmission screening documents and concur with the findings. I believe the patient meets criteria and is sufficiently stable to allow participation in the program. This requires an intensive level of therapy, close medical supervision, and an interdisciplinary team approach provided through an individualized plan of care. I approve admitting this patient for an intensive inpatient rehabilitation program.    Please feel free to call with any questions   [phone] (cell phone)    Dr Kwame Rogers  Board certified in Neurology  Board Certified in Clinical Neurophysiology  Fellowship Trained in Clinical Neurophysiology      EMR Dragon/transcription disclaimer:Significant part of this  encounter note is electronic

## 2024-08-21 NOTE — PROGRESS NOTES
Rapid Response was called for pt when his oxygen and blood pressure went low, per staff member. He was also not responding appropriately, per staff. Pt's daughter was present and the multi-discipline team responded to the RR. Other disciplines participated and did their part in the RR. This  was remaining and provided spiritual care for the pt. Pt shared that is he is [employer] [place]. Provided sustaining presence, support, and prayer. Pt and pt's daughter expressed gratitude for spiritual care.       Electronically signed by Mary Behrens on 8/21/2024 at 2:59 PM

## 2024-08-21 NOTE — PROGRESS NOTES
08/21/24 1300   Bed mobility   Rolling to Left Stand by assistance   Rolling to Right Stand by assistance   Supine to Sit Stand by assistance   Sit to Supine Stand by assistance   Bed Mobility Comments TRIPLANAR TO RIGHT WITH RAILS   Transfers   Sit to Stand Minimal Assistance;Contact guard assistance  (VCS FOR HAND PLACEMENT)   Stand to Sit Minimal Assistance;Contact guard assistance  (PATIENT NEVER INDEP REACHED BACK BEFORE SITTING. REQUIRED CUES EACH TIME)   Bed to Chair Moderate assistance;Minimal assistance  (FAST TURNS WITH RW EXCESSIVELY ANTERIOR TO BODY)   Car Transfer Minimal Assistance;Contact guard assistance   Ambulation   Device Rolling Walker   Assistance Moderate assistance;Minimal assistance   Quality of Gait VCS FOR KEEPING RW CLOSE TO BODY, ERECT POSTURE.  PATIENT RESUMES FAST PACE WITH BILAT SCAP ELEVATIONAND RW EXCESSIVELY ANTERIOR TO BODY, NARROW MAK, WHEN AMBULATING DISTANCES OVER APPROX 20 FT OR WHEN DISTRACTED.   Distance 25 FT X2, 40 FT   More Ambulation? Yes   Ambulation 2   Device 2 Rolling Walker   Assistance 2 Minimal assistance;Contact guard assistance   Quality of Gait 2 INITIALLY FAST PACE, RW EXCESSIVLEY ANTERIOR, NARROW MAK.  ABLE TO CORRECT WITH CUES.  ONE TURN TO THE LEFT UNSAFE WITH RW EXCESSIVELY ANTERIOR AND OFFSET TO BODY, COLLISION BETWEEN FOOT AND BACK POST.  2ND TURN OK WITH CUES FOR TECHNIQUE.   Distance 50 FT   Assessment   Assessment SEE GAIT.  DISCUSSED WITH OT NEED FOR SLP SCREEN.  OT TO PERFORM BIMS THIS PM.

## 2024-08-21 NOTE — THERAPY EVALUATION
pending progress    PLAN:  Physical Therapy Plan  General Plan:  minutes of therapy at least 5 out of 7 days a week  Therapy Duration: 4 Weeks  Current Treatment Recommendations: Strengthening, ROM, Balance training, Functional mobility training, Transfer training, Cognitive/Perceptual training, Wheelchair mobility training, Endurance training, Gait training, Stair training, Home exercise program, Safety education & training, Patient/Caregiver education & training, Equipment evaluation, education, & procurement, Therapeutic activities  Discharge Recommendations: Continue to assess pending progress  PATIENT REQUIRES AND IS REASONABLY EXPECTED TO ACTIVELY PARTICIPATE IN AT LEAST 3 HOURS OF INTENSIVE THERAPY PER DAY AT LEAST 5 DAYS PER WEEK, AND BE EXPECTED TO MAKE MEASURABLE IMPROVEMENT THAT WILL BE OF PRACTICAL VALUE TO IMPROVE THE PATIENT'S FUNCTIONAL CAPACITY OR ADAPTATION TO IMPAIRMENTS.   PATIENT GOAL FOR REHAB:  RETURN TO PRIOR LEVEL OF FUNCTION       IRF/DONNIE  Roll Left and Right  Assistance Needed: Supervision or touching assistance  CARE Score: 4  Discharge Goal: Independent    Sit to Lying  Assistance Needed: Supervision or touching assistance  CARE Score: 4  Discharge Goal: Independent    Lying to Sitting on Side of Bed  Assistance Needed: Supervision or touching assistance  CARE Score: 4  Discharge Goal: Independent    Sit to Stand  Assistance Needed: Partial/moderate assistance  CARE Score: 3  Discharge Goal: Supervision or touching assistance    Chair/Bed-to-Chair Transfer  Assistance Needed: Partial/moderate assistance  CARE Score: 3  Discharge Goal: Supervision or touching assistance    Car Transfer  Reason if not Attempted: Not attempted due to medical condition or safety concerns  CARE Score: 88  Discharge Goal: Supervision or touching assistance    Walk 10 Feet  Assistance Needed: Substantial/maximal assistance  CARE Score: 2  Discharge Goal: Supervision or touching assistance    Walk 50 Feet

## 2024-08-21 NOTE — PROGRESS NOTES
Called to pt room due to low O2 sat & lethargy. Per OT pt sat reading 64%, upon arrival to room pt O2 sat 83% and rising on 3L NC. Rapid response called at 1429 and pt assisted back to bed. Dr. TABARES at bedside to assess pt. Pt more alert at this time. See new orders.

## 2024-08-21 NOTE — PROGRESS NOTES
Nutrition Assessment     Type and Reason for Visit: Initial    Nutrition Recommendations/Plan:   Start Glucerna once daily.   Obtain preferences.      Malnutrition Assessment:  Malnutrition Status: At risk for malnutrition (Comment)    Nutrition Assessment:  Following for new admit to inpatient rehab unit. Pt presents at risk for nutritional compromise r/t variable PO intake. Wt history reflects gradual wt loss over the past 1.5 years. He has not had recent significant wt loss. Pt reports good appetite and desire to eat breakfast this morning. Preferences obtained. Pt does have hx well-controlled DM with AphI0r=5.7% (8/15/24). No hx insulin dependency documented in PCP encounters. Pt only took Metformin at home with close kidney function monitoring. BG ranging 218-331mg/dL here with low dose sliding scale ordered. Recommend adding long-acting insulin.    Estimated Daily Nutrient Needs:  Energy (kcal):  7586-1104 (25-30kcal/kg) Weight Used for Energy Requirements: Current     Protein (g):   (1.0-2.0g/kg) Weight Used for Protein Requirements: Current        Fluid (ml/day):  5588-7564 Method Used for Fluid Requirements: 1 ml/kcal    Nutrition Related Findings:   EyIU9a=9.7% (8/15/24); -331mg/dL, GFR 36, K+ 5.3 Wound Type: Surgical Incision (L chest)    Current Nutrition Therapies:    ADULT DIET; Regular; 4 carb choices (60 gm/meal)  ADULT ORAL NUTRITION SUPPLEMENT; Lunch; Diabetic Oral Supplement    Anthropometric Measures:  Height: 177.8 cm (5' 10\")  Current Body Wt: 78.5 kg (173 lb 1.6 oz)   BMI: 24.8    Nutrition Diagnosis:   Inadequate energy intake related to acute injury/trauma as evidenced by intake 0-25%, poor intake prior to admission    Nutrition Interventions:   Food and/or Nutrient Delivery: Continue Current Diet, Start Oral Nutrition Supplement  Nutrition Education/Counseling: No recommendation at this time  Coordination of Nutrition Care: Continue to monitor while inpatient       Goals:      Goals: PO intake 50% or greater       Nutrition Monitoring and Evaluation:   Behavioral-Environmental Outcomes: None Identified  Food/Nutrient Intake Outcomes: Food and Nutrient Intake, Supplement Intake  Physical Signs/Symptoms Outcomes: Biochemical Data, Nutrition Focused Physical Findings, Weight, Skin    Discharge Planning:    Too soon to determine     Kyacee Pérez MS, RD, LD, CDCES  Contact: [phone]

## 2024-08-21 NOTE — PLAN OF CARE
Problem: Safety - Adult  Goal: Free from fall injury  8/21/2024 0811 by Mikaela Morin RN  Outcome: Progressing  8/20/2024 2207 by Virgen Fink RN  Outcome: Progressing     Problem: Discharge Planning  Goal: Discharge to home or other facility with appropriate resources  8/21/2024 0811 by Mikaela Morin RN  Outcome: Progressing  Flowsheets (Taken 8/21/2024 0807)  Discharge to home or other facility with appropriate resources: Identify barriers to discharge with patient and caregiver  8/20/2024 2207 by iVrgen Fink RN  Outcome: Progressing  Flowsheets  Taken 8/20/2024 1910 by Virgen Fink RN  Discharge to home or other facility with appropriate resources: Identify barriers to discharge with patient and caregiver  Taken 8/20/2024 1828 by Letitia Ulloa RN  Discharge to home or other facility with appropriate resources: Identify barriers to discharge with patient and caregiver     Problem: Pain  Goal: Verbalizes/displays adequate comfort level or baseline comfort level  8/21/2024 0811 by Mikaela Morin RN  Outcome: Progressing  8/20/2024 2207 by Virgen Fink RN  Outcome: Progressing     Problem: ABCDS Injury Assessment  Goal: Absence of physical injury  8/21/2024 0811 by Mikaela Morin RN  Outcome: Progressing  8/20/2024 2207 by Virgen Fink RN  Outcome: Progressing

## 2024-08-21 NOTE — PLAN OF CARE
Problem: Safety - Adult  Goal: Free from fall injury  Outcome: Progressing     Problem: Discharge Planning  Goal: Discharge to home or other facility with appropriate resources  Outcome: Progressing  Flowsheets  Taken 8/20/2024 1910 by Virgen Fink RN  Discharge to home or other facility with appropriate resources: Identify barriers to discharge with patient and caregiver  Taken 8/20/2024 1828 by Letitia Ulloa RN  Discharge to home or other facility with appropriate resources: Identify barriers to discharge with patient and caregiver     Problem: Pain  Goal: Verbalizes/displays adequate comfort level or baseline comfort level  Outcome: Progressing     Problem: ABCDS Injury Assessment  Goal: Absence of physical injury  Outcome: Progressing

## 2024-08-22 PROBLEM — D63.8 ANEMIA IN OTHER CHRONIC DISEASES CLASSIFIED ELSEWHERE: Status: ACTIVE | Noted: 2024-08-22

## 2024-08-22 LAB
ABO/RH: NORMAL
ALBUMIN SERPL-MCNC: 2.9 G/DL (ref 3.5–5.2)
ALP SERPL-CCNC: 71 U/L (ref 40–130)
ALT SERPL-CCNC: 13 U/L (ref 5–41)
ANION GAP SERPL CALCULATED.3IONS-SCNC: 15 MMOL/L (ref 7–19)
ANTIBODY SCREEN: NORMAL
AST SERPL-CCNC: 18 U/L (ref 5–40)
BASOPHILS # BLD: 0.1 K/UL (ref 0–0.2)
BASOPHILS NFR BLD: 0.7 % (ref 0–1)
BILIRUB SERPL-MCNC: 0.2 MG/DL (ref 0.2–1.2)
BILIRUB UR QL STRIP: NEGATIVE
BLOOD BANK DISPENSE STATUS: NORMAL
BLOOD BANK DISPENSE STATUS: NORMAL
BLOOD BANK PRODUCT CODE: NORMAL
BLOOD BANK PRODUCT CODE: NORMAL
BPU ID: NORMAL
BPU ID: NORMAL
BUN SERPL-MCNC: 24 MG/DL (ref 8–23)
CALCIUM SERPL-MCNC: 8.2 MG/DL (ref 8.8–10.2)
CHLORIDE SERPL-SCNC: 95 MMOL/L (ref 98–111)
CLARITY UR: CLEAR
CO2 SERPL-SCNC: 24 MMOL/L (ref 22–29)
COLOR UR: YELLOW
CREAT SERPL-MCNC: 1.5 MG/DL (ref 0.5–1.2)
DESCRIPTION BLOOD BANK: NORMAL
DESCRIPTION BLOOD BANK: NORMAL
EKG P AXIS: NORMAL DEGREES
EKG P-R INTERVAL: NORMAL MS
EKG Q-T INTERVAL: 376 MS
EKG QRS DURATION: 92 MS
EKG QTC CALCULATION (BAZETT): 435 MS
EKG T AXIS: 147 DEGREES
EOSINOPHIL # BLD: 0.3 K/UL (ref 0–0.6)
EOSINOPHIL NFR BLD: 3.1 % (ref 0–5)
ERYTHROCYTE [DISTWIDTH] IN BLOOD BY AUTOMATED COUNT: 13.8 % (ref 11.5–14.5)
GLUCOSE BLD-MCNC: 187 MG/DL (ref 70–99)
GLUCOSE BLD-MCNC: 187 MG/DL (ref 70–99)
GLUCOSE BLD-MCNC: 196 MG/DL (ref 70–99)
GLUCOSE BLD-MCNC: 272 MG/DL (ref 70–99)
GLUCOSE SERPL-MCNC: 157 MG/DL (ref 74–109)
GLUCOSE UR STRIP.AUTO-MCNC: NEGATIVE MG/DL
HCT VFR BLD AUTO: 22.3 % (ref 42–52)
HCT VFR BLD AUTO: 28.4 % (ref 42–52)
HGB BLD-MCNC: 7 G/DL (ref 14–18)
HGB BLD-MCNC: 9.1 G/DL (ref 14–18)
HGB UR STRIP.AUTO-MCNC: NEGATIVE MG/L
IMM GRANULOCYTES # BLD: 0.1 K/UL
KETONES UR STRIP.AUTO-MCNC: NEGATIVE MG/DL
LACTATE BLDV-SCNC: 1.5 MMOL/L (ref 0.5–1.9)
LEUKOCYTE ESTERASE UR QL STRIP.AUTO: NEGATIVE
LYMPHOCYTES # BLD: 1.7 K/UL (ref 1.1–4.5)
LYMPHOCYTES NFR BLD: 16.8 % (ref 20–40)
MCH RBC QN AUTO: 31.1 PG (ref 27–31)
MCHC RBC AUTO-ENTMCNC: 31.4 G/DL (ref 33–37)
MCV RBC AUTO: 99.1 FL (ref 80–94)
MONOCYTES # BLD: 1 K/UL (ref 0–0.9)
MONOCYTES NFR BLD: 9.9 % (ref 0–10)
NEUTROPHILS # BLD: 7 K/UL (ref 1.5–7.5)
NEUTS SEG NFR BLD: 68.4 % (ref 50–65)
NITRITE UR QL STRIP.AUTO: NEGATIVE
PERFORMED ON: ABNORMAL
PH UR STRIP.AUTO: 5 [PH] (ref 5–8)
PLATELET # BLD AUTO: 333 K/UL (ref 130–400)
PMV BLD AUTO: 9.5 FL (ref 9.4–12.4)
POTASSIUM SERPL-SCNC: 4 MMOL/L (ref 3.5–5)
PROT SERPL-MCNC: 6.1 G/DL (ref 6.6–8.7)
PROT UR STRIP.AUTO-MCNC: ABNORMAL MG/DL
RBC # BLD AUTO: 2.25 M/UL (ref 4.7–6.1)
SODIUM SERPL-SCNC: 134 MMOL/L (ref 136–145)
SP GR UR STRIP.AUTO: 1.02 (ref 1–1.03)
UROBILINOGEN UR STRIP.AUTO-MCNC: 0.2 E.U./DL
WBC # BLD AUTO: 10.3 K/UL (ref 4.8–10.8)

## 2024-08-22 PROCEDURE — 99233 SBSQ HOSP IP/OBS HIGH 50: CPT | Performed by: PSYCHIATRY & NEUROLOGY

## 2024-08-22 PROCEDURE — 85025 COMPLETE CBC W/AUTO DIFF WBC: CPT

## 2024-08-22 PROCEDURE — 2700000000 HC OXYGEN THERAPY PER DAY

## 2024-08-22 PROCEDURE — 36430 TRANSFUSION BLD/BLD COMPNT: CPT

## 2024-08-22 PROCEDURE — 81003 URINALYSIS AUTO W/O SCOPE: CPT

## 2024-08-22 PROCEDURE — 36415 COLL VENOUS BLD VENIPUNCTURE: CPT

## 2024-08-22 PROCEDURE — 85018 HEMOGLOBIN: CPT

## 2024-08-22 PROCEDURE — 94760 N-INVAS EAR/PLS OXIMETRY 1: CPT

## 2024-08-22 PROCEDURE — 99222 1ST HOSP IP/OBS MODERATE 55: CPT | Performed by: INTERNAL MEDICINE

## 2024-08-22 PROCEDURE — 1180000000 HC REHAB R&B

## 2024-08-22 PROCEDURE — 85014 HEMATOCRIT: CPT

## 2024-08-22 PROCEDURE — 6370000000 HC RX 637 (ALT 250 FOR IP): Performed by: INTERNAL MEDICINE

## 2024-08-22 PROCEDURE — 83605 ASSAY OF LACTIC ACID: CPT

## 2024-08-22 PROCEDURE — 82962 GLUCOSE BLOOD TEST: CPT

## 2024-08-22 PROCEDURE — 80053 COMPREHEN METABOLIC PANEL: CPT

## 2024-08-22 PROCEDURE — 93010 ELECTROCARDIOGRAM REPORT: CPT | Performed by: INTERNAL MEDICINE

## 2024-08-22 PROCEDURE — 6370000000 HC RX 637 (ALT 250 FOR IP): Performed by: NURSE PRACTITIONER

## 2024-08-22 PROCEDURE — 2580000003 HC RX 258: Performed by: NURSE PRACTITIONER

## 2024-08-22 RX ORDER — PANTOPRAZOLE SODIUM 40 MG/1
40 TABLET, DELAYED RELEASE ORAL
Status: DISCONTINUED | OUTPATIENT
Start: 2024-08-22 | End: 2024-08-31 | Stop reason: HOSPADM

## 2024-08-22 RX ORDER — SODIUM CHLORIDE 9 MG/ML
INJECTION, SOLUTION INTRAVENOUS PRN
Status: DISCONTINUED | OUTPATIENT
Start: 2024-08-22 | End: 2024-08-31 | Stop reason: HOSPADM

## 2024-08-22 RX ADMIN — ATORVASTATIN CALCIUM 20 MG: 20 TABLET, FILM COATED ORAL at 09:06

## 2024-08-22 RX ADMIN — PRIMIDONE 50 MG: 50 TABLET ORAL at 09:07

## 2024-08-22 RX ADMIN — MONTELUKAST 10 MG: 10 TABLET, FILM COATED ORAL at 21:23

## 2024-08-22 RX ADMIN — METOPROLOL SUCCINATE 50 MG: 50 TABLET, EXTENDED RELEASE ORAL at 09:07

## 2024-08-22 RX ADMIN — METOPROLOL SUCCINATE 50 MG: 50 TABLET, EXTENDED RELEASE ORAL at 21:23

## 2024-08-22 RX ADMIN — GUAIFENESIN SYRUP AND DEXTROMETHORPHAN 5 ML: 100; 10 SYRUP ORAL at 21:23

## 2024-08-22 RX ADMIN — PANTOPRAZOLE SODIUM 40 MG: 40 TABLET, DELAYED RELEASE ORAL at 17:00

## 2024-08-22 RX ADMIN — PANTOPRAZOLE SODIUM 40 MG: 40 TABLET, DELAYED RELEASE ORAL at 06:00

## 2024-08-22 RX ADMIN — PRIMIDONE 50 MG: 50 TABLET ORAL at 21:23

## 2024-08-22 RX ADMIN — SODIUM CHLORIDE, PRESERVATIVE FREE 10 ML: 5 INJECTION INTRAVENOUS at 09:08

## 2024-08-22 RX ADMIN — SODIUM CHLORIDE, PRESERVATIVE FREE 10 ML: 5 INJECTION INTRAVENOUS at 21:24

## 2024-08-22 RX ADMIN — TAMSULOSIN HYDROCHLORIDE 0.4 MG: 0.4 CAPSULE ORAL at 09:07

## 2024-08-22 RX ADMIN — DILTIAZEM HYDROCHLORIDE 120 MG: 120 CAPSULE, COATED, EXTENDED RELEASE ORAL at 09:07

## 2024-08-22 NOTE — PROGRESS NOTES
Occupational Therapy     08/22/24 1300   OT Individual Minutes   Time In 1300   Time Out 1300   Minutes 0   Minute Variance   Variance 45   Reason Med hold  (Med hold for medical necessity.)   Time Code Minutes    Timed Code Treatment Minutes 0 Minutes

## 2024-08-22 NOTE — CONSULTS
Hospitalist: Consultation     Date: 2024  Time: 3:13 PM    Name: Jose Tate   MRN: [id number]  : 1940    Code Status: Limited No additional code details  PCP: Chayo Grande MD    Consulting Physician: Pérez Butler MD  Consult requested by: Dr. Rogers  Reason for consult: Medical management    HPI: 84-year-old male with diabetes, recently hospitalized with new onset A-fib with RVR with recent syncopal episode with progressive fatigue, generalized weakness and recent falls.  He was managed with IV Cardizem infusion, weaned off and maintained on low-dose metoprolol for rate control and Eliquis 2.5 mg twice daily for anticoagulation on discharge.  Noted normal EF on echo per subsequently developed bradycardia.  Seen by cardiology and underwent pacemaker placement on 2024.  Discharged to [facility] inpatient rehab service on .  He was subsequently noted to have progressive decline in hemoglobin over several days.  GI consulted due to concern for occult GI bleeding.  Hospital medicine consulted for medical management.    To my evaluation, patient notes has not had any BM in a few days.  No overt bleeding episodes have been noted thus far.  Still having progressive fatigue more so in the last few days.  Currently denies any lightheadedness/dizziness, chest pain or shortness of breath at rest.          Past Medical History:   Diagnosis Date    Allergic rhinitis     Bronchitis, chronic (HCC)     Carotid artery stenosis     GERD (gastroesophageal reflux disease)     Heart attack (HCC)     Hemorrhoids     Hypercholesterolemia     Palliative care patient 2024    Type II or unspecified type diabetes mellitus without mention of complication, not stated as uncontrolled      Past Surgical History:   Procedure Laterality Date    CARDIAC SURGERY      balloon surgery (angioplasty)    EP DEVICE PROCEDURE Left 2024    Insert PPM dual performed by Kendell Willson MD at [facility] CARDIAC CATH LAB     mL/m2.   Aorta: Normal sized ascending aorta.   Pericardium: No pericardial effusion.   IVC/SVC: IVC is normal (RAP ~3 mmHg). No clinically significant valvular regurgitation or stenosis identified. Current rhythm- sinus     XR CHEST PORTABLE    Result Date: 8/15/2024  EXAM:  CHEST FRONTAL VIEW  HISTORY:  Atrial fibrillation COMPARISON:  11/25/2020    Mild density in the right base may represent atelectasis or pneumonia.  Lungs are otherwise clear.  No vascular congestion or pleural fluid.  Normal heart size.  Atherosclerotic disease noted.  Port catheter appears appropriately positioned radiographically. Electronically signed by: DUSTY SNOWDEN M.D. Date:     08/15/2024 Time:    16:48       Assessment/Plan:     Patient Active Problem List   Diagnosis    Hypercholesterolemia    Bronchitis, chronic (HCC)    GERD (gastroesophageal reflux disease)    COPD (chronic obstructive pulmonary disease) (HCC)    Essential hypertension    Type 2 diabetes mellitus without complication (HCC)    Adenocarcinoma of right lung (HCC)    Former smoker    Occupational exposure in workplace    Iron deficiency anemia    Chronic systolic (congestive) heart failure    Chemotherapy-induced neutropenia (HCC)    Coronary artery disease involving native heart with angina pectoris, unspecified vessel or lesion type (HCC)    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

## 2024-08-22 NOTE — PLAN OF CARE
Problem: Safety - Adult  Goal: Free from fall injury  8/21/2024 2206 by Virgen Fink RN  Outcome: Progressing  8/21/2024 0811 by Mikaela Morin RN  Outcome: Progressing     Problem: Discharge Planning  Goal: Discharge to home or other facility with appropriate resources  8/21/2024 2206 by Virgen Fink RN  Outcome: Progressing  8/21/2024 0811 by Mikaela Morin RN  Outcome: Progressing  Flowsheets (Taken 8/21/2024 0807)  Discharge to home or other facility with appropriate resources: Identify barriers to discharge with patient and caregiver     Problem: Pain  Goal: Verbalizes/displays adequate comfort level or baseline comfort level  8/21/2024 2206 by Virgen Fink RN  Outcome: Progressing  8/21/2024 0811 by Mikaela Morin RN  Outcome: Progressing     Problem: ABCDS Injury Assessment  Goal: Absence of physical injury  8/21/2024 2206 by Virgen Fink RN  Outcome: Progressing  8/21/2024 0811 by Mikaela Morin RN  Outcome: Progressing     Problem: Nutrition Deficit:  Goal: Optimize nutritional status  Outcome: Progressing  Flowsheets (Taken 8/21/2024 0842 by Kaycee Pérez, MS, RD, LD)  Nutrient intake appropriate for improving, restoring, or maintaining nutritional needs:   Monitor oral intake, labs, and treatment plans   Recommend appropriate diets, oral nutritional supplements, and vitamin/mineral supplements     Problem: Chronic Conditions and Co-morbidities  Goal: Patient's chronic conditions and co-morbidity symptoms are monitored and maintained or improved  Outcome: Progressing

## 2024-08-22 NOTE — PLAN OF CARE
Problem: Safety - Adult  Goal: Free from fall injury  8/21/2024 2208 by Virgen Fink RN  Outcome: Progressing  8/21/2024 2206 by Virgen Fink RN  Outcome: Progressing  8/21/2024 0811 by Mikaela Morin RN  Outcome: Progressing     Problem: Discharge Planning  Goal: Discharge to home or other facility with appropriate resources  8/21/2024 2208 by Virgen Fink RN  Outcome: Progressing  8/21/2024 2206 by Virgen Fink RN  Outcome: Progressing  8/21/2024 0811 by Mikaela Morin RN  Outcome: Progressing  Flowsheets (Taken 8/21/2024 0807)  Discharge to home or other facility with appropriate resources: Identify barriers to discharge with patient and caregiver     Problem: Pain  Goal: Verbalizes/displays adequate comfort level or baseline comfort level  8/21/2024 2208 by Virgen Fink RN  Outcome: Progressing  8/21/2024 2206 by Virgen Fink RN  Outcome: Progressing  8/21/2024 0811 by Mikaela Morin RN  Outcome: Progressing     Problem: ABCDS Injury Assessment  Goal: Absence of physical injury  8/21/2024 2208 by Virgen Fink RN  Outcome: Progressing  8/21/2024 2206 by Virgen Fink RN  Outcome: Progressing  8/21/2024 0811 by Mikaela Morin RN  Outcome: Progressing     Problem: Nutrition Deficit:  Goal: Optimize nutritional status  8/21/2024 2208 by Virgen Fink RN  Outcome: Progressing  8/21/2024 2206 by Virgen Fink RN  Outcome: Progressing  Flowsheets (Taken 8/21/2024 0842 by Kaycee Pérez, MS, RD, LD)  Nutrient intake appropriate for improving, restoring, or maintaining nutritional needs:   Monitor oral intake, labs, and treatment plans   Recommend appropriate diets, oral nutritional supplements, and vitamin/mineral supplements     Problem: Chronic Conditions and Co-morbidities  Goal: Patient's chronic conditions and co-morbidity symptoms are monitored and maintained or improved  8/21/2024 2208 by Virgen Fink RN  Outcome: Progressing  8/21/2024 2206 by Virgen Fink RN  Outcome:  Progressing

## 2024-08-22 NOTE — CONSENT
Informed Consent for Blood Component Transfusion Note    I have discussed with the patient the rationale for blood component transfusion; its benefits in treating or preventing fatigue, organ damage, or death; and its risk which includes mild transfusion reactions, rare risk of blood borne infection, or more serious but rare reactions. I have discussed the alternatives to transfusion, including the risk and consequences of not receiving transfusion. The patient had an opportunity to ask questions and had agreed to proceed with transfusion of blood components.    Electronically signed by Pérez Butler MD on 8/22/24 at 3:41 PM CDT

## 2024-08-22 NOTE — PROGRESS NOTES
Patient:   Jose Tate  MR#:    [id number]   Room:    0824/824-02   YOB: 1940  Date of Progress Note: 8/22/2024  Time of Note                           6:54 AM  Consulting Physician:   Kwame Rogers M.D.  Attending Physician:  Kwame Rogers MD     Chief complaint Acute kidney injury     S:This 84 y.o. male  with history IDDM, MI, GERD, COPD, chronic cough, chronic systolic CHF, HTN, and hypercholesterolemia. He presented to [facility] ER on 8/15/24 with c/o progressive fatigue, weakness and fall over the past couple of weeks. Reportedly had syncopal episode night prior to admission. Workup in ER revealed new onset Afib with RVR, BUN 40, Cr 2.6, Mg 1.3, CRP 30.0, BNP 5469, WBC 15.0, Hgb 10.0, chest x-ray indicated mild density in the right base may represent atelectasis or pneumonia, lungs are otherwise clear. IV Cardizem bolus and drip was initiated in ER. Patient noted to have TOMER with creatine of 2.6 with no history of CKD. He was given LR in ER and diuretics were placed on hold. Patient was admitted to the Hospitalist service with consult for cardiology. He was seen by Dr. Daly, who recommended low-dose Toprol, Eliquis 2.5 mg twice daily on discharge. ECHO indicated normal LV size with LVEF of 55 to 60%, mildly increased thickness, normal wall motion, normal diastolic function. Noted to have bradycardia. VQ scan indicated right midlung and lower lung zone small perfusion defects, biapical decreased uptake which could be related to emphysema, low to immediate probability. Cardiology further recommended for pacemaker. Patient was in agreement and underwent pacemaker placement by Dr. Willson on 8/19/24. Continued to remain sinus Tachycardia with frequent PACs, Cardiology further recommended adding beta blocker and start Eliquis. Patient is currently on Lovenox 1 mg/kg BID with plans to transition to Eliquis on discharge. Patients renal function is has shown improvement currently with creatine at

## 2024-08-22 NOTE — CONSULTS
Pt Name: Jose Tate  MRN: [id number]  [id number]  YOB: 1940  Admit Date: 8/20/2024  5:11 PM  Date of evaluation: 8/22/2024  Primary Care Physician: Chayo Grande MD   0824/824-02       Requesting Provider: Kwame Andersen    GI Consult    Indication: Anemia.    History:  The patient is a 84 y.o. male admitted to the rehab for multiple medical issues.  Patient mid to the hospital with multiple somatic symptoms of fatigue, tiredness and weakness, multiple falls and claims that he was passed out for at least a few hours at home.  Apparently he is chronically anemic but noted to have some drop in his hemoglobin and hematocrit since he is in the rehab.  He denies any GI symptoms to me.  He has some reflux symptoms which is controlled on medication.  No nausea and vomiting.  No abdominal pain, cramping, gas or bloating.  No dysphagia or odynophagia.  No change in stool color, consistency or caliber.  No hematochezia or melanotic stool.  He has no bowel movement since yesterday to give any stool specimen to check for blood.  His appetites been decreased and apparently he lost some weight.  He does not recall when was his last upper endoscopy or colonoscopy examination was done.  There is no documentation of any gross bleeding in last few days.  He recently underwent for a pacemaker insertion.      Past Medical History:        Diagnosis Date    Allergic rhinitis     Bronchitis, chronic (HCC)     Carotid artery stenosis     GERD (gastroesophageal reflux disease)     Heart attack (HCC)     Hemorrhoids     Hypercholesterolemia     Palliative care patient 08/16/2024    Type II or unspecified type diabetes mellitus without mention of complication, not stated as uncontrolled      Past Surgical History:        Procedure Laterality Date    CARDIAC SURGERY      balloon surgery (angioplasty)    EP DEVICE PROCEDURE Left 8/19/2024    Insert PPM dual performed by Kendell Willson MD at [facility] CARDIAC CATH  techniques as appropriate to the performed exam and include at least one of the following: Automated exposure control, adjustment of the mA and/or kV according to size, and the use of iterative reconstruction technique.  ______________________________________ Electronically signed by: PAULA POLLARD D.O. Date:     08/21/2024 Time:    18:23     XR CHEST PORTABLE    Result Date: 8/19/2024  Left-sided pacemaker placement without pneumothorax.    ______________________________________ Electronically signed by: ALLAN DEUTSCH M.D. Date:     08/19/2024 Time:    12:38     NM LUNG SCAN PERFUSION ONLY    Result Date: 8/17/2024  Right mid lung and lower lung zone small perfusion defects.  Biapical decreased uptake which could be related to emphysema.  Findings are indeterminate without corresponding ventilation images.  Low to immediate probability.  CTA chest could be considered for further evaluation.    ______________________________________ Electronically signed by: KEO WOODS M.D. Date:     08/17/2024 Time:    16:02     XR CHEST PORTABLE    Result Date: 8/16/2024   1. No acute cardiopulmonary disease. 2. Chronic opacity in the right upper lobe. 3. Probable emphysema.    .   ______________________________________ Electronically signed by: PAULA POLLARD D.O. Date:     08/16/2024 Time:    16:26     XR CHEST PORTABLE    Result Date: 8/15/2024  Mild density in the right base may represent atelectasis or pneumonia.  Lungs are otherwise clear.  No vascular congestion or pleural fluid.  Normal heart size.  Atherosclerotic disease noted.  Port catheter appears appropriately positioned radiographically. - - - - -    ______________________________________ Electronically signed by: DUSTY SNOWDEN M.D. Date:     08/15/2024 Time:    16:48         Assessment:  Patient  admitted to the rehab after prolonged course in the hospital for multiple somatic symptoms and cardiac issue.  He is status post pacemaker.  He noted to have  anemia which appears to be chronic but noted to have some drop in his hemoglobin and hematocrit without any gross bleeding.  He is unable to give any stool specimen in last 24 hours to check for any gross bleeding or occult bleeding.  His anemia is multifactorial.  Drop in his hemoglobin could be fluctuation in the lab.  If he would have been actively bleeding and drop 1 to 2 g there should be some blood grossly in his stool.    Impression:  1.  Anemia, multifactorial.  2.  Multiple comorbid conditions.    Plan:  1.  Differential diagnoses anemia and drop in hemoglobin hematocrit was briefly discussed with patient.  2.  Agree with blood transfusion and iron infusion if needed.  3.  Follow hemoglobin hematocrit.  4.  Based on his age and multiple comorbid conditions I will hold on for any endoscopy or colonoscopy examination unless there is evidence of any active bleeding or significant drop in hemoglobin and hematocrit.  Patient understood and agreed with the plan.    Girma Barron MD  8/22/2024, 12:41 PM

## 2024-08-23 ENCOUNTER — APPOINTMENT (OUTPATIENT)
Dept: CT IMAGING | Age: 84
End: 2024-08-23
Attending: PSYCHIATRY & NEUROLOGY
Payer: MEDICARE

## 2024-08-23 PROBLEM — D62 ACUTE BLOOD LOSS ANEMIA: Status: ACTIVE | Noted: 2024-08-23

## 2024-08-23 LAB
ALBUMIN SERPL-MCNC: 3.4 G/DL (ref 3.5–5.2)
ALP SERPL-CCNC: 83 U/L (ref 40–129)
ALT SERPL-CCNC: 16 U/L (ref 5–41)
ANION GAP SERPL CALCULATED.3IONS-SCNC: 11 MMOL/L (ref 7–19)
AST SERPL-CCNC: 22 U/L (ref 5–40)
BASOPHILS # BLD: 0.1 K/UL (ref 0–0.2)
BASOPHILS NFR BLD: 0.6 % (ref 0–1)
BILIRUB SERPL-MCNC: 0.2 MG/DL (ref 0.2–1.2)
BUN SERPL-MCNC: 29 MG/DL (ref 8–23)
CALCIUM SERPL-MCNC: 9 MG/DL (ref 8.8–10.2)
CHLORIDE SERPL-SCNC: 98 MMOL/L (ref 98–111)
CO2 SERPL-SCNC: 27 MMOL/L (ref 22–29)
CREAT SERPL-MCNC: 1.7 MG/DL (ref 0.7–1.2)
EKG P AXIS: 95 DEGREES
EKG P-R INTERVAL: 140 MS
EKG Q-T INTERVAL: 292 MS
EKG QRS DURATION: 98 MS
EKG QTC CALCULATION (BAZETT): 437 MS
EKG T AXIS: 149 DEGREES
EOSINOPHIL # BLD: 0.4 K/UL (ref 0–0.6)
EOSINOPHIL NFR BLD: 3.6 % (ref 0–5)
ERYTHROCYTE [DISTWIDTH] IN BLOOD BY AUTOMATED COUNT: 14.4 % (ref 11.5–14.5)
GLUCOSE BLD-MCNC: 173 MG/DL (ref 70–99)
GLUCOSE BLD-MCNC: 193 MG/DL (ref 70–99)
GLUCOSE BLD-MCNC: 215 MG/DL (ref 70–99)
GLUCOSE BLD-MCNC: 276 MG/DL (ref 70–99)
GLUCOSE SERPL-MCNC: 201 MG/DL (ref 70–99)
HCT VFR BLD AUTO: 30.3 % (ref 42–52)
HGB BLD-MCNC: 9.9 G/DL (ref 14–18)
IMM GRANULOCYTES # BLD: 0.1 K/UL
LYMPHOCYTES # BLD: 1.5 K/UL (ref 1.1–4.5)
LYMPHOCYTES NFR BLD: 15.7 % (ref 20–40)
MCH RBC QN AUTO: 31.1 PG (ref 27–31)
MCHC RBC AUTO-ENTMCNC: 32.7 G/DL (ref 33–37)
MCV RBC AUTO: 95.3 FL (ref 80–94)
MONOCYTES # BLD: 0.9 K/UL (ref 0–0.9)
MONOCYTES NFR BLD: 8.8 % (ref 0–10)
NEUTROPHILS # BLD: 6.9 K/UL (ref 1.5–7.5)
NEUTS SEG NFR BLD: 70.3 % (ref 50–65)
PERFORMED ON: ABNORMAL
PLATELET # BLD AUTO: 341 K/UL (ref 130–400)
PMV BLD AUTO: 9.5 FL (ref 9.4–12.4)
POTASSIUM SERPL-SCNC: 4.3 MMOL/L (ref 3.5–5)
PROT SERPL-MCNC: 6.9 G/DL (ref 6.6–8.7)
RBC # BLD AUTO: 3.18 M/UL (ref 4.7–6.1)
SODIUM SERPL-SCNC: 136 MMOL/L (ref 136–145)
WBC # BLD AUTO: 9.8 K/UL (ref 4.8–10.8)

## 2024-08-23 PROCEDURE — 97116 GAIT TRAINING THERAPY: CPT

## 2024-08-23 PROCEDURE — 80053 COMPREHEN METABOLIC PANEL: CPT

## 2024-08-23 PROCEDURE — 99232 SBSQ HOSP IP/OBS MODERATE 35: CPT | Performed by: PSYCHIATRY & NEUROLOGY

## 2024-08-23 PROCEDURE — 1180000000 HC REHAB R&B

## 2024-08-23 PROCEDURE — 6370000000 HC RX 637 (ALT 250 FOR IP): Performed by: INTERNAL MEDICINE

## 2024-08-23 PROCEDURE — 97530 THERAPEUTIC ACTIVITIES: CPT

## 2024-08-23 PROCEDURE — 6370000000 HC RX 637 (ALT 250 FOR IP): Performed by: NURSE PRACTITIONER

## 2024-08-23 PROCEDURE — 82962 GLUCOSE BLOOD TEST: CPT

## 2024-08-23 PROCEDURE — 94760 N-INVAS EAR/PLS OXIMETRY 1: CPT

## 2024-08-23 PROCEDURE — 6370000000 HC RX 637 (ALT 250 FOR IP): Performed by: PSYCHIATRY & NEUROLOGY

## 2024-08-23 PROCEDURE — 36415 COLL VENOUS BLD VENIPUNCTURE: CPT

## 2024-08-23 PROCEDURE — 97535 SELF CARE MNGMENT TRAINING: CPT

## 2024-08-23 PROCEDURE — 85025 COMPLETE CBC W/AUTO DIFF WBC: CPT

## 2024-08-23 PROCEDURE — 97110 THERAPEUTIC EXERCISES: CPT

## 2024-08-23 PROCEDURE — 71250 CT THORAX DX C-: CPT

## 2024-08-23 PROCEDURE — 2580000003 HC RX 258: Performed by: NURSE PRACTITIONER

## 2024-08-23 PROCEDURE — 2700000000 HC OXYGEN THERAPY PER DAY

## 2024-08-23 RX ADMIN — TAMSULOSIN HYDROCHLORIDE 0.4 MG: 0.4 CAPSULE ORAL at 07:39

## 2024-08-23 RX ADMIN — SODIUM CHLORIDE, PRESERVATIVE FREE 10 ML: 5 INJECTION INTRAVENOUS at 21:52

## 2024-08-23 RX ADMIN — POLYETHYLENE GLYCOL 3350 17 G: 17 POWDER, FOR SOLUTION ORAL at 17:06

## 2024-08-23 RX ADMIN — METOPROLOL SUCCINATE 50 MG: 50 TABLET, EXTENDED RELEASE ORAL at 07:39

## 2024-08-23 RX ADMIN — METOPROLOL SUCCINATE 50 MG: 50 TABLET, EXTENDED RELEASE ORAL at 21:46

## 2024-08-23 RX ADMIN — INSULIN LISPRO 1 UNITS: 100 INJECTION, SOLUTION INTRAVENOUS; SUBCUTANEOUS at 07:43

## 2024-08-23 RX ADMIN — PANTOPRAZOLE SODIUM 40 MG: 40 TABLET, DELAYED RELEASE ORAL at 05:43

## 2024-08-23 RX ADMIN — PRIMIDONE 50 MG: 50 TABLET ORAL at 21:46

## 2024-08-23 RX ADMIN — MONTELUKAST 10 MG: 10 TABLET, FILM COATED ORAL at 21:46

## 2024-08-23 RX ADMIN — PRIMIDONE 50 MG: 50 TABLET ORAL at 07:39

## 2024-08-23 RX ADMIN — PANTOPRAZOLE SODIUM 40 MG: 40 TABLET, DELAYED RELEASE ORAL at 17:09

## 2024-08-23 RX ADMIN — ATORVASTATIN CALCIUM 20 MG: 20 TABLET, FILM COATED ORAL at 07:39

## 2024-08-23 NOTE — PLAN OF CARE
Problem: Safety - Adult  Goal: Free from fall injury  8/22/2024 1939 by Virgen Fink RN  Outcome: Progressing  8/22/2024 1247 by Debbie Sears RN  Outcome: Progressing     Problem: Discharge Planning  Goal: Discharge to home or other facility with appropriate resources  8/22/2024 1939 by Virgen Fink RN  Outcome: Progressing  Flowsheets (Taken 8/22/2024 1933)  Discharge to home or other facility with appropriate resources:   Identify barriers to discharge with patient and caregiver   Refer to discharge planning if patient needs post-hospital services based on physician order or complex needs related to functional status, cognitive ability or social support system  8/22/2024 1247 by Debbie Sears RN  Outcome: Progressing     Problem: Pain  Goal: Verbalizes/displays adequate comfort level or baseline comfort level  8/22/2024 1939 by Virgen iFnk RN  Outcome: Progressing  8/22/2024 1247 by Debbie Sears RN  Outcome: Progressing     Problem: Nutrition Deficit:  Goal: Optimize nutritional status  8/22/2024 1939 by Virgen Fink RN  Outcome: Progressing  8/22/2024 1247 by Debbie Sears RN  Outcome: Progressing     Problem: Chronic Conditions and Co-morbidities  Goal: Patient's chronic conditions and co-morbidity symptoms are monitored and maintained or improved  8/22/2024 1939 by Virgen Fink RN  Outcome: Progressing  Flowsheets (Taken 8/22/2024 1933)  Care Plan - Patient's Chronic Conditions and Co-Morbidity Symptoms are Monitored and Maintained or Improved: Monitor and assess patient's chronic conditions and comorbid symptoms for stability, deterioration, or improvement  8/22/2024 1247 by Debbie Sears RN  Outcome: Progressing     Problem: Skin/Tissue Integrity  Goal: Absence of new skin breakdown  Description: 1.  Monitor for areas of redness and/or skin breakdown  2.  Assess vascular access sites hourly  3.  Every 4-6 hours minimum:  Change oxygen saturation probe site  4.  Every 4-6 hours:  If on  nasal continuous positive airway pressure, respiratory therapy assess nares and determine need for appliance change or resting period.  8/22/2024 1939 by Virgen Fink, RN  Outcome: Progressing  8/22/2024 1247 by Debbie Sears, RN  Outcome: Progressing

## 2024-08-23 NOTE — PROGRESS NOTES
DIS Nurse Note  SUBJECTIVE: Patient assessed secondary to elevated Deterioration Index Score.      Deterioration Index Score:  Predictive Model Details          51 (Warning)  Factor Value    Calculated 8/23/2024 06:35 25% Age 84 years old    Deterioration Index Model 21% Supplemental oxygen Oxygen     13% Paola coma scale 14     8% Hematocrit abnormal (28.4 %)     8% Cardiac rhythm Atrial fib;Biventricular paced     6% Pulse 104     5% Respiratory rate 18     3% Sodium abnormal (134 mmol/L)     3% WBC count 10.3 K/uL     3% Pulse oximetry 100 %     2% Blood pH abnormal (7.460)     2% BUN abnormal (24 mg/dL)     2% Systolic 122     0% Potassium 4.0 mmol/L     0% Temperature 97.3 °F (36.3 °C)        Vital Signs:  Vitals:    08/22/24 1315 08/22/24 1715 08/22/24 1722 08/23/24 0513   BP: (!) 135/42 110/61 (!) 110/46 (!) 122/57   Pulse: 85 (!) 110 84 (!) 104   Resp: 20 20  18   Temp: 97.4 °F (36.3 °C) 97.7 °F (36.5 °C)  97.3 °F (36.3 °C)   TempSrc:  Temporal  Temporal   SpO2: 100% 97%  100%   Weight:       Height:            Provider Notified: Not Indicated    ASSESSMENT:  Stable     Plan of Care: Ongoing    Electronically signed by Virgen Fink RN

## 2024-08-23 NOTE — PROGRESS NOTES
08/22/24 0815   OT Individual Minutes   Time In 0815   Time Out 0815   Minutes 0   Minute Variance   Reason Med hold  (Med hold due to blood transfusion and decline in function.)   Time Code Minutes    Timed Code Treatment Minutes 0 Minutes

## 2024-08-23 NOTE — PROGRESS NOTES
DIS Nurse Note  SUBJECTIVE: Patient assessed secondary to elevated Deterioration Index Score.      Deterioration Index Score:  Predictive Model Details          59 (Warning)  Factor Value    Calculated 8/23/2024 16:28 22% Age 84 years old    Deterioration Index Model 19% Supplemental oxygen Nasal cannula     11% Paola coma scale 14     9% Respiratory rate 20     8% Pulse 111     7% Cardiac rhythm Atrial fib;Biventricular paced     6% Hematocrit abnormal (30.3 %)     5% Systolic 156     3% Potassium 4.3 mmol/L     2% Pulse oximetry 100 %     2% WBC count 9.8 K/uL     2% Sodium 136 mmol/L     2% Blood pH abnormal (7.460)     2% BUN abnormal (29 mg/dL)     0% Temperature 97.5 °F (36.4 °C)        Vital Signs:  Vitals:    08/23/24 0825 08/23/24 1100 08/23/24 1300 08/23/24 1525   BP:  (!) 160/74  (!) 156/76   Pulse: 66 (!) 108  (!) 111   Resp:    20   Temp:    97.5 °F (36.4 °C)   TempSrc:    Temporal   SpO2: 95% 98% 97% 100%   Weight:       Height:            Provider Notified: Not Indicated    ASSESSMENT:   no change    Plan of Care: no change    Electronically signed by Юлия Batista RN

## 2024-08-23 NOTE — PROGRESS NOTES
Patient:   Jose Tate  MR#:    [id number]   Room:    0824/824-02   YOB: 1940  Date of Progress Note: 8/23/2024  Time of Note                           7:39 AM  Consulting Physician:   Kwame Rogers M.D.  Attending Physician:  Kwame Rogers MD     Chief complaint Acute kidney injury     S:This 84 y.o. male  with history IDDM, MI, GERD, COPD, chronic cough, chronic systolic CHF, HTN, and hypercholesterolemia. He presented to [facility] ER on 8/15/24 with c/o progressive fatigue, weakness and fall over the past couple of weeks. Reportedly had syncopal episode night prior to admission. Workup in ER revealed new onset Afib with RVR, BUN 40, Cr 2.6, Mg 1.3, CRP 30.0, BNP 5469, WBC 15.0, Hgb 10.0, chest x-ray indicated mild density in the right base may represent atelectasis or pneumonia, lungs are otherwise clear. IV Cardizem bolus and drip was initiated in ER. Patient noted to have TOMER with creatine of 2.6 with no history of CKD. He was given LR in ER and diuretics were placed on hold. Patient was admitted to the Hospitalist service with consult for cardiology. He was seen by Dr. Daly, who recommended low-dose Toprol, Eliquis 2.5 mg twice daily on discharge. ECHO indicated normal LV size with LVEF of 55 to 60%, mildly increased thickness, normal wall motion, normal diastolic function. Noted to have bradycardia. VQ scan indicated right midlung and lower lung zone small perfusion defects, biapical decreased uptake which could be related to emphysema, low to immediate probability. Cardiology further recommended for pacemaker. Patient was in agreement and underwent pacemaker placement by Dr. Willson on 8/19/24. Continued to remain sinus Tachycardia with frequent PACs, Cardiology further recommended adding beta blocker and start Eliquis. Patient is currently on Lovenox 1 mg/kg BID with plans to transition to Eliquis on discharge. Patients renal function is has shown improvement currently with creatine at  Equipment evaluation, education, & procurement, Therapeutic activities  Discharge Recommendations: Continue to assess pending progress  PATIENT REQUIRES AND IS REASONABLY EXPECTED TO ACTIVELY PARTICIPATE IN AT LEAST 3 HOURS OF INTENSIVE THERAPY PER DAY AT LEAST 5 DAYS PER WEEK, AND BE EXPECTED TO MAKE MEASURABLE IMPROVEMENT THAT WILL BE OF PRACTICAL VALUE TO IMPROVE THE PATIENT'S FUNCTIONAL CAPACITY OR ADAPTATION TO IMPAIRMENTS.   PATIENT GOAL FOR REHAB:  RETURN TO PRIOR LEVEL OF FUNCTION         IRF/DONNIE  Roll Left and Right  Assistance Needed: Supervision or touching assistance  CARE Score: 4  Discharge Goal: Independent     Sit to Lying  Assistance Needed: Supervision or touching assistance  CARE Score: 4  Discharge Goal: Independent     Lying to Sitting on Side of Bed  Assistance Needed: Supervision or touching assistance  CARE Score: 4  Discharge Goal: Independent     Sit to Stand  Assistance Needed: Partial/moderate assistance  CARE Score: 3  Discharge Goal: Supervision or touching assistance     Chair/Bed-to-Chair Transfer  Assistance Needed: Partial/moderate assistance  CARE Score: 3  Discharge Goal: Supervision or touching assistance     Car Transfer  Reason if not Attempted: Not attempted due to medical condition or safety concerns  CARE Score: 88  Discharge Goal: Supervision or touching assistance     Walk 10 Feet  Assistance Needed: Substantial/maximal assistance  CARE Score: 2  Discharge Goal: Supervision or touching assistance     Walk 50 Feet with Two Turns  Reason if not Attempted: Not attempted due to medical condition or safety concerns  CARE Score: 88  Discharge Goal: Supervision or touching assistance     Walk 150 Feet  Reason if not Attempted: Not attempted due to medical condition or safety concerns  CARE Score: 88  Discharge Goal: Not Attempted     Walking 10 Feet on Uneven Surfaces  Reason if not Attempted: Not attempted due to medical condition or safety concerns  CARE Score: 88  Discharge  Goal: Not Attempted     1 Step (Curb)  Reason if not Attempted: Not attempted due to medical condition or safety concerns  CARE Score: 88  Discharge Goal: Supervision or touching assistance     4 Steps  Reason if not Attempted: Not attempted due to medical condition or safety concerns  CARE Score: 88  Discharge Goal: Supervision or touching assistance     12 Steps  Reason if not Attempted: Not attempted due to medical condition or safety concerns  CARE Score: 88     Wheel 50 Feet with Two Turns  Reason if not Attempted: Not attempted due to medical condition or safety concerns  CARE Score: 88  Discharge Goal: Partial/moderate assistance     Wheel 150 Feet  Reason if not Attempted: Not attempted due to medical condition or safety concerns  CARE Score: 88  Discharge Goal: Not Attempted        LAST TREATMENT TIME  PT Individual Minutes  Time In: 1000  Time Out: 1100  Minutes: 60                                                    RECORD REVIEW: Previous medical records, medications were reviewed at today's visit    IMPRESSION:   1.  Acute kidney injury-monitor  2.  Atrial fibrillation-Eliquis(on hold 8/21)/Cardizem/metoprolol  3.  Hyperlipidemia-on statin  4.  Hypertension-on meds monitor  5.  Diabetes-monitor blood sugar  6.  GI-PPI/bowel regimen  7.  BPH-on meds monitor  8.  Coronary artery disease/CHF/status post pacemaker  9.  COPD/history of lung cancer-Singulair/Robitussin as needed  10.  PT/OT  11.  Limited code-DO NOT INTUBATE    Drop in hematocrit-transfuse 1 unit/stool for occult blood/monitor CBC/Eliquis on hold /GI consult/hospitalist consults appreciate    Placed on SCDs    Continue current care    Expected duration and frequency therapy: 180 minutes per day, 5 days per week    CALL WITH ANY QUESTIONS  [phone] CELL  Dr Kwame Rogers

## 2024-08-23 NOTE — PLAN OF CARE
Labs reviewed today.  He received 1 PRBC yesterday for a hemoglobin of 7.  It overcorrected to 9.9.  There are no reports of khai GI bleeding.  We will continue to monitor hemoglobin trend.  If there are any reports of khai bleeding please contact us immediately.  No plans for endoscopic intervention at this time.

## 2024-08-23 NOTE — PROGRESS NOTES
DIS Nurse Note  SUBJECTIVE: Patient assessed secondary to elevated Deterioration Index Score.      Deterioration Index Score:  Predictive Model Details          54 (Warning)  Factor Value    Calculated 8/23/2024 13:41 24% Age 84 years old    Deterioration Index Model 21% Supplemental oxygen Nasal cannula     12% Paola coma scale 14     7% Cardiac rhythm Atrial fib;Biventricular paced     7% Pulse 108     7% Hematocrit abnormal (30.3 %)     6% Systolic 160     4% Respiratory rate 18     3% Potassium 4.3 mmol/L     2% WBC count 9.8 K/uL     2% Sodium 136 mmol/L     2% Blood pH abnormal (7.460)     2% BUN abnormal (29 mg/dL)     0% Pulse oximetry 98 %     0% Temperature 97.3 °F (36.3 °C)        Vital Signs:  Vitals:    08/23/24 0729 08/23/24 0815 08/23/24 0825 08/23/24 1100   BP:    (!) 160/74   Pulse:  (!) 101 66 (!) 108   Resp:       Temp:       TempSrc:       SpO2: 98% (!) 84% 95% 98%   Weight:       Height:            Provider Notified: Not Indicated    ASSESSMENT:   no distress noted, no change since last assessment    Plan of Care: no change    Electronically signed by Юлия Batista RN

## 2024-08-23 NOTE — PROGRESS NOTES
08/23/24 1100   Vitals   Pulse (!) 108   SpO2 98 %   O2 Device Nasal cannula  (2L)   BP (!) 160/74   MAP (Calculated) 103   Bed mobility   Supine to Sit Stand by assistance  (TO RIGHT WITH RAIL, TRIPLANAR)   Transfers   Sit to Stand Contact guard assistance  (VCS FOR HANDS ON BED/WC)   Stand to Sit Contact guard assistance  (VCS TO REACH BACK)   Bed to Chair Contact guard assistance  (STAND P IVOT RW)   Ambulation   Device Rolling Walker   Other Apparatus O2  (2L)   Assistance Moderate assistance;Minimal assistance   Quality of Gait CONTINUES TO AMBULATE WITH EXTENDED ELBOW, BILAT SCAP ELEVATION, RW EXCESSIVELY ANTERIOR TO BODY.  CUED TO STAND UPRIGHT.  PATIENT ABLE TO PERFORM FOR APPROX 10 FT, BECOMES UNSTEADY, THEN REVERTS TO PRIOR POSTURE/FORM.  OVERALL UNSTEADY WITH INCREASED LATERAL DEVIATION BILATERALLY. ONE INSTTANCE OF STEPPING ON XXXL SOCKS DURING TURNS CAUSING STUMBLE WTIH MOD A TO CORRECT. OTHERWISE MIN A.  SOCKS REPLACED WITH XXL.   Distance 75 FT   Comments 2 TURNS, O2 SATS 98% POST AMBULATION.   Wheelchair Activities   Propulsion Yes   Propulsion 1   Propulsion Manual   Method RUE;LUE   Level of Assistance Contact guard assistance   Description/ Details SLOW.  DIFFICULTY WITH TURNS   Distance 50 FT   PT Exercises   Exercise Treatment SITTING BILATLE HIP FLEX/EXT/ABD/ADD; KNEE EXT; PF/DF X10 EA WITH MANUAL RESISTANCE   Assessment   Assessment TOLERATED WELL.  SEE GAIT.  CONTNIUES TO BE UNSTEADY ON FEET; REQUIRES CUES FOR HAND PLACEMENT EACH TIME FOR SIT TO/FROM STAND.

## 2024-08-23 NOTE — PROGRESS NOTES
Facility/Department: [facility] 8 REHAB UNIT  Occupational Therapy Treatment Note    Name: Jose Tate  : 1940  MRN: [id number]  Date of Service: 2024    Discharge Recommendations:  Continue to assess pending progress          Patient Diagnosis(es): The encounter diagnosis was Permanent atrial fibrillation (HCC).  Past Medical History:  has a past medical history of Allergic rhinitis, Bronchitis, chronic (HCC), Carotid artery stenosis, GERD (gastroesophageal reflux disease), Heart attack (HCC), Hemorrhoids, Hypercholesterolemia, Palliative care patient, and Type II or unspecified type diabetes mellitus without mention of complication, not stated as uncontrolled.  Past Surgical History:  has a past surgical history that includes Cardiac surgery; Port Surgery (Right, 2021); and ep device procedure (Left, 2024).    Treatment Diagnosis: Acute Kidney failure, Pacemaker placement on 2024    Assessment        Decreased functional mobility ; Decreased ADL status; Decreased strength; Decreased endurance; Decreased balance; Decreased high-level IADLs     Patient had an episode of urinary incontinence. Assisted patient with changing out pants and brief. O2 sats decreasd to 84% on 2 L O2 with ambulation to bathroom. Increased to 3 L and increased to 97%.          Plan   Occupational Therapy Plan  Current Treatment Recommendations: Strengthening, Balance training, Functional mobility training, Endurance training, Wheelchair mobility training, Equipment evaluation, education, & procurement, Patient/Caregiver education & training, Safety education & training, Self-Care / ADL, Home management training     Restrictions  Restrictions/Precautions  Restrictions/Precautions: Weight Bearing, Fall Risk  Implants present? : Pacemaker  Lower Extremity Weight Bearing Restrictions  Right Lower Extremity Weight Bearing: Weight Bearing As Tolerated  Left Lower Extremity Weight Bearing: Weight Bearing As

## 2024-08-23 NOTE — PROGRESS NOTES
Facility/Department: [facility] 8 REHAB UNIT  Occupational Therapy Treatment Note    Name: Jose Tate  : 1940  MRN: [id number]  Date of Service: 2024    Discharge Recommendations:  Continue to assess pending progress          Patient Diagnosis(es): The encounter diagnosis was Permanent atrial fibrillation (HCC).  Past Medical History:  has a past medical history of Allergic rhinitis, Bronchitis, chronic (HCC), Carotid artery stenosis, GERD (gastroesophageal reflux disease), Heart attack (HCC), Hemorrhoids, Hypercholesterolemia, Palliative care patient, and Type II or unspecified type diabetes mellitus without mention of complication, not stated as uncontrolled.  Past Surgical History:  has a past surgical history that includes Cardiac surgery; Port Surgery (Right, 2021); and ep device procedure (Left, 2024).    Treatment Diagnosis: Acute Kidney failure, Pacemaker placement on 2024    Assessment   Performance deficits / Impairments: Decreased functional mobility ;Decreased ADL status;Decreased strength;Decreased endurance;Decreased balance;Decreased high-level IADLs  Treatment Diagnosis: Acute Kidney failure, Pacemaker placement on 2024  Activity Tolerance  Activity Tolerance: Patient Tolerated treatment well              Plan   Occupational Therapy Plan  Current Treatment Recommendations: Strengthening, Balance training, Functional mobility training, Endurance training, Wheelchair mobility training, Equipment evaluation, education, & procurement, Patient/Caregiver education & training, Safety education & training, Self-Care / ADL, Home management training     Restrictions  Restrictions/Precautions  Restrictions/Precautions: Weight Bearing, Fall Risk  Implants present? : Pacemaker  Lower Extremity Weight Bearing Restrictions  Right Lower Extremity Weight Bearing: Weight Bearing As Tolerated  Left Lower Extremity Weight Bearing: Weight Bearing As Tolerated  Position Activity

## 2024-08-23 NOTE — PROGRESS NOTES
anemia  Active Problems:    Chronic systolic (congestive) heart failure    Coronary artery disease involving native heart with angina pectoris, unspecified vessel or lesion type (HCC)    Hypercholesterolemia    GERD (gastroesophageal reflux disease)    COPD (chronic obstructive pulmonary disease) (HCC)    Essential hypertension    Type 2 diabetes mellitus without complication (HCC)    Adenocarcinoma of right lung (HCC)    New onset a-fib (HCC)    TOMER (acute kidney injury) (HCC)    Pacemaker    Acute kidney injury (HCC)    Anemia in other chronic diseases classified elsewhere  Resolved Problems:    * No resolved hospital problems. *      Likely acute blood loss anemia, in setting of recent anticoagulation on Eliquis  - Holding Eliquis  - No overt GI bleeding noted thus far, no recent BM, continue to follow clinically, monitor stools  - S/p 1 unit PRBCs transfused 8/22, significant improvement noted with hemoglobin up to 9.9 this a.m.  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

## 2024-08-24 LAB
ALBUMIN SERPL-MCNC: 3.2 G/DL (ref 3.5–5.2)
ALP SERPL-CCNC: 78 U/L (ref 40–129)
ALT SERPL-CCNC: 17 U/L (ref 5–41)
ANION GAP SERPL CALCULATED.3IONS-SCNC: 14 MMOL/L (ref 7–19)
AST SERPL-CCNC: 19 U/L (ref 5–40)
B PARAP IS1001 DNA NPH QL NAA+NON-PROBE: NOT DETECTED
B PERT.PT PRMT NPH QL NAA+NON-PROBE: NOT DETECTED
BASOPHILS # BLD: 0.1 K/UL (ref 0–0.2)
BASOPHILS NFR BLD: 0.7 % (ref 0–1)
BILIRUB SERPL-MCNC: 0.2 MG/DL (ref 0.2–1.2)
BUN SERPL-MCNC: 28 MG/DL (ref 8–23)
C PNEUM DNA NPH QL NAA+NON-PROBE: NOT DETECTED
CALCIUM SERPL-MCNC: 8.4 MG/DL (ref 8.8–10.2)
CHLORIDE SERPL-SCNC: 99 MMOL/L (ref 98–111)
CO2 SERPL-SCNC: 24 MMOL/L (ref 22–29)
CREAT SERPL-MCNC: 1.5 MG/DL (ref 0.7–1.2)
EOSINOPHIL # BLD: 0.3 K/UL (ref 0–0.6)
EOSINOPHIL NFR BLD: 3.4 % (ref 0–5)
ERYTHROCYTE [DISTWIDTH] IN BLOOD BY AUTOMATED COUNT: 14.2 % (ref 11.5–14.5)
FLUAV RNA NPH QL NAA+NON-PROBE: NOT DETECTED
FLUBV RNA NPH QL NAA+NON-PROBE: NOT DETECTED
GLUCOSE BLD-MCNC: 198 MG/DL (ref 70–99)
GLUCOSE BLD-MCNC: 212 MG/DL (ref 70–99)
GLUCOSE BLD-MCNC: 241 MG/DL (ref 70–99)
GLUCOSE BLD-MCNC: 241 MG/DL (ref 70–99)
GLUCOSE SERPL-MCNC: 159 MG/DL (ref 70–99)
HADV DNA NPH QL NAA+NON-PROBE: NOT DETECTED
HCOV 229E RNA NPH QL NAA+NON-PROBE: NOT DETECTED
HCOV HKU1 RNA NPH QL NAA+NON-PROBE: NOT DETECTED
HCOV NL63 RNA NPH QL NAA+NON-PROBE: NOT DETECTED
HCOV OC43 RNA NPH QL NAA+NON-PROBE: NOT DETECTED
HCT VFR BLD AUTO: 27.7 % (ref 42–52)
HGB BLD-MCNC: 8.9 G/DL (ref 14–18)
HMPV RNA NPH QL NAA+NON-PROBE: NOT DETECTED
HPIV1 RNA NPH QL NAA+NON-PROBE: NOT DETECTED
HPIV2 RNA NPH QL NAA+NON-PROBE: NOT DETECTED
HPIV3 RNA NPH QL NAA+NON-PROBE: NOT DETECTED
HPIV4 RNA NPH QL NAA+NON-PROBE: NOT DETECTED
IMM GRANULOCYTES # BLD: 0.1 K/UL
LYMPHOCYTES # BLD: 1.7 K/UL (ref 1.1–4.5)
LYMPHOCYTES NFR BLD: 17.5 % (ref 20–40)
M PNEUMO DNA NPH QL NAA+NON-PROBE: NOT DETECTED
MCH RBC QN AUTO: 30.8 PG (ref 27–31)
MCHC RBC AUTO-ENTMCNC: 32.1 G/DL (ref 33–37)
MCV RBC AUTO: 95.8 FL (ref 80–94)
MONOCYTES # BLD: 0.8 K/UL (ref 0–0.9)
MONOCYTES NFR BLD: 7.8 % (ref 0–10)
NEUTROPHILS # BLD: 6.8 K/UL (ref 1.5–7.5)
NEUTS SEG NFR BLD: 69.5 % (ref 50–65)
PERFORMED ON: ABNORMAL
PLATELET # BLD AUTO: 337 K/UL (ref 130–400)
PMV BLD AUTO: 9.5 FL (ref 9.4–12.4)
POTASSIUM SERPL-SCNC: 4.4 MMOL/L (ref 3.5–5)
PROT SERPL-MCNC: 6.3 G/DL (ref 6.6–8.7)
RBC # BLD AUTO: 2.89 M/UL (ref 4.7–6.1)
RSV RNA NPH QL NAA+NON-PROBE: NOT DETECTED
RV+EV RNA NPH QL NAA+NON-PROBE: NOT DETECTED
SARS-COV-2 RNA NPH QL NAA+NON-PROBE: NOT DETECTED
SODIUM SERPL-SCNC: 137 MMOL/L (ref 136–145)
WBC # BLD AUTO: 9.8 K/UL (ref 4.8–10.8)

## 2024-08-24 PROCEDURE — 94760 N-INVAS EAR/PLS OXIMETRY 1: CPT

## 2024-08-24 PROCEDURE — 97116 GAIT TRAINING THERAPY: CPT

## 2024-08-24 PROCEDURE — 6370000000 HC RX 637 (ALT 250 FOR IP): Performed by: NURSE PRACTITIONER

## 2024-08-24 PROCEDURE — 80053 COMPREHEN METABOLIC PANEL: CPT

## 2024-08-24 PROCEDURE — 97110 THERAPEUTIC EXERCISES: CPT

## 2024-08-24 PROCEDURE — 82962 GLUCOSE BLOOD TEST: CPT

## 2024-08-24 PROCEDURE — 1180000000 HC REHAB R&B

## 2024-08-24 PROCEDURE — 97530 THERAPEUTIC ACTIVITIES: CPT

## 2024-08-24 PROCEDURE — 6370000000 HC RX 637 (ALT 250 FOR IP): Performed by: INTERNAL MEDICINE

## 2024-08-24 PROCEDURE — 97535 SELF CARE MNGMENT TRAINING: CPT

## 2024-08-24 PROCEDURE — 2700000000 HC OXYGEN THERAPY PER DAY

## 2024-08-24 PROCEDURE — 0202U NFCT DS 22 TRGT SARS-COV-2: CPT

## 2024-08-24 PROCEDURE — 85025 COMPLETE CBC W/AUTO DIFF WBC: CPT

## 2024-08-24 PROCEDURE — 36415 COLL VENOUS BLD VENIPUNCTURE: CPT

## 2024-08-24 PROCEDURE — 6370000000 HC RX 637 (ALT 250 FOR IP): Performed by: STUDENT IN AN ORGANIZED HEALTH CARE EDUCATION/TRAINING PROGRAM

## 2024-08-24 PROCEDURE — 2580000003 HC RX 258: Performed by: NURSE PRACTITIONER

## 2024-08-24 RX ORDER — CETIRIZINE HYDROCHLORIDE 10 MG/1
10 TABLET ORAL DAILY
Status: DISCONTINUED | OUTPATIENT
Start: 2024-08-24 | End: 2024-08-31 | Stop reason: HOSPADM

## 2024-08-24 RX ORDER — HYDROCODONE POLISTIREX AND CHLORPHENIRAMINE POLISTIREX 10; 8 MG/5ML; MG/5ML
5 SUSPENSION, EXTENDED RELEASE ORAL EVERY 12 HOURS PRN
Status: DISCONTINUED | OUTPATIENT
Start: 2024-08-24 | End: 2024-08-25

## 2024-08-24 RX ORDER — FUROSEMIDE 40 MG
40 TABLET ORAL EVERY OTHER DAY
Status: DISCONTINUED | OUTPATIENT
Start: 2024-08-25 | End: 2024-08-31 | Stop reason: HOSPADM

## 2024-08-24 RX ORDER — BENZONATATE 100 MG/1
200 CAPSULE ORAL 3 TIMES DAILY
Status: DISCONTINUED | OUTPATIENT
Start: 2024-08-24 | End: 2024-08-31 | Stop reason: HOSPADM

## 2024-08-24 RX ADMIN — MONTELUKAST 10 MG: 10 TABLET, FILM COATED ORAL at 22:26

## 2024-08-24 RX ADMIN — INSULIN LISPRO 1 UNITS: 100 INJECTION, SOLUTION INTRAVENOUS; SUBCUTANEOUS at 17:04

## 2024-08-24 RX ADMIN — SODIUM CHLORIDE, PRESERVATIVE FREE 10 ML: 5 INJECTION INTRAVENOUS at 22:27

## 2024-08-24 RX ADMIN — METOPROLOL SUCCINATE 50 MG: 50 TABLET, EXTENDED RELEASE ORAL at 07:27

## 2024-08-24 RX ADMIN — METOPROLOL SUCCINATE 50 MG: 50 TABLET, EXTENDED RELEASE ORAL at 22:26

## 2024-08-24 RX ADMIN — INSULIN LISPRO 1 UNITS: 100 INJECTION, SOLUTION INTRAVENOUS; SUBCUTANEOUS at 11:41

## 2024-08-24 RX ADMIN — PRIMIDONE 50 MG: 50 TABLET ORAL at 22:26

## 2024-08-24 RX ADMIN — PRIMIDONE 50 MG: 50 TABLET ORAL at 07:28

## 2024-08-24 RX ADMIN — ATORVASTATIN CALCIUM 20 MG: 20 TABLET, FILM COATED ORAL at 07:27

## 2024-08-24 RX ADMIN — SODIUM CHLORIDE, PRESERVATIVE FREE 10 ML: 5 INJECTION INTRAVENOUS at 07:31

## 2024-08-24 RX ADMIN — BENZONATATE 200 MG: 100 CAPSULE ORAL at 11:05

## 2024-08-24 RX ADMIN — CETIRIZINE HYDROCHLORIDE 10 MG: 10 TABLET, FILM COATED ORAL at 11:05

## 2024-08-24 RX ADMIN — PANTOPRAZOLE SODIUM 40 MG: 40 TABLET, DELAYED RELEASE ORAL at 06:35

## 2024-08-24 RX ADMIN — TAMSULOSIN HYDROCHLORIDE 0.4 MG: 0.4 CAPSULE ORAL at 07:27

## 2024-08-24 RX ADMIN — BENZONATATE 200 MG: 100 CAPSULE ORAL at 15:03

## 2024-08-24 RX ADMIN — PANTOPRAZOLE SODIUM 40 MG: 40 TABLET, DELAYED RELEASE ORAL at 15:03

## 2024-08-24 RX ADMIN — BENZONATATE 200 MG: 100 CAPSULE ORAL at 22:26

## 2024-08-24 NOTE — PROGRESS NOTES
Physical Therapy    Name: Jose Tate  MRN:  [id number]  Date of service:  8/24/2024 08/24/24 1019   Restrictions/Precautions   Restrictions/Precautions Weight Bearing;Fall Risk   Required Braces or Orthoses? No   Implants present?  Pacemaker   Lower Extremity Weight Bearing Restrictions   Right Lower Extremity Weight Bearing Weight Bearing As Tolerated   Left Lower Extremity Weight Bearing Weight Bearing As Tolerated   Position Activity Restriction   Other position/activity restrictions Pacemaker precautions.   General   Chart Reviewed Yes   Additional Pertinent Hx IDDM, MI, GERD, COPD, chronic cough, chronic systolic CHF, HTN, and hypercholesterolemia   Subjective   Subjective Pt ready for PT treatment.   General Comment   Comments OT noted pt had cough and runny nose during OT session.   Oxygen Therapy   SpO2 100 %  (occasional drops into mid 80's. Pt able to recover in less than 1 min.)   O2 Device Nasal cannula   O2 Flow Rate (L/min) 2 L/min   Transfers   Sit to Stand Stand by assistance;Contact guard assistance   Stand to Sit Stand by assistance;Contact guard assistance   Bed to Chair Contact guard assistance   Ambulation   Device Rolling Walker   Other Apparatus O2   Assistance Contact guard assistance   Quality of Gait Good body positioning inside AD. Upright posture but downward gaze. Required cues to correct   Distance 55' x 2   Comments O2 100% post amb   More Ambulation? Yes   Exercises   Hip Flexion 2/15   Hip Abduction 2/15   Knee Long Arc Quad 2/15   Ankle Pumps 2/20   Comments seated and standing marching with FWW for support   Short Term Goals   Short Term Goal 1 BED MOBILITY INDEP WITH RAILS   Short Term Goal 2 TF SURFACE TO SURFACE MIN A WITH A.D.   Short Term Goal 3 AMBULATE 50 FT WITH RW UTILZING PROPER FORM MIN A   Short Term Goal 4 UP/DOWN 4 STEPS 1-2 RAILS MOD A   Short Term Goal 5 CAR TF MOD A WITH A.D.   Conditions Requiring Skilled Therapeutic Intervention   Assessment Pt  tolerated treatment well. Pt presented with 1 small coughing episode during session.  Noting he felt better after walking. Improved gait pattern and safety as well as decreased assistance required with transfers.  Moderate cues to keep pt awake during rest breaks as O2 sats drops and pt tends to mouth breathe at times. Nurse notified and aware.   Activity Tolerance   Activity Tolerance Patient tolerated treatment well;Patient limited by fatigue           Electronically signed by Katie Urrutia PTA on 8/24/2024 at 1:38 PM

## 2024-08-24 NOTE — PROGRESS NOTES
Facility/Department: [facility] 8 REHAB UNIT  Occupational Therapy Treatment Note    Name: Jose Tate  : 1940  MRN: [id number]  Date of Service: 2024    Discharge Recommendations:  Continue to assess pending progress          Patient Diagnosis(es): The encounter diagnosis was Permanent atrial fibrillation (HCC).  Past Medical History:  has a past medical history of Allergic rhinitis, Bronchitis, chronic (HCC), Carotid artery stenosis, GERD (gastroesophageal reflux disease), Heart attack (HCC), Hemorrhoids, Hypercholesterolemia, Palliative care patient, and Type II or unspecified type diabetes mellitus without mention of complication, not stated as uncontrolled.  Past Surgical History:  has a past surgical history that includes Cardiac surgery; Port Surgery (Right, 2021); and ep device procedure (Left, 2024).    Treatment Diagnosis: Acute Kidney failure, Pacemaker placement on 2024    Assessment   Performance deficits / Impairments: Decreased functional mobility ;Decreased ADL status;Decreased strength;Decreased endurance;Decreased balance;Decreased high-level IADLs  Treatment Diagnosis: Acute Kidney failure, Pacemaker placement on 2024  Activity Tolerance  Activity Tolerance: Patient Tolerated treatment well              Plan   Occupational Therapy Plan  Current Treatment Recommendations: Strengthening, Balance training, Functional mobility training, Endurance training, Wheelchair mobility training, Equipment evaluation, education, & procurement, Patient/Caregiver education & training, Safety education & training, Self-Care / ADL, Home management training     Restrictions  Restrictions/Precautions  Restrictions/Precautions: Weight Bearing, Fall Risk  Implants present? : Pacemaker  Lower Extremity Weight Bearing Restrictions  Right Lower Extremity Weight Bearing: Weight Bearing As Tolerated  Left Lower Extremity Weight Bearing: Weight Bearing As Tolerated  Position Activity

## 2024-08-24 NOTE — PROGRESS NOTES
Jose CARLSON Bebeto   [id number]       08/24/24 1501 08/24/24 1502 08/24/24 1509   Restrictions/Precautions   Restrictions/Precautions Weight Bearing;Fall Risk  --   --    Implants present?  Pacemaker  --   --    Lower Extremity Weight Bearing Restrictions   Right Lower Extremity Weight Bearing Weight Bearing As Tolerated  --   --    Left Lower Extremity Weight Bearing Weight Bearing As Tolerated  --   --    Position Activity Restriction   Other position/activity restrictions Pacemaker precautions.  --   --    General   Additional Pertinent Hx IDDM, MI, GERD, COPD, chronic cough, chronic systolic CHF, HTN, and hypercholesterolemia  --   --    Diagnosis 8/19 PACEMAKER PLACEMENT; ACUTE KIDNEY INJURY  --   --    Subjective   Subjective Pt. agreeable to therapy session.  --   --    Vitals   O2 Device  --   --   --    Subjective   Pain  --  denies  --    Transfers   Sit to Stand  --   --   --    Stand to Sit  --   --   --    Ambulation   WB Status  --   --   --    Ambulation   Device  --   --   --    Assistance  --   --   --    Distance  --   --   --    Comments  --   --   --    PT Exercises   Exercise Treatment  --   --  Sitting BLE exercises  x10 reps/ 2 sets.   Assessment   Assessment  --   --   --    Safety Devices   Type of Devices  --   --   --    PT Individual Minutes   Time In  --   --   --    Time Out  --   --   --    Minutes  --   --   --       08/24/24 1530 08/24/24 1535   Restrictions/Precautions   Restrictions/Precautions  --   --    Implants present?   --   --    Lower Extremity Weight Bearing Restrictions   Right Lower Extremity Weight Bearing  --   --    Left Lower Extremity Weight Bearing  --   --    Position Activity Restriction   Other position/activity restrictions  --   --    General   Additional Pertinent Hx  --   --    Diagnosis  --   --    Subjective   Subjective  --   --    Vitals   O2 Device  --  Nasal cannula   Subjective   Pain  --   --    Transfers   Sit to Stand Stand by assistance;Contact

## 2024-08-24 NOTE — PROGRESS NOTES
Daily Progress Note    Date:2024  Patient: Jose Tate  : 1940  MRN:[id number]  CODE:Limited No additional code details  PCP:Chayo Grande MD    Admit Date: 2024  5:11 PM   LOS: 4 days       Subjective:   No acute events noted overnight.  He reported having a BM yesterday evening but did not notice any blood or melena.  Worked with PT today, fatigue and some dyspnea with exertion but otherwise no chest pain, palpitations, lightheadedness or dizziness.  He has had some intermittent cough and nasal congestion but no fevers or chills.      Summary:84-year-old male with diabetes, recently hospitalized with new onset A-fib with RVR with recent syncopal episode with progressive fatigue, generalized weakness and recent falls.  He was managed with IV Cardizem infusion, weaned off and maintained on low-dose metoprolol for rate control and Eliquis 2.5 mg twice daily for anticoagulation on discharge.  Noted normal EF on echo per subsequently developed bradycardia.  Seen by cardiology and underwent pacemaker placement on 2024.  Discharged to [facility] inpatient rehab service on .  He was subsequently noted to have progressive decline in hemoglobin over several days.  GI consulted due to concern for occult GI bleeding however did not have any overt bleeding and GI did not recommend endoscopic intervention.  Hospital medicine consulted for medical management.  He did have drop in hemoglobin down to 7.0 in setting of symptomatic anemia, transfused 1 unit PRBCs on  with significant improvement.  CT enterography without any obvious source of bleeding.      Objective:      Vital signs in last 24 hours:  Patient Vitals for the past 24 hrs:   BP Temp Temp src Pulse Resp SpO2 Weight   24 1019 -- -- -- -- -- 100 % --   24 0845 -- -- -- -- -- 97 % --   24 0839 -- -- -- -- -- -- 80.9 kg (178 lb 4.8 oz)   24 0830 -- -- -- -- -- (!) 84 % --   24 0634 -- -- -- -- -- 98 % --  infusion, , IntraVENous, PRN, Rg Brianlpaben B, APRN - CNP    [Held by provider] dilTIAZem (CARDIZEM CD) extended release capsule 120 mg, 120 mg, Oral, Daily, BrianRg cabrallpaben B, APRN - CNP, 120 mg at 08/22/24 0907    promethazine (PHENERGAN) tablet 25 mg, 25 mg, Oral, Q6H PRN, Colleen Briann RAYSHAWN, APRN - CNP    glucose chewable tablet 16 g, 4 tablet, Oral, PRN, Brian, Jalpaben B, APRN - CNP    dextrose bolus 10% 125 mL, 125 mL, IntraVENous, PRN **OR** dextrose bolus 10% 250 mL, 250 mL, IntraVENous, PRN, Rg Brianlpaben B, APRN - CNP    glucagon injection 1 mg, 1 mg, IntraMUSCular, PRN, Rg Brianlpaben B, APRN - CNP    dextrose 10 % infusion, , IntraVENous, Continuous PRN, Rg Brianlpaben B, APRN - CNP    insulin lispro (HUMALOG,ADMELOG) injection vial 0-4 Units, 0-4 Units, SubCUTAneous, TID WC, Rg Brianlpaben B, APRN - CNP, 1 Units at 08/24/24 1141    insulin lispro (HUMALOG,ADMELOG) injection vial 0-4 Units, 0-4 Units, SubCUTAneous, Nightly, Rg Brianlpaben B, APRN - CNP    metoprolol succinate (TOPROL XL) extended release tablet 50 mg, 50 mg, Oral, BID, Rg Brianlpaben B, APRN - CNP, 50 mg at 08/24/24 0727    [Held by provider] apixaban (ELIQUIS) tablet 2.5 mg, 2.5 mg, Oral, BID, Rg Brianlpaben B, APRN - CNP, 2.5 mg at 08/21/24 0804    acetaminophen (TYLENOL) tablet 650 mg, 650 mg, Oral, Q4H PRN, Kwame Rogers MD    polyethylene glycol (GLYCOLAX) packet 17 g, 17 g, Oral, Daily PRN, Kwame Rogers MD, 17 g at 08/23/24 1706      Assessment/plan  Principal Problem:    Acute blood loss anemia  Active Problems:    Chronic systolic (congestive) heart failure    Coronary artery disease involving native heart with angina pectoris, unspecified vessel or lesion type (HCC)    Hypercholesterolemia    GERD (gastroesophageal reflux disease)    COPD (chronic obstructive pulmonary disease) (HCC)    Essential hypertension    Type 2 diabetes mellitus without complication (HCC)    Adenocarcinoma of right lung (HCC)    New onset

## 2024-08-24 NOTE — PROGRESS NOTES
DIS Nurse Note  SUBJECTIVE: Patient assessed secondary to elevated Deterioration Index Score.      Deterioration Index Score:  Predictive Model Details          51 (Warning)  Factor Value    Calculated 8/24/2024 16:32 26% Age 84 years old    Deterioration Index Model 22% Supplemental oxygen Nasal cannula     11% Respiratory rate 20     8% Hematocrit abnormal (27.7 %)     8% Systolic 165     8% Cardiac rhythm Biventricular paced     5% Pulse 102     5% Potassium 4.4 mmol/L     3% Pulse oximetry 100 %     3% WBC count 9.8 K/uL     2% BUN abnormal (28 mg/dL)     2% Sodium 137 mmol/L     0% Temperature 97.7 °F (36.5 °C)        Vital Signs:  Vitals:    08/24/24 0839 08/24/24 0845 08/24/24 1019 08/24/24 1630   BP:    (!) 165/53   Pulse:    (!) 102   Resp:    20   Temp:    97.7 °F (36.5 °C)   TempSrc:    Temporal   SpO2:  97% 100% 100%   Weight: 80.9 kg (178 lb 4.8 oz)      Height:            Provider Notified: Not Indicated    ASSESSMENT:   no change    Plan of Care: no change    Electronically signed by Юлия Batista RN

## 2024-08-25 LAB
BASOPHILS # BLD: 0.1 K/UL (ref 0–0.2)
BASOPHILS NFR BLD: 1 % (ref 0–1)
EOSINOPHIL # BLD: 0.3 K/UL (ref 0–0.6)
EOSINOPHIL NFR BLD: 2.9 % (ref 0–5)
ERYTHROCYTE [DISTWIDTH] IN BLOOD BY AUTOMATED COUNT: 14.1 % (ref 11.5–14.5)
GLUCOSE BLD-MCNC: 184 MG/DL (ref 70–99)
GLUCOSE BLD-MCNC: 230 MG/DL (ref 70–99)
GLUCOSE BLD-MCNC: 237 MG/DL (ref 70–99)
GLUCOSE BLD-MCNC: 272 MG/DL (ref 70–99)
HCT VFR BLD AUTO: 27.4 % (ref 42–52)
HGB BLD-MCNC: 8.8 G/DL (ref 14–18)
IMM GRANULOCYTES # BLD: 0.1 K/UL
LYMPHOCYTES # BLD: 1.5 K/UL (ref 1.1–4.5)
LYMPHOCYTES NFR BLD: 16.8 % (ref 20–40)
MCH RBC QN AUTO: 30.7 PG (ref 27–31)
MCHC RBC AUTO-ENTMCNC: 32.1 G/DL (ref 33–37)
MCV RBC AUTO: 95.5 FL (ref 80–94)
MONOCYTES # BLD: 0.7 K/UL (ref 0–0.9)
MONOCYTES NFR BLD: 7.7 % (ref 0–10)
NEUTROPHILS # BLD: 6.5 K/UL (ref 1.5–7.5)
NEUTS SEG NFR BLD: 70.7 % (ref 50–65)
PERFORMED ON: ABNORMAL
PLATELET # BLD AUTO: 314 K/UL (ref 130–400)
PMV BLD AUTO: 9.4 FL (ref 9.4–12.4)
RBC # BLD AUTO: 2.87 M/UL (ref 4.7–6.1)
WBC # BLD AUTO: 9.1 K/UL (ref 4.8–10.8)

## 2024-08-25 PROCEDURE — 6370000000 HC RX 637 (ALT 250 FOR IP): Performed by: INTERNAL MEDICINE

## 2024-08-25 PROCEDURE — 6370000000 HC RX 637 (ALT 250 FOR IP): Performed by: NURSE PRACTITIONER

## 2024-08-25 PROCEDURE — 2580000003 HC RX 258: Performed by: NURSE PRACTITIONER

## 2024-08-25 PROCEDURE — 6370000000 HC RX 637 (ALT 250 FOR IP): Performed by: STUDENT IN AN ORGANIZED HEALTH CARE EDUCATION/TRAINING PROGRAM

## 2024-08-25 PROCEDURE — 82962 GLUCOSE BLOOD TEST: CPT

## 2024-08-25 PROCEDURE — 94760 N-INVAS EAR/PLS OXIMETRY 1: CPT

## 2024-08-25 PROCEDURE — 85025 COMPLETE CBC W/AUTO DIFF WBC: CPT

## 2024-08-25 PROCEDURE — 36415 COLL VENOUS BLD VENIPUNCTURE: CPT

## 2024-08-25 PROCEDURE — 1180000000 HC REHAB R&B

## 2024-08-25 PROCEDURE — 2700000000 HC OXYGEN THERAPY PER DAY

## 2024-08-25 RX ADMIN — INSULIN LISPRO 1 UNITS: 100 INJECTION, SOLUTION INTRAVENOUS; SUBCUTANEOUS at 12:01

## 2024-08-25 RX ADMIN — FUROSEMIDE 40 MG: 40 TABLET ORAL at 07:49

## 2024-08-25 RX ADMIN — MONTELUKAST 10 MG: 10 TABLET, FILM COATED ORAL at 21:23

## 2024-08-25 RX ADMIN — CETIRIZINE HYDROCHLORIDE 10 MG: 10 TABLET, FILM COATED ORAL at 07:48

## 2024-08-25 RX ADMIN — PANTOPRAZOLE SODIUM 40 MG: 40 TABLET, DELAYED RELEASE ORAL at 05:05

## 2024-08-25 RX ADMIN — TAMSULOSIN HYDROCHLORIDE 0.4 MG: 0.4 CAPSULE ORAL at 07:49

## 2024-08-25 RX ADMIN — SODIUM CHLORIDE, PRESERVATIVE FREE 10 ML: 5 INJECTION INTRAVENOUS at 08:03

## 2024-08-25 RX ADMIN — ATORVASTATIN CALCIUM 20 MG: 20 TABLET, FILM COATED ORAL at 07:49

## 2024-08-25 RX ADMIN — PRIMIDONE 50 MG: 50 TABLET ORAL at 07:49

## 2024-08-25 RX ADMIN — PRIMIDONE 50 MG: 50 TABLET ORAL at 21:23

## 2024-08-25 RX ADMIN — PANTOPRAZOLE SODIUM 40 MG: 40 TABLET, DELAYED RELEASE ORAL at 15:33

## 2024-08-25 RX ADMIN — METOPROLOL SUCCINATE 50 MG: 50 TABLET, EXTENDED RELEASE ORAL at 21:23

## 2024-08-25 RX ADMIN — INSULIN LISPRO 2 UNITS: 100 INJECTION, SOLUTION INTRAVENOUS; SUBCUTANEOUS at 17:36

## 2024-08-25 RX ADMIN — BENZONATATE 200 MG: 100 CAPSULE ORAL at 21:23

## 2024-08-25 RX ADMIN — METOPROLOL SUCCINATE 50 MG: 50 TABLET, EXTENDED RELEASE ORAL at 07:48

## 2024-08-25 RX ADMIN — BENZONATATE 200 MG: 100 CAPSULE ORAL at 07:48

## 2024-08-25 RX ADMIN — BENZONATATE 200 MG: 100 CAPSULE ORAL at 15:33

## 2024-08-25 RX ADMIN — DILTIAZEM HYDROCHLORIDE 120 MG: 120 CAPSULE, COATED, EXTENDED RELEASE ORAL at 09:18

## 2024-08-25 RX ADMIN — SODIUM CHLORIDE, PRESERVATIVE FREE 10 ML: 5 INJECTION INTRAVENOUS at 21:28

## 2024-08-25 NOTE — PLAN OF CARE
Problem: Safety - Adult  Goal: Free from fall injury  Outcome: Progressing     Problem: Discharge Planning  Goal: Discharge to home or other facility with appropriate resources  Outcome: Progressing  Flowsheets  Taken 8/25/2024 0751 by Cat Hua LPN  Discharge to home or other facility with appropriate resources:   Identify barriers to discharge with patient and caregiver   Arrange for needed discharge resources and transportation as appropriate   Identify discharge learning needs (meds, wound care, etc)   Arrange for interpreters to assist at discharge as needed   Refer to discharge planning if patient needs post-hospital services based on physician order or complex needs related to functional status, cognitive ability or social support system  Taken 8/24/2024 2012 by Fernando Reynoso RN  Discharge to home or other facility with appropriate resources:   Identify barriers to discharge with patient and caregiver   Identify discharge learning needs (meds, wound care, etc)     Problem: Pain  Goal: Verbalizes/displays adequate comfort level or baseline comfort level  Outcome: Progressing     Problem: ABCDS Injury Assessment  Goal: Absence of physical injury  Outcome: Progressing     Problem: Nutrition Deficit:  Goal: Optimize nutritional status  Outcome: Progressing     Problem: Chronic Conditions and Co-morbidities  Goal: Patient's chronic conditions and co-morbidity symptoms are monitored and maintained or improved  Outcome: Progressing  Flowsheets  Taken 8/25/2024 0751 by Cat Hua LPN  Care Plan - Patient's Chronic Conditions and Co-Morbidity Symptoms are Monitored and Maintained or Improved:   Collaborate with multidisciplinary team to address chronic and comorbid conditions and prevent exacerbation or deterioration   Monitor and assess patient's chronic conditions and comorbid symptoms for stability, deterioration, or improvement   Update acute care plan with appropriate goals if chronic or  comorbid symptoms are exacerbated and prevent overall improvement and discharge  Taken 8/24/2024 2012 by Fernando Reynoso RN  Care Plan - Patient's Chronic Conditions and Co-Morbidity Symptoms are Monitored and Maintained or Improved:   Monitor and assess patient's chronic conditions and comorbid symptoms for stability, deterioration, or improvement   Collaborate with multidisciplinary team to address chronic and comorbid conditions and prevent exacerbation or deterioration   Update acute care plan with appropriate goals if chronic or comorbid symptoms are exacerbated and prevent overall improvement and discharge     Problem: Skin/Tissue Integrity  Goal: Absence of new skin breakdown  Description: 1.  Monitor for areas of redness and/or skin breakdown  2.  Assess vascular access sites hourly  3.  Every 4-6 hours minimum:  Change oxygen saturation probe site  4.  Every 4-6 hours:  If on nasal continuous positive airway pressure, respiratory therapy assess nares and determine need for appliance change or resting period.  Outcome: Progressing

## 2024-08-25 NOTE — PROGRESS NOTES
Daily Progress Note    Date:2024  Patient: Jose Tate  : 1940  MRN:[id number]  CODE:Limited No additional code details  PCP:Chayo Grande MD    Admit Date: 2024  5:11 PM   LOS: 5 days       Subjective:   No acute events noted overnight.  No recent bleeding episodes noted.  No bowel movement noted today.  Has had fatigue and some degree of dyspnea with exertion, but otherwise no new symptomatology.  No palpitations.        Summary:84-year-old male with diabetes, recently hospitalized with new onset A-fib with RVR with recent syncopal episode with progressive fatigue, generalized weakness and recent falls.  He was managed with IV Cardizem infusion, weaned off and maintained on low-dose metoprolol for rate control and Eliquis 2.5 mg twice daily for anticoagulation on discharge.  Noted normal EF on echo per subsequently developed bradycardia.  Seen by cardiology and underwent pacemaker placement on 2024.  Discharged to [facility] inpatient rehab service on .  He was subsequently noted to have progressive decline in hemoglobin over several days.  GI consulted due to concern for occult GI bleeding however did not have any overt bleeding and GI did not recommend endoscopic intervention.  Hospital medicine consulted for medical management.  He did have drop in hemoglobin down to 7.0 in setting of symptomatic anemia, transfused 1 unit PRBCs on  with significant improvement.  CT enterography without any obvious source of bleeding.      Objective:      Vital signs in last 24 hours:  Patient Vitals for the past 24 hrs:   BP Temp Temp src Pulse Resp SpO2   24 0629 -- -- -- -- -- 97 %   24 0418 (!) 148/66 97.7 °F (36.5 °C) Temporal (!) 105 20 100 %   24 1630 (!) 165/53 97.7 °F (36.5 °C) Temporal (!) 102 20 100 %     Physical exam    General: No acute distress  Cardiac: Normal rate with irregular rhythm  Respiratory: No wheezes rales or rhonchi  Abdomen:

## 2024-08-26 ENCOUNTER — TELEPHONE (OUTPATIENT)
Dept: CARDIOLOGY CLINIC | Age: 84
End: 2024-08-26

## 2024-08-26 LAB
ALBUMIN SERPL-MCNC: 3.1 G/DL (ref 3.5–5.2)
ALP SERPL-CCNC: 79 U/L (ref 40–129)
ALT SERPL-CCNC: 14 U/L (ref 5–41)
ANION GAP SERPL CALCULATED.3IONS-SCNC: 10 MMOL/L (ref 7–19)
AST SERPL-CCNC: 16 U/L (ref 5–40)
BASOPHILS # BLD: 0.1 K/UL (ref 0–0.2)
BASOPHILS NFR BLD: 0.8 % (ref 0–1)
BILIRUB SERPL-MCNC: 0.2 MG/DL (ref 0.2–1.2)
BUN SERPL-MCNC: 28 MG/DL (ref 8–23)
CALCIUM SERPL-MCNC: 8.9 MG/DL (ref 8.8–10.2)
CHLORIDE SERPL-SCNC: 98 MMOL/L (ref 98–111)
CO2 SERPL-SCNC: 29 MMOL/L (ref 22–29)
CREAT SERPL-MCNC: 1.5 MG/DL (ref 0.7–1.2)
EKG P AXIS: 95 DEGREES
EKG P-R INTERVAL: 140 MS
EKG Q-T INTERVAL: 292 MS
EKG QRS DURATION: 98 MS
EKG QTC CALCULATION (BAZETT): 437 MS
EKG T AXIS: 149 DEGREES
EOSINOPHIL # BLD: 0.3 K/UL (ref 0–0.6)
EOSINOPHIL NFR BLD: 3 % (ref 0–5)
ERYTHROCYTE [DISTWIDTH] IN BLOOD BY AUTOMATED COUNT: 14 % (ref 11.5–14.5)
GLUCOSE BLD-MCNC: 183 MG/DL (ref 70–99)
GLUCOSE BLD-MCNC: 194 MG/DL (ref 70–99)
GLUCOSE BLD-MCNC: 279 MG/DL (ref 70–99)
GLUCOSE BLD-MCNC: 344 MG/DL (ref 70–99)
GLUCOSE SERPL-MCNC: 176 MG/DL (ref 70–99)
HCT VFR BLD AUTO: 28.7 % (ref 42–52)
HGB BLD-MCNC: 9.2 G/DL (ref 14–18)
IMM GRANULOCYTES # BLD: 0.1 K/UL
LYMPHOCYTES # BLD: 1.7 K/UL (ref 1.1–4.5)
LYMPHOCYTES NFR BLD: 18.2 % (ref 20–40)
MCH RBC QN AUTO: 30.6 PG (ref 27–31)
MCHC RBC AUTO-ENTMCNC: 32.1 G/DL (ref 33–37)
MCV RBC AUTO: 95.3 FL (ref 80–94)
MONOCYTES # BLD: 0.8 K/UL (ref 0–0.9)
MONOCYTES NFR BLD: 8.2 % (ref 0–10)
NEUTROPHILS # BLD: 6.5 K/UL (ref 1.5–7.5)
NEUTS SEG NFR BLD: 69.1 % (ref 50–65)
PERFORMED ON: ABNORMAL
PLATELET # BLD AUTO: 317 K/UL (ref 130–400)
PMV BLD AUTO: 9.3 FL (ref 9.4–12.4)
POTASSIUM SERPL-SCNC: 4.7 MMOL/L (ref 3.5–5)
PROT SERPL-MCNC: 6.3 G/DL (ref 6.6–8.7)
RBC # BLD AUTO: 3.01 M/UL (ref 4.7–6.1)
SODIUM SERPL-SCNC: 137 MMOL/L (ref 136–145)
WBC # BLD AUTO: 9.4 K/UL (ref 4.8–10.8)

## 2024-08-26 PROCEDURE — 36415 COLL VENOUS BLD VENIPUNCTURE: CPT

## 2024-08-26 PROCEDURE — 97535 SELF CARE MNGMENT TRAINING: CPT

## 2024-08-26 PROCEDURE — 82962 GLUCOSE BLOOD TEST: CPT

## 2024-08-26 PROCEDURE — 97110 THERAPEUTIC EXERCISES: CPT

## 2024-08-26 PROCEDURE — 6370000000 HC RX 637 (ALT 250 FOR IP): Performed by: INTERNAL MEDICINE

## 2024-08-26 PROCEDURE — 94760 N-INVAS EAR/PLS OXIMETRY 1: CPT

## 2024-08-26 PROCEDURE — 80053 COMPREHEN METABOLIC PANEL: CPT

## 2024-08-26 PROCEDURE — 85025 COMPLETE CBC W/AUTO DIFF WBC: CPT

## 2024-08-26 PROCEDURE — 2700000000 HC OXYGEN THERAPY PER DAY

## 2024-08-26 PROCEDURE — 97116 GAIT TRAINING THERAPY: CPT

## 2024-08-26 PROCEDURE — 6370000000 HC RX 637 (ALT 250 FOR IP): Performed by: STUDENT IN AN ORGANIZED HEALTH CARE EDUCATION/TRAINING PROGRAM

## 2024-08-26 PROCEDURE — 2580000003 HC RX 258: Performed by: NURSE PRACTITIONER

## 2024-08-26 PROCEDURE — 99232 SBSQ HOSP IP/OBS MODERATE 35: CPT | Performed by: PSYCHIATRY & NEUROLOGY

## 2024-08-26 PROCEDURE — 1180000000 HC REHAB R&B

## 2024-08-26 PROCEDURE — 6370000000 HC RX 637 (ALT 250 FOR IP): Performed by: NURSE PRACTITIONER

## 2024-08-26 PROCEDURE — 6370000000 HC RX 637 (ALT 250 FOR IP): Performed by: PSYCHIATRY & NEUROLOGY

## 2024-08-26 PROCEDURE — 97530 THERAPEUTIC ACTIVITIES: CPT

## 2024-08-26 RX ADMIN — BENZONATATE 200 MG: 100 CAPSULE ORAL at 15:11

## 2024-08-26 RX ADMIN — BENZONATATE 200 MG: 100 CAPSULE ORAL at 21:10

## 2024-08-26 RX ADMIN — METOPROLOL SUCCINATE 50 MG: 50 TABLET, EXTENDED RELEASE ORAL at 07:22

## 2024-08-26 RX ADMIN — PANTOPRAZOLE SODIUM 40 MG: 40 TABLET, DELAYED RELEASE ORAL at 05:18

## 2024-08-26 RX ADMIN — BENZONATATE 200 MG: 100 CAPSULE ORAL at 07:22

## 2024-08-26 RX ADMIN — ATORVASTATIN CALCIUM 20 MG: 20 TABLET, FILM COATED ORAL at 07:22

## 2024-08-26 RX ADMIN — TAMSULOSIN HYDROCHLORIDE 0.4 MG: 0.4 CAPSULE ORAL at 07:22

## 2024-08-26 RX ADMIN — DILTIAZEM HYDROCHLORIDE 120 MG: 120 CAPSULE, COATED, EXTENDED RELEASE ORAL at 07:22

## 2024-08-26 RX ADMIN — CETIRIZINE HYDROCHLORIDE 10 MG: 10 TABLET, FILM COATED ORAL at 07:22

## 2024-08-26 RX ADMIN — POLYETHYLENE GLYCOL 3350 17 G: 17 POWDER, FOR SOLUTION ORAL at 14:21

## 2024-08-26 RX ADMIN — PRIMIDONE 50 MG: 50 TABLET ORAL at 21:10

## 2024-08-26 RX ADMIN — ACETAMINOPHEN 650 MG: 325 TABLET ORAL at 21:09

## 2024-08-26 RX ADMIN — METOPROLOL SUCCINATE 50 MG: 50 TABLET, EXTENDED RELEASE ORAL at 21:10

## 2024-08-26 RX ADMIN — PANTOPRAZOLE SODIUM 40 MG: 40 TABLET, DELAYED RELEASE ORAL at 15:11

## 2024-08-26 RX ADMIN — PRIMIDONE 50 MG: 50 TABLET ORAL at 07:22

## 2024-08-26 RX ADMIN — MONTELUKAST 10 MG: 10 TABLET, FILM COATED ORAL at 21:10

## 2024-08-26 RX ADMIN — SODIUM CHLORIDE, PRESERVATIVE FREE 10 ML: 5 INJECTION INTRAVENOUS at 07:22

## 2024-08-26 RX ADMIN — GUAIFENESIN SYRUP AND DEXTROMETHORPHAN 5 ML: 100; 10 SYRUP ORAL at 21:09

## 2024-08-26 RX ADMIN — SODIUM CHLORIDE, PRESERVATIVE FREE 10 ML: 5 INJECTION INTRAVENOUS at 21:00

## 2024-08-26 RX ADMIN — INSULIN LISPRO 4 UNITS: 100 INJECTION, SOLUTION INTRAVENOUS; SUBCUTANEOUS at 21:18

## 2024-08-26 RX ADMIN — INSULIN LISPRO 2 UNITS: 100 INJECTION, SOLUTION INTRAVENOUS; SUBCUTANEOUS at 12:02

## 2024-08-26 ASSESSMENT — PAIN SCALES - GENERAL: PAINLEVEL_OUTOF10: 1

## 2024-08-26 NOTE — PROGRESS NOTES
Physical Findings, Weight, Skin    Discharge Planning:    Too soon to determine     Kaycee Pérez MS, RD, LD, CDCES  Contact: [phone]

## 2024-08-26 NOTE — PATIENT CARE CONFERENCE
[facility] ACUTE INPATIENT REHABILITATION  TEAM CONFERENCE NOTE    Date: 2024  Patient Name: Jose Tate        MRN: [id number]    : 1940  (84 y.o.)  Gender: male      Diagnosis:  PACEMAKER PLACEMENT; ACUTE KIDNEY INJURY      PHYSICAL THERAPY  GROSS ASSESSMENT       BED MOBILITY  Bed mobility  Rolling to Left: Stand by assistance  Rolling to Right: Stand by assistance  Supine to Sit: Stand by assistance (TO RIGHT WITH RAIL, TRIPLANAR)  Sit to Supine: Stand by assistance  Bed Mobility Comments: TRIPLANAR TO RIGHT WITH RAILS       TRANSFERS  Transfers  Sit to Stand: Stand by assistance  Stand to Sit: Stand by assistance  Bed to Chair: Stand by assistance  Car Transfer: Stand by assistance (with rw)  WHEELCHAIR PROPULSION  Propulsion 1  Propulsion: Manual  Method: SANA MCFADDEN  Level of Assistance: Stand by assistance  Description/ Details: SLOW.  DIFFICULTY WITH TURNS  Distance: 125 FT  AMBULATION  Ambulation  Device: Rolling Walker  Other Apparatus:  (ROOM AIR)  Assistance: Contact guard assistance, Stand by assistance  Quality of Gait: RECIPROCAL PATTERN.  NARROW MAK. FAST PACE.  BILAT SCAP ELEVATION, DOWNWARD GAZE.  Distance: 125 FT  Comments: O2 SATS 100% AFTER WALK,  BPM  More Ambulation?: Yes  STAIRS  Stairs  # Steps : 4  Rails: Bilateral  Assistance: Moderate assistance, Minimal assistance  Comment: initially performed reciprocal pattern until cued for step to.  inconsistent with which foot leading with ascending and descending.  GOALS:  Short Term Goals  Time Frame for Short Term Goals: 2 WEEKS  Short Term Goal 1: BED MOBILITY INDEP WITH RAILS  Short Term Goal 2: TF SURFACE TO SURFACE MIN A WITH A.D.  Short Term Goal 3: AMBULATE 50 FT WITH RW UTILZING PROPER FORM MIN A  Short Term Goal 4: UP/DOWN 4 STEPS 1-2 RAILS MOD A  Short Term Goal 5: CAR TF MOD A WITH A.D.    Long Term Goals  Time Frame for Long Term Goals : 4 WEEKS  Long Term Goal 1: INDEP BED MOBILITY WITHOUT RAILD  Long  Term Goal 2: TF SURFACE TO SURFACE SBA WITH A.D.  Long Term Goal 3: AMBUALTE 50 FT WITH A.D. REQUIRING NO GREATER THAN SBA UTILIZING PROPER FORM WITH TWO TURNS  Long Term Goal 4: UP/DOWN 4 STEPS 1-2 RAILS MIN A  Long Term Goal 5: CAR TF MARIA D WITH A.D.    ASSESSMENT:  Assessment: PATIENT CONTINUES TO HOLD O2 SATS MID 90S ON ROOM AIR WITH ACTIVITY.  LEFT INDEX FINGER PRODUCES LOWER READING THAN RIGHT INDEX FINGER.      SPEECH THERAPY      OCCUPATIONAL THERAPY  Cognitive Patterns:  Cognitive Pattern Assessment Used: BIMS  Cognitive Assessment Method (CAM):  Confusion Assessment Method (CAM)  Is there evidence of an acute change in mental status from the patient's baseline?: Yes  Inattention: Behavior not present  Disorganized thinking: Behavior present, fluctuates (comes and goes, changes in severity)  Altered level of consciousness: Behavior present, fluctuates (comes and goes, changes in severity)  Health Literacy:           CURRENT IRF-DONNIE SCORES  Eating: CARE Score: 6       Oral Hygiene: CARE Score: 5        Toileting: CARE Score: 4  Comment: CGA      Shower/Bathe: CARE Score: 4  Comment: CGA with bathing        Upper Body Dressing: CARE Score: 5  Comment: Setup      Lower Body Dressing: CARE Score: 4  Comment: CGA       Footwear: CARE Score: 4  Comment: Socks only with leg over knee tech       Toilet Transfers: CARE Score: 4  Comment: CGA       Picking Up Object:  CARE Score: 1          UE Functioning:  LUE: WFL within pacemaker precautions.  RUE: AROM WFL     Pain Assessment:   No pain        STGs:  Short Term Goals  Time Frame for Short Term Goals: 1 week  Short Term Goal 1: Supervision with toileting.  Short Term Goal 2: Supervision with toilet transfers.  Short Term Goal 3: Supervision with bathing hygiene.  Short Term Goal 4: Supervision with LB dressing.  Short Term Goal 5: Supervision with donning/doffing socks and shoes.  Short Term Goal 6: Setup for UB dressing.  Short Term Goal 7: Patient will complete

## 2024-08-26 NOTE — PROGRESS NOTES
Patient:   Jose Tate  MR#:    [id number]   Room:    0824/824-02   YOB: 1940  Date of Progress Note: 8/26/2024  Time of Note                           7:46 AM  Consulting Physician:   Kwame Rogers M.D.  Attending Physician:  Kwame Rogers MD     Chief complaint Acute kidney injury     S:This 84 y.o. male  with history IDDM, MI, GERD, COPD, chronic cough, chronic systolic CHF, HTN, and hypercholesterolemia. He presented to [facility] ER on 8/15/24 with c/o progressive fatigue, weakness and fall over the past couple of weeks. Reportedly had syncopal episode night prior to admission. Workup in ER revealed new onset Afib with RVR, BUN 40, Cr 2.6, Mg 1.3, CRP 30.0, BNP 5469, WBC 15.0, Hgb 10.0, chest x-ray indicated mild density in the right base may represent atelectasis or pneumonia, lungs are otherwise clear. IV Cardizem bolus and drip was initiated in ER. Patient noted to have TOMER with creatine of 2.6 with no history of CKD. He was given LR in ER and diuretics were placed on hold. Patient was admitted to the Hospitalist service with consult for cardiology. He was seen by Dr. Daly, who recommended low-dose Toprol, Eliquis 2.5 mg twice daily on discharge. ECHO indicated normal LV size with LVEF of 55 to 60%, mildly increased thickness, normal wall motion, normal diastolic function. Noted to have bradycardia. VQ scan indicated right midlung and lower lung zone small perfusion defects, biapical decreased uptake which could be related to emphysema, low to immediate probability. Cardiology further recommended for pacemaker. Patient was in agreement and underwent pacemaker placement by Dr. Willson on 8/19/24. Continued to remain sinus Tachycardia with frequent PACs, Cardiology further recommended adding beta blocker and start Eliquis. Patient is currently on Lovenox 1 mg/kg BID with plans to transition to Eliquis on discharge. Patients renal function is has shown improvement currently with creatine at  1.8, will need continued monitoring. He remains weak overall and is participating in both PT/OT. He is felt to need a stay on Rehab to work towards his goal of returning home with assist of family. He is now felt ready to start the Rehab program.  Rapid response called previously for presyncopal episode after bath.  Patient became weak and pale and closes eyes for a few seconds.  The patient denied loss of consciousness.  Medical evaluation undertaken.  Eliquis held for concern of bleeding.  Status posttransfusion.  No new complaints.  Overall feeling about the same.    REVIEW OF SYSTEMS:  Constitutional: No fevers No chills  Neck:No stiffness  Respiratory: No shortness of breath  Cardiovascular: No chest pain No palpitations  Gastrointestinal: No abdominal pain    Genitourinary: No Dysuria  Neurological: No headache, no confusion    Past Medical History:      Diagnosis Date    Allergic rhinitis     Bronchitis, chronic (HCC)     Carotid artery stenosis     GERD (gastroesophageal reflux disease)     Heart attack (HCC)     Hemorrhoids     Hypercholesterolemia     Palliative care patient 08/16/2024    Type II or unspecified type diabetes mellitus without mention of complication, not stated as uncontrolled        Past Surgical History:      Procedure Laterality Date    CARDIAC SURGERY      balloon surgery (angioplasty)    EP DEVICE PROCEDURE Left 8/19/2024    Insert PPM dual performed by Kendell Willson MD at [facility] CARDIAC CATH LAB    PORT SURGERY Right 04/01/2021       Medications in Hospital:      Current Facility-Administered Medications:     magic butt cream, , Topical, Q4H PRN, Kwame Rogers MD    benzonatate (TESSALON) capsule 200 mg, 200 mg, Oral, TID, Pérez Butler MD, 200 mg at 08/26/24 0722    cetirizine (ZYRTEC) tablet 10 mg, 10 mg, Oral, Daily, Pérez Butler MD, 10 mg at 08/26/24 0722    furosemide (LASIX) tablet 40 mg, 40 mg, Oral, Every Other Day, Pérez Butler MD, 40 mg at 08/25/24 0749

## 2024-08-26 NOTE — PROGRESS NOTES
08/26/24 1345   Vitals   Pulse (!) 101   SpO2 96 %   O2 Device None (Room air)   Transfers   Sit to Stand Stand by assistance   Stand to Sit Stand by assistance   Bed to Chair Stand by assistance   Car Transfer Stand by assistance  (with rw)   Ambulation   Device Rolling Walker   Other Apparatus   (ROOM AIR)   Assistance Contact guard assistance;Stand by assistance   Quality of Gait RECIPROCAL PATTERN.  NARROW MAK. FAST PACE.  BILAT SCAP ELEVATION, DOWNWARD GAZE.   Distance 125 FT   Ambulation 2   Device 2 Rolling Walker   Assistance 2 Contact guard assistance;Stand by assistance   Quality of Gait 2 see amb 1.  self corrected posture after 20 ft, maintained throughout.   Distance 100 ft   Comments out and back. patient reported fatigue last 25 ft   Stairs/Curb   Stairs? Yes   Stairs   # Steps  4   Rails Bilateral   Assistance Moderate assistance;Minimal assistance   Comment initially performed reciprocal pattern until cued for step to.  inconsistent with which foot leading with ascending and descending.   Propulsion 1   Method RUE;LUE   Level of Assistance Stand by assistance   Distance 125 FT   Assessment   Assessment PATIENT CONTINUES TO HOLD O2 SATS MID 90S ON ROOM AIR WITH ACTIVITY.  LEFT INDEX FINGER PRODUCES LOWER READING THAN RIGHT INDEX FINGER.

## 2024-08-26 NOTE — PROGRESS NOTES
Tolerated  Position Activity Restriction  Other position/activity restrictions: Pacemaker precautions.    Subjective         Social/Functional History  Social/Functional History  Lives With: Alone  Type of Home: House  Home Layout: One level  Home Access: Ramped entrance, Stairs to enter with rails  Bathroom Shower/Tub: Walk-in shower  Bathroom Equipment: Grab bars in shower, Grab bars around toilet, Built-in shower seat  Home Equipment: Walker - Standard, Walker - 4-Wheeled  Has the patient had two or more falls in the past year or any fall with injury in the past year?: No  ADL Assistance: Independent  Homemaking Assistance: Independent  Homemaking Responsibilities: Yes  Ambulation Assistance: Independent (No AD)  Transfer Assistance: Independent  Active : Yes  Mode of Transportation: Car  Occupation: Retired  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 Used to golf.  Additional Comments: Daughter works full time and has 2 children with disabilities, Other daughter lives with Jere.       Objective   Functional Mobility  Functional - Mobility Device: Rolling Walker  Activity: To/from bathroom  Assist Level: Contact guard assistance  Functional Mobility Comments: One LOB.  Temp: 97.3 °F (36.3 °C)  Pulse: 100  Heart Rate Source: Monitor  Respirations: 20  SpO2: 96 %  O2 Device: None (Room air)  BP: (!) 150/81  MAP (Calculated): 104  BP Location: Right upper arm             Safety Devices  Type of Devices: Call light within reach;Chair alarm in place          Transfers  Sit to stand: Stand by assistance  Stand to sit: Stand by assistance            OutComes Score    Toileting Hygiene  Assistance needed: Supervision or touching assistance  Comment: CGA  CARE Score: 4  Discharge Goal: Independent      Toilet Transfer  Assistance needed: Supervision or touching assistance  Comment: CGA  CARE Score: 4  Discharge Goal:  Independent    Balance  Sitting Balance: Stand by assistance  Standing Balance: Contact guard assistance     \    Eating  Assistance Needed: Independent  CARE Score: 6  Discharge Goal: Independent    Shower/Bathe Self  Assistance Needed: Supervision or touching assistance  Comment: CGA with bathing  CARE Score: 4  Discharge Goal: Independent    Upper Body Dressing  Assistance Needed: Setup or clean-up assistance  Comment: Setup  CARE Score: 5  Discharge Goal: Independent      Lower Body Dressing  Assistance Needed: Supervision or touching assistance  Comment: CGA  CARE Score: 4  Discharge Goal: Independent    Putting On/Taking Off Footwear  Assistance Needed: Supervision or touching assistance  Comment: Socks only with leg over knee tech  CARE Score: 4  Discharge Goal: Independent        Goals  Short Term Goals  Time Frame for Short Term Goals: 1 week  Short Term Goal 1: Supervision with toileting.  Short Term Goal 2: Supervision with toilet transfers.  Short Term Goal 3: Supervision with bathing hygiene.  Short Term Goal 4: Supervision with LB dressing.  Short Term Goal 5: Supervision with donning/doffing socks and shoes.  Short Term Goal 6: Setup for UB dressing.  Short Term Goal 7: Patient will complete 1-2 handed static standing task for 3 minutes, requiring CGA.  Long Term Goals  Time Frame for Long Term Goals : 2-3 weeks  Long Term Goal 1: Mod I with toileting and toilet transfers.  Long Term Goal 2: Mod I with bathing hygiene.  Long Term Goal 3: Mod I with overall dressing.  Long Term Goal 4: Mod I with ambulatory homemaking tasks.  Long Term Goal 5: Patient verbalize DME needs.  Patient Goals   Patient goals : Return home independently.     Therapy Time   Individual Concurrent Group Co-treatment   Time In 0815         Time Out 0900         Minutes 45         Timed Code Treatment Minutes: 45 Minutes       Electronically signed by Renae Saldana OT on 8/26/2024 at 12:44 PM

## 2024-08-26 NOTE — PLAN OF CARE
Problem: Safety - Adult  Goal: Free from fall injury  8/26/2024 0930 by Cat Hua LPN  Outcome: Progressing  8/26/2024 0003 by Trinity Robledo LPN  Outcome: Progressing     Problem: Discharge Planning  Goal: Discharge to home or other facility with appropriate resources  8/26/2024 0930 by Cat Hua LPN  Outcome: Progressing  8/26/2024 0003 by Trinity Robledo LPN  Outcome: Progressing     Problem: Pain  Goal: Verbalizes/displays adequate comfort level or baseline comfort level  8/26/2024 0930 by Cat Hua LPN  Outcome: Progressing  8/26/2024 0003 by Trinity Robledo LPN  Outcome: Progressing     Problem: ABCDS Injury Assessment  Goal: Absence of physical injury  8/26/2024 0930 by Cat Hua LPN  Outcome: Progressing  8/26/2024 0003 by Trinity Robledo LPN  Outcome: Progressing     Problem: Nutrition Deficit:  Goal: Optimize nutritional status  8/26/2024 0930 by Cat Hua LPN  Outcome: Progressing  8/26/2024 0003 by Trinity Robledo LPN  Outcome: Progressing     Problem: Chronic Conditions and Co-morbidities  Goal: Patient's chronic conditions and co-morbidity symptoms are monitored and maintained or improved  8/26/2024 0930 by Cat Hua LPN  Outcome: Progressing  8/26/2024 0003 by Trinity Robledo LPN  Outcome: Progressing     Problem: Skin/Tissue Integrity  Goal: Absence of new skin breakdown  Description: 1.  Monitor for areas of redness and/or skin breakdown  2.  Assess vascular access sites hourly  3.  Every 4-6 hours minimum:  Change oxygen saturation probe site  4.  Every 4-6 hours:  If on nasal continuous positive airway pressure, respiratory therapy assess nares and determine need for appliance change or resting period.  8/26/2024 0930 by Cat Hua LPN  Outcome: Progressing  8/26/2024 0003 by Trinity Robledo LPN  Outcome: Progressing

## 2024-08-26 NOTE — PROGRESS NOTES
08/26/24 1100   Vitals   Pulse 100   SpO2 96 %   O2 Device None (Room air)   Transfers   Sit to Stand Stand by assistance   Stand to Sit Stand by assistance   Bed to Chair Stand by assistance  (STAND PIVOT WITHOUT RW)   Ambulation   Device Rolling Walker   Other Apparatus   (ROOM AIR)   Assistance Contact guard assistance   Quality of Gait MAINTAINED RW CLOSE TO BODY.  DOWNARD GAZE, BILAT SCAPULAR ELEVATION.  NARROW MAK.  QUICK PACE.  RECIPROCAL PATTERN. SLIGHTLY UNSTEADY   Distance 105 FT   Comments O2 SATS 100% AFTER WALK,  BPM   Ambulation 2   Device 2 Rolling Walker   Other Apparatus 2   (ROOM AIR)   Assistance 2 Contact guard assistance;Stand by assistance   Quality of Gait 2 SEE AMB 1   Distance 125 FT   PT Exercises   Exercise Treatment SITTING BILATLE HIP FLEX/EXT/ABD/ADD; KNEE EXT; PF/DF X10 EA WITH MANUAL RESISTANCE   Assessment   Assessment PATIENT RESENTS WITH BILAT UE TREMORS TODAY. STATES THIS OCCURS FROM TIME TO TIME.  OVERALL MUCH IMPROVED ENDURANCE, WEANING TO ROOM AIR.  HR CONTINUES TO BE ELEVATED AT REST AND WITH ACTIVITY.  O2 SATS HIGH 90S ON ROOM AIR EVEN WITH AMBULATION.  CONTINUES TO FALL ASLEEP IN BETWEEN BOUTS OF ACTIVITY.  IMPROVED REACHING BACK PRIOR TO SITTING, WITH NO VCS BEING REQUIRED TODAY. STILL CUES FOR PROPER POSTURE WITH AMBULATION.

## 2024-08-26 NOTE — PROGRESS NOTES
Daily Progress Note    Date:2024  Patient: Jose Tate  : 1940  MRN:[id number]  CODE:Limited No additional code details  PCP:Chayo Grande MD    Admit Date: 2024  5:11 PM   LOS: 6 days       Subjective:   No acute events noted overnight.  No bleeding episodes noted.  Weaned off supplemental O2.  Denies shortness of breath at rest.  No chest pain or palpitations.        Summary:84-year-old male with diabetes, recently hospitalized with new onset A-fib with RVR with recent syncopal episode with progressive fatigue, generalized weakness and recent falls.  He was managed with IV Cardizem infusion, weaned off and maintained on low-dose metoprolol for rate control and Eliquis 2.5 mg twice daily for anticoagulation on discharge.  Noted normal EF on echo per subsequently developed bradycardia.  Seen by cardiology and underwent pacemaker placement on 2024.  Discharged to [facility] inpatient rehab service on .  He was subsequently noted to have progressive decline in hemoglobin over several days.  GI consulted due to concern for occult GI bleeding however did not have any overt bleeding and GI did not recommend endoscopic intervention.  Eliquis was held.  Hospital medicine consulted for medical management.  He did have drop in hemoglobin down to 7.0 in setting of symptomatic anemia, transfused 1 unit PRBCs on  with significant improvement.  CT enterography without any obvious source of bleeding.  Hemoglobin overall steadily improved and stabilized, did not have any bleeding episodes noted.      Objective:      Vital signs in last 24 hours:  Patient Vitals for the past 24 hrs:   BP Temp Temp src Pulse Resp SpO2   24 1100 -- -- -- 100 -- 96 %   24 1015 -- -- -- -- -- 93 %   24 0830 -- -- -- (!) 125 -- 96 %   24 0815 -- -- -- (!) 110 -- 99 %   24 0707 -- -- -- -- -- 98 %   24 0527 (!) 150/81 97.3 °F (36.3 °C) Temporal (!) 113 20 99 %   24 2123 (!)  place    BPH  - Continue Flomax    Cough, nasal congestion----improved  - Respiratory PCR negative  - Symptomatic and supportive treatment as needed      Continue rehab per primary service      Pérez Butler MD 8/26/2024 1:31 PM

## 2024-08-26 NOTE — PROGRESS NOTES
Facility/Department: [facility] 8 REHAB UNIT  Occupational Therapy Treatment Note    Name: Jose Tate  : 1940  MRN: [id number]  Date of Service: 2024    Discharge Recommendations:  Continue to assess pending progress          Patient Diagnosis(es): The encounter diagnosis was Permanent atrial fibrillation (HCC).  Past Medical History:  has a past medical history of Allergic rhinitis, Bronchitis, chronic (HCC), Carotid artery stenosis, GERD (gastroesophageal reflux disease), Heart attack (HCC), Hemorrhoids, Hypercholesterolemia, Palliative care patient, and Type II or unspecified type diabetes mellitus without mention of complication, not stated as uncontrolled.  Past Surgical History:  has a past surgical history that includes Cardiac surgery; Port Surgery (Right, 2021); and ep device procedure (Left, 2024).    Treatment Diagnosis: Acute Kidney failure, Pacemaker placement on 2024    Assessment   Performance deficits / Impairments: Decreased functional mobility ;Decreased ADL status;Decreased strength;Decreased endurance;Decreased balance;Decreased high-level IADLs  Assessment: Patient now on room air. O2 sats at 96% post ambulation.  Treatment Diagnosis: Acute Kidney failure, Pacemaker placement on 2024  Activity Tolerance  Activity Tolerance: Patient Tolerated treatment well              Plan   Occupational Therapy Plan  Current Treatment Recommendations: Strengthening, Balance training, Functional mobility training, Endurance training, Wheelchair mobility training, Equipment evaluation, education, & procurement, Patient/Caregiver education & training, Safety education & training, Self-Care / ADL, Home management training     Restrictions  Restrictions/Precautions  Restrictions/Precautions: Weight Bearing, Fall Risk  Required Braces or Orthoses?: No  Implants present? : Pacemaker  Lower Extremity Weight Bearing Restrictions  Right Lower Extremity Weight Bearing: Weight Bearing As

## 2024-08-27 LAB
GLUCOSE BLD-MCNC: 195 MG/DL (ref 70–99)
GLUCOSE BLD-MCNC: 209 MG/DL (ref 70–99)
GLUCOSE BLD-MCNC: 214 MG/DL (ref 70–99)
GLUCOSE BLD-MCNC: 236 MG/DL (ref 70–99)
HEMOCCULT STL QL: NORMAL
PERFORMED ON: ABNORMAL

## 2024-08-27 PROCEDURE — 6370000000 HC RX 637 (ALT 250 FOR IP): Performed by: STUDENT IN AN ORGANIZED HEALTH CARE EDUCATION/TRAINING PROGRAM

## 2024-08-27 PROCEDURE — 6370000000 HC RX 637 (ALT 250 FOR IP): Performed by: NURSE PRACTITIONER

## 2024-08-27 PROCEDURE — 97110 THERAPEUTIC EXERCISES: CPT

## 2024-08-27 PROCEDURE — 97535 SELF CARE MNGMENT TRAINING: CPT

## 2024-08-27 PROCEDURE — 97116 GAIT TRAINING THERAPY: CPT

## 2024-08-27 PROCEDURE — 1180000000 HC REHAB R&B

## 2024-08-27 PROCEDURE — 6370000000 HC RX 637 (ALT 250 FOR IP): Performed by: PSYCHIATRY & NEUROLOGY

## 2024-08-27 PROCEDURE — 2580000003 HC RX 258: Performed by: NURSE PRACTITIONER

## 2024-08-27 PROCEDURE — 6370000000 HC RX 637 (ALT 250 FOR IP): Performed by: INTERNAL MEDICINE

## 2024-08-27 PROCEDURE — 97530 THERAPEUTIC ACTIVITIES: CPT

## 2024-08-27 PROCEDURE — 82272 OCCULT BLD FECES 1-3 TESTS: CPT

## 2024-08-27 PROCEDURE — 94760 N-INVAS EAR/PLS OXIMETRY 1: CPT

## 2024-08-27 PROCEDURE — 82962 GLUCOSE BLOOD TEST: CPT

## 2024-08-27 PROCEDURE — 99232 SBSQ HOSP IP/OBS MODERATE 35: CPT | Performed by: PSYCHIATRY & NEUROLOGY

## 2024-08-27 RX ORDER — SENNA AND DOCUSATE SODIUM 50; 8.6 MG/1; MG/1
2 TABLET, FILM COATED ORAL 2 TIMES DAILY
Status: DISCONTINUED | OUTPATIENT
Start: 2024-08-27 | End: 2024-08-31 | Stop reason: HOSPADM

## 2024-08-27 RX ORDER — LACTULOSE 10 G/15ML
20 SOLUTION ORAL
Status: DISCONTINUED | OUTPATIENT
Start: 2024-08-27 | End: 2024-08-28

## 2024-08-27 RX ORDER — POLYETHYLENE GLYCOL 3350 17 G/17G
17 POWDER, FOR SOLUTION ORAL DAILY
Status: DISCONTINUED | OUTPATIENT
Start: 2024-08-27 | End: 2024-08-27

## 2024-08-27 RX ADMIN — CETIRIZINE HYDROCHLORIDE 10 MG: 10 TABLET, FILM COATED ORAL at 08:27

## 2024-08-27 RX ADMIN — METOPROLOL SUCCINATE 50 MG: 50 TABLET, EXTENDED RELEASE ORAL at 21:27

## 2024-08-27 RX ADMIN — MONTELUKAST 10 MG: 10 TABLET, FILM COATED ORAL at 21:28

## 2024-08-27 RX ADMIN — BENZONATATE 200 MG: 100 CAPSULE ORAL at 15:55

## 2024-08-27 RX ADMIN — SODIUM CHLORIDE, PRESERVATIVE FREE 10 ML: 5 INJECTION INTRAVENOUS at 08:27

## 2024-08-27 RX ADMIN — ATORVASTATIN CALCIUM 20 MG: 20 TABLET, FILM COATED ORAL at 08:26

## 2024-08-27 RX ADMIN — METOPROLOL SUCCINATE 50 MG: 50 TABLET, EXTENDED RELEASE ORAL at 08:26

## 2024-08-27 RX ADMIN — INSULIN LISPRO 1 UNITS: 100 INJECTION, SOLUTION INTRAVENOUS; SUBCUTANEOUS at 16:18

## 2024-08-27 RX ADMIN — PANTOPRAZOLE SODIUM 40 MG: 40 TABLET, DELAYED RELEASE ORAL at 15:55

## 2024-08-27 RX ADMIN — BENZONATATE 200 MG: 100 CAPSULE ORAL at 08:27

## 2024-08-27 RX ADMIN — BENZONATATE 200 MG: 100 CAPSULE ORAL at 21:28

## 2024-08-27 RX ADMIN — PANTOPRAZOLE SODIUM 40 MG: 40 TABLET, DELAYED RELEASE ORAL at 05:48

## 2024-08-27 RX ADMIN — SODIUM CHLORIDE, PRESERVATIVE FREE 10 ML: 5 INJECTION INTRAVENOUS at 21:31

## 2024-08-27 RX ADMIN — DILTIAZEM HYDROCHLORIDE 120 MG: 120 CAPSULE, COATED, EXTENDED RELEASE ORAL at 08:26

## 2024-08-27 RX ADMIN — INSULIN LISPRO 1 UNITS: 100 INJECTION, SOLUTION INTRAVENOUS; SUBCUTANEOUS at 11:49

## 2024-08-27 RX ADMIN — TAMSULOSIN HYDROCHLORIDE 0.4 MG: 0.4 CAPSULE ORAL at 08:26

## 2024-08-27 RX ADMIN — FUROSEMIDE 40 MG: 40 TABLET ORAL at 08:26

## 2024-08-27 RX ADMIN — PRIMIDONE 50 MG: 50 TABLET ORAL at 08:26

## 2024-08-27 RX ADMIN — SENNOSIDES AND DOCUSATE SODIUM 2 TABLET: 50; 8.6 TABLET ORAL at 21:28

## 2024-08-27 RX ADMIN — PRIMIDONE 50 MG: 50 TABLET ORAL at 21:28

## 2024-08-27 RX ADMIN — LACTULOSE 20 G: 20 SOLUTION ORAL at 12:49

## 2024-08-27 RX ADMIN — LACTULOSE 20 G: 20 SOLUTION ORAL at 15:55

## 2024-08-27 ASSESSMENT — PAIN SCALES - GENERAL: PAINLEVEL_OUTOF10: 0

## 2024-08-27 NOTE — PROGRESS NOTES
Daily Progress Note    Date:2024  Patient: Jose Tate  : 1940  MRN:[id number]  CODE:Limited No additional code details  PCP:Chayo Grande MD    Admit Date: 2024  5:11 PM   LOS: 7 days       Subjective:   No acute events noted overnight.  Resting comfortably without complaints.  Off supplemental oxygen, no dyspnea.  No worsening fatigue.  No bleeding episodes noted.        Summary:  84-year-old male with diabetes, recently hospitalized with new onset A-fib with RVR with recent syncopal episode with progressive fatigue, generalized weakness and recent falls.  He was managed with IV Cardizem infusion, weaned off and maintained on low-dose metoprolol for rate control and Eliquis 2.5 mg twice daily for anticoagulation on discharge.  Noted normal EF on echo per subsequently developed bradycardia.  Seen by cardiology and underwent pacemaker placement on 2024.  Discharged to [facility] inpatient rehab service on .  He was subsequently noted to have progressive decline in hemoglobin over several days.  GI consulted due to concern for occult GI bleeding however did not have any overt bleeding and GI did not recommend endoscopic intervention.  Eliquis was held.  Hospital medicine consulted for medical management.  He did have drop in hemoglobin down to 7.0 in setting of symptomatic anemia, transfused 1 unit PRBCs on  with significant improvement.  CT enterography without any obvious source of bleeding.  Hemoglobin overall steadily improved and stabilized, did not have any bleeding episodes noted.      Objective:      Vital signs in last 24 hours:  Patient Vitals for the past 24 hrs:   BP Temp Temp src Pulse Resp SpO2   24 0815 -- -- -- 94 18 97 %   24 0625 -- -- -- 90 20 95 %   24 0514 (!) 157/66 97.9 °F (36.6 °C) Temporal 97 18 94 %   24 2109 (!) 172/83 -- -- (!) 115 -- --   24 1606 136/75 98.6 °F (37 °C) Oral 87 18 99 %   24 1345 -- -- -- (!) 101 -- 96 %

## 2024-08-27 NOTE — PROGRESS NOTES
Facility/Department: [facility]  Occupational Therapy     Name: Jose Tate  : 1940  MRN: [id number]  Date of Service: 2024    Discharge Recommendations:  Patient would benefit from continued therapy after discharge    Patient Diagnosis(es): The encounter diagnosis was Permanent atrial fibrillation (HCC).  Past Medical History:  has a past medical history of Allergic rhinitis, Bronchitis, chronic (HCC), Carotid artery stenosis, GERD (gastroesophageal reflux disease), Heart attack (HCC), Hemorrhoids, Hypercholesterolemia, Palliative care patient, and Type II or unspecified type diabetes mellitus without mention of complication, not stated as uncontrolled.  Past Surgical History:  has a past surgical history that includes Cardiac surgery; Port Surgery (Right, 2021); and ep device procedure (Left, 2024).    Treatment Diagnosis: Acute Kidney failure, Pacemaker placement on 2024    Assessment   Performance deficits / Impairments: Decreased functional mobility ;Decreased ADL status;Decreased strength;Decreased endurance;Decreased balance;Decreased high-level IADLs  Treatment Diagnosis: Acute Kidney failure, Pacemaker placement on 2024  Prognosis: Good  Activity Tolerance  Activity Tolerance: Patient Tolerated treatment well     Plan   Occupational Therapy Plan  Current Treatment Recommendations: Strengthening, Balance training, Functional mobility training, Endurance training, Wheelchair mobility training, Equipment evaluation, education, & procurement, Patient/Caregiver education & training, Safety education & training, Self-Care / ADL, Home management training     Restrictions  Restrictions/Precautions  Restrictions/Precautions: Weight Bearing, Fall Risk  Required Braces or Orthoses?: No  Implants present? : Pacemaker  Lower Extremity Weight Bearing Restrictions  Right Lower Extremity Weight Bearing: Weight Bearing As Tolerated  Left Lower Extremity Weight Bearing: Weight  Bearing As Tolerated  Position Activity Restriction  Other position/activity restrictions: Pacemaker precautions.    Subjective   General  Chart Reviewed: Yes  Patient assessed for rehabilitation services?: Yes  Response to previous treatment: Patient with no complaints from previous session  Family / Caregiver Present: No    Social/Functional History  Social/Functional History  Lives With: Alone  Type of Home: House  Home Layout: One level  Home Access: Ramped entrance, Stairs to enter with rails  Bathroom Shower/Tub: Walk-in shower  Bathroom Equipment: Grab bars in shower, Grab bars around toilet, Built-in shower seat  Home Equipment: Walker - Standard, Walker - 4-Wheeled  Has the patient had two or more falls in the past year or any fall with injury in the past year?: No  ADL Assistance: Independent  Homemaking Assistance: Independent  Homemaking Responsibilities: Yes  Ambulation Assistance: Independent (No AD)  Transfer Assistance: Independent  Active : Yes  Mode of Transportation: Car  Occupation: Retired  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 Used to golf.  Additional Comments: Daughter works full time and has 2 children with disabilities, Other daughter lives with Jere.    Objective   Functional Mobility  Functional - Mobility Device: Rolling Walker  Activity: Other  Assist Level: Contact guard assistance  Functional Mobility Comments: In halls.  Temp: 96.9 °F (36.1 °C)  Pulse: 93  Heart Rate Source: Monitor  Respirations: 16  SpO2: 99 %  O2 Device: None (Room air)  BP: (!) 160/72  MAP (Calculated): 101  BP Location: Right upper arm  Patient Position: Lying right side    Safety Devices  Type of Devices: Call light within reach;Chair alarm in place    Transfers  Sit to stand: Contact guard assistance  Stand to sit: Contact guard assistance  Transfer Comments: Min vc for hand placement.    Exercise Treatment: Rickshaw: LANAE 5#, 2

## 2024-08-27 NOTE — PROGRESS NOTES
08/27/24 1100   Ambulation   Device Rolling Walker   Assistance Contact guard assistance   Quality of Gait RECIPROCAL PATTERN.  INITIALLLY DOWNWARD GAZE, FFP, RW EXCESSIVLEY ANTERIOR.  SELF CORRECTED WITH DISTANCE.  TURNS TO SIT WITH RW ANTERIOR TO BODY, DOES NOT PULL AROUND WITH HIM TO SIT   Distance 130 FT   Ambulation 2   Device 2 Rolling Walker   Assistance 2 Contact guard assistance   Quality of Gait 2 AMBULATED FROM DOOR TO BATHROOM AND BACK.  POSTURE, FORM IMMEDIATELY DETERIORATES UPON TURNING IN ENCLOSED SPACES.  WILL INCORPORATE THIS INTO INTERVENTIONS THIS PM   Distance 15 FT X2   PT Exercises   Exercise Treatment SITTING BILAT LE HIP FLEX/EXT/ABD/ADD; KNEE EXT; PF/DF 2X10 EA WITH MANAUL RESISTANCE   Assessment   Assessment IMPROVED POSTURE, FORM WITH STRAIGHTLINE AMBULATION.  RESUMES FFP WITH RW ANTERIOR TO BODY WITH TURNING, MANUEVERING AROUND OBJECTS.  WILL INCORPORATE INTO INTERVENTIONS THIS PM.

## 2024-08-27 NOTE — PROGRESS NOTES
08/27/24 1438   Transfers   Sit to Stand Contact guard assistance;Stand by assistance  (RW)   Stand to Sit Contact guard assistance;Stand by assistance   Bed to Chair Contact guard assistance;Stand by assistance  (WHEN TURNING TO SIT PUSHES RW AWAY FROM SELF RATHER THAN TURNING WITH IT.)   Ambulation   Device Rolling Walker   Assistance Contact guard assistance   Quality of Gait AMBULATED FROM COMMODE TO WC BY ROOM DOOR.  FFP, RW EXCESSIVLEY ANTERIOR.   Distance 15 FT   Ambulation 2   Device 2 Rolling Walker   Assistance 2 Contact guard assistance   Quality of Gait 2 VCS REQUIRED TO MAINTAIN BODY WITHIN RW.  CONTINUED DOWNWARD GAZE, BILAT ELEVATED SCAPULAE.  PATIENT HAS DIFFICULTY FINDING HAPPY MEDIUM BETWEEN RW EXCESSIVELY ANTEIOR TO SELF AND RW TOO CLOSE.  RW LOWERED ONE NOTCH TO SEE IF THIS IS MORE COMFORTABLE POSITION.  SEE AMBULATION 3.   Distance 50 FT   Comments CONE SLALOM   Ambulation 3   Device 3 Rolling Walker   Assistance 3 Contact guard assistance   Quality of Gait 3 IMPROVED QUALITY OF GAIT WITH LOWERED RW. MORE CONSISTENT PERFORMANCE IN TERMS OF POSTURE, POSITION OF WALKER IN RELATION TO BODY.   Distance 50 FT   Comments CONE SLALOM   Assessment   Assessment PATIENT STATES HE HAS A ROLLATOR AT HOME WHICH WAS HIS WIFE'S.  LIKELY NOT APPROPRIATE FOR PATIENT AT THIS TIME, BUT INSTRUCTED PATIENT TO HAVE FAMILY BRING IT SO WE COULD ADJUST THE HEIGHT, TRIAL THE ROLLATOR IN PREPARATION FOR POSSIBLY ADVANCING TO IT AT FUTURE DATE.

## 2024-08-27 NOTE — PROGRESS NOTES
Occupational Therapy  Facility/Department: [facility]  Daily Treatment Note  NAME: Jose Tate  : 1940  MRN: [id number]    Date of Service: 2024    Discharge Recommendations:  Patient would benefit from continued therapy after discharge       Assessment   Performance deficits / Impairments: Decreased functional mobility ;Decreased ADL status;Decreased strength;Decreased endurance;Decreased balance;Decreased high-level IADLs  Assessment: Pt tolerated tx well.  Pt experiencing some unsteadiness when up.  Pt able to stand at sink and complete grooming.  Pt continues to benefit from skilled OT services.  Treatment Diagnosis: Acute Kidney failure, Pacemaker placement on 2024  Prognosis: Good  Activity Tolerance  Activity Tolerance: Patient Tolerated treatment well         Patient Diagnosis(es): The encounter diagnosis was Permanent atrial fibrillation (HCC).      has a past medical history of Allergic rhinitis, Bronchitis, chronic (HCC), Carotid artery stenosis, GERD (gastroesophageal reflux disease), Heart attack (HCC), Hemorrhoids, Hypercholesterolemia, Palliative care patient, and Type II or unspecified type diabetes mellitus without mention of complication, not stated as uncontrolled.   has a past surgical history that includes Cardiac surgery; Port Surgery (Right, 2021); and ep device procedure (Left, 2024).    Restrictions  Restrictions/Precautions  Restrictions/Precautions: Weight Bearing, Fall Risk  Required Braces or Orthoses?: No  Implants present? : Pacemaker  Lower Extremity Weight Bearing Restrictions  Right Lower Extremity Weight Bearing: Weight Bearing As Tolerated  Left Lower Extremity Weight Bearing: Weight Bearing As Tolerated  Position Activity Restriction  Other position/activity restrictions: Pacemaker precautions.  Subjective   General  Chart Reviewed: Yes  Patient assessed for rehabilitation services?: Yes  Response to previous treatment: Patient with no  complaints from previous session  Family / Caregiver Present: Yes (DTR)  Pain Screening  Pain at present: 0  Scale Used: Numeric Score  Intervention List: Patient able to continue with treatment   Orientation     Objective    ADL  Grooming: Independent  Toileting: Contact guard assistance;Minimal assistance        Toilet Transfers  Toilet - Technique: Ambulating  Equipment Used: Grab bars  Toilet Transfer: Contact guard assistance  Bed mobility  Supine to Sit: Stand by assistance  Transfers  Stand Step Transfers: Contact guard assistance  Sit to stand: Contact guard assistance  Stand to sit: Contact guard assistance  Transfer Comments: .                                                           Plan   Occupational Therapy Plan  Current Treatment Recommendations: Strengthening, Balance training, Functional mobility training, Endurance training, Wheelchair mobility training, Equipment evaluation, education, & procurement, Patient/Caregiver education & training, Safety education & training, Self-Care / ADL, Home management training  Goals  Short Term Goals  Time Frame for Short Term Goals: 1 week  Short Term Goal 1: Supervision with toileting.  Short Term Goal 2: Supervision with toilet transfers.  Short Term Goal 3: Supervision with bathing hygiene.  Short Term Goal 4: Supervision with LB dressing.  Short Term Goal 5: Supervision with donning/doffing socks and shoes.  Short Term Goal 6: Setup for UB dressing.  Short Term Goal 7: Patient will complete 1-2 handed static standing task for 3 minutes, requiring CGA.  Long Term Goals  Time Frame for Long Term Goals : 2-3 weeks  Long Term Goal 1: Mod I with toileting and toilet transfers.  Long Term Goal 2: Mod I with bathing hygiene.  Long Term Goal 3: Mod I with overall dressing.  Long Term Goal 4: Mod I with ambulatory homemaking tasks.  Long Term Goal 5: Patient verbalize DME needs.  Patient Goals   Patient goals : Return home independently.       Therapy Time

## 2024-08-27 NOTE — PLAN OF CARE
Problem: Safety - Adult  Goal: Free from fall injury  8/26/2024 2207 by Trinity Robledo LPN  Outcome: Progressing  8/26/2024 0930 by Cat Hua LPN  Outcome: Progressing     Problem: Discharge Planning  Goal: Discharge to home or other facility with appropriate resources  8/26/2024 2207 by Triinty Robledo LPN  Outcome: Progressing  8/26/2024 0930 by Cat Hua LPN  Outcome: Progressing     Problem: Pain  Goal: Verbalizes/displays adequate comfort level or baseline comfort level  8/26/2024 2207 by Trinity Robledo LPN  Outcome: Progressing  8/26/2024 0930 by Cat Hua LPN  Outcome: Progressing     Problem: ABCDS Injury Assessment  Goal: Absence of physical injury  8/26/2024 2207 by Trinity Robledo LPN  Outcome: Progressing  8/26/2024 0930 by Cat Hua LPN  Outcome: Progressing     Problem: Nutrition Deficit:  Goal: Optimize nutritional status  8/26/2024 2207 by Trinity Robledo LPN  Outcome: Progressing  Flowsheets (Taken 8/26/2024 0952 by Kaycee Pérez, MS, RD, LD)  Nutrient intake appropriate for improving, restoring, or maintaining nutritional needs:   Monitor oral intake, labs, and treatment plans   Recommend appropriate diets, oral nutritional supplements, and vitamin/mineral supplements  8/26/2024 0930 by Cat Hua LPN  Outcome: Progressing     Problem: Chronic Conditions and Co-morbidities  Goal: Patient's chronic conditions and co-morbidity symptoms are monitored and maintained or improved  8/26/2024 2207 by Trinity Robledo LPN  Outcome: Progressing  8/26/2024 0930 by Cat Hua LPN  Outcome: Progressing     Problem: Skin/Tissue Integrity  Goal: Absence of new skin breakdown  Description: 1.  Monitor for areas of redness and/or skin breakdown  2.  Assess vascular access sites hourly  3.  Every 4-6 hours minimum:  Change oxygen saturation probe site  4.  Every 4-6 hours:  If on nasal continuous positive airway pressure, respiratory therapy assess nares and determine need for  appliance change or resting period.  8/26/2024 2207 by Trinity Robledo LPN  Outcome: Progressing  8/26/2024 0930 by Cat Hua LPN  Outcome: Progressing

## 2024-08-27 NOTE — PROGRESS NOTES
Patient:   Jose Tate  MR#:    [id number]   Room:    0824/824-02   YOB: 1940  Date of Progress Note: 8/27/2024  Time of Note                           8:26 AM  Consulting Physician:   Kwame Rogers M.D.  Attending Physician:  Kwame Rogers MD     Chief complaint Acute kidney injury     S:This 84 y.o. male  with history IDDM, MI, GERD, COPD, chronic cough, chronic systolic CHF, HTN, and hypercholesterolemia. He presented to [facility] ER on 8/15/24 with c/o progressive fatigue, weakness and fall over the past couple of weeks. Reportedly had syncopal episode night prior to admission. Workup in ER revealed new onset Afib with RVR, BUN 40, Cr 2.6, Mg 1.3, CRP 30.0, BNP 5469, WBC 15.0, Hgb 10.0, chest x-ray indicated mild density in the right base may represent atelectasis or pneumonia, lungs are otherwise clear. IV Cardizem bolus and drip was initiated in ER. Patient noted to have TOMER with creatine of 2.6 with no history of CKD. He was given LR in ER and diuretics were placed on hold. Patient was admitted to the Hospitalist service with consult for cardiology. He was seen by Dr. Daly, who recommended low-dose Toprol, Eliquis 2.5 mg twice daily on discharge. ECHO indicated normal LV size with LVEF of 55 to 60%, mildly increased thickness, normal wall motion, normal diastolic function. Noted to have bradycardia. VQ scan indicated right midlung and lower lung zone small perfusion defects, biapical decreased uptake which could be related to emphysema, low to immediate probability. Cardiology further recommended for pacemaker. Patient was in agreement and underwent pacemaker placement by Dr. Willson on 8/19/24. Continued to remain sinus Tachycardia with frequent PACs, Cardiology further recommended adding beta blocker and start Eliquis. Patient is currently on Lovenox 1 mg/kg BID with plans to transition to Eliquis on discharge. Patients renal function is has shown improvement currently with creatine at  bolus 10% 250 mL, 250 mL, IntraVENous, PRN, Srini Brian, APRN - CNP    glucagon injection 1 mg, 1 mg, IntraMUSCular, PRN, Keyur Brianaben B, APRN - CNP    dextrose 10 % infusion, , IntraVENous, Continuous PRN, Rg Brianlpaben B, APRN - CNP    insulin lispro (HUMALOG,ADMELOG) injection vial 0-4 Units, 0-4 Units, SubCUTAneous, TID WC, Keyur Brianaben B, APRN - CNP, 2 Units at 08/26/24 1202    insulin lispro (HUMALOG,ADMELOG) injection vial 0-4 Units, 0-4 Units, SubCUTAneous, Nightly, Keyur Brianaben RAYSHAWN, APRN - CNP, 4 Units at 08/26/24 2118    metoprolol succinate (TOPROL XL) extended release tablet 50 mg, 50 mg, Oral, BID, Rg Brianlpaben B, APRN - CNP, 50 mg at 08/26/24 2110    [Held by provider] apixaban (ELIQUIS) tablet 2.5 mg, 2.5 mg, Oral, BID, Keyur Brianaben RAYSHAWN, APRN - CNP, 2.5 mg at 08/21/24 0804    acetaminophen (TYLENOL) tablet 650 mg, 650 mg, Oral, Q4H PRN, Kwame Rogers MD, 650 mg at 08/26/24 2109    polyethylene glycol (GLYCOLAX) packet 17 g, 17 g, Oral, Daily PRN, Kwame Rogers MD, 17 g at 08/26/24 1421    Allergies:  Patient has no known allergies.    Social History:   TOBACCO:   reports that he quit smoking about 28 years ago. His smoking use included cigarettes. He started smoking about 58 years ago. He has a 45 pack-year smoking history. He has never used smokeless tobacco.  ETOH:   reports no history of alcohol use.    Family History:       Problem Relation Age of Onset    Diabetes Mother     High Blood Pressure Mother     Cancer Father         Lung         PHYSICAL EXAM:  BP (!) 157/66   Pulse 94   Temp 97.9 °F (36.6 °C) (Temporal)   Resp 18   Ht 1.778 m (5' 10\")   Wt 80.9 kg (178 lb 4.8 oz)   SpO2 97%   BMI 25.58 kg/m²     Constitutional - well developed, well nourished.   Eyes - conjunctiva normal.   Ear, nose, throat - No scars, masses, or lesions over external nose or ears, no atrophy of tongue  Neck-symmetric, no masses noted, no jugular vein distension  Respiration- chest wall

## 2024-08-27 NOTE — PLAN OF CARE
Problem: Safety - Adult  Goal: Free from fall injury  8/27/2024 1012 by Smith Cortez LPN  Outcome: Progressing  8/26/2024 2207 by Trinity Robledo LPN  Outcome: Progressing     Problem: Discharge Planning  Goal: Discharge to home or other facility with appropriate resources  8/27/2024 1012 by Smith Cortez LPN  Outcome: Progressing  8/26/2024 2207 by Trinity Robledo LPN  Outcome: Progressing     Problem: Pain  Goal: Verbalizes/displays adequate comfort level or baseline comfort level  8/27/2024 1012 by Smith Cortez LPN  Outcome: Progressing  8/26/2024 2207 by Trinity Robledo LPN  Outcome: Progressing  Flowsheets (Taken 8/26/2024 2139)  Verbalizes/displays adequate comfort level or baseline comfort level: Encourage patient to monitor pain and request assistance     Problem: ABCDS Injury Assessment  Goal: Absence of physical injury  8/27/2024 1012 by Smith Cortez LPN  Outcome: Progressing  8/26/2024 2207 by Trinity Robledo LPN  Outcome: Progressing     Problem: Nutrition Deficit:  Goal: Optimize nutritional status  8/27/2024 1012 by Smith Cortez LPN  Outcome: Progressing  8/26/2024 2207 by Trinity Robledo LPN  Outcome: Progressing  Flowsheets (Taken 8/26/2024 0952 by Kaycee Pérez, MS, RD, LD)  Nutrient intake appropriate for improving, restoring, or maintaining nutritional needs:   Monitor oral intake, labs, and treatment plans   Recommend appropriate diets, oral nutritional supplements, and vitamin/mineral supplements     Problem: Chronic Conditions and Co-morbidities  Goal: Patient's chronic conditions and co-morbidity symptoms are monitored and maintained or improved  8/27/2024 1012 by Smith Cortez LPN  Outcome: Progressing  8/26/2024 2207 by Trinity Robledo LPN  Outcome: Progressing     Problem: Skin/Tissue Integrity  Goal: Absence of new skin breakdown  Description: 1.  Monitor for areas of redness and/or skin breakdown  2.  Assess vascular access sites hourly  3.  Every 4-6 hours minimum:  Change

## 2024-08-28 LAB
GLUCOSE BLD-MCNC: 187 MG/DL (ref 70–99)
GLUCOSE BLD-MCNC: 215 MG/DL (ref 70–99)
GLUCOSE BLD-MCNC: 232 MG/DL (ref 70–99)
GLUCOSE BLD-MCNC: 244 MG/DL (ref 70–99)
PERFORMED ON: ABNORMAL

## 2024-08-28 PROCEDURE — 6370000000 HC RX 637 (ALT 250 FOR IP): Performed by: NURSE PRACTITIONER

## 2024-08-28 PROCEDURE — 6370000000 HC RX 637 (ALT 250 FOR IP): Performed by: STUDENT IN AN ORGANIZED HEALTH CARE EDUCATION/TRAINING PROGRAM

## 2024-08-28 PROCEDURE — 99232 SBSQ HOSP IP/OBS MODERATE 35: CPT | Performed by: PSYCHIATRY & NEUROLOGY

## 2024-08-28 PROCEDURE — 97116 GAIT TRAINING THERAPY: CPT

## 2024-08-28 PROCEDURE — 97530 THERAPEUTIC ACTIVITIES: CPT

## 2024-08-28 PROCEDURE — 6370000000 HC RX 637 (ALT 250 FOR IP): Performed by: PSYCHIATRY & NEUROLOGY

## 2024-08-28 PROCEDURE — 97110 THERAPEUTIC EXERCISES: CPT

## 2024-08-28 PROCEDURE — 82962 GLUCOSE BLOOD TEST: CPT

## 2024-08-28 PROCEDURE — 6370000000 HC RX 637 (ALT 250 FOR IP): Performed by: INTERNAL MEDICINE

## 2024-08-28 PROCEDURE — 1180000000 HC REHAB R&B

## 2024-08-28 PROCEDURE — 94760 N-INVAS EAR/PLS OXIMETRY 1: CPT

## 2024-08-28 PROCEDURE — 2580000003 HC RX 258: Performed by: NURSE PRACTITIONER

## 2024-08-28 RX ORDER — GLIPIZIDE 5 MG/1
5 TABLET ORAL
Status: DISCONTINUED | OUTPATIENT
Start: 2024-08-28 | End: 2024-08-31 | Stop reason: HOSPADM

## 2024-08-28 RX ADMIN — INSULIN LISPRO 1 UNITS: 100 INJECTION, SOLUTION INTRAVENOUS; SUBCUTANEOUS at 12:43

## 2024-08-28 RX ADMIN — METOPROLOL SUCCINATE 50 MG: 50 TABLET, EXTENDED RELEASE ORAL at 21:59

## 2024-08-28 RX ADMIN — SODIUM CHLORIDE, PRESERVATIVE FREE 10 ML: 5 INJECTION INTRAVENOUS at 22:02

## 2024-08-28 RX ADMIN — PANTOPRAZOLE SODIUM 40 MG: 40 TABLET, DELAYED RELEASE ORAL at 05:45

## 2024-08-28 RX ADMIN — PRIMIDONE 50 MG: 50 TABLET ORAL at 21:58

## 2024-08-28 RX ADMIN — SODIUM CHLORIDE, PRESERVATIVE FREE 10 ML: 5 INJECTION INTRAVENOUS at 08:14

## 2024-08-28 RX ADMIN — APIXABAN 2.5 MG: 2.5 TABLET, FILM COATED ORAL at 22:01

## 2024-08-28 RX ADMIN — GLIPIZIDE 5 MG: 5 TABLET ORAL at 12:43

## 2024-08-28 RX ADMIN — ATORVASTATIN CALCIUM 20 MG: 20 TABLET, FILM COATED ORAL at 08:15

## 2024-08-28 RX ADMIN — MONTELUKAST 10 MG: 10 TABLET, FILM COATED ORAL at 21:59

## 2024-08-28 RX ADMIN — DILTIAZEM HYDROCHLORIDE 120 MG: 120 CAPSULE, COATED, EXTENDED RELEASE ORAL at 08:15

## 2024-08-28 RX ADMIN — TAMSULOSIN HYDROCHLORIDE 0.4 MG: 0.4 CAPSULE ORAL at 08:15

## 2024-08-28 RX ADMIN — METOPROLOL SUCCINATE 50 MG: 50 TABLET, EXTENDED RELEASE ORAL at 08:15

## 2024-08-28 RX ADMIN — BENZONATATE 200 MG: 100 CAPSULE ORAL at 08:15

## 2024-08-28 RX ADMIN — BENZONATATE 200 MG: 100 CAPSULE ORAL at 21:59

## 2024-08-28 RX ADMIN — BENZONATATE 200 MG: 100 CAPSULE ORAL at 15:17

## 2024-08-28 RX ADMIN — PANTOPRAZOLE SODIUM 40 MG: 40 TABLET, DELAYED RELEASE ORAL at 15:17

## 2024-08-28 RX ADMIN — SENNOSIDES AND DOCUSATE SODIUM 2 TABLET: 50; 8.6 TABLET ORAL at 21:59

## 2024-08-28 RX ADMIN — PRIMIDONE 50 MG: 50 TABLET ORAL at 08:15

## 2024-08-28 RX ADMIN — INSULIN LISPRO 1 UNITS: 100 INJECTION, SOLUTION INTRAVENOUS; SUBCUTANEOUS at 08:15

## 2024-08-28 RX ADMIN — SENNOSIDES AND DOCUSATE SODIUM 2 TABLET: 50; 8.6 TABLET ORAL at 08:15

## 2024-08-28 RX ADMIN — CETIRIZINE HYDROCHLORIDE 10 MG: 10 TABLET, FILM COATED ORAL at 08:15

## 2024-08-28 NOTE — PLAN OF CARE
Problem: Safety - Adult  Goal: Free from fall injury  8/28/2024 1405 by Jennifer Angel RN  Outcome: Progressing  8/28/2024 0024 by Trinity Robledo LPN  Outcome: Progressing     Problem: Discharge Planning  Goal: Discharge to home or other facility with appropriate resources  8/28/2024 1405 by Jennifer Angel RN  Outcome: Progressing  8/28/2024 0024 by Trinity Robledo LPN  Outcome: Progressing     Problem: Pain  Goal: Verbalizes/displays adequate comfort level or baseline comfort level  8/28/2024 1405 by Jennifer Angel RN  Outcome: Progressing  8/28/2024 0024 by Trinity Robledo LPN  Outcome: Progressing     Problem: ABCDS Injury Assessment  Goal: Absence of physical injury  8/28/2024 1405 by Jennifer Angel RN  Outcome: Progressing  8/28/2024 0024 by rTinity Robledo LPN  Outcome: Progressing     Problem: Nutrition Deficit:  Goal: Optimize nutritional status  8/28/2024 1405 by Jennifer Angel RN  Outcome: Progressing  8/28/2024 0024 by Trinity Robledo LPN  Outcome: Progressing     Problem: Chronic Conditions and Co-morbidities  Goal: Patient's chronic conditions and co-morbidity symptoms are monitored and maintained or improved  8/28/2024 1405 by Jennifer Angel RN  Outcome: Progressing  8/28/2024 0024 by Trinity Robledo LPN  Outcome: Progressing     Problem: Skin/Tissue Integrity  Goal: Absence of new skin breakdown  Description: 1.  Monitor for areas of redness and/or skin breakdown  2.  Assess vascular access sites hourly  3.  Every 4-6 hours minimum:  Change oxygen saturation probe site  4.  Every 4-6 hours:  If on nasal continuous positive airway pressure, respiratory therapy assess nares and determine need for appliance change or resting period.  8/28/2024 1405 by Jennifer Angel RN  Outcome: Progressing  8/28/2024 0024 by Trinity Robledo LPN  Outcome: Progressing     Problem: Skin/Tissue Integrity  Goal: Absence of new skin breakdown  Description: 1.  Monitor for areas of redness and/or skin

## 2024-08-28 NOTE — PROGRESS NOTES
Daily Progress Note    Date:2024  Patient: Jose Tate  : 1940  MRN:[id number]  CODE:Limited No additional code details  PCP:Chayo Grande MD    Admit Date: 2024  5:11 PM   LOS: 8 days       Subjective:   No acute events noted overnight.  Resting comfortably without complaints.  Denies any dyspnea.  No increased fatigue.      Summary:  84-year-old male with diabetes, recently hospitalized with new onset A-fib with RVR with recent syncopal episode with progressive fatigue, generalized weakness and recent falls.  He was managed with IV Cardizem infusion, weaned off and maintained on low-dose metoprolol for rate control and Eliquis 2.5 mg twice daily for anticoagulation on discharge.  Noted normal EF on echo per subsequently developed bradycardia.  Seen by cardiology and underwent pacemaker placement on 2024.  Discharged to [facility] rehab service on .  He was subsequently noted to have progressive decline in hemoglobin over several days.  GI consulted due to concern for occult GI bleeding however did not have any overt bleeding and GI did not recommend endoscopic intervention.  Eliquis was held.  Hospital medicine consulted for medical management.  He did have drop in hemoglobin down to 7.0 in setting of symptomatic anemia, transfused 1 unit PRBCs on  with significant improvement.  CT enterography without any obvious source of bleeding.  Hemoglobin overall steadily improved and stabilized, did not have any bleeding episodes noted.      Objective:      Vital signs in last 24 hours:  Patient Vitals for the past 24 hrs:   BP Temp Temp src Pulse Resp SpO2   24 1537 139/60 98.1 °F (36.7 °C) Temporal 86 18 99 %   24 0902 -- -- -- 94 20 94 %   24 0800 (!) 142/80 -- -- 90 -- --   24 0637 -- -- -- (!) 102 18 99 %   24 0518 (!) 183/79 97.9 °F (36.6 °C) Temporal (!) 110 16 100 %     Physical exam    General: No acute distress  Cardiac: Normal rate with  CNP, 4 Units at 08/26/24 2118    metoprolol succinate (TOPROL XL) extended release tablet 50 mg, 50 mg, Oral, BID, Srini Brian APRN - CNP, 50 mg at 08/28/24 0815    apixaban (ELIQUIS) tablet 2.5 mg, 2.5 mg, Oral, BID, Kwame Rogers MD, 2.5 mg at 08/21/24 0804    acetaminophen (TYLENOL) tablet 650 mg, 650 mg, Oral, Q4H PRN, Kwame Rogers MD, 650 mg at 08/26/24 2109    polyethylene glycol (GLYCOLAX) packet 17 g, 17 g, Oral, Daily PRN, Kwame Rogers MD, 17 g at 08/26/24 1421      Assessment/plan  Principal Problem:    Acute blood loss anemia  Active Problems:    Chronic systolic (congestive) heart failure    Coronary artery disease involving native heart with angina pectoris, unspecified vessel or lesion type (HCC)    Hypercholesterolemia    GERD (gastroesophageal reflux disease)    COPD (chronic obstructive pulmonary disease) (HCC)    Essential hypertension    Type 2 diabetes mellitus without complication (HCC)    Adenocarcinoma of right lung (HCC)    New onset a-fib (HCC)    TOMER (acute kidney injury) (HCC)    Pacemaker    Acute kidney injury (HCC)    Anemia in other chronic diseases classified elsewhere  Resolved Problems:    * No resolved hospital problems. *      Likely acute blood loss anemia, in setting of recent anticoagulation on Eliquis---overall improved, stable  - At this point, ok to resume Eliquis and monitor  - No overt GI bleeding noted thus far  --Continue to monitor stools   - S/p 1 unit PRBCs transfused 8/22  --H&H has remained fairly stable  - Repeat CBC tomorrow  - No plan for endoscopic intervention at this point per GI    TOMER, prerenal---overall improved, fairly stable  --Avoid hypotension and nephrotoxins  - Encourage adequate oral intake/hydration    Chronic systolic heart failure  - Continue Lasix 40 mg every other day at this point to maintain euvolemia, avoid overdiuresis    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

## 2024-08-28 NOTE — PROGRESS NOTES
08/28/24 1100   Transfers   Sit to Stand Stand by assistance   Stand to Sit Stand by assistance   Bed to Chair Stand by assistance  (with rollator)   Ambulation   Device Rollator   Assistance Contact guard assistance   Quality of Gait VCS FOR PROPER USE OF BREAKS.  OVERALL COMPARABLE TO GAIT WITH RW, HOWEVER WITH FATIGUE PATIENT BEGAN TO SHUFFLE, ALLOWING RW TO GET EXCESSIVELY ANTERIOR TO BODY.  ABLE TO CORRECT WITH CUES   Distance 100 FT   Ambulation 2   Device 2 Rollator   Assistance 2 Contact guard assistance;Stand by assistance   Quality of Gait 2 VCS TO UNLOCK BREAKS PRIOR TO AMBULATING.  VCS TO LOCK BRAKES WHEN TURNING TO SIT IN ROLLATOR.  OVERALL IMPROVED VS FIRST AMBULATION   Distance 100 FT   Comments TURN TO SIT ON ROLLATOR   Ambulation 3   Device 3 Rollator   Assistance 3 Contact guard assistance   Quality of Gait 3 FORM/POSTURE BEGINS TO DETERIORATE AT APPROXIMIATELY 75 FT.  ONCE PATIENT SAW WHEELCHAIR HE WAS GOING TO SIT IN, ALLOWED ROLLATOR TO DRIFT ANTERIOR TO BODY, BEGAN TAKING SHUFFLING STEPS. ABLE TO CORRECT WITH CUES   Distance 100   Comments TURN TO SIT IN WHEELCHAI   Stairs   # Steps  8   Rails Bilateral   PT Exercises   Exercise Treatment SITTING BILAT LE HIP FLEX/EXT/ABD/ADD; KNEE EXT; PF/DF 2X10 EA WITH MANAUL RESISTANCE   PROM Exercises AMBULATION 4: 100 FT WITH ROLLATOR, CGA/SBA   Assessment   Assessment TOLERATED WELL.  DISCUSSUED WITH PATIENT USING ROLLATOR FOR SHORTER DISTANCES,SUCH AS IN HOME OR BEING DROPPED OFF AT ENTRANCE OF DOCTOR'S OFFICE.  DUE TO DETERIORATION OF FORM WITH FATIGUE/DISTANCE DO NOT RECOMMEND PATIENT USING FOR WALKS BEYOND 75 FT

## 2024-08-28 NOTE — PROGRESS NOTES
Patient:   Jose Tate  MR#:    [id number]   Room:    0824/824-02   YOB: 1940  Date of Progress Note: 8/28/2024  Time of Note                           9:29 AM  Consulting Physician:   Kwame Rogers M.D.  Attending Physician:  Kwame Rogers MD     Chief complaint Acute kidney injury     S:This 84 y.o. male  with history IDDM, MI, GERD, COPD, chronic cough, chronic systolic CHF, HTN, and hypercholesterolemia. He presented to [facility] ER on 8/15/24 with c/o progressive fatigue, weakness and fall over the past couple of weeks. Reportedly had syncopal episode night prior to admission. Workup in ER revealed new onset Afib with RVR, BUN 40, Cr 2.6, Mg 1.3, CRP 30.0, BNP 5469, WBC 15.0, Hgb 10.0, chest x-ray indicated mild density in the right base may represent atelectasis or pneumonia, lungs are otherwise clear. IV Cardizem bolus and drip was initiated in ER. Patient noted to have TOMER with creatine of 2.6 with no history of CKD. He was given LR in ER and diuretics were placed on hold. Patient was admitted to the Hospitalist service with consult for cardiology. He was seen by Dr. Daly, who recommended low-dose Toprol, Eliquis 2.5 mg twice daily on discharge. ECHO indicated normal LV size with LVEF of 55 to 60%, mildly increased thickness, normal wall motion, normal diastolic function. Noted to have bradycardia. VQ scan indicated right midlung and lower lung zone small perfusion defects, biapical decreased uptake which could be related to emphysema, low to immediate probability. Cardiology further recommended for pacemaker. Patient was in agreement and underwent pacemaker placement by Dr. Willson on 8/19/24. Continued to remain sinus Tachycardia with frequent PACs, Cardiology further recommended adding beta blocker and start Eliquis. Patient is currently on Lovenox 1 mg/kg BID with plans to transition to Eliquis on discharge. Patients renal function is has shown improvement currently with creatine at  normal.   Ear, nose, throat - No scars, masses, or lesions over external nose or ears, no atrophy of tongue  Neck-symmetric, no masses noted, no jugular vein distension  Respiration- chest wall appears symmetric, good expansion,   normal effort without use of accessory muscles  Musculoskeletal - no significant wasting of muscles noted, no bony deformities  Extremities-no clubbing, cyanosis or edema  Skin - warm, dry, and intact. No rash, erythema, or pallor.  Psychiatric - mood, affect, and behavior appear normal.      Neurological exam  Awake, alert, fluent oriented appropriate affect  Attention and concentration appear appropriate  Recent and remote memory appears unremarkable  Speech normal without dysarthria  No clear issues with language of fund of knowledge     Cranial Nerve Exam     CN III, IV,VI-EOMI, No nystagmus, conjugate eye movements, no ptosis    CN VII-no facial assymetry       Motor Exam  antigravity throughout upper and lower extremities bilaterally      Tremors- no tremors in hands or head noted     Gait  Not tested     Nursing/pcp notes, imaging,labs and vitals reviewed.     PT,OT and/or speech notes reviewed    Lab Results   Component Value Date    WBC 9.4 08/26/2024    HGB 9.2 (L) 08/26/2024    HCT 28.7 (L) 08/26/2024    MCV 95.3 (H) 08/26/2024     08/26/2024     Lab Results   Component Value Date     08/26/2024    K 4.7 08/26/2024    CL 98 08/26/2024    CO2 29 08/26/2024    BUN 28 (H) 08/26/2024    CREATININE 1.5 (H) 08/26/2024    GLUCOSE 176 (H) 08/26/2024    CALCIUM 8.9 08/26/2024    BILITOT 0.2 08/26/2024    ALKPHOS 79 08/26/2024    AST 16 08/26/2024    ALT 14 08/26/2024    LABGLOM 45 (A) 08/26/2024    GFRAA 54 (L) 06/03/2021    GLOB 3.0 07/19/2024     Lab Results   Component Value Date    INR 1.18 08/16/2024    PROTIME 14.7 (H) 08/16/2024       Jose Centeno, PT  Physical Therapist  Physical Therapy     Progress Notes      Signed     Date of Service: 8/27/2024  3:18 PM

## 2024-08-28 NOTE — PROGRESS NOTES
08/28/24 1438   Encounter Summary   Encounter Overview/Reason Spiritual/Emotional Needs   Service Provided For Patient   Referral/Consult From Palliative Care   Support System Children   Complexity of Encounter Moderate   Begin Time 1425   End Time  1440   Total Time Calculated 15 min   Spiritual/Emotional needs   Type Spiritual Support   Palliative Care   Type Palliative Care, Follow-up   Assessment/Intervention/Outcome   Assessment Compromised coping   Intervention Active listening;Prayer (assurance of)/[place];Sustaining Presence/Ministry of presence   Outcome Acceptance;Expressed Gratitude;Receptive   Plan and Referrals   Plan/Referrals Continue to visit, (comment);Continue Support (comment)   Does the patient have a [facility] PCP? Yes    to follow up after discharge? No       This  met with pt and pt's daughter to provide spiritual care. Pt's daughter says pt is due to discharge on Saturday. She also says hopefully he will have Home Health and Outpatient Palliative care. This  provided spiritual care with sustaining presence, nurtured hope, and prayer. Pt and daughter expressed gratitude for spiritual care.       Electronically signed by Mary Behrens on 8/28/2024 at 2:42 PM

## 2024-08-28 NOTE — PLAN OF CARE
Problem: Safety - Adult  Goal: Free from fall injury  Outcome: Progressing     Problem: Discharge Planning  Goal: Discharge to home or other facility with appropriate resources  Outcome: Progressing     Problem: Pain  Goal: Verbalizes/displays adequate comfort level or baseline comfort level  Outcome: Progressing     Problem: ABCDS Injury Assessment  Goal: Absence of physical injury  Outcome: Progressing     Problem: Nutrition Deficit:  Goal: Optimize nutritional status  Outcome: Progressing     Problem: Chronic Conditions and Co-morbidities  Goal: Patient's chronic conditions and co-morbidity symptoms are monitored and maintained or improved  Outcome: Progressing     Problem: Skin/Tissue Integrity  Goal: Absence of new skin breakdown  Description: 1.  Monitor for areas of redness and/or skin breakdown  2.  Assess vascular access sites hourly  3.  Every 4-6 hours minimum:  Change oxygen saturation probe site  4.  Every 4-6 hours:  If on nasal continuous positive airway pressure, respiratory therapy assess nares and determine need for appliance change or resting period.  Outcome: Progressing

## 2024-08-28 NOTE — PROGRESS NOTES
08/28/24 1400   Ambulation   Quality of Gait SAME AS AM WALKS, BUT GREATER CONTROL AND LESS SEVERITY OF DEFICITS. VCS FOR PROPER USE OF BREAKS. OVERALL COMPARABLE TO GAIT WITH RW, HOWEVER WITH FATIGUE PATIENT BEGAN TO SHUFFLE, ALLOWING RW TO GET EXCESSIVELY ANTERIOR TO BODY. ABLE TO CORRECT WITH CUES.   Distance 100 FT   Comments TURN TO SIT ON ROLLATOR BRACED AGAINST WALL.   PT Exercises   Exercise Treatment SIDESTEPPING/BACKWARDS WALKING 12 FT X2 EA   Assessment   Assessment OVERALL IMPROVEMENT IN STRENGTH, ENDURANCE WITH LESS RESTED NEEDED BETWEEN BOUTS OF ACTIVITY.

## 2024-08-28 NOTE — PROGRESS NOTES
Occupational Therapy  Facility/Department: [facility] 8 REHAB UNIT  Rehabilitation Occupational Therapy Daily Treatment Note    Date: 24  Patient Name: Jose Tate       Room: 0824/824-02  MRN: [id number]  Account: [id number]   : 1940  (84 y.o.) Gender: male                Treatment Diagnosis: (P) Acute Kidney failure, Pacemaker placement on 2024   Past Medical History:  has a past medical history of Allergic rhinitis, Bronchitis, chronic (HCC), Carotid artery stenosis, GERD (gastroesophageal reflux disease), Heart attack (HCC), Hemorrhoids, Hypercholesterolemia, Palliative care patient, and Type II or unspecified type diabetes mellitus without mention of complication, not stated as uncontrolled.  Past Surgical History:   has a past surgical history that includes Cardiac surgery; Port Surgery (Right, 2021); and ep device procedure (Left, 2024).     24 0900   Restrictions/Precautions   Restrictions/Precautions Weight Bearing;Fall Risk   Position Activity Restriction   Other position/activity restrictions Pacemaker precautions.   Balance   Standing Balance Stand by assistance   Functional Mobility   Functional - Mobility Device Rolling Walker   Assist Level Contact guard assistance   Transfers   Sit to stand Stand by assistance   Stand to sit Stand by assistance   Transfer Comments recliner, w/c, bed   OT Exercises   Exercise Treatment 2# HEP modified for pacemaker precautions   Activity Tolerance   Activity Tolerance Patient Tolerated treatment well   Assessment   Performance deficits / Impairments Decreased functional mobility ;Decreased ADL status;Decreased strength;Decreased endurance;Decreased balance;Decreased high-level IADLs   Assessment Discussed safety after d/c, declines need for a life alert but states he will keep his cellphone with it. Rec using his rollator to transport phone.   Treatment Diagnosis Acute Kidney failure, Pacemaker placement on 2024   Education

## 2024-08-29 LAB
ALBUMIN SERPL-MCNC: 3.4 G/DL (ref 3.5–5.2)
ALP SERPL-CCNC: 82 U/L (ref 40–129)
ALT SERPL-CCNC: 16 U/L (ref 5–41)
ANION GAP SERPL CALCULATED.3IONS-SCNC: 8 MMOL/L (ref 7–19)
AST SERPL-CCNC: 17 U/L (ref 5–40)
BASOPHILS # BLD: 0.1 K/UL (ref 0–0.2)
BASOPHILS NFR BLD: 1.1 % (ref 0–1)
BILIRUB SERPL-MCNC: 0.2 MG/DL (ref 0.2–1.2)
BUN SERPL-MCNC: 33 MG/DL (ref 8–23)
CALCIUM SERPL-MCNC: 9.2 MG/DL (ref 8.8–10.2)
CHLORIDE SERPL-SCNC: 98 MMOL/L (ref 98–111)
CO2 SERPL-SCNC: 29 MMOL/L (ref 22–29)
CREAT SERPL-MCNC: 1.8 MG/DL (ref 0.7–1.2)
EOSINOPHIL # BLD: 0.3 K/UL (ref 0–0.6)
EOSINOPHIL NFR BLD: 3.4 % (ref 0–5)
ERYTHROCYTE [DISTWIDTH] IN BLOOD BY AUTOMATED COUNT: 13.6 % (ref 11.5–14.5)
GLUCOSE BLD-MCNC: 169 MG/DL (ref 70–99)
GLUCOSE BLD-MCNC: 184 MG/DL (ref 70–99)
GLUCOSE BLD-MCNC: 195 MG/DL (ref 70–99)
GLUCOSE BLD-MCNC: 285 MG/DL (ref 70–99)
GLUCOSE BLD-MCNC: 353 MG/DL (ref 70–99)
GLUCOSE SERPL-MCNC: 171 MG/DL (ref 70–99)
HCT VFR BLD AUTO: 30.4 % (ref 42–52)
HGB BLD-MCNC: 9.5 G/DL (ref 14–18)
IMM GRANULOCYTES # BLD: 0.1 K/UL
LYMPHOCYTES # BLD: 1.7 K/UL (ref 1.1–4.5)
LYMPHOCYTES NFR BLD: 19.3 % (ref 20–40)
MCH RBC QN AUTO: 30 PG (ref 27–31)
MCHC RBC AUTO-ENTMCNC: 31.3 G/DL (ref 33–37)
MCV RBC AUTO: 95.9 FL (ref 80–94)
MONOCYTES # BLD: 0.7 K/UL (ref 0–0.9)
MONOCYTES NFR BLD: 8 % (ref 0–10)
NEUTROPHILS # BLD: 5.9 K/UL (ref 1.5–7.5)
NEUTS SEG NFR BLD: 67.3 % (ref 50–65)
PERFORMED ON: ABNORMAL
PLATELET # BLD AUTO: 325 K/UL (ref 130–400)
PMV BLD AUTO: 9.6 FL (ref 9.4–12.4)
POTASSIUM SERPL-SCNC: 4.6 MMOL/L (ref 3.5–5)
PROT SERPL-MCNC: 6.6 G/DL (ref 6.4–8.3)
RBC # BLD AUTO: 3.17 M/UL (ref 4.7–6.1)
SODIUM SERPL-SCNC: 135 MMOL/L (ref 136–145)
WBC # BLD AUTO: 8.8 K/UL (ref 4.8–10.8)

## 2024-08-29 PROCEDURE — 97535 SELF CARE MNGMENT TRAINING: CPT

## 2024-08-29 PROCEDURE — 6370000000 HC RX 637 (ALT 250 FOR IP): Performed by: NURSE PRACTITIONER

## 2024-08-29 PROCEDURE — 99232 SBSQ HOSP IP/OBS MODERATE 35: CPT | Performed by: PSYCHIATRY & NEUROLOGY

## 2024-08-29 PROCEDURE — 6370000000 HC RX 637 (ALT 250 FOR IP): Performed by: PSYCHIATRY & NEUROLOGY

## 2024-08-29 PROCEDURE — 97530 THERAPEUTIC ACTIVITIES: CPT

## 2024-08-29 PROCEDURE — 36415 COLL VENOUS BLD VENIPUNCTURE: CPT

## 2024-08-29 PROCEDURE — 6370000000 HC RX 637 (ALT 250 FOR IP): Performed by: INTERNAL MEDICINE

## 2024-08-29 PROCEDURE — 85025 COMPLETE CBC W/AUTO DIFF WBC: CPT

## 2024-08-29 PROCEDURE — 97110 THERAPEUTIC EXERCISES: CPT

## 2024-08-29 PROCEDURE — 6370000000 HC RX 637 (ALT 250 FOR IP): Performed by: STUDENT IN AN ORGANIZED HEALTH CARE EDUCATION/TRAINING PROGRAM

## 2024-08-29 PROCEDURE — 80053 COMPREHEN METABOLIC PANEL: CPT

## 2024-08-29 PROCEDURE — 1180000000 HC REHAB R&B

## 2024-08-29 PROCEDURE — 82962 GLUCOSE BLOOD TEST: CPT

## 2024-08-29 PROCEDURE — 94760 N-INVAS EAR/PLS OXIMETRY 1: CPT

## 2024-08-29 PROCEDURE — 2580000003 HC RX 258: Performed by: NURSE PRACTITIONER

## 2024-08-29 PROCEDURE — 97116 GAIT TRAINING THERAPY: CPT

## 2024-08-29 RX ORDER — GLIPIZIDE 5 MG/1
5 TABLET ORAL
Qty: 30 TABLET | Refills: 0 | Status: SHIPPED | OUTPATIENT
Start: 2024-08-30

## 2024-08-29 RX ORDER — PANTOPRAZOLE SODIUM 40 MG/1
40 TABLET, DELAYED RELEASE ORAL
Qty: 60 TABLET | Refills: 0 | Status: SHIPPED | OUTPATIENT
Start: 2024-08-29

## 2024-08-29 RX ORDER — SENNA AND DOCUSATE SODIUM 50; 8.6 MG/1; MG/1
2 TABLET, FILM COATED ORAL 2 TIMES DAILY
Qty: 120 TABLET | Refills: 0 | Status: SHIPPED | OUTPATIENT
Start: 2024-08-29

## 2024-08-29 RX ORDER — METOPROLOL SUCCINATE 50 MG/1
50 TABLET, EXTENDED RELEASE ORAL 2 TIMES DAILY
Qty: 60 TABLET | Refills: 0 | Status: SHIPPED | OUTPATIENT
Start: 2024-08-29

## 2024-08-29 RX ORDER — MONTELUKAST SODIUM 10 MG/1
10 TABLET ORAL NIGHTLY
Qty: 30 TABLET | Refills: 0 | Status: SHIPPED | OUTPATIENT
Start: 2024-08-29 | End: 2024-09-28

## 2024-08-29 RX ORDER — FUROSEMIDE 40 MG
40 TABLET ORAL EVERY OTHER DAY
Qty: 30 TABLET | Refills: 0 | Status: SHIPPED | OUTPATIENT
Start: 2024-08-29

## 2024-08-29 RX ORDER — PRIMIDONE 50 MG/1
50 TABLET ORAL 2 TIMES DAILY
Qty: 60 TABLET | Refills: 0 | Status: SHIPPED | OUTPATIENT
Start: 2024-08-29

## 2024-08-29 RX ORDER — DILTIAZEM HYDROCHLORIDE 120 MG/1
120 CAPSULE, COATED, EXTENDED RELEASE ORAL DAILY
Qty: 30 CAPSULE | Refills: 0 | Status: SHIPPED | OUTPATIENT
Start: 2024-08-29

## 2024-08-29 RX ORDER — TAMSULOSIN HYDROCHLORIDE 0.4 MG/1
0.4 CAPSULE ORAL DAILY
Qty: 30 CAPSULE | Refills: 0 | Status: SHIPPED | OUTPATIENT
Start: 2024-08-29

## 2024-08-29 RX ORDER — BENZONATATE 200 MG/1
200 CAPSULE ORAL 3 TIMES DAILY
Qty: 90 CAPSULE | Refills: 0 | Status: SHIPPED | OUTPATIENT
Start: 2024-08-29 | End: 2024-09-28

## 2024-08-29 RX ORDER — GUAIFENESIN/DEXTROMETHORPHAN 100-10MG/5
5 SYRUP ORAL EVERY 4 HOURS PRN
Qty: 120 ML | Refills: 0 | Status: SHIPPED | OUTPATIENT
Start: 2024-08-29 | End: 2024-09-08

## 2024-08-29 RX ORDER — CETIRIZINE HYDROCHLORIDE 10 MG/1
10 TABLET ORAL DAILY
Qty: 30 TABLET | Refills: 0 | Status: SHIPPED | OUTPATIENT
Start: 2024-08-29

## 2024-08-29 RX ORDER — ATORVASTATIN CALCIUM 20 MG/1
20 TABLET, FILM COATED ORAL DAILY
Qty: 30 TABLET | Refills: 0 | Status: SHIPPED | OUTPATIENT
Start: 2024-08-29

## 2024-08-29 RX ADMIN — BENZONATATE 200 MG: 100 CAPSULE ORAL at 20:50

## 2024-08-29 RX ADMIN — PRIMIDONE 50 MG: 50 TABLET ORAL at 09:24

## 2024-08-29 RX ADMIN — PANTOPRAZOLE SODIUM 40 MG: 40 TABLET, DELAYED RELEASE ORAL at 05:52

## 2024-08-29 RX ADMIN — BENZONATATE 200 MG: 100 CAPSULE ORAL at 09:24

## 2024-08-29 RX ADMIN — ATORVASTATIN CALCIUM 20 MG: 20 TABLET, FILM COATED ORAL at 09:24

## 2024-08-29 RX ADMIN — BENZONATATE 200 MG: 100 CAPSULE ORAL at 15:35

## 2024-08-29 RX ADMIN — SENNOSIDES AND DOCUSATE SODIUM 2 TABLET: 50; 8.6 TABLET ORAL at 20:50

## 2024-08-29 RX ADMIN — INSULIN LISPRO 2 UNITS: 100 INJECTION, SOLUTION INTRAVENOUS; SUBCUTANEOUS at 18:02

## 2024-08-29 RX ADMIN — TAMSULOSIN HYDROCHLORIDE 0.4 MG: 0.4 CAPSULE ORAL at 09:24

## 2024-08-29 RX ADMIN — APIXABAN 2.5 MG: 2.5 TABLET, FILM COATED ORAL at 09:24

## 2024-08-29 RX ADMIN — SODIUM CHLORIDE, PRESERVATIVE FREE 10 ML: 5 INJECTION INTRAVENOUS at 09:30

## 2024-08-29 RX ADMIN — GLIPIZIDE 5 MG: 5 TABLET ORAL at 05:52

## 2024-08-29 RX ADMIN — PRIMIDONE 50 MG: 50 TABLET ORAL at 20:50

## 2024-08-29 RX ADMIN — PANTOPRAZOLE SODIUM 40 MG: 40 TABLET, DELAYED RELEASE ORAL at 15:35

## 2024-08-29 RX ADMIN — DILTIAZEM HYDROCHLORIDE 120 MG: 120 CAPSULE, COATED, EXTENDED RELEASE ORAL at 09:24

## 2024-08-29 RX ADMIN — METOPROLOL SUCCINATE 50 MG: 50 TABLET, EXTENDED RELEASE ORAL at 20:50

## 2024-08-29 RX ADMIN — MONTELUKAST 10 MG: 10 TABLET, FILM COATED ORAL at 20:50

## 2024-08-29 RX ADMIN — SENNOSIDES AND DOCUSATE SODIUM 2 TABLET: 50; 8.6 TABLET ORAL at 09:24

## 2024-08-29 RX ADMIN — SODIUM CHLORIDE, PRESERVATIVE FREE 10 ML: 5 INJECTION INTRAVENOUS at 20:50

## 2024-08-29 RX ADMIN — CETIRIZINE HYDROCHLORIDE 10 MG: 10 TABLET, FILM COATED ORAL at 09:24

## 2024-08-29 RX ADMIN — METOPROLOL SUCCINATE 50 MG: 50 TABLET, EXTENDED RELEASE ORAL at 09:24

## 2024-08-29 RX ADMIN — APIXABAN 2.5 MG: 2.5 TABLET, FILM COATED ORAL at 20:50

## 2024-08-29 RX ADMIN — FUROSEMIDE 40 MG: 40 TABLET ORAL at 09:24

## 2024-08-29 NOTE — PROGRESS NOTES
Patient:   Jose Tate  MR#:    [id number]   Room:    0824/824-02   YOB: 1940  Date of Progress Note: 8/29/2024  Time of Note                           8:32 AM  Consulting Physician:   Kwame Rogers M.D.  Attending Physician:  Kwame Rogers MD     Chief complaint Acute kidney injury     S:This 84 y.o. male  with history IDDM, MI, GERD, COPD, chronic cough, chronic systolic CHF, HTN, and hypercholesterolemia. He presented to [facility] ER on 8/15/24 with c/o progressive fatigue, weakness and fall over the past couple of weeks. Reportedly had syncopal episode night prior to admission. Workup in ER revealed new onset Afib with RVR, BUN 40, Cr 2.6, Mg 1.3, CRP 30.0, BNP 5469, WBC 15.0, Hgb 10.0, chest x-ray indicated mild density in the right base may represent atelectasis or pneumonia, lungs are otherwise clear. IV Cardizem bolus and drip was initiated in ER. Patient noted to have TOMER with creatine of 2.6 with no history of CKD. He was given LR in ER and diuretics were placed on hold. Patient was admitted to the Hospitalist service with consult for cardiology. He was seen by Dr. Daly, who recommended low-dose Toprol, Eliquis 2.5 mg twice daily on discharge. ECHO indicated normal LV size with LVEF of 55 to 60%, mildly increased thickness, normal wall motion, normal diastolic function. Noted to have bradycardia. VQ scan indicated right midlung and lower lung zone small perfusion defects, biapical decreased uptake which could be related to emphysema, low to immediate probability. Cardiology further recommended for pacemaker. Patient was in agreement and underwent pacemaker placement by Dr. Willson on 8/19/24. Continued to remain sinus Tachycardia with frequent PACs, Cardiology further recommended adding beta blocker and start Eliquis. Patient is currently on Lovenox 1 mg/kg BID with plans to transition to Eliquis on discharge. Patients renal function is has shown improvement currently with creatine at  8/27/2024  3:18 PM     Signed              08/27/24 1438   Transfers   Sit to Stand Contact guard assistance;Stand by assistance  (RW)   Stand to Sit Contact guard assistance;Stand by assistance   Bed to Chair Contact guard assistance;Stand by assistance  (WHEN TURNING TO SIT PUSHES RW AWAY FROM SELF RATHER THAN TURNING WITH IT.)   Ambulation   Device Rolling Walker   Assistance Contact guard assistance   Quality of Gait AMBULATED FROM COMMODE TO  BY ROOM DOOR.  FFP, RW EXCESSIVLEY ANTERIOR.   Distance 15 FT   Ambulation 2   Device 2 Rolling Walker   Assistance 2 Contact guard assistance   Quality of Gait 2 VCS REQUIRED TO MAINTAIN BODY WITHIN RW.  CONTINUED DOWNWARD GAZE, BILAT ELEVATED SCAPULAE.  PATIENT HAS DIFFICULTY FINDING HAPPY MEDIUM BETWEEN RW EXCESSIVELY ANTEIOR TO SELF AND RW TOO CLOSE.  RW LOWERED ONE NOTCH TO SEE IF THIS IS MORE COMFORTABLE POSITION.  SEE AMBULATION 3.   Distance 50 FT   Comments CONE SLALOM   Ambulation 3   Device 3 Rolling Walker   Assistance 3 Contact guard assistance   Quality of Gait 3 IMPROVED QUALITY OF GAIT WITH LOWERED RW. MORE CONSISTENT PERFORMANCE IN TERMS OF POSTURE, POSITION OF WALKER IN RELATION TO BODY.   Distance 50 FT   Comments CONE SLALOM   Assessment   Assessment PATIENT STATES HE HAS A ROLLATOR AT HOME WHICH WAS HIS WIFE'S.  LIKELY NOT APPROPRIATE FOR PATIENT AT THIS TIME, BUT INSTRUCTED PATIENT TO HAVE FAMILY BRING IT SO WE COULD ADJUST THE HEIGHT, TRIAL THE ROLLATOR IN PREPARATION FOR POSSIBLY ADVANCING TO IT AT FUTURE DATE.                               RECORD REVIEW: Previous medical records, medications were reviewed at today's visit    IMPRESSION:   1.  Acute kidney injury-monitor  2.  Atrial fibrillation-Eliquis(on hold 8/21--restart 8/28)/Cardizem/metoprolol  3.  Hyperlipidemia-on statin  4.  Hypertension-on meds monitor  5.  Diabetes-monitor blood sugar-add glyburide  6.  GI-PPI/bowel regimen  7.  BPH-on meds monitor  8.  Coronary artery

## 2024-08-29 NOTE — PROGRESS NOTES
Nutrition Assessment     Type and Reason for Visit: Reassess    Malnutrition Assessment:  Malnutrition Status: No malnutrition    Nutrition Assessment:  Pt continues to improve from a nutritional standpoint. PO intake avg >50% of most meals. BG ranging 187-244mg/dL. He is consuming ONS at times. Wt remains stable. Will follow for additional needs.    Estimated Daily Nutrient Needs:  Energy (kcal):  9419-7447 (25-30kcal/kg) Weight Used for Energy Requirements: Current     Protein (g):   (1.0-2.0g/kg) Weight Used for Protein Requirements: Current        Fluid (ml/day):  1963-2355 Method Used for Fluid Requirements: 1 ml/kcal    Nutrition Related Findings:   IziM4x=1.7% (8/15/24) Wound Type: Surgical Incision (L chest)    Current Nutrition Therapies:    ADULT ORAL NUTRITION SUPPLEMENT; Lunch, Breakfast, Dinner; Diabetic Oral Supplement  ADULT DIET; Regular; 4 carb choices (60 gm/meal); No oatmeal    Anthropometric Measures:  Height: 177.8 cm (5' 10\")  Current Body Wt: 80.9 kg (178 lb 4.8 oz)   BMI: 25.6    Nutrition Diagnosis:   Inadequate energy intake related to acute injury/trauma as evidenced by intake 0-25%, poor intake prior to admission    Nutrition Interventions:   Food and/or Nutrient Delivery: Continue Current Diet, Continue Oral Nutrition Supplement  Nutrition Education/Counseling: No recommendation at this time  Coordination of Nutrition Care: Continue to monitor while inpatient       Goals:  Previous Goal Met: Goal(s) Achieved  Goals: PO intake 50% or greater       Nutrition Monitoring and Evaluation:   Behavioral-Environmental Outcomes: None Identified  Food/Nutrient Intake Outcomes: Food and Nutrient Intake, Supplement Intake  Physical Signs/Symptoms Outcomes: Biochemical Data, Nutrition Focused Physical Findings, Weight, Skin    Discharge Planning:    Continue current diet     Kaycee Pérez, MS, RD, LD, CDCES  Contact: [phone]

## 2024-08-29 NOTE — PLAN OF CARE
Problem: Safety - Adult  Goal: Free from fall injury  8/29/2024 1109 by Jennifer Angel RN  Outcome: Progressing  8/29/2024 0001 by Gregory Diop LPN  Outcome: Progressing  Flowsheets (Taken 8/28/2024 2350)  Free From Fall Injury: Instruct family/caregiver on patient safety     Problem: Discharge Planning  Goal: Discharge to home or other facility with appropriate resources  8/29/2024 1109 by Jennifer Angel RN  Outcome: Progressing  8/29/2024 0001 by Gregory Diop LPN  Outcome: Progressing     Problem: Pain  Goal: Verbalizes/displays adequate comfort level or baseline comfort level  8/29/2024 1109 by Jennifer Angel RN  Outcome: Progressing  8/29/2024 0001 by Gregory Diop LPN  Outcome: Progressing     Problem: ABCDS Injury Assessment  Goal: Absence of physical injury  8/29/2024 1109 by Jennifer Angel RN  Outcome: Progressing  8/29/2024 0001 by Gregory Diop LPN  Outcome: Progressing     Problem: Nutrition Deficit:  Goal: Optimize nutritional status  8/29/2024 1109 by Jennifer Angel RN  Outcome: Progressing  8/29/2024 0001 by Gregory Diop LPN  Outcome: Progressing     Problem: Chronic Conditions and Co-morbidities  Goal: Patient's chronic conditions and co-morbidity symptoms are monitored and maintained or improved  8/29/2024 1109 by Jennifer Angel RN  Outcome: Progressing  8/29/2024 0001 by Gregory Diop LPN  Outcome: Progressing     Problem: Skin/Tissue Integrity  Goal: Absence of new skin breakdown  Description: 1.  Monitor for areas of redness and/or skin breakdown  2.  Assess vascular access sites hourly  3.  Every 4-6 hours minimum:  Change oxygen saturation probe site  4.  Every 4-6 hours:  If on nasal continuous positive airway pressure, respiratory therapy assess nares and determine need for appliance change or resting period.  8/29/2024 1109 by Jennifer Angel RN  Outcome: Progressing  8/29/2024 0001 by Gregory Diop LPN  Outcome: Progressing

## 2024-08-29 NOTE — DISCHARGE SUMMARY
Neurology Discharge Summary     Patient Identification:  Jose Tate is a 84 y.o. male.  :  1940  Admit Date:  2024  Discharge date : 2024   Attending Provider: Kwame Rogers MD     Account Number: [id number]                                   Admission Diagnoses:   Acute kidney injury (HCC) [N17.9]    Discharge Diagnoses:  Principal Problem:    Acute blood loss anemia  Active Problems:    Chronic systolic (congestive) heart failure    Coronary artery disease involving native heart with angina pectoris, unspecified vessel or lesion type (HCC)    Hypercholesterolemia    GERD (gastroesophageal reflux disease)    COPD (chronic obstructive pulmonary disease) (HCC)    Essential hypertension    Type 2 diabetes mellitus without complication (HCC)    Adenocarcinoma of right lung (HCC)    New onset a-fib (HCC)    TOMER (acute kidney injury) (HCC)    Pacemaker    Acute kidney injury (HCC)    Anemia in other chronic diseases classified elsewhere  Resolved Problems:    * No resolved hospital problems. *      Discharge Medications:    Current Discharge Medication List             Details   glipiZIDE (GLUCOTROL) 5 MG tablet Take 1 tablet by mouth every morning (before breakfast)  Qty: 30 tablet, Refills: 0      cetirizine (ZYRTEC) 10 MG tablet Take 1 tablet by mouth daily  Qty: 30 tablet, Refills: 0      atorvastatin (LIPITOR) 20 MG tablet Take 1 tablet by mouth daily  Qty: 30 tablet, Refills: 0      metoprolol succinate (TOPROL XL) 50 MG extended release tablet Take 1 tablet by mouth in the morning and at bedtime  Qty: 60 tablet, Refills: 0      dilTIAZem (CARDIZEM CD) 120 MG extended release capsule Take 1 capsule by mouth daily  Qty: 30 capsule, Refills: 0      benzonatate (TESSALON) 200 MG capsule Take 1 capsule by mouth in the morning, at noon, and at bedtime  Qty: 90 capsule, Refills: 0      guaiFENesin-dextromethorphan (ROBITUSSIN DM) 100-10 MG/5ML syrup Take 5 mLs by mouth every 4 hours as  Stopping:         aspirin 325 MG tablet Comments:   Reason for Stopping:                 Consults:   Hospitalist    Hospital Course:This 84 y.o. male  with history IDDM, MI, GERD, COPD, chronic cough, chronic systolic CHF, HTN, and hypercholesterolemia. He presented to [facility] ER on 8/15/24 with c/o progressive fatigue, weakness and fall over the past couple of weeks. Reportedly had syncopal episode night prior to admission. Workup in ER revealed new onset Afib with RVR, BUN 40, Cr 2.6, Mg 1.3, CRP 30.0, BNP 5469, WBC 15.0, Hgb 10.0, chest x-ray indicated mild density in the right base may represent atelectasis or pneumonia, lungs are otherwise clear. IV Cardizem bolus and drip was initiated in ER. Patient noted to have TOMER with creatine of 2.6 with no history of CKD. He was given LR in ER and diuretics were placed on hold. Patient was admitted to the Hospitalist service with consult for cardiology. He was seen by Dr. Daly, who recommended low-dose Toprol, Eliquis 2.5 mg twice daily on discharge. ECHO indicated normal LV size with LVEF of 55 to 60%, mildly increased thickness, normal wall motion, normal diastolic function. Noted to have bradycardia. VQ scan indicated right midlung and lower lung zone small perfusion defects, biapical decreased uptake which could be related to emphysema, low to immediate probability. Cardiology further recommended for pacemaker. Patient was in agreement and underwent pacemaker placement by Dr. Willson on 8/19/24. Continued to remain sinus Tachycardia with frequent PACs, Cardiology further recommended adding beta blocker and start Eliquis. Patient is currently on Lovenox 1 mg/kg BID with plans to transition to Eliquis on discharge. Patients renal function is had shown improvement.  He remained weak overall and was participating in both PT/OT. He was felt to need a stay on Rehab to work towards his goal of returning home with assist of family. He was felt ready to start the Rehab  program.  Patient was admitted to inpatient rehab with improvement.  After admission rapid response was called for presyncopal episode after bath.  Patient became weak and pale and closes eyes for a few seconds.  The patient denied loss of consciousness.  Medical evaluation undertaken.  Eliquis held for concern of bleeding.  Eliquis was restarted after stools were occult blood and workup for bleeding were unremarkable.  Did receive a transfusion.  Plan discharge home with home health.          Discharge Instructions     Patient Instructions:   Home  Therapy orders: PT, OT, and Nursing     Discharge lab work: none  Code status: Limited   Activity: activity as tolerated  Diet: ADULT ORAL NUTRITION SUPPLEMENT; Lunch, Breakfast, Dinner; Diabetic Oral Supplement  ADULT DIET; Regular; 4 carb choices (60 gm/meal); No oatmeal    Wound Care: as directed  Equipment: as per therapy      Kwame Rogers MD, MD    At least 35 minutes were spent in discharging the patient

## 2024-08-29 NOTE — DISCHARGE INSTRUCTIONS
Discharge Instructions      Patient Instructions:   Home  Therapy orders: PT, OT, and Nursing     Discharge lab work: none  Code status: Limited   Activity: activity as tolerated  Diet: ADULT ORAL NUTRITION SUPPLEMENT; Lunch, Breakfast, Dinner; Diabetic Oral Supplement  ADULT DIET; Regular; 4 carb choices (60 gm/meal); No oatmeal    Wound Care: as directed  Equipment: as per therapy

## 2024-08-29 NOTE — PLAN OF CARE
Problem: Safety - Adult  Goal: Free from fall injury  8/29/2024 0001 by Gregory Diop LPN  Outcome: Progressing  Flowsheets (Taken 8/28/2024 2350)  Free From Fall Injury: Instruct family/caregiver on patient safety  8/28/2024 1405 by Jennifer Angel, RN  Outcome: Progressing     Problem: Discharge Planning  Goal: Discharge to home or other facility with appropriate resources  8/29/2024 0001 by Gregory Diop LPN  Outcome: Progressing  8/28/2024 1405 by Jennifer Angel, RN  Outcome: Progressing     Problem: Pain  Goal: Verbalizes/displays adequate comfort level or baseline comfort level  8/29/2024 0001 by Gregory Diop LPN  Outcome: Progressing  8/28/2024 1405 by Jennifer Angel, RN  Outcome: Progressing     Problem: ABCDS Injury Assessment  Goal: Absence of physical injury  8/29/2024 0001 by Gregory Diop LPN  Outcome: Progressing  8/28/2024 1405 by Jennifer Angel, RN  Outcome: Progressing     Problem: Nutrition Deficit:  Goal: Optimize nutritional status  8/29/2024 0001 by Gregory Diop LPN  Outcome: Progressing  8/28/2024 1405 by Jennifer Angel, RN  Outcome: Progressing     Problem: Chronic Conditions and Co-morbidities  Goal: Patient's chronic conditions and co-morbidity symptoms are monitored and maintained or improved  8/29/2024 0001 by Gregory Diop LPN  Outcome: Progressing  8/28/2024 1405 by Jennifer Angel, RN  Outcome: Progressing     Problem: Skin/Tissue Integrity  Goal: Absence of new skin breakdown  Description: 1.  Monitor for areas of redness and/or skin breakdown  2.  Assess vascular access sites hourly  3.  Every 4-6 hours minimum:  Change oxygen saturation probe site  4.  Every 4-6 hours:  If on nasal continuous positive airway pressure, respiratory therapy assess nares and determine need for appliance change or resting period.  8/29/2024 0001 by Gregory Diop LPN  Outcome: Progressing  8/28/2024 1405 by Jennifer Angel, RN  Outcome: Progressing

## 2024-08-29 NOTE — PROGRESS NOTES
Facility/Department: [facility] 8 REHAB UNIT  Occupational Therapy Treatment Note    Name: Jose Tate  : 1940  MRN: [id number]  Date of Service: 2024    Discharge Recommendations:  Home with Home health OT, Home with assist PRN  OT Equipment Recommendations  Equipment Needed: Yes  Mobility Devices: Walker  Walker: Rollator (4 Wheeled)  Other: Daughter has bought a rollator. Told daughter that we could order him one through insurance. States he does not want a BSC over the toilet and already has handi-cap height toilets. Already has a built in shower seat.       Patient Diagnosis(es): The primary encounter diagnosis was Acute kidney injury (HCC). Diagnoses of Permanent atrial fibrillation (HCC), Chronic systolic (congestive) heart failure, Coronary artery disease involving native heart with angina pectoris, unspecified vessel or lesion type (HCC), Anemia in other chronic diseases classified elsewhere, Shortness of breath, New onset a-fib (HCC), and Type 2 diabetes mellitus without complication, without long-term current use of insulin (HCC) were also pertinent to this visit.  Past Medical History:  has a past medical history of Allergic rhinitis, Bronchitis, chronic (HCC), Carotid artery stenosis, GERD (gastroesophageal reflux disease), Heart attack (HCC), Hemorrhoids, Hypercholesterolemia, Palliative care patient, and Type II or unspecified type diabetes mellitus without mention of complication, not stated as uncontrolled.  Past Surgical History:  has a past surgical history that includes Cardiac surgery; Port Surgery (Right, 2021); and ep device procedure (Left, 2024).    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 Used to golf.  Additional Comments: Daughter works full time and has 2 children with disabilities, Other daughter lives with Jere.       Objective   Functional Mobility  Functional - Mobility Device: 4-Wheeled Walker  Activity: To/from bathroom  Assist Level: Stand by assistance  Functional Mobility Comments: In halls.  Temp: 98.2 °F (36.8 °C)  Pulse: 80  Heart Rate Source: Monitor  Respirations: 18  SpO2: 95 %  O2 Device: None (Room air)  BP: 138/70  MAP (Calculated): 93             Safety Devices  Type of Devices: Call light within reach;Chair alarm in place     Shower Transfers  Shower - Transfer From: Walker  Shower - Transfer Type: To and From  Shower - Transfer To: Shower seat with back  Shower - Technique: Ambulating  Shower Transfers: Stand by assistance  Shower Transfers Comments: stepping over lip to shower chair.    Transfers  Sit to stand: Stand by assistance  Stand to sit: Stand by assistance  Transfer Comments: VC for when to lock and unlock brakes, especially when going to sit down on rollator. When going to go to stand from rollator he attempts to ambulate with the rollator behind him. Will need continued practice.         OutComes Score    Toileting Hygiene  Assistance needed: Supervision or touching assistance  Comment: Supervision  CARE Score: 4  Discharge Goal: Independent      Toilet Transfer  Assistance needed: Supervision or touching assistance  Comment: Supervision  CARE Score: 4  Discharge Goal: Independent    Balance  Sitting Balance: Supervision  Standing Balance: Supervision     Standing Balance  Time: 2 minutes x2 trials.  Activity: 1-2 handed static standing task.  Lower  Body Dressing  Assistance Needed: Supervision or touching assistance  Comment: Supervision  CARE Score: 4  Discharge Goal: Independent      Goals  Short Term Goals  Time Frame for Short Term Goals: 1 week  Short Term Goal 1: MET  Short Term Goal 2: MET  Short Term Goal 3: Supervision with bathing hygiene.  Short Term Goal 4: MET  Short Term Goal 5: Supervision with donning/doffing socks and shoes.  Short Term Goal 6: Setup for UB dressing.  Short Term Goal 7: Patient will complete 1-2 handed static standing task for 3 minutes, requiring CGA.  Long Term Goals  Time Frame for Long Term Goals : 2-3 weeks  Long Term Goal 1: Mod I with toileting and toilet transfers.  Long Term Goal 2: Mod I with bathing hygiene.  Long Term Goal 3: Mod I with overall dressing.  Long Term Goal 4: Mod I with ambulatory homemaking tasks.  Long Term Goal 5: Patient verbalize DME needs.  Patient Goals   Patient goals : Return home independently.     Therapy Time   Individual Concurrent Group Co-treatment   Time In 1100         Time Out 1200         Minutes 60         Timed Code Treatment Minutes: 60 Minutes       Electronically signed by Renae Saldana OT on 8/29/2024 at 2:45 PM

## 2024-08-29 NOTE — PROGRESS NOTES
Facility/Department: [facility] [place] 8 REHAB UNIT  Occupational Therapy Treatment Note    Name: Jose Tate  : 1940  MRN: [id number]  Date of Service: 2024    Discharge Recommendations:  Home with Home health OT, Home with assist PRN  OT Equipment Recommendations  Equipment Needed: Yes  Mobility Devices: Walker  Walker: Rollator (4 Wheeled)  Other: Daughter has bought a rollator. Told daughter that we could order him one through insurance. States he does not want a BSC over the toilet and already has handi-cap height toilets. Already has a built in shower seat.       Patient Diagnosis(es): The primary encounter diagnosis was Acute kidney injury (HCC). Diagnoses of Permanent atrial fibrillation (HCC), Chronic systolic (congestive) heart failure, Coronary artery disease involving native heart with angina pectoris, unspecified vessel or lesion type (HCC), Anemia in other chronic diseases classified elsewhere, Shortness of breath, New onset a-fib (HCC), and Type 2 diabetes mellitus without complication, without long-term current use of insulin (HCC) were also pertinent to this visit.  Past Medical History:  has a past medical history of Allergic rhinitis, Bronchitis, chronic (HCC), Carotid artery stenosis, GERD (gastroesophageal reflux disease), Heart attack (HCC), Hemorrhoids, Hypercholesterolemia, Palliative care patient, and Type II or unspecified type diabetes mellitus without mention of complication, not stated as uncontrolled.  Past Surgical History:  has a past surgical history that includes Cardiac surgery; Port Surgery (Right, 2021); and ep device procedure (Left, 2024).    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 Used to golf.  Additional Comments: Daughter works full time and has 2 children with disabilities, Other daughter lives with Jere.       Objective   Functional Mobility  Functional - Mobility Device: 4-Wheeled Walker  Activity: To/From therapy gym  Assist Level: Stand by assistance  Functional Mobility Comments: VC for sitting down on rollator and then will unlock brakes before standing up. Then attempt to ambulate with rollator behind him.  Temp: 98.2 °F (36.8 °C)  Pulse: 80  Heart Rate Source: Monitor  Respirations: 18  SpO2: 95 %  O2 Device: None (Room air)  BP: 138/70  MAP (Calculated): 93    Safety Devices  Type of Devices: Call light within reach;Chair alarm in place    Transfers  Sit to stand: Stand by assistance  Stand to sit: Stand by assistance  Transfer Comments: VC for when to lock and unlock brakes, especially when going to sit down on rollator. When going to go to stand from rollator he attempts to ambulate with the rollator behind him. Will need continued practice.     08/29/24 1305   Instrumental ADL's   Instrumental ADLs Yes   Meal Prep   Meal Prep Level Walker  (Rollator)   Meal Prep Level of Assistance Stand by assistance   Meal Preparation Microwave task   Light Housekeeping   Light Housekeeping Level Walker  (Rollator)   Light Housekeeping Level of Assistance Stand by assistance   Light Housekeeping Folding clothes and hanging up in closet.          OutComes Score  Balance  Sitting Balance: Independent  Standing Balance: Supervision     Goals  Short Term Goals  Time Frame for Short Term Goals: 1 week  Short Term Goal 1: MET  Short Term Goal 2: MET  Short Term Goal 3: Supervision

## 2024-08-29 NOTE — PROGRESS NOTES
08/29/24 1500   General   Chart Reviewed Yes   Subjective   Subjective Patient in recliner ready for therapy family member present.  (No C/O pain.)   Vitals   O2 Device None (Room air)   Transfers   Sit to Stand Supervision   Stand to Sit Supervision   Car Transfer Supervision   Ambulation   WB Status WBAT   Ambulation   Surface Level tile   Device Rollator   Assistance Supervision   Quality of Gait Steady NO LOB   Distance 125  (Patient stopped gait to use BR.)   Ambulation 2   Surface - 2 level tile   Device 2 Rollator   Assistance 2 Supervision   Quality of Gait 2 SAME   Distance 175   Comments Patient sat on rollator safely SBA.   Activity Tolerance   Activity Tolerance Patient tolerated treatment well   Safety Devices   Type of Devices Call light within reach;Left in chair;Chair alarm in place  (Family present)       Electronically signed by Rashad Knapp PTA on 8/29/2024 at 3:26 PM

## 2024-08-29 NOTE — PROGRESS NOTES
08/29/24 0800   General   Chart Reviewed Yes   Subjective   Subjective Well, not on top of the world.  ( answer to how do you feel? )  (No C/O pain.)   Hearing   Hearing WFL   Vitals   O2 Device None (Room air)   Orientation   Overall Orientation Status WNL   Bed mobility   Supine to Sit   (Patient sitting EOB IND. eating breakfast.  Patient finishing breakfast IND pulled socks up.)   Transfers   Sit to Stand Supervision   Stand to Sit Supervision   Ambulation   WB Status WBAT   Ambulation   Device Rollator   Assistance Stand by assistance;Minimal assistance   Quality of Gait Steady NO LOB   Distance 175  (Patient safely locked rollator sat for rest break about 2 minutes finished gait.)   Ambulation 2   Device 2 Rollator   Assistance 2 Stand by assistance;Contact guard assistance   Quality of Gait 2 SAME   Distance 175  (One brief sitting break)   PT Exercises   A/AROM Exercises BL LE EX sitting on rollator c 1.5# X 20 Reps   Activity Tolerance   Activity Tolerance Patient tolerated treatment well       Electronically signed by Rashad Knapp PTA on 8/29/2024 at 9:06 AM

## 2024-08-30 LAB
GLUCOSE BLD-MCNC: 133 MG/DL (ref 70–99)
GLUCOSE BLD-MCNC: 163 MG/DL (ref 70–99)
GLUCOSE BLD-MCNC: 170 MG/DL (ref 70–99)
GLUCOSE BLD-MCNC: 278 MG/DL (ref 70–99)
PERFORMED ON: ABNORMAL

## 2024-08-30 PROCEDURE — 94760 N-INVAS EAR/PLS OXIMETRY 1: CPT

## 2024-08-30 PROCEDURE — 1180000000 HC REHAB R&B

## 2024-08-30 PROCEDURE — 6370000000 HC RX 637 (ALT 250 FOR IP): Performed by: STUDENT IN AN ORGANIZED HEALTH CARE EDUCATION/TRAINING PROGRAM

## 2024-08-30 PROCEDURE — 97116 GAIT TRAINING THERAPY: CPT

## 2024-08-30 PROCEDURE — 6370000000 HC RX 637 (ALT 250 FOR IP): Performed by: INTERNAL MEDICINE

## 2024-08-30 PROCEDURE — 97110 THERAPEUTIC EXERCISES: CPT

## 2024-08-30 PROCEDURE — 97535 SELF CARE MNGMENT TRAINING: CPT

## 2024-08-30 PROCEDURE — 82962 GLUCOSE BLOOD TEST: CPT

## 2024-08-30 PROCEDURE — 6370000000 HC RX 637 (ALT 250 FOR IP): Performed by: PSYCHIATRY & NEUROLOGY

## 2024-08-30 PROCEDURE — 97530 THERAPEUTIC ACTIVITIES: CPT

## 2024-08-30 PROCEDURE — 2580000003 HC RX 258: Performed by: NURSE PRACTITIONER

## 2024-08-30 PROCEDURE — 99232 SBSQ HOSP IP/OBS MODERATE 35: CPT | Performed by: PSYCHIATRY & NEUROLOGY

## 2024-08-30 PROCEDURE — 6370000000 HC RX 637 (ALT 250 FOR IP): Performed by: NURSE PRACTITIONER

## 2024-08-30 RX ADMIN — DILTIAZEM HYDROCHLORIDE 120 MG: 120 CAPSULE, COATED, EXTENDED RELEASE ORAL at 09:03

## 2024-08-30 RX ADMIN — SODIUM CHLORIDE, PRESERVATIVE FREE 10 ML: 5 INJECTION INTRAVENOUS at 09:03

## 2024-08-30 RX ADMIN — BENZONATATE 200 MG: 100 CAPSULE ORAL at 21:13

## 2024-08-30 RX ADMIN — CETIRIZINE HYDROCHLORIDE 10 MG: 10 TABLET, FILM COATED ORAL at 09:03

## 2024-08-30 RX ADMIN — BENZONATATE 200 MG: 100 CAPSULE ORAL at 14:56

## 2024-08-30 RX ADMIN — PANTOPRAZOLE SODIUM 40 MG: 40 TABLET, DELAYED RELEASE ORAL at 14:56

## 2024-08-30 RX ADMIN — PANTOPRAZOLE SODIUM 40 MG: 40 TABLET, DELAYED RELEASE ORAL at 05:18

## 2024-08-30 RX ADMIN — TAMSULOSIN HYDROCHLORIDE 0.4 MG: 0.4 CAPSULE ORAL at 09:03

## 2024-08-30 RX ADMIN — PRIMIDONE 50 MG: 50 TABLET ORAL at 21:13

## 2024-08-30 RX ADMIN — APIXABAN 2.5 MG: 2.5 TABLET, FILM COATED ORAL at 09:03

## 2024-08-30 RX ADMIN — BENZONATATE 200 MG: 100 CAPSULE ORAL at 09:03

## 2024-08-30 RX ADMIN — APIXABAN 2.5 MG: 2.5 TABLET, FILM COATED ORAL at 21:13

## 2024-08-30 RX ADMIN — MONTELUKAST 10 MG: 10 TABLET, FILM COATED ORAL at 21:13

## 2024-08-30 RX ADMIN — METOPROLOL SUCCINATE 50 MG: 50 TABLET, EXTENDED RELEASE ORAL at 21:13

## 2024-08-30 RX ADMIN — ATORVASTATIN CALCIUM 20 MG: 20 TABLET, FILM COATED ORAL at 09:03

## 2024-08-30 RX ADMIN — SENNOSIDES AND DOCUSATE SODIUM 2 TABLET: 50; 8.6 TABLET ORAL at 21:12

## 2024-08-30 RX ADMIN — GLIPIZIDE 5 MG: 5 TABLET ORAL at 05:18

## 2024-08-30 RX ADMIN — PRIMIDONE 50 MG: 50 TABLET ORAL at 09:03

## 2024-08-30 RX ADMIN — METOPROLOL SUCCINATE 50 MG: 50 TABLET, EXTENDED RELEASE ORAL at 09:03

## 2024-08-30 NOTE — PROGRESS NOTES
08/30/24 0903   Vitals   Pulse 97   /64   MAP (Calculated) 86   Bed mobility   Rolling to Left Independent   Rolling to Right Independent   Supine to Sit Independent   Sit to Supine Independent   Transfers   Sit to Stand Modified independent   Stand to Sit Modified independent   Bed to Chair Modified independent   Comment with rollator   Ambulation   Surface Carpet;Uneven   Device Rollator   Assistance Supervision   Quality of Gait ambulated in [place]   Distance 150 ft   Comments turn to sit on rollator. proper performance   Ambulation 2   Surface - 2 level tile   Device 2 Rollator   Assistance 2 Modified Independent   Quality of Gait 2 amb in room.  no lob. good technique   Distance 50 ft   PT Exercises   Exercise Treatment patient had bowel movement in pants while amb downstairs.  back to personal bathroomfor extensive clean up   Assessment   Assessment checked for up ad tucker. cleared from pt standpoint.  awaiting ot's assessment.

## 2024-08-30 NOTE — PROGRESS NOTES
Patient:   Jose Tate  MR#:    [id number]   Room:    0824/824-02   YOB: 1940  Date of Progress Note: 8/30/2024  Time of Note                           7:54 AM  Consulting Physician:   Kwame Rogers M.D.  Attending Physician:  Kwame Rogers MD     Chief complaint Acute kidney injury     S:This 84 y.o. male  with history IDDM, MI, GERD, COPD, chronic cough, chronic systolic CHF, HTN, and hypercholesterolemia. He presented to [facility] ER on 8/15/24 with c/o progressive fatigue, weakness and fall over the past couple of weeks. Reportedly had syncopal episode night prior to admission. Workup in ER revealed new onset Afib with RVR, BUN 40, Cr 2.6, Mg 1.3, CRP 30.0, BNP 5469, WBC 15.0, Hgb 10.0, chest x-ray indicated mild density in the right base may represent atelectasis or pneumonia, lungs are otherwise clear. IV Cardizem bolus and drip was initiated in ER. Patient noted to have TOMER with creatine of 2.6 with no history of CKD. He was given LR in ER and diuretics were placed on hold. Patient was admitted to the Hospitalist service with consult for cardiology. He was seen by Dr. Daly, who recommended low-dose Toprol, Eliquis 2.5 mg twice daily on discharge. ECHO indicated normal LV size with LVEF of 55 to 60%, mildly increased thickness, normal wall motion, normal diastolic function. Noted to have bradycardia. VQ scan indicated right midlung and lower lung zone small perfusion defects, biapical decreased uptake which could be related to emphysema, low to immediate probability. Cardiology further recommended for pacemaker. Patient was in agreement and underwent pacemaker placement by Dr. Willson on 8/19/24. Continued to remain sinus Tachycardia with frequent PACs, Cardiology further recommended adding beta blocker and start Eliquis. Patient is currently on Lovenox 1 mg/kg BID with plans to transition to Eliquis on discharge. Patients renal function is has shown improvement currently with creatine at

## 2024-08-30 NOTE — PLAN OF CARE
Problem: Safety - Adult  Goal: Free from fall injury  Outcome: Progressing     Problem: Discharge Planning  Goal: Discharge to home or other facility with appropriate resources  Outcome: Progressing  Flowsheets (Taken 8/29/2024 1924)  Discharge to home or other facility with appropriate resources:   Identify barriers to discharge with patient and caregiver   Refer to discharge planning if patient needs post-hospital services based on physician order or complex needs related to functional status, cognitive ability or social support system     Problem: Pain  Goal: Verbalizes/displays adequate comfort level or baseline comfort level  Outcome: Progressing     Problem: ABCDS Injury Assessment  Goal: Absence of physical injury  Outcome: Progressing     Problem: Nutrition Deficit:  Goal: Optimize nutritional status  Outcome: Progressing     Problem: Chronic Conditions and Co-morbidities  Goal: Patient's chronic conditions and co-morbidity symptoms are monitored and maintained or improved  Outcome: Progressing  Flowsheets (Taken 8/29/2024 1924)  Care Plan - Patient's Chronic Conditions and Co-Morbidity Symptoms are Monitored and Maintained or Improved: Monitor and assess patient's chronic conditions and comorbid symptoms for stability, deterioration, or improvement     Problem: Skin/Tissue Integrity  Goal: Absence of new skin breakdown  Description: 1.  Monitor for areas of redness and/or skin breakdown  2.  Assess vascular access sites hourly  3.  Every 4-6 hours minimum:  Change oxygen saturation probe site  4.  Every 4-6 hours:  If on nasal continuous positive airway pressure, respiratory therapy assess nares and determine need for appliance change or resting period.  Outcome: Progressing

## 2024-08-30 NOTE — CARE COORDINATION
Mr. Tate, expecting discharge tomorrow, to home, where daughter resides next door and daughter that lives in [place] is coming to assist in transition. Referral made to [facility] and referral to outpatient palliative care (Per instructions from PCP, Dr. Chayo Grande).

## 2024-08-30 NOTE — PLAN OF CARE
Problem: Safety - Adult  Goal: Free from fall injury  8/30/2024 0216 by Virgen Fink RN  Outcome: Progressing     Problem: Discharge Planning  Goal: Discharge to home or other facility with appropriate resources  Recent Flowsheet Documentation  Taken 8/30/2024 0907 by Arianna Hernandez RN  Discharge to home or other facility with appropriate resources: Refer to discharge planning if patient needs post-hospital services based on physician order or complex needs related to functional status, cognitive ability or social support system  8/30/2024 0216 by Virgen Fink RN  Outcome: Progressing  Flowsheets (Taken 8/29/2024 1924)  Discharge to home or other facility with appropriate resources:   Identify barriers to discharge with patient and caregiver   Refer to discharge planning if patient needs post-hospital services based on physician order or complex needs related to functional status, cognitive ability or social support system     Problem: Pain  Goal: Verbalizes/displays adequate comfort level or baseline comfort level  8/30/2024 0216 by Virgen Fink RN  Outcome: Progressing     Problem: ABCDS Injury Assessment  Goal: Absence of physical injury  8/30/2024 0216 by Virgen Fink RN  Outcome: Progressing     Problem: Nutrition Deficit:  Goal: Optimize nutritional status  8/30/2024 0216 by Virgen Fink RN  Outcome: Progressing     Problem: Chronic Conditions and Co-morbidities  Goal: Patient's chronic conditions and co-morbidity symptoms are monitored and maintained or improved  Recent Flowsheet Documentation  Taken 8/30/2024 0907 by Arianna Hernandez RN  Care Plan - Patient's Chronic Conditions and Co-Morbidity Symptoms are Monitored and Maintained or Improved: Monitor and assess patient's chronic conditions and comorbid symptoms for stability, deterioration, or improvement  8/30/2024 0216 by Virgen Fink RN  Outcome: Progressing  Flowsheets (Taken 8/29/2024 1924)  Care Plan - Patient's Chronic Conditions and  Co-Morbidity Symptoms are Monitored and Maintained or Improved: Monitor and assess patient's chronic conditions and comorbid symptoms for stability, deterioration, or improvement     Problem: Skin/Tissue Integrity  Goal: Absence of new skin breakdown  Description: 1.  Monitor for areas of redness and/or skin breakdown  2.  Assess vascular access sites hourly  3.  Every 4-6 hours minimum:  Change oxygen saturation probe site  4.  Every 4-6 hours:  If on nasal continuous positive airway pressure, respiratory therapy assess nares and determine need for appliance change or resting period.  8/30/2024 0216 by Virgen Fink, RN  Outcome: Progressing

## 2024-08-30 NOTE — PLAN OF CARE
Problem: Safety - Adult  Goal: Free from fall injury  8/30/2024 1436 by Arianna Hernandez RN  Outcome: Progressing  8/30/2024 0216 by Virgen Fink RN  Outcome: Progressing     Problem: Discharge Planning  Goal: Discharge to home or other facility with appropriate resources  8/30/2024 1436 by Arianna Hernandez RN  Outcome: Progressing  Flowsheets (Taken 8/30/2024 0907)  Discharge to home or other facility with appropriate resources: Refer to discharge planning if patient needs post-hospital services based on physician order or complex needs related to functional status, cognitive ability or social support system  8/30/2024 0216 by Virgen Fink RN  Outcome: Progressing  Flowsheets (Taken 8/29/2024 1924)  Discharge to home or other facility with appropriate resources:   Identify barriers to discharge with patient and caregiver   Refer to discharge planning if patient needs post-hospital services based on physician order or complex needs related to functional status, cognitive ability or social support system     Problem: Pain  Goal: Verbalizes/displays adequate comfort level or baseline comfort level  8/30/2024 1436 by Arianna Hernandez RN  Outcome: Progressing  8/30/2024 0216 by Virgen Fink RN  Outcome: Progressing     Problem: ABCDS Injury Assessment  Goal: Absence of physical injury  8/30/2024 1436 by Arianna Hernandez RN  Outcome: Progressing  8/30/2024 0216 by Virgen Fink RN  Outcome: Progressing     Problem: Nutrition Deficit:  Goal: Optimize nutritional status  8/30/2024 1436 by Arianna Hernandez RN  Outcome: Progressing  8/30/2024 0216 by Virgen Fink RN  Outcome: Progressing     Problem: Chronic Conditions and Co-morbidities  Goal: Patient's chronic conditions and co-morbidity symptoms are monitored and maintained or improved  8/30/2024 1436 by Arianna Hernandez RN  Outcome: Progressing  Flowsheets (Taken 8/30/2024 0907)  Care Plan - Patient's Chronic Conditions and Co-Morbidity Symptoms are Monitored and  Maintained or Improved: Monitor and assess patient's chronic conditions and comorbid symptoms for stability, deterioration, or improvement  8/30/2024 0216 by Virgen Fink, RN  Outcome: Progressing  Flowsheets (Taken 8/29/2024 1924)  Care Plan - Patient's Chronic Conditions and Co-Morbidity Symptoms are Monitored and Maintained or Improved: Monitor and assess patient's chronic conditions and comorbid symptoms for stability, deterioration, or improvement     Problem: Skin/Tissue Integrity  Goal: Absence of new skin breakdown  Description: 1.  Monitor for areas of redness and/or skin breakdown  2.  Assess vascular access sites hourly  3.  Every 4-6 hours minimum:  Change oxygen saturation probe site  4.  Every 4-6 hours:  If on nasal continuous positive airway pressure, respiratory therapy assess nares and determine need for appliance change or resting period.  8/30/2024 1436 by Arianna Hernandez, RN  Outcome: Progressing  8/30/2024 0216 by Virgen Fink, RN  Outcome: Progressing

## 2024-08-30 NOTE — PROGRESS NOTES
Chin Suarez MD Caswell, Mallory J, RN  Caller: Unspecified (Today,  9:36 AM)  If no symptoms no need for stress test.   Also, if I have seen his wife no issue with tandem but if she is a new consult then would not want back to back.   Lastly, would like FLP and A1c since will be over a year since prior.   LF                     Orders placed for labs. Msg to schedulers to please arrange and adjust schedule accordingly as above- pt has been seen by [facility] but it has been over 5 years, thus, new consult. -[facility] [place]    Facility/Department: [facility] REHAB UNIT  Occupational Therapy Treatment Note    Name: Jose Tate  : 1940  MRN: [id number]  Date of Service: 2024    Discharge Recommendations:  Home with Home health OT, Home with assist PRN  OT Equipment Recommendations  Equipment Needed: No  Other: Daughter has already bought a rollator.       Patient Diagnosis(es): The primary encounter diagnosis was Acute kidney injury (HCC). Diagnoses of Permanent atrial fibrillation (HCC), Chronic systolic (congestive) heart failure, Coronary artery disease involving native heart with angina pectoris, unspecified vessel or lesion type (HCC), Anemia in other chronic diseases classified elsewhere, Shortness of breath, New onset a-fib (HCC), and Type 2 diabetes mellitus without complication, without long-term current use of insulin (HCC) were also pertinent to this visit.  Past Medical History:  has a past medical history of Allergic rhinitis, Bronchitis, chronic (HCC), Carotid artery stenosis, GERD (gastroesophageal reflux disease), Heart attack (HCC), Hemorrhoids, Hypercholesterolemia, Palliative care patient, and Type II or unspecified type diabetes mellitus without mention of complication, not stated as uncontrolled.  Past Surgical History:  has a past surgical history that includes Cardiac surgery; Port Surgery (Right, 2021); and ep device procedure (Left, 2024).    Treatment Diagnosis: Acute Kidney failure, Pacemaker placement on 2024    Assessment   Performance deficits / Impairments: Decreased functional mobility ;Decreased strength;Decreased endurance;Decreased high-level IADLs  Treatment Diagnosis: Acute Kidney failure, Pacemaker placement on 2024  Activity Tolerance  Activity Tolerance: Patient Tolerated treatment well              Plan   Occupational Therapy Plan  Current Treatment Recommendations: Strengthening, Functional mobility training, Endurance training, Home management training      Restrictions  Restrictions/Precautions  Restrictions/Precautions: Up Ad Rianna  Required Braces or Orthoses?: No  Implants present? : Pacemaker  Lower Extremity Weight Bearing Restrictions  Right Lower Extremity Weight Bearing: Weight Bearing As Tolerated  Left Lower Extremity Weight Bearing: Weight Bearing As Tolerated  Position Activity Restriction  Other position/activity restrictions: Pacemaker precautions.    Subjective   General  Chart Reviewed: Yes  Patient assessed for rehabilitation services?: Yes  Response to previous treatment: Patient with no complaints from previous session  Family / Caregiver Present: No     Social/Functional History  Social/Functional History  Lives With: Alone  Type of Home: House  Home Layout: One level  Home Access: Ramped entrance, Stairs to enter with rails  Bathroom Shower/Tub: Walk-in shower  Bathroom Equipment: Grab bars in shower, Grab bars around toilet, Built-in shower seat  Home Equipment: Walker - Standard, Walker - 4-Wheeled  Has the patient had two or more falls in the past year or any fall with injury in the past year?: No  ADL Assistance: Independent  Homemaking Assistance: Independent  Homemaking Responsibilities: Yes  Ambulation Assistance: Independent (No AD)  Transfer Assistance: Independent  Active : Yes  Mode of Transportation: Car  Occupation: Retired  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 Used to golf.  Additional Comments: Daughter works full time and has 2 children with disabilities, Other daughter lives with Jere.       Objective   Functional Mobility  Functional - Mobility Device: 4-Wheeled Walker  Activity: To/From therapy gym, Retrieve items, Transport items  Assist Level: Modified independent   Functional Mobility Comments: VC for sitting down on rollator and then will unlock brakes before standing up. Then attempt to ambulate with rollator behind him.  Temp: 98.1 °F  (36.7 °C)  Pulse: 97  Heart Rate Source: Monitor  Respirations: 20  SpO2: 94 %  O2 Device: None (Room air)  BP: 131/64  MAP (Calculated): 86  BP Location: Left upper arm             Safety Devices  Type of Devices: Call light within reach;Left in chair       Transfers  Stand Step Transfers: Modified independent  Sit to stand: Modified independent  Stand to sit: Modified independent  Transfer Comments: Patient did much better with locking/unlocking brakes on rollator before sitting down on rollator and standing up from rollator after sitting.           Education  Education Given To: Patient  Education Provided: Home Exercise Program, Precautions  Education Provided Comments: Educated on exercises with pacemaker precautions. Given HEP.  Education Method: Demonstration, Verbal, Printed Information/Hand-outs  Barriers to Learning: None  Education Outcome: Verbalized understanding, Demonstrated understanding, Continued education needed     OutComes Score    Balance  Sitting Balance: Independent  Standing Balance: Modified independent            Goals  Short Term Goals  Time Frame for Short Term Goals: 1 week  Short Term Goal 1: MET  Short Term Goal 2: MET  Short Term Goal 3: MET  Short Term Goal 4: MET  Short Term Goal 5: MET  Short Term Goal 6: MET  Short Term Goal 7: MET  Long Term Goals  Time Frame for Long Term Goals : 2-3 weeks  Long Term Goal 1: MET  Long Term Goal 2: MET  Long Term Goal 3: MET  Long Term Goal 4: MET  Long Term Goal 5: MET  Patient Goals   Patient goals : Return home independently.     Therapy Time   Individual Concurrent Group Co-treatment   Time In 1300         Time Out 1400         Minutes 60         Timed Code Treatment Minutes: 60 Minutes       Electronically signed by Renae Saldana OT on 8/30/2024 at 2:34 PM

## 2024-08-30 NOTE — PROGRESS NOTES
[facility] Inpatient Rehabilitation Unit  Test for Patient [place] in the Areas of Transfers/Ambulation    Ambulation/Transfers   Independent ambulation in room with assistive device:  Yes     -Device Type:  Rollator   Independent transfers from wheelchair to surface in room:  Yes     Bathroom Transfers  Independent transfers to toilet: Yes   Independent transfers for shower:  Yes     Ambulation in hallway  Patient able to ambulate in hallway with device:   Yes          Inpatient Rehabilitation Nursing and Therapies feel as though the patient qualifies for an acute rehabilitation test for independence in the areas of transfers and ambulation prior to discharging from Inpatient Rehabilitation Unit at [facility].  This test for independence in the areas of transfers and ambulation must be agreed upon by the patient's physician, nurse, and therapists.        Nurse Approval:  Electronically signed by Arianna Hernandez RN on 8/30/2024 at 10:09 AM      Physical Therapist Approval:  Electronically signed by Jose Centeno PT on 8/30/24 at 11:08 AM CDT      Occupational Therapist Approval: Electronically signed by Renae Saldana OT on 8/30/24 at 10:01 AM CDT

## 2024-08-30 NOTE — PROGRESS NOTES
Physical Therapy    Name: Jose Tate  MRN: [id number]  Date of service: 8/30/2024 08/30/24 1400   Restrictions/Precautions   Restrictions/Precautions Up Ad Rianna   Implants present?  Pacemaker   Lower Extremity Weight Bearing Restrictions   Right Lower Extremity Weight Bearing Weight Bearing As Tolerated   Left Lower Extremity Weight Bearing Weight Bearing As Tolerated   Position Activity Restriction   Other position/activity restrictions Pacemaker precautions.   General   Diagnosis 8/19 PACEMAKER PLACEMENT; ACUTE KIDNEY INJURY   General Comment   Comments Pt in room after OT   Subjective   Subjective agreed to therapy   Subjective   Pain denies pain   Transfers   Sit to Stand Modified independent   Stand to Sit Modified independent   Comment with rollator to/from recliner   Ambulation   WB Status WBAT   Ambulation   Surface Level tile   Device Rollator   Assistance Supervision   Quality of Gait to the WC   Distance 5x2   Comments to/from recliner   Ambulation 2   Surface - 2 level tile   Device 2 Rollator   Assistance 2 Modified Independent   Quality of Gait 2 in hallway multiple turns   Distance 200ft   PT Exercises   Exercise Treatment sitting in WC: ankle pumps, LAQ, marching, hip ABD with red band, and hip ADD with yellow ball x 10.   PROM Exercises standing with UE support: heel raises, hip ABD, hip EXT, marching, and mini squats x 10  (rested half way between exercises)   Short Term Goals   Time Frame for Short Term Goals 2 WEEKS   Short Term Goal 1 BED MOBILITY INDEP WITH RAILS   Short Term Goal 2 TF SURFACE TO SURFACE MIN A WITH A.D.   Short Term Goal 3 AMBULATE 50 FT WITH RW UTILZING PROPER FORM MIN A   Short Term Goal 4 UP/DOWN 4 STEPS 1-2 RAILS MOD A   Short Term Goal 5 CAR TF MOD A WITH A.D.   Long Term Goals   Time Frame for Long Term Goals  4 WEEKS   Long Term Goal 1 INDEP BED MOBILITY WITHOUT RAILD   Long Term Goal 2 TF SURFACE TO SURFACE SBA WITH A.D.   Long Term Goal 3 AMBUALTE 50 FT WITH

## 2024-08-30 NOTE — PROGRESS NOTES
08/30/24 1100   Bed mobility   Rolling to Left Independent   Rolling to Right Independent   Supine to Sit Independent   Sit to Supine Independent   Transfers   Sit to Stand Modified independent   Stand to Sit Modified independent   Bed to Chair Modified independent   Car Transfer Modified independent  (WITH ROLLATOR)   Ambulation   Device Rollator   Assistance Supervision   Distance 125 FT X2   Comments TURN TO SIT ROLLATOR FOR REST IN BETWEEN BOUTS OF AMBULATION.  PROPER USE OF BREAKS. CUED TO BRACE ROLLATOR AGAINST WALL FOR MAXIMAL SAFETY.   Stairs   # Steps  12   Rails Bilateral   Assistance Contact guard assistance   Comment ALTERNATED WHICH FOOT ASCEND WITH. CONSISTNTLY DESCENDED WITH RIGHT.  D/T HX OF RIGHT KNEE PAIN, INSTRUCTED PATIENT TO ASCEND WITH LEFT EXCLUSIVELY, CONTINUE DESCENDING WITH RIGHT.   Propulsion 1   Method RUE;LUE   Level of Assistance Supervision   Description/ Details SLOW.  VEERS LEFTWARD. SHORT STROKES   Distance 150 FT   Assessment   Assessment OK FOR UP AD AMADOU.  ALL D/C FUNCTIONAL TASKS COMLETED.   WOULD BENEFIT FROM SITTING, SUPINE HEP PAPERWORK GIVEN THIS PM.

## 2024-08-31 VITALS
TEMPERATURE: 97.9 F | SYSTOLIC BLOOD PRESSURE: 149 MMHG | OXYGEN SATURATION: 97 % | WEIGHT: 178.3 LBS | HEART RATE: 96 BPM | BODY MASS INDEX: 25.53 KG/M2 | RESPIRATION RATE: 16 BRPM | DIASTOLIC BLOOD PRESSURE: 52 MMHG | HEIGHT: 70 IN

## 2024-08-31 LAB
GLUCOSE BLD-MCNC: 141 MG/DL (ref 70–99)
PERFORMED ON: ABNORMAL

## 2024-08-31 PROCEDURE — 99239 HOSP IP/OBS DSCHRG MGMT >30: CPT | Performed by: PSYCHIATRY & NEUROLOGY

## 2024-08-31 PROCEDURE — 94760 N-INVAS EAR/PLS OXIMETRY 1: CPT

## 2024-08-31 PROCEDURE — 6370000000 HC RX 637 (ALT 250 FOR IP): Performed by: STUDENT IN AN ORGANIZED HEALTH CARE EDUCATION/TRAINING PROGRAM

## 2024-08-31 PROCEDURE — 6370000000 HC RX 637 (ALT 250 FOR IP): Performed by: PSYCHIATRY & NEUROLOGY

## 2024-08-31 PROCEDURE — 6370000000 HC RX 637 (ALT 250 FOR IP): Performed by: NURSE PRACTITIONER

## 2024-08-31 PROCEDURE — 6370000000 HC RX 637 (ALT 250 FOR IP): Performed by: INTERNAL MEDICINE

## 2024-08-31 PROCEDURE — 82962 GLUCOSE BLOOD TEST: CPT

## 2024-08-31 RX ADMIN — APIXABAN 2.5 MG: 2.5 TABLET, FILM COATED ORAL at 07:48

## 2024-08-31 RX ADMIN — PRIMIDONE 50 MG: 50 TABLET ORAL at 07:48

## 2024-08-31 RX ADMIN — FUROSEMIDE 40 MG: 40 TABLET ORAL at 07:48

## 2024-08-31 RX ADMIN — TAMSULOSIN HYDROCHLORIDE 0.4 MG: 0.4 CAPSULE ORAL at 07:48

## 2024-08-31 RX ADMIN — ATORVASTATIN CALCIUM 20 MG: 20 TABLET, FILM COATED ORAL at 07:48

## 2024-08-31 RX ADMIN — METOPROLOL SUCCINATE 50 MG: 50 TABLET, EXTENDED RELEASE ORAL at 07:48

## 2024-08-31 RX ADMIN — CETIRIZINE HYDROCHLORIDE 10 MG: 10 TABLET, FILM COATED ORAL at 07:48

## 2024-08-31 RX ADMIN — BENZONATATE 200 MG: 100 CAPSULE ORAL at 07:48

## 2024-08-31 RX ADMIN — DILTIAZEM HYDROCHLORIDE 120 MG: 120 CAPSULE, COATED, EXTENDED RELEASE ORAL at 07:48

## 2024-08-31 RX ADMIN — PANTOPRAZOLE SODIUM 40 MG: 40 TABLET, DELAYED RELEASE ORAL at 05:43

## 2024-08-31 RX ADMIN — GLIPIZIDE 5 MG: 5 TABLET ORAL at 05:43

## 2024-08-31 NOTE — DISCHARGE SUMMARY
Physical Therapy DISCHARGE Note  DATE:  2024  NAME:  Jose Tate  :  1940  (84 y.o.,male)  MRN:  [id number]    HEIGHT:  Height: 177.8 cm (5' 10\")  WEIGHT:  Weight - Scale: 80.9 kg (178 lb 4.8 oz)    PATIENT DIAGNOSIS(ES):    Diagnosis:  PACEMAKER PLACEMENT; ACUTE KIDNEY INJURY    Additional Pertinent Hx: IDDM, MI, GERD, COPD, chronic cough, chronic systolic CHF, HTN, and hypercholesterolemia  RESTRICTIONS/PRECAUTIONS:    Restrictions/Precautions  Restrictions/Precautions: Up Ad Rianna  Required Braces or Orthoses?: No  Implants present? : Pacemaker  Position Activity Restriction  Other position/activity restrictions: Pacemaker precautions.  OVERALL  ORIENTATION STATUS:  Overall Orientation Status: Within Normal Limits  PAIN:  Pain Level: 0                    GROSS ASSESSMENT        POSTURE/BALANCE          ACTIVITY TOLERANCE  Activity Tolerance  Activity Tolerance: Patient tolerated treatment well      BED MOBILITY  Bed mobility  Rolling to Left: Independent  Rolling to Right: Independent  Supine to Sit: Independent  Sit to Supine: Independent  Bed Mobility Comments: TRIPLANAR TO RIGHT WITH RAILS        TRANSFERS  Transfers  Sit to Stand: Modified independent  Stand to Sit: Modified independent  Bed to Chair: Modified independent  Car Transfer: Modified independent (WITH ROLLATOR)  Comment: with rollator to/from recliner       AMBULATION 1  Ambulation  Surface: Level tile  Device: Rollator  Other Apparatus:  (ROOM AIR)  Assistance: Supervision  Quality of Gait: to the WC  Distance: 5x2  Comments: to/from recliner  More Ambulation?: Yes  AMBULATION 2  Ambulation 2  Surface - 2: level tile  Device 2: Rollator  Other Apparatus 2:  (ROOM AIR)  Assistance 2: Modified Independent  Quality of Gait 2: in hallway multiple turns  Distance: 200ft  Comments: Patient sat on rollator safely SBA.  STAIRS  Stairs  # Steps : 12  Rails: Bilateral  Assistance: Contact guard assistance  Comment: ALTERNATED WHICH FOOT  Recommendations: Home with Home health PT    PLAN:  Discharge Recommendations: Home with Home health PT    PATIENT GOAL FOR REHAB:  RETURN TO PRIOR LEVEL OF FUNCTION       IRF/DONNIE  Roll Left and Right  Assistance Needed: Independent  CARE Score: 6  Discharge Goal: Independent    Sit to Lying  Assistance Needed: Independent  CARE Score: 6  Discharge Goal: Independent    Lying to Sitting on Side of Bed  Assistance Needed: Independent  CARE Score: 6  Discharge Goal: Independent    Sit to Stand  Assistance Needed: Independent  CARE Score: 6  Discharge Goal: Supervision or touching assistance    Chair/Bed-to-Chair Transfer  Assistance Needed: Independent  CARE Score: 6  Discharge Goal: Supervision or touching assistance    Car Transfer  Assistance Needed: Independent  Reason if not Attempted: Not attempted due to medical condition or safety concerns  CARE Score: 6  Discharge Goal: Supervision or touching assistance    Walk 10 Feet  Assistance Needed: Independent  CARE Score: 6  Discharge Goal: Supervision or touching assistance    Walk 50 Feet with Two Turns  Assistance Needed: Independent  Reason if not Attempted: Not attempted due to medical condition or safety concerns  CARE Score: 6  Discharge Goal: Supervision or touching assistance    Walk 150 Feet  Assistance Needed: Independent  Reason if not Attempted: Not attempted due to medical condition or safety concerns  CARE Score: 6  Discharge Goal: Not Attempted    Walking 10 Feet on Uneven Surfaces  Reason if not Attempted: Not attempted due to medical condition or safety concerns  CARE Score: 88  Discharge Goal: Not Attempted    1 Step (Curb)  Assistance Needed: Supervision or touching assistance  Reason if not Attempted: Not attempted due to medical condition or safety concerns  CARE Score: 4  Discharge Goal: Supervision or touching assistance    4 Steps  Assistance Needed: Supervision or touching assistance  Reason if not Attempted: Not attempted due to medical

## 2024-08-31 NOTE — PLAN OF CARE
Problem: Safety - Adult  Goal: Free from fall injury  8/30/2024 2314 by Eli Florian RN  Outcome: Progressing  8/30/2024 1436 by Arianna Hernandez RN  Outcome: Progressing     Problem: Discharge Planning  Goal: Discharge to home or other facility with appropriate resources  8/30/2024 2314 by Eli Florian RN  Outcome: Progressing  8/30/2024 1436 by Arianna Hernandez RN  Outcome: Progressing  Flowsheets (Taken 8/30/2024 0907)  Discharge to home or other facility with appropriate resources: Refer to discharge planning if patient needs post-hospital services based on physician order or complex needs related to functional status, cognitive ability or social support system     Problem: Pain  Goal: Verbalizes/displays adequate comfort level or baseline comfort level  8/30/2024 2314 by Eli Florian RN  Outcome: Progressing  8/30/2024 1436 by Arianna Hernandez RN  Outcome: Progressing     Problem: ABCDS Injury Assessment  Goal: Absence of physical injury  8/30/2024 2314 by Eli Florian RN  Outcome: Progressing  8/30/2024 1436 by Arianna Hernandez RN  Outcome: Progressing

## 2024-08-31 NOTE — PLAN OF CARE
Problem: Safety - Adult  Goal: Free from fall injury  8/31/2024 0828 by Arianna Hernandez RN  Outcome: Adequate for Discharge  8/30/2024 2314 by Eli Florian RN  Outcome: Progressing     Problem: Discharge Planning  Goal: Discharge to home or other facility with appropriate resources  8/31/2024 0828 by Arianna Hernandez RN  Outcome: Adequate for Discharge  Flowsheets (Taken 8/31/2024 0730)  Discharge to home or other facility with appropriate resources: Refer to discharge planning if patient needs post-hospital services based on physician order or complex needs related to functional status, cognitive ability or social support system  8/30/2024 2314 by Eli Florian RN  Outcome: Progressing     Problem: Pain  Goal: Verbalizes/displays adequate comfort level or baseline comfort level  8/31/2024 0828 by Arianna Hernandez RN  Outcome: Adequate for Discharge  8/30/2024 2314 by Eli Florian RN  Outcome: Progressing     Problem: ABCDS Injury Assessment  Goal: Absence of physical injury  8/31/2024 0828 by Arianna Hernandez RN  Outcome: Adequate for Discharge  8/30/2024 2314 by Eli Florian RN  Outcome: Progressing     Problem: Nutrition Deficit:  Goal: Optimize nutritional status  8/31/2024 0828 by Arianna Hernandez RN  Outcome: Adequate for Discharge  8/30/2024 2314 by Eli Florian RN  Outcome: Progressing     Problem: Chronic Conditions and Co-morbidities  Goal: Patient's chronic conditions and co-morbidity symptoms are monitored and maintained or improved  8/31/2024 0828 by Arianan Hernandez RN  Outcome: Adequate for Discharge  Flowsheets (Taken 8/31/2024 0730)  Care Plan - Patient's Chronic Conditions and Co-Morbidity Symptoms are Monitored and Maintained or Improved: Monitor and assess patient's chronic conditions and comorbid symptoms for stability, deterioration, or improvement  8/30/2024 2314 by Eli Florian RN  Outcome: Progressing     Problem: Skin/Tissue Integrity  Goal:  Absence of new skin breakdown  Description: 1.  Monitor for areas of redness and/or skin breakdown  2.  Assess vascular access sites hourly  3.  Every 4-6 hours minimum:  Change oxygen saturation probe site  4.  Every 4-6 hours:  If on nasal continuous positive airway pressure, respiratory therapy assess nares and determine need for appliance change or resting period.  8/31/2024 0828 by Arianna Hernandez, RN  Outcome: Adequate for Discharge  8/30/2024 7134 by Eli Florian, RN  Outcome: Progressing

## 2024-08-31 NOTE — PROGRESS NOTES
Patient:   Jose Tate  MR#:    [id number]   Room:    0824/824-02   YOB: 1940  Date of Progress Note: 8/31/2024  Time of Note                           7:45 AM  Consulting Physician:   Kwame Rogers M.D.  Attending Physician:  Kwame Rogers MD     Chief complaint Acute kidney injury     S:This 84 y.o. male  with history IDDM, MI, GERD, COPD, chronic cough, chronic systolic CHF, HTN, and hypercholesterolemia. He presented to [facility] ER on 8/15/24 with c/o progressive fatigue, weakness and fall over the past couple of weeks. Reportedly had syncopal episode night prior to admission. Workup in ER revealed new onset Afib with RVR, BUN 40, Cr 2.6, Mg 1.3, CRP 30.0, BNP 5469, WBC 15.0, Hgb 10.0, chest x-ray indicated mild density in the right base may represent atelectasis or pneumonia, lungs are otherwise clear. IV Cardizem bolus and drip was initiated in ER. Patient noted to have TOMER with creatine of 2.6 with no history of CKD. He was given LR in ER and diuretics were placed on hold. Patient was admitted to the Hospitalist service with consult for cardiology. He was seen by Dr. Daly, who recommended low-dose Toprol, Eliquis 2.5 mg twice daily on discharge. ECHO indicated normal LV size with LVEF of 55 to 60%, mildly increased thickness, normal wall motion, normal diastolic function. Noted to have bradycardia. VQ scan indicated right midlung and lower lung zone small perfusion defects, biapical decreased uptake which could be related to emphysema, low to immediate probability. Cardiology further recommended for pacemaker. Patient was in agreement and underwent pacemaker placement by Dr. Willson on 8/19/24. Continued to remain sinus Tachycardia with frequent PACs, Cardiology further recommended adding beta blocker and start Eliquis. Patient is currently on Lovenox 1 mg/kg BID with plans to transition to Eliquis on discharge. Patients renal function is has shown improvement currently with creatine at    Eyes - conjunctiva normal.   Ear, nose, throat - No scars, masses, or lesions over external nose or ears, no atrophy of tongue  Neck-symmetric, no masses noted, no jugular vein distension  Respiration- chest wall appears symmetric, good expansion,   normal effort without use of accessory muscles  Musculoskeletal - no significant wasting of muscles noted, no bony deformities  Extremities-no clubbing, cyanosis or edema  Skin - warm, dry, and intact. No rash, erythema, or pallor.  Psychiatric - mood, affect, and behavior appear normal.      Neurological exam  Awake, alert, fluent oriented appropriate affect  Attention and concentration appear appropriate  Recent and remote memory appears unremarkable  Speech normal without dysarthria  No clear issues with language of fund of knowledge     Cranial Nerve Exam     CN III, IV,VI-EOMI, No nystagmus, conjugate eye movements, no ptosis    CN VII-no facial assymetry       Motor Exam  antigravity throughout upper and lower extremities bilaterally      Tremors- no tremors in hands or head noted     Gait  Not tested     Nursing/pcp notes, imaging,labs and vitals reviewed.     PT,OT and/or speech notes reviewed    Lab Results   Component Value Date    WBC 8.8 08/29/2024    HGB 9.5 (L) 08/29/2024    HCT 30.4 (L) 08/29/2024    MCV 95.9 (H) 08/29/2024     08/29/2024     Lab Results   Component Value Date     (L) 08/29/2024    K 4.6 08/29/2024    CL 98 08/29/2024    CO2 29 08/29/2024    BUN 33 (H) 08/29/2024    CREATININE 1.8 (H) 08/29/2024    GLUCOSE 171 (H) 08/29/2024    CALCIUM 9.2 08/29/2024    BILITOT 0.2 08/29/2024    ALKPHOS 82 08/29/2024    AST 17 08/29/2024    ALT 16 08/29/2024    LABGLOM 36 (A) 08/29/2024    GFRAA 54 (L) 06/03/2021    GLOB 3.0 07/19/2024     Lab Results   Component Value Date    INR 1.18 08/16/2024    PROTIME 14.7 (H) 08/16/2024       Jose Centeno, PT  Physical Therapist  Physical Therapy     Progress Notes      Signed     Date of Service:

## 2024-09-03 ENCOUNTER — CARE COORDINATION (OUTPATIENT)
Dept: CASE MANAGEMENT | Age: 84
End: 2024-09-03

## 2024-09-03 NOTE — DISCHARGE SUMMARY
Occupational Therapy Discharge Summary         Date: 9/3/2024  Patient Name: Jose Tate        MRN: [id number]    : 1940  (84 y.o.)  Gender: male      Diagnosis:  PACEMAKER PLACEMENT; ACUTE KIDNEY INJURY  Restrictions/Precautions  Restrictions/Precautions: Up Ad Rianna  Required Braces or Orthoses?: No  Implants present? : Pacemaker      Discharge Date:24       UE Functioning:  WFLs    Home Management:  Functional Mobility  Functional - Mobility Device: 4-Wheeled Walker  Activity: To/From therapy gym, Retrieve items, Transport items  Assist Level: Modified independent   Functional Mobility Comments: VC for sitting down on rollator and then will unlock brakes before standing up. Then attempt to ambulate with rollator behind him.    Adaptive Equipment/DME Status:  Bathroom Equipment: Grab bars in shower, Grab bars around toilet, Built-in shower seat  Home Equipment: Walker - Standard, Walker - 4-Wheeled      Pain Assessment:          Remaining Problems:  Decreased functional mobility ;Decreased strength;Decreased endurance;Decreased high-level IADLs     STGs:  Short Term Goals  Time Frame for Short Term Goals: 1 week  Short Term Goal 1: Supervision with toileting.  Short Term Goal 2: Supervision with toilet transfers.  Short Term Goal 3: Supervision with bathing hygiene.  Short Term Goal 4: Supervision with LB dressing.  Short Term Goal 5: Supervision with donning/doffing socks and shoes.  Short Term Goal 6: Setup for UB dressing.  Short Term Goal 7: Patient will complete 1-2 handed static standing task for 3 minutes, requiring CGA.  Met all STGs    LTGs:  Long Term Goals  Time Frame for Long Term Goals : 2-3 weeks  Long Term Goal 1: Mod I with toileting and toilet transfers.  Long Term Goal 2: Mod I with bathing hygiene.  Long Term Goal 3: Mod I with overall dressing.  Long Term Goal 4: Mod I with ambulatory homemaking tasks.  Long Term Goal 5: Patient verbalize DME needs.  Met all LTGs

## 2024-09-03 NOTE — CARE COORDINATION
Care Transitions Note    Initial Call - Call within 2 business days of discharge: Yes    Attempted to reach patient for transitions of care follow up. Unable to reach patient.    Outreach Attempts:   Attempted to make contact with Jose for an initial follow up call post discharge from the hospital without success.  Unable to leave a message regarding intent of call and call back information.  Will try again my next business day.       Patient: Jose Tate    Patient : 1940   MRN: [id number]    Reason for Admission:  Discharge Date: 24  RURS: Readmission Risk Score: 21.7    Last Discharge Facility       Date Complaint Diagnosis Description Type Department Provider    24  Acute kidney injury (HCC) ... Admission (Discharged) [facility] REH[facility] Kwame Rogers MD            Was this an external facility discharge? No    Follow Up Appointment:   Patient has hospital follow up appointment scheduled within 7 days of discharge.    Future Appointments         Provider Specialty Dept Phone    2024 2:15 PM Chayo Grande MD Primary Care [phone]    2024 8:00 AM Kendell Willson MD Cardiology [phone]    2024 10:30 AM Renetta Mckeon, TAWANA - NP Cardiology [phone]    2024 12:00 PM SCHEDULE, [facility] MED ONC Infusion Therapy [phone]    11/15/2024 11:30 AM SCHEDULE, [facility] MED ONC Infusion Therapy [phone]    2025 11:30 AM (Arrive by 11:15 AM) [facility] CT RM 1 Radiology [phone]    2025 11:30 AM Darvin Malloy MD Hematology and Oncology [phone]    2025 11:30 AM SCHEDULE, [facility] MED ONC Infusion Therapy [phone]            Plan for follow-up on next business day.      Ranjeet Holley RN

## 2024-09-04 ENCOUNTER — CARE COORDINATION (OUTPATIENT)
Dept: CASE MANAGEMENT | Age: 84
End: 2024-09-04

## 2024-09-04 NOTE — CONSULTS
Pacemaker/AICD educational packet and cover letter sent to the patient's mailing address on record.  Information included; incision care, affected arm ROM and weight bearing limitations, present and future precautionary measures, sign & symptom awareness with reasons to call the cardiologist, keeping of appointments, carrying identification card and transmitter operation.  Patient instructed to contact cardiologist's office with questions or concerns.

## 2024-09-04 NOTE — CARE COORDINATION
Care Transitions Note    Initial Call - Call within 2 business days of discharge: Yes    Attempted to reach patient for transitions of care follow up. Unable to reach patient.    Outreach Attempts:   Attempted to make contact with Jose for an initial follow up call post discharge from the hospital without success.  Unable to leave a message regarding intent of call and call back information.  Will discharge from CTN services at this time due to inability to make contact.      Patient: Jose Tate    Patient : 1940   MRN: [id number]    Reason for Admission:   Discharge Date: 24  RURS: Readmission Risk Score: 21.7    Last Discharge Facility       Date Complaint Diagnosis Description Type Department Provider    24  Acute kidney injury (HCC) ... Admission (Discharged) [facility] RE[facility] Kwame Rogers MD            Was this an external facility discharge? No    Follow Up Appointment:   Patient has hospital follow up appointment scheduled within 7 days of discharge.    Future Appointments         Provider Specialty Dept Phone    2024 2:15 PM Chayo Grande MD Primary Care [phone]    2024 10:30 AM Vanessa Cifuentes APRN - CNP Palliative Care [phone]    2024 8:00 AM Kendell Willson MD Cardiology [phone]    2024 10:30 AM Renetta Mckeon APRN - NP Cardiology [phone]    2024 12:00 PM SCHEDULE, [facility] MED ONC Infusion Therapy [phone]    11/15/2024 11:30 AM SCHEDULE, [facility] MED ONC Infusion Therapy [phone]    2025 11:30 AM (Arrive by 11:15 AM) [facility] CT RM 1 Radiology [phone]    2025 11:30 AM Darvin Malloy MD Hematology and Oncology [phone]    2025 11:30 AM SCHEDULE, [facility] MED ONC Infusion Therapy [phone]            No further follow-up call indicated     Ranjeet Holley RN

## 2024-09-05 ENCOUNTER — OFFICE VISIT (OUTPATIENT)
Dept: PRIMARY CARE CLINIC | Age: 84
End: 2024-09-05

## 2024-09-05 VITALS
TEMPERATURE: 97.1 F | RESPIRATION RATE: 18 BRPM | HEART RATE: 80 BPM | SYSTOLIC BLOOD PRESSURE: 134 MMHG | BODY MASS INDEX: 25.05 KG/M2 | HEIGHT: 70 IN | WEIGHT: 175 LBS | DIASTOLIC BLOOD PRESSURE: 80 MMHG

## 2024-09-05 DIAGNOSIS — D64.9 ANEMIA, UNSPECIFIED TYPE: ICD-10-CM

## 2024-09-05 DIAGNOSIS — N17.9 AKI (ACUTE KIDNEY INJURY) (HCC): ICD-10-CM

## 2024-09-05 DIAGNOSIS — I10 ESSENTIAL HYPERTENSION: ICD-10-CM

## 2024-09-05 DIAGNOSIS — Z09 HOSPITAL DISCHARGE FOLLOW-UP: Primary | ICD-10-CM

## 2024-09-05 DIAGNOSIS — I48.91 NEW ONSET A-FIB (HCC): ICD-10-CM

## 2024-09-05 DIAGNOSIS — E44.1 MILD MALNUTRITION (HCC): Chronic | ICD-10-CM

## 2024-09-05 DIAGNOSIS — I50.22 CHRONIC SYSTOLIC (CONGESTIVE) HEART FAILURE (HCC): ICD-10-CM

## 2024-09-05 PROBLEM — D68.69 SECONDARY HYPERCOAGULABLE STATE (HCC): Status: ACTIVE | Noted: 2024-09-05

## 2024-09-05 LAB
ALBUMIN SERPL-MCNC: 3.6 G/DL (ref 3.5–5.2)
ALP SERPL-CCNC: 83 U/L (ref 40–129)
ALT SERPL-CCNC: 16 U/L (ref 5–41)
ANION GAP SERPL CALCULATED.3IONS-SCNC: 11 MMOL/L (ref 7–19)
AST SERPL-CCNC: 24 U/L (ref 5–40)
BASOPHILS # BLD: 0.1 K/UL (ref 0–0.2)
BASOPHILS NFR BLD: 1.4 % (ref 0–1)
BILIRUB SERPL-MCNC: 0.2 MG/DL (ref 0.2–1.2)
BNP BLD-MCNC: 931 PG/ML (ref 0–449)
BUN SERPL-MCNC: 27 MG/DL (ref 8–23)
CALCIUM SERPL-MCNC: 9 MG/DL (ref 8.8–10.2)
CHLORIDE SERPL-SCNC: 99 MMOL/L (ref 98–111)
CO2 SERPL-SCNC: 26 MMOL/L (ref 22–29)
CREAT SERPL-MCNC: 1.5 MG/DL (ref 0.7–1.2)
EOSINOPHIL # BLD: 0.3 K/UL (ref 0–0.6)
EOSINOPHIL NFR BLD: 4.3 % (ref 0–5)
ERYTHROCYTE [DISTWIDTH] IN BLOOD BY AUTOMATED COUNT: 13.9 % (ref 11.5–14.5)
GLUCOSE SERPL-MCNC: 181 MG/DL (ref 70–99)
HCT VFR BLD AUTO: 31.5 % (ref 42–52)
HGB BLD-MCNC: 10.1 G/DL (ref 14–18)
IMM GRANULOCYTES # BLD: 0 K/UL
LYMPHOCYTES # BLD: 1.2 K/UL (ref 1.1–4.5)
LYMPHOCYTES NFR BLD: 18.6 % (ref 20–40)
MCH RBC QN AUTO: 30.2 PG (ref 27–31)
MCHC RBC AUTO-ENTMCNC: 32.1 G/DL (ref 33–37)
MCV RBC AUTO: 94.3 FL (ref 80–94)
MONOCYTES # BLD: 0.4 K/UL (ref 0–0.9)
MONOCYTES NFR BLD: 5.6 % (ref 0–10)
NEUTROPHILS # BLD: 4.4 K/UL (ref 1.5–7.5)
NEUTS SEG NFR BLD: 69.8 % (ref 50–65)
PLATELET # BLD AUTO: 270 K/UL (ref 130–400)
PMV BLD AUTO: 10.4 FL (ref 9.4–12.4)
POTASSIUM SERPL-SCNC: 4.7 MMOL/L (ref 3.5–5)
PROT SERPL-MCNC: 6.8 G/DL (ref 6.4–8.3)
RBC # BLD AUTO: 3.34 M/UL (ref 4.7–6.1)
SODIUM SERPL-SCNC: 136 MMOL/L (ref 136–145)
WBC # BLD AUTO: 6.3 K/UL (ref 4.8–10.8)

## 2024-09-05 SDOH — ECONOMIC STABILITY: FOOD INSECURITY: WITHIN THE PAST 12 MONTHS, THE FOOD YOU BOUGHT JUST DIDN'T LAST AND YOU DIDN'T HAVE MONEY TO GET MORE.: NEVER TRUE

## 2024-09-05 SDOH — ECONOMIC STABILITY: FOOD INSECURITY: WITHIN THE PAST 12 MONTHS, YOU WORRIED THAT YOUR FOOD WOULD RUN OUT BEFORE YOU GOT MONEY TO BUY MORE.: NEVER TRUE

## 2024-09-05 SDOH — ECONOMIC STABILITY: INCOME INSECURITY: HOW HARD IS IT FOR YOU TO PAY FOR THE VERY BASICS LIKE FOOD, HOUSING, MEDICAL CARE, AND HEATING?: NOT HARD AT ALL

## 2024-09-05 ASSESSMENT — PATIENT HEALTH QUESTIONNAIRE - PHQ9
1. LITTLE INTEREST OR PLEASURE IN DOING THINGS: NOT AT ALL
SUM OF ALL RESPONSES TO PHQ9 QUESTIONS 1 & 2: 0
SUM OF ALL RESPONSES TO PHQ QUESTIONS 1-9: 0
2. FEELING DOWN, DEPRESSED OR HOPELESS: NOT AT ALL
SUM OF ALL RESPONSES TO PHQ QUESTIONS 1-9: 0

## 2024-09-09 ENCOUNTER — OFFICE VISIT (OUTPATIENT)
Dept: PALLATIVE CARE | Age: 84
End: 2024-09-09
Payer: MEDICARE

## 2024-09-09 DIAGNOSIS — N18.32 STAGE 3B CHRONIC KIDNEY DISEASE (HCC): ICD-10-CM

## 2024-09-09 DIAGNOSIS — Z95.0 S/P PLACEMENT OF CARDIAC PACEMAKER: ICD-10-CM

## 2024-09-09 DIAGNOSIS — Z51.5 ENCOUNTER FOR PALLIATIVE CARE: ICD-10-CM

## 2024-09-09 DIAGNOSIS — J44.9 CHRONIC OBSTRUCTIVE PULMONARY DISEASE, UNSPECIFIED COPD TYPE (HCC): ICD-10-CM

## 2024-09-09 DIAGNOSIS — I50.22 CHRONIC SYSTOLIC (CONGESTIVE) HEART FAILURE (HCC): Primary | ICD-10-CM

## 2024-09-09 PROCEDURE — G8417 CALC BMI ABV UP PARAM F/U: HCPCS | Performed by: NURSE PRACTITIONER

## 2024-09-09 PROCEDURE — 99350 HOME/RES VST EST HIGH MDM 60: CPT | Performed by: NURSE PRACTITIONER

## 2024-09-09 PROCEDURE — 1036F TOBACCO NON-USER: CPT | Performed by: NURSE PRACTITIONER

## 2024-09-09 PROCEDURE — 1123F ACP DISCUSS/DSCN MKR DOCD: CPT | Performed by: NURSE PRACTITIONER

## 2024-09-11 LAB
ALBUMIN SERPL-MCNC: 3.7 G/DL (ref 3.5–5.2)
ALP SERPL-CCNC: 82 U/L (ref 40–129)
ALT SERPL-CCNC: 14 U/L (ref 5–41)
ANION GAP SERPL CALCULATED.3IONS-SCNC: 10 MMOL/L (ref 7–19)
AST SERPL-CCNC: 16 U/L (ref 5–40)
BILIRUB SERPL-MCNC: 0.2 MG/DL (ref 0.2–1.2)
BUN SERPL-MCNC: 27 MG/DL (ref 8–23)
CALCIUM SERPL-MCNC: 9 MG/DL (ref 8.8–10.2)
CHLORIDE SERPL-SCNC: 99 MMOL/L (ref 98–111)
CO2 SERPL-SCNC: 30 MMOL/L (ref 22–29)
CREAT SERPL-MCNC: 1.7 MG/DL (ref 0.7–1.2)
FERRITIN SERPL-MCNC: 295 NG/ML (ref 30–400)
GLUCOSE SERPL-MCNC: 128 MG/DL (ref 70–99)
POTASSIUM SERPL-SCNC: 4.7 MMOL/L (ref 3.5–5)
PROT SERPL-MCNC: 6.9 G/DL (ref 6.4–8.3)
SODIUM SERPL-SCNC: 139 MMOL/L (ref 136–145)

## 2024-09-12 ENCOUNTER — OFFICE VISIT (OUTPATIENT)
Dept: CARDIOLOGY CLINIC | Age: 84
End: 2024-09-12

## 2024-09-12 VITALS
SYSTOLIC BLOOD PRESSURE: 134 MMHG | WEIGHT: 187 LBS | BODY MASS INDEX: 26.77 KG/M2 | DIASTOLIC BLOOD PRESSURE: 84 MMHG | HEART RATE: 110 BPM | HEIGHT: 70 IN

## 2024-09-12 DIAGNOSIS — Z95.0 PACEMAKER: Primary | ICD-10-CM

## 2024-09-12 DIAGNOSIS — I49.5 TACHY-BRADY SYNDROME (HCC): ICD-10-CM

## 2024-09-15 PROBLEM — R05.9 COUGH: Status: RESOLVED | Noted: 2024-08-16 | Resolved: 2024-09-15

## 2024-09-15 PROBLEM — D68.69 SECONDARY HYPERCOAGULABLE STATE (HCC): Status: RESOLVED | Noted: 2024-09-05 | Resolved: 2024-09-15

## 2024-09-15 PROBLEM — D64.9 ANEMIA: Status: ACTIVE | Noted: 2024-08-22

## 2024-09-17 ENCOUNTER — TELEPHONE (OUTPATIENT)
Dept: PRIMARY CARE CLINIC | Age: 84
End: 2024-09-17

## 2024-09-19 ENCOUNTER — TELEPHONE (OUTPATIENT)
Dept: PRIMARY CARE CLINIC | Age: 84
End: 2024-09-19

## 2024-09-27 RX ORDER — FUROSEMIDE 40 MG
TABLET ORAL
Qty: 90 TABLET | Refills: 3 | Status: SHIPPED | OUTPATIENT
Start: 2024-09-27

## 2024-10-01 RX ORDER — ATORVASTATIN CALCIUM 20 MG/1
20 TABLET, FILM COATED ORAL DAILY
Qty: 30 TABLET | Refills: 0 | Status: SHIPPED | OUTPATIENT
Start: 2024-10-01

## 2024-10-01 RX ORDER — METOPROLOL SUCCINATE 50 MG/1
50 TABLET, EXTENDED RELEASE ORAL 2 TIMES DAILY
Qty: 60 TABLET | Refills: 0 | Status: SHIPPED | OUTPATIENT
Start: 2024-10-01

## 2024-10-01 RX ORDER — PANTOPRAZOLE SODIUM 40 MG/1
TABLET, DELAYED RELEASE ORAL
Qty: 30 TABLET | Refills: 0 | Status: SHIPPED | OUTPATIENT
Start: 2024-10-01

## 2024-10-01 RX ORDER — TAMSULOSIN HYDROCHLORIDE 0.4 MG/1
0.4 CAPSULE ORAL DAILY
Qty: 30 CAPSULE | Refills: 0 | Status: SHIPPED | OUTPATIENT
Start: 2024-10-01

## 2024-10-01 RX ORDER — MONTELUKAST SODIUM 10 MG/1
10 TABLET ORAL NIGHTLY
Qty: 30 TABLET | Refills: 0 | Status: SHIPPED | OUTPATIENT
Start: 2024-10-01 | End: 2024-10-31

## 2024-10-01 RX ORDER — DILTIAZEM HYDROCHLORIDE 120 MG/1
120 CAPSULE, COATED, EXTENDED RELEASE ORAL DAILY
Qty: 90 CAPSULE | Refills: 3 | Status: SHIPPED | OUTPATIENT
Start: 2024-10-01

## 2024-10-01 RX ORDER — ATORVASTATIN CALCIUM 20 MG/1
20 TABLET, FILM COATED ORAL DAILY
Qty: 90 TABLET | Refills: 3 | Status: SHIPPED | OUTPATIENT
Start: 2024-10-01 | End: 2024-10-01 | Stop reason: SDUPTHER

## 2024-10-01 RX ORDER — CETIRIZINE HYDROCHLORIDE 10 MG/1
10 TABLET ORAL DAILY
Qty: 30 TABLET | Refills: 0 | Status: SHIPPED | OUTPATIENT
Start: 2024-10-01

## 2024-10-01 RX ORDER — PRIMIDONE 50 MG/1
50 TABLET ORAL 2 TIMES DAILY
Qty: 60 TABLET | Refills: 0 | Status: SHIPPED | OUTPATIENT
Start: 2024-10-01

## 2024-10-08 ENCOUNTER — OFFICE VISIT (OUTPATIENT)
Dept: PRIMARY CARE CLINIC | Age: 84
End: 2024-10-08

## 2024-10-08 VITALS
SYSTOLIC BLOOD PRESSURE: 126 MMHG | HEIGHT: 70 IN | WEIGHT: 178 LBS | BODY MASS INDEX: 25.48 KG/M2 | RESPIRATION RATE: 20 BRPM | TEMPERATURE: 96.8 F | DIASTOLIC BLOOD PRESSURE: 70 MMHG

## 2024-10-08 DIAGNOSIS — I10 ESSENTIAL HYPERTENSION: Primary | ICD-10-CM

## 2024-10-08 DIAGNOSIS — E11.9 TYPE 2 DIABETES MELLITUS WITHOUT COMPLICATION, WITHOUT LONG-TERM CURRENT USE OF INSULIN (HCC): ICD-10-CM

## 2024-10-08 DIAGNOSIS — Z23 NEED FOR INFLUENZA VACCINATION: ICD-10-CM

## 2024-10-08 DIAGNOSIS — Z29.11 NEED FOR RSV VACCINATION: ICD-10-CM

## 2024-10-08 RX ORDER — FUROSEMIDE 40 MG
40 TABLET ORAL EVERY OTHER DAY
COMMUNITY

## 2024-10-08 NOTE — PROGRESS NOTES
[facility], [facility]    Abuse Screen   Housing Stability: Low Risk  (9/5/2024)    Housing Stability Vital Sign     Unable to Pay for Housing in the Last Year: No     Number of Times Moved in the Last Year: 0     Homeless in the Last Year: No       Review of Systems   Constitutional:  Positive for activity change and fatigue.   HENT:  Positive for hearing loss.    Eyes:  Positive for visual disturbance.   Respiratory:  Positive for shortness of breath.    Cardiovascular: Negative.    Gastrointestinal: Negative.    Endocrine: Negative for polydipsia, polyphagia and polyuria.   Musculoskeletal:  Positive for arthralgias.   Neurological:  Positive for weakness.   Hematological:  Bruises/bleeds easily.         Current Outpatient Medications on File Prior to Visit   Medication Sig Dispense Refill    metFORMIN (GLUCOPHAGE) 1000 MG tablet Take 1 tablet by mouth in the morning and at bedtime For diabetes      furosemide (LASIX) 40 MG tablet Take 1 tablet by mouth every other day Take for fluid and heart      dilTIAZem (CARDIZEM CD) 120 MG extended release capsule Take 1 capsule by mouth daily For heart and blood pressure 90 capsule 3    atorvastatin (LIPITOR) 20 MG tablet Take 1 tablet by mouth daily At bedtime for high cholesterol 30 tablet 0    montelukast (SINGULAIR) 10 MG tablet Take 1 tablet by mouth nightly For allergies 30 tablet 0    tamsulosin (FLOMAX) 0.4 MG capsule Take 1 capsule by mouth daily For prostate and urination 30 capsule 0    primidone (MYSOLINE) 50 MG tablet Take 1 tablet by mouth in the morning and at bedtime For tremors 60 tablet 0    pantoprazole (PROTONIX) 40 MG tablet 1 tablet by mouth once a day before meal for stomach 30 tablet 0    metoprolol succinate (TOPROL XL) 50 MG extended release tablet Take 1 tablet by mouth in the morning and at bedtime For blood pressure and heart 60 tablet 0    cetirizine (ZYRTEC) 10 MG tablet Take 1 tablet by mouth daily For chronic allergies 30 tablet

## 2024-10-14 ENCOUNTER — TELEPHONE (OUTPATIENT)
Dept: PRIMARY CARE CLINIC | Age: 84
End: 2024-10-14

## 2024-10-14 NOTE — TELEPHONE ENCOUNTER
----- Message from Dr. Chayo Grande MD sent at 10/13/2024  7:43 PM CDT -----  Please call his daughter Dulce.  I really thought we had his medicine list perfectly done but now I would like him to decrease his metformin 1000 mg 1 twice a day to 500 mg twice a day.  He can just break the 1000 mg tablet in half.  If it still doing really well in December we will probably stop the metformin completely.

## 2024-10-15 DIAGNOSIS — Z95.0 PACEMAKER: Primary | ICD-10-CM

## 2024-10-15 DIAGNOSIS — I49.5 TACHY-BRADY SYNDROME (HCC): ICD-10-CM

## 2024-10-16 ENCOUNTER — TELEPHONE (OUTPATIENT)
Dept: CARDIOLOGY CLINIC | Age: 84
End: 2024-10-16

## 2024-10-16 NOTE — TELEPHONE ENCOUNTER
Notified patient Dr. Willson stated he can hold off on the Eliquis until his atrial fibrillation burden increases.  Patient stated he is fine with taking the Eliquis because he is more concerned about stroke than falling.  Since his family was concerned about his risk of falling, asked patient who he wanted me to call and review recommendations with and he stated to call Stefania because Dulce was in [facility].  Called and spoke to Stefania.  She will discuss with her Dad and let us know what they decide to do.  Will let Dr. Grande know also.

## 2024-10-16 NOTE — TELEPHONE ENCOUNTER
----- Message from Dr. Kendell Willson MD sent at 10/15/2024  5:34 PM CDT -----  Regarding: RE: AF burden  Doesn't look like any AF so we should hold off on A/C until his burden increases  ----- Message -----  From: Twila Knapp RN  Sent: 10/10/2024   4:32 PM CDT  To: Kendell Willson MD; Hue Barahona MA  Subject: RE: AF burden                                    Patient has a scheduled carelink on 10/15/24 and I will send it to you.  When we saw him on 9/12/24 he had not had any AF.  He is a new pacemaker implant 8/19/24.    Twila  ----- Message -----  From: Kendell Willson MD  Sent: 10/10/2024   3:07 PM CDT  To: Hue Barahona MA; Twila Knapp RN  Subject: AF burden                                        Hey I can't see any downloads yet and he needs a sooner appt with me than March for follow up.  Question is AF burden. He doesn't want to take A/C but I'd only stop it for falls if he isn't having the arrhythmia. Thx  ----- Message -----  From: Chayo Grande MD  Sent: 10/10/2024   7:15 AM CDT  To: Kendell Willson MD    Thank you  ----- Message -----  From: Kendell Willson MD  Sent: 10/9/2024   2:28 PM CDT  To: Chayo Grande MD    Thank you I'll reach out to him. Would not rec stopping A/C for falls  ----- Message -----  From: Chayo Grande MD  Sent: 10/8/2024  12:48 PM CDT  To: MD Dr. Demetris Pruitt,    I recently saw our mutual patient Jose Tate with his daughter.  He is actually in regular rhythm right now.  His family is concerned because they think that he is at very high risk of falling and they wondered how long he would need to continue the Eliquis.  They asked me if I would ask you so I am doing it.  He does not have an appointment with you until the spring but he is doing well.  Thank you,  Chayo Grande

## 2024-10-25 ENCOUNTER — OFFICE VISIT (OUTPATIENT)
Dept: PALLATIVE CARE | Age: 84
End: 2024-10-25
Payer: MEDICARE

## 2024-10-25 DIAGNOSIS — Z51.5 ENCOUNTER FOR PALLIATIVE CARE: ICD-10-CM

## 2024-10-25 DIAGNOSIS — Z74.1 REQUIRES ASSISTANCE WITH ACTIVITIES OF DAILY LIVING (ADL): ICD-10-CM

## 2024-10-25 DIAGNOSIS — R60.0 BILATERAL LOWER EXTREMITY EDEMA: Primary | ICD-10-CM

## 2024-10-25 PROCEDURE — G8482 FLU IMMUNIZE ORDER/ADMIN: HCPCS | Performed by: NURSE PRACTITIONER

## 2024-10-25 PROCEDURE — 1036F TOBACCO NON-USER: CPT | Performed by: NURSE PRACTITIONER

## 2024-10-25 PROCEDURE — G8417 CALC BMI ABV UP PARAM F/U: HCPCS | Performed by: NURSE PRACTITIONER

## 2024-10-25 PROCEDURE — 99348 HOME/RES VST EST LOW MDM 30: CPT | Performed by: NURSE PRACTITIONER

## 2024-10-25 PROCEDURE — 1123F ACP DISCUSS/DSCN MKR DOCD: CPT | Performed by: NURSE PRACTITIONER

## 2024-10-26 NOTE — PROGRESS NOTES
Supportive Care/Community Based Palliative Care  Follow Up Note        Patient Name:  Jose Tate  Medical Record Number:  [id number]  YOB: 1940    Date of Visit: 10/25/2024  Location of Visit:  Home    Patient Care Team:  Chayo Grande MD as PCP - General (Family Medicine)  [facility] Grande MD [facility]  Vanessa Cifuentes APRN - CNP as Advanced Practice Nurse (Nurse Practitioner)    History obtained from:  patient, non-family caregiver - Ellen, electronic medical record    PALLIATIVE DIAGNOSES AND ORDERS/RECOMMENDATIONS/PLAN:   1. Bilateral lower extremity edema  Encouraged elevation, encourage compression hose    2. Requires assistance with activities of daily living (ADL)  Continue with assistive care    3. Encounter for palliative care  Current goals of care include: Continue treatment with palliative intent, Preserve independence/control/autonomy, Maintain or improve functional status, Maintain or improve quality of life, Focus on comfort and quality of life, Remain at home    Code status discussed and patient will continue to consider options.  Will continue goals of care discussions based on clinical course  Follow up home visit in 2-3 weeks and as needed    CHIEF COMPLAINT:     Chief Complaint   Patient presents with    Leg Swelling     BLE edema       CLINICAL SUMMARY:          Jose Tate is a 84 y.o. male with PMH of CHF, lung cancer s/p chemo and radiation, COPD, former smoker, atrial fibrillation, tachycardia-Lukasz syndrome s/p pacemaker placement, CKD stage IIIb, T2DM, and other co morbidities.     [facility] admission 8/15-8/20/2024 for evaluation and treatment of syncope, found to be in new onset atrial fibrillation, was found to have symptomatic bradycardia and pacemaker was placed, discharged to [facility] Inpatient Rehab, then home     HPI AND VISIT SUMMARY:   I saw Jose Tate in His home. Upon my arrival patient was noted to be awake, alert, oriented, in

## 2024-10-28 ENCOUNTER — SOCIAL WORK (OUTPATIENT)
Dept: PALLATIVE CARE | Age: 84
End: 2024-10-28

## 2024-10-28 NOTE — PROGRESS NOTES
mention that his Eliquis is $275 per  month.  I called [facility] and they confirmed this for a $30 supply stating that insurance covers $500 of the cost and his part is $275.  I did call Dulce (dtr) with his permission to see if she would like to pursue a more cost effective option or investigate to see if there is assistance for this medication.  Awaiting a call back.

## 2024-10-31 RX ORDER — APIXABAN 2.5 MG/1
2.5 TABLET, FILM COATED ORAL 2 TIMES DAILY
Qty: 60 TABLET | Refills: 0 | OUTPATIENT
Start: 2024-10-31

## 2024-10-31 NOTE — TELEPHONE ENCOUNTER
Requested Prescriptions     Pending Prescriptions Disp Refills    ELIQUIS 2.5 MG TABS tablet [Pharmacy Med Name: ELIQUIS 2.5 MG TAB 2.5 Tablet] 60 tablet 0     Sig: Take 1 tablet by mouth 2 times daily       Last Office Visit: Visit date not found  Next Office Visit: Visit date not found  Last Medication Refill:8/29/2024 with  0RF

## 2024-11-08 RX ORDER — METOPROLOL SUCCINATE 50 MG/1
50 TABLET, EXTENDED RELEASE ORAL 2 TIMES DAILY
Qty: 60 TABLET | Refills: 0 | Status: SHIPPED | OUTPATIENT
Start: 2024-11-08

## 2024-11-13 ENCOUNTER — APPOINTMENT (OUTPATIENT)
Dept: CT IMAGING | Age: 84
DRG: 194 | End: 2024-11-13
Payer: MEDICARE

## 2024-11-13 ENCOUNTER — HOSPITAL ENCOUNTER (INPATIENT)
Age: 84
LOS: 7 days | Discharge: NURSING, REHAB OR LONG-TERM CARE | DRG: 194 | End: 2024-11-20
Attending: EMERGENCY MEDICINE | Admitting: INTERNAL MEDICINE
Payer: MEDICARE

## 2024-11-13 ENCOUNTER — APPOINTMENT (OUTPATIENT)
Dept: GENERAL RADIOLOGY | Age: 84
DRG: 194 | End: 2024-11-13
Payer: MEDICARE

## 2024-11-13 DIAGNOSIS — I48.91 ATRIAL FIBRILLATION WITH RVR (HCC): Primary | ICD-10-CM

## 2024-11-13 DIAGNOSIS — R91.8 OPACITY OF LUNG ON IMAGING STUDY: ICD-10-CM

## 2024-11-13 DIAGNOSIS — I49.5 TACHY-BRADY SYNDROME (HCC): ICD-10-CM

## 2024-11-13 DIAGNOSIS — R74.8 ELEVATED CK: ICD-10-CM

## 2024-11-13 DIAGNOSIS — R78.81 BACTEREMIA DUE TO STREPTOCOCCUS: ICD-10-CM

## 2024-11-13 DIAGNOSIS — B95.5 BACTEREMIA DUE TO STREPTOCOCCUS: ICD-10-CM

## 2024-11-13 DIAGNOSIS — Z95.0 PACEMAKER: Primary | ICD-10-CM

## 2024-11-13 PROBLEM — G25.0 ESSENTIAL TREMOR: Status: ACTIVE | Noted: 2024-11-13

## 2024-11-13 PROBLEM — K59.00 CONSTIPATION: Status: ACTIVE | Noted: 2024-11-13

## 2024-11-13 PROBLEM — J15.9 COMMUNITY ACQUIRED BACTERIAL PNEUMONIA: Status: ACTIVE | Noted: 2024-11-13

## 2024-11-13 PROBLEM — N40.0 BPH (BENIGN PROSTATIC HYPERPLASIA): Status: ACTIVE | Noted: 2024-11-13

## 2024-11-13 LAB
ADV 40+41 DNA STL QL NAA+NON-PROBE: NOT DETECTED
ALBUMIN SERPL-MCNC: 3.8 G/DL (ref 3.5–5.2)
ALP SERPL-CCNC: 84 U/L (ref 40–129)
ALT SERPL-CCNC: 15 U/L (ref 5–41)
ANION GAP SERPL CALCULATED.3IONS-SCNC: 14 MMOL/L (ref 7–19)
ANISOCYTOSIS BLD QL SMEAR: ABNORMAL
AST SERPL-CCNC: 26 U/L (ref 5–40)
BACTERIA URNS QL MICRO: NEGATIVE /HPF
BASOPHILS # BLD: 0 K/UL (ref 0–0.2)
BASOPHILS NFR BLD: 0 % (ref 0–1)
BILIRUB SERPL-MCNC: 0.3 MG/DL (ref 0.2–1.2)
BILIRUB UR QL STRIP: NEGATIVE
BUN SERPL-MCNC: 35 MG/DL (ref 8–23)
C CAYETANENSIS DNA STL QL NAA+NON-PROBE: NOT DETECTED
C COLI+JEJ+UPSA DNA STL QL NAA+NON-PROBE: NOT DETECTED
C DIFF TOX A+B STL QL IA: NORMAL
CALCIUM SERPL-MCNC: 9.2 MG/DL (ref 8.8–10.2)
CHLORIDE SERPL-SCNC: 98 MMOL/L (ref 98–111)
CK SERPL-CCNC: 589 U/L (ref 39–308)
CLARITY UR: CLEAR
CO2 SERPL-SCNC: 25 MMOL/L (ref 22–29)
COLOR UR: YELLOW
CREAT SERPL-MCNC: 1.8 MG/DL (ref 0.7–1.2)
CRYPTOSP DNA STL QL NAA+NON-PROBE: NOT DETECTED
CRYSTALS URNS MICRO: NORMAL /HPF
E HISTOLYT DNA STL QL NAA+NON-PROBE: NOT DETECTED
EAEC PAA PLAS AGGR+AATA ST NAA+NON-PRB: NOT DETECTED
EC STX1+STX2 GENES STL QL NAA+NON-PROBE: NOT DETECTED
EOSINOPHIL # BLD: 0 K/UL (ref 0–0.6)
EOSINOPHIL NFR BLD: 0 % (ref 0–5)
EPEC EAE GENE STL QL NAA+NON-PROBE: NOT DETECTED
EPI CELLS #/AREA URNS AUTO: 1 /HPF (ref 0–5)
ERYTHROCYTE [DISTWIDTH] IN BLOOD BY AUTOMATED COUNT: 14.6 % (ref 11.5–14.5)
ETEC LTA+ST1A+ST1B TOX ST NAA+NON-PROBE: NOT DETECTED
G LAMBLIA DNA STL QL NAA+NON-PROBE: NOT DETECTED
GI PATH DNA+RNA PNL STL NAA+NON-PROBE: NOT DETECTED
GLUCOSE BLD-MCNC: 127 MG/DL (ref 70–99)
GLUCOSE BLD-MCNC: 184 MG/DL (ref 70–99)
GLUCOSE SERPL-MCNC: 182 MG/DL (ref 70–99)
GLUCOSE UR STRIP.AUTO-MCNC: NEGATIVE MG/DL
HCT VFR BLD AUTO: 33.2 % (ref 42–52)
HGB BLD-MCNC: 10.6 G/DL (ref 14–18)
HGB UR STRIP.AUTO-MCNC: ABNORMAL MG/L
HYALINE CASTS #/AREA URNS AUTO: 5 /HPF (ref 0–8)
HYPOCHROMIA BLD QL SMEAR: ABNORMAL
IMM GRANULOCYTES # BLD: 0.5 K/UL
INR PPP: 1.33 (ref 0.88–1.18)
KETONES UR STRIP.AUTO-MCNC: NEGATIVE MG/DL
LACTATE BLDV-SCNC: 1.6 MG/DL (ref 0.5–1.9)
LACTATE BLDV-SCNC: 1.6 MG/DL (ref 0.5–1.9)
LEUKOCYTE ESTERASE UR QL STRIP.AUTO: ABNORMAL
LYMPHOCYTES # BLD: 1.9 K/UL (ref 1.1–4.5)
LYMPHOCYTES NFR BLD: 9 % (ref 20–40)
MACROCYTES BLD QL SMEAR: ABNORMAL
MAGNESIUM SERPL-MCNC: 1.6 MG/DL (ref 1.6–2.4)
MCH RBC QN AUTO: 30.6 PG (ref 27–31)
MCHC RBC AUTO-ENTMCNC: 31.9 G/DL (ref 33–37)
MCV RBC AUTO: 96 FL (ref 80–94)
MONOCYTES # BLD: 0 K/UL (ref 0–0.9)
MONOCYTES NFR BLD: 0 % (ref 0–10)
MRSA DNA SPEC QL NAA+PROBE: NOT DETECTED
NEUTROPHILS # BLD: 19.4 K/UL (ref 1.5–7.5)
NEUTS BAND NFR BLD MANUAL: 10 % (ref 0–5)
NEUTS SEG NFR BLD: 81 % (ref 50–65)
NITRITE UR QL STRIP.AUTO: NEGATIVE
NOROVIRUS GI+II RNA STL QL NAA+NON-PROBE: NOT DETECTED
OVALOCYTES BLD QL SMEAR: ABNORMAL
P SHIGELLOIDES DNA STL QL NAA+NON-PROBE: NOT DETECTED
PERFORMED ON: ABNORMAL
PERFORMED ON: ABNORMAL
PH UR STRIP.AUTO: 6 [PH] (ref 5–8)
PLATELET # BLD AUTO: 211 K/UL (ref 130–400)
PLATELET SLIDE REVIEW: ADEQUATE
PMV BLD AUTO: 10.4 FL (ref 9.4–12.4)
POLYCHROMASIA BLD QL SMEAR: ABNORMAL
POTASSIUM SERPL-SCNC: 4.5 MMOL/L (ref 3.5–5)
PROCALCITONIN: 1.2 NG/ML (ref 0–0.09)
PROT SERPL-MCNC: 7.6 G/DL (ref 6.4–8.3)
PROT UR STRIP.AUTO-MCNC: 100 MG/DL
PROTHROMBIN TIME: 16.1 SEC (ref 12–14.6)
RBC # BLD AUTO: 3.46 M/UL (ref 4.7–6.1)
RBC #/AREA URNS AUTO: 3 /HPF (ref 0–4)
REASON FOR REJECTION: NORMAL
REJECTED TEST: NORMAL
RVA RNA STL QL NAA+NON-PROBE: NOT DETECTED
S ENT+BONG DNA STL QL NAA+NON-PROBE: NOT DETECTED
SAPO I+II+IV+V RNA STL QL NAA+NON-PROBE: NOT DETECTED
SHIGELLA SP+EIEC IPAH ST NAA+NON-PROBE: NOT DETECTED
SODIUM SERPL-SCNC: 137 MMOL/L (ref 136–145)
SP GR UR STRIP.AUTO: 1.02 (ref 1–1.03)
TROPONIN, HIGH SENSITIVITY: 35 NG/L (ref 0–22)
UROBILINOGEN UR STRIP.AUTO-MCNC: 0.2 E.U./DL
V CHOL+PARA+VUL DNA STL QL NAA+NON-PROBE: NOT DETECTED
V CHOLERAE DNA STL QL NAA+NON-PROBE: NOT DETECTED
WBC # BLD AUTO: 21.3 K/UL (ref 4.8–10.8)
WBC #/AREA URNS AUTO: 1 /HPF (ref 0–5)
Y ENTEROCOL DNA STL QL NAA+NON-PROBE: NOT DETECTED

## 2024-11-13 PROCEDURE — 2580000003 HC RX 258: Performed by: INTERNAL MEDICINE

## 2024-11-13 PROCEDURE — 87641 MR-STAPH DNA AMP PROBE: CPT

## 2024-11-13 PROCEDURE — 71250 CT THORAX DX C-: CPT

## 2024-11-13 PROCEDURE — 84484 ASSAY OF TROPONIN QUANT: CPT

## 2024-11-13 PROCEDURE — 87077 CULTURE AEROBIC IDENTIFY: CPT

## 2024-11-13 PROCEDURE — 70450 CT HEAD/BRAIN W/O DYE: CPT

## 2024-11-13 PROCEDURE — 71045 X-RAY EXAM CHEST 1 VIEW: CPT

## 2024-11-13 PROCEDURE — 87449 NOS EACH ORGANISM AG IA: CPT

## 2024-11-13 PROCEDURE — 82550 ASSAY OF CK (CPK): CPT

## 2024-11-13 PROCEDURE — 96374 THER/PROPH/DIAG INJ IV PUSH: CPT

## 2024-11-13 PROCEDURE — 2500000003 HC RX 250 WO HCPCS: Performed by: EMERGENCY MEDICINE

## 2024-11-13 PROCEDURE — 93005 ELECTROCARDIOGRAM TRACING: CPT | Performed by: EMERGENCY MEDICINE

## 2024-11-13 PROCEDURE — 81001 URINALYSIS AUTO W/SCOPE: CPT

## 2024-11-13 PROCEDURE — 87040 BLOOD CULTURE FOR BACTERIA: CPT

## 2024-11-13 PROCEDURE — 6360000002 HC RX W HCPCS

## 2024-11-13 PROCEDURE — 83735 ASSAY OF MAGNESIUM: CPT

## 2024-11-13 PROCEDURE — 84145 PROCALCITONIN (PCT): CPT

## 2024-11-13 PROCEDURE — 2580000003 HC RX 258

## 2024-11-13 PROCEDURE — 82962 GLUCOSE BLOOD TEST: CPT

## 2024-11-13 PROCEDURE — 99285 EMERGENCY DEPT VISIT HI MDM: CPT

## 2024-11-13 PROCEDURE — 85610 PROTHROMBIN TIME: CPT

## 2024-11-13 PROCEDURE — 83605 ASSAY OF LACTIC ACID: CPT

## 2024-11-13 PROCEDURE — 87150 DNA/RNA AMPLIFIED PROBE: CPT

## 2024-11-13 PROCEDURE — 2580000003 HC RX 258: Performed by: EMERGENCY MEDICINE

## 2024-11-13 PROCEDURE — 80053 COMPREHEN METABOLIC PANEL: CPT

## 2024-11-13 PROCEDURE — 1200000000 HC SEMI PRIVATE

## 2024-11-13 PROCEDURE — 85025 COMPLETE CBC W/AUTO DIFF WBC: CPT

## 2024-11-13 PROCEDURE — 87186 SC STD MICRODIL/AGAR DIL: CPT

## 2024-11-13 PROCEDURE — 96361 HYDRATE IV INFUSION ADD-ON: CPT

## 2024-11-13 PROCEDURE — 6360000002 HC RX W HCPCS: Performed by: EMERGENCY MEDICINE

## 2024-11-13 PROCEDURE — 87324 CLOSTRIDIUM AG IA: CPT

## 2024-11-13 PROCEDURE — 72125 CT NECK SPINE W/O DYE: CPT

## 2024-11-13 PROCEDURE — 87507 IADNA-DNA/RNA PROBE TQ 12-25: CPT

## 2024-11-13 PROCEDURE — 36415 COLL VENOUS BLD VENIPUNCTURE: CPT

## 2024-11-13 RX ORDER — PRIMIDONE 50 MG/1
50 TABLET ORAL 2 TIMES DAILY
Status: DISCONTINUED | OUTPATIENT
Start: 2024-11-13 | End: 2024-11-20 | Stop reason: HOSPADM

## 2024-11-13 RX ORDER — METOPROLOL SUCCINATE 50 MG/1
50 TABLET, EXTENDED RELEASE ORAL 2 TIMES DAILY
Status: DISCONTINUED | OUTPATIENT
Start: 2024-11-13 | End: 2024-11-17

## 2024-11-13 RX ORDER — ONDANSETRON 2 MG/ML
4 INJECTION INTRAMUSCULAR; INTRAVENOUS EVERY 6 HOURS PRN
Status: DISCONTINUED | OUTPATIENT
Start: 2024-11-13 | End: 2024-11-20 | Stop reason: HOSPADM

## 2024-11-13 RX ORDER — ONDANSETRON 4 MG/1
4 TABLET, ORALLY DISINTEGRATING ORAL EVERY 8 HOURS PRN
Status: DISCONTINUED | OUTPATIENT
Start: 2024-11-13 | End: 2024-11-20 | Stop reason: HOSPADM

## 2024-11-13 RX ORDER — TAMSULOSIN HYDROCHLORIDE 0.4 MG/1
0.4 CAPSULE ORAL DAILY
Status: DISCONTINUED | OUTPATIENT
Start: 2024-11-13 | End: 2024-11-20 | Stop reason: HOSPADM

## 2024-11-13 RX ORDER — POLYETHYLENE GLYCOL 3350 17 G/17G
17 POWDER, FOR SOLUTION ORAL DAILY PRN
Status: DISCONTINUED | OUTPATIENT
Start: 2024-11-13 | End: 2024-11-20 | Stop reason: HOSPADM

## 2024-11-13 RX ORDER — FUROSEMIDE 40 MG/1
40 TABLET ORAL EVERY OTHER DAY
Status: DISCONTINUED | OUTPATIENT
Start: 2024-11-13 | End: 2024-11-20 | Stop reason: HOSPADM

## 2024-11-13 RX ORDER — 0.9 % SODIUM CHLORIDE 0.9 %
250 INTRAVENOUS SOLUTION INTRAVENOUS ONCE
Status: COMPLETED | OUTPATIENT
Start: 2024-11-13 | End: 2024-11-13

## 2024-11-13 RX ORDER — DILTIAZEM HYDROCHLORIDE 120 MG/1
120 CAPSULE, COATED, EXTENDED RELEASE ORAL DAILY
Status: DISCONTINUED | OUTPATIENT
Start: 2024-11-13 | End: 2024-11-20 | Stop reason: HOSPADM

## 2024-11-13 RX ORDER — ATORVASTATIN CALCIUM 20 MG/1
20 TABLET, FILM COATED ORAL DAILY
Status: DISCONTINUED | OUTPATIENT
Start: 2024-11-13 | End: 2024-11-20 | Stop reason: HOSPADM

## 2024-11-13 RX ORDER — DEXTROSE MONOHYDRATE 100 MG/ML
INJECTION, SOLUTION INTRAVENOUS CONTINUOUS PRN
Status: DISCONTINUED | OUTPATIENT
Start: 2024-11-13 | End: 2024-11-20 | Stop reason: HOSPADM

## 2024-11-13 RX ORDER — SENNA AND DOCUSATE SODIUM 50; 8.6 MG/1; MG/1
2 TABLET, FILM COATED ORAL 2 TIMES DAILY
Status: DISCONTINUED | OUTPATIENT
Start: 2024-11-13 | End: 2024-11-20 | Stop reason: HOSPADM

## 2024-11-13 RX ORDER — ACETAMINOPHEN 325 MG/1
650 TABLET ORAL EVERY 6 HOURS PRN
Status: DISCONTINUED | OUTPATIENT
Start: 2024-11-13 | End: 2024-11-20 | Stop reason: HOSPADM

## 2024-11-13 RX ORDER — INSULIN LISPRO 100 [IU]/ML
0-4 INJECTION, SOLUTION INTRAVENOUS; SUBCUTANEOUS
Status: DISCONTINUED | OUTPATIENT
Start: 2024-11-13 | End: 2024-11-20 | Stop reason: HOSPADM

## 2024-11-13 RX ORDER — SODIUM CHLORIDE 0.9 % (FLUSH) 0.9 %
5-40 SYRINGE (ML) INJECTION EVERY 12 HOURS SCHEDULED
Status: DISCONTINUED | OUTPATIENT
Start: 2024-11-13 | End: 2024-11-20 | Stop reason: HOSPADM

## 2024-11-13 RX ORDER — PANTOPRAZOLE SODIUM 40 MG/1
40 TABLET, DELAYED RELEASE ORAL
Status: DISCONTINUED | OUTPATIENT
Start: 2024-11-14 | End: 2024-11-20 | Stop reason: HOSPADM

## 2024-11-13 RX ORDER — SODIUM CHLORIDE 9 MG/ML
INJECTION, SOLUTION INTRAVENOUS PRN
Status: DISCONTINUED | OUTPATIENT
Start: 2024-11-13 | End: 2024-11-20 | Stop reason: HOSPADM

## 2024-11-13 RX ORDER — SODIUM CHLORIDE 9 MG/ML
INJECTION, SOLUTION INTRAVENOUS CONTINUOUS
Status: DISCONTINUED | OUTPATIENT
Start: 2024-11-13 | End: 2024-11-18

## 2024-11-13 RX ORDER — MONTELUKAST SODIUM 10 MG/1
10 TABLET ORAL NIGHTLY
Status: DISCONTINUED | OUTPATIENT
Start: 2024-11-13 | End: 2024-11-20 | Stop reason: HOSPADM

## 2024-11-13 RX ORDER — DILTIAZEM HYDROCHLORIDE 5 MG/ML
10 INJECTION INTRAVENOUS ONCE
Status: COMPLETED | OUTPATIENT
Start: 2024-11-13 | End: 2024-11-13

## 2024-11-13 RX ORDER — CETIRIZINE HYDROCHLORIDE 10 MG/1
10 TABLET ORAL DAILY
Status: DISCONTINUED | OUTPATIENT
Start: 2024-11-13 | End: 2024-11-20 | Stop reason: HOSPADM

## 2024-11-13 RX ORDER — SODIUM CHLORIDE 0.9 % (FLUSH) 0.9 %
5-40 SYRINGE (ML) INJECTION PRN
Status: DISCONTINUED | OUTPATIENT
Start: 2024-11-13 | End: 2024-11-20 | Stop reason: HOSPADM

## 2024-11-13 RX ADMIN — WATER 1000 MG: 1 INJECTION INTRAMUSCULAR; INTRAVENOUS; SUBCUTANEOUS at 14:26

## 2024-11-13 RX ADMIN — VANCOMYCIN HYDROCHLORIDE 2000 MG: 10 INJECTION, POWDER, LYOPHILIZED, FOR SOLUTION INTRAVENOUS at 18:45

## 2024-11-13 RX ADMIN — SODIUM CHLORIDE 250 ML: 9 INJECTION, SOLUTION INTRAVENOUS at 12:27

## 2024-11-13 RX ADMIN — CEFEPIME 2000 MG: 2 INJECTION, POWDER, FOR SOLUTION INTRAVENOUS at 18:41

## 2024-11-13 RX ADMIN — AZITHROMYCIN MONOHYDRATE 500 MG: 500 INJECTION, POWDER, LYOPHILIZED, FOR SOLUTION INTRAVENOUS at 14:26

## 2024-11-13 RX ADMIN — SODIUM CHLORIDE: 9 INJECTION, SOLUTION INTRAVENOUS at 17:33

## 2024-11-13 RX ADMIN — SODIUM CHLORIDE, PRESERVATIVE FREE 10 ML: 5 INJECTION INTRAVENOUS at 21:38

## 2024-11-13 RX ADMIN — DILTIAZEM HYDROCHLORIDE 10 MG: 5 INJECTION, SOLUTION INTRAVENOUS at 11:32

## 2024-11-13 NOTE — CARE COORDINATION
Case Management Assessment  Initial Evaluation    Date/Time of Evaluation: 11/13/2024 2:52 PM  Assessment Completed by: Bolivar Murry    If patient is discharged prior to next notation, then this note serves as note for discharge by case management.    Patient Name: Jose Tate                   YOB: 1940  Diagnosis: Community acquired bacterial pneumonia [J15.9]                   Date / Time: 11/13/2024 11:02 AM    Patient Admission Status: Inpatient   Readmission Risk (Low < 19, Mod (19-27), High > 27): Readmission Risk Score: 21.7    Current PCP: Chayo Grande MD  PCP verified by CM? Yes    Chart Reviewed: Yes      History Provided by: Patient  Patient Orientation: Alert and Oriented    Patient Cognition: Alert    Hospitalization in the last 30 days (Readmission):  No    If yes, Readmission Assessment in  Navigator will be completed.    Advance Directives:      Code Status: Prior   Patient's Primary Decision Maker is: Legal Next of Kin    Primary Decision Maker: Stefania Covarrubias - Child - [phone]    Primary Decision Maker: sterling Mcmullen - Child - [phone]    Discharge Planning:    Patient lives with: Alone Type of Home: House  Primary Care Giver: Self  Patient Support Systems include: Children, Family Members, Home Care Staff   Current Financial resources: Medicare  Current community resources: None  Current services prior to admission: None            Current DME:              Type of Home Care services:  None    ADLS  Prior functional level: Independent in ADLs/IADLs  Current functional level: Independent in ADLs/IADLs    PT AM-PAC:   /24  OT AM-PAC:   /24    Family can provide assistance at DC: Yes  Would you like Case Management to discuss the discharge plan with any other family members/significant others, and if so, who? Yes (Daughter)  Plans to Return to Present Housing: Yes  Potential Assistance needed at discharge: N/A            Potential DME:    Patient expects to

## 2024-11-13 NOTE — ED PROVIDER NOTES
[facility] EMERGENCY DEPT  eMERGENCY dEPARTMENT eNCOUnter      Pt Name: Jose Tate  MRN: [id number]  Birthdate 1940  Date of evaluation: 11/13/2024  Provider: Rianna Smith DO    CHIEF COMPLAINT       Chief Complaint   Patient presents with    Fall     Fell at 1500 yesterday. Was found on the floor around 1000 today.    Extremity Weakness    Irregular Heart Beat     Has hx of afib         HISTORY OF PRESENT ILLNESS   (Location/Symptom, Timing/Onset,Context/Setting, Quality, Duration, Modifying Factors, Severity)  Note limiting factors.   Jose Tate is a 84 y.o. male who presents to the emergency department     The patient is an 85 yo M who presents to the ED by EMS with generalized weakness. He says he was at home yesterday, walked across the room and passed out. He says he hit the floor. He says he doesn't think he hit his head, he thinks it was \"a slow fall\". He denies any pain or injury but says he was too weak too stand up. Reports generalized weakness. He dragged himself into his bedroom and tried to get up in bed but was unable to so he pulled the blanket off  the bed and slept on the floor until his family found him today. He believes Afib caused his symptoms as it has caused the same symptoms in the past. He is anticoagulated on Eliquis. He denies any recent illness except a little diarrhea days ago. Denies fever, chills, cough, chest pain, shortness of breath, headache, neck pain, back pain, abdominal pain, dysuria, unilateral weakness. The patient denies any other complaints or concerns at this time.               NursingNotes were reviewed.    REVIEW OF SYSTEMS    (2-9 systems for level 4, 10 or more for level 5)     As per HPI         PAST MEDICALHISTORY     Past Medical History:   Diagnosis Date    Allergic rhinitis     Atrial fibrillation (HCC)     Bronchitis, chronic (HCC)     Carotid artery stenosis     Congenital heart disease     GERD (gastroesophageal reflux disease)     Heart attack  Mother     Cancer Father         Lung          SOCIAL HISTORY       Social History     Socioeconomic History    Marital status:      Spouse name: None    Number of children: None    Years of education: None    Highest education level: None   Tobacco Use    Smoking status: Former     Current packs/day: 0.00     Average packs/day: 1.5 packs/day for 30.0 years (45.0 ttl pk-yrs)     Types: Cigarettes     Start date:      Quit date:      Years since quittin.8    Smokeless tobacco: Never   Vaping Use    Vaping status: Never Used   Substance and Sexual Activity    Alcohol use: No    Drug use: No    Sexual activity: Defer     Social Determinants of Health     Financial Resource Strain: Low Risk  (2024)    Overall Financial Resource Strain (CARDIA)     Difficulty of Paying Living Expenses: Not hard at all   Food Insecurity: No Food Insecurity (2024)    Hunger Vital Sign     Worried About Running Out of Food in the Last Year: Never true     Ran Out of Food in the Last Year: Never true   Transportation Needs: No Transportation Needs (2024)    PRAPARE - Transportation     Lack of Transportation (Medical): No     Lack of Transportation (Non-Medical): No   Physical Activity: Insufficiently Active (2022)    Exercise Vital Sign     Days of Exercise per Week: 1 day     Minutes of Exercise per Session: 20 min    Social Connections ([facility] HRSN)    Received from [facility], [facility]    Abuse Screen   Housing Stability: Low Risk  (2024)    Housing Stability Vital Sign     Unable to Pay for Housing in the Last Year: No     Number of Times Moved in the Last Year: 0     Homeless in the Last Year: No       SCREENINGS    Spray Coma Scale  Eye Opening: Spontaneous  Best Verbal Response: Oriented  Best Motor Response: Obeys commands  Spray Coma Scale Score: 15        PHYSICAL EXAM    (up to 7 for level 4, 8 or more for level 5)     ED Triage Vitals   BP Systolic BP

## 2024-11-13 NOTE — PROGRESS NOTES
[facility]   Pharmacy Pharmacokinetic Monitoring Service - Vancomycin     Jose Tate is a 84 y.o. male starting on vancomycin therapy for pneumonia. Pharmacy consulted by TAWANA Carrillo, for monitoring and adjustment.    Target Concentration: Goal AUC/-600 mg*hr/L    Additional Antimicrobials: cefepime    Pertinent Laboratory Values:   Wt Readings from Last 1 Encounters:   11/13/24 79.7 kg (175 lb 12.8 oz)     Temp Readings from Last 1 Encounters:   11/13/24 98.3 °F (36.8 °C) (Temporal)     Estimated Creatinine Clearance: 31 mL/min (A) (based on SCr of 1.8 mg/dL (H)).  Recent Labs     11/13/24  1114 11/13/24  1225   CREATININE  --  1.8*   BUN  --  35*   WBC 21.3*  --      Procalcitonin: 1.20    Pertinent Cultures:  Culture Date Source Results   11/13/24 Blood Sent    Respiratory No result   MRSA Nasal Swab: was ordered by provider, awaiting results.    Plan:  Dosing recommendations based on Bayesian software  Start vancomycin 2000mg IV x 1 dose followed by 750mg IV every 24 hours  Anticipated AUC of 457 and trough concentration of 15.4 at steady state  Renal labs as indicated   Vancomycin concentration ordered for 11/15/24 @ 1530   Pharmacy will continue to monitor patient and adjust therapy as indicated    Thank you for the consult,    SHERRY RODRIGUEZ, PHARM D, 11/13/2024, 5:20 PM

## 2024-11-13 NOTE — H&P
[facility]      Hospitalist - History & Physical      PCP: Chayo Grande MD    Date of Admission: 11/13/2024    Date of Service: 11/13/2024    Chief Complaint: Fall    History Of Present Illness:   The patient is a 84 y.o. male with A-fib, GERD, hypercholesterolemia, T2DM comes to the ED for evaluation following a fall.  Patient states that he fell last night due to syncopal episode.  He states he thinks he went into A-fib as that is what happened the last time he had an episode.  Patient was able to crawl into his bedroom but was unable to get into bed and slept on the floor last night until his family found him today.  Patient is anticoagulated on Eliquis, does not think he hit his head.  Denies fever, chills, cough, chest pain, shortness of breath, headache, abdominal pain.      In ED: CXR with interval development of left perihilar vague opacity, potentially either pneumonia or atelectasis, mass cannot be excluded; CT cervical spine with no acute fracture or traumatic malalignment; CT head with senescent changes without acute abnormality; WBC 21.3, H&H 10.6/33.2, CK5 89, lactic acid 1.6, creatinine 1.8, BUN 35, GFR 36.  Will admit inpatient to hospitalist.    Past Medical History:        Diagnosis Date    Allergic rhinitis     Atrial fibrillation (HCC)     Bronchitis, chronic (HCC)     Carotid artery stenosis     Congenital heart disease     GERD (gastroesophageal reflux disease)     Heart attack (HCC)     Hemorrhoids     Hypercholesterolemia     Palliative care patient 08/16/2024    Type II or unspecified type diabetes mellitus without mention of complication, not stated as uncontrolled        Past Surgical History:        Procedure Laterality Date    CARDIAC SURGERY      balloon surgery (angioplasty)    EP DEVICE PROCEDURE Left 8/19/2024    Insert PPM dual performed by Kendell Willson MD at [facility] CARDIAC CATH LAB    PORT SURGERY Right 04/01/2021       Home Medications:  Prior to Admission  complication (HCC)    Atrial fibrillation (HCC)    Chronic rhinitis    Essential tremor    BPH (benign prostatic hyperplasia)    Constipation  Resolved Problems:    * No resolved hospital problems. *     Principal Problem:    Community acquired bacterial pneumonia   - CT chest wo contrast ordered   - WBC 21.3-follow labs   - Lactic acid 1.6   - CBC w/ diff, BMP w/ reflex to mag daily   - Procalcitonin ordered   - Sputum culture   - Cefepime IV q8h    - Pharmacy to dose vancomycin   - MRSA culture   - Diet regular: 4 carb choices, low fat/low chol/high fiber/2 gm Na   - Daily weights   - Strict I/O's   - Telemetry    Active Problems:    Hypercholesterolemia   - Continue lipitor      GERD (gastroesophageal reflux disease)   - Continue pantoprazole      Essential hypertension   - Continue lasix   - Continue metoprolol       Type 2 diabetes mellitus without complication   - POCT glucose AC/HS    - Low-dose SSI    - Hypoglycemia treatment protocol      Atrial fibrillation    - Continue eliquis   - Continue cardizem       Chronic rhinitis   - Continue cetirizine   - Continue montelukast      Essential tremor   - Continue primidone      BPH (benign prostatic hyperplasia)   - Continue tamsulosin      Constipation   - Continue senokot    Resolved Problems:    * No resolved hospital problems. *    DVT prophylaxis:  GI prophylaxis:     Signed:  TAWANA Queen - CNP, 11/13/2024 1:58 PM      EMR Dragon/Transcription disclaimer:   Much of this encounter note is an electronic transcription/translation of spoken language to printed text. The electronic translation of spoken language may permit erroneous, or at times, nonsensical words or phrases to be inadvertently transcribed; although attempts have made to review the note for such errors, some may still exist.

## 2024-11-13 NOTE — PROGRESS NOTES
Pharmacy Renal Adjustment    Jose Tate is a 84 y.o. male. Pharmacy has renally adjusted medications per protocol.    Recent Labs     11/13/24  1225   BUN 35*       Recent Labs     11/13/24  1225   CREATININE 1.8*       Estimated Creatinine Clearance: 31 mL/min (A) (based on SCr of 1.8 mg/dL (H)).    Height:   Ht Readings from Last 1 Encounters:   11/13/24 1.778 m (5' 10\")     Weight:  Wt Readings from Last 1 Encounters:   11/13/24 79.7 kg (175 lb 12.8 oz)       Plan: Adjust the following medications based on renal function:    Cefepime to 2000 mg IV once over 30 minutes followed by 2000 mg IV every 12 hours extended infusion over 240 minutes for 7 days    SHERRY RODRIGUEZ, PHARM D, 11/13/2024, 5:10 PM

## 2024-11-13 NOTE — PROGRESS NOTES
4 Eyes Skin Assessment     NAME:  Jose Tate  YOB: 1940  MEDICAL RECORD NUMBER:  [id number]    The patient is being assessed for  Admission    I agree that at least one RN has performed a thorough Head to Toe Skin Assessment on the patient. ALL assessment sites listed below have been assessed.      Areas assessed by both nurses:    Head, Face, Ears, Shoulders, Back, Chest, Arms, Elbows, Hands, Sacrum. Buttock, Coccyx, Ischium, and Legs. Feet and Heels        Does the Patient have a Wound? No noted wound(s)       Rony Prevention initiated by RN: Yes  Wound Care Orders initiated by RN: No    Pressure Injury (Stage 3,4, Unstageable, DTI, NWPT, and Complex wounds) if present, place Wound referral order by RN under : No    New Ostomies, if present place, Ostomy referral order under : No     Nurse 1 eSignature: Electronically signed by Lyric Up RN on 11/13/24 at 5:09 PM CST    **SHARE this note so that the co-signing nurse can place an eSignature**    Nurse 2 eSignature: Electronically signed by Malini Roberts RN on 11/13/24 at 5:13 PM CST

## 2024-11-13 NOTE — ED NOTES
ED TO INPATIENT SBAR HANDOFF    Patient Name: Jose Tate   : 1940  84 y.o.   Family/Caregiver Present: Yes  Code Status Order: Prior    C-SSRS: Risk of Suicide: No Risk  Sitter No  Restraints:         Situation  Chief Complaint:   Chief Complaint   Patient presents with    Fall     Fell at 1500 yesterday. Was found on the floor around 1000 today.    Extremity Weakness    Irregular Heart Beat     Has hx of afib     Patient Diagnosis: Community acquired bacterial pneumonia [J15.9]     Brief Description of Patient's Condition: PT to ER via EMS after falling at home yesterday.  Pt was unable to get up from floor.   Family found patient this morning.   Pt denies pain from fall.   \"It was a slow fall, I think I blacked out\"  Pt does have pacemaker and was interrogated in ER.    Pt also in AFIB RVR.   Pt is alert and oriented.   Pt brought family food.   Mental Status: oriented, alert, coherent, logical, thought processes intact, and able to concentrate and follow conversation  Arrived from: home    Imaging:   XR CHEST PORTABLE   Final Result       1.  Interval development of left perihilar vague opacity, potentially either pneumonia or atelectasis.  Mass cannot be excluded.  Short-term follow-up radiographs are recommended to show resolution.  If the opacity persists, then a CT chest would be    needed.               ______________________________________    Electronically signed by: SUZANNE SHOEMAKER M.D.   Date:     2024   Time:    13:19       CT HEAD WO CONTRAST   Final Result   Senescent changes without acute abnormality.        All CT scans are performed using dose optimization techniques as appropriate to the performed exam and include    at least one of the following: Automated exposure control, adjustment of the mA and/or kV according to size, and the use of iterative reconstruction technique.        ______________________________________    Electronically signed by: JACLYN KIM M.D.   Date:

## 2024-11-13 NOTE — ED NOTES
Lab called regarding first lactic that was ordered.   Previous primary nurse states that first lactic was sent with initial labs.    Lab states they did not receive lactic.     This nurse will redraw lactic and start antibiotics.

## 2024-11-13 NOTE — PROGRESS NOTES
Jose Tate arrived to room # 414.   Presented with: fall, cough  Mental Status: Patient is oriented, alert, coherent, logical, thought processes intact, and able to concentrate and follow conversation.   Vitals:    11/13/24 1630   BP: (!) 148/61   Pulse: (!) 124   Resp: 22   Temp: 98.3 °F (36.8 °C)   SpO2: 98%     Patient safety contract and falls prevention contract reviewed with patient Yes.  Oriented Patient and Family to room.  Call light within reach. Yes.  Needs, issues or concerns expressed at this time: no.      Electronically signed by Lyric Up RN on 11/13/2024 at 4:55 PM     English

## 2024-11-14 ENCOUNTER — TELEPHONE (OUTPATIENT)
Age: 84
End: 2024-11-14
Payer: MEDICARE

## 2024-11-14 ENCOUNTER — OUTSIDE FACILITY SERVICE (OUTPATIENT)
Age: 84
End: 2024-11-14
Payer: MEDICARE

## 2024-11-14 ENCOUNTER — APPOINTMENT (OUTPATIENT)
Age: 84
DRG: 194 | End: 2024-11-14
Payer: MEDICARE

## 2024-11-14 PROBLEM — B95.5 BACTEREMIA DUE TO STREPTOCOCCUS: Status: ACTIVE | Noted: 2024-11-14

## 2024-11-14 PROBLEM — R78.81 BACTEREMIA DUE TO STREPTOCOCCUS: Status: ACTIVE | Noted: 2024-11-14

## 2024-11-14 LAB
A BAUMANNII DNA BLD POS QL NAA+NON-PROBE: NOT DETECTED
ANION GAP SERPL CALCULATED.3IONS-SCNC: 12 MMOL/L (ref 7–19)
BASOPHILS # BLD: 0 K/UL (ref 0–0.2)
BASOPHILS NFR BLD: 0 % (ref 0–1)
BUN SERPL-MCNC: 35 MG/DL (ref 8–23)
C ALBICANS DNA BLD POS QL NAA+NON-PROBE: NOT DETECTED
C AURIS DNA BLD POS QL NAA+PROBE: NOT DETECTED
C GLABRATA DNA BLD POS QL NAA+NON-PROBE: NOT DETECTED
C KRUSEI DNA BLD POS QL NAA+NON-PROBE: NOT DETECTED
C PARAP DNA BLD POS QL NAA+NON-PROBE: NOT DETECTED
C TROPICLS DNA BLD POS QL NAA+NON-PROBE: NOT DETECTED
CALCIUM SERPL-MCNC: 8.6 MG/DL (ref 8.8–10.2)
CHLORIDE SERPL-SCNC: 103 MMOL/L (ref 98–111)
CO2 SERPL-SCNC: 24 MMOL/L (ref 22–29)
CREAT SERPL-MCNC: 1.8 MG/DL (ref 0.7–1.2)
CRYPTOCOCCUS NEOFORMANS/GATTII BY PCR: NOT DETECTED
E CLOAC COMP DNA BLD POS NAA+NON-PROBE: NOT DETECTED
E COLI DNA BLD POS QL NAA+NON-PROBE: NOT DETECTED
E FAECALIS DNA BLD POS QL NAA+PROBE: NOT DETECTED
E FAECIUM DNA BLD POS QL NAA+PROBE: NOT DETECTED
ECHO AV AREA PEAK VELOCITY: 1.8 CM2
ECHO AV AREA VTI: 1.6 CM2
ECHO AV AREA/BSA PEAK VELOCITY: 0.9 CM2/M2
ECHO AV AREA/BSA VTI: 0.8 CM2/M2
ECHO AV MEAN GRADIENT: 5 MMHG
ECHO AV MEAN VELOCITY: 1 M/S
ECHO AV PEAK GRADIENT: 9 MMHG
ECHO AV PEAK VELOCITY: 1.5 M/S
ECHO AV VELOCITY RATIO: 0.6
ECHO AV VTI: 24.5 CM
ECHO BSA: 2.02 M2
ECHO EST RA PRESSURE: 3 MMHG
ECHO IVC PROX: 1.4 CM
ECHO LA AREA 2C: 14.4 CM2
ECHO LA AREA 4C: 15.1 CM2
ECHO LA DIAMETER INDEX: 1.64 CM/M2
ECHO LA DIAMETER: 3.3 CM
ECHO LA MAJOR AXIS: 4.7 CM
ECHO LA MINOR AXIS: 5.2 CM
ECHO LA VOL BP: 38 ML (ref 18–58)
ECHO LA VOL MOD A2C: 32 ML (ref 18–58)
ECHO LA VOL MOD A4C: 40 ML (ref 18–58)
ECHO LA VOL/BSA BIPLANE: 19 ML/M2 (ref 16–34)
ECHO LA VOLUME INDEX MOD A2C: 16 ML/M2 (ref 16–34)
ECHO LA VOLUME INDEX MOD A4C: 20 ML/M2 (ref 16–34)
ECHO LV E' LATERAL VELOCITY: 11.3 CM/S
ECHO LV E' SEPTAL VELOCITY: 6.21 CM/S
ECHO LV EDV A2C: 77 ML
ECHO LV EDV A4C: 76 ML
ECHO LV EDV INDEX A4C: 38 ML/M2
ECHO LV EDV NDEX A2C: 38 ML/M2
ECHO LV EJECTION FRACTION A2C: 60 %
ECHO LV EJECTION FRACTION A4C: 66 %
ECHO LV EJECTION FRACTION BIPLANE: 62 % (ref 55–100)
ECHO LV ESV A2C: 31 ML
ECHO LV ESV A4C: 26 ML
ECHO LV ESV INDEX A2C: 15 ML/M2
ECHO LV ESV INDEX A4C: 13 ML/M2
ECHO LV FRACTIONAL SHORTENING: 15 % (ref 28–44)
ECHO LV INTERNAL DIMENSION DIASTOLE INDEX: 2.29 CM/M2
ECHO LV INTERNAL DIMENSION DIASTOLIC: 4.6 CM (ref 4.2–5.9)
ECHO LV INTERNAL DIMENSION SYSTOLIC INDEX: 1.94 CM/M2
ECHO LV INTERNAL DIMENSION SYSTOLIC: 3.9 CM
ECHO LV IVSD: 1.4 CM (ref 0.6–1)
ECHO LV MASS 2D: 205 G (ref 88–224)
ECHO LV MASS INDEX 2D: 102 G/M2 (ref 49–115)
ECHO LV POSTERIOR WALL DIASTOLIC: 1 CM (ref 0.6–1)
ECHO LV RELATIVE WALL THICKNESS RATIO: 0.43
ECHO LVOT AREA: 3.1 CM2
ECHO LVOT AV VTI INDEX: 0.52
ECHO LVOT DIAM: 2 CM
ECHO LVOT MEAN GRADIENT: 2 MMHG
ECHO LVOT PEAK GRADIENT: 3 MMHG
ECHO LVOT PEAK VELOCITY: 0.9 M/S
ECHO LVOT STROKE VOLUME INDEX: 19.8 ML/M2
ECHO LVOT SV: 39.9 ML
ECHO LVOT VTI: 12.7 CM
ECHO MV A VELOCITY: 1.25 M/S
ECHO MV E DECELERATION TIME (DT): 200 MS
ECHO MV E VELOCITY: 1.38 M/S
ECHO MV E/A RATIO: 1.1
ECHO MV E/E' LATERAL: 12.21
ECHO MV E/E' RATIO (AVERAGED): 17.22
ECHO MV E/E' SEPTAL: 22.22
ECHO RA AREA 4C: 11.1 CM2
ECHO RA END SYSTOLIC VOLUME APICAL 4 CHAMBER INDEX BSA: 10 ML/M2
ECHO RA VOLUME: 21 ML
ECHO RIGHT VENTRICULAR SYSTOLIC PRESSURE (RVSP): 40 MMHG
ECHO RV BASAL DIMENSION: 3 CM
ECHO RV LONGITUDINAL DIMENSION: 7.7 CM
ECHO RV MID DIMENSION: 2.4 CM
ECHO RV TAPSE: 2.2 CM (ref 1.7–?)
ECHO TV REGURGITANT MAX VELOCITY: 3.03 M/S
ECHO TV REGURGITANT PEAK GRADIENT: 37 MMHG
ENTEROBACT DNA BLD POS QL NAA+NON-PROBE: NOT DETECTED
ENTEROCOC DNA BLD POS QL NAA+NON-PROBE: NOT DETECTED
EOSINOPHIL # BLD: 0.15 K/UL (ref 0–0.6)
EOSINOPHIL NFR BLD: 1 % (ref 0–5)
ERYTHROCYTE [DISTWIDTH] IN BLOOD BY AUTOMATED COUNT: 14.7 % (ref 11.5–14.5)
GLUCOSE BLD-MCNC: 128 MG/DL (ref 70–99)
GLUCOSE BLD-MCNC: 238 MG/DL (ref 70–99)
GLUCOSE BLD-MCNC: 247 MG/DL (ref 70–99)
GLUCOSE BLD-MCNC: 290 MG/DL (ref 70–99)
GLUCOSE SERPL-MCNC: 137 MG/DL (ref 70–99)
GN BLD CULTURE PNL BLD POS NAA+PROBE: NOT DETECTED
GP B STREP DNA BLD POS QL NAA+NON-PROBE: NOT DETECTED
HCT VFR BLD AUTO: 30.1 % (ref 42–52)
HGB BLD-MCNC: 9.4 G/DL (ref 14–18)
HYPOCHROMIA BLD QL SMEAR: ABNORMAL
IMM GRANULOCYTES # BLD: 0.2 K/UL
K OXYTOCA DNA BLD POS QL NAA+NON-PROBE: NOT DETECTED
K PNEUMON DNA SPEC QL NAA+PROBE: NOT DETECTED
K. AEROGENES DNA SPEC QL NAA+PROBE: NOT DETECTED
L MONOCYTOG DNA BLD POS QL NAA+NON-PROBE: NOT DETECTED
LYMPHOCYTES # BLD: 1.1 K/UL (ref 1.1–4.5)
LYMPHOCYTES NFR BLD: 7 % (ref 20–40)
MACROCYTES BLD QL SMEAR: ABNORMAL
MCH RBC QN AUTO: 30.4 PG (ref 27–31)
MCHC RBC AUTO-ENTMCNC: 31.2 G/DL (ref 33–37)
MCV RBC AUTO: 97.4 FL (ref 80–94)
MONOCYTES # BLD: 1.2 K/UL (ref 0–0.9)
MONOCYTES NFR BLD: 8 % (ref 0–10)
N MEN DNA BLD POS QL NAA+NON-PROBE: NOT DETECTED
NEUTROPHILS # BLD: 12.6 K/UL (ref 1.5–7.5)
NEUTS BAND NFR BLD MANUAL: 2 % (ref 0–5)
NEUTS SEG NFR BLD: 82 % (ref 50–65)
OVALOCYTES BLD QL SMEAR: ABNORMAL
P AERUGINOSA DNA BLD POS NAA+NON-PROBE: NOT DETECTED
PERFORMED ON: ABNORMAL
PLATELET # BLD AUTO: 175 K/UL (ref 130–400)
PLATELET SLIDE REVIEW: ADEQUATE
PMV BLD AUTO: 11 FL (ref 9.4–12.4)
POTASSIUM SERPL-SCNC: 3.8 MMOL/L (ref 3.5–5)
PROTEUS SP DNA BLD POS QL NAA+NON-PROBE: NOT DETECTED
RBC # BLD AUTO: 3.09 M/UL (ref 4.7–6.1)
S AUREUS DNA BLD POS QL NAA+NON-PROBE: NOT DETECTED
S AUREUS+CONS DNA BLD POS NAA+NON-PROBE: NOT DETECTED
S EPIDERMIDIS DNA BLD POS QL NAA+PROBE: NOT DETECTED
S LUGDUNENSIS DNA BLD POS QL NAA+PROBE: NOT DETECTED
S MALTOPH DNA BLD POS QL NAA+PROBE: NOT DETECTED
S MARCESCENS DNA BLD POS NAA+NON-PROBE: NOT DETECTED
S PNEUM DNA BLD POS QL NAA+NON-PROBE: DETECTED
S PYO DNA BLD POS QL NAA+NON-PROBE: NOT DETECTED
SALMONELLA DNA BLD POS QL NAA+PROBE: NOT DETECTED
SCHISTOCYTES BLD QL SMEAR: ABNORMAL
SODIUM SERPL-SCNC: 139 MMOL/L (ref 136–145)
STREPTOCOCCUS DNA BLD POS NAA+NON-PROBE: DETECTED
WBC # BLD AUTO: 15 K/UL (ref 4.8–10.8)

## 2024-11-14 PROCEDURE — 97116 GAIT TRAINING THERAPY: CPT

## 2024-11-14 PROCEDURE — 6370000000 HC RX 637 (ALT 250 FOR IP): Performed by: NURSE PRACTITIONER

## 2024-11-14 PROCEDURE — 97165 OT EVAL LOW COMPLEX 30 MIN: CPT

## 2024-11-14 PROCEDURE — 97530 THERAPEUTIC ACTIVITIES: CPT

## 2024-11-14 PROCEDURE — 93306 TTE W/DOPPLER COMPLETE: CPT | Performed by: INTERNAL MEDICINE

## 2024-11-14 PROCEDURE — 97161 PT EVAL LOW COMPLEX 20 MIN: CPT

## 2024-11-14 PROCEDURE — 99222 1ST HOSP IP/OBS MODERATE 55: CPT | Performed by: INTERNAL MEDICINE

## 2024-11-14 PROCEDURE — 80048 BASIC METABOLIC PNL TOTAL CA: CPT

## 2024-11-14 PROCEDURE — 2580000003 HC RX 258: Performed by: INTERNAL MEDICINE

## 2024-11-14 PROCEDURE — 92610 EVALUATE SWALLOWING FUNCTION: CPT

## 2024-11-14 PROCEDURE — 87040 BLOOD CULTURE FOR BACTERIA: CPT

## 2024-11-14 PROCEDURE — C8929 TTE W OR WO FOL WCON,DOPPLER: HCPCS

## 2024-11-14 PROCEDURE — 1200000000 HC SEMI PRIVATE

## 2024-11-14 PROCEDURE — 6360000002 HC RX W HCPCS: Performed by: NURSE PRACTITIONER

## 2024-11-14 PROCEDURE — 36415 COLL VENOUS BLD VENIPUNCTURE: CPT

## 2024-11-14 PROCEDURE — 6360000004 HC RX CONTRAST MEDICATION: Performed by: NURSE PRACTITIONER

## 2024-11-14 PROCEDURE — 85025 COMPLETE CBC W/AUTO DIFF WBC: CPT

## 2024-11-14 PROCEDURE — 2580000003 HC RX 258

## 2024-11-14 PROCEDURE — 2580000003 HC RX 258: Performed by: NURSE PRACTITIONER

## 2024-11-14 PROCEDURE — 6360000002 HC RX W HCPCS

## 2024-11-14 PROCEDURE — 6370000000 HC RX 637 (ALT 250 FOR IP)

## 2024-11-14 PROCEDURE — 82962 GLUCOSE BLOOD TEST: CPT

## 2024-11-14 PROCEDURE — 94760 N-INVAS EAR/PLS OXIMETRY 1: CPT

## 2024-11-14 PROCEDURE — 94640 AIRWAY INHALATION TREATMENT: CPT

## 2024-11-14 RX ORDER — BENZONATATE 200 MG/1
200 CAPSULE ORAL 3 TIMES DAILY
Qty: 90 CAPSULE | Refills: 0 | Status: SHIPPED | OUTPATIENT
Start: 2024-11-14 | End: 2024-12-14

## 2024-11-14 RX ORDER — PANTOPRAZOLE SODIUM 40 MG/1
TABLET, DELAYED RELEASE ORAL
Qty: 60 TABLET | Refills: 3 | Status: SHIPPED | OUTPATIENT
Start: 2024-11-14

## 2024-11-14 RX ORDER — IPRATROPIUM BROMIDE AND ALBUTEROL SULFATE 2.5; .5 MG/3ML; MG/3ML
1 SOLUTION RESPIRATORY (INHALATION)
Status: DISCONTINUED | OUTPATIENT
Start: 2024-11-14 | End: 2024-11-16

## 2024-11-14 RX ADMIN — PRIMIDONE 50 MG: 50 TABLET ORAL at 20:36

## 2024-11-14 RX ADMIN — MONTELUKAST 10 MG: 10 TABLET, FILM COATED ORAL at 20:36

## 2024-11-14 RX ADMIN — METOPROLOL SUCCINATE 50 MG: 50 TABLET, EXTENDED RELEASE ORAL at 20:36

## 2024-11-14 RX ADMIN — APIXABAN 2.5 MG: 2.5 TABLET, FILM COATED ORAL at 20:36

## 2024-11-14 RX ADMIN — IPRATROPIUM BROMIDE AND ALBUTEROL SULFATE 1 DOSE: 2.5; .5 SOLUTION RESPIRATORY (INHALATION) at 19:07

## 2024-11-14 RX ADMIN — CEFEPIME 2000 MG: 2 INJECTION, POWDER, FOR SOLUTION INTRAVENOUS at 05:51

## 2024-11-14 RX ADMIN — SENNOSIDES AND DOCUSATE SODIUM 2 TABLET: 50; 8.6 TABLET ORAL at 10:02

## 2024-11-14 RX ADMIN — SODIUM CHLORIDE, PRESERVATIVE FREE 10 ML: 5 INJECTION INTRAVENOUS at 15:23

## 2024-11-14 RX ADMIN — ATORVASTATIN CALCIUM 20 MG: 20 TABLET, FILM COATED ORAL at 10:03

## 2024-11-14 RX ADMIN — WATER 40 MG: 1 INJECTION INTRAMUSCULAR; INTRAVENOUS; SUBCUTANEOUS at 15:50

## 2024-11-14 RX ADMIN — METOPROLOL SUCCINATE 50 MG: 50 TABLET, EXTENDED RELEASE ORAL at 10:03

## 2024-11-14 RX ADMIN — SODIUM CHLORIDE: 9 INJECTION, SOLUTION INTRAVENOUS at 15:55

## 2024-11-14 RX ADMIN — WATER 2000 MG: 1 INJECTION INTRAMUSCULAR; INTRAVENOUS; SUBCUTANEOUS at 12:47

## 2024-11-14 RX ADMIN — PRIMIDONE 50 MG: 50 TABLET ORAL at 10:03

## 2024-11-14 RX ADMIN — INSULIN LISPRO 1 UNITS: 100 INJECTION, SOLUTION INTRAVENOUS; SUBCUTANEOUS at 17:12

## 2024-11-14 RX ADMIN — INSULIN LISPRO 2 UNITS: 100 INJECTION, SOLUTION INTRAVENOUS; SUBCUTANEOUS at 20:39

## 2024-11-14 RX ADMIN — SODIUM CHLORIDE, PRESERVATIVE FREE 10 ML: 5 INJECTION INTRAVENOUS at 20:39

## 2024-11-14 RX ADMIN — INSULIN LISPRO 1 UNITS: 100 INJECTION, SOLUTION INTRAVENOUS; SUBCUTANEOUS at 12:49

## 2024-11-14 RX ADMIN — SENNOSIDES AND DOCUSATE SODIUM 2 TABLET: 50; 8.6 TABLET ORAL at 20:36

## 2024-11-14 RX ADMIN — CETIRIZINE HYDROCHLORIDE 10 MG: 10 TABLET, FILM COATED ORAL at 10:03

## 2024-11-14 RX ADMIN — DILTIAZEM HYDROCHLORIDE 120 MG: 120 CAPSULE, COATED, EXTENDED RELEASE ORAL at 10:04

## 2024-11-14 RX ADMIN — APIXABAN 2.5 MG: 2.5 TABLET, FILM COATED ORAL at 10:03

## 2024-11-14 RX ADMIN — TAMSULOSIN HYDROCHLORIDE 0.4 MG: 0.4 CAPSULE ORAL at 10:03

## 2024-11-14 NOTE — PROGRESS NOTES
Physical Therapy  Facility/Department: [facility] ONCOLOGY UNIT  Physical Therapy Initial Assessment    Name: Jose Tate  : 1940  MRN: [id number]  Date of Service: 2024    Discharge Recommendations:  Continue to assess pending progress          Patient Diagnosis(es): The primary encounter diagnosis was Atrial fibrillation with RVR (HCC). Diagnoses of Elevated CK and Opacity of lung on imaging study were also pertinent to this visit.  Past Medical History:  has a past medical history of Allergic rhinitis, Atrial fibrillation (HCC), Bronchitis, chronic (HCC), Carotid artery stenosis, Congenital heart disease, GERD (gastroesophageal reflux disease), Heart attack (HCC), Hemorrhoids, Hypercholesterolemia, Palliative care patient, and Type II or unspecified type diabetes mellitus without mention of complication, not stated as uncontrolled.  Past Surgical History:  has a past surgical history that includes Cardiac surgery; Port Surgery (Right, 2021); and ep device procedure (Left, 2024).    Assessment  Body Structures, Functions, Activity Limitations Requiring Skilled Therapeutic Intervention: Decreased functional mobility ;Decreased ADL status;Decreased strength;Decreased balance;Decreased endurance  Assessment: Pt ABLE TO AMB INTO CANO WITH CLOSE ASSIST. WILL PROGRESS AS TOLERATED.  Requires PT Follow-Up: Yes  Activity Tolerance  Activity Tolerance: Patient tolerated treatment well    Plan  Physical Therapy Plan  General Plan: 5-7 times per week  Current Treatment Recommendations: Strengthening, ROM, Balance training, Functional mobility training, Transfer training, Gait training, Safety education & training, Patient/Caregiver education & training, Endurance training  Safety Devices  Type of Devices: Call light within reach, Left in chair, Nurse notified (FAMILY PRESENT)    Restrictions  Restrictions/Precautions  Restrictions/Precautions: Fall Risk     Subjective  Pain: DENIES  General  Patient  assessed for rehabilitation services?: Yes  Diagnosis: FALL, SYNCOPE, PNA  Subjective  Subjective: Pt READY TO GET OOB         Social/Functional History  Social/Functional History  Lives With: Alone  Type of Home: House  Home Layout: One level  Home Access: Ramped entrance  Bathroom Shower/Tub: Walk-in shower, Shower chair with back  Bathroom Toilet: Handicap height  Bathroom Equipment: Grab bars in shower  Bathroom Accessibility: Accessible  Home Equipment: Cane, Rollator, Walker - Rolling  Has the patient had two or more falls in the past year or any fall with injury in the past year?: Yes  Receives Help From: Family  ADL Assistance: Independent  Homemaking Assistance: Independent  Homemaking Responsibilities: Yes  Ambulation Assistance: Independent (uses rollator)  Transfer Assistance: Independent  Active : No  Patient's  Info: Family  Mode of Transportation: Car  Occupation: Retired  Additional Comments: pt has caregiver come in 3 times a week for chores, cooking, and helping with ADL routine  Vision/Hearing  Vision  Vision: Within Functional Limits  Hearing  Hearing Exceptions: Bilateral hearing aid    Cognition   Orientation  Overall Orientation Status: Within Functional Limits  Cognition  Overall Cognitive Status: WFL    Objective  Temp: 97 °F (36.1 °C)  Pulse: 87  Heart Rate Source: Monitor  Respirations: 18  SpO2: 95 %  O2 Device: None (Room air)  BP: (!) 165/68  MAP (Calculated): 100  BP Location: Right lower arm  BP Method: Automatic        Gross Assessment  AROM: Within functional limits  Strength: Generally decreased, functional                   Bed mobility  Supine to Sit: Minimal assistance  Transfers  Sit to Stand: Contact guard assistance  Stand to Sit: Contact guard assistance  Bed to Chair: Contact guard assistance  Ambulation  Device: Rolling Walker  Assistance: Contact guard assistance  Quality of Gait: NO LOB  Gait Deviations: Decreased step length;Decreased step height;Slow

## 2024-11-14 NOTE — PROGRESS NOTES
Occupational Therapy Initial Assessment  Date: 2024   Patient Name: Jose Tate  MRN: [id number]     : 1940    Date of Service: 2024    Discharge Recommendations:  Continue to assess pending progress, Patient would benefit from continued therapy after discharge       Assessment   Performance deficits / Impairments: Decreased functional mobility ;Decreased ADL status;Decreased strength;Decreased balance;Decreased endurance  Assessment: Evaluation completed and tx initiated.  The patient would benefit from further skilled therapy to upgrade safety and functional independence. Pt was asleep upon arrival, but easy to wake and pt was ready to participate. Pt required min A for bed mobility and CGA for sit to stand from EOB. Pt displayed generalized weakness and lack of endurance during functional mobility. Pt would benefit from skilled OT services to address these deficits and improve ADL performance and overall safety.  Treatment Diagnosis: community acquired bacterial pneumonia  Prognosis: Good  Decision Making: Low Complexity  REQUIRES OT FOLLOW-UP: Yes  Activity Tolerance  Activity Tolerance: Patient Tolerated treatment well              Patient Diagnosis(es): The primary encounter diagnosis was Atrial fibrillation with RVR (HCC). Diagnoses of Elevated CK and Opacity of lung on imaging study were also pertinent to this visit.    Past Medical History:   Past Medical History:   Diagnosis Date    Allergic rhinitis     Atrial fibrillation (HCC)     Bronchitis, chronic (HCC)     Carotid artery stenosis     Congenital heart disease     GERD (gastroesophageal reflux disease)     Heart attack (HCC)     Hemorrhoids     Hypercholesterolemia     Palliative care patient 2024    Type II or unspecified type diabetes mellitus without mention of complication, not stated as uncontrolled         Past Surgical History:   Past Surgical History:   Procedure Laterality Date    CARDIAC SURGERY      balloon    Observation/Palpation  Posture: Good  Observation: IV     ADL  Feeding: Independent;Setup  Grooming: Supervision;Setup  UE Bathing: Stand by assistance  LE Bathing: Contact guard assistance;Minimal assistance  UE Dressing: Stand by assistance  LE Dressing: Contact guard assistance;Minimal assistance  Putting On/Taking Off Footwear: Contact guard assistance;Minimal assistance  Toileting: Contact guard assistance;Minimal assistance  Functional Mobility: Contact guard assistance  Functional Mobility Skilled Clinical Factors: using RW        Bed mobility  Supine to Sit: Minimal assistance  Transfers  Sit to stand: Contact guard assistance  Stand to sit: Contact guard assistance  Transfer Comments: using RW     Cognition  Overall Cognitive Status: WFL               Gross Assessment  AROM: Within functional limits  Strength: Generally decreased, functional (B UE strength 4-/5)  LUE AROM (degrees)  LUE AROM : WFL  RUE AROM (degrees)  RUE AROM : WFL                      Plan   Occupational Therapy Plan  Times Per Week: 3-5  Current Treatment Recommendations: Strengthening, Balance training, Functional mobility training, Endurance training, Patient/Caregiver education & training, Self-Care / ADL, Safety education & training, Positioning, Equipment evaluation, education, & procurement, Home management training       Goals  Short Term Goals  Short Term Goal 1: Modified independent with dressing  Short Term Goal 2: Modified independent with toileting  Short Term Goal 3: Modified independent with standing and transfers  Short Term Goal 4: Modified independent with light ambulatory ADL  Short Term Goal 5: Patient will be independent with bedside therapeutic activities, AE/DME recommendations, and home safety        Tx initiated: Pt was willing to work with therapy. Focused on bed mobility, standing balance and endurance during functional mobility. Pt displayed generalized weakness affecting ADL performance and mobility. (15

## 2024-11-14 NOTE — CONSULTS
INFECTIOUS DISEASES CONSULT NOTE    Patient:  Jose Tate 84 y.o. male  ROOM # [unfilled]  YOB: 1940  MRN: [id number]  CSN:  [id number]  Admit date: 11/13/2024   Admitting Physician: Dwaine Church MD  Primary Care Physician: Chayo Grande MD  REFERRING PROVIDER: No ref. provider found    Reason for Consultation: \"Bacteremia, pneumonia, right upper chest port in place, recent pacemaker placement\"    History of Present Illness/Chief Complaint: Pleasant 84-year-old man.  He lives alone.  He does have some caregiver assistance.  He indicates he had gone to the bathroom.  He indicates he had a passing out spell.  He indicates he was able to crawl from the bathroom to near his bedside.  He indicates he was too weak to pull himself up on the bed.  He indicates he pulled the bedding off the bed.  He was able to make himself a pallet.  He tried to reach the phone, but it fell off by the nightstand.  He was found by family on one of his caregivers.  Ambulance was called.  He was brought to the emergency room.  He had not been experiencing fever.  He indicates prior to the passing out spell he had been doing well.  He indicates he had had a similar episode a number of months ago and underwent pacemaker placement.  He has not noticed redness or swelling of his pacemaker site.  He has a little bit of congestion but no severe cough or sputum production.  He has not had dysuria or urinary frequency.  He has not had diarrhea.  He has had blood cultures turn positive.  Infectious diseases asked to evaluate and offer recommendations.  His daughter is at bedside.  She indicates he has had a little bit of cough and congestion.    Current Scheduled Medications:    cefTRIAXone (ROCEPHIN) IV  2,000 mg IntraVENous Q24H    ipratropium 0.5 mg-albuterol 2.5 mg  1 Dose Inhalation Q4H WA RT    methylPREDNISolone  40 mg IntraVENous Daily    apixaban  2.5 mg Oral BID    atorvastatin  20 mg Oral Daily    cetirizine  10

## 2024-11-14 NOTE — PROGRESS NOTES
[facility]      [facility]    Patient:  Jose Tate  YOB: 1940  Date of Service: 11/14/2024  MRN: [id number]   Acct: [id number]   Primary Care Physician: Chayo Grande MD  Advance Directive: Partial Code  Admit Date: 11/13/2024       Hospital Day: 1    Portions of this note have been copied forward, however, updated to reflect the most current clinical status of this patient.     CHIEF COMPLAINT Fall    SUBJECTIVE:  Mr. Tate was resting in bed this morning. Reports SOB with exertion at times. Denies fever or chills. Denies chest pain. Denies nausea or vomiting.      CUMULATIVE HOSPITAL COURSE:   The patient is a  84 y.o. male with PMH of Afib on Eliquis, recent pacemaker placement, T2DM, hypercholesterolemia Lung cancer, and GERD who presented to [facility] ED for evaluation following a fall. Reportedly fell night prior to admission due to syncopal episode. Thought to be in Afib. Stated he had similar episode with prior episode of afib. Denied fever, chills, chest pain or abdominal pain. Workup in ED revealed  CXR with interval development of left perihilar vague opacity, potentially either pneumonia or atelectasis, mass cannot be excluded; CT cervical spine with no acute fracture or traumatic malalignment; CT head with senescent changes without acute abnormality; WBC 21.3, H&H 10.6/33.2, CK5 89, lactic acid 1.6, creatinine 1.8, BUN 35, GFR 36. Patient was admitted to hospital medicine for further evaluation. Empiric antibiotics were initiated. Blood cultures positive, resembling streptococcus. MRSA nares negative. Antibiotics adjusted to Rocephin. Repeat blood cultures ordered. ID consultation placed.         Review of Systems   Constitutional:  Negative for chills, diaphoresis, fatigue and fever.   HENT:  Negative for congestion and ear pain.    Eyes:  Negative for visual disturbance.   Respiratory:  Positive for shortness of breath. Negative for cough and wheezing.

## 2024-11-14 NOTE — PROGRESS NOTES
Facility/Department: [facility] ONCOLOGY UNIT   CLINICAL BEDSIDE SWALLOW EVALUATION    NAME: Jose Tate  : 1940  MRN: [id number]  ADMISSION DATE: 2024  Date of Eval: 2024  Evaluating Therapist: Tracie Pruitt MS CCC-SLP        ADMITTING DIAGNOSIS:   has Hypercholesterolemia; Bronchitis, chronic (HCC); GERD (gastroesophageal reflux disease); COPD (chronic obstructive pulmonary disease) (HCC); Essential hypertension; Type 2 diabetes mellitus without complication (HCC); Adenocarcinoma of right lung (HCC); Former smoker; Occupational exposure in workplace; Iron deficiency anemia; Chronic systolic (congestive) heart failure; Chemotherapy-induced neutropenia (HCC); Coronary artery disease involving native heart with angina pectoris, unspecified vessel or lesion type (HCC); 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      History:  Per EMR- Pt fell at home and believes he may have passed out. atrial fib, elevated CK, opacity of lung on imaging study      Recent Chest Xray/CT of Chest:   IMPRESSION:  1.  New mass-like consolidation in the anterior medial left upper lobe and patchy nodular ground-glass in the right lower lobe suggestive of developing infection/pneumonia.  2.  Stable consolidation in the posterior superior right upper lobe/azygos lobe.  3.  Otherwise no interval change.          Current Diet level:  Current Diet : NPO  Current Liquid Diet : NPO        Pain:  Pain Assessment  Pain Assessment: None - Denies Pain    Reason

## 2024-11-14 NOTE — CONSULTS
Palliative Care:  Pt known to palliative care, also followed by [facility] at home.  Initiated for support, goals, ACP review.  Pt presents to ed after a fall at home. Pt was on floor from 11/12 at 1:00 p.m. until next morning at 10:00.  This morning pt is up in chair, alert and oriented.   Dtr, Dulce, is present.              Past Medical History:        Past Medical History:   Diagnosis Date    Allergic rhinitis     Atrial fibrillation (HCC)     Bronchitis, chronic (HCC)     Carotid artery stenosis     Congenital heart disease     GERD (gastroesophageal reflux disease)     Heart attack (HCC)     Hemorrhoids     Hypercholesterolemia     Palliative care patient 08/16/2024    Type II or unspecified type diabetes mellitus without mention of complication, not stated as uncontrolled        Advance Directives:  DNI no changes from last ACP note.   Pt/dtr state they have POA paperwork at 's office ready to sign. Requested paperwork once rec'd.            Pain/Other Symptoms:  No pain currently             How are symptoms affecting QOL  Pt/dtr feel pt has had good quality since therapy rec'd in Aug after a fall at home.  Currently experiencing decreased strength and endurance.  See note below.    Psychological/Spiritual:   Good support.                    Plan:  Medical management,  PT/OT  MRI brain, IP Rehab consult      Patient/family discussion r/t goals:           (what does living well look like to you)?            Dtr talks with me about how well pt has done since his last admission in Aug. He was also on rehab floor prior to dc home. HH followed after that discharge. Dtr was pleased with his progress, strength, and improved appetite.  Pt does require some assistance and they have hired [facility] 3 days a week to assist with his needs.  He is still able to be alone.  Unsure what will result after this admission.  Dulce, lives in [place] and his other dtr lives near pt.  I have provided dtr with contact information to [facility]

## 2024-11-14 NOTE — PROGRESS NOTES
Dr. Canas notified of 2 sets of positive aerobic blood cultures showing gram positive cocci with pairs and chains. Identified as strep pneumo.

## 2024-11-14 NOTE — TELEPHONE ENCOUNTER
Message sent to Dr. Pulliam:   [2:13 PM] Lora Helms ([facility])     New Consult:   Ani with [facility]   [phone]  LIN Farris 1940  [facility] room # 414  Consulting: Mary Carmen Bautista, APRN   For: bacteremia, PNA, has right upper chest port in place as well, recent pacemaker placement

## 2024-11-14 NOTE — PROGRESS NOTES
Exam ordered 11/13/24, requested MRI screening for RN to complete,as of 14:30 on 11/14 screening still not done.

## 2024-11-15 ENCOUNTER — OUTSIDE FACILITY SERVICE (OUTPATIENT)
Age: 84
End: 2024-11-15
Payer: MEDICARE

## 2024-11-15 LAB
ANION GAP SERPL CALCULATED.3IONS-SCNC: 12 MMOL/L (ref 7–19)
BASOPHILS # BLD: 0 K/UL (ref 0–0.2)
BASOPHILS NFR BLD: 0.3 % (ref 0–1)
BUN SERPL-MCNC: 33 MG/DL (ref 8–23)
CALCIUM SERPL-MCNC: 8.5 MG/DL (ref 8.8–10.2)
CHLORIDE SERPL-SCNC: 102 MMOL/L (ref 98–111)
CO2 SERPL-SCNC: 22 MMOL/L (ref 22–29)
CREAT SERPL-MCNC: 1.7 MG/DL (ref 0.7–1.2)
EKG P AXIS: NORMAL DEGREES
EKG P-R INTERVAL: NORMAL MS
EKG Q-T INTERVAL: 300 MS
EKG QRS DURATION: 106 MS
EKG QTC CALCULATION (BAZETT): 419 MS
EKG T AXIS: 162 DEGREES
EOSINOPHIL # BLD: 0 K/UL (ref 0–0.6)
EOSINOPHIL NFR BLD: 0.2 % (ref 0–5)
ERYTHROCYTE [DISTWIDTH] IN BLOOD BY AUTOMATED COUNT: 14.3 % (ref 11.5–14.5)
GLUCOSE BLD-MCNC: 188 MG/DL (ref 70–99)
GLUCOSE BLD-MCNC: 280 MG/DL (ref 70–99)
GLUCOSE BLD-MCNC: 285 MG/DL (ref 70–99)
GLUCOSE BLD-MCNC: 333 MG/DL (ref 70–99)
GLUCOSE SERPL-MCNC: 183 MG/DL (ref 70–99)
HCT VFR BLD AUTO: 27.7 % (ref 42–52)
HGB BLD-MCNC: 8.8 G/DL (ref 14–18)
IMM GRANULOCYTES # BLD: 0.1 K/UL
LYMPHOCYTES # BLD: 0.9 K/UL (ref 1.1–4.5)
LYMPHOCYTES NFR BLD: 9.3 % (ref 20–40)
MCH RBC QN AUTO: 30.3 PG (ref 27–31)
MCHC RBC AUTO-ENTMCNC: 31.8 G/DL (ref 33–37)
MCV RBC AUTO: 95.5 FL (ref 80–94)
MONOCYTES # BLD: 0.5 K/UL (ref 0–0.9)
MONOCYTES NFR BLD: 5.3 % (ref 0–10)
NEUTROPHILS # BLD: 8.5 K/UL (ref 1.5–7.5)
NEUTS SEG NFR BLD: 84.4 % (ref 50–65)
PERFORMED ON: ABNORMAL
PLATELET # BLD AUTO: 180 K/UL (ref 130–400)
PMV BLD AUTO: 10.6 FL (ref 9.4–12.4)
POTASSIUM SERPL-SCNC: 4.3 MMOL/L (ref 3.5–5)
RBC # BLD AUTO: 2.9 M/UL (ref 4.7–6.1)
SODIUM SERPL-SCNC: 136 MMOL/L (ref 136–145)
WBC # BLD AUTO: 10.1 K/UL (ref 4.8–10.8)

## 2024-11-15 PROCEDURE — 97535 SELF CARE MNGMENT TRAINING: CPT

## 2024-11-15 PROCEDURE — 2580000003 HC RX 258: Performed by: INTERNAL MEDICINE

## 2024-11-15 PROCEDURE — 97116 GAIT TRAINING THERAPY: CPT

## 2024-11-15 PROCEDURE — 94760 N-INVAS EAR/PLS OXIMETRY 1: CPT

## 2024-11-15 PROCEDURE — 94640 AIRWAY INHALATION TREATMENT: CPT

## 2024-11-15 PROCEDURE — 87205 SMEAR GRAM STAIN: CPT

## 2024-11-15 PROCEDURE — 1200000000 HC SEMI PRIVATE

## 2024-11-15 PROCEDURE — 80048 BASIC METABOLIC PNL TOTAL CA: CPT

## 2024-11-15 PROCEDURE — 6360000002 HC RX W HCPCS: Performed by: NURSE PRACTITIONER

## 2024-11-15 PROCEDURE — 82962 GLUCOSE BLOOD TEST: CPT

## 2024-11-15 PROCEDURE — 2580000003 HC RX 258: Performed by: NURSE PRACTITIONER

## 2024-11-15 PROCEDURE — 85025 COMPLETE CBC W/AUTO DIFF WBC: CPT

## 2024-11-15 PROCEDURE — 6370000000 HC RX 637 (ALT 250 FOR IP): Performed by: HOSPITALIST

## 2024-11-15 PROCEDURE — 6370000000 HC RX 637 (ALT 250 FOR IP)

## 2024-11-15 PROCEDURE — 36415 COLL VENOUS BLD VENIPUNCTURE: CPT

## 2024-11-15 PROCEDURE — 87040 BLOOD CULTURE FOR BACTERIA: CPT

## 2024-11-15 PROCEDURE — 87070 CULTURE OTHR SPECIMN AEROBIC: CPT

## 2024-11-15 PROCEDURE — 2580000003 HC RX 258

## 2024-11-15 PROCEDURE — 93010 ELECTROCARDIOGRAM REPORT: CPT | Performed by: INTERNAL MEDICINE

## 2024-11-15 PROCEDURE — 6370000000 HC RX 637 (ALT 250 FOR IP): Performed by: NURSE PRACTITIONER

## 2024-11-15 PROCEDURE — 97530 THERAPEUTIC ACTIVITIES: CPT

## 2024-11-15 RX ORDER — MECOBALAMIN 5000 MCG
10 TABLET,DISINTEGRATING ORAL NIGHTLY PRN
Status: DISCONTINUED | OUTPATIENT
Start: 2024-11-15 | End: 2024-11-20 | Stop reason: HOSPADM

## 2024-11-15 RX ADMIN — WATER 40 MG: 1 INJECTION INTRAMUSCULAR; INTRAVENOUS; SUBCUTANEOUS at 08:35

## 2024-11-15 RX ADMIN — DILTIAZEM HYDROCHLORIDE 120 MG: 120 CAPSULE, COATED, EXTENDED RELEASE ORAL at 08:35

## 2024-11-15 RX ADMIN — APIXABAN 2.5 MG: 2.5 TABLET, FILM COATED ORAL at 20:24

## 2024-11-15 RX ADMIN — ATORVASTATIN CALCIUM 20 MG: 20 TABLET, FILM COATED ORAL at 08:35

## 2024-11-15 RX ADMIN — PANTOPRAZOLE SODIUM 40 MG: 40 TABLET, DELAYED RELEASE ORAL at 05:13

## 2024-11-15 RX ADMIN — SODIUM CHLORIDE: 9 INJECTION, SOLUTION INTRAVENOUS at 05:13

## 2024-11-15 RX ADMIN — SODIUM CHLORIDE, PRESERVATIVE FREE 10 ML: 5 INJECTION INTRAVENOUS at 08:36

## 2024-11-15 RX ADMIN — MONTELUKAST 10 MG: 10 TABLET, FILM COATED ORAL at 20:24

## 2024-11-15 RX ADMIN — SENNOSIDES AND DOCUSATE SODIUM 2 TABLET: 50; 8.6 TABLET ORAL at 08:35

## 2024-11-15 RX ADMIN — METOPROLOL SUCCINATE 50 MG: 50 TABLET, EXTENDED RELEASE ORAL at 08:35

## 2024-11-15 RX ADMIN — PRIMIDONE 50 MG: 50 TABLET ORAL at 08:35

## 2024-11-15 RX ADMIN — FUROSEMIDE 40 MG: 40 TABLET ORAL at 08:37

## 2024-11-15 RX ADMIN — INSULIN LISPRO 2 UNITS: 100 INJECTION, SOLUTION INTRAVENOUS; SUBCUTANEOUS at 11:32

## 2024-11-15 RX ADMIN — Medication 10 MG: at 21:40

## 2024-11-15 RX ADMIN — IPRATROPIUM BROMIDE AND ALBUTEROL SULFATE 1 DOSE: 2.5; .5 SOLUTION RESPIRATORY (INHALATION) at 06:17

## 2024-11-15 RX ADMIN — METOPROLOL SUCCINATE 50 MG: 50 TABLET, EXTENDED RELEASE ORAL at 20:24

## 2024-11-15 RX ADMIN — SODIUM CHLORIDE, PRESERVATIVE FREE 10 ML: 5 INJECTION INTRAVENOUS at 20:24

## 2024-11-15 RX ADMIN — WATER 2000 MG: 1 INJECTION INTRAMUSCULAR; INTRAVENOUS; SUBCUTANEOUS at 13:41

## 2024-11-15 RX ADMIN — SODIUM CHLORIDE: 9 INJECTION, SOLUTION INTRAVENOUS at 20:24

## 2024-11-15 RX ADMIN — INSULIN LISPRO 2 UNITS: 100 INJECTION, SOLUTION INTRAVENOUS; SUBCUTANEOUS at 21:40

## 2024-11-15 RX ADMIN — CETIRIZINE HYDROCHLORIDE 10 MG: 10 TABLET, FILM COATED ORAL at 08:35

## 2024-11-15 RX ADMIN — INSULIN LISPRO 3 UNITS: 100 INJECTION, SOLUTION INTRAVENOUS; SUBCUTANEOUS at 16:50

## 2024-11-15 RX ADMIN — APIXABAN 2.5 MG: 2.5 TABLET, FILM COATED ORAL at 08:35

## 2024-11-15 RX ADMIN — IPRATROPIUM BROMIDE AND ALBUTEROL SULFATE 1 DOSE: 2.5; .5 SOLUTION RESPIRATORY (INHALATION) at 10:21

## 2024-11-15 RX ADMIN — SENNOSIDES AND DOCUSATE SODIUM 2 TABLET: 50; 8.6 TABLET ORAL at 20:24

## 2024-11-15 RX ADMIN — TAMSULOSIN HYDROCHLORIDE 0.4 MG: 0.4 CAPSULE ORAL at 08:35

## 2024-11-15 RX ADMIN — INSULIN LISPRO 1 UNITS: 100 INJECTION, SOLUTION INTRAVENOUS; SUBCUTANEOUS at 08:38

## 2024-11-15 RX ADMIN — PRIMIDONE 50 MG: 50 TABLET ORAL at 20:24

## 2024-11-15 RX ADMIN — IPRATROPIUM BROMIDE AND ALBUTEROL SULFATE 1 DOSE: 2.5; .5 SOLUTION RESPIRATORY (INHALATION) at 18:49

## 2024-11-15 NOTE — PROGRESS NOTES
Infectious Diseases Progress Note    Patient:  Jose Tate  YOB: 1940  MRN: [id number]   Admit date: 11/13/2024   Admitting Physician: Dwaine Church MD  Primary Care Physician: Chayo Grande MD    Chief Complaint/Interval History: He has been coughing some today.  He is without fever.  He is hemodynamically stable.  No nausea or vomiting.  He indicates he walks some today.  He had an elevated heart rate at 1 point with his ambulation.  He is not having diarrhea.  He has no rash.  Family at bedside.  Show images of the CT chest with the patient and his family.  Reviewed culture results.    In/Out    Intake/Output Summary (Last 24 hours) at 11/15/2024 1708  Last data filed at 11/15/2024 0848  Gross per 24 hour   Intake 80 ml   Output 425 ml   Net -345 ml     Allergies: No Known Allergies  Current Meds: cefTRIAXone (ROCEPHIN) 2,000 mg in sterile water 20 mL IV syringe, Q24H  ipratropium 0.5 mg-albuterol 2.5 mg (DUONEB) nebulizer solution 1 Dose, Q4H WA RT  methylPREDNISolone sodium succ (SOLU-MEDROL) 40 mg in sterile water 1 mL injection, Daily  apixaban (ELIQUIS) tablet 2.5 mg, BID  atorvastatin (LIPITOR) tablet 20 mg, Daily  cetirizine (ZYRTEC) tablet 10 mg, Daily  dilTIAZem (CARDIZEM CD) extended release capsule 120 mg, Daily  furosemide (LASIX) tablet 40 mg, Every Other Day  metoprolol succinate (TOPROL XL) extended release tablet 50 mg, BID  montelukast (SINGULAIR) tablet 10 mg, Nightly  pantoprazole (PROTONIX) tablet 40 mg, QAM AC  primidone (MYSOLINE) tablet 50 mg, BID  sennosides-docusate sodium (SENOKOT-S) 8.6-50 MG tablet 2 tablet, BID  tamsulosin (FLOMAX) capsule 0.4 mg, Daily  sodium chloride flush 0.9 % injection 5-40 mL, 2 times per day  sodium chloride flush 0.9 % injection 5-40 mL, PRN  0.9 % sodium chloride infusion, PRN  ondansetron (ZOFRAN-ODT) disintegrating tablet 4 mg, Q8H PRN   Or  ondansetron (ZOFRAN) injection 4 mg, Q6H PRN  polyethylene glycol (GLYCOLAX) packet 17

## 2024-11-15 NOTE — PROGRESS NOTES
techniques as appropriate to the performed exam and include at least one of the following: Automated exposure control, adjustment of the mA and/or kV according to size, and the use of iterative reconstruction technique.  ______________________________________ Electronically signed by: TARAS SOTO M.D. Date:     11/13/2024 Time:    13:13       CT HEAD WO CONTRAST  Result Date: 11/13/2024    Senescent changes without acute abnormality.  All CT scans are performed using dose optimization techniques as appropriate to the performed exam and include at least one of the following: Automated exposure control, adjustment of the mA and/or kV according to size, and the use of iterative reconstruction technique.  ______________________________________ Electronically signed by: JACLYN KIM M.D. Date:     11/13/2024 Time:    13:10           Micro:      Culture, Blood 1 [[id number]] (Abnormal) Collected: 11/13/24 1132   Order Status: Completed Specimen: Blood Updated: 11/15/24 0638    Blood Culture, Routine -- Abnormal     Gram stain Aerobic bottle:  Gram stain Anaerobic bottle:  Gram positive cocci in chains and/or pairs  resembling Streptococcus  Culture in progress  Please notify Physician   Bottle volume = 6 ml   Abnormal     Organism Streptococcus pneumoniae Abnormal     Blood Culture, Routine --    Identification by Molecular PCR  Isolated from Aerobic and Anaerobic bottle  Sensitivity to follow       Culture, Blood 2 [[id number]] (Abnormal) Collected: 11/13/24 1141   Order Status: Completed Specimen: Blood Updated: 11/15/24 1004    Culture, Blood 2  Bottle volume = 6 ml Abnormal     Organism Streptococcus pneumoniae Abnormal     Culture, Blood 2 --    Isolated from Aerobic and Anaerobic bottle  No further workup  Refer to (blood culture collected 11/12/24@2311) for sensitivity results       Blood ID, Molecular [[id number]] (Abnormal) Collected: 11/13/24 1132   Order Status: Completed Updated: 11/14/24 6142     PCR Not Detected    Cyclospora Cayetanensis PCR Not Detected    Entamoeba Histolytica PCR Not Detected    E Coli Enteroaggregative PCR Not Detected    E Coli Enteropathogenic PCR Not Detected    E Coli Enterotoxigenic PCR Not Detected    Giardia Lamblia PCR Not Detected    Norovirus GI GII PCR Not Detected    Plesiomonas Shigelloides PCR Not Detected    Rotavirus A PCR Not Detected    Salmonella PCR Not Detected    Sapovirus PCR Not Detected    Shiga-like Toxin-producing E. Coli (STEC) stx1/stx2 Not Detected    E Coli Shigella/Enteroinvasive PCR Not Detected    Vibrio PCR Not Detected    Vibrio Cholerae PCR Not Detected    Yersinia Enterocolitica PCR Not Detected     Clostridium Difficile Toxin/Antigen [[id number]] Collected: 11/13/24 8147   Order Status: Completed Specimen: Stool Updated: 11/13/24 1840    C.diff Toxin/Antigen --    Result: Negative for Toxigenic C. difficile by EIA  Normal Range: Negative     Assessment/Plan   Principal Problem:    Bacteremia due to Streptococcus  Active Problems:    Hypercholesterolemia    GERD (gastroesophageal reflux disease)    Essential hypertension    Type 2 diabetes mellitus without complication (HCC)    Atrial fibrillation (HCC)    Community acquired bacterial pneumonia    Chronic rhinitis    Essential tremor    BPH (benign prostatic hyperplasia)    Constipation  Resolved Problems:    * No resolved hospital problems. *    Principal Problem:    Bacteremia due to Streptococcus-   - Continue IV Rocephin    - ID consulted     - Recommended continuing IV Rocephin     - Follow repeat bloods culture     - Blood cultures positive, resembling Streptococcus   - Follow Repeat blood cultures, pending       Active Problems:    Community acquired bacterial pneumonia/ Chronic rhinitis-   - Continue IV Rocephin               - Bronchodilators added               - Continue IV steroids               - Encourage deep breathing and cough              - Incentive spirometry              -

## 2024-11-15 NOTE — PLAN OF CARE
Problem: Chronic Conditions and Co-morbidities  Goal: Patient's chronic conditions and co-morbidity symptoms are monitored and maintained or improved  11/14/2024 2319 by Ever Davey RN  Outcome: Progressing  11/14/2024 1303 by Eli Venegas RN  Outcome: Progressing  Flowsheets (Taken 11/14/2024 1250)  Care Plan - Patient's Chronic Conditions and Co-Morbidity Symptoms are Monitored and Maintained or Improved:   Monitor and assess patient's chronic conditions and comorbid symptoms for stability, deterioration, or improvement   Collaborate with multidisciplinary team to address chronic and comorbid conditions and prevent exacerbation or deterioration   Update acute care plan with appropriate goals if chronic or comorbid symptoms are exacerbated and prevent overall improvement and discharge     Problem: Skin/Tissue Integrity  Goal: Absence of new skin breakdown  Description: 1.  Monitor for areas of redness and/or skin breakdown  2.  Assess vascular access sites hourly  3.  Every 4-6 hours minimum:  Change oxygen saturation probe site  4.  Every 4-6 hours:  If on nasal continuous positive airway pressure, respiratory therapy assess nares and determine need for appliance change or resting period.  11/14/2024 2319 by Ever Davey RN  Outcome: Progressing  11/14/2024 1303 by Eli Venegas RN  Outcome: Progressing     Problem: ABCDS Injury Assessment  Goal: Absence of physical injury  11/14/2024 2319 by Ever Davey RN  Outcome: Progressing  11/14/2024 1303 by Eli Venegas RN  Outcome: Progressing  Flowsheets (Taken 11/14/2024 1250)  Absence of Physical Injury: Implement safety measures based on patient assessment     Problem: Safety - Adult  Goal: Free from fall injury  11/14/2024 2319 by Ever Davey RN  Outcome: Progressing  11/14/2024 1303 by Eli Venegas RN  Outcome: Progressing  Flowsheets (Taken 11/14/2024 1250)  Free From Fall Injury: Instruct family/caregiver on patient safety

## 2024-11-15 NOTE — PROGRESS NOTES
Physical Therapy     11/15/24 1500   Restrictions/Precautions   Restrictions/Precautions Fall Risk   General   Diagnosis FALL, SYNCOPE, PNA   Subjective   Subjective Pt READY TO GET OOB   Subjective   Pain DENIES   Bed mobility   Supine to Sit Minimal assistance   Bed Mobility Comments with bed functions and R rail   Transfers   Sit to Stand Contact guard assistance   Stand to Sit Contact guard assistance   Bed to Chair Contact guard assistance   Comment moderate LOB during sit to stand from EOB to RW with min/modA to correct. CGA for all bathroom activities for safety due to pt tremors/balance deficits   Ambulation   Device Rolling Walker   Assistance Contact guard assistance;Minimal assistance   Quality of Gait unsteady overall but no true LOB- pt had signficant tremors during entire tx   Gait Deviations Decreased step length;Decreased step height;Slow Kathrine   Distance 40'   Comments distance kept short due to elevated heart rate to 140, chair follow for safety due to pt initial standing LOB from EOB to RW.   Short Term Goals   Time Frame for Short Term Goals 14 DAYS   Short Term Goal 1 BED MOB MOD IND   Short Term Goal 2 TRANSFERS SBA   Short Term Goal 3 ' RW SBA   Activity Tolerance   Activity Tolerance Patient tolerated treatment well;Patient limited by fatigue   Assessment   Assessment pt needed chair follow for AMB due to unsteadiness overall from tremors and fatigue. would benefit from continued rehab to address balance and mobility deficits. left in chair with family present and nursing notified.   PT Plan of Care   Friday X   Safety Devices   Type of Devices Call light within reach;Gait belt;Left in chair;Nurse notified     Electronically signed by Carson Moraes PTA on 11/15/2024 at 3:14 PM

## 2024-11-16 ENCOUNTER — OUTSIDE FACILITY SERVICE (OUTPATIENT)
Age: 84
End: 2024-11-16
Payer: MEDICARE

## 2024-11-16 LAB
ANION GAP SERPL CALCULATED.3IONS-SCNC: 15 MMOL/L (ref 7–19)
BACTERIA BLD CULT ORG #2: ABNORMAL
BACTERIA BLD CULT ORG #2: ABNORMAL
BASOPHILS # BLD: 0 K/UL (ref 0–0.2)
BASOPHILS NFR BLD: 0.3 % (ref 0–1)
BUN SERPL-MCNC: 34 MG/DL (ref 8–23)
CALCIUM SERPL-MCNC: 8.6 MG/DL (ref 8.8–10.2)
CHLORIDE SERPL-SCNC: 102 MMOL/L (ref 98–111)
CO2 SERPL-SCNC: 19 MMOL/L (ref 22–29)
CREAT SERPL-MCNC: 1.8 MG/DL (ref 0.7–1.2)
EOSINOPHIL # BLD: 0.1 K/UL (ref 0–0.6)
EOSINOPHIL NFR BLD: 0.7 % (ref 0–5)
ERYTHROCYTE [DISTWIDTH] IN BLOOD BY AUTOMATED COUNT: 14.2 % (ref 11.5–14.5)
GLUCOSE BLD-MCNC: 171 MG/DL (ref 70–99)
GLUCOSE BLD-MCNC: 178 MG/DL (ref 70–99)
GLUCOSE BLD-MCNC: 267 MG/DL (ref 70–99)
GLUCOSE BLD-MCNC: 341 MG/DL (ref 70–99)
GLUCOSE SERPL-MCNC: 155 MG/DL (ref 70–99)
HCT VFR BLD AUTO: 28 % (ref 42–52)
HGB BLD-MCNC: 8.9 G/DL (ref 14–18)
IMM GRANULOCYTES # BLD: 0.1 K/UL
LYMPHOCYTES # BLD: 1.2 K/UL (ref 1.1–4.5)
LYMPHOCYTES NFR BLD: 11.8 % (ref 20–40)
MCH RBC QN AUTO: 30.6 PG (ref 27–31)
MCHC RBC AUTO-ENTMCNC: 31.8 G/DL (ref 33–37)
MCV RBC AUTO: 96.2 FL (ref 80–94)
MONOCYTES # BLD: 0.6 K/UL (ref 0–0.9)
MONOCYTES NFR BLD: 6.2 % (ref 0–10)
NEUTROPHILS # BLD: 7.8 K/UL (ref 1.5–7.5)
NEUTS SEG NFR BLD: 80.4 % (ref 50–65)
ORGANISM: ABNORMAL
PERFORMED ON: ABNORMAL
PLATELET # BLD AUTO: 215 K/UL (ref 130–400)
PMV BLD AUTO: 10.6 FL (ref 9.4–12.4)
POTASSIUM SERPL-SCNC: 4.3 MMOL/L (ref 3.5–5)
RBC # BLD AUTO: 2.91 M/UL (ref 4.7–6.1)
SODIUM SERPL-SCNC: 136 MMOL/L (ref 136–145)
WBC # BLD AUTO: 9.7 K/UL (ref 4.8–10.8)

## 2024-11-16 PROCEDURE — 1200000000 HC SEMI PRIVATE

## 2024-11-16 PROCEDURE — 85025 COMPLETE CBC W/AUTO DIFF WBC: CPT

## 2024-11-16 PROCEDURE — 36415 COLL VENOUS BLD VENIPUNCTURE: CPT

## 2024-11-16 PROCEDURE — 2580000003 HC RX 258: Performed by: NURSE PRACTITIONER

## 2024-11-16 PROCEDURE — 82962 GLUCOSE BLOOD TEST: CPT

## 2024-11-16 PROCEDURE — 2580000003 HC RX 258: Performed by: INTERNAL MEDICINE

## 2024-11-16 PROCEDURE — 2580000003 HC RX 258

## 2024-11-16 PROCEDURE — 80048 BASIC METABOLIC PNL TOTAL CA: CPT

## 2024-11-16 PROCEDURE — 2500000003 HC RX 250 WO HCPCS: Performed by: NURSE PRACTITIONER

## 2024-11-16 PROCEDURE — 6370000000 HC RX 637 (ALT 250 FOR IP)

## 2024-11-16 PROCEDURE — 89220 SPUTUM SPECIMEN COLLECTION: CPT

## 2024-11-16 PROCEDURE — 94760 N-INVAS EAR/PLS OXIMETRY 1: CPT

## 2024-11-16 PROCEDURE — 6370000000 HC RX 637 (ALT 250 FOR IP): Performed by: HOSPITALIST

## 2024-11-16 PROCEDURE — 6360000002 HC RX W HCPCS: Performed by: NURSE PRACTITIONER

## 2024-11-16 PROCEDURE — 94640 AIRWAY INHALATION TREATMENT: CPT

## 2024-11-16 PROCEDURE — 6370000000 HC RX 637 (ALT 250 FOR IP): Performed by: NURSE PRACTITIONER

## 2024-11-16 RX ORDER — METOPROLOL TARTRATE 1 MG/ML
5 INJECTION, SOLUTION INTRAVENOUS ONCE
Status: COMPLETED | OUTPATIENT
Start: 2024-11-16 | End: 2024-11-16

## 2024-11-16 RX ORDER — LEVALBUTEROL INHALATION SOLUTION 0.63 MG/3ML
0.63 SOLUTION RESPIRATORY (INHALATION) EVERY 8 HOURS PRN
Status: DISCONTINUED | OUTPATIENT
Start: 2024-11-16 | End: 2024-11-17

## 2024-11-16 RX ADMIN — IPRATROPIUM BROMIDE 0.5 MG: 0.5 SOLUTION RESPIRATORY (INHALATION) at 10:27

## 2024-11-16 RX ADMIN — APIXABAN 2.5 MG: 2.5 TABLET, FILM COATED ORAL at 08:29

## 2024-11-16 RX ADMIN — TAMSULOSIN HYDROCHLORIDE 0.4 MG: 0.4 CAPSULE ORAL at 08:30

## 2024-11-16 RX ADMIN — WATER 2000 MG: 1 INJECTION INTRAMUSCULAR; INTRAVENOUS; SUBCUTANEOUS at 11:23

## 2024-11-16 RX ADMIN — PANTOPRAZOLE SODIUM 40 MG: 40 TABLET, DELAYED RELEASE ORAL at 05:41

## 2024-11-16 RX ADMIN — METOPROLOL TARTRATE 5 MG: 5 INJECTION INTRAVENOUS at 11:23

## 2024-11-16 RX ADMIN — SENNOSIDES AND DOCUSATE SODIUM 2 TABLET: 50; 8.6 TABLET ORAL at 08:29

## 2024-11-16 RX ADMIN — ATORVASTATIN CALCIUM 20 MG: 20 TABLET, FILM COATED ORAL at 08:30

## 2024-11-16 RX ADMIN — SENNOSIDES AND DOCUSATE SODIUM 2 TABLET: 50; 8.6 TABLET ORAL at 20:19

## 2024-11-16 RX ADMIN — APIXABAN 2.5 MG: 2.5 TABLET, FILM COATED ORAL at 20:19

## 2024-11-16 RX ADMIN — Medication 10 MG: at 20:19

## 2024-11-16 RX ADMIN — INSULIN LISPRO 3 UNITS: 100 INJECTION, SOLUTION INTRAVENOUS; SUBCUTANEOUS at 20:33

## 2024-11-16 RX ADMIN — INSULIN LISPRO 2 UNITS: 100 INJECTION, SOLUTION INTRAVENOUS; SUBCUTANEOUS at 11:36

## 2024-11-16 RX ADMIN — DILTIAZEM HYDROCHLORIDE 120 MG: 120 CAPSULE, COATED, EXTENDED RELEASE ORAL at 08:30

## 2024-11-16 RX ADMIN — SODIUM CHLORIDE, PRESERVATIVE FREE 10 ML: 5 INJECTION INTRAVENOUS at 20:20

## 2024-11-16 RX ADMIN — CETIRIZINE HYDROCHLORIDE 10 MG: 10 TABLET, FILM COATED ORAL at 08:29

## 2024-11-16 RX ADMIN — IPRATROPIUM BROMIDE AND ALBUTEROL SULFATE 1 DOSE: 2.5; .5 SOLUTION RESPIRATORY (INHALATION) at 06:36

## 2024-11-16 RX ADMIN — METOPROLOL SUCCINATE 50 MG: 50 TABLET, EXTENDED RELEASE ORAL at 20:19

## 2024-11-16 RX ADMIN — SODIUM CHLORIDE, PRESERVATIVE FREE 10 ML: 5 INJECTION INTRAVENOUS at 08:30

## 2024-11-16 RX ADMIN — SODIUM CHLORIDE: 9 INJECTION, SOLUTION INTRAVENOUS at 11:22

## 2024-11-16 RX ADMIN — PRIMIDONE 50 MG: 50 TABLET ORAL at 08:30

## 2024-11-16 RX ADMIN — METOPROLOL SUCCINATE 50 MG: 50 TABLET, EXTENDED RELEASE ORAL at 08:30

## 2024-11-16 RX ADMIN — PRIMIDONE 50 MG: 50 TABLET ORAL at 20:19

## 2024-11-16 RX ADMIN — MONTELUKAST 10 MG: 10 TABLET, FILM COATED ORAL at 20:20

## 2024-11-16 RX ADMIN — SODIUM CHLORIDE, PRESERVATIVE FREE 10 ML: 5 INJECTION INTRAVENOUS at 11:23

## 2024-11-16 RX ADMIN — LEVALBUTEROL HYDROCHLORIDE 0.63 MG: 0.63 SOLUTION RESPIRATORY (INHALATION) at 18:41

## 2024-11-16 NOTE — PROGRESS NOTES
[facility]      Progress Note    Patient:  Jose Tate  YOB: 1940  Date of Service: 11/16/2024  MRN: [id number]   Acct: [id number]   Primary Care Physician: Chayo Grande MD  Advance Directive: Partial Code  Admit Date: 11/13/2024       Hospital Day: 3    Portions of this note have been copied forward, however, updated to reflect the most current clinical status of this patient.     CHIEF COMPLAINT Fall    SUBJECTIVE:  Mr. Tate was resting in chair this morning. Reports shortness of breath with exertion is improving. Denies chest pain. Denies fever, chills, nausea, or vomiting.       CUMULATIVE HOSPITAL COURSE:   The patient is a  84 y.o. male with PMH of Afib on Eliquis, recent pacemaker placement, T2DM, hypercholesterolemia Lung cancer, and GERD who presented to [facility] ED for evaluation following a fall. Reportedly fell night prior to admission due to syncopal episode. Thought to be in Afib. Stated he had similar episode with prior episode of afib. Denied fever, chills, chest pain or abdominal pain. Workup in ED revealed  CXR with interval development of left perihilar vague opacity, potentially either pneumonia or atelectasis, mass cannot be excluded; CT cervical spine with no acute fracture or traumatic malalignment; CT head with senescent changes without acute abnormality; WBC 21.3, H&H 10.6/33.2, CK5 89, lactic acid 1.6, creatinine 1.8, BUN 35, GFR 36. Patient was admitted to hospital medicine for further evaluation. Empiric antibiotics were initiated. Blood cultures positive, resembling streptococcus. MRSA nares negative. Antibiotics adjusted to Rocephin. Repeat blood cultures ordered. ID consultation placed. ID recommended continuing Rocephin and follow repeat blood cultures. Repeat Blood cultures (11/15/24) NGTD.         Review of Systems   Constitutional:  Negative for chills, diaphoresis, fatigue and fever.   HENT:  Negative for congestion and ear pain.    Eyes:  Negative  Labs     11/14/24  0356 11/15/24  0458 11/16/24  0313    136 136   K 3.8 4.3 4.3    102 102   CO2 24 22 19*   BUN 35* 33* 34*   CREATININE 1.8* 1.7* 1.8*   GLUCOSE 137* 183* 155*       RAD:     CT CHEST WO CONTRAST  Result Date: 11/13/2024     1.  New mass-like consolidation in the anterior medial left upper lobe and patchy nodular ground-glass in the right lower lobe suggestive of developing infection/pneumonia. 2.  Stable consolidation in the posterior superior right upper lobe/azygos lobe. 3.  Otherwise no interval change.  All CT scans are performed using dose optimization techniques as appropriate to the performed exam and include at least one of the following: Automated exposure control, adjustment of the mA and/or kV according to size, and the use of iterative reconstruction technique.  ______________________________________ Electronically signed by: BHARATI ANAYA M.D. Date:     11/13/2024 Time:    16:19       XR CHEST PORTABLE  Result Date: 11/13/2024     1.  Interval development of left perihilar vague opacity, potentially either pneumonia or atelectasis.  Mass cannot be excluded.  Short-term follow-up radiographs are recommended to show resolution.  If the opacity persists, then a CT chest would be needed.    ______________________________________ Electronically signed by: SUZANNE SHOEMAKER M.D. Date:     11/13/2024 Time:    13:19       CT CERVICAL SPINE WO CONTRAST  Result Date: 11/13/2024    No acute fracture or traumatic malalignment. Severe multilevel cervical spondylosis.  All CT scans are performed using dose optimization techniques as appropriate to the performed exam and include at least one of the following: Automated exposure control, adjustment of the mA and/or kV according to size, and the use of iterative reconstruction technique.  ______________________________________ Electronically signed by: TARAS SOTO M.D. Date:     11/13/2024 Time:    13:13       CT HEAD WO CONTRAST  Result

## 2024-11-16 NOTE — PLAN OF CARE
Problem: Chronic Conditions and Co-morbidities  Goal: Patient's chronic conditions and co-morbidity symptoms are monitored and maintained or improved  11/15/2024 2043 by Massiel Mejia RN  Outcome: Progressing  11/15/2024 1259 by Eli Venegas RN  Outcome: Progressing  Flowsheets (Taken 11/15/2024 0896)  Care Plan - Patient's Chronic Conditions and Co-Morbidity Symptoms are Monitored and Maintained or Improved:   Monitor and assess patient's chronic conditions and comorbid symptoms for stability, deterioration, or improvement   Collaborate with multidisciplinary team to address chronic and comorbid conditions and prevent exacerbation or deterioration   Update acute care plan with appropriate goals if chronic or comorbid symptoms are exacerbated and prevent overall improvement and discharge     Problem: Skin/Tissue Integrity  Goal: Absence of new skin breakdown  Description: 1.  Monitor for areas of redness and/or skin breakdown  2.  Assess vascular access sites hourly  3.  Every 4-6 hours minimum:  Change oxygen saturation probe site  4.  Every 4-6 hours:  If on nasal continuous positive airway pressure, respiratory therapy assess nares and determine need for appliance change or resting period.  11/15/2024 2043 by Massiel Mejia, RN  Outcome: Progressing  11/15/2024 1259 by Eli Venegas RN  Outcome: Progressing     Problem: ABCDS Injury Assessment  Goal: Absence of physical injury  11/15/2024 2043 by Massiel Mejia RN  Outcome: Progressing  11/15/2024 1259 by Eli Venegas RN  Outcome: Progressing     Problem: Safety - Adult  Goal: Free from fall injury  11/15/2024 2043 by Massiel Mejia RN  Outcome: Progressing  11/15/2024 1259 by Eli Venegas RN  Outcome: Progressing

## 2024-11-16 NOTE — PROGRESS NOTES
Infectious Diseases Progress Note    Patient:  Jose Tate  YOB: 1940  MRN: [id number]   Admit date: 11/13/2024   Admitting Physician: Dwaine Church MD  Primary Care Physician: Chayo Grande MD    Chief Complaint/Interval History: He is without new symptoms.  He is coughing some and is mobilizing some sputum.  Overall cough is more loose than when he first came in the hospital.  He is not dyspneic.  He does not describe chest pain or chest pressure.  He is not having diarrhea or rash.  Seems to be tolerating ceftriaxone without side effect.  Checked culture results-susceptibility remains pending on the Streptococcus pneumonia recovered from blood.    In/Out    Intake/Output Summary (Last 24 hours) at 11/16/2024 1608  Last data filed at 11/16/2024 1537  Gross per 24 hour   Intake 1702 ml   Output 2300 ml   Net -598 ml     Allergies: No Known Allergies  Current Meds: levalbuterol (XOPENEX) nebulization 0.63 mg, Q8H PRN  melatonin disintegrating tablet 10 mg, Nightly PRN  cefTRIAXone (ROCEPHIN) 2,000 mg in sterile water 20 mL IV syringe, Q24H  apixaban (ELIQUIS) tablet 2.5 mg, BID  atorvastatin (LIPITOR) tablet 20 mg, Daily  cetirizine (ZYRTEC) tablet 10 mg, Daily  dilTIAZem (CARDIZEM CD) extended release capsule 120 mg, Daily  furosemide (LASIX) tablet 40 mg, Every Other Day  metoprolol succinate (TOPROL XL) extended release tablet 50 mg, BID  montelukast (SINGULAIR) tablet 10 mg, Nightly  pantoprazole (PROTONIX) tablet 40 mg, QAM AC  primidone (MYSOLINE) tablet 50 mg, BID  sennosides-docusate sodium (SENOKOT-S) 8.6-50 MG tablet 2 tablet, BID  tamsulosin (FLOMAX) capsule 0.4 mg, Daily  sodium chloride flush 0.9 % injection 5-40 mL, 2 times per day  sodium chloride flush 0.9 % injection 5-40 mL, PRN  0.9 % sodium chloride infusion, PRN  ondansetron (ZOFRAN-ODT) disintegrating tablet 4 mg, Q8H PRN   Or  ondansetron (ZOFRAN) injection 4 mg, Q6H PRN  polyethylene glycol (GLYCOLAX) packet 17 g,

## 2024-11-17 ENCOUNTER — OUTSIDE FACILITY SERVICE (OUTPATIENT)
Age: 84
End: 2024-11-17

## 2024-11-17 LAB
ANION GAP SERPL CALCULATED.3IONS-SCNC: 15 MMOL/L (ref 7–19)
BACTERIA SPEC RESP CULT: NORMAL
BASOPHILS # BLD: 0.1 K/UL (ref 0–0.2)
BASOPHILS NFR BLD: 0.5 % (ref 0–1)
BUN SERPL-MCNC: 25 MG/DL (ref 8–23)
CALCIUM SERPL-MCNC: 8.7 MG/DL (ref 8.8–10.2)
CHLORIDE SERPL-SCNC: 98 MMOL/L (ref 98–111)
CO2 SERPL-SCNC: 21 MMOL/L (ref 22–29)
CREAT SERPL-MCNC: 1.6 MG/DL (ref 0.7–1.2)
EOSINOPHIL # BLD: 0.4 K/UL (ref 0–0.6)
EOSINOPHIL NFR BLD: 3.7 % (ref 0–5)
ERYTHROCYTE [DISTWIDTH] IN BLOOD BY AUTOMATED COUNT: 14.2 % (ref 11.5–14.5)
GLUCOSE BLD-MCNC: 182 MG/DL (ref 70–99)
GLUCOSE BLD-MCNC: 189 MG/DL (ref 70–99)
GLUCOSE BLD-MCNC: 203 MG/DL (ref 70–99)
GLUCOSE BLD-MCNC: 284 MG/DL (ref 70–99)
GLUCOSE SERPL-MCNC: 185 MG/DL (ref 70–99)
GRAM STN SPEC: NORMAL
HCT VFR BLD AUTO: 31.2 % (ref 42–52)
HGB BLD-MCNC: 10 G/DL (ref 14–18)
IMM GRANULOCYTES # BLD: 0.1 K/UL
LYMPHOCYTES # BLD: 1.2 K/UL (ref 1.1–4.5)
LYMPHOCYTES NFR BLD: 10.6 % (ref 20–40)
MCH RBC QN AUTO: 30.3 PG (ref 27–31)
MCHC RBC AUTO-ENTMCNC: 32.1 G/DL (ref 33–37)
MCV RBC AUTO: 94.5 FL (ref 80–94)
MONOCYTES # BLD: 0.9 K/UL (ref 0–0.9)
MONOCYTES NFR BLD: 8 % (ref 0–10)
NEUTROPHILS # BLD: 8.9 K/UL (ref 1.5–7.5)
NEUTS SEG NFR BLD: 76.5 % (ref 50–65)
PERFORMED ON: ABNORMAL
PLATELET # BLD AUTO: 234 K/UL (ref 130–400)
PMV BLD AUTO: 10 FL (ref 9.4–12.4)
POTASSIUM SERPL-SCNC: 4 MMOL/L (ref 3.5–5)
RBC # BLD AUTO: 3.3 M/UL (ref 4.7–6.1)
SODIUM SERPL-SCNC: 134 MMOL/L (ref 136–145)
WBC # BLD AUTO: 11.6 K/UL (ref 4.8–10.8)

## 2024-11-17 PROCEDURE — 1200000000 HC SEMI PRIVATE

## 2024-11-17 PROCEDURE — 36415 COLL VENOUS BLD VENIPUNCTURE: CPT

## 2024-11-17 PROCEDURE — 82962 GLUCOSE BLOOD TEST: CPT

## 2024-11-17 PROCEDURE — 85025 COMPLETE CBC W/AUTO DIFF WBC: CPT

## 2024-11-17 PROCEDURE — 6370000000 HC RX 637 (ALT 250 FOR IP)

## 2024-11-17 PROCEDURE — 6360000002 HC RX W HCPCS: Performed by: NURSE PRACTITIONER

## 2024-11-17 PROCEDURE — OUTSIDEPOS PR OUTSIDE POS PLACEHOLDER: Performed by: INTERNAL MEDICINE

## 2024-11-17 PROCEDURE — 80048 BASIC METABOLIC PNL TOTAL CA: CPT

## 2024-11-17 PROCEDURE — 2580000003 HC RX 258

## 2024-11-17 PROCEDURE — 6370000000 HC RX 637 (ALT 250 FOR IP): Performed by: HOSPITALIST

## 2024-11-17 PROCEDURE — 94760 N-INVAS EAR/PLS OXIMETRY 1: CPT

## 2024-11-17 PROCEDURE — 2580000003 HC RX 258: Performed by: NURSE PRACTITIONER

## 2024-11-17 PROCEDURE — 2580000003 HC RX 258: Performed by: INTERNAL MEDICINE

## 2024-11-17 PROCEDURE — 97530 THERAPEUTIC ACTIVITIES: CPT

## 2024-11-17 PROCEDURE — 97116 GAIT TRAINING THERAPY: CPT

## 2024-11-17 PROCEDURE — 6370000000 HC RX 637 (ALT 250 FOR IP): Performed by: NURSE PRACTITIONER

## 2024-11-17 PROCEDURE — 94640 AIRWAY INHALATION TREATMENT: CPT

## 2024-11-17 RX ORDER — MECOBALAMIN 5000 MCG
5 TABLET,DISINTEGRATING ORAL NIGHTLY
Status: DISCONTINUED | OUTPATIENT
Start: 2024-11-17 | End: 2024-11-20 | Stop reason: HOSPADM

## 2024-11-17 RX ORDER — LEVALBUTEROL INHALATION SOLUTION 0.63 MG/3ML
0.63 SOLUTION RESPIRATORY (INHALATION) EVERY 6 HOURS
Status: DISCONTINUED | OUTPATIENT
Start: 2024-11-17 | End: 2024-11-18

## 2024-11-17 RX ORDER — METOPROLOL SUCCINATE 50 MG/1
100 TABLET, EXTENDED RELEASE ORAL 2 TIMES DAILY
Status: DISCONTINUED | OUTPATIENT
Start: 2024-11-17 | End: 2024-11-20 | Stop reason: HOSPADM

## 2024-11-17 RX ORDER — GUAIFENESIN 600 MG/1
600 TABLET, EXTENDED RELEASE ORAL 2 TIMES DAILY
Status: DISCONTINUED | OUTPATIENT
Start: 2024-11-17 | End: 2024-11-20 | Stop reason: HOSPADM

## 2024-11-17 RX ORDER — LABETALOL HYDROCHLORIDE 5 MG/ML
10 INJECTION, SOLUTION INTRAVENOUS EVERY 4 HOURS PRN
Status: DISCONTINUED | OUTPATIENT
Start: 2024-11-17 | End: 2024-11-20 | Stop reason: HOSPADM

## 2024-11-17 RX ADMIN — METOPROLOL SUCCINATE 100 MG: 50 TABLET, EXTENDED RELEASE ORAL at 20:22

## 2024-11-17 RX ADMIN — FUROSEMIDE 40 MG: 40 TABLET ORAL at 09:35

## 2024-11-17 RX ADMIN — METOPROLOL SUCCINATE 50 MG: 50 TABLET, EXTENDED RELEASE ORAL at 09:27

## 2024-11-17 RX ADMIN — SODIUM CHLORIDE: 9 INJECTION, SOLUTION INTRAVENOUS at 00:12

## 2024-11-17 RX ADMIN — LEVALBUTEROL HYDROCHLORIDE 0.63 MG: 0.63 SOLUTION RESPIRATORY (INHALATION) at 23:44

## 2024-11-17 RX ADMIN — INSULIN LISPRO 1 UNITS: 100 INJECTION, SOLUTION INTRAVENOUS; SUBCUTANEOUS at 17:01

## 2024-11-17 RX ADMIN — LEVALBUTEROL HYDROCHLORIDE 0.63 MG: 0.63 SOLUTION RESPIRATORY (INHALATION) at 18:54

## 2024-11-17 RX ADMIN — Medication 10 MG: at 20:22

## 2024-11-17 RX ADMIN — PRIMIDONE 50 MG: 50 TABLET ORAL at 20:22

## 2024-11-17 RX ADMIN — ATORVASTATIN CALCIUM 20 MG: 20 TABLET, FILM COATED ORAL at 09:27

## 2024-11-17 RX ADMIN — LEVALBUTEROL HYDROCHLORIDE 0.63 MG: 0.63 SOLUTION RESPIRATORY (INHALATION) at 07:05

## 2024-11-17 RX ADMIN — INSULIN LISPRO 1 UNITS: 100 INJECTION, SOLUTION INTRAVENOUS; SUBCUTANEOUS at 09:28

## 2024-11-17 RX ADMIN — WATER 2000 MG: 1 INJECTION INTRAMUSCULAR; INTRAVENOUS; SUBCUTANEOUS at 12:32

## 2024-11-17 RX ADMIN — SENNOSIDES AND DOCUSATE SODIUM 2 TABLET: 50; 8.6 TABLET ORAL at 09:27

## 2024-11-17 RX ADMIN — SODIUM CHLORIDE, PRESERVATIVE FREE 10 ML: 5 INJECTION INTRAVENOUS at 09:27

## 2024-11-17 RX ADMIN — PRIMIDONE 50 MG: 50 TABLET ORAL at 09:27

## 2024-11-17 RX ADMIN — GUAIFENESIN 600 MG: 600 TABLET ORAL at 20:22

## 2024-11-17 RX ADMIN — SENNOSIDES AND DOCUSATE SODIUM 2 TABLET: 50; 8.6 TABLET ORAL at 20:22

## 2024-11-17 RX ADMIN — MONTELUKAST 10 MG: 10 TABLET, FILM COATED ORAL at 20:22

## 2024-11-17 RX ADMIN — SODIUM CHLORIDE, PRESERVATIVE FREE 10 ML: 5 INJECTION INTRAVENOUS at 20:22

## 2024-11-17 RX ADMIN — APIXABAN 2.5 MG: 2.5 TABLET, FILM COATED ORAL at 20:22

## 2024-11-17 RX ADMIN — LABETALOL HYDROCHLORIDE 10 MG: 5 INJECTION, SOLUTION INTRAVENOUS at 09:27

## 2024-11-17 RX ADMIN — TAMSULOSIN HYDROCHLORIDE 0.4 MG: 0.4 CAPSULE ORAL at 09:27

## 2024-11-17 RX ADMIN — DILTIAZEM HYDROCHLORIDE 120 MG: 120 CAPSULE, COATED, EXTENDED RELEASE ORAL at 09:27

## 2024-11-17 RX ADMIN — LEVALBUTEROL HYDROCHLORIDE 0.63 MG: 0.63 SOLUTION RESPIRATORY (INHALATION) at 12:15

## 2024-11-17 RX ADMIN — INSULIN LISPRO 1 UNITS: 100 INJECTION, SOLUTION INTRAVENOUS; SUBCUTANEOUS at 12:31

## 2024-11-17 RX ADMIN — LEVALBUTEROL HYDROCHLORIDE 0.63 MG: 0.63 SOLUTION RESPIRATORY (INHALATION) at 00:18

## 2024-11-17 RX ADMIN — GUAIFENESIN 600 MG: 600 TABLET ORAL at 09:27

## 2024-11-17 RX ADMIN — SODIUM CHLORIDE: 9 INJECTION, SOLUTION INTRAVENOUS at 15:16

## 2024-11-17 RX ADMIN — CETIRIZINE HYDROCHLORIDE 10 MG: 10 TABLET, FILM COATED ORAL at 09:27

## 2024-11-17 RX ADMIN — PANTOPRAZOLE SODIUM 40 MG: 40 TABLET, DELAYED RELEASE ORAL at 09:27

## 2024-11-17 RX ADMIN — APIXABAN 2.5 MG: 2.5 TABLET, FILM COATED ORAL at 09:27

## 2024-11-17 NOTE — PLAN OF CARE
Problem: Chronic Conditions and Co-morbidities  Goal: Patient's chronic conditions and co-morbidity symptoms are monitored and maintained or improved  Outcome: Progressing  Flowsheets (Taken 11/16/2024 2019)  Care Plan - Patient's Chronic Conditions and Co-Morbidity Symptoms are Monitored and Maintained or Improved:   Monitor and assess patient's chronic conditions and comorbid symptoms for stability, deterioration, or improvement   Collaborate with multidisciplinary team to address chronic and comorbid conditions and prevent exacerbation or deterioration     Problem: Skin/Tissue Integrity  Goal: Absence of new skin breakdown  Description: 1.  Monitor for areas of redness and/or skin breakdown  2.  Assess vascular access sites hourly  3.  Every 4-6 hours minimum:  Change oxygen saturation probe site  4.  Every 4-6 hours:  If on nasal continuous positive airway pressure, respiratory therapy assess nares and determine need for appliance change or resting period.  Outcome: Progressing     Problem: ABCDS Injury Assessment  Goal: Absence of physical injury  Outcome: Progressing     Problem: Safety - Adult  Goal: Free from fall injury  Outcome: Progressing

## 2024-11-17 NOTE — PROGRESS NOTES
[facility]      [facility] Note    Patient:  Jose Tate  YOB: 1940  Date of Service: 11/17/2024  MRN: [id number]   Acct: [id number]   Primary Care Physician: Chayo Grande MD  Advance Directive: Partial Code  Admit Date: 11/13/2024       Hospital Day: 4    Portions of this note have been copied forward, however, updated to reflect the most current clinical status of this patient.     CHIEF COMPLAINT Fall    SUBJECTIVE:  Mr. Tate was resting in chair this morning. Continues to have cough. Reports shortness of breath with exertion improving.       CUMULATIVE HOSPITAL COURSE:   The patient is a  84 y.o. male with PMH of Afib on Eliquis, recent pacemaker placement, T2DM, hypercholesterolemia Lung cancer, and GERD who presented to [facility] ED for evaluation following a fall. Reportedly fell night prior to admission due to syncopal episode. Thought to be in Afib. Stated he had similar episode with prior episode of afib. Denied fever, chills, chest pain or abdominal pain. Workup in ED revealed  CXR with interval development of left perihilar vague opacity, potentially either pneumonia or atelectasis, mass cannot be excluded; CT cervical spine with no acute fracture or traumatic malalignment; CT head with senescent changes without acute abnormality; WBC 21.3, H&H 10.6/33.2, CK5 89, lactic acid 1.6, creatinine 1.8, BUN 35, GFR 36. Patient was admitted to hospital medicine for further evaluation. Empiric antibiotics were initiated. Blood culture (11/13/24) positive for Streptococcus pneumoniae, pansensitive. MRSA nares negative. Antibiotics adjusted to Rocephin. Repeat blood cultures ordered. ID consultation placed. ID recommended continuing Rocephin and follow repeat blood cultures. Repeat Blood cultures (11/15/24) NGTD.         Review of Systems   Constitutional:  Negative for chills, diaphoresis, fatigue and fever.   HENT:  Negative for congestion and ear pain.    Eyes:  Negative for visual     - Repeat blood cultures (11/14/24) NGTD    - Repeat blood cultures (11/15/24) NGTD       Active Problems:    Community acquired bacterial pneumonia/ Chronic rhinitis-   - Continue IV Rocephin               - Bronchodilators adjusted to Xopenex               - Encourage deep breathing and cough              - Incentive spirometry              - Supplemental oxygen as needed, currently on room air       Atrial fibrillation (HCC)-   - Currently ST per monitor    - IV Labetalol given this am   - Increased home Toprol XL to 100 mg BID   - Continue home Cardizem  - Continue home Eliquis   - Serial and PRN EKGs  - Monitor on telemetry       Hypercholesterolemia-    - Continue statin       GERD (gastroesophageal reflux disease)-    - Continue Protonix       Essential hypertension-   - Elevated at times   - PRN IV Labetalol    - Increased home Toprol XL to 100 mg BID    - Continue current medications   - Monitor BP closely       Type 2 diabetes mellitus without complication (HCC)-    - Recent  A1c (8/15/2024) 5.7   - low dose SSI   - Accu-Checks    - Hypoglycemia treatment protocol in place         Essential tremor-   - at baseline       BPH (benign prostatic hyperplasia)-    - Continue home Flomax       Antibiotic: Rocephin      DVT Prophylaxis: Eliquis      GI prophylaxis:  Protonix      Discharge planning: Accepted to inpatient rehab, pending medical clearance        Further Orders per Clinical course/attending.     Electronically signed by TAWANA Boggs CNP on 11/17/2024 at 3:42 PM       EMR Dragon/Transcription disclaimer:   Much of this encounter note is an electronic transcription/translation of spoken language to printed text. The electronic translation of spoken language may permit erroneous, or at times, nonsensical words or phrases to be inadvertently transcribed; although attempts have made to review the note for such errors, some may still exist.

## 2024-11-17 NOTE — PROGRESS NOTES
Physical Therapy    Name: Jose Tate  MRN: [id number]  Date of service: 11/17/2024 11/17/24 0800   Restrictions/Precautions   Restrictions/Precautions Fall Risk   General   Diagnosis FALL, SYNCOPE, PNA   General Comment   Comments Pt in bathroom, nursing assisted pt there   Subjective   Subjective pt needing to sit a little bit longer, trying to have BM   Subjective   Subjective no complaints of pain   Transfers   Sit to Stand Minimal Assistance   Stand to Sit Contact guard assistance   Comment min assist with toilet transfer. Stood again from recliner to don new brief and PCA applied new external cath   Ambulation   Surface Level tile   Device Rolling Walker   Assistance Contact guard assistance   Quality of Gait verbal cues for posture correction and keeping RW closer to body   Gait Deviations Decreased step length;Decreased step height;Slow Kathrine   Distance 20ft   Comments bathroom to chair   Short Term Goals   Time Frame for Short Term Goals 14 DAYS   Short Term Goal 1 BED MOB MOD IND   Short Term Goal 2 TRANSFERS SBA   Short Term Goal 3 ' RW SBA   Activity Tolerance   Activity Tolerance Patient tolerated treatment well;Patient limited by fatigue   Assessment   Assessment Pt doing well this morning. Using bathroom upon entry, pt unsuccessful with BM. Ambulated in room to recliner. Pt was fatigued and started coughing. Sat to rest in recliner before standing to don new brief and external cath. Left pt in chair with alarm on and all needs in reach   Discharge Recommendations Continue to assess pending progress;24 hour supervision or assist;Therapy recommended at discharge   Physical Therapy Plan   General Plan 5-7 times per week   Current Treatment Recommendations Strengthening;ROM;Balance training;Functional mobility training;Transfer training;Gait training;Safety education & training;Patient/Caregiver education & training;Endurance training   PT Plan of Care   Sunday X   Safety Devices   Type  of Devices Left in chair;Chair alarm in place;Call light within reach;Nurse notified  (PCA present)   PT Whiteboard Notes   Therapy Whiteboard RE 11/28  FALL, SYNCOPE, PNA   Recommendation   Requires PT Follow-Up Yes           Electronically signed by Jimbo Puentes PTA on 11/17/2024 at 9:09 AM

## 2024-11-17 NOTE — PROGRESS NOTES
Infectious Diseases Progress Note    Patient:  Jose Tate  YOB: 1940  MRN: [id number]   Admit date: 11/13/2024   Admitting Physician: Dwaine Church MD  Primary Care Physician: Chayo Grande MD    Chief Complaint/Interval History: He is without new symptoms.  He is coughing some.  He is not dyspneic.  He is up to chair.  He is trying to mobilize more.    In/Out    Intake/Output Summary (Last 24 hours) at 11/17/2024 1349  Last data filed at 11/17/2024 0930  Gross per 24 hour   Intake 240 ml   Output 2900 ml   Net -2660 ml     Allergies: No Known Allergies  Current Meds: guaiFENesin (MUCINEX) extended release tablet 600 mg, BID  labetalol (NORMODYNE;TRANDATE) injection 10 mg, Q4H PRN  melatonin disintegrating tablet 5 mg, Nightly  metoprolol succinate (TOPROL XL) extended release tablet 100 mg, BID  levalbuterol (XOPENEX) nebulization 0.63 mg, Q8H PRN  melatonin disintegrating tablet 10 mg, Nightly PRN  cefTRIAXone (ROCEPHIN) 2,000 mg in sterile water 20 mL IV syringe, Q24H  apixaban (ELIQUIS) tablet 2.5 mg, BID  atorvastatin (LIPITOR) tablet 20 mg, Daily  cetirizine (ZYRTEC) tablet 10 mg, Daily  dilTIAZem (CARDIZEM CD) extended release capsule 120 mg, Daily  furosemide (LASIX) tablet 40 mg, Every Other Day  montelukast (SINGULAIR) tablet 10 mg, Nightly  pantoprazole (PROTONIX) tablet 40 mg, QAM AC  primidone (MYSOLINE) tablet 50 mg, BID  sennosides-docusate sodium (SENOKOT-S) 8.6-50 MG tablet 2 tablet, BID  tamsulosin (FLOMAX) capsule 0.4 mg, Daily  sodium chloride flush 0.9 % injection 5-40 mL, 2 times per day  sodium chloride flush 0.9 % injection 5-40 mL, PRN  0.9 % sodium chloride infusion, PRN  ondansetron (ZOFRAN-ODT) disintegrating tablet 4 mg, Q8H PRN   Or  ondansetron (ZOFRAN) injection 4 mg, Q6H PRN  polyethylene glycol (GLYCOLAX) packet 17 g, Daily PRN  acetaminophen (TYLENOL) tablet 650 mg, Q6H PRN   Or  acetaminophen (TYLENOL) suppository 650 mg, Q6H PRN  glucose chewable

## 2024-11-18 ENCOUNTER — APPOINTMENT (OUTPATIENT)
Dept: MRI IMAGING | Age: 84
DRG: 194 | End: 2024-11-18
Payer: MEDICARE

## 2024-11-18 ENCOUNTER — OUTSIDE FACILITY SERVICE (OUTPATIENT)
Age: 84
End: 2024-11-18

## 2024-11-18 LAB
ANION GAP SERPL CALCULATED.3IONS-SCNC: 14 MMOL/L (ref 7–19)
BACTERIA BLD CULT: ABNORMAL
BASOPHILS # BLD: 0.1 K/UL (ref 0–0.2)
BASOPHILS NFR BLD: 0.6 % (ref 0–1)
BUN SERPL-MCNC: 24 MG/DL (ref 8–23)
CALCIUM SERPL-MCNC: 8.7 MG/DL (ref 8.8–10.2)
CHLORIDE SERPL-SCNC: 99 MMOL/L (ref 98–111)
CO2 SERPL-SCNC: 22 MMOL/L (ref 22–29)
CREAT SERPL-MCNC: 1.7 MG/DL (ref 0.7–1.2)
EOSINOPHIL # BLD: 0.5 K/UL (ref 0–0.6)
EOSINOPHIL NFR BLD: 4.2 % (ref 0–5)
ERYTHROCYTE [DISTWIDTH] IN BLOOD BY AUTOMATED COUNT: 14.2 % (ref 11.5–14.5)
GLUCOSE BLD-MCNC: 199 MG/DL (ref 70–99)
GLUCOSE BLD-MCNC: 201 MG/DL (ref 70–99)
GLUCOSE BLD-MCNC: 230 MG/DL (ref 70–99)
GLUCOSE BLD-MCNC: 257 MG/DL (ref 70–99)
GLUCOSE SERPL-MCNC: 181 MG/DL (ref 70–99)
HCT VFR BLD AUTO: 30 % (ref 42–52)
HGB BLD-MCNC: 9.6 G/DL (ref 14–18)
IMM GRANULOCYTES # BLD: 0.1 K/UL
LYMPHOCYTES # BLD: 1.1 K/UL (ref 1.1–4.5)
LYMPHOCYTES NFR BLD: 9.7 % (ref 20–40)
MCH RBC QN AUTO: 30.5 PG (ref 27–31)
MCHC RBC AUTO-ENTMCNC: 32 G/DL (ref 33–37)
MCV RBC AUTO: 95.2 FL (ref 80–94)
MONOCYTES # BLD: 1 K/UL (ref 0–0.9)
MONOCYTES NFR BLD: 8.7 % (ref 0–10)
NEUTROPHILS # BLD: 8.4 K/UL (ref 1.5–7.5)
NEUTS SEG NFR BLD: 75.9 % (ref 50–65)
ORGANISM: ABNORMAL
ORGANISM: ABNORMAL
PERFORMED ON: ABNORMAL
PLATELET # BLD AUTO: 240 K/UL (ref 130–400)
PMV BLD AUTO: 9.7 FL (ref 9.4–12.4)
POTASSIUM SERPL-SCNC: 4.1 MMOL/L (ref 3.5–5)
RBC # BLD AUTO: 3.15 M/UL (ref 4.7–6.1)
SODIUM SERPL-SCNC: 135 MMOL/L (ref 136–145)
WBC # BLD AUTO: 11.1 K/UL (ref 4.8–10.8)

## 2024-11-18 PROCEDURE — 97535 SELF CARE MNGMENT TRAINING: CPT

## 2024-11-18 PROCEDURE — 94760 N-INVAS EAR/PLS OXIMETRY 1: CPT

## 2024-11-18 PROCEDURE — 80048 BASIC METABOLIC PNL TOTAL CA: CPT

## 2024-11-18 PROCEDURE — 1200000000 HC SEMI PRIVATE

## 2024-11-18 PROCEDURE — 97110 THERAPEUTIC EXERCISES: CPT

## 2024-11-18 PROCEDURE — 94640 AIRWAY INHALATION TREATMENT: CPT

## 2024-11-18 PROCEDURE — 82962 GLUCOSE BLOOD TEST: CPT

## 2024-11-18 PROCEDURE — OUTSIDEPOS PR OUTSIDE POS PLACEHOLDER: Performed by: INTERNAL MEDICINE

## 2024-11-18 PROCEDURE — 97116 GAIT TRAINING THERAPY: CPT

## 2024-11-18 PROCEDURE — 2580000003 HC RX 258

## 2024-11-18 PROCEDURE — 2580000003 HC RX 258: Performed by: NURSE PRACTITIONER

## 2024-11-18 PROCEDURE — 85025 COMPLETE CBC W/AUTO DIFF WBC: CPT

## 2024-11-18 PROCEDURE — 6370000000 HC RX 637 (ALT 250 FOR IP): Performed by: NURSE PRACTITIONER

## 2024-11-18 PROCEDURE — 6370000000 HC RX 637 (ALT 250 FOR IP)

## 2024-11-18 PROCEDURE — 36415 COLL VENOUS BLD VENIPUNCTURE: CPT

## 2024-11-18 PROCEDURE — 6360000002 HC RX W HCPCS: Performed by: NURSE PRACTITIONER

## 2024-11-18 PROCEDURE — 6360000002 HC RX W HCPCS

## 2024-11-18 PROCEDURE — 2580000003 HC RX 258: Performed by: INTERNAL MEDICINE

## 2024-11-18 PROCEDURE — 70551 MRI BRAIN STEM W/O DYE: CPT

## 2024-11-18 RX ORDER — ARFORMOTEROL TARTRATE 15 UG/2ML
15 SOLUTION RESPIRATORY (INHALATION)
Status: DISCONTINUED | OUTPATIENT
Start: 2024-11-18 | End: 2024-11-20 | Stop reason: HOSPADM

## 2024-11-18 RX ORDER — ALBUTEROL SULFATE 0.83 MG/ML
2.5 SOLUTION RESPIRATORY (INHALATION) EVERY 6 HOURS PRN
Status: DISCONTINUED | OUTPATIENT
Start: 2024-11-18 | End: 2024-11-20 | Stop reason: HOSPADM

## 2024-11-18 RX ORDER — POLYETHYLENE GLYCOL 3350 17 G/17G
17 POWDER, FOR SOLUTION ORAL DAILY
Status: DISCONTINUED | OUTPATIENT
Start: 2024-11-18 | End: 2024-11-20 | Stop reason: HOSPADM

## 2024-11-18 RX ADMIN — CETIRIZINE HYDROCHLORIDE 10 MG: 10 TABLET, FILM COATED ORAL at 08:39

## 2024-11-18 RX ADMIN — TAMSULOSIN HYDROCHLORIDE 0.4 MG: 0.4 CAPSULE ORAL at 08:39

## 2024-11-18 RX ADMIN — SODIUM CHLORIDE, PRESERVATIVE FREE 10 ML: 5 INJECTION INTRAVENOUS at 21:18

## 2024-11-18 RX ADMIN — PRIMIDONE 50 MG: 50 TABLET ORAL at 08:39

## 2024-11-18 RX ADMIN — SODIUM CHLORIDE: 9 INJECTION, SOLUTION INTRAVENOUS at 06:07

## 2024-11-18 RX ADMIN — GUAIFENESIN 600 MG: 600 TABLET ORAL at 08:37

## 2024-11-18 RX ADMIN — APIXABAN 2.5 MG: 2.5 TABLET, FILM COATED ORAL at 08:37

## 2024-11-18 RX ADMIN — ATORVASTATIN CALCIUM 20 MG: 20 TABLET, FILM COATED ORAL at 08:38

## 2024-11-18 RX ADMIN — Medication 5 MG: at 21:14

## 2024-11-18 RX ADMIN — INSULIN LISPRO 1 UNITS: 100 INJECTION, SOLUTION INTRAVENOUS; SUBCUTANEOUS at 11:37

## 2024-11-18 RX ADMIN — METOPROLOL SUCCINATE 100 MG: 50 TABLET, EXTENDED RELEASE ORAL at 08:39

## 2024-11-18 RX ADMIN — GUAIFENESIN 600 MG: 600 TABLET ORAL at 21:14

## 2024-11-18 RX ADMIN — WATER 2000 MG: 1 INJECTION INTRAMUSCULAR; INTRAVENOUS; SUBCUTANEOUS at 12:05

## 2024-11-18 RX ADMIN — MONTELUKAST 10 MG: 10 TABLET, FILM COATED ORAL at 21:14

## 2024-11-18 RX ADMIN — INSULIN LISPRO 2 UNITS: 100 INJECTION, SOLUTION INTRAVENOUS; SUBCUTANEOUS at 21:15

## 2024-11-18 RX ADMIN — DILTIAZEM HYDROCHLORIDE 120 MG: 120 CAPSULE, COATED, EXTENDED RELEASE ORAL at 08:38

## 2024-11-18 RX ADMIN — ARFORMOTEROL TARTRATE 15 MCG: 15 SOLUTION RESPIRATORY (INHALATION) at 10:40

## 2024-11-18 RX ADMIN — IPRATROPIUM BROMIDE 0.5 MG: 0.5 SOLUTION RESPIRATORY (INHALATION) at 14:04

## 2024-11-18 RX ADMIN — PANTOPRAZOLE SODIUM 40 MG: 40 TABLET, DELAYED RELEASE ORAL at 04:52

## 2024-11-18 RX ADMIN — PRIMIDONE 50 MG: 50 TABLET ORAL at 21:17

## 2024-11-18 RX ADMIN — IPRATROPIUM BROMIDE 0.5 MG: 0.5 SOLUTION RESPIRATORY (INHALATION) at 18:40

## 2024-11-18 RX ADMIN — SENNOSIDES AND DOCUSATE SODIUM 2 TABLET: 50; 8.6 TABLET ORAL at 21:14

## 2024-11-18 RX ADMIN — IPRATROPIUM BROMIDE 0.5 MG: 0.5 SOLUTION RESPIRATORY (INHALATION) at 10:40

## 2024-11-18 RX ADMIN — POLYETHYLENE GLYCOL 3350 17 G: 17 POWDER, FOR SOLUTION ORAL at 11:23

## 2024-11-18 RX ADMIN — SODIUM CHLORIDE, PRESERVATIVE FREE 10 ML: 5 INJECTION INTRAVENOUS at 08:39

## 2024-11-18 RX ADMIN — METOPROLOL SUCCINATE 100 MG: 50 TABLET, EXTENDED RELEASE ORAL at 21:15

## 2024-11-18 RX ADMIN — SENNOSIDES AND DOCUSATE SODIUM 2 TABLET: 50; 8.6 TABLET ORAL at 08:37

## 2024-11-18 RX ADMIN — LEVALBUTEROL HYDROCHLORIDE 0.63 MG: 0.63 SOLUTION RESPIRATORY (INHALATION) at 06:38

## 2024-11-18 RX ADMIN — APIXABAN 2.5 MG: 2.5 TABLET, FILM COATED ORAL at 21:15

## 2024-11-18 NOTE — PROGRESS NOTES
11/18/24 1000   Subjective   Subjective Patient in chair agrees to walk.   Pain Assessment   Pain Assessment None - Denies Pain   Cognition   Overall Cognitive Status WFL   Orientation   Overall Orientation Status WFL   Vitals   O2 Device None (Room air)   Transfer Training   Transfer Training Yes   Sit to Stand Contact-guard assistance   Stand to Sit Contact-guard assistance   Gait Training   Gait Training Yes   Gait   Overall Level of Assistance Contact-guard assistance   Distance (ft) 75 Feet   Assistive Device Walker, rolling   PT Exercises   A/AROM Exercises BL LE EX sitting X 10 reps   Patient Education   Education Given To Patient   Education Provided Role of Therapy;Plan of Care;Home Exercise Program;Transfer Training;Fall Prevention Strategies   Education Provided Comments Use of call light & staff assist.   Education Method Demonstration;Verbal   Barriers to Learning None   Education Outcome Verbalized understanding;Demonstrated understanding;Other (Comment)   Other Specialty Interventions   Other Treatments/Modalities In chair call light all needs in reach alarm attached.   PT Plan of Care   Tuesday X       Electronically signed by Rashad Knapp PTA on 11/18/2024 at 10:58 AM

## 2024-11-18 NOTE — CARE COORDINATION
[facility]  Notification of Admission Decision      [] Patient has been accepted for admit to [facility] on : Waiting on medical clearance      Please write discharge orders and summary prior to discharge.    [] Patient acceptance to Rehab pending the following :    [] Eval in progress       [] Patient determined to be ineligible for services at [facility] because :       We recommend you consider        Thank you for your referral, we appreciate you. If you have any questions, please feel   free to contact me at [phone].

## 2024-11-18 NOTE — PROGRESS NOTES
Soaps stud enema given.  1600 11/18/24    Electronically signed by Brenda Klein RN on 11/18/2024 at 4:52 PM

## 2024-11-18 NOTE — PROGRESS NOTES
Occupational Therapy     11/18/24 1400   Subjective   Subjective Pt sitting up in recliner upon arrival for therapy. pt trying to get out of his recliner and back to bed on his own upon arrival.   Pain Assessment   Pain Assessment None - Denies Pain   Cognition   Overall Cognitive Status Exceptions   Safety Judgement Decreased awareness of need for assistance;Decreased awareness of need for safety   Insights Impaired   Cognition Comment VERY Ione.   Orientation   Overall Orientation Status WFL   Bed Mobility Training   Bed Mobility Training Yes   Overall Level of Assistance Stand-by assistance   Interventions Verbal cues   Transfer Training   Transfer Training Yes   Overall Level of Assistance Contact-guard assistance   Interventions Verbal cues;Tactile cues   Sit to Stand Contact-guard assistance   Stand to Sit Contact-guard assistance   Bed to Chair Contact-guard assistance   Toilet Transfer Contact-guard assistance   Balance   Sitting Intact   Standing With support  (RW)   ADL   Feeding Independent;Setup   Grooming Supervision;Setup   UE Bathing Stand by assistance   LE Bathing Contact guard assistance   UE Dressing Stand by assistance   LE Dressing Contact guard assistance   Putting On/Taking Off Footwear Contact guard assistance   Toileting Contact guard assistance   Functional Mobility Contact guard assistance   Assessment   Assessment Tx focused on functional mobility in room at RW level, to the bathroom to perform hand hygiene at sink and back to bed. Pt had no loss of balance but shows decreased safety awareness. Pt presents with very Ione.   Activity Tolerance Patient tolerated treatment well   Discharge Recommendations Patient would benefit from continued therapy after discharge   Occupational Therapy Plan   Times Per Week 3-5   Times Per Day Once a day   OT Plan of Care   Monday X     Electronically signed by JONATHAN Rico on 11/18/2024 at 2:54 PM

## 2024-11-18 NOTE — PLAN OF CARE
Problem: Chronic Conditions and Co-morbidities  Goal: Patient's chronic conditions and co-morbidity symptoms are monitored and maintained or improved  Outcome: Progressing  Flowsheets (Taken 11/17/2024 0930 by Eli Venegas RN)  Care Plan - Patient's Chronic Conditions and Co-Morbidity Symptoms are Monitored and Maintained or Improved:   Monitor and assess patient's chronic conditions and comorbid symptoms for stability, deterioration, or improvement   Collaborate with multidisciplinary team to address chronic and comorbid conditions and prevent exacerbation or deterioration   Update acute care plan with appropriate goals if chronic or comorbid symptoms are exacerbated and prevent overall improvement and discharge     Problem: Skin/Tissue Integrity  Goal: Absence of new skin breakdown  Description: 1.  Monitor for areas of redness and/or skin breakdown  2.  Assess vascular access sites hourly  3.  Every 4-6 hours minimum:  Change oxygen saturation probe site  4.  Every 4-6 hours:  If on nasal continuous positive airway pressure, respiratory therapy assess nares and determine need for appliance change or resting period.  Outcome: Progressing     Problem: ABCDS Injury Assessment  Goal: Absence of physical injury  11/17/2024 2114 by Massiel Mejia RN  Outcome: Progressing  11/17/2024 1204 by Eli Venegas RN  Outcome: Progressing  Flowsheets (Taken 11/17/2024 0930)  Absence of Physical Injury: Implement safety measures based on patient assessment     Problem: Safety - Adult  Goal: Free from fall injury  11/17/2024 2114 by Massiel Mejia, RN  Outcome: Progressing  11/17/2024 1204 by Eli Venegas RN  Outcome: Progressing  Flowsheets (Taken 11/17/2024 0930)  Free From Fall Injury:   Instruct family/caregiver on patient safety   Based on caregiver fall risk screen, instruct family/caregiver to ask for assistance with transferring infant if caregiver noted to have fall risk factors

## 2024-11-18 NOTE — PROGRESS NOTES
Palliative care attempted to visit with pt.  RN with him at the time to complete paperwork/questions for MRI.  Pt has been accepted to rehab floor pending medical clearance.  Will follow up at later time.    Electronically signed by Kaur Kaufman RN on 11/18/2024 at 1:20 PM

## 2024-11-18 NOTE — PROGRESS NOTES
[facility]      Patient:  Jose Tate  YOB: 1940  Date of Service: 11/18/2024  MRN: [id number]   Acct: [id number]   Primary Care Physician: Chayo Grande MD  Advance Directive: Partial Code  Admit Date: 11/13/2024       Hospital Day: 5  Portions of this note have been copied forward, however, changed to reflect the most current clinical status of this patient.    CHIEF COMPLAINT fall    SUBJECTIVE: No issues overnight resting comfortably in chair    Cumulative Hospital course  84-year-old male with AF on Eliquis, tachybradycardia syndrome s/p pacemaker placement,, lung cancer, no complaints of fall.  Reported fell prior to admission due to syncopal event.  He was able to crawl from the bathroom to his bedside but was too weak to pull himself up on the bed.  He was found by family member and EMS notified.  Stated he had had similar episode when he was in atrial fibrillation.  Workup in ER CT chest new masslike consolidation medial left upper lobe patchy nodular groundglass in right lower lobe suggestive of pneumonia, CT cervical spine no acute fracture or malalignment, CT head with senescent changes without acute abnormality, WBC 21.3 lactate 1.6.  Was initiated on broad-spectrum antibiotics.  Blood culture Streptococcus pneumoniae antibiotics adjusted to Rocephin and infectious disease consulted.  Repeat blood cultures no growth to date.  MRI brain syncopal event and ataxia screening completed will be performed today.  Plan for inpatient rehab likely tomorrow.    Objective:   VITALS:  BP (!) 179/77   Pulse 85   Temp 97 °F (36.1 °C) (Temporal)   Resp 18   Ht 1.778 m (5' 10\")   Wt 84.8 kg (187 lb)   SpO2 95%   BMI 26.83 kg/m²   24HR INTAKE/OUTPUT:    Intake/Output Summary (Last 24 hours) at 11/18/2024 1454  Last data filed at 11/18/2024 1235  Gross per 24 hour   Intake 2955 ml   Output 2600 ml   Net 355 ml       Physical Exam  Vitals and nursing note reviewed.   Constitutional:

## 2024-11-19 LAB
ANION GAP SERPL CALCULATED.3IONS-SCNC: 10 MMOL/L (ref 7–19)
BACTERIA BLD CULT: NORMAL
BASOPHILS # BLD: 0.1 K/UL (ref 0–0.2)
BASOPHILS NFR BLD: 0.9 % (ref 0–1)
BUN SERPL-MCNC: 26 MG/DL (ref 8–23)
CALCIUM SERPL-MCNC: 8.9 MG/DL (ref 8.8–10.2)
CHLORIDE SERPL-SCNC: 100 MMOL/L (ref 98–111)
CO2 SERPL-SCNC: 25 MMOL/L (ref 22–29)
CREAT SERPL-MCNC: 2 MG/DL (ref 0.7–1.2)
EOSINOPHIL # BLD: 0.5 K/UL (ref 0–0.6)
EOSINOPHIL NFR BLD: 4.8 % (ref 0–5)
ERYTHROCYTE [DISTWIDTH] IN BLOOD BY AUTOMATED COUNT: 14.2 % (ref 11.5–14.5)
GLUCOSE BLD-MCNC: 182 MG/DL (ref 70–99)
GLUCOSE BLD-MCNC: 215 MG/DL (ref 70–99)
GLUCOSE BLD-MCNC: 316 MG/DL (ref 70–99)
GLUCOSE BLD-MCNC: 324 MG/DL (ref 70–99)
GLUCOSE SERPL-MCNC: 137 MG/DL (ref 70–99)
HCT VFR BLD AUTO: 28.8 % (ref 42–52)
HGB BLD-MCNC: 9.3 G/DL (ref 14–18)
IMM GRANULOCYTES # BLD: 0.1 K/UL
LYMPHOCYTES # BLD: 1.3 K/UL (ref 1.1–4.5)
LYMPHOCYTES NFR BLD: 14 % (ref 20–40)
MCH RBC QN AUTO: 30.6 PG (ref 27–31)
MCHC RBC AUTO-ENTMCNC: 32.3 G/DL (ref 33–37)
MCV RBC AUTO: 94.7 FL (ref 80–94)
MONOCYTES # BLD: 0.8 K/UL (ref 0–0.9)
MONOCYTES NFR BLD: 8.7 % (ref 0–10)
NEUTROPHILS # BLD: 6.5 K/UL (ref 1.5–7.5)
NEUTS SEG NFR BLD: 70.1 % (ref 50–65)
PERFORMED ON: ABNORMAL
PLATELET # BLD AUTO: 288 K/UL (ref 130–400)
PMV BLD AUTO: 9.9 FL (ref 9.4–12.4)
POTASSIUM SERPL-SCNC: 4.4 MMOL/L (ref 3.5–5)
RBC # BLD AUTO: 3.04 M/UL (ref 4.7–6.1)
SODIUM SERPL-SCNC: 135 MMOL/L (ref 136–145)
WBC # BLD AUTO: 9.3 K/UL (ref 4.8–10.8)

## 2024-11-19 PROCEDURE — 36415 COLL VENOUS BLD VENIPUNCTURE: CPT

## 2024-11-19 PROCEDURE — 6370000000 HC RX 637 (ALT 250 FOR IP)

## 2024-11-19 PROCEDURE — 80048 BASIC METABOLIC PNL TOTAL CA: CPT

## 2024-11-19 PROCEDURE — 2580000003 HC RX 258

## 2024-11-19 PROCEDURE — 82962 GLUCOSE BLOOD TEST: CPT

## 2024-11-19 PROCEDURE — 85025 COMPLETE CBC W/AUTO DIFF WBC: CPT

## 2024-11-19 PROCEDURE — 1200000000 HC SEMI PRIVATE

## 2024-11-19 PROCEDURE — 6360000002 HC RX W HCPCS

## 2024-11-19 PROCEDURE — 6370000000 HC RX 637 (ALT 250 FOR IP): Performed by: NURSE PRACTITIONER

## 2024-11-19 PROCEDURE — 94640 AIRWAY INHALATION TREATMENT: CPT

## 2024-11-19 PROCEDURE — 94760 N-INVAS EAR/PLS OXIMETRY 1: CPT

## 2024-11-19 PROCEDURE — 6370000000 HC RX 637 (ALT 250 FOR IP): Performed by: INTERNAL MEDICINE

## 2024-11-19 PROCEDURE — 6360000002 HC RX W HCPCS: Performed by: INTERNAL MEDICINE

## 2024-11-19 PROCEDURE — 2580000003 HC RX 258: Performed by: INTERNAL MEDICINE

## 2024-11-19 RX ORDER — ONDANSETRON 4 MG/1
4 TABLET, ORALLY DISINTEGRATING ORAL EVERY 8 HOURS PRN
Status: CANCELLED | OUTPATIENT
Start: 2024-11-19

## 2024-11-19 RX ORDER — POLYETHYLENE GLYCOL 3350 17 G/17G
17 POWDER, FOR SOLUTION ORAL DAILY
Status: CANCELLED | OUTPATIENT
Start: 2024-11-20

## 2024-11-19 RX ORDER — ATORVASTATIN CALCIUM 20 MG/1
20 TABLET, FILM COATED ORAL DAILY
Status: CANCELLED | OUTPATIENT
Start: 2024-11-20

## 2024-11-19 RX ORDER — FUROSEMIDE 40 MG/1
40 TABLET ORAL EVERY OTHER DAY
Status: CANCELLED | OUTPATIENT
Start: 2024-11-21

## 2024-11-19 RX ORDER — GUAIFENESIN 600 MG/1
600 TABLET, EXTENDED RELEASE ORAL 2 TIMES DAILY
Status: CANCELLED | OUTPATIENT
Start: 2024-11-19

## 2024-11-19 RX ORDER — DILTIAZEM HYDROCHLORIDE 120 MG/1
120 CAPSULE, COATED, EXTENDED RELEASE ORAL DAILY
Status: CANCELLED | OUTPATIENT
Start: 2024-11-20

## 2024-11-19 RX ORDER — ACETAMINOPHEN 325 MG/1
650 TABLET ORAL EVERY 6 HOURS PRN
Status: CANCELLED | OUTPATIENT
Start: 2024-11-19

## 2024-11-19 RX ORDER — ONDANSETRON 2 MG/ML
4 INJECTION INTRAMUSCULAR; INTRAVENOUS EVERY 6 HOURS PRN
Status: CANCELLED | OUTPATIENT
Start: 2024-11-19

## 2024-11-19 RX ORDER — METOPROLOL SUCCINATE 50 MG/1
100 TABLET, EXTENDED RELEASE ORAL 2 TIMES DAILY
Status: CANCELLED | OUTPATIENT
Start: 2024-11-19

## 2024-11-19 RX ORDER — TAMSULOSIN HYDROCHLORIDE 0.4 MG/1
0.4 CAPSULE ORAL DAILY
Status: CANCELLED | OUTPATIENT
Start: 2024-11-20

## 2024-11-19 RX ORDER — POLYETHYLENE GLYCOL 3350 17 G/17G
17 POWDER, FOR SOLUTION ORAL DAILY PRN
Status: CANCELLED | OUTPATIENT
Start: 2024-11-19

## 2024-11-19 RX ORDER — ALBUTEROL SULFATE 0.83 MG/ML
2.5 SOLUTION RESPIRATORY (INHALATION) EVERY 6 HOURS PRN
Status: CANCELLED | OUTPATIENT
Start: 2024-11-19

## 2024-11-19 RX ORDER — MECOBALAMIN 5000 MCG
5 TABLET,DISINTEGRATING ORAL NIGHTLY
Status: CANCELLED | OUTPATIENT
Start: 2024-11-19

## 2024-11-19 RX ORDER — PANTOPRAZOLE SODIUM 40 MG/1
40 TABLET, DELAYED RELEASE ORAL
Status: CANCELLED | OUTPATIENT
Start: 2024-11-20

## 2024-11-19 RX ORDER — SENNA AND DOCUSATE SODIUM 50; 8.6 MG/1; MG/1
2 TABLET, FILM COATED ORAL 2 TIMES DAILY
Status: CANCELLED | OUTPATIENT
Start: 2024-11-19

## 2024-11-19 RX ORDER — CETIRIZINE HYDROCHLORIDE 10 MG/1
10 TABLET ORAL DAILY
Status: CANCELLED | OUTPATIENT
Start: 2024-11-20

## 2024-11-19 RX ORDER — MECOBALAMIN 5000 MCG
10 TABLET,DISINTEGRATING ORAL NIGHTLY PRN
Status: CANCELLED | OUTPATIENT
Start: 2024-11-19

## 2024-11-19 RX ORDER — ARFORMOTEROL TARTRATE 15 UG/2ML
15 SOLUTION RESPIRATORY (INHALATION)
Status: CANCELLED | OUTPATIENT
Start: 2024-11-19

## 2024-11-19 RX ORDER — MONTELUKAST SODIUM 10 MG/1
10 TABLET ORAL NIGHTLY
Status: CANCELLED | OUTPATIENT
Start: 2024-11-19

## 2024-11-19 RX ORDER — PRIMIDONE 50 MG/1
50 TABLET ORAL 2 TIMES DAILY
Status: CANCELLED | OUTPATIENT
Start: 2024-11-19

## 2024-11-19 RX ORDER — SODIUM CHLORIDE 0.9 % (FLUSH) 0.9 %
5-40 SYRINGE (ML) INJECTION PRN
Status: CANCELLED | OUTPATIENT
Start: 2024-11-19

## 2024-11-19 RX ORDER — INSULIN LISPRO 100 [IU]/ML
0-4 INJECTION, SOLUTION INTRAVENOUS; SUBCUTANEOUS
Status: CANCELLED | OUTPATIENT
Start: 2024-11-19

## 2024-11-19 RX ORDER — DEXTROSE MONOHYDRATE 100 MG/ML
INJECTION, SOLUTION INTRAVENOUS CONTINUOUS PRN
Status: CANCELLED | OUTPATIENT
Start: 2024-11-19

## 2024-11-19 RX ORDER — BISACODYL 5 MG/1
10 TABLET, DELAYED RELEASE ORAL ONCE
Status: COMPLETED | OUTPATIENT
Start: 2024-11-19 | End: 2024-11-19

## 2024-11-19 RX ORDER — LACTULOSE 10 G/15ML
20 SOLUTION ORAL 2 TIMES DAILY
Status: DISCONTINUED | OUTPATIENT
Start: 2024-11-19 | End: 2024-11-20 | Stop reason: HOSPADM

## 2024-11-19 RX ORDER — BISACODYL 5 MG/1
5 TABLET, DELAYED RELEASE ORAL DAILY
Status: DISCONTINUED | OUTPATIENT
Start: 2024-11-20 | End: 2024-11-20 | Stop reason: HOSPADM

## 2024-11-19 RX ORDER — CEFDINIR 300 MG/1
300 CAPSULE ORAL EVERY 12 HOURS SCHEDULED
Status: CANCELLED | OUTPATIENT
Start: 2024-11-20 | End: 2024-11-27

## 2024-11-19 RX ORDER — CEFDINIR 300 MG/1
300 CAPSULE ORAL EVERY 12 HOURS SCHEDULED
Status: DISCONTINUED | OUTPATIENT
Start: 2024-11-20 | End: 2024-11-20 | Stop reason: HOSPADM

## 2024-11-19 RX ORDER — SODIUM CHLORIDE 0.9 % (FLUSH) 0.9 %
5-40 SYRINGE (ML) INJECTION EVERY 12 HOURS SCHEDULED
Status: CANCELLED | OUTPATIENT
Start: 2024-11-19

## 2024-11-19 RX ADMIN — INSULIN LISPRO 1 UNITS: 100 INJECTION, SOLUTION INTRAVENOUS; SUBCUTANEOUS at 12:02

## 2024-11-19 RX ADMIN — SENNOSIDES AND DOCUSATE SODIUM 2 TABLET: 50; 8.6 TABLET ORAL at 08:07

## 2024-11-19 RX ADMIN — ARFORMOTEROL TARTRATE 15 MCG: 15 SOLUTION RESPIRATORY (INHALATION) at 06:30

## 2024-11-19 RX ADMIN — ATORVASTATIN CALCIUM 20 MG: 20 TABLET, FILM COATED ORAL at 08:07

## 2024-11-19 RX ADMIN — CETIRIZINE HYDROCHLORIDE 10 MG: 10 TABLET, FILM COATED ORAL at 08:08

## 2024-11-19 RX ADMIN — PANTOPRAZOLE SODIUM 40 MG: 40 TABLET, DELAYED RELEASE ORAL at 05:32

## 2024-11-19 RX ADMIN — BISACODYL 10 MG: 5 TABLET, COATED ORAL at 12:19

## 2024-11-19 RX ADMIN — METOPROLOL SUCCINATE 100 MG: 50 TABLET, EXTENDED RELEASE ORAL at 08:07

## 2024-11-19 RX ADMIN — LACTULOSE 20 G: 20 SOLUTION ORAL at 12:20

## 2024-11-19 RX ADMIN — SENNOSIDES AND DOCUSATE SODIUM 2 TABLET: 50; 8.6 TABLET ORAL at 21:22

## 2024-11-19 RX ADMIN — INSULIN LISPRO 3 UNITS: 100 INJECTION, SOLUTION INTRAVENOUS; SUBCUTANEOUS at 17:36

## 2024-11-19 RX ADMIN — APIXABAN 2.5 MG: 2.5 TABLET, FILM COATED ORAL at 08:08

## 2024-11-19 RX ADMIN — DILTIAZEM HYDROCHLORIDE 120 MG: 120 CAPSULE, COATED, EXTENDED RELEASE ORAL at 08:07

## 2024-11-19 RX ADMIN — IPRATROPIUM BROMIDE 0.5 MG: 0.5 SOLUTION RESPIRATORY (INHALATION) at 14:47

## 2024-11-19 RX ADMIN — POLYETHYLENE GLYCOL 3350 17 G: 17 POWDER, FOR SOLUTION ORAL at 09:56

## 2024-11-19 RX ADMIN — PRIMIDONE 50 MG: 50 TABLET ORAL at 08:08

## 2024-11-19 RX ADMIN — IPRATROPIUM BROMIDE 0.5 MG: 0.5 SOLUTION RESPIRATORY (INHALATION) at 06:30

## 2024-11-19 RX ADMIN — GUAIFENESIN 600 MG: 600 TABLET ORAL at 21:22

## 2024-11-19 RX ADMIN — PRIMIDONE 50 MG: 50 TABLET ORAL at 21:21

## 2024-11-19 RX ADMIN — METOPROLOL SUCCINATE 100 MG: 50 TABLET, EXTENDED RELEASE ORAL at 21:22

## 2024-11-19 RX ADMIN — TAMSULOSIN HYDROCHLORIDE 0.4 MG: 0.4 CAPSULE ORAL at 08:08

## 2024-11-19 RX ADMIN — ARFORMOTEROL TARTRATE 15 MCG: 15 SOLUTION RESPIRATORY (INHALATION) at 18:54

## 2024-11-19 RX ADMIN — SODIUM CHLORIDE, PRESERVATIVE FREE 10 ML: 5 INJECTION INTRAVENOUS at 08:08

## 2024-11-19 RX ADMIN — Medication 5 MG: at 21:21

## 2024-11-19 RX ADMIN — IPRATROPIUM BROMIDE 0.5 MG: 0.5 SOLUTION RESPIRATORY (INHALATION) at 18:54

## 2024-11-19 RX ADMIN — GUAIFENESIN 600 MG: 600 TABLET ORAL at 08:08

## 2024-11-19 RX ADMIN — MONTELUKAST 10 MG: 10 TABLET, FILM COATED ORAL at 21:22

## 2024-11-19 RX ADMIN — WATER 2000 MG: 1 INJECTION INTRAMUSCULAR; INTRAVENOUS; SUBCUTANEOUS at 12:17

## 2024-11-19 RX ADMIN — IPRATROPIUM BROMIDE 0.5 MG: 0.5 SOLUTION RESPIRATORY (INHALATION) at 10:10

## 2024-11-19 RX ADMIN — LACTULOSE 20 G: 20 SOLUTION ORAL at 21:22

## 2024-11-19 RX ADMIN — SODIUM CHLORIDE, PRESERVATIVE FREE 10 ML: 5 INJECTION INTRAVENOUS at 21:20

## 2024-11-19 NOTE — PROGRESS NOTES
Infectious Diseases Progress Note    Patient:  Jose Tate  YOB: 1940  MRN: [id number]   Admit date: 11/13/2024   Admitting Physician: Dwaine Church MD  Primary Care Physician: Chayo Grnade MD    Chief Complaint/Interval History: He was up to chair some today.  He continues to have some cough.  Overall improving.  Tolerating oral intake.  He will receive ceftriaxone today.  Can transition to Omnicef starting tomorrow.    In/Out    Intake/Output Summary (Last 24 hours) at 11/18/2024 2003  Last data filed at 11/18/2024 1752  Gross per 24 hour   Intake 3195 ml   Output 2400 ml   Net 795 ml     Allergies: No Known Allergies  Current Meds: albuterol (PROVENTIL) (2.5 MG/3ML) 0.083% nebulizer solution 2.5 mg, Q6H PRN  ipratropium (ATROVENT) 0.02 % nebulizer solution 0.5 mg, 4x Daily RT  arformoterol tartrate (BROVANA) nebulizer solution 15 mcg, BID RT  polyethylene glycol (GLYCOLAX) packet 17 g, Daily  guaiFENesin (MUCINEX) extended release tablet 600 mg, BID  labetalol (NORMODYNE;TRANDATE) injection 10 mg, Q4H PRN  melatonin disintegrating tablet 5 mg, Nightly  metoprolol succinate (TOPROL XL) extended release tablet 100 mg, BID  melatonin disintegrating tablet 10 mg, Nightly PRN  cefTRIAXone (ROCEPHIN) 2,000 mg in sterile water 20 mL IV syringe, Q24H  apixaban (ELIQUIS) tablet 2.5 mg, BID  atorvastatin (LIPITOR) tablet 20 mg, Daily  cetirizine (ZYRTEC) tablet 10 mg, Daily  dilTIAZem (CARDIZEM CD) extended release capsule 120 mg, Daily  furosemide (LASIX) tablet 40 mg, Every Other Day  montelukast (SINGULAIR) tablet 10 mg, Nightly  pantoprazole (PROTONIX) tablet 40 mg, QAM AC  primidone (MYSOLINE) tablet 50 mg, BID  sennosides-docusate sodium (SENOKOT-S) 8.6-50 MG tablet 2 tablet, BID  tamsulosin (FLOMAX) capsule 0.4 mg, Daily  sodium chloride flush 0.9 % injection 5-40 mL, 2 times per day  sodium chloride flush 0.9 % injection 5-40 mL, PRN  0.9 % sodium chloride infusion, PRN  ondansetron  (ZOFRAN-ODT) disintegrating tablet 4 mg, Q8H PRN   Or  ondansetron (ZOFRAN) injection 4 mg, Q6H PRN  polyethylene glycol (GLYCOLAX) packet 17 g, Daily PRN  acetaminophen (TYLENOL) tablet 650 mg, Q6H PRN   Or  acetaminophen (TYLENOL) suppository 650 mg, Q6H PRN  glucose chewable tablet 16 g, PRN  dextrose bolus 10% 125 mL, PRN   Or  dextrose bolus 10% 250 mL, PRN  glucagon injection 1 mg, PRN  dextrose 10 % infusion, Continuous PRN  insulin lispro (HUMALOG,ADMELOG) injection vial 0-4 Units, 4x Daily AC & HS      Review of Systems see HPI.  No diarrhea or rash.    VitalSigns:  BP (!) 143/84   Pulse 80   Temp 97.5 °F (36.4 °C) (Temporal)   Resp 16   Ht 1.778 m (5' 10\")   Wt 84.8 kg (187 lb)   SpO2 99%   BMI 26.83 kg/m²      Physical Exam  Line/IV site: No erythema, warmth, induration, or tenderness.  Lungs with good air movement.  No wheezing.  Rare crackle.    Lab Results:  CBC:   Recent Labs     11/16/24  0313 11/17/24  0339 11/18/24 0317   WBC 9.7 11.6* 11.1*   HGB 8.9* 10.0* 9.6*    234 240     BMP:  Recent Labs     11/16/24  0313 11/17/24  0339 11/18/24 0317    134* 135*   K 4.3 4.0 4.1    98 99   CO2 19* 21* 22   BUN 34* 25* 24*   CREATININE 1.8* 1.6* 1.7*   GLUCOSE 155* 185* 181*     Culture Results: No new results    Radiology: None    Additional Studies Reviewed:  None    Impression:  Bacteremic pneumococcal pneumonia  Right middle and left upper lobe masslike infiltrate    Recommendations:  Discontinue ceftriaxone after today's dose  Begin Omnicef 3 mg orally every 12 hours for 7 days  Continue to encourage activity  Feel acute rehab would be very beneficial for him  He should have follow-up chest imaging in 3 to 4 weeks to make sure improvement/resolution of infiltrate    TINA HUFF MD

## 2024-11-19 NOTE — PROGRESS NOTES
[facility]      Patient:  Jose Tate  YOB: 1940  Date of Service: 11/19/2024  MRN: [id number]   Acct: [id number]   Primary Care Physician: Chayo Grande MD  Advance Directive: Partial Code  Admit Date: 11/13/2024       Hospital Day: 6  Portions of this note have been copied forward, however, changed to reflect the most current clinical status of this patient.    CHIEF COMPLAINT fall    SUBJECTIVE: No issues overnight resting comfortably in chair    Cumulative Hospital course  84-year-old male with AF on Eliquis, tachybradycardia syndrome s/p pacemaker placement,, lung cancer, no complaints of fall.  Reported fell prior to admission due to syncopal event.  He was able to crawl from the bathroom to his bedside but was too weak to pull himself up on the bed.  He was found by family member and EMS notified.  Stated he had had similar episode when he was in atrial fibrillation.  Workup in ER CT chest new masslike consolidation medial left upper lobe patchy nodular groundglass in right lower lobe suggestive of pneumonia, CT cervical spine no acute fracture or malalignment, CT head with senescent changes without acute abnormality, WBC 21.3 lactate 1.6.  Was initiated on broad-spectrum antibiotics.  Blood culture Streptococcus pneumoniae antibiotics adjusted to Rocephin and infectious disease consulted.  Repeat blood cultures no growth to date.  MRI brain no acute intracranial findings, mild cerebral atrophy, mild chronic white matter microvascular ischemic changes, chronic lacunar infarct. Has been constipated with last BM over a week ago, no complaints of n/v/abdominal pain. Bowel regimen started yesterday, advanced today. Will need BM prior to acute rehab. Hopefully tomorrow.     Objective:   VITALS:  BP (!) 152/60   Pulse 80   Temp 98.3 °F (36.8 °C) (Temporal)   Resp 16   Ht 1.778 m (5' 10\")   Wt 82.3 kg (181 lb 8 oz)   SpO2 93%   BMI 26.04 kg/m²   24HR INTAKE/OUTPUT:

## 2024-11-19 NOTE — PROGRESS NOTES
Physical Therapy    Name: Jose Tate  MRN: [id number]  Date of service: 11/19/2024    Pt has been up this morning and is already in chair, planning to d/c to IP Rehab.       Electronically signed by Jimbo Puentes PTA on 11/19/2024 at 9:09 AM

## 2024-11-20 ENCOUNTER — HOSPITAL ENCOUNTER (INPATIENT)
Age: 84
LOS: 7 days | Discharge: HOME HEALTH | DRG: 947 | End: 2024-11-27
Attending: PSYCHIATRY & NEUROLOGY | Admitting: PSYCHIATRY & NEUROLOGY
Payer: MEDICARE

## 2024-11-20 ENCOUNTER — TELEPHONE (OUTPATIENT)
Dept: FAMILY MEDICINE CLINIC | Age: 84
End: 2024-11-20

## 2024-11-20 VITALS
DIASTOLIC BLOOD PRESSURE: 82 MMHG | RESPIRATION RATE: 20 BRPM | BODY MASS INDEX: 26.07 KG/M2 | OXYGEN SATURATION: 96 % | TEMPERATURE: 98 F | WEIGHT: 182.13 LBS | SYSTOLIC BLOOD PRESSURE: 140 MMHG | HEART RATE: 105 BPM | HEIGHT: 70 IN

## 2024-11-20 DIAGNOSIS — I10 ESSENTIAL HYPERTENSION: ICD-10-CM

## 2024-11-20 DIAGNOSIS — J96.01 ACUTE RESPIRATORY FAILURE WITH HYPOXIA: ICD-10-CM

## 2024-11-20 DIAGNOSIS — E11.9 TYPE 2 DIABETES MELLITUS WITHOUT COMPLICATION, WITHOUT LONG-TERM CURRENT USE OF INSULIN (HCC): ICD-10-CM

## 2024-11-20 DIAGNOSIS — Z57.9 OCCUPATIONAL EXPOSURE IN WORKPLACE: Primary | ICD-10-CM

## 2024-11-20 PROBLEM — J96.00 ACUTE RESPIRATORY FAILURE: Status: ACTIVE | Noted: 2024-11-20

## 2024-11-20 LAB
ANION GAP SERPL CALCULATED.3IONS-SCNC: 15 MMOL/L (ref 7–19)
BACTERIA BLD CULT ORG #2: NORMAL
BACTERIA BLD CULT ORG #2: NORMAL
BASOPHILS # BLD: 0.1 K/UL (ref 0–0.2)
BASOPHILS NFR BLD: 0.8 % (ref 0–1)
BUN SERPL-MCNC: 29 MG/DL (ref 8–23)
CALCIUM SERPL-MCNC: 9.1 MG/DL (ref 8.8–10.2)
CHLORIDE SERPL-SCNC: 98 MMOL/L (ref 98–111)
CO2 SERPL-SCNC: 23 MMOL/L (ref 22–29)
CREAT SERPL-MCNC: 1.9 MG/DL (ref 0.7–1.2)
EOSINOPHIL # BLD: 0.4 K/UL (ref 0–0.6)
EOSINOPHIL NFR BLD: 4.2 % (ref 0–5)
ERYTHROCYTE [DISTWIDTH] IN BLOOD BY AUTOMATED COUNT: 14.2 % (ref 11.5–14.5)
GLUCOSE BLD-MCNC: 197 MG/DL (ref 70–99)
GLUCOSE BLD-MCNC: 202 MG/DL (ref 70–99)
GLUCOSE BLD-MCNC: 218 MG/DL (ref 70–99)
GLUCOSE SERPL-MCNC: 189 MG/DL (ref 70–99)
HCT VFR BLD AUTO: 32.3 % (ref 42–52)
HGB BLD-MCNC: 10.2 G/DL (ref 14–18)
IMM GRANULOCYTES # BLD: 0.3 K/UL
LYMPHOCYTES # BLD: 1.3 K/UL (ref 1.1–4.5)
LYMPHOCYTES NFR BLD: 13.2 % (ref 20–40)
MCH RBC QN AUTO: 30.3 PG (ref 27–31)
MCHC RBC AUTO-ENTMCNC: 31.6 G/DL (ref 33–37)
MCV RBC AUTO: 95.8 FL (ref 80–94)
MONOCYTES # BLD: 1 K/UL (ref 0–0.9)
MONOCYTES NFR BLD: 10 % (ref 0–10)
NEUTROPHILS # BLD: 6.7 K/UL (ref 1.5–7.5)
NEUTS SEG NFR BLD: 69 % (ref 50–65)
PERFORMED ON: ABNORMAL
PLATELET # BLD AUTO: 345 K/UL (ref 130–400)
PMV BLD AUTO: 10 FL (ref 9.4–12.4)
POTASSIUM SERPL-SCNC: 4.4 MMOL/L (ref 3.5–5)
PREALB SERPL-MCNC: 13 MG/DL (ref 20–40)
RBC # BLD AUTO: 3.37 M/UL (ref 4.7–6.1)
SODIUM SERPL-SCNC: 136 MMOL/L (ref 136–145)
TSH SERPL DL<=0.005 MIU/L-ACNC: 0.9 UIU/ML (ref 0.27–4.2)
VIT B12 SERPL-MCNC: 604 PG/ML (ref 232–1245)
WBC # BLD AUTO: 9.7 K/UL (ref 4.8–10.8)

## 2024-11-20 PROCEDURE — 36415 COLL VENOUS BLD VENIPUNCTURE: CPT

## 2024-11-20 PROCEDURE — 6370000000 HC RX 637 (ALT 250 FOR IP): Performed by: NURSE PRACTITIONER

## 2024-11-20 PROCEDURE — 94640 AIRWAY INHALATION TREATMENT: CPT

## 2024-11-20 PROCEDURE — 6360000002 HC RX W HCPCS

## 2024-11-20 PROCEDURE — 82962 GLUCOSE BLOOD TEST: CPT

## 2024-11-20 PROCEDURE — 6370000000 HC RX 637 (ALT 250 FOR IP): Performed by: INTERNAL MEDICINE

## 2024-11-20 PROCEDURE — 84443 ASSAY THYROID STIM HORMONE: CPT

## 2024-11-20 PROCEDURE — 1180000000 HC REHAB R&B

## 2024-11-20 PROCEDURE — 82607 VITAMIN B-12: CPT

## 2024-11-20 PROCEDURE — 84134 ASSAY OF PREALBUMIN: CPT

## 2024-11-20 PROCEDURE — 94150 VITAL CAPACITY TEST: CPT

## 2024-11-20 PROCEDURE — 6370000000 HC RX 637 (ALT 250 FOR IP)

## 2024-11-20 PROCEDURE — 2580000003 HC RX 258

## 2024-11-20 PROCEDURE — 80048 BASIC METABOLIC PNL TOTAL CA: CPT

## 2024-11-20 PROCEDURE — 94760 N-INVAS EAR/PLS OXIMETRY 1: CPT

## 2024-11-20 PROCEDURE — 85025 COMPLETE CBC W/AUTO DIFF WBC: CPT

## 2024-11-20 RX ORDER — ARFORMOTEROL TARTRATE 15 UG/2ML
15 SOLUTION RESPIRATORY (INHALATION)
Status: DISCONTINUED | OUTPATIENT
Start: 2024-11-20 | End: 2024-11-27 | Stop reason: HOSPADM

## 2024-11-20 RX ORDER — TAMSULOSIN HYDROCHLORIDE 0.4 MG/1
0.4 CAPSULE ORAL DAILY
Status: DISCONTINUED | OUTPATIENT
Start: 2024-11-21 | End: 2024-11-27 | Stop reason: HOSPADM

## 2024-11-20 RX ORDER — ACETAMINOPHEN 650 MG/1
650 SUPPOSITORY RECTAL EVERY 6 HOURS PRN
Status: DISCONTINUED | OUTPATIENT
Start: 2024-11-20 | End: 2024-11-27 | Stop reason: HOSPADM

## 2024-11-20 RX ORDER — DILTIAZEM HYDROCHLORIDE 120 MG/1
120 CAPSULE, COATED, EXTENDED RELEASE ORAL DAILY
Status: DISCONTINUED | OUTPATIENT
Start: 2024-11-21 | End: 2024-11-27 | Stop reason: HOSPADM

## 2024-11-20 RX ORDER — SODIUM CHLORIDE 0.9 % (FLUSH) 0.9 %
5-40 SYRINGE (ML) INJECTION PRN
Status: DISCONTINUED | OUTPATIENT
Start: 2024-11-20 | End: 2024-11-27 | Stop reason: HOSPADM

## 2024-11-20 RX ORDER — FUROSEMIDE 40 MG/1
40 TABLET ORAL EVERY OTHER DAY
Status: DISCONTINUED | OUTPATIENT
Start: 2024-11-21 | End: 2024-11-27 | Stop reason: HOSPADM

## 2024-11-20 RX ORDER — GUAIFENESIN 600 MG/1
600 TABLET, EXTENDED RELEASE ORAL 2 TIMES DAILY
Status: DISCONTINUED | OUTPATIENT
Start: 2024-11-20 | End: 2024-11-27 | Stop reason: HOSPADM

## 2024-11-20 RX ORDER — ACETAMINOPHEN 325 MG/1
650 TABLET ORAL EVERY 6 HOURS PRN
Status: DISCONTINUED | OUTPATIENT
Start: 2024-11-20 | End: 2024-11-22 | Stop reason: SDUPTHER

## 2024-11-20 RX ORDER — ATORVASTATIN CALCIUM 20 MG/1
20 TABLET, FILM COATED ORAL DAILY
Status: DISCONTINUED | OUTPATIENT
Start: 2024-11-21 | End: 2024-11-27 | Stop reason: HOSPADM

## 2024-11-20 RX ORDER — POLYETHYLENE GLYCOL 3350 17 G/17G
17 POWDER, FOR SOLUTION ORAL DAILY PRN
Status: DISCONTINUED | OUTPATIENT
Start: 2024-11-20 | End: 2024-11-27 | Stop reason: HOSPADM

## 2024-11-20 RX ORDER — CETIRIZINE HYDROCHLORIDE 10 MG/1
10 TABLET ORAL DAILY
Status: DISCONTINUED | OUTPATIENT
Start: 2024-11-21 | End: 2024-11-27 | Stop reason: HOSPADM

## 2024-11-20 RX ORDER — MONTELUKAST SODIUM 10 MG/1
10 TABLET ORAL NIGHTLY
Status: DISCONTINUED | OUTPATIENT
Start: 2024-11-20 | End: 2024-11-27 | Stop reason: HOSPADM

## 2024-11-20 RX ORDER — ACETAMINOPHEN 325 MG/1
650 TABLET ORAL EVERY 4 HOURS PRN
Status: DISCONTINUED | OUTPATIENT
Start: 2024-11-20 | End: 2024-11-27 | Stop reason: HOSPADM

## 2024-11-20 RX ORDER — MECOBALAMIN 5000 MCG
5 TABLET,DISINTEGRATING ORAL NIGHTLY
Status: DISCONTINUED | OUTPATIENT
Start: 2024-11-20 | End: 2024-11-27 | Stop reason: HOSPADM

## 2024-11-20 RX ORDER — ALBUTEROL SULFATE 0.83 MG/ML
2.5 SOLUTION RESPIRATORY (INHALATION) EVERY 6 HOURS PRN
Status: DISCONTINUED | OUTPATIENT
Start: 2024-11-20 | End: 2024-11-27 | Stop reason: HOSPADM

## 2024-11-20 RX ORDER — DEXTROSE MONOHYDRATE 100 MG/ML
INJECTION, SOLUTION INTRAVENOUS CONTINUOUS PRN
Status: DISCONTINUED | OUTPATIENT
Start: 2024-11-20 | End: 2024-11-27 | Stop reason: HOSPADM

## 2024-11-20 RX ORDER — INSULIN LISPRO 100 [IU]/ML
0-4 INJECTION, SOLUTION INTRAVENOUS; SUBCUTANEOUS
Status: DISCONTINUED | OUTPATIENT
Start: 2024-11-20 | End: 2024-11-27 | Stop reason: HOSPADM

## 2024-11-20 RX ORDER — PANTOPRAZOLE SODIUM 40 MG/1
40 TABLET, DELAYED RELEASE ORAL
Status: DISCONTINUED | OUTPATIENT
Start: 2024-11-21 | End: 2024-11-27 | Stop reason: HOSPADM

## 2024-11-20 RX ORDER — METOPROLOL SUCCINATE 50 MG/1
100 TABLET, EXTENDED RELEASE ORAL 2 TIMES DAILY
Status: DISCONTINUED | OUTPATIENT
Start: 2024-11-20 | End: 2024-11-27 | Stop reason: HOSPADM

## 2024-11-20 RX ORDER — CEFDINIR 300 MG/1
300 CAPSULE ORAL EVERY 12 HOURS SCHEDULED
Status: COMPLETED | OUTPATIENT
Start: 2024-11-20 | End: 2024-11-26

## 2024-11-20 RX ORDER — PRIMIDONE 50 MG/1
50 TABLET ORAL 2 TIMES DAILY
Status: DISCONTINUED | OUTPATIENT
Start: 2024-11-20 | End: 2024-11-27 | Stop reason: HOSPADM

## 2024-11-20 RX ORDER — SODIUM CHLORIDE 0.9 % (FLUSH) 0.9 %
5-40 SYRINGE (ML) INJECTION EVERY 12 HOURS SCHEDULED
Status: DISCONTINUED | OUTPATIENT
Start: 2024-11-20 | End: 2024-11-27 | Stop reason: HOSPADM

## 2024-11-20 RX ORDER — MECOBALAMIN 5000 MCG
10 TABLET,DISINTEGRATING ORAL NIGHTLY PRN
Status: DISCONTINUED | OUTPATIENT
Start: 2024-11-20 | End: 2024-11-27 | Stop reason: HOSPADM

## 2024-11-20 RX ORDER — SENNA AND DOCUSATE SODIUM 50; 8.6 MG/1; MG/1
2 TABLET, FILM COATED ORAL 2 TIMES DAILY
Status: DISCONTINUED | OUTPATIENT
Start: 2024-11-20 | End: 2024-11-27 | Stop reason: HOSPADM

## 2024-11-20 RX ORDER — ONDANSETRON 4 MG/1
4 TABLET, ORALLY DISINTEGRATING ORAL EVERY 8 HOURS PRN
Status: DISCONTINUED | OUTPATIENT
Start: 2024-11-20 | End: 2024-11-27 | Stop reason: HOSPADM

## 2024-11-20 RX ORDER — ONDANSETRON 2 MG/ML
4 INJECTION INTRAMUSCULAR; INTRAVENOUS EVERY 6 HOURS PRN
Status: DISCONTINUED | OUTPATIENT
Start: 2024-11-20 | End: 2024-11-27 | Stop reason: HOSPADM

## 2024-11-20 RX ADMIN — CEFDINIR 300 MG: 300 CAPSULE ORAL at 08:56

## 2024-11-20 RX ADMIN — ARFORMOTEROL TARTRATE 15 MCG: 15 SOLUTION RESPIRATORY (INHALATION) at 06:45

## 2024-11-20 RX ADMIN — SENNOSIDES AND DOCUSATE SODIUM 2 TABLET: 50; 8.6 TABLET ORAL at 08:57

## 2024-11-20 RX ADMIN — Medication 5 MG: at 20:51

## 2024-11-20 RX ADMIN — GUAIFENESIN 600 MG: 600 TABLET ORAL at 20:51

## 2024-11-20 RX ADMIN — APIXABAN 2.5 MG: 2.5 TABLET, FILM COATED ORAL at 20:51

## 2024-11-20 RX ADMIN — LACTULOSE 20 G: 20 SOLUTION ORAL at 08:54

## 2024-11-20 RX ADMIN — CEFDINIR 300 MG: 300 CAPSULE ORAL at 20:51

## 2024-11-20 RX ADMIN — ATORVASTATIN CALCIUM 20 MG: 20 TABLET, FILM COATED ORAL at 08:59

## 2024-11-20 RX ADMIN — APIXABAN 2.5 MG: 2.5 TABLET, FILM COATED ORAL at 08:56

## 2024-11-20 RX ADMIN — METOPROLOL SUCCINATE 100 MG: 50 TABLET, EXTENDED RELEASE ORAL at 08:56

## 2024-11-20 RX ADMIN — TAMSULOSIN HYDROCHLORIDE 0.4 MG: 0.4 CAPSULE ORAL at 08:57

## 2024-11-20 RX ADMIN — PRIMIDONE 50 MG: 50 TABLET ORAL at 20:51

## 2024-11-20 RX ADMIN — DILTIAZEM HYDROCHLORIDE 120 MG: 120 CAPSULE, COATED, EXTENDED RELEASE ORAL at 08:56

## 2024-11-20 RX ADMIN — CETIRIZINE HYDROCHLORIDE 10 MG: 10 TABLET, FILM COATED ORAL at 08:57

## 2024-11-20 RX ADMIN — INSULIN LISPRO 1 UNITS: 100 INJECTION, SOLUTION INTRAVENOUS; SUBCUTANEOUS at 09:00

## 2024-11-20 RX ADMIN — SODIUM CHLORIDE, PRESERVATIVE FREE 10 ML: 5 INJECTION INTRAVENOUS at 08:55

## 2024-11-20 RX ADMIN — PRIMIDONE 50 MG: 50 TABLET ORAL at 08:57

## 2024-11-20 RX ADMIN — POLYETHYLENE GLYCOL 3350 17 G: 17 POWDER, FOR SOLUTION ORAL at 08:54

## 2024-11-20 RX ADMIN — MONTELUKAST 10 MG: 10 TABLET, FILM COATED ORAL at 20:51

## 2024-11-20 RX ADMIN — IPRATROPIUM BROMIDE 0.5 MG: 0.5 SOLUTION RESPIRATORY (INHALATION) at 06:45

## 2024-11-20 RX ADMIN — SENNOSIDES AND DOCUSATE SODIUM 2 TABLET: 8.6; 5 TABLET ORAL at 20:51

## 2024-11-20 RX ADMIN — IPRATROPIUM BROMIDE 0.5 MG: 0.5 SOLUTION RESPIRATORY (INHALATION) at 18:18

## 2024-11-20 RX ADMIN — BISACODYL 5 MG: 5 TABLET, COATED ORAL at 08:56

## 2024-11-20 RX ADMIN — GUAIFENESIN 600 MG: 600 TABLET ORAL at 08:55

## 2024-11-20 RX ADMIN — METOPROLOL SUCCINATE 100 MG: 50 TABLET, EXTENDED RELEASE ORAL at 20:51

## 2024-11-20 RX ADMIN — PANTOPRAZOLE SODIUM 40 MG: 40 TABLET, DELAYED RELEASE ORAL at 08:55

## 2024-11-20 RX ADMIN — IPRATROPIUM BROMIDE 0.5 MG: 0.5 SOLUTION RESPIRATORY (INHALATION) at 10:30

## 2024-11-20 RX ADMIN — ARFORMOTEROL TARTRATE 15 MCG: 15 SOLUTION RESPIRATORY (INHALATION) at 18:18

## 2024-11-20 SDOH — HEALTH STABILITY - PHYSICAL HEALTH: OCCUPATIONAL EXPOSURE TO UNSPECIFIED RISK FACTOR: Z57.9

## 2024-11-20 NOTE — PLAN OF CARE
Problem: Chronic Conditions and Co-morbidities  Goal: Patient's chronic conditions and co-morbidity symptoms are monitored and maintained or improved  Outcome: Progressing     Problem: Skin/Tissue Integrity  Goal: Absence of new skin breakdown  Outcome: Progressing     Problem: ABCDS Injury Assessment  Goal: Absence of physical injury  Outcome: Progressing     Problem: Safety - Adult  Goal: Free from fall injury  Outcome: Progressing

## 2024-11-20 NOTE — PLAN OF CARE
Problem: Chronic Conditions and Co-morbidities  Goal: Patient's chronic conditions and co-morbidity symptoms are monitored and maintained or improved  11/20/2024 1058 by Yin Donald RN  Outcome: Progressing  11/20/2024 0426 by Ellen nErique RN  Outcome: Progressing     Problem: Skin/Tissue Integrity  Goal: Absence of new skin breakdown  Description: 1.  Monitor for areas of redness and/or skin breakdown  2.  Assess vascular access sites hourly  3.  Every 4-6 hours minimum:  Change oxygen saturation probe site  4.  Every 4-6 hours:  If on nasal continuous positive airway pressure, respiratory therapy assess nares and determine need for appliance change or resting period.  11/20/2024 1058 by Yin Donald RN  Outcome: Progressing  11/20/2024 0426 by Ellen Enrique RN  Outcome: Progressing     Problem: ABCDS Injury Assessment  Goal: Absence of physical injury  11/20/2024 1058 by Yin Donald RN  Outcome: Progressing  11/20/2024 0426 by Ellen Enrique RN  Outcome: Progressing     Problem: Safety - Adult  Goal: Free from fall injury  11/20/2024 1058 by Yin Donald RN  Outcome: Progressing  11/20/2024 0426 by Ellen Enrique RN  Outcome: Progressing

## 2024-11-20 NOTE — CARE COORDINATION
[facility]  Notification of Admission Decision      [x] Patient has been accepted [facility] on : 11/20/24 Room 824      Please write discharge orders and summary prior to discharge.    [] Patient acceptance to Rehab pending the following :    [] Eval in progress       [] Patient determined to be ineligible for services at [facility] because :       We recommend you consider        Thank you for your referral, we appreciate you. If you have any questions, please feel   free to contact me at [phone].  Electronically Signed by Jessica Rodriguez, Admissions Coordinator 11/20/2024 8:12 AM

## 2024-11-20 NOTE — DISCHARGE SUMMARY
Jose Tate  :  1940  MRN:  [id number]    Admit date:  2024  Discharge date:  24    Discharging Physician:  DR Tate     Advance Directive: Partial Code    Consults: PALLIATIVE CARE EVAL  IP CONSULT TO REHAB/TCU ADMISSION COORDINATOR  IP CONSULT TO INFECTIOUS DISEASES  IP CONSULT TO REHAB/TCU ADMISSION COORDINATOR     Primary Care Physician:  Chayo Grande MD    Discharge Diagnoses:  Principal Problem:    Bacteremia due to Streptococcus  Active Problems:    Hypercholesterolemia    GERD (gastroesophageal reflux disease)    Essential hypertension    Type 2 diabetes mellitus without complication (HCC)    Atrial fibrillation (HCC)    Community acquired bacterial pneumonia    Chronic rhinitis    Essential tremor    BPH (benign prostatic hyperplasia)    Constipation  Resolved Problems:    * No resolved hospital problems. *      Portions of this note have been copied forward, however, changed to reflect the most current clinical status of this patient.  Hospital Course:   84-year-old male with AF on Eliquis, tachybradycardia syndrome s/p pacemaker placement,, lung cancer, no complaints of fall.  Reported fell prior to admission due to syncopal event.  He was able to crawl from the bathroom to his bedside but was too weak to pull himself up on the bed.  He was found by family member and EMS notified.  Stated he had had similar episode when he was in atrial fibrillation.  Workup in ER CT chest new masslike consolidation medial left upper lobe patchy nodular groundglass in right lower lobe suggestive of pneumonia, CT cervical spine no acute fracture or malalignment, CT head with senescent changes without acute abnormality, WBC 21.3 lactate 1.6.  Was initiated on broad-spectrum antibiotics.  Blood culture Streptococcus pneumoniae antibiotics adjusted to Rocephin and infectious disease consulted. Transitioned to oral Omnicef for 7 days. Will need f/u imaging in 3-4 weeks to assure resolution of

## 2024-11-21 LAB
ALBUMIN SERPL-MCNC: 3.1 G/DL (ref 3.5–5.2)
ALP SERPL-CCNC: 100 U/L (ref 40–129)
ALT SERPL-CCNC: 21 U/L (ref 5–41)
ANION GAP SERPL CALCULATED.3IONS-SCNC: 12 MMOL/L (ref 7–19)
AST SERPL-CCNC: 23 U/L (ref 5–40)
BASOPHILS # BLD: 0.1 K/UL (ref 0–0.2)
BASOPHILS NFR BLD: 1 % (ref 0–1)
BILIRUB SERPL-MCNC: 0.2 MG/DL (ref 0.2–1.2)
BUN SERPL-MCNC: 32 MG/DL (ref 8–23)
CALCIUM SERPL-MCNC: 8.9 MG/DL (ref 8.8–10.2)
CHLORIDE SERPL-SCNC: 99 MMOL/L (ref 98–111)
CO2 SERPL-SCNC: 25 MMOL/L (ref 22–29)
CREAT SERPL-MCNC: 1.8 MG/DL (ref 0.7–1.2)
EOSINOPHIL # BLD: 0.4 K/UL (ref 0–0.6)
EOSINOPHIL NFR BLD: 4.8 % (ref 0–5)
ERYTHROCYTE [DISTWIDTH] IN BLOOD BY AUTOMATED COUNT: 14.3 % (ref 11.5–14.5)
GLUCOSE BLD-MCNC: 187 MG/DL (ref 70–99)
GLUCOSE BLD-MCNC: 188 MG/DL (ref 70–99)
GLUCOSE BLD-MCNC: 240 MG/DL (ref 70–99)
GLUCOSE BLD-MCNC: 246 MG/DL (ref 70–99)
GLUCOSE SERPL-MCNC: 189 MG/DL (ref 70–99)
HCT VFR BLD AUTO: 29.2 % (ref 42–52)
HGB BLD-MCNC: 9.1 G/DL (ref 14–18)
IMM GRANULOCYTES # BLD: 0.2 K/UL
LYMPHOCYTES # BLD: 1.6 K/UL (ref 1.1–4.5)
LYMPHOCYTES NFR BLD: 17.5 % (ref 20–40)
MCH RBC QN AUTO: 29.7 PG (ref 27–31)
MCHC RBC AUTO-ENTMCNC: 31.2 G/DL (ref 33–37)
MCV RBC AUTO: 95.4 FL (ref 80–94)
MONOCYTES # BLD: 0.7 K/UL (ref 0–0.9)
MONOCYTES NFR BLD: 7.6 % (ref 0–10)
NEUTROPHILS # BLD: 6 K/UL (ref 1.5–7.5)
NEUTS SEG NFR BLD: 67.1 % (ref 50–65)
PERFORMED ON: ABNORMAL
PLATELET # BLD AUTO: 367 K/UL (ref 130–400)
PMV BLD AUTO: 9.4 FL (ref 9.4–12.4)
POTASSIUM SERPL-SCNC: 4.3 MMOL/L (ref 3.5–5)
PREALB SERPL-MCNC: 14 MG/DL (ref 20–40)
PROT SERPL-MCNC: 6.6 G/DL (ref 6.4–8.3)
RBC # BLD AUTO: 3.06 M/UL (ref 4.7–6.1)
SODIUM SERPL-SCNC: 136 MMOL/L (ref 136–145)
WBC # BLD AUTO: 9 K/UL (ref 4.8–10.8)

## 2024-11-21 PROCEDURE — 94760 N-INVAS EAR/PLS OXIMETRY 1: CPT

## 2024-11-21 PROCEDURE — 2580000003 HC RX 258

## 2024-11-21 PROCEDURE — 94640 AIRWAY INHALATION TREATMENT: CPT

## 2024-11-21 PROCEDURE — 51798 US URINE CAPACITY MEASURE: CPT

## 2024-11-21 PROCEDURE — 36415 COLL VENOUS BLD VENIPUNCTURE: CPT

## 2024-11-21 PROCEDURE — 6360000002 HC RX W HCPCS

## 2024-11-21 PROCEDURE — 85025 COMPLETE CBC W/AUTO DIFF WBC: CPT

## 2024-11-21 PROCEDURE — 1180000000 HC REHAB R&B

## 2024-11-21 PROCEDURE — 80053 COMPREHEN METABOLIC PANEL: CPT

## 2024-11-21 PROCEDURE — 6370000000 HC RX 637 (ALT 250 FOR IP): Performed by: PSYCHIATRY & NEUROLOGY

## 2024-11-21 PROCEDURE — 6370000000 HC RX 637 (ALT 250 FOR IP)

## 2024-11-21 PROCEDURE — 97116 GAIT TRAINING THERAPY: CPT

## 2024-11-21 PROCEDURE — 97161 PT EVAL LOW COMPLEX 20 MIN: CPT

## 2024-11-21 PROCEDURE — 97165 OT EVAL LOW COMPLEX 30 MIN: CPT

## 2024-11-21 PROCEDURE — 97530 THERAPEUTIC ACTIVITIES: CPT

## 2024-11-21 PROCEDURE — 97535 SELF CARE MNGMENT TRAINING: CPT

## 2024-11-21 PROCEDURE — 97110 THERAPEUTIC EXERCISES: CPT

## 2024-11-21 PROCEDURE — 84134 ASSAY OF PREALBUMIN: CPT

## 2024-11-21 PROCEDURE — 99222 1ST HOSP IP/OBS MODERATE 55: CPT | Performed by: PSYCHIATRY & NEUROLOGY

## 2024-11-21 PROCEDURE — 82962 GLUCOSE BLOOD TEST: CPT

## 2024-11-21 RX ORDER — TRAZODONE HYDROCHLORIDE 50 MG/1
50 TABLET, FILM COATED ORAL NIGHTLY PRN
Status: DISCONTINUED | OUTPATIENT
Start: 2024-11-21 | End: 2024-11-22

## 2024-11-21 RX ADMIN — GUAIFENESIN 600 MG: 600 TABLET ORAL at 08:30

## 2024-11-21 RX ADMIN — DILTIAZEM HYDROCHLORIDE 120 MG: 120 CAPSULE, COATED, EXTENDED RELEASE ORAL at 08:30

## 2024-11-21 RX ADMIN — ARFORMOTEROL TARTRATE 15 MCG: 15 SOLUTION RESPIRATORY (INHALATION) at 06:43

## 2024-11-21 RX ADMIN — SODIUM CHLORIDE, PRESERVATIVE FREE 10 ML: 5 INJECTION INTRAVENOUS at 20:46

## 2024-11-21 RX ADMIN — CEFDINIR 300 MG: 300 CAPSULE ORAL at 08:30

## 2024-11-21 RX ADMIN — PRIMIDONE 50 MG: 50 TABLET ORAL at 20:42

## 2024-11-21 RX ADMIN — METOPROLOL SUCCINATE 100 MG: 50 TABLET, EXTENDED RELEASE ORAL at 20:42

## 2024-11-21 RX ADMIN — CETIRIZINE HYDROCHLORIDE 10 MG: 10 TABLET, FILM COATED ORAL at 08:30

## 2024-11-21 RX ADMIN — CEFDINIR 300 MG: 300 CAPSULE ORAL at 20:42

## 2024-11-21 RX ADMIN — TAMSULOSIN HYDROCHLORIDE 0.4 MG: 0.4 CAPSULE ORAL at 08:30

## 2024-11-21 RX ADMIN — PANTOPRAZOLE SODIUM 40 MG: 40 TABLET, DELAYED RELEASE ORAL at 05:44

## 2024-11-21 RX ADMIN — APIXABAN 2.5 MG: 2.5 TABLET, FILM COATED ORAL at 08:30

## 2024-11-21 RX ADMIN — INSULIN LISPRO 1 UNITS: 100 INJECTION, SOLUTION INTRAVENOUS; SUBCUTANEOUS at 17:23

## 2024-11-21 RX ADMIN — INSULIN LISPRO 1 UNITS: 100 INJECTION, SOLUTION INTRAVENOUS; SUBCUTANEOUS at 20:42

## 2024-11-21 RX ADMIN — PRIMIDONE 50 MG: 50 TABLET ORAL at 08:30

## 2024-11-21 RX ADMIN — IPRATROPIUM BROMIDE 0.5 MG: 0.5 SOLUTION RESPIRATORY (INHALATION) at 06:43

## 2024-11-21 RX ADMIN — APIXABAN 2.5 MG: 2.5 TABLET, FILM COATED ORAL at 20:42

## 2024-11-21 RX ADMIN — IPRATROPIUM BROMIDE 0.5 MG: 0.5 SOLUTION RESPIRATORY (INHALATION) at 18:11

## 2024-11-21 RX ADMIN — SENNOSIDES AND DOCUSATE SODIUM 2 TABLET: 8.6; 5 TABLET ORAL at 20:42

## 2024-11-21 RX ADMIN — IPRATROPIUM BROMIDE 0.5 MG: 0.5 SOLUTION RESPIRATORY (INHALATION) at 14:55

## 2024-11-21 RX ADMIN — SENNOSIDES AND DOCUSATE SODIUM 2 TABLET: 8.6; 5 TABLET ORAL at 08:30

## 2024-11-21 RX ADMIN — ATORVASTATIN CALCIUM 20 MG: 20 TABLET, FILM COATED ORAL at 08:30

## 2024-11-21 RX ADMIN — ARFORMOTEROL TARTRATE 15 MCG: 15 SOLUTION RESPIRATORY (INHALATION) at 18:11

## 2024-11-21 RX ADMIN — SODIUM CHLORIDE, PRESERVATIVE FREE 10 ML: 5 INJECTION INTRAVENOUS at 08:33

## 2024-11-21 RX ADMIN — METOPROLOL SUCCINATE 100 MG: 50 TABLET, EXTENDED RELEASE ORAL at 08:30

## 2024-11-21 RX ADMIN — MONTELUKAST 10 MG: 10 TABLET, FILM COATED ORAL at 20:42

## 2024-11-21 RX ADMIN — TRAZODONE HYDROCHLORIDE 50 MG: 50 TABLET ORAL at 22:32

## 2024-11-21 RX ADMIN — FUROSEMIDE 40 MG: 40 TABLET ORAL at 08:30

## 2024-11-21 RX ADMIN — Medication 5 MG: at 20:42

## 2024-11-21 RX ADMIN — GUAIFENESIN 600 MG: 600 TABLET ORAL at 20:42

## 2024-11-21 NOTE — H&P
pairs and chains  (Molecular identification Streptococcus pneumonia) Dr Mustafa with ID was consulted. He was started on Rocephen 2000 IV per 24hrs. Repeat cultures no growth to date. MRI  brain for syncopal event and ataxia on 11/18/24  No acute intracranial findings, mild cerebral atrophy, mild chronic white matter microvascular ischemic changes, chronic lacunar infarct in the right periventricular white matter. Patient had been constipated with last BM over a week ago, no complaints of n/v/abdominal pain. Bowel regimen was started on 11/18/24, after soap suds enema on 11/19/24 patient has had BM x 2. He is now medically stable and participating with therapy. He is ready to begin rehab with plan of returning home after rehab dc with support from his caregiver and family.    REVIEW OF SYSTEMS:  Constitutional - No fever or chills.  No diaphoresis or significant fatigue.  HENT -  No tinnitus or significant hearing loss.  Eyes - no sudden vision change or eye pain  Respiratory - no significant shortness of breath or cough  Cardiovascular - no chest pain No palpitations or significant leg swelling  Gastrointestinal - no abdominal swelling or pain.    Genitourinary - No difficulty urinating, dysuria  Musculoskeletal - no back pain or myalgia.  Skin - no color change or rash  Neurologic - No seizures.  No lateralizing weakness.  Hematologic - no easy bruising or excessive bleeding.  Psychiatric - no severe anxiety or nervousness.   All other review of systems are negative.      Past Medical History:      Diagnosis Date    Allergic rhinitis     Atrial fibrillation (HCC)     Bronchitis, chronic (HCC)     Carotid artery stenosis     Congenital heart disease     GERD (gastroesophageal reflux disease)     Heart attack (HCC)     Hemorrhoids     Hypercholesterolemia     Palliative care patient 08/16/2024    Type II or unspecified type diabetes mellitus without mention of complication, not stated as uncontrolled        Past

## 2024-11-21 NOTE — THERAPY EVALUATION
with Two Turns  Assistance Needed: Supervision or touching assistance  CARE Score: 4  Discharge Goal: Independent    Walk 150 Feet  Reason if not Attempted: Not attempted due to medical condition or safety concerns  CARE Score: 88  Discharge Goal: Independent    Walking 10 Feet on Uneven Surfaces  Reason if not Attempted: Not attempted due to medical condition or safety concerns  CARE Score: 88  Discharge Goal: Not Attempted    1 Step (Curb)  Reason if not Attempted: Not attempted due to medical condition or safety concerns  CARE Score: 88  Discharge Goal: Not Attempted    4 Steps  Reason if not Attempted: Not attempted due to medical condition or safety concerns  CARE Score: 88  Discharge Goal: Not Attempted    12 Steps  Reason if not Attempted: Not attempted due to medical condition or safety concerns  CARE Score: 88  Discharge Goal: Not Attempted    Wheel 50 Feet with Two Turns  Assistance Needed: Supervision or touching assistance  CARE Score: 4  Discharge Goal: Independent    Wheel 150 Feet  Reason if not Attempted: Not attempted due to medical condition or safety concerns  CARE Score: 88  Discharge Goal: Independent      LAST TREATMENT TIME  PT Individual Minutes  Time In: 1345  Time Out: 1415  Minutes: 30           Electronically signed by ARIAN Gracia on 11/21/2024 at 3:34 PM

## 2024-11-22 LAB
GLUCOSE BLD-MCNC: 168 MG/DL (ref 70–99)
GLUCOSE BLD-MCNC: 170 MG/DL (ref 70–99)
GLUCOSE BLD-MCNC: 186 MG/DL (ref 70–99)
GLUCOSE BLD-MCNC: 284 MG/DL (ref 70–99)
PERFORMED ON: ABNORMAL

## 2024-11-22 PROCEDURE — 94640 AIRWAY INHALATION TREATMENT: CPT

## 2024-11-22 PROCEDURE — 97535 SELF CARE MNGMENT TRAINING: CPT

## 2024-11-22 PROCEDURE — 97530 THERAPEUTIC ACTIVITIES: CPT

## 2024-11-22 PROCEDURE — 6370000000 HC RX 637 (ALT 250 FOR IP): Performed by: PSYCHIATRY & NEUROLOGY

## 2024-11-22 PROCEDURE — 82962 GLUCOSE BLOOD TEST: CPT

## 2024-11-22 PROCEDURE — 94760 N-INVAS EAR/PLS OXIMETRY 1: CPT

## 2024-11-22 PROCEDURE — 2580000003 HC RX 258

## 2024-11-22 PROCEDURE — 6360000002 HC RX W HCPCS

## 2024-11-22 PROCEDURE — 6370000000 HC RX 637 (ALT 250 FOR IP)

## 2024-11-22 PROCEDURE — 97110 THERAPEUTIC EXERCISES: CPT

## 2024-11-22 PROCEDURE — 99232 SBSQ HOSP IP/OBS MODERATE 35: CPT | Performed by: PSYCHIATRY & NEUROLOGY

## 2024-11-22 PROCEDURE — 97116 GAIT TRAINING THERAPY: CPT

## 2024-11-22 PROCEDURE — 1180000000 HC REHAB R&B

## 2024-11-22 RX ORDER — TRAZODONE HYDROCHLORIDE 100 MG/1
100 TABLET ORAL NIGHTLY PRN
Status: DISCONTINUED | OUTPATIENT
Start: 2024-11-22 | End: 2024-11-27 | Stop reason: HOSPADM

## 2024-11-22 RX ADMIN — Medication 5 MG: at 20:12

## 2024-11-22 RX ADMIN — IPRATROPIUM BROMIDE 0.5 MG: 0.5 SOLUTION RESPIRATORY (INHALATION) at 14:15

## 2024-11-22 RX ADMIN — PRIMIDONE 50 MG: 50 TABLET ORAL at 07:43

## 2024-11-22 RX ADMIN — PRIMIDONE 50 MG: 50 TABLET ORAL at 20:12

## 2024-11-22 RX ADMIN — IPRATROPIUM BROMIDE 0.5 MG: 0.5 SOLUTION RESPIRATORY (INHALATION) at 10:07

## 2024-11-22 RX ADMIN — INSULIN LISPRO 1 UNITS: 100 INJECTION, SOLUTION INTRAVENOUS; SUBCUTANEOUS at 12:27

## 2024-11-22 RX ADMIN — DILTIAZEM HYDROCHLORIDE 120 MG: 120 CAPSULE, COATED, EXTENDED RELEASE ORAL at 07:43

## 2024-11-22 RX ADMIN — SENNOSIDES AND DOCUSATE SODIUM 2 TABLET: 8.6; 5 TABLET ORAL at 20:13

## 2024-11-22 RX ADMIN — IPRATROPIUM BROMIDE 0.5 MG: 0.5 SOLUTION RESPIRATORY (INHALATION) at 06:15

## 2024-11-22 RX ADMIN — IPRATROPIUM BROMIDE 0.5 MG: 0.5 SOLUTION RESPIRATORY (INHALATION) at 19:13

## 2024-11-22 RX ADMIN — METOPROLOL SUCCINATE 100 MG: 50 TABLET, EXTENDED RELEASE ORAL at 20:12

## 2024-11-22 RX ADMIN — CEFDINIR 300 MG: 300 CAPSULE ORAL at 07:43

## 2024-11-22 RX ADMIN — TAMSULOSIN HYDROCHLORIDE 0.4 MG: 0.4 CAPSULE ORAL at 07:43

## 2024-11-22 RX ADMIN — GUAIFENESIN 600 MG: 600 TABLET ORAL at 07:43

## 2024-11-22 RX ADMIN — PANTOPRAZOLE SODIUM 40 MG: 40 TABLET, DELAYED RELEASE ORAL at 05:13

## 2024-11-22 RX ADMIN — SENNOSIDES AND DOCUSATE SODIUM 2 TABLET: 8.6; 5 TABLET ORAL at 07:43

## 2024-11-22 RX ADMIN — INSULIN LISPRO 2 UNITS: 100 INJECTION, SOLUTION INTRAVENOUS; SUBCUTANEOUS at 20:11

## 2024-11-22 RX ADMIN — ARFORMOTEROL TARTRATE 15 MCG: 15 SOLUTION RESPIRATORY (INHALATION) at 06:15

## 2024-11-22 RX ADMIN — APIXABAN 2.5 MG: 2.5 TABLET, FILM COATED ORAL at 20:12

## 2024-11-22 RX ADMIN — ATORVASTATIN CALCIUM 20 MG: 20 TABLET, FILM COATED ORAL at 07:43

## 2024-11-22 RX ADMIN — METOPROLOL SUCCINATE 100 MG: 50 TABLET, EXTENDED RELEASE ORAL at 07:43

## 2024-11-22 RX ADMIN — CETIRIZINE HYDROCHLORIDE 10 MG: 10 TABLET, FILM COATED ORAL at 07:44

## 2024-11-22 RX ADMIN — CEFDINIR 300 MG: 300 CAPSULE ORAL at 20:12

## 2024-11-22 RX ADMIN — TRAZODONE HYDROCHLORIDE 100 MG: 100 TABLET ORAL at 20:12

## 2024-11-22 RX ADMIN — GUAIFENESIN 600 MG: 600 TABLET ORAL at 20:12

## 2024-11-22 RX ADMIN — ARFORMOTEROL TARTRATE 15 MCG: 15 SOLUTION RESPIRATORY (INHALATION) at 19:14

## 2024-11-22 RX ADMIN — MONTELUKAST 10 MG: 10 TABLET, FILM COATED ORAL at 20:12

## 2024-11-22 RX ADMIN — APIXABAN 2.5 MG: 2.5 TABLET, FILM COATED ORAL at 07:43

## 2024-11-22 RX ADMIN — SODIUM CHLORIDE, PRESERVATIVE FREE 10 ML: 5 INJECTION INTRAVENOUS at 07:44

## 2024-11-23 LAB
GLUCOSE BLD-MCNC: 175 MG/DL (ref 70–99)
GLUCOSE BLD-MCNC: 179 MG/DL (ref 70–99)
GLUCOSE BLD-MCNC: 193 MG/DL (ref 70–99)
GLUCOSE BLD-MCNC: 247 MG/DL (ref 70–99)
PERFORMED ON: ABNORMAL

## 2024-11-23 PROCEDURE — 82962 GLUCOSE BLOOD TEST: CPT

## 2024-11-23 PROCEDURE — 97110 THERAPEUTIC EXERCISES: CPT

## 2024-11-23 PROCEDURE — 2580000003 HC RX 258

## 2024-11-23 PROCEDURE — 6360000002 HC RX W HCPCS

## 2024-11-23 PROCEDURE — 97535 SELF CARE MNGMENT TRAINING: CPT

## 2024-11-23 PROCEDURE — 1180000000 HC REHAB R&B

## 2024-11-23 PROCEDURE — 6370000000 HC RX 637 (ALT 250 FOR IP): Performed by: PSYCHIATRY & NEUROLOGY

## 2024-11-23 PROCEDURE — 97530 THERAPEUTIC ACTIVITIES: CPT

## 2024-11-23 PROCEDURE — 6370000000 HC RX 637 (ALT 250 FOR IP)

## 2024-11-23 PROCEDURE — 94760 N-INVAS EAR/PLS OXIMETRY 1: CPT

## 2024-11-23 PROCEDURE — 99232 SBSQ HOSP IP/OBS MODERATE 35: CPT | Performed by: PSYCHIATRY & NEUROLOGY

## 2024-11-23 PROCEDURE — 97116 GAIT TRAINING THERAPY: CPT

## 2024-11-23 PROCEDURE — 92610 EVALUATE SWALLOWING FUNCTION: CPT

## 2024-11-23 PROCEDURE — 94640 AIRWAY INHALATION TREATMENT: CPT

## 2024-11-23 RX ADMIN — FUROSEMIDE 40 MG: 40 TABLET ORAL at 08:43

## 2024-11-23 RX ADMIN — INSULIN LISPRO 2 UNITS: 100 INJECTION, SOLUTION INTRAVENOUS; SUBCUTANEOUS at 20:09

## 2024-11-23 RX ADMIN — APIXABAN 2.5 MG: 2.5 TABLET, FILM COATED ORAL at 19:51

## 2024-11-23 RX ADMIN — ARFORMOTEROL TARTRATE 15 MCG: 15 SOLUTION RESPIRATORY (INHALATION) at 07:10

## 2024-11-23 RX ADMIN — IPRATROPIUM BROMIDE 0.5 MG: 0.5 SOLUTION RESPIRATORY (INHALATION) at 18:30

## 2024-11-23 RX ADMIN — CEFDINIR 300 MG: 300 CAPSULE ORAL at 08:43

## 2024-11-23 RX ADMIN — APIXABAN 2.5 MG: 2.5 TABLET, FILM COATED ORAL at 08:42

## 2024-11-23 RX ADMIN — METOPROLOL SUCCINATE 100 MG: 50 TABLET, EXTENDED RELEASE ORAL at 08:43

## 2024-11-23 RX ADMIN — TAMSULOSIN HYDROCHLORIDE 0.4 MG: 0.4 CAPSULE ORAL at 08:43

## 2024-11-23 RX ADMIN — SODIUM CHLORIDE, PRESERVATIVE FREE 10 ML: 5 INJECTION INTRAVENOUS at 19:51

## 2024-11-23 RX ADMIN — GUAIFENESIN 600 MG: 600 TABLET ORAL at 19:51

## 2024-11-23 RX ADMIN — SODIUM CHLORIDE, PRESERVATIVE FREE 10 ML: 5 INJECTION INTRAVENOUS at 08:44

## 2024-11-23 RX ADMIN — SENNOSIDES AND DOCUSATE SODIUM 2 TABLET: 8.6; 5 TABLET ORAL at 21:00

## 2024-11-23 RX ADMIN — Medication 5 MG: at 19:50

## 2024-11-23 RX ADMIN — CEFDINIR 300 MG: 300 CAPSULE ORAL at 19:50

## 2024-11-23 RX ADMIN — SENNOSIDES AND DOCUSATE SODIUM 2 TABLET: 8.6; 5 TABLET ORAL at 08:42

## 2024-11-23 RX ADMIN — ARFORMOTEROL TARTRATE 15 MCG: 15 SOLUTION RESPIRATORY (INHALATION) at 18:30

## 2024-11-23 RX ADMIN — ATORVASTATIN CALCIUM 20 MG: 20 TABLET, FILM COATED ORAL at 08:43

## 2024-11-23 RX ADMIN — PRIMIDONE 50 MG: 50 TABLET ORAL at 19:50

## 2024-11-23 RX ADMIN — MONTELUKAST 10 MG: 10 TABLET, FILM COATED ORAL at 19:50

## 2024-11-23 RX ADMIN — PANTOPRAZOLE SODIUM 40 MG: 40 TABLET, DELAYED RELEASE ORAL at 05:42

## 2024-11-23 RX ADMIN — GUAIFENESIN 600 MG: 600 TABLET ORAL at 08:42

## 2024-11-23 RX ADMIN — IPRATROPIUM BROMIDE 0.5 MG: 0.5 SOLUTION RESPIRATORY (INHALATION) at 07:10

## 2024-11-23 RX ADMIN — CETIRIZINE HYDROCHLORIDE 10 MG: 10 TABLET, FILM COATED ORAL at 08:43

## 2024-11-23 RX ADMIN — METOPROLOL SUCCINATE 100 MG: 50 TABLET, EXTENDED RELEASE ORAL at 19:51

## 2024-11-23 RX ADMIN — DILTIAZEM HYDROCHLORIDE 120 MG: 120 CAPSULE, COATED, EXTENDED RELEASE ORAL at 08:43

## 2024-11-23 RX ADMIN — INSULIN LISPRO 1 UNITS: 100 INJECTION, SOLUTION INTRAVENOUS; SUBCUTANEOUS at 12:17

## 2024-11-23 RX ADMIN — TRAZODONE HYDROCHLORIDE 100 MG: 100 TABLET ORAL at 19:54

## 2024-11-23 RX ADMIN — PRIMIDONE 50 MG: 50 TABLET ORAL at 08:43

## 2024-11-23 RX ADMIN — IPRATROPIUM BROMIDE 0.5 MG: 0.5 SOLUTION RESPIRATORY (INHALATION) at 10:57

## 2024-11-23 ASSESSMENT — PAIN SCALES - GENERAL: PAINLEVEL_OUTOF10: 0

## 2024-11-24 LAB
GLUCOSE BLD-MCNC: 171 MG/DL (ref 70–99)
GLUCOSE BLD-MCNC: 182 MG/DL (ref 70–99)
GLUCOSE BLD-MCNC: 213 MG/DL (ref 70–99)
GLUCOSE BLD-MCNC: 295 MG/DL (ref 70–99)
PERFORMED ON: ABNORMAL

## 2024-11-24 PROCEDURE — 6370000000 HC RX 637 (ALT 250 FOR IP)

## 2024-11-24 PROCEDURE — 82962 GLUCOSE BLOOD TEST: CPT

## 2024-11-24 PROCEDURE — 94640 AIRWAY INHALATION TREATMENT: CPT

## 2024-11-24 PROCEDURE — 2580000003 HC RX 258

## 2024-11-24 PROCEDURE — 6360000002 HC RX W HCPCS

## 2024-11-24 PROCEDURE — 1180000000 HC REHAB R&B

## 2024-11-24 PROCEDURE — 94760 N-INVAS EAR/PLS OXIMETRY 1: CPT

## 2024-11-24 PROCEDURE — 6370000000 HC RX 637 (ALT 250 FOR IP): Performed by: PSYCHIATRY & NEUROLOGY

## 2024-11-24 RX ADMIN — CEFDINIR 300 MG: 300 CAPSULE ORAL at 20:46

## 2024-11-24 RX ADMIN — PRIMIDONE 50 MG: 50 TABLET ORAL at 20:46

## 2024-11-24 RX ADMIN — SENNOSIDES AND DOCUSATE SODIUM 2 TABLET: 8.6; 5 TABLET ORAL at 07:28

## 2024-11-24 RX ADMIN — ATORVASTATIN CALCIUM 20 MG: 20 TABLET, FILM COATED ORAL at 07:29

## 2024-11-24 RX ADMIN — PRIMIDONE 50 MG: 50 TABLET ORAL at 07:29

## 2024-11-24 RX ADMIN — METOPROLOL SUCCINATE 100 MG: 50 TABLET, EXTENDED RELEASE ORAL at 20:46

## 2024-11-24 RX ADMIN — INSULIN LISPRO 1 UNITS: 100 INJECTION, SOLUTION INTRAVENOUS; SUBCUTANEOUS at 07:37

## 2024-11-24 RX ADMIN — SODIUM CHLORIDE, PRESERVATIVE FREE 10 ML: 5 INJECTION INTRAVENOUS at 07:33

## 2024-11-24 RX ADMIN — DILTIAZEM HYDROCHLORIDE 120 MG: 120 CAPSULE, COATED, EXTENDED RELEASE ORAL at 07:29

## 2024-11-24 RX ADMIN — ARFORMOTEROL TARTRATE 15 MCG: 15 SOLUTION RESPIRATORY (INHALATION) at 18:14

## 2024-11-24 RX ADMIN — GUAIFENESIN 600 MG: 600 TABLET ORAL at 20:45

## 2024-11-24 RX ADMIN — GUAIFENESIN 600 MG: 600 TABLET ORAL at 07:29

## 2024-11-24 RX ADMIN — MONTELUKAST 10 MG: 10 TABLET, FILM COATED ORAL at 20:46

## 2024-11-24 RX ADMIN — APIXABAN 2.5 MG: 2.5 TABLET, FILM COATED ORAL at 07:29

## 2024-11-24 RX ADMIN — INSULIN LISPRO 1 UNITS: 100 INJECTION, SOLUTION INTRAVENOUS; SUBCUTANEOUS at 16:44

## 2024-11-24 RX ADMIN — IPRATROPIUM BROMIDE 0.5 MG: 0.5 SOLUTION RESPIRATORY (INHALATION) at 06:51

## 2024-11-24 RX ADMIN — APIXABAN 2.5 MG: 2.5 TABLET, FILM COATED ORAL at 20:46

## 2024-11-24 RX ADMIN — TAMSULOSIN HYDROCHLORIDE 0.4 MG: 0.4 CAPSULE ORAL at 07:28

## 2024-11-24 RX ADMIN — PANTOPRAZOLE SODIUM 40 MG: 40 TABLET, DELAYED RELEASE ORAL at 05:44

## 2024-11-24 RX ADMIN — ARFORMOTEROL TARTRATE 15 MCG: 15 SOLUTION RESPIRATORY (INHALATION) at 06:51

## 2024-11-24 RX ADMIN — TRAZODONE HYDROCHLORIDE 100 MG: 100 TABLET ORAL at 20:46

## 2024-11-24 RX ADMIN — IPRATROPIUM BROMIDE 0.5 MG: 0.5 SOLUTION RESPIRATORY (INHALATION) at 18:14

## 2024-11-24 RX ADMIN — CETIRIZINE HYDROCHLORIDE 10 MG: 10 TABLET, FILM COATED ORAL at 07:28

## 2024-11-24 RX ADMIN — CEFDINIR 300 MG: 300 CAPSULE ORAL at 07:28

## 2024-11-24 RX ADMIN — SENNOSIDES AND DOCUSATE SODIUM 2 TABLET: 8.6; 5 TABLET ORAL at 20:46

## 2024-11-24 RX ADMIN — IPRATROPIUM BROMIDE 0.5 MG: 0.5 SOLUTION RESPIRATORY (INHALATION) at 11:11

## 2024-11-24 RX ADMIN — Medication 5 MG: at 20:45

## 2024-11-24 RX ADMIN — IPRATROPIUM BROMIDE 0.5 MG: 0.5 SOLUTION RESPIRATORY (INHALATION) at 15:14

## 2024-11-24 RX ADMIN — METOPROLOL SUCCINATE 100 MG: 50 TABLET, EXTENDED RELEASE ORAL at 07:28

## 2024-11-24 RX ADMIN — SODIUM CHLORIDE, PRESERVATIVE FREE 10 ML: 5 INJECTION INTRAVENOUS at 20:50

## 2024-11-24 RX ADMIN — INSULIN LISPRO 2 UNITS: 100 INJECTION, SOLUTION INTRAVENOUS; SUBCUTANEOUS at 20:46

## 2024-11-25 LAB
ALBUMIN SERPL-MCNC: 3.2 G/DL (ref 3.5–5.2)
ALP SERPL-CCNC: 96 U/L (ref 40–129)
ALT SERPL-CCNC: 18 U/L (ref 5–41)
ANION GAP SERPL CALCULATED.3IONS-SCNC: 12 MMOL/L (ref 7–19)
AST SERPL-CCNC: 18 U/L (ref 5–40)
BASOPHILS # BLD: 0.1 K/UL (ref 0–0.2)
BASOPHILS NFR BLD: 0.9 % (ref 0–1)
BILIRUB SERPL-MCNC: <0.2 MG/DL (ref 0.2–1.2)
BUN SERPL-MCNC: 40 MG/DL (ref 8–23)
CALCIUM SERPL-MCNC: 8.3 MG/DL (ref 8.8–10.2)
CHLORIDE SERPL-SCNC: 102 MMOL/L (ref 98–111)
CO2 SERPL-SCNC: 24 MMOL/L (ref 22–29)
CREAT SERPL-MCNC: 1.8 MG/DL (ref 0.7–1.2)
EOSINOPHIL # BLD: 0.3 K/UL (ref 0–0.6)
EOSINOPHIL NFR BLD: 2.7 % (ref 0–5)
ERYTHROCYTE [DISTWIDTH] IN BLOOD BY AUTOMATED COUNT: 14.2 % (ref 11.5–14.5)
GLUCOSE BLD-MCNC: 191 MG/DL (ref 70–99)
GLUCOSE BLD-MCNC: 194 MG/DL (ref 70–99)
GLUCOSE BLD-MCNC: 201 MG/DL (ref 70–99)
GLUCOSE BLD-MCNC: 273 MG/DL (ref 70–99)
GLUCOSE SERPL-MCNC: 165 MG/DL (ref 70–99)
HCT VFR BLD AUTO: 27 % (ref 42–52)
HGB BLD-MCNC: 8.5 G/DL (ref 14–18)
IMM GRANULOCYTES # BLD: 0.1 K/UL
LYMPHOCYTES # BLD: 1.4 K/UL (ref 1.1–4.5)
LYMPHOCYTES NFR BLD: 14 % (ref 20–40)
MCH RBC QN AUTO: 30.4 PG (ref 27–31)
MCHC RBC AUTO-ENTMCNC: 31.5 G/DL (ref 33–37)
MCV RBC AUTO: 96.4 FL (ref 80–94)
MONOCYTES # BLD: 0.6 K/UL (ref 0–0.9)
MONOCYTES NFR BLD: 5.8 % (ref 0–10)
NEUTROPHILS # BLD: 7.7 K/UL (ref 1.5–7.5)
NEUTS SEG NFR BLD: 75.4 % (ref 50–65)
PERFORMED ON: ABNORMAL
PLATELET # BLD AUTO: 385 K/UL (ref 130–400)
PMV BLD AUTO: 9.2 FL (ref 9.4–12.4)
POTASSIUM SERPL-SCNC: 4.9 MMOL/L (ref 3.5–5)
PROT SERPL-MCNC: 6.4 G/DL (ref 6.4–8.3)
RBC # BLD AUTO: 2.8 M/UL (ref 4.7–6.1)
SODIUM SERPL-SCNC: 138 MMOL/L (ref 136–145)
WBC # BLD AUTO: 10.2 K/UL (ref 4.8–10.8)

## 2024-11-25 PROCEDURE — 97110 THERAPEUTIC EXERCISES: CPT

## 2024-11-25 PROCEDURE — 6360000002 HC RX W HCPCS

## 2024-11-25 PROCEDURE — 97530 THERAPEUTIC ACTIVITIES: CPT

## 2024-11-25 PROCEDURE — 1180000000 HC REHAB R&B

## 2024-11-25 PROCEDURE — 97535 SELF CARE MNGMENT TRAINING: CPT

## 2024-11-25 PROCEDURE — 80053 COMPREHEN METABOLIC PANEL: CPT

## 2024-11-25 PROCEDURE — 82962 GLUCOSE BLOOD TEST: CPT

## 2024-11-25 PROCEDURE — 97116 GAIT TRAINING THERAPY: CPT

## 2024-11-25 PROCEDURE — 94640 AIRWAY INHALATION TREATMENT: CPT

## 2024-11-25 PROCEDURE — 94760 N-INVAS EAR/PLS OXIMETRY 1: CPT

## 2024-11-25 PROCEDURE — 99232 SBSQ HOSP IP/OBS MODERATE 35: CPT | Performed by: PSYCHIATRY & NEUROLOGY

## 2024-11-25 PROCEDURE — 6370000000 HC RX 637 (ALT 250 FOR IP)

## 2024-11-25 PROCEDURE — 2580000003 HC RX 258

## 2024-11-25 PROCEDURE — 85025 COMPLETE CBC W/AUTO DIFF WBC: CPT

## 2024-11-25 PROCEDURE — 36415 COLL VENOUS BLD VENIPUNCTURE: CPT

## 2024-11-25 RX ADMIN — IPRATROPIUM BROMIDE 0.5 MG: 0.5 SOLUTION RESPIRATORY (INHALATION) at 07:13

## 2024-11-25 RX ADMIN — METOPROLOL SUCCINATE 100 MG: 50 TABLET, EXTENDED RELEASE ORAL at 20:41

## 2024-11-25 RX ADMIN — IPRATROPIUM BROMIDE 0.5 MG: 0.5 SOLUTION RESPIRATORY (INHALATION) at 11:17

## 2024-11-25 RX ADMIN — INSULIN LISPRO 1 UNITS: 100 INJECTION, SOLUTION INTRAVENOUS; SUBCUTANEOUS at 07:39

## 2024-11-25 RX ADMIN — SODIUM CHLORIDE, PRESERVATIVE FREE 5 ML: 5 INJECTION INTRAVENOUS at 20:45

## 2024-11-25 RX ADMIN — FUROSEMIDE 40 MG: 40 TABLET ORAL at 07:38

## 2024-11-25 RX ADMIN — SENNOSIDES AND DOCUSATE SODIUM 2 TABLET: 8.6; 5 TABLET ORAL at 20:41

## 2024-11-25 RX ADMIN — SODIUM CHLORIDE, PRESERVATIVE FREE 10 ML: 5 INJECTION INTRAVENOUS at 07:39

## 2024-11-25 RX ADMIN — APIXABAN 2.5 MG: 2.5 TABLET, FILM COATED ORAL at 20:41

## 2024-11-25 RX ADMIN — INSULIN LISPRO 2 UNITS: 100 INJECTION, SOLUTION INTRAVENOUS; SUBCUTANEOUS at 20:42

## 2024-11-25 RX ADMIN — ARFORMOTEROL TARTRATE 15 MCG: 15 SOLUTION RESPIRATORY (INHALATION) at 18:25

## 2024-11-25 RX ADMIN — METOPROLOL SUCCINATE 100 MG: 50 TABLET, EXTENDED RELEASE ORAL at 07:38

## 2024-11-25 RX ADMIN — APIXABAN 2.5 MG: 2.5 TABLET, FILM COATED ORAL at 07:39

## 2024-11-25 RX ADMIN — ATORVASTATIN CALCIUM 20 MG: 20 TABLET, FILM COATED ORAL at 07:38

## 2024-11-25 RX ADMIN — INSULIN LISPRO 1 UNITS: 100 INJECTION, SOLUTION INTRAVENOUS; SUBCUTANEOUS at 16:55

## 2024-11-25 RX ADMIN — DILTIAZEM HYDROCHLORIDE 120 MG: 120 CAPSULE, COATED, EXTENDED RELEASE ORAL at 07:39

## 2024-11-25 RX ADMIN — SENNOSIDES AND DOCUSATE SODIUM 2 TABLET: 8.6; 5 TABLET ORAL at 07:39

## 2024-11-25 RX ADMIN — CEFDINIR 300 MG: 300 CAPSULE ORAL at 07:38

## 2024-11-25 RX ADMIN — CEFDINIR 300 MG: 300 CAPSULE ORAL at 20:41

## 2024-11-25 RX ADMIN — PANTOPRAZOLE SODIUM 40 MG: 40 TABLET, DELAYED RELEASE ORAL at 05:44

## 2024-11-25 RX ADMIN — MONTELUKAST 10 MG: 10 TABLET, FILM COATED ORAL at 20:41

## 2024-11-25 RX ADMIN — INSULIN LISPRO 1 UNITS: 100 INJECTION, SOLUTION INTRAVENOUS; SUBCUTANEOUS at 12:08

## 2024-11-25 RX ADMIN — GUAIFENESIN 600 MG: 600 TABLET ORAL at 20:41

## 2024-11-25 RX ADMIN — Medication 5 MG: at 20:41

## 2024-11-25 RX ADMIN — IPRATROPIUM BROMIDE 0.5 MG: 0.5 SOLUTION RESPIRATORY (INHALATION) at 18:25

## 2024-11-25 RX ADMIN — GUAIFENESIN 600 MG: 600 TABLET ORAL at 07:38

## 2024-11-25 RX ADMIN — CETIRIZINE HYDROCHLORIDE 10 MG: 10 TABLET, FILM COATED ORAL at 07:38

## 2024-11-25 RX ADMIN — PRIMIDONE 50 MG: 50 TABLET ORAL at 20:41

## 2024-11-25 RX ADMIN — TAMSULOSIN HYDROCHLORIDE 0.4 MG: 0.4 CAPSULE ORAL at 07:38

## 2024-11-25 RX ADMIN — ARFORMOTEROL TARTRATE 15 MCG: 15 SOLUTION RESPIRATORY (INHALATION) at 07:13

## 2024-11-25 RX ADMIN — PRIMIDONE 50 MG: 50 TABLET ORAL at 07:39

## 2024-11-25 NOTE — PATIENT CARE CONFERENCE
[facility] ACUTE INPATIENT REHABILITATION  TEAM CONFERENCE NOTE    Date: 2024  Patient Name: Jose Tate        MRN: [id number]    : 1940  (84 y.o.)  Gender: male      Diagnosis: Bacteria pneumonia      PHYSICAL THERAPY  GROSS ASSESSMENT       BED MOBILITY  Bed mobility  Rolling to Left: Independent  Rolling to Right: Independent  Supine to Sit: Independent  Sit to Supine: Independent  Scooting: Independent  Bed Mobility Comments: all using bed rails       TRANSFERS  Transfers  Sit to Stand: Stand by assistance, Contact guard assistance  Stand to Sit: Stand by assistance, Contact guard assistance  Bed to Chair: Stand by assistance (stand turn to w/c)  Car Transfer: Stand by assistance (with rollator)  WHEELCHAIR PROPULSION  Propulsion 1  Propulsion: Manual  Level: Level Tile  Method: SANA MCFADDEN  Level of Assistance: Contact guard assistance  Description/ Details: constantly veers to left, LUE weaker than RLE  Distance: 125  AMBULATION  Ambulation  Surface: Level tile  Device: Rollator  Assistance: Stand by assistance  Quality of Gait: pace and posture improved briefly with VC to stand erect and keep rollator closer; pt remarks \"everyone tells me that\"  Gait Deviations: Decreased step length, Decreased step height  Distance: 200'  Comments: multiple turns L and R  More Ambulation?: Yes  STAIRS     GOALS:  Short Term Goals  Time Frame for Short Term Goals: 1 Week  Short Term Goal 1: Bed mobility INDEP with or without rail  Short Term Goal 2: Transfer surface to surface SBA  Short Term Goal 3: Ambulate 125 FT SBA with RW  Short Term Goal 4: Car transfer SBA  Short Term Goal 5: Wheelchair propulsion 100FT CGA    Long Term Goals  Time Frame for Long Term Goals : 3 weeks  Long Term Goal 1: Transfer surface to surface INDEP  Long Term Goal 2: Ambulate 150 FT INDEP with RW  Long Term Goal 3: Car transfer INDEP  Long Term Goal 4: Wheelchair propulsion 150 FT INDEP  Long Term Goal 5: Sit to stand

## 2024-11-26 LAB
GLUCOSE BLD-MCNC: 170 MG/DL (ref 70–99)
GLUCOSE BLD-MCNC: 188 MG/DL (ref 70–99)
GLUCOSE BLD-MCNC: 221 MG/DL (ref 70–99)
GLUCOSE BLD-MCNC: 280 MG/DL (ref 70–99)
PERFORMED ON: ABNORMAL

## 2024-11-26 PROCEDURE — 97110 THERAPEUTIC EXERCISES: CPT

## 2024-11-26 PROCEDURE — 1180000000 HC REHAB R&B

## 2024-11-26 PROCEDURE — 6370000000 HC RX 637 (ALT 250 FOR IP)

## 2024-11-26 PROCEDURE — 6360000002 HC RX W HCPCS

## 2024-11-26 PROCEDURE — 99232 SBSQ HOSP IP/OBS MODERATE 35: CPT | Performed by: PSYCHIATRY & NEUROLOGY

## 2024-11-26 PROCEDURE — 94760 N-INVAS EAR/PLS OXIMETRY 1: CPT

## 2024-11-26 PROCEDURE — 97535 SELF CARE MNGMENT TRAINING: CPT

## 2024-11-26 PROCEDURE — 97116 GAIT TRAINING THERAPY: CPT

## 2024-11-26 PROCEDURE — 82962 GLUCOSE BLOOD TEST: CPT

## 2024-11-26 PROCEDURE — 94640 AIRWAY INHALATION TREATMENT: CPT

## 2024-11-26 PROCEDURE — 97530 THERAPEUTIC ACTIVITIES: CPT

## 2024-11-26 RX ORDER — PRIMIDONE 50 MG/1
50 TABLET ORAL 2 TIMES DAILY
Qty: 60 TABLET | Refills: 0 | Status: SHIPPED | OUTPATIENT
Start: 2024-11-26

## 2024-11-26 RX ORDER — GUAIFENESIN 600 MG/1
600 TABLET, EXTENDED RELEASE ORAL 2 TIMES DAILY
Qty: 60 TABLET | Refills: 0 | Status: SHIPPED | OUTPATIENT
Start: 2024-11-26

## 2024-11-26 RX ORDER — SENNA AND DOCUSATE SODIUM 50; 8.6 MG/1; MG/1
2 TABLET, FILM COATED ORAL 2 TIMES DAILY
Qty: 120 TABLET | Refills: 0 | Status: SHIPPED | OUTPATIENT
Start: 2024-11-26

## 2024-11-26 RX ORDER — CETIRIZINE HYDROCHLORIDE 10 MG/1
10 TABLET ORAL DAILY
Qty: 30 TABLET | Refills: 0 | Status: SHIPPED | OUTPATIENT
Start: 2024-11-26

## 2024-11-26 RX ORDER — DILTIAZEM HYDROCHLORIDE 120 MG/1
120 CAPSULE, COATED, EXTENDED RELEASE ORAL DAILY
Qty: 30 CAPSULE | Refills: 0 | Status: SHIPPED | OUTPATIENT
Start: 2024-11-26

## 2024-11-26 RX ORDER — ATORVASTATIN CALCIUM 20 MG/1
20 TABLET, FILM COATED ORAL DAILY
Qty: 30 TABLET | Refills: 0 | Status: SHIPPED | OUTPATIENT
Start: 2024-11-26

## 2024-11-26 RX ORDER — MONTELUKAST SODIUM 10 MG/1
10 TABLET ORAL NIGHTLY
Qty: 30 TABLET | Refills: 0 | Status: SHIPPED | OUTPATIENT
Start: 2024-11-26

## 2024-11-26 RX ORDER — TAMSULOSIN HYDROCHLORIDE 0.4 MG/1
0.4 CAPSULE ORAL DAILY
Qty: 30 CAPSULE | Refills: 0 | Status: SHIPPED | OUTPATIENT
Start: 2024-11-26

## 2024-11-26 RX ORDER — MECOBALAMIN 5000 MCG
5 TABLET,DISINTEGRATING ORAL NIGHTLY
Qty: 30 TABLET | Refills: 0 | Status: SHIPPED | OUTPATIENT
Start: 2024-11-26

## 2024-11-26 RX ORDER — ARFORMOTEROL TARTRATE 15 UG/2ML
15 SOLUTION RESPIRATORY (INHALATION)
Qty: 120 ML | Refills: 0 | Status: SHIPPED | OUTPATIENT
Start: 2024-11-26

## 2024-11-26 RX ORDER — TRAZODONE HYDROCHLORIDE 100 MG/1
100 TABLET ORAL NIGHTLY PRN
Qty: 30 TABLET | Refills: 0 | Status: SHIPPED | OUTPATIENT
Start: 2024-11-26

## 2024-11-26 RX ORDER — METOPROLOL SUCCINATE 100 MG/1
100 TABLET, EXTENDED RELEASE ORAL 2 TIMES DAILY
Qty: 60 TABLET | Refills: 0 | Status: SHIPPED | OUTPATIENT
Start: 2024-11-26

## 2024-11-26 RX ORDER — PANTOPRAZOLE SODIUM 40 MG/1
40 TABLET, DELAYED RELEASE ORAL
Qty: 30 TABLET | Refills: 0 | Status: SHIPPED | OUTPATIENT
Start: 2024-11-27

## 2024-11-26 RX ORDER — FUROSEMIDE 40 MG/1
40 TABLET ORAL EVERY OTHER DAY
Qty: 30 TABLET | Refills: 0 | Status: SHIPPED | OUTPATIENT
Start: 2024-11-27

## 2024-11-26 RX ADMIN — IPRATROPIUM BROMIDE 0.5 MG: 0.5 SOLUTION RESPIRATORY (INHALATION) at 07:21

## 2024-11-26 RX ADMIN — CEFDINIR 300 MG: 300 CAPSULE ORAL at 21:27

## 2024-11-26 RX ADMIN — PRIMIDONE 50 MG: 50 TABLET ORAL at 21:27

## 2024-11-26 RX ADMIN — DILTIAZEM HYDROCHLORIDE 120 MG: 120 CAPSULE, COATED, EXTENDED RELEASE ORAL at 10:08

## 2024-11-26 RX ADMIN — TAMSULOSIN HYDROCHLORIDE 0.4 MG: 0.4 CAPSULE ORAL at 10:08

## 2024-11-26 RX ADMIN — GUAIFENESIN 600 MG: 600 TABLET ORAL at 21:28

## 2024-11-26 RX ADMIN — GUAIFENESIN 600 MG: 600 TABLET ORAL at 10:08

## 2024-11-26 RX ADMIN — ARFORMOTEROL TARTRATE 15 MCG: 15 SOLUTION RESPIRATORY (INHALATION) at 18:12

## 2024-11-26 RX ADMIN — CETIRIZINE HYDROCHLORIDE 10 MG: 10 TABLET, FILM COATED ORAL at 10:08

## 2024-11-26 RX ADMIN — SENNOSIDES AND DOCUSATE SODIUM 2 TABLET: 8.6; 5 TABLET ORAL at 21:27

## 2024-11-26 RX ADMIN — PRIMIDONE 50 MG: 50 TABLET ORAL at 10:08

## 2024-11-26 RX ADMIN — CEFDINIR 300 MG: 300 CAPSULE ORAL at 10:08

## 2024-11-26 RX ADMIN — IPRATROPIUM BROMIDE 0.5 MG: 0.5 SOLUTION RESPIRATORY (INHALATION) at 18:12

## 2024-11-26 RX ADMIN — METOPROLOL SUCCINATE 100 MG: 50 TABLET, EXTENDED RELEASE ORAL at 21:28

## 2024-11-26 RX ADMIN — IPRATROPIUM BROMIDE 0.5 MG: 0.5 SOLUTION RESPIRATORY (INHALATION) at 11:28

## 2024-11-26 RX ADMIN — SENNOSIDES AND DOCUSATE SODIUM 2 TABLET: 8.6; 5 TABLET ORAL at 10:08

## 2024-11-26 RX ADMIN — METOPROLOL SUCCINATE 100 MG: 50 TABLET, EXTENDED RELEASE ORAL at 10:08

## 2024-11-26 RX ADMIN — ARFORMOTEROL TARTRATE 15 MCG: 15 SOLUTION RESPIRATORY (INHALATION) at 07:21

## 2024-11-26 RX ADMIN — INSULIN LISPRO 2 UNITS: 100 INJECTION, SOLUTION INTRAVENOUS; SUBCUTANEOUS at 21:34

## 2024-11-26 RX ADMIN — IPRATROPIUM BROMIDE 0.5 MG: 0.5 SOLUTION RESPIRATORY (INHALATION) at 15:38

## 2024-11-26 RX ADMIN — ATORVASTATIN CALCIUM 20 MG: 20 TABLET, FILM COATED ORAL at 10:08

## 2024-11-26 RX ADMIN — APIXABAN 2.5 MG: 2.5 TABLET, FILM COATED ORAL at 10:08

## 2024-11-26 RX ADMIN — APIXABAN 2.5 MG: 2.5 TABLET, FILM COATED ORAL at 21:28

## 2024-11-26 RX ADMIN — MONTELUKAST 10 MG: 10 TABLET, FILM COATED ORAL at 21:27

## 2024-11-26 RX ADMIN — PANTOPRAZOLE SODIUM 40 MG: 40 TABLET, DELAYED RELEASE ORAL at 05:55

## 2024-11-26 RX ADMIN — Medication 5 MG: at 21:27

## 2024-11-26 NOTE — DISCHARGE INSTRUCTIONS
Discharge Instructions      Patient Instructions:   Home  Therapy orders:PT/OT/Nursing   Discharge lab work: none  Code status: Partial Code   Activity: activity as tolerated  Diet: ADULT DIET; Regular; Dislikes beef stroganoff    Wound Care: as directed  Equipment: as per therapy

## 2024-11-26 NOTE — DISCHARGE SUMMARY
Neurology Discharge Summary     Patient Identification:  Jose Tate is a 84 y.o. male.  :  1940  Admit Date:  2024  Discharge date : 2024   Attending Provider: Kwame Rogers MD     Account Number: [id number]                                   Admission Diagnoses:   Acute respiratory failure [J96.00]    Discharge Diagnoses:  Principal Problem:    Acute respiratory failure with hypoxia  Active Problems:    Coronary artery disease involving native heart with angina pectoris, unspecified vessel or lesion type (HCC)    Hypercholesterolemia    GERD (gastroesophageal reflux disease)    COPD (chronic obstructive pulmonary disease) (HCC)    Essential hypertension    Type 2 diabetes mellitus without complication (HCC)    Adenocarcinoma of right lung (HCC)    Iron deficiency anemia    Atrial fibrillation (HCC)    Shortness of breath    Pacemaker    Community acquired bacterial pneumonia    Essential tremor    BPH (benign prostatic hyperplasia)    Acute respiratory failure  Resolved Problems:    * No resolved hospital problems. *      Discharge Medications:    Current Discharge Medication List             Details   arformoterol tartrate (BROVANA) 15 MCG/2ML NEBU Take 2 mLs by nebulization in the morning and 2 mLs in the evening.  Qty: 120 mL, Refills: 0      ipratropium (ATROVENT) 0.02 % nebulizer solution Take 2.5 mLs by nebulization 4 times daily  Qty: 2.5 mL, Refills: 0      traZODone (DESYREL) 100 MG tablet Take 1 tablet by mouth nightly as needed for Sleep  Qty: 30 tablet, Refills: 0      guaiFENesin (MUCINEX) 600 MG extended release tablet Take 1 tablet by mouth 2 times daily  Qty: 60 tablet, Refills: 0      melatonin 5 MG TBDP disintegrating tablet Take 1 tablet by mouth nightly  Qty: 30 tablet, Refills: 0                Details   montelukast (SINGULAIR) 10 MG tablet Take 1 tablet by mouth nightly  Qty: 30 tablet, Refills: 0      apixaban (ELIQUIS) 2.5 MG TABS tablet Take 1 tablet by

## 2024-11-26 NOTE — CARE COORDINATION
[facility] Inpatient Rehabilitation Unit  Test for Patient Atkinson in the Areas of Transfers/Ambulation    Ambulation/Transfers   Independent ambulation in room with assistive device:  Yes     -Device Type:  Rollator  Independent transfers from wheelchair to surface in room:  No     Bathroom Transfers  Independent transfers to toilet: Yes   Independent transfers for shower:  No     Ambulation in hallway  Patient able to ambulate in hallway with device:   No          Inpatient Rehabilitation Nursing and Therapies feel as though the patient qualifies for an acute rehabilitation test for independence in the areas of transfers and ambulation prior to discharging from Inpatient Rehabilitation Unit at .  This test for independence in the areas of transfers and ambulation must be agreed upon by the patient's physician, nurse, and therapists.        Nurse Approval:  Electronically signed by KAILEE Crook, RN on 11/26/2024 at 1:21 PM      Physical Therapist Approval:  Electronically signed by Malini Campuzano PTA on 11/26/2024 at 1:22 PM     Occupational Therapist Approval: Electronically signed by JONATHAN Melton on 11/26/2024 at 12:29 PM

## 2024-11-27 VITALS
SYSTOLIC BLOOD PRESSURE: 157 MMHG | TEMPERATURE: 97.9 F | OXYGEN SATURATION: 96 % | DIASTOLIC BLOOD PRESSURE: 76 MMHG | HEIGHT: 70 IN | HEART RATE: 99 BPM | BODY MASS INDEX: 26.58 KG/M2 | RESPIRATION RATE: 18 BRPM | WEIGHT: 185.7 LBS

## 2024-11-27 LAB
GLUCOSE BLD-MCNC: 192 MG/DL (ref 70–99)
PERFORMED ON: ABNORMAL

## 2024-11-27 PROCEDURE — 94640 AIRWAY INHALATION TREATMENT: CPT

## 2024-11-27 PROCEDURE — 6360000002 HC RX W HCPCS

## 2024-11-27 PROCEDURE — 6370000000 HC RX 637 (ALT 250 FOR IP)

## 2024-11-27 PROCEDURE — 82962 GLUCOSE BLOOD TEST: CPT

## 2024-11-27 PROCEDURE — 99239 HOSP IP/OBS DSCHRG MGMT >30: CPT | Performed by: PSYCHIATRY & NEUROLOGY

## 2024-11-27 PROCEDURE — 94760 N-INVAS EAR/PLS OXIMETRY 1: CPT

## 2024-11-27 RX ADMIN — CETIRIZINE HYDROCHLORIDE 10 MG: 10 TABLET, FILM COATED ORAL at 09:50

## 2024-11-27 RX ADMIN — DILTIAZEM HYDROCHLORIDE 120 MG: 120 CAPSULE, COATED, EXTENDED RELEASE ORAL at 09:50

## 2024-11-27 RX ADMIN — FUROSEMIDE 40 MG: 40 TABLET ORAL at 09:50

## 2024-11-27 RX ADMIN — PRIMIDONE 50 MG: 50 TABLET ORAL at 09:50

## 2024-11-27 RX ADMIN — IPRATROPIUM BROMIDE 0.5 MG: 0.5 SOLUTION RESPIRATORY (INHALATION) at 06:17

## 2024-11-27 RX ADMIN — PANTOPRAZOLE SODIUM 40 MG: 40 TABLET, DELAYED RELEASE ORAL at 05:24

## 2024-11-27 RX ADMIN — SENNOSIDES AND DOCUSATE SODIUM 2 TABLET: 8.6; 5 TABLET ORAL at 09:50

## 2024-11-27 RX ADMIN — ARFORMOTEROL TARTRATE 15 MCG: 15 SOLUTION RESPIRATORY (INHALATION) at 06:17

## 2024-11-27 RX ADMIN — GUAIFENESIN 600 MG: 600 TABLET ORAL at 09:50

## 2024-11-27 RX ADMIN — ATORVASTATIN CALCIUM 20 MG: 20 TABLET, FILM COATED ORAL at 09:50

## 2024-11-27 RX ADMIN — METOPROLOL SUCCINATE 100 MG: 50 TABLET, EXTENDED RELEASE ORAL at 09:50

## 2024-11-27 RX ADMIN — APIXABAN 2.5 MG: 2.5 TABLET, FILM COATED ORAL at 09:50

## 2024-11-27 RX ADMIN — TAMSULOSIN HYDROCHLORIDE 0.4 MG: 0.4 CAPSULE ORAL at 09:50

## 2024-11-27 NOTE — DISCHARGE SUMMARY
Physical Therapy DISCHARGE Note  DATE:  2024  NAME:  Jose Tate  :  1940  (84 y.o.,male)  MRN:  [id number]    HEIGHT:  Height: 177.8 cm (5' 10\")  WEIGHT:  Weight - Scale: 84.2 kg (185 lb 11.2 oz)    PATIENT DIAGNOSIS(ES):    Diagnosis: Bacteria pneumonia    Additional Pertinent Hx: A-fib, GERD, hypercholesterolemia, essential tremor, BPH, T2DM.  RESTRICTIONS/PRECAUTIONS:    Restrictions/Precautions  Restrictions/Precautions: Modified Diet, Up Ad Rianna  Implants present? : Pacemaker     OVERALL  ORIENTATION STATUS:  Overall Orientation Status: Within Functional Limits  PAIN:  Pain Level: 0                    GROSS ASSESSMENT        POSTURE/BALANCE          ACTIVITY TOLERANCE  Activity Tolerance  Activity Tolerance: Patient limited by endurance, Patient tolerated treatment well      BED MOBILITY  Bed mobility  Rolling to Left: Independent  Rolling to Right: Independent  Supine to Sit: Independent  Sit to Supine: Independent  Scooting: Independent  Bed Mobility Comments: mat table with only one pillow and no rail        TRANSFERS  Transfers  Sit to Stand: Modified independent  Stand to Sit: Modified independent  Bed to Chair: Modified independent (rollator)  Car Transfer: Modified independent (stand step with rollator; verbalizes bottom in first; uses frame of car to pull up to and control descent)  Comment: not consistent with rollator brake managment       AMBULATION 1  Ambulation  Surface: Level tile  Device: Rollator  Assistance: Stand by assistance  Quality of Gait: emerging reciprocal pattern with improved self correction of walker proximity; seated rest with fatigue on rollator seat  Gait Deviations: Decreased step length, Decreased step height, Decreased head and trunk rotation  Distance: 150'  Comments: turns included  More Ambulation?: Yes    STAIRS  Stairs  # Steps : 8  Stairs Height: 4\"  Rails: Bilateral  Assistance: Contact guard assistance  Comment: instructed to use step to pattern for

## 2024-11-27 NOTE — DISCHARGE SUMMARY
Occupational Therapy Discharge Summary         Date: 2024  Patient Name: Jose Tate        MRN: [id number]    : 1940  (84 y.o.)  Gender: male      Diagnosis: Bacteria pneumonia  Restrictions/Precautions  Restrictions/Precautions: Modified Diet, Up Ad Rianna  Implants present? : Pacemaker      Discharge Date: 2024    UE Functioning:  BUE AROM WFL     Home Management:  Functional Mobility  Functional - Mobility Device: 4-Wheeled Walker  Activity: To/from bathroom, To/From therapy gym  Assist Level: Modified independent   Functional Mobility Comments: In pt. room.    Adaptive Equipment/DME Status:  Bathroom Equipment: Grab bars in shower  Home Equipment: Cane, Rollator, Walker - Rolling  Built in shower seat, handicap height toilet with GBs     Pain Assessment:   No pain        Remaining Problems:  Decreased functional mobility ; Decreased strength; Decreased endurance     STGs:  Short Term Goals  Time Frame for Short Term Goals: 1 week  Short Term Goal 1: Perform LB dressing with min A and AE as needed  Short Term Goal 2: Perform all aspects of toileting with SBA  Short Term Goal 3: Perform all aspects of bathing with CGA and AE as needed  Short Term Goal 4: Pt will perform 1-2 handed standing level home making task for 3 minutes with CGA  Short Term Goal 5: Pt will perform HEP with independence to improve overall performance during ADL routine  MET 5/5 Short Term Goals MET    LTGs:  Long Term Goals  Time Frame for Long Term Goals : 2 weeks  Long Term Goal 1: Perform LB dressing with SBA and AE as needed  Long Term Goal 2: Perform all aspects of toileting with supervision  Long Term Goal 3: Perform all aspects of bathing with SBA and AE as needed  Long Term Goal 4: Perform 1-2 handed standing level home making task for 5 minutes with SBA  Long Term Goal 5: Pt will verbalize DME needs  MET 5/5 Long Term Goals MET    Discharge Setting and Recommendations:  Home with Home Health OT     Discharge

## 2024-11-27 NOTE — PLAN OF CARE
Problem: Safety - Adult  Goal: Free from fall injury  11/21/2024 2222 by Eli Florian RN  Outcome: Progressing  11/21/2024 1321 by Debbie Sears RN  Outcome: Progressing     Problem: Chronic Conditions and Co-morbidities  Goal: Patient's chronic conditions and co-morbidity symptoms are monitored and maintained or improved  11/21/2024 2222 by Eli Florian RN  Outcome: Progressing  11/21/2024 1321 by Debbie Sears RN  Outcome: Progressing     Problem: Discharge Planning  Goal: Discharge to home or other facility with appropriate resources  11/21/2024 2222 by Eli Florian RN  Outcome: Progressing  11/21/2024 1321 by Debbie Sears RN  Outcome: Progressing     Problem: ABCDS Injury Assessment  Goal: Absence of physical injury  11/21/2024 2222 by Eli Florian RN  Outcome: Progressing  11/21/2024 1321 by Debbie Sears RN  Outcome: Progressing     
  Problem: Safety - Adult  Goal: Free from fall injury  11/22/2024 2257 by Eli Florian RN  Outcome: Progressing  11/22/2024 1303 by Debbie Sears RN  Outcome: Progressing     Problem: Chronic Conditions and Co-morbidities  Goal: Patient's chronic conditions and co-morbidity symptoms are monitored and maintained or improved  11/22/2024 2257 by Eli Florian RN  Outcome: Progressing  11/22/2024 1303 by Debbie Sears RN  Outcome: Progressing     Problem: Discharge Planning  Goal: Discharge to home or other facility with appropriate resources  11/22/2024 2257 by Eli Florian RN  Outcome: Progressing  11/22/2024 1303 by Debbie Sears RN  Outcome: Progressing     Problem: ABCDS Injury Assessment  Goal: Absence of physical injury  11/22/2024 2257 by Eli Florian RN  Outcome: Progressing  11/22/2024 1303 by Debbei Sears RN  Outcome: Progressing     
  Problem: Safety - Adult  Goal: Free from fall injury  11/23/2024 0954 by Caron Warren LPN  Outcome: Progressing  11/22/2024 2257 by Eli Florian, RN  Outcome: Progressing     Problem: Chronic Conditions and Co-morbidities  Goal: Patient's chronic conditions and co-morbidity symptoms are monitored and maintained or improved  11/23/2024 0954 by Caron Warren LPN  Outcome: Progressing  Flowsheets (Taken 11/23/2024 0857)  Care Plan - Patient's Chronic Conditions and Co-Morbidity Symptoms are Monitored and Maintained or Improved: Monitor and assess patient's chronic conditions and comorbid symptoms for stability, deterioration, or improvement  11/22/2024 2257 by Eli Florian, RN  Outcome: Progressing     Problem: Discharge Planning  Goal: Discharge to home or other facility with appropriate resources  11/23/2024 0954 by Caron Warren LPN  Outcome: Progressing  Flowsheets (Taken 11/23/2024 0857)  Discharge to home or other facility with appropriate resources: Refer to discharge planning if patient needs post-hospital services based on physician order or complex needs related to functional status, cognitive ability or social support system  11/22/2024 2257 by Eli Florian, RN  Outcome: Progressing     Problem: ABCDS Injury Assessment  Goal: Absence of physical injury  11/23/2024 0954 by Caron Warren LPN  Outcome: Progressing  11/22/2024 2257 by Eli Florian, RN  Outcome: Progressing     Problem: Neurosensory - Adult  Goal: Achieves stable or improved neurological status  Outcome: Progressing  Flowsheets (Taken 11/23/2024 0857)  Achieves stable or improved neurological status: Assess for and report changes in neurological status  Goal: Achieves maximal functionality and self care  Outcome: Progressing  Flowsheets (Taken 11/23/2024 0857)  Achieves maximal functionality and self care: Encourage and assist patient to increase activity and self care with guidance from 
  Problem: Safety - Adult  Goal: Free from fall injury  11/23/2024 2116 by Garima Tse, RN  Outcome: Progressing  11/23/2024 2112 by Garima Tse, RN  Outcome: Progressing  Flowsheets (Taken 11/23/2024 2112)  Free From Fall Injury: Instruct family/caregiver on patient safety  11/23/2024 0954 by Caron Warren LPN  Outcome: Progressing     
  Problem: Safety - Adult  Goal: Free from fall injury  11/24/2024 0930 by Caron Warren LPN  Outcome: Progressing  11/23/2024 2116 by Garima Tse RN  Outcome: Progressing  11/23/2024 2112 by Garima Tse RN  Outcome: Progressing  Flowsheets (Taken 11/23/2024 2112)  Free From Fall Injury: Instruct family/caregiver on patient safety     Problem: Chronic Conditions and Co-morbidities  Goal: Patient's chronic conditions and co-morbidity symptoms are monitored and maintained or improved  11/24/2024 0930 by Caron Warren LPN  Outcome: Progressing  Flowsheets (Taken 11/24/2024 0741)  Care Plan - Patient's Chronic Conditions and Co-Morbidity Symptoms are Monitored and Maintained or Improved: Monitor and assess patient's chronic conditions and comorbid symptoms for stability, deterioration, or improvement  11/23/2024 2116 by Garima Tse RN  Outcome: Progressing  11/23/2024 2112 by Garima Tse RN  Outcome: Progressing  Flowsheets (Taken 11/23/2024 2102)  Care Plan - Patient's Chronic Conditions and Co-Morbidity Symptoms are Monitored and Maintained or Improved: Monitor and assess patient's chronic conditions and comorbid symptoms for stability, deterioration, or improvement     Problem: Discharge Planning  Goal: Discharge to home or other facility with appropriate resources  11/24/2024 0930 by Caron Warren LPN  Outcome: Progressing  Flowsheets (Taken 11/24/2024 0741)  Discharge to home or other facility with appropriate resources: Refer to discharge planning if patient needs post-hospital services based on physician order or complex needs related to functional status, cognitive ability or social support system  11/23/2024 2116 by Garima Tse RN  Outcome: Progressing  11/23/2024 2112 by Garima Tse RN  Outcome: Progressing  Flowsheets (Taken 11/23/2024 2102)  Discharge to home or other facility with appropriate resources: Identify barriers to discharge with 
  Problem: Safety - Adult  Goal: Free from fall injury  11/24/2024 2124 by Eli Florian RN  Outcome: Progressing  11/24/2024 0930 by Carno Warren LPN  Outcome: Progressing     Problem: Chronic Conditions and Co-morbidities  Goal: Patient's chronic conditions and co-morbidity symptoms are monitored and maintained or improved  11/24/2024 2124 by Eli Florian RN  Outcome: Progressing  11/24/2024 0930 by Caron Warren LPN  Outcome: Progressing  Flowsheets (Taken 11/24/2024 0741)  Care Plan - Patient's Chronic Conditions and Co-Morbidity Symptoms are Monitored and Maintained or Improved: Monitor and assess patient's chronic conditions and comorbid symptoms for stability, deterioration, or improvement     Problem: Discharge Planning  Goal: Discharge to home or other facility with appropriate resources  11/24/2024 2124 by Eli Florian RN  Outcome: Progressing  11/24/2024 0930 by Caron Warren LPN  Outcome: Progressing  Flowsheets (Taken 11/24/2024 0741)  Discharge to home or other facility with appropriate resources: Refer to discharge planning if patient needs post-hospital services based on physician order or complex needs related to functional status, cognitive ability or social support system     Problem: ABCDS Injury Assessment  Goal: Absence of physical injury  11/24/2024 2124 by Eli Florian RN  Outcome: Progressing  11/24/2024 0930 by Caron Warren LPN  Outcome: Progressing     
  Problem: Safety - Adult  Goal: Free from fall injury  11/25/2024 0945 by Debbie Sears RN  Outcome: Progressing  11/24/2024 2124 by Eli Florian RN  Outcome: Progressing     Problem: Chronic Conditions and Co-morbidities  Goal: Patient's chronic conditions and co-morbidity symptoms are monitored and maintained or improved  11/25/2024 0945 by Debbie Sears RN  Outcome: Progressing  11/24/2024 2124 by Eli Florian RN  Outcome: Progressing     Problem: Discharge Planning  Goal: Discharge to home or other facility with appropriate resources  11/25/2024 0945 by Debbie Sears RN  Outcome: Progressing  11/24/2024 2124 by Eli Florian RN  Outcome: Progressing     Problem: ABCDS Injury Assessment  Goal: Absence of physical injury  11/25/2024 0945 by Debbie Sears RN  Outcome: Progressing  11/24/2024 2124 by Eli Florian RN  Outcome: Progressing     Problem: Nutrition Deficit:  Goal: Optimize nutritional status  11/25/2024 0945 by Debbie Sears RN  Outcome: Progressing  11/24/2024 2124 by Eli Florian RN  Outcome: Progressing     Problem: Neurosensory - Adult  Goal: Achieves stable or improved neurological status  11/25/2024 0945 by Debbie Sears RN  Outcome: Progressing  11/24/2024 2124 by Eli Florian RN  Outcome: Progressing  Goal: Achieves maximal functionality and self care  11/25/2024 0945 by Debbie Sears RN  Outcome: Progressing  11/24/2024 2124 by Eli Florian RN  Outcome: Progressing     Problem: Respiratory - Adult  Goal: Achieves optimal ventilation and oxygenation  11/25/2024 0945 by Debbie Sears RN  Outcome: Progressing  11/24/2024 2124 by Eli Florian RN  Outcome: Progressing     Problem: Cardiovascular - Adult  Goal: Maintains optimal cardiac output and hemodynamic stability  11/25/2024 0945 by Debbie Sears RN  Outcome: Progressing  11/24/2024 2124 by Eli Florian RN  Outcome: Progressing     Problem: Skin/Tissue 
  Problem: Safety - Adult  Goal: Free from fall injury  11/25/2024 2314 by Trinity Robledo LPN  Outcome: Progressing  11/25/2024 0945 by Debbie Sears RN  Outcome: Progressing     Problem: Chronic Conditions and Co-morbidities  Goal: Patient's chronic conditions and co-morbidity symptoms are monitored and maintained or improved  11/25/2024 2314 by Trinity Robledo LPN  Outcome: Progressing  11/25/2024 0945 by Debbie Sears RN  Outcome: Progressing     Problem: Discharge Planning  Goal: Discharge to home or other facility with appropriate resources  11/25/2024 2314 by Trinity Robledo LPN  Outcome: Progressing  11/25/2024 0945 by Debbie Sears RN  Outcome: Progressing     Problem: ABCDS Injury Assessment  Goal: Absence of physical injury  11/25/2024 2314 by Trinity Robledo LPN  Outcome: Progressing  11/25/2024 0945 by Debbie Sears RN  Outcome: Progressing     Problem: Nutrition Deficit:  Goal: Optimize nutritional status  11/25/2024 2314 by Trinity Robledo LPN  Outcome: Progressing  11/25/2024 0945 by Debbie Sears RN  Outcome: Progressing     Problem: Neurosensory - Adult  Goal: Achieves stable or improved neurological status  11/25/2024 2314 by Trinity Robledo LPN  Outcome: Progressing  11/25/2024 0945 by Debbie Sears RN  Outcome: Progressing  Goal: Achieves maximal functionality and self care  11/25/2024 2314 by Trinity Robledo LPN  Outcome: Progressing  11/25/2024 0945 by Debbie Sears RN  Outcome: Progressing     Problem: Respiratory - Adult  Goal: Achieves optimal ventilation and oxygenation  11/25/2024 2314 by Trinity Robledo LPN  Outcome: Progressing  11/25/2024 0945 by Debbie Sears RN  Outcome: Progressing     Problem: Cardiovascular - Adult  Goal: Maintains optimal cardiac output and hemodynamic stability  11/25/2024 2314 by Trinity Robledo LPN  Outcome: Progressing  11/25/2024 0945 by Debbie Sears RN  Outcome: Progressing     Problem: Skin/Tissue Integrity - Adult  Goal: Skin integrity remains 
  Problem: Safety - Adult  Goal: Free from fall injury  11/26/2024 2327 by Trinity Robledo LPN  Outcome: Progressing  11/26/2024 1605 by Mayela Cervantes RN  Outcome: Adequate for Discharge     Problem: Chronic Conditions and Co-morbidities  Goal: Patient's chronic conditions and co-morbidity symptoms are monitored and maintained or improved  11/26/2024 2327 by Trinity Robledo LPN  Outcome: Progressing  11/26/2024 1605 by Mayela Cervantes RN  Outcome: Adequate for Discharge     Problem: Discharge Planning  Goal: Discharge to home or other facility with appropriate resources  11/26/2024 2327 by Trinity Robledo LPN  Outcome: Progressing  11/26/2024 1605 by Mayela Cervantes RN  Outcome: Adequate for Discharge     Problem: ABCDS Injury Assessment  Goal: Absence of physical injury  11/26/2024 2327 by Trinity Robledo LPN  Outcome: Progressing  11/26/2024 1605 by Mayela Cervantes RN  Outcome: Adequate for Discharge     Problem: Nutrition Deficit:  Goal: Optimize nutritional status  11/26/2024 2327 by Trinity Robledo LPN  Outcome: Progressing  11/26/2024 1605 by Mayela Cervantes RN  Outcome: Adequate for Discharge     Problem: Neurosensory - Adult  Goal: Achieves stable or improved neurological status  11/26/2024 2327 by Trinity Robledo LPN  Outcome: Progressing  11/26/2024 1605 by Mayela Cervantes RN  Outcome: Adequate for Discharge  Goal: Achieves maximal functionality and self care  11/26/2024 2327 by Trinity Robledo LPN  Outcome: Progressing  11/26/2024 1605 by Mayela Cervantes RN  Outcome: Adequate for Discharge     Problem: Respiratory - Adult  Goal: Achieves optimal ventilation and oxygenation  11/26/2024 2327 by Trinity Robledo LPN  Outcome: Progressing  11/26/2024 1605 by Mayela Cervantes RN  Outcome: Adequate for Discharge     Problem: Cardiovascular - Adult  Goal: Maintains optimal cardiac output and hemodynamic stability  11/26/2024 2327 by Trinity Robledo LPN  Outcome: Progressing  11/26/2024 1605 by Mayela Cervantes 
  Problem: Safety - Adult  Goal: Free from fall injury  11/27/2024 1037 by Mayela Cervantes RN  Outcome: Completed  11/26/2024 2327 by Trinity Robledo LPN  Outcome: Progressing     Problem: Chronic Conditions and Co-morbidities  Goal: Patient's chronic conditions and co-morbidity symptoms are monitored and maintained or improved  11/27/2024 1037 by Mayela Cervantes RN  Outcome: Completed  11/26/2024 2327 by Trinity Robledo LPN  Outcome: Progressing     Problem: Discharge Planning  Goal: Discharge to home or other facility with appropriate resources  11/27/2024 1037 by Mayela Cervantes RN  Outcome: Completed  11/26/2024 2327 by Trinity Robledo LPN  Outcome: Progressing     Problem: ABCDS Injury Assessment  Goal: Absence of physical injury  11/27/2024 1037 by Mayela Cervantes RN  Outcome: Completed  11/26/2024 2327 by Trinity Robledo LPN  Outcome: Progressing     Problem: Nutrition Deficit:  Goal: Optimize nutritional status  11/27/2024 1037 by Mayela Cervantes RN  Outcome: Completed  11/26/2024 2327 by Trinity Robledo LPN  Outcome: Progressing     Problem: Neurosensory - Adult  Goal: Achieves stable or improved neurological status  11/27/2024 1037 by Mayela Cervantes RN  Outcome: Completed  11/26/2024 2327 by Trinity Robledo LPN  Outcome: Progressing  Goal: Achieves maximal functionality and self care  11/27/2024 1037 by Mayela Cervantes RN  Outcome: Completed  11/26/2024 2327 by Trinity Robledo LPN  Outcome: Progressing     Problem: Respiratory - Adult  Goal: Achieves optimal ventilation and oxygenation  11/27/2024 1037 by Mayela Cervantes RN  Outcome: Completed  11/26/2024 2327 by Trinity Robledo LPN  Outcome: Progressing     Problem: Cardiovascular - Adult  Goal: Maintains optimal cardiac output and hemodynamic stability  11/27/2024 1037 by Mayela Cervantes RN  Outcome: Completed  11/26/2024 2327 by Trinity Robledo LPN  Outcome: Progressing     Problem: Skin/Tissue Integrity - Adult  Goal: Skin integrity remains 
  Problem: Safety - Adult  Goal: Free from fall injury  Outcome: Adequate for Discharge     Problem: Chronic Conditions and Co-morbidities  Goal: Patient's chronic conditions and co-morbidity symptoms are monitored and maintained or improved  Outcome: Adequate for Discharge     Problem: Discharge Planning  Goal: Discharge to home or other facility with appropriate resources  Outcome: Adequate for Discharge     Problem: ABCDS Injury Assessment  Goal: Absence of physical injury  Outcome: Adequate for Discharge     Problem: Nutrition Deficit:  Goal: Optimize nutritional status  Outcome: Adequate for Discharge     Problem: Neurosensory - Adult  Goal: Achieves stable or improved neurological status  Outcome: Adequate for Discharge  Goal: Achieves maximal functionality and self care  Outcome: Adequate for Discharge     Problem: Respiratory - Adult  Goal: Achieves optimal ventilation and oxygenation  Outcome: Adequate for Discharge     Problem: Cardiovascular - Adult  Goal: Maintains optimal cardiac output and hemodynamic stability  Outcome: Adequate for Discharge     Problem: Skin/Tissue Integrity - Adult  Goal: Skin integrity remains intact  Outcome: Adequate for Discharge     Problem: Musculoskeletal - Adult  Goal: Return mobility to safest level of function  Outcome: Adequate for Discharge  Goal: Return ADL status to a safe level of function  Outcome: Adequate for Discharge     Problem: Genitourinary - Adult  Goal: Absence of urinary retention  Outcome: Adequate for Discharge     Problem: Metabolic/Fluid and Electrolytes - Adult  Goal: Electrolytes maintained within normal limits  Outcome: Adequate for Discharge  Goal: Hemodynamic stability and optimal renal function maintained  Outcome: Adequate for Discharge  Goal: Glucose maintained within prescribed range  Outcome: Adequate for Discharge       Electronically signed by ROSA CrookN, RN on 11/26/2024 at 4:06 PM    
  Problem: Safety - Adult  Goal: Free from fall injury  Outcome: Progressing     Problem: Chronic Conditions and Co-morbidities  Goal: Patient's chronic conditions and co-morbidity symptoms are monitored and maintained or improved  Outcome: Progressing     Problem: Discharge Planning  Goal: Discharge to home or other facility with appropriate resources  Outcome: Progressing     
  Problem: Safety - Adult  Goal: Free from fall injury  Outcome: Progressing     Problem: Chronic Conditions and Co-morbidities  Goal: Patient's chronic conditions and co-morbidity symptoms are monitored and maintained or improved  Outcome: Progressing     Problem: Discharge Planning  Goal: Discharge to home or other facility with appropriate resources  Outcome: Progressing     Problem: ABCDS Injury Assessment  Goal: Absence of physical injury  Outcome: Progressing     Problem: Nutrition Deficit:  Goal: Optimize nutritional status  Outcome: Progressing     
  Problem: Safety - Adult  Goal: Free from fall injury  Outcome: Progressing     Problem: Chronic Conditions and Co-morbidities  Goal: Patient's chronic conditions and co-morbidity symptoms are monitored and maintained or improved  Outcome: Progressing     Problem: Discharge Planning  Goal: Discharge to home or other facility with appropriate resources  Outcome: Progressing     Problem: ABCDS Injury Assessment  Goal: Absence of physical injury  Outcome: Progressing     Problem: Nutrition Deficit:  Goal: Optimize nutritional status  Outcome: Progressing     
  [] In this rare instance due to the nature of this patient's medical involvement, this patient will be seen 15 hours per week (900 minutes within a 7 day period).    Treatments may include therapeutic exercises, gait training, neuromuscular re-ed, transfer training, community reintegration, bed mobility, w/c mobility and training, self care, home mgmt, cognitive training, energy conservation,dysphagia tx, speech/language/communication therapy, group therapy, and patient/family education. In addition, dietician/nutritionist may monitor calorie count as well as intake and collaboratively work with SLP on dietary upgrades.  Neuropsychology/Psychology may evaluate and provide necessary support.    Medical issues being managed closely and that require 24 hour availability of a physician:   [] Swallowing Precautions  [x] Bowel/Bladder Fx  [] Weight bearing precautions   [] Wound Care    [] Pain Mgmt   [x] Infection Protection   [x] DVT Prophylaxis   [x] Fall Precautions  [x] Fluid/Electrolyte/Nutrition Balance   [] Voice Protection   [x] Respiratory  [] Other:    Medical Prognosis: [x] Good  [] Fair    [] Guarded   Total expected IRF days:  12  Anticipated discharge destination:    [] Home Independently   [x] Home with supervision    []SNF     [] Other                                           Physician anticipated functional outcomes:  Ambulate household distances independently with assistive device.  IPOC brief synthesis: Acute inpatient rehabilitation with occupational therapy,  physical therapy, 180 minutes, 5 every 7   days will address basic and advancing mobility with self-care   instruction and adaptive equipment training. Caregiver education will   be offered. Expected length of stay prior to the supervised level of   functional discharge to home with home care is 12 days.    Assessment and Plan:  1.  Pneumonia/COPD-nebulizers/Singulair/Zyrtec/Mucinex complete antibiotics  2.  Atrial

## 2024-11-27 NOTE — PROGRESS NOTES
11/21/24 1422   Bed mobility   Sit to Supine Supervision  (With rail, to left sidelying, left side of bed)   Transfers   Sit to Stand Contact guard assistance  (From Recliner to rollator)   Stand to Sit Contact guard assistance  (Rollator to recliner)   Ambulation   Surface Level tile   Device Rollator   Assistance Contact guard assistance   Quality of Gait Short steps, improved rollator position with cues, FFP, reciprocal gait pattern   Gait Deviations Decreased step length;Decreased step height   Distance 75 FT   Comments Multiple turns   Ambulation 2   Surface - 2 level tile   Device 2 Rollator   Assistance 2 Contact guard assistance   Quality of Gait 2 FFP, reciprocal gait pattern, rollator anterior to body   Gait Deviations Decreased step length;Decreased step height   Distance 10 FT   Comments Recliner to bed   PT Exercises   Exercise Treatment Bilateral LE sitting exercies hip flexion, hip extension, hip ABD, hip ADD, knee extension x 10 with manual resistance.   Assessment   Assessment Patient was fatigued after morning session and OT right before PT. Due to fatigue the focus of treatment was one walk and bilateral sitting exercies. Patient tolerated sitting exercies well. Sitting exercies were done in recliner and patient was taken to bed at end of treatment   PT Individual Minutes   Time In 1345   Time Out 1415   Minutes 30       
   11/22/24 0815   Restrictions/Precautions   Restrictions/Precautions Fall Risk   Implants present?  Pacemaker   General   Diagnosis Bacteria pneumonia   Social/Functional History   Lives With Alone   Type of Home House   Home Layout One level   Home Access Ramped entrance   Ambulation Assistance Independent   Transfer Assistance Independent   Bed mobility   Rolling to Left Independent   Rolling to Right Independent   Supine to Sit Stand by assistance   Transfers   Sit to Stand Stand by assistance   Stand to Sit Stand by assistance   Bed to Chair Stand by assistance   Ambulation   Surface Level tile   Device Rolling Walker   Assistance Stand by assistance   Quality of Gait rapid amb, tends to keep rollator too far away, no LOB   Distance 150   Comments 2 turns   Ambulation 2   Surface - 2 level tile   Device 2 Rollator   Assistance 2 Stand by assistance   Quality of Gait 2 lowered rollator 1 notch, better gait posture and rollator kept closer to body.   Distance 150   Comments 2 turns   Propulsion 1   Propulsion Manual   Level Level Tile   Method RUE;LUE   Level of Assistance Contact guard assistance   Distance 125   PT Exercises   Exercise Treatment seated ex ble's 1 set 10 reps. min manual resist;   Assessment   Assessment improved amb distance, no LOB, LLE with decreased endurance with fatigue before 10 reps of ex completed   PT Individual Minutes   Time In 0815   Time Out 0900   Minutes 45       
   11/25/24 0815   Restrictions/Precautions   Restrictions/Precautions Fall Risk   Implants present?  Pacemaker   General   Diagnosis Bacteria pneumonia   Social/Functional History   Lives With Alone   Type of Home House   Home Layout One level   Home Access Ramped entrance   Bathroom Shower/Tub Walk-in shower;Shower chair with back   Bed mobility   Rolling to Left Independent   Rolling to Right Independent   Supine to Sit Independent   Sit to Supine Independent   Scooting Independent   Bed Mobility Comments all using bed rails   Transfers   Sit to Stand Stand by assistance;Contact guard assistance   Stand to Sit Stand by assistance;Contact guard assistance   Bed to Chair Stand by assistance  (stand turn to w/c)   Car Transfer Stand by assistance  (with rollator)   Ambulation   Surface Level tile   Device Rollator   Assistance Stand by assistance   Quality of Gait pace and posture improved with VC to stand erect and keep rollator closer   Distance 150   Comments 2 turns   Ambulation 2   Surface - 2 level tile   Device 2 Rollator   Assistance 2 Stand by assistance   Quality of Gait 2 pace and posture improved with VC to stand erect and keep rollator closer   Distance 100   Propulsion 1   Propulsion Manual   Level Level Tile   Method RUE;LUE   Level of Assistance Contact guard assistance   Description/ Details constantly veers to left, LUE weaker than RLE   Assessment   Assessment SBA with all dynamic standing activites, VC frequently required to stand more erect during amb and to not let rollator get too far ahead of him.   PT Individual Minutes   Time In 0815   Time Out 0900   Minutes 45       
  Name: Jose Tate  MRN: [id number]  Date of Service:  11/22/2024 11/22/24 1430   Restrictions/Precautions   Restrictions/Precautions Fall Risk   Implants present?  Pacemaker   General   Chart Reviewed Yes   Patient assessed for rehabilitation services? Yes   Additional Pertinent Hx A-fib, GERD, hypercholesterolemia, essential tremor, BPH, T2DM.   Diagnosis Bacteria pneumonia   General Comment   Comments Pt required Max A for donning of clean brief/pants.   Subjective   Subjective Pt sitting in recliner dozing off intermittently, awakes slightly and agrees to participate in therapy. Pt attempts to participate in sitting BLE ther-ex, yet ke keeps falling asleep. PTA offers to assist him to bed and he eventually agrees to lay down- he requests to go to BR first.   Hearing   Hearing X   Hearing Exceptions Bilateral hearing aid   Subjective   Subjective Pt does present w/ intermitttent coughing   Pain Faces: 0/10   Bed mobility   Sit to Supine Supervision   Scooting Supervision   Bed Mobility Comments On L side of bed- use of bedrail   Transfers   Sit to Stand Stand by assistance;Contact guard assistance   Stand to Sit Stand by assistance;Contact guard assistance   Ambulation   Surface Level tile   Device Rollator  (Personal rollator)   Assistance Contact guard assistance;Stand by assistance   Quality of Gait faster gato, tends to keep rollator too far away, no LOB   Distance 10' X 3   Comments Multiple L/R turns in room   Activity Tolerance   Activity Tolerance Patient limited by fatigue   Assessment   Assessment Pt able to tolerate tx w/o c/o of increased pain, does present w/ mod-max fatigue. Pt able to perform aspects of bed mobility on hospital bed w/ rail requiring Supervision.   Discharge Recommendations Continue to assess pending progress;Home with Home health PT;Home with assist PRN   Education   Education Given To Patient   Education Provided Home Exercise Program   Education Provided Comments 
  Name: Jose Tate  MRN: [id number]  Date of Service:  11/26/2024 11/26/24 0815   Restrictions/Precautions   Restrictions/Precautions Fall Risk   Implants present?  Pacemaker   General   Chart Reviewed Yes   Patient assessed for rehabilitation services? Yes   Additional Pertinent Hx A-fib, GERD, hypercholesterolemia, essential tremor, BPH, T2DM.   Diagnosis Bacteria pneumonia   Subjective   Subjective Pt sitting in recliner, agrees to participate in therapy.   Hearing   Hearing X   Hearing Exceptions Bilateral hearing aid   Vitals   Pulse 89  (89-93)   SpO2 95 %  (95-98)   Subjective   Pain Verbal: 0/10   Transfers   Sit to Stand Stand by assistance;Contact guard assistance   Stand to Sit Stand by assistance;Contact guard assistance   Bed to Chair Stand by assistance;Contact guard assistance  (recliner<w/c from pt R w/o AD)   Ambulation   Surface Level tile   Device Rollator   Assistance Contact guard assistance   Quality of Gait step to pattern to attempted reciprocal,  intermittent farther proximity from AD   Gait Deviations Slow Kathrine;Decreased step length;Decreased step height;Decreased head and trunk rotation   Distance 50' X 2  (1X sitting rest break on rollator seat in between for ~1 min.)   Comments Multiple L/R turns, focus on pt performing obstacle course in which he manuv. around cones and stepped over strips in hallway   Activity Tolerance   Activity Tolerance Patient limited by endurance;Patient tolerated treatment well   Assessment   Assessment Pt does not lock w/c brakes before standing, does recall to lock rollator brakes appropriately. Pt has no LOB manuv. around cones during obstacle course, steps over strips reciprocally and adequately clearing ~70% of time. Pt reports he has caregiver 5X a week that does his laudry, helps him w/ showers, and does cooking.   Discharge Recommendations Continue to assess pending progress;Home with Home health PT;Home with assist PRN   Safety Devices   Type 
4 Eyes Skin Assessment     NAME:  Jose Tate  YOB: 1940  MEDICAL RECORD NUMBER:  [id number]    The patient is being assessed for  Admission    I agree that at least one RN has performed a thorough Head to Toe Skin Assessment on the patient. ALL assessment sites listed below have been assessed.      Areas assessed by both nurses:    Head, Face, Ears, Shoulders, Back, Chest, Arms, Elbows, Hands, Sacrum. Buttock, Coccyx, Ischium, and Legs. Feet and Heels        Does the Patient have a Wound? No noted wound(s)       Rony Prevention initiated by RN: No  Wound Care Orders initiated by RN: No    Pressure Injury (Stage 3,4, Unstageable, DTI, NWPT, and Complex wounds) if present, place Wound referral order by RN under : No    New Ostomies, if present place, Ostomy referral order under : No     Nurse 1 eSignature: Electronically signed by Юлия Batista RN on 11/20/24 at 6:09 PM CST    **SHARE this note so that the co-signing nurse can place an eSignature**    Nurse 2 eSignature: Electronically signed by Jennifer Angel RN on 11/20/24 at 6:24 PM CST   
Comprehensive Nutrition Assessment    Type and Reason for Visit:  Initial    Nutrition Recommendations/Plan:   Continue current interventions     Malnutrition Assessment:  Malnutrition Status:  At risk for malnutrition (11/21/24 0834)    Context:  Acute Illness     Findings of the 6 clinical characteristics of malnutrition:  Energy Intake:  Mild decrease in energy intake  Weight Loss:  No weight loss     Body Fat Loss:  No body fat loss     Muscle Mass Loss:  No muscle mass loss    Fluid Accumulation:  Mild Extremities   Strength:  Not Performed    Nutrition Assessment:    Pt evaluated related to admission to rehab. Pt states his appetite is good, intakes reported as 50-75% at this time. Weight is a gain of 10.6% in 90 days from previous office visit. States UBW ~175 lbs. Pt on Lasix which can contribute to weight fluctuations. Denies problems with chewing or swallowing. Will monitor for possible need of carb restriction, due to varied intake at this time will continue current diet.    Nutrition Related Findings:    BM 11-20, Glu 182-324 Wound Type: None       Current Nutrition Intake & Therapies:    Average Meal Intake: 51-75%  Average Supplements Intake: None Ordered  ADULT DIET; Regular    Anthropometric Measures:  Height: 177.8 cm (5' 10\")  Ideal Body Weight (IBW): 166 lbs (75 kg)    Admission Body Weight: 84.2 kg (185 lb 10 oz)  Current Body Weight: 84.2 kg (185 lb 10 oz), 111.8 % IBW. Weight Source: Bed scale  Current BMI (kg/m2): 26.6  Usual Body Weight: 76.1 kg (167 lb 12.3 oz) (Per office visit 8-15-24)   % Weight Change (Calculated): 10.6  Weight Adjustment For: No Adjustment  BMI Categories: Overweight (BMI 25.0-29.9)    Estimated Daily Nutrient Needs:  Energy Requirements Based On: Kcal/kg  Weight Used for Energy Requirements: Current  Energy (kcal/day): 3617-5291 (20-25/kg)  Weight Used for Protein Requirements: Ideal  Protein (g/day):  (1.3-2/kg)  Method Used for Fluid Requirements: 1 
Facility/Department: [facility] 8 REHAB UNIT  Occupational Therapy Treatment Note    Name: Jose Tate  : 1940  MRN: [id number]  Date of Service: 2024    Discharge Recommendations:  Continue to assess pending progress, Patient would benefit from continued therapy after discharge          Patient Diagnosis(es): acute respiratory failure with hypoxia  Past Medical History:  has a past medical history of Allergic rhinitis, Atrial fibrillation (HCC), Bronchitis, chronic (HCC), Carotid artery stenosis, Congenital heart disease, GERD (gastroesophageal reflux disease), Heart attack (HCC), Hemorrhoids, Hypercholesterolemia, Palliative care patient, and Type II or unspecified type diabetes mellitus without mention of complication, not stated as uncontrolled.  Past Surgical History:  has a past surgical history that includes Cardiac surgery; Port Surgery (Right, 2021); and ep device procedure (Left, 2024).    Treatment Diagnosis: acute respiratory failure with hypoxia    Assessment   Performance deficits / Impairments: Decreased functional mobility ;Decreased ADL status;Decreased strength;Decreased balance;Decreased endurance  Assessment: Tx completed. Pt was up in the chair upon arrival and asleep after finishing lunch. Pt required vcs for staying awake for the first part of tx, but then was able to stay awake. Pt was educated on adaptive equipment to improve overall independence during ADL routine while improving safety. Pt was able to demo using sock aid successfully 2 times.  Treatment Diagnosis: acute respiratory failure with hypoxia  Prognosis: Good  Decision Making: Low Complexity  Activity Tolerance  Activity Tolerance: Patient Tolerated treatment well              Plan   Occupational Therapy Plan  Days Per Week: 5 Days  Hours Per Day: 1.5 hours  Current Treatment Recommendations: Strengthening, Balance training, Functional mobility training, Endurance training, Patient/Caregiver education & 
Facility/Department: [facility] 8 REHAB UNIT  Occupational Therapy Treatment Note    Name: Jose Tate  : 1940  MRN: [id number]  Date of Service: 2024    Discharge Recommendations:  Continue to assess pending progress, Patient would benefit from continued therapy after discharge        Past Medical History:  has a past medical history of Allergic rhinitis, Atrial fibrillation (HCC), Bronchitis, chronic (HCC), Carotid artery stenosis, Congenital heart disease, GERD (gastroesophageal reflux disease), Heart attack (HCC), Hemorrhoids, Hypercholesterolemia, Palliative care patient, and Type II or unspecified type diabetes mellitus without mention of complication, not stated as uncontrolled.  Past Surgical History:  has a past surgical history that includes Cardiac surgery; Port Surgery (Right, 2021); and ep device procedure (Left, 2024).    Treatment Diagnosis: acute respiratory failure with hypoxia    Assessment   Performance deficits / Impairments: Decreased functional mobility ;Decreased ADL status;Decreased strength;Decreased balance;Decreased endurance  Assessment: Completed shower and during bathing, patient became very fatigued and was leaning over onto knees. When getting dressing O2 sats were checked and sats read 75%, it increased with rest to 92%, so kept on room air. Assisted patient with LB dressing due to extreme fatigue. Will need to continue to monitor O2 sats with ADLs and mobility.  Treatment Diagnosis: acute respiratory failure with hypoxia  Activity Tolerance  Activity Tolerance: Patient limited by fatigue              Plan   Occupational Therapy Plan  Days Per Week: 5 Days  Hours Per Day: 1.5 hours  Current Treatment Recommendations: Strengthening, Balance training, Functional mobility training, Endurance training, Patient/Caregiver education & training, Self-Care / ADL, Safety education & training, Equipment evaluation, education, & procurement, Home management training 
Facility/Department: [facility] 8 REHAB UNIT  Occupational Therapy     Name: Jose Tate  : 1940  MRN: [id number]  Date of Service: 2024    Discharge Recommendations:  Home with Home health OT          Patient Diagnosis(es): The primary encounter diagnosis was Occupational exposure in workplace. Diagnoses of Type 2 diabetes mellitus without complication, without long-term current use of insulin (HCC), Essential hypertension, and Acute respiratory failure with hypoxia were also pertinent to this visit.  Past Medical History:  has a past medical history of Allergic rhinitis, Atrial fibrillation (HCC), Bronchitis, chronic (HCC), Carotid artery stenosis, Congenital heart disease, GERD (gastroesophageal reflux disease), Heart attack (HCC), Hemorrhoids, Hypercholesterolemia, Palliative care patient, and Type II or unspecified type diabetes mellitus without mention of complication, not stated as uncontrolled.  Past Surgical History:  has a past surgical history that includes Cardiac surgery; Port Surgery (Right, 2021); and ep device procedure (Left, 2024).    Treatment Diagnosis: acute respiratory failure with hypoxia      Assessment   Performance deficits / Impairments: Decreased functional mobility ;Decreased ADL status;Decreased strength;Decreased balance;Decreased endurance  Assessment: Patient drowsy during therapy session, but able to participate.  Treatment Diagnosis: acute respiratory failure with hypoxia  Prognosis: Good  Activity Tolerance  Activity Tolerance: Patient Tolerated treatment well            Plan   Occupational Therapy Plan  Days Per Week: 5 Days  Hours Per Day: 1.5 hours  Current Treatment Recommendations: Strengthening, Balance training, Functional mobility training, Endurance training, Patient/Caregiver education & training, Self-Care / ADL, Safety education & training, Equipment evaluation, education, & procurement, Home management training 
Facility/Department: [facility] 8 REHAB UNIT  Occupational Therapy Treatment Note    Name: Jose Tate  : 1940  MRN: [id number]  Date of Service: 2024    Discharge Recommendations:  Home with Home health OT  OT Equipment Recommendations  Equipment Needed: No  Other: Already has rollator, built in shower seat and handicap height toilet with GBs.       Patient Diagnosis(es): The primary encounter diagnosis was Occupational exposure in workplace. Diagnoses of Type 2 diabetes mellitus without complication, without long-term current use of insulin (HCC), Essential hypertension, and Acute respiratory failure with hypoxia were also pertinent to this visit.  Past Medical History:  has a past medical history of Allergic rhinitis, Atrial fibrillation (HCC), Bronchitis, chronic (HCC), Carotid artery stenosis, Congenital heart disease, GERD (gastroesophageal reflux disease), Heart attack (HCC), Hemorrhoids, Hypercholesterolemia, Palliative care patient, and Type II or unspecified type diabetes mellitus without mention of complication, not stated as uncontrolled.  Past Surgical History:  has a past surgical history that includes Cardiac surgery; Port Surgery (Right, 2021); and ep device procedure (Left, 2024).    Treatment Diagnosis: acute respiratory failure with hypoxia    Assessment   Performance deficits / Impairments: Decreased functional mobility ;Decreased ADL status;Decreased strength;Decreased balance;Decreased endurance  Assessment: Patient drowsy during therapy session, but able to participate.  Treatment Diagnosis: acute respiratory failure with hypoxia  Activity Tolerance  Activity Tolerance: Patient Tolerated treatment well              Plan   Occupational Therapy Plan  Days Per Week: 5 Days  Hours Per Day: 1.5 hours  Current Treatment Recommendations: Strengthening, Balance training, Functional mobility training, Endurance training, Patient/Caregiver education & training, Self-Care / ADL, 
Facility/Department: [facility] 8 REHAB UNIT  Occupational Therapy Treatment Note    Name: Jose Tate  : 1940  MRN: [id number]  Date of Service: 2024    Discharge Recommendations:  Continue to assess pending progress, Patient would benefit from continued therapy after discharge          Past Medical History:  has a past medical history of Allergic rhinitis, Atrial fibrillation (HCC), Bronchitis, chronic (HCC), Carotid artery stenosis, Congenital heart disease, GERD (gastroesophageal reflux disease), Heart attack (HCC), Hemorrhoids, Hypercholesterolemia, Palliative care patient, and Type II or unspecified type diabetes mellitus without mention of complication, not stated as uncontrolled.  Past Surgical History:  has a past surgical history that includes Cardiac surgery; Port Surgery (Right, 2021); and ep device procedure (Left, 2024).    Treatment Diagnosis: acute respiratory failure with hypoxia    Assessment   Performance deficits / Impairments: Decreased functional mobility ;Decreased ADL status;Decreased strength;Decreased balance;Decreased endurance  Treatment Diagnosis: acute respiratory failure with hypoxia  Activity Tolerance  Activity Tolerance: Patient Tolerated treatment well              Plan   Occupational Therapy Plan  Days Per Week: 5 Days  Hours Per Day: 1.5 hours  Current Treatment Recommendations: Strengthening, Balance training, Functional mobility training, Endurance training, Patient/Caregiver education & training, Self-Care / ADL, Safety education & training, Equipment evaluation, education, & procurement, Home management training     Restrictions  Restrictions/Precautions  Restrictions/Precautions: Fall Risk  Implants present? : Pacemaker    Subjective   General  Chart Reviewed: Yes  Patient assessed for rehabilitation services?: Yes  Family / Caregiver Present: No     Social/Functional History  Social/Functional History  Lives With: Alone  Type of Home: House  Home 
Facility/Department: [facility] REHAB UNIT  Occupational Therapy     Name: Jose Tate  : 1940  MRN: [id number]  Date of Service: 2024    Discharge Recommendations:  Continue to assess pending progress, Patient would benefit from continued therapy after discharge    Past Medical History:  has a past medical history of Allergic rhinitis, Atrial fibrillation (HCC), Bronchitis, chronic (HCC), Carotid artery stenosis, Congenital heart disease, GERD (gastroesophageal reflux disease), Heart attack (HCC), Hemorrhoids, Hypercholesterolemia, Palliative care patient, and Type II or unspecified type diabetes mellitus without mention of complication, not stated as uncontrolled.  Past Surgical History:  has a past surgical history that includes Cardiac surgery; Port Surgery (Right, 2021); and ep device procedure (Left, 2024).    Treatment Diagnosis: acute respiratory failure with hypoxia    Assessment   Performance deficits / Impairments: Decreased functional mobility ;Decreased ADL status;Decreased strength;Decreased balance;Decreased endurance  Treatment Diagnosis: acute respiratory failure with hypoxia  Prognosis: Good  Activity Tolerance  Activity Tolerance: Patient Tolerated treatment well     Plan   Occupational Therapy Plan  Days Per Week: 5 Days  Hours Per Day: 1.5 hours  Current Treatment Recommendations: Strengthening, Balance training, Functional mobility training, Endurance training, Patient/Caregiver education & training, Self-Care / ADL, Safety education & training, Equipment evaluation, education, & procurement, Home management training     Restrictions  Restrictions/Precautions  Restrictions/Precautions: Fall Risk  Implants present? : Pacemaker    Subjective   General  Chart Reviewed: Yes  Patient assessed for rehabilitation services?: Yes  Family / Caregiver Present: No    Social/Functional History  Social/Functional History  Lives With: Alone  Type of Home: House  Home Layout: One 
Facility/Department: [facility] REHAB UNIT  Occupational Therapy     Name: Jose Tate  : 1940  MRN: [id number]  Date of Service: 2024    Discharge Recommendations:  Continue to assess pending progress, Patient would benefit from continued therapy after discharge    Past Medical History:  has a past medical history of Allergic rhinitis, Atrial fibrillation (HCC), Bronchitis, chronic (HCC), Carotid artery stenosis, Congenital heart disease, GERD (gastroesophageal reflux disease), Heart attack (HCC), Hemorrhoids, Hypercholesterolemia, Palliative care patient, and Type II or unspecified type diabetes mellitus without mention of complication, not stated as uncontrolled.  Past Surgical History:  has a past surgical history that includes Cardiac surgery; Port Surgery (Right, 2021); and ep device procedure (Left, 2024).    Treatment Diagnosis: acute respiratory failure with hypoxia      Assessment   Performance deficits / Impairments: Decreased functional mobility ;Decreased ADL status;Decreased strength;Decreased balance;Decreased endurance  Treatment Diagnosis: acute respiratory failure with hypoxia  Prognosis: Good  Activity Tolerance  Activity Tolerance: Patient Tolerated treatment well     Plan   Occupational Therapy Plan  Days Per Week: 5 Days  Hours Per Day: 1.5 hours  Current Treatment Recommendations: Strengthening, Balance training, Functional mobility training, Endurance training, Patient/Caregiver education & training, Self-Care / ADL, Safety education & training, Equipment evaluation, education, & procurement, Home management training     Restrictions  Restrictions/Precautions  Restrictions/Precautions: Fall Risk  Implants present? : Pacemaker    Subjective   General  Chart Reviewed: Yes  Patient assessed for rehabilitation services?: Yes  Family / Caregiver Present: No    Social/Functional History  Social/Functional History  Lives With: Alone  Type of Home: House  Home Layout: One 
Facility/Department: [facility] 8 REHAB UNIT  Occupational Therapy Treatment Note    Name: Jose Tate  : 1940  MRN: [id number]  Date of Service: 2024    Discharge Recommendations:  Home with Home health OT          Patient Diagnosis(es): The primary encounter diagnosis was Occupational exposure in workplace. Diagnoses of Type 2 diabetes mellitus without complication, without long-term current use of insulin (HCC), Essential hypertension, and Acute respiratory failure with hypoxia were also pertinent to this visit.  Past Medical History:  has a past medical history of Allergic rhinitis, Atrial fibrillation (HCC), Bronchitis, chronic (HCC), Carotid artery stenosis, Congenital heart disease, GERD (gastroesophageal reflux disease), Heart attack (HCC), Hemorrhoids, Hypercholesterolemia, Palliative care patient, and Type II or unspecified type diabetes mellitus without mention of complication, not stated as uncontrolled.  Past Surgical History:  has a past surgical history that includes Cardiac surgery; Port Surgery (Right, 2021); and ep device procedure (Left, 2024).    Treatment Diagnosis: acute respiratory failure with hypoxia    Assessment   Performance deficits / Impairments: Decreased functional mobility ;Decreased strength;Decreased endurance  Assessment: Pt. cleared for up ad tucker in room with rollator.  Treatment Diagnosis: acute respiratory failure with hypoxia  Prognosis: Good  Activity Tolerance  Activity Tolerance: Patient Tolerated treatment well              Plan   Occupational Therapy Plan  Days Per Week: 5 Days  Hours Per Day: 1.5 hours  Current Treatment Recommendations: Strengthening, Functional mobility training, Endurance training, Home management training     Restrictions  Restrictions/Precautions  Restrictions/Precautions: Modified Diet  Implants present? : Pacemaker    Subjective   General  Chart Reviewed: Yes  Patient assessed for rehabilitation services?: Yes  Family / 
Jose Tate arrived to room # 824.   Presented with: Resp failure d/t bilat PNA  Mental Status: Patient is oriented and alert.   There were no vitals filed for this visit.  Patient safety contract and falls prevention contract reviewed with patient Yes.  Oriented Patient to room.  Call light within reach. Yes.  Needs, issues or concerns expressed at this time: no.      Electronically signed by Юлия Batista RN on 11/20/2024 at 1:22 PM  
Nutrition Assessment     Type and Reason for Visit: Reassess    Nutrition Recommendations/Plan:   Continue current interventions     Malnutrition Assessment:  Malnutrition Status: At risk for malnutrition    Nutrition Assessment:  Pt reports appetite is good. Intakes vary although frequently range from %. Wt down from previous weight, pt on Lasix. Food preferences updated.    Estimated Daily Nutrient Needs:  Energy (kcal):  9958-9293 (20-25/kg) Weight Used for Energy Requirements: Current     Protein (g):   (1.3-2/kg) Weight Used for Protein Requirements: Ideal        Fluid (ml/day):  0375-5837 (20-25/kg) Method Used for Fluid Requirements: 1 ml/kcal    Nutrition Related Findings:   BM 11-23, Glu 168-295 Wound Type: None    Current Nutrition Therapies:    ADULT DIET; Regular    Anthropometric Measures:  Height: 177.8 cm (5' 10\")  Current Body Wt: 83.3 kg (183 lb 10.3 oz) (Taken today by RD)     Nutrition Diagnosis:   in context of acute illness or injury related to inadequate protein-energy intake as evidenced by intake 51-75%    Nutrition Interventions:   Food and/or Nutrient Delivery: Continue Current Diet  Nutrition Education/Counseling: No recommendation at this time  Coordination of Nutrition Care: Continue to monitor while inpatient  Plan of Care discussed with: Pt    Goals:  Goals: PO intake 75% or greater  Type of Goal: Continue current goal  Previous Goal Met: Progressing toward Goal(s)    Nutrition Monitoring and Evaluation:   Behavioral-Environmental Outcomes: None Identified  Food/Nutrient Intake Outcomes: Food and Nutrient Intake  Physical Signs/Symptoms Outcomes: Biochemical Data, Fluid Status or Edema, Chewing or Swallowing    Discharge Planning:    Continue current diet     Dulce Sanches RD  Contact: [phone]    
Patient is discharged home today. IMM letter signed. WILMA spoke to Sirisha at [facility] to notify of discharge. Sirisha says patient is scheduled for admit on Friday, 11/29.     Electronically signed by WILMA Mclaughlin on 11/27/24 at 8:34 AM CST   
Patient:   Jose Tate  MR#:    [id number]   Room:    0824/824-02   YOB: 1940  Date of Progress Note: 11/23/2024  Time of Note                           7:17 AM  Consulting Physician:   Kwame Rogers M.D.  Attending Physician:  Kwame Rogers MD     Chief complaint Unspecified bacterial pneumonia     S:This 84 y.o. male  with A-fib, GERD, hypercholesterolemia, essential tremor, BPH, T2DM present to [facility] ED for evaluation following a fall.  Patient states that he fell the night prior to admission due to syncopal episode.  He states he thinks he went into A-fib as that is what happened the last time he had an episode.  Patient was able to crawl into his bedroom but was unable to get into bed and slept on the floor last night until his family found him today.  Patient is anticoagulated on Eliquis, does not think he hit his head.  Denies fever, chills, cough, chest pain, shortness of breath, headache, abdominal pain.    In ED: CXR with interval development of left perihilar vague opacity, potentially either pneumonia or atelectasis, mass cannot be excluded; CT cervical spine with no acute fracture or traumatic malalignment; CT head with senescent changes without acute abnormality; WBC 21.3, H&H 10.6/33.2, CK5 89, lactic acid 1.6, creatinine 1.8, BUN 35, GFR 36. Blood cultures collected November 13, 2024-gram-positive cocci in pairs and chains Blood cultures November 13, 2024-gram-positive cocci in pairs and chains  (Molecular identification Streptococcus pneumonia) Dr Mustafa with ID was consulted. He was started on Rocephen 2000 IV per 24hrs. Repeat cultures no growth to date. MRI  brain for syncopal event and ataxia on 11/18/24  No acute intracranial findings, mild cerebral atrophy, mild chronic white matter microvascular ischemic changes, chronic lacunar infarct in the right periventricular white matter. Patient had been constipated with last BM over a week ago, no complaints of n/v/abdominal 
Patient:   Jose Tate  MR#:    [id number]   Room:    0824/824-02   YOB: 1940  Date of Progress Note: 11/22/2024  Time of Note                           7:24 AM  Consulting Physician:   Kwame Rogers M.D.  Attending Physician:  Kwame Rogers MD     Chief complaint Unspecified bacterial pneumonia     S:This 84 y.o. male  with A-fib, GERD, hypercholesterolemia, essential tremor, BPH, T2DM present to [facility] ED for evaluation following a fall.  Patient states that he fell the night prior to admission due to syncopal episode.  He states he thinks he went into A-fib as that is what happened the last time he had an episode.  Patient was able to crawl into his bedroom but was unable to get into bed and slept on the floor last night until his family found him today.  Patient is anticoagulated on Eliquis, does not think he hit his head.  Denies fever, chills, cough, chest pain, shortness of breath, headache, abdominal pain.    In ED: CXR with interval development of left perihilar vague opacity, potentially either pneumonia or atelectasis, mass cannot be excluded; CT cervical spine with no acute fracture or traumatic malalignment; CT head with senescent changes without acute abnormality; WBC 21.3, H&H 10.6/33.2, CK5 89, lactic acid 1.6, creatinine 1.8, BUN 35, GFR 36. Blood cultures collected November 13, 2024-gram-positive cocci in pairs and chains Blood cultures November 13, 2024-gram-positive cocci in pairs and chains  (Molecular identification Streptococcus pneumonia) Dr Mustafa with ID was consulted. He was started on Rocephen 2000 IV per 24hrs. Repeat cultures no growth to date. MRI  brain for syncopal event and ataxia on 11/18/24  No acute intracranial findings, mild cerebral atrophy, mild chronic white matter microvascular ischemic changes, chronic lacunar infarct in the right periventricular white matter. Patient had been constipated with last BM over a week ago, no complaints of n/v/abdominal 
Physical Therapy  Name: Jose Tate  MRN:  [id number]  Date of service:  11/26/2024 11/26/24 1345   Restrictions/Precautions   Restrictions/Precautions Modified Diet;Up Ad Rianna   Bed mobility   Rolling to Left Independent   Rolling to Right Independent   Supine to Sit Independent   Sit to Supine Independent   Scooting Independent   Bed Mobility Comments mat table with only one pillow and no rail   Transfers   Sit to Stand Modified independent   Stand to Sit Modified independent   Bed to Chair Modified independent  (rollator)   Car Transfer Modified independent  (stand step with rollator; verbalizes bottom in first; uses frame of car to pull up to and control descent)   Comment not consistent with rollator brake managment   Ambulation   Surface Level tile   Device Rollator   Assistance Stand by assistance   Quality of Gait emerging reciprocal pattern with improved self correction of walker proximity; seated rest with fatigue on rollator seat   Gait Deviations Decreased step length;Decreased step height;Decreased head and trunk rotation   Distance 125'   Comments turns included   Ambulation 2   Surface - 2 level tile   Device 2 Rollator   Assistance 2 Stand by assistance   Quality of Gait 2 as above   Gait Deviations Decreased step length;Decreased step height;Decreased head and trunk rotation   Distance 100'   PT Exercises   Dynamic Standing Balance Exercises 6\" fwd steps ups  x 10 L and R lead, lateral x 5 L and R lead   Assessment   Assessment Cont with inconsistencies with rollator brakes though improved; also self correcting proximity to rollator with less reminders. Reviewed all mobility in prep for discharge home tomorrow. Pt eager to return home with family support.   Safety Devices   Type of Devices Call light within reach;Left in chair   PT Individual Minutes   Time In 1345   Time Out 1420   Minutes 35         Electronically signed by Sparkle Wolff PTA on 11/26/2024 at 2:57 PM    
Physical Therapy  Name: Jose Tate  MRN:  [id number]  Date of service:  11/23/2024 11/23/24 0900   General   Additional Pertinent Hx A-fib, GERD, hypercholesterolemia, essential tremor, BPH, T2DM.   Diagnosis Bacteria pneumonia   General Comment   Comments in bed, post OT session (OT reports pt pretty fatigued after shower)   Subjective   Subjective Pt states he feels better after shower but it did wear him out.   Vitals   Pulse (!) 118   Heart Rate Source Monitor   SpO2 96 %   O2 Device None (Room air)   Comment post amb with quick rise to 99% spO2   Subjective   Subjective denies any pain   Bed mobility   Rolling to Left Modified independent   Supine to Sit Modified independent   Bed Mobility Comments oob to L side bed flat and minimal use of rail   Transfers   Sit to Stand Stand by assistance;Contact guard assistance   Stand to Sit Stand by assistance;Contact guard assistance   Bed to Chair Contact guard assistance;Stand by assistance  (makes a full turn to chair rather than quick 90*)   Ambulation   Surface Level tile   Device Rollator   Assistance Contact guard assistance   Quality of Gait quick pace at times uncontrolled, fwd flexed with only brief correction upon cues   Gait Deviations Decreased step length;Decreased step height   Distance 150'   Comments spO2 96% after amb with 118 HR   Ambulation 2   Surface - 2 level tile   Device 2 Rollator   Assistance 2 Contact guard assistance;Stand by assistance   Quality of Gait 2 as above   Distance 50'   Comments stood up while therapist retrieving rollator on other side of room   PT Exercises   A/AROM Exercises QS, ADD sets x 20   Resistive Exercises B man resist x 20 PF/DF, knee ext/flex, hip ext/flex, hip abd/add   Assessment   Assessment Good progression of amb distance this date though presents with fwd flex posture and inconsistent sometimes uncontrolled pace.Dec safety awareness with disregard for brakes on rollator and sometimes makes 
Physical Therapy  Name: Jose Tate  MRN:  [id number]  Date of service:  11/25/2024 11/25/24 1350   Restrictions/Precautions   Restrictions/Precautions Fall Risk   Implants present?  Pacemaker   General   Additional Pertinent Hx A-fib, GERD, hypercholesterolemia, essential tremor, BPH, T2DM.   Diagnosis Bacteria pneumonia   General Comment   Comments in WC, post OT   Subjective   Subjective Pt denies pain. No new to report.   Subjective   Subjective denies any pain   Transfers   Sit to Stand Stand by assistance;Contact guard assistance   Stand to Sit Stand by assistance;Contact guard assistance   Bed to Chair Stand by assistance  (stand step with grab bar in BR WC<>toilet, stand step from WC>recliner)   Comment pt assisted in/out of BR in addition to transfers wc>recliner   Ambulation   Surface Level tile   Device Rollator   Assistance Stand by assistance   Quality of Gait pace and posture improved briefly with VC to stand erect and keep rollator closer; pt remarks \"everyone tells me that\"   Gait Deviations Decreased step length;Decreased step height   Distance 200'   Comments multiple turns L and R   Stairs/Curb   Stairs? Yes   Stairs   # Steps  8   Stairs Height 4\"   Rails Bilateral   Assistance Contact guard assistance   Comment instructed to use step to pattern for safety with good application throughout, but alternates sequence of LEs   PT Exercises   Resistive Exercises x 20 with 1 1/2# wt: hip flex, knee ext; x 20 with green band: hip abd, knee flex   Assessment   Assessment pt able to demonstrate inc amb distance with reminders again for proximity to walker and posture; pt able to manage 8 steps with B HRs though will not have to perform at his home   Safety Devices   Type of Devices Call light within reach;Chair alarm in place;Left in chair   PT Individual Minutes   Time In 1345   Time Out 1430   Minutes 45         Electronically signed by Sparkle Wolff PTA on 11/25/2024 at 2:32 PM    
Physical Therapy  Name: Jose Tate  MRN:  [id number]  Date of service:  11/23/2024 11/23/24 1430   Restrictions/Precautions   Restrictions/Precautions Fall Risk   Implants present?  Pacemaker   General   Additional Pertinent Hx A-fib, GERD, hypercholesterolemia, essential tremor, BPH, T2DM.   Diagnosis Bacteria pneumonia   General Comment   Comments in straight chair, post OT session   Subjective   Subjective Pt states \"I'm just tired\".   Subjective   Subjective denies any pain   Ambulation   Surface Level tile   Device Rollator   Assistance Contact guard assistance   Quality of Gait quick pace at times uncontrolled, fwd flexed with only brief correction upon cues   Gait Deviations Decreased step length;Decreased step height   Distance 150'   Ambulation 2   Surface - 2 level tile   Device 2 Rollator   Assistance 2 Contact guard assistance;Stand by assistance   Quality of Gait 2 as above   Gait Deviations Decreased step length;Decreased step height   Distance 60'   PT Exercises   Standing Open/Closed Kinetic Chain Exercises at stabilized rollator x 15: heel raises, toe raises, marching, hip abd, hip ext  (hip ext more of HS curl despite cues; frequent seated rest periods)   Assessment   Assessment Good progression of amb distance this date though presents with fwd flex posture and inconsistent sometimes uncontrolled pace.Dec safety awareness with disregard for brakes on rollator and sometimes makes unconventional decisions with turns. At time stands and begins to step prior to having rollator present and before being asked to intiate.   Safety Devices   Type of Devices Call light within reach;Chair alarm in place;Left in chair   PT Individual Minutes   Time In 1430   Time Out 1500   Minutes 30         Electronically signed by Sparkle Wolff PTA on 11/23/2024 at 3:29 PM    
Referral sent to [facility]. Will await response / acceptance.    Electronically signed by WILMA Mclaughlin on 11/25/24 at 9:55 AM CST   
Spoke to patient in room re discharge date. Patient is hoping to discharge on Wednesday (11/27) so that he will be home for Thanksgiving. Patient has been able to ambulate up to 150ft at a time. Patient says that he will have a caregiver 5x weekly and daughters that can assist. Anticipate need for home health. MSW notified Dr. Rogers. Will arrange HH when order is in.     Electronically signed by WILMA Mclaughlin on 11/25/24 at 8:22 AM CST   
strategies, and safe eating environment.    Impression  Dysphagia Diagnosis: Swallow function appears WFL  Dysphagia Impression : Pt demo good laryngeal elevation with all PO trials. Pt demo clear vocal quality during the BSE. Pt's swallow function is judged to be WFL at this time.  Dysphagia Outcome Severity Scale: Level 6: Within functional limits/Modified independence     Treatment Plan  Requires SLP Intervention: No  Duration of Treatment: no treatment needed; evaluation only  D/C Recommendations: No follow up therapy recommended post discharge       Recommended Diet and Intervention  Diet Solids Recommendation: Regular  Liquid Consistency Recommendation: Thin  Recommended Form of Meds: Whole with water  Recommendations: Self feed  Therapeutic Interventions: Patient/Family education    Compensatory Swallowing Strategies  Compensatory Swallowing Strategies : Small bites/sips    Treatment/Goals  Long-term Goals  Timeframe for Long-term Goals: no treatment needed; evaluation only    General  Chart Reviewed: Yes  Comments: Pt was seated in his bedside recliner and was reading the paper.  Pt was observed to be coughing upon SLP entry. Pt demo wet cough with small amout of sputum.  Pt stated, \"I am trying to cough all this stuff up.\"  Subjective  Subjective: Pt was alert and oriented X 4 and was pleasant during BSE.  Behavior/Cognition: Alert;Cooperative;Pleasant mood  Temperature Spikes Noted: No  Respiratory Status: Room air  O2 Device: None (Room air)  Communication Observation: Functional  Follows Directions: Simple  Dentition: Adequate;Some missing teeth  Patient Positioning: Upright in chair  Baseline Vocal Quality: Normal  Volitional Cough: Strong;Wet  Prior Dysphagia History: Pt denied any difficulty swallowing/choking on this date.  Consistencies Administered: Ice Chips;Thin - cup;Thin - teaspoon;Pureed;Regular         Oral Motor Deficits  Labial: No impairment  Dentition: Partials (comment);Natural  Oral 
Exceptions  Arousal/Alertness: Appears intact  Following Commands: Appears intact  Attention Span: Appears intact  Memory: Impaired  Safety Judgement: Decreased awareness of need for assistance;Decreased awareness of need for safety  Problem Solving: Assistance required to generate solutions;Assistance required to implement solutions  Insights: Impaired  Cognition Comment: VERY [place].  Orientation  Overall Orientation Status: Within Functional Limits                     LUE AROM (degrees)  LUE AROM : WFL  Left Hand AROM (degrees)  Left Hand AROM: WFL  RUE AROM (degrees)  RUE AROM : WFL  Right Hand AROM (degrees)  Right Hand AROM: WFL  LUE Strength  Gross LUE Strength: WFL  LUE Strength Comment: L UE strength 4-/5  RUE Strength  Gross RUE Strength: WFL  RUE Strength Comment: R UE strength 4/5           Education  Education Given To: Patient  Education Provided: Role of Therapy, Plan of Care, Safety, Fall Prevention Strategies  Education Method: Demonstration, Verbal  Barriers to Learning: Cognition  Education Outcome: Verbalized understanding, Demonstrated understanding, Continued education needed     OutComes Score                                                Toileting Hygiene  Assistance needed: Partial/moderate assistance  CARE Score: 3  Discharge Goal: Independent      Toilet Transfer  Assistance needed: Supervision or touching assistance  CARE Score: 4  Discharge Goal: Independent    Balance  Sitting Balance: Supervision  Standing Balance: Minimal assistance     Standing Balance  Time: 1 minute  Activity: 1-2 handed task-washing perineal area during shower while in standing with min A    Eating  Assistance Needed: Setup or clean-up assistance  CARE Score: 5  Discharge Goal: Independent    Shower/Bathe Self  Assistance Needed: Partial/moderate assistance  CARE Score: 3  Discharge Goal: Set-up or clean-up assistance    Upper Body Dressing  Assistance Needed: Partial/moderate assistance  CARE Score: 3  Discharge 
Minutes    Electronically signed by JONATHAN Melton on 11/26/2024 at 12:30 PM  
use of accessory muscles  Musculoskeletal - no significant wasting of muscles noted, no bony deformities  Extremities-no clubbing, cyanosis or edema  Skin - warm, dry, and intact. No rash, erythema, or pallor.  Psychiatric - mood, affect, and behavior appear normal.      Neurological exam  Awake, alert, fluent oriented appropriate affect  Attention and concentration appear appropriate  Recent and remote memory appears unremarkable  Speech normal without dysarthria  No clear issues with language of fund of knowledge     Cranial Nerve Exam     CN III, IV,VI-EOMI, No nystagmus, conjugate eye movements, no ptosis    CN VII-no facial assymetry       Motor Exam  antigravity throughout upper and lower extremities bilaterally      Tremors- no tremors in hands or head noted     Gait  Not tested     Nursing/pcp notes, imaging,labs and vitals reviewed.     PT,OT and/or speech notes reviewed    Lab Results   Component Value Date    WBC 10.2 11/25/2024    HGB 8.5 (L) 11/25/2024    HCT 27.0 (L) 11/25/2024    MCV 96.4 (H) 11/25/2024     11/25/2024     Lab Results   Component Value Date     11/25/2024    K 4.9 11/25/2024     11/25/2024    CO2 24 11/25/2024    BUN 40 (H) 11/25/2024    CREATININE 1.8 (H) 11/25/2024    GLUCOSE 165 (H) 11/25/2024    CALCIUM 8.3 (L) 11/25/2024    BILITOT <0.2 11/25/2024    ALKPHOS 96 11/25/2024    AST 18 11/25/2024    ALT 18 11/25/2024    LABGLOM 36 (A) 11/25/2024    GFRAA 54 (L) 06/03/2021    GLOB 3.0 07/19/2024     Lab Results   Component Value Date    INR 1.33 (H) 11/13/2024    INR 1.18 08/16/2024    PROTIME 16.1 (H) 11/13/2024    PROTIME 14.7 (H) 08/16/2024       Sparkle Wolff PTA  Physical Therapist Assistant  Physical Therapy     Progress Notes      Cosign Needed     Date of Service: 11/23/2024  3:29 PM     Cosign Needed         Physical Therapy  Name: Jose Tate  MRN:  [id number]  Date of service:  11/23/2024 11/23/24 1430   Restrictions/Precautions 
       Physical Therapy  Name: Jose Tate  MRN:  [id number]  Date of service:  11/23/2024 11/23/24 1430   Restrictions/Precautions   Restrictions/Precautions Fall Risk   Implants present?  Pacemaker   General   Additional Pertinent Hx A-fib, GERD, hypercholesterolemia, essential tremor, BPH, T2DM.   Diagnosis Bacteria pneumonia   General Comment   Comments in straight chair, post OT session   Subjective   Subjective Pt states \"I'm just tired\".   Subjective   Subjective denies any pain   Ambulation   Surface Level tile   Device Rollator   Assistance Contact guard assistance   Quality of Gait quick pace at times uncontrolled, fwd flexed with only brief correction upon cues   Gait Deviations Decreased step length;Decreased step height   Distance 150'   Ambulation 2   Surface - 2 level tile   Device 2 Rollator   Assistance 2 Contact guard assistance;Stand by assistance   Quality of Gait 2 as above   Gait Deviations Decreased step length;Decreased step height   Distance 60'   PT Exercises   Standing Open/Closed Kinetic Chain Exercises at stabilized rollator x 15: heel raises, toe raises, marching, hip abd, hip ext  (hip ext more of HS curl despite cues; frequent seated rest periods)   Assessment   Assessment Good progression of amb distance this date though presents with fwd flex posture and inconsistent sometimes uncontrolled pace.Dec safety awareness with disregard for brakes on rollator and sometimes makes unconventional decisions with turns. At time stands and begins to step prior to having rollator present and before being asked to intiate.   Safety Devices   Type of Devices Call light within reach;Chair alarm in place;Left in chair   PT Individual Minutes   Time In 1430   Time Out 1500   Minutes 30            Electronically signed by Sparkle Wolff PTA on 11/23/2024 at 3:29 PM                           RECORD REVIEW: Previous medical records, medications were reviewed at today's 
Jose Tate  MRN:  [id number]  Date of service:  11/23/2024 11/23/24 1430   Restrictions/Precautions   Restrictions/Precautions Fall Risk   Implants present?  Pacemaker   General   Additional Pertinent Hx A-fib, GERD, hypercholesterolemia, essential tremor, BPH, T2DM.   Diagnosis Bacteria pneumonia   General Comment   Comments in straight chair, post OT session   Subjective   Subjective Pt states \"I'm just tired\".   Subjective   Subjective denies any pain   Ambulation   Surface Level tile   Device Rollator   Assistance Contact guard assistance   Quality of Gait quick pace at times uncontrolled, fwd flexed with only brief correction upon cues   Gait Deviations Decreased step length;Decreased step height   Distance 150'   Ambulation 2   Surface - 2 level tile   Device 2 Rollator   Assistance 2 Contact guard assistance;Stand by assistance   Quality of Gait 2 as above   Gait Deviations Decreased step length;Decreased step height   Distance 60'   PT Exercises   Standing Open/Closed Kinetic Chain Exercises at stabilized rollator x 15: heel raises, toe raises, marching, hip abd, hip ext  (hip ext more of HS curl despite cues; frequent seated rest periods)   Assessment   Assessment Good progression of amb distance this date though presents with fwd flex posture and inconsistent sometimes uncontrolled pace.Dec safety awareness with disregard for brakes on rollator and sometimes makes unconventional decisions with turns. At time stands and begins to step prior to having rollator present and before being asked to intiate.   Safety Devices   Type of Devices Call light within reach;Chair alarm in place;Left in chair   PT Individual Minutes   Time In 1430   Time Out 1500   Minutes 30            Electronically signed by Sparkle Wolff PTA on 11/23/2024 at 3:29 PM                           RECORD REVIEW: Previous medical records, medications were reviewed at today's visit    IMPRESSION:   1.

## 2024-11-29 ENCOUNTER — CARE COORDINATION (OUTPATIENT)
Dept: CASE MANAGEMENT | Age: 84
End: 2024-11-29

## 2024-11-29 DIAGNOSIS — R78.81 BACTEREMIA DUE TO STREPTOCOCCUS: Primary | ICD-10-CM

## 2024-11-29 DIAGNOSIS — B95.5 BACTEREMIA DUE TO STREPTOCOCCUS: Primary | ICD-10-CM

## 2024-12-02 ENCOUNTER — OFFICE VISIT (OUTPATIENT)
Dept: PALLATIVE CARE | Age: 84
End: 2024-12-02
Payer: MEDICARE

## 2024-12-02 DIAGNOSIS — J42 CHRONIC BRONCHITIS, UNSPECIFIED CHRONIC BRONCHITIS TYPE (HCC): Primary | ICD-10-CM

## 2024-12-02 DIAGNOSIS — Z51.5 ENCOUNTER FOR PALLIATIVE CARE: ICD-10-CM

## 2024-12-02 DIAGNOSIS — I50.22 CHRONIC SYSTOLIC (CONGESTIVE) HEART FAILURE (HCC): ICD-10-CM

## 2024-12-02 DIAGNOSIS — N18.32 STAGE 3B CHRONIC KIDNEY DISEASE (HCC): ICD-10-CM

## 2024-12-02 PROCEDURE — 99349 HOME/RES VST EST MOD MDM 40: CPT | Performed by: NURSE PRACTITIONER

## 2024-12-02 PROCEDURE — 1036F TOBACCO NON-USER: CPT | Performed by: NURSE PRACTITIONER

## 2024-12-02 PROCEDURE — G8482 FLU IMMUNIZE ORDER/ADMIN: HCPCS | Performed by: NURSE PRACTITIONER

## 2024-12-02 PROCEDURE — G8417 CALC BMI ABV UP PARAM F/U: HCPCS | Performed by: NURSE PRACTITIONER

## 2024-12-02 PROCEDURE — 1123F ACP DISCUSS/DSCN MKR DOCD: CPT | Performed by: NURSE PRACTITIONER

## 2024-12-02 RX ORDER — ALBUTEROL SULFATE 0.83 MG/ML
2.5 SOLUTION RESPIRATORY (INHALATION) 4 TIMES DAILY PRN
Qty: 120 EACH | Refills: 3 | Status: SHIPPED | OUTPATIENT
Start: 2024-12-02

## 2024-12-02 RX ORDER — ALBUTEROL SULFATE 90 UG/1
2 INHALANT RESPIRATORY (INHALATION) EVERY 6 HOURS PRN
Qty: 18 G | Refills: 3 | Status: SHIPPED | OUTPATIENT
Start: 2024-12-02

## 2024-12-02 RX ORDER — FLUTICASONE PROPIONATE AND SALMETEROL 250; 50 UG/1; UG/1
1 POWDER RESPIRATORY (INHALATION) EVERY 12 HOURS
Qty: 60 EACH | Refills: 3 | Status: SHIPPED | OUTPATIENT
Start: 2024-12-02

## 2024-12-02 NOTE — PROGRESS NOTES
this visit.  Temp   97.8, Heart Rate  88, Respirations 18, SpO2  95% RA  General appearance:  alert and cooperative with exam, vital signs stable, well nourished, well developed, chronically ill appearing  HEENT: normocephalic, atraumatic, sclera clear, conjunctiva pink, EOMI, external ears normal, external nose normal, lips normal, oral mucosa moist   Neck: supple, trachea midline, no palpable adenopathy  Lungs: equal bilateral expansion, diminished bases bilaterally, cough, dyspnea on exertion  Heart: regular rate and rhythm, S1 S2 normal, no murmur  Abdomen:  soft, non-tender, bowel sounds active, no masses noted  Genitourinary: no bladder fullness, masses, or tenderness  Extremities:  no cyanosis, no clubbing, 1+ bilateral LE edema  Neurologic: no focal neurologic deficits, normal sensation, no tremor, alert and oriented   Psychiatric: alert and oriented, no recent or remote memory deficits, good judgement, mood and affect appropriate  Skin: warm, dry, no obvious lesions    LAB DATA REVIEWED:     Admission on 11/20/2024, Discharged on 11/27/2024   Component Date Value    Prealbumin 11/20/2024 13 (L)     TSH Reflex FT4 11/20/2024 0.90     Vitamin B-12 11/20/2024 604     Sodium 11/21/2024 136     Potassium reflex Magnesi* 11/21/2024 4.3     Chloride 11/21/2024 99     CO2 11/21/2024 25     Anion Gap 11/21/2024 12     Glucose 11/21/2024 189 (H)     BUN 11/21/2024 32 (H)     Creatinine 11/21/2024 1.8 (H)     Est, Glom Filt Rate 11/21/2024 36 (A)     Calcium 11/21/2024 8.9     Total Protein 11/21/2024 6.6     Albumin 11/21/2024 3.1 (L)     Total Bilirubin 11/21/2024 0.2     Alkaline Phosphatase 11/21/2024 100     ALT 11/21/2024 21     AST 11/21/2024 23     Prealbumin 11/21/2024 14 (L)     WBC 11/21/2024 9.0     RBC 11/21/2024 3.06 (L)     Hemoglobin 11/21/2024 9.1 (L)     Hematocrit 11/21/2024 29.2 (L)     MCV 11/21/2024 95.4 (H)     MCH 11/21/2024 29.7     MCHC 11/21/2024 31.2 (L)     RDW 11/21/2024 14.3

## 2024-12-02 NOTE — PROGRESS NOTES
Physician Progress Note      PATIENT:               ANGELA BOATENG  CSN #:                  [id number]  :                       1940  ADMIT DATE:       2024 11:02 AM  DISCH DATE:        2024 12:46 PM  RESPONDING  PROVIDER #:        FORTUNATO DELGADO          QUERY TEXT:    Pt admitted with fall, pneumonia. Pt noted to have WBC 21.3 with 10% bands,   Procalcitonin 1.20, Tachypnea 22-27, Tachycardia 129, Bacteremia due to   Streptococcus per DC summary. If possible, please document in the progress   notes and discharge summary if you are evaluating and /or treating any of the   following:  The medical record reflects the following:  Risk Factors: Community acquired bacterial pneumonia, bacteremia due to   streptococcus, tachypnea, elevated WBC, Lactate  Clinical Indicators: ED VS: 99.0 oral, 111/90, 129, 17, 93%. ED PN-   Tachycardic, adding lactic and blood cultures for possible sepsis. DC summary-   Bacteremia due to Streptococcus. Lactate 1.6. Pt was initiated on on   broad-spectrum antibiotics.  Blood culture Streptococcus pneumoniae   antibiotics adjusted to Rocephin and infectious disease consulted.   Transitioned to oral Omnicef for 7 days. Will need f/u imaging in 3-4 weeks to   assure resolution of infiltrate. Repeat blood cultures no growth to date. WBC   21.3 with 10% bands, repeat 15.0 with 2% bands, Procalcitonin 1.20, Tachy  Treatment: IV abx, consults, monitoring, labs, CT, MRI, xray, echo, 250 cc   bolus, 10mg Cardizem IV,  Options provided:  -- Gram positive Sepsis, present on admission  -- pneumonia without Sepsis  -- Other - I will add my own diagnosis  -- Disagree - Not applicable / Not valid  -- Disagree - Clinically unable to determine / Unknown  -- Refer to Clinical Documentation Reviewer    PROVIDER RESPONSE TEXT:    This patient has gram positive sepsis which was present on admission.    Query created by: Betty Garrido on 2024 6:21 PM      Electronically signed by:

## 2024-12-03 ENCOUNTER — CARE COORDINATION (OUTPATIENT)
Dept: CASE MANAGEMENT | Age: 84
End: 2024-12-03

## 2024-12-03 NOTE — CARE COORDINATION
TAWANA Mendez - CNP Palliative Care [phone]    1/16/2025 11:30 AM (Arrive by 11:15 AM) [facility] CT RM 1 Radiology [phone]    1/24/2025 11:30 AM Darvin Malloy MD Hematology and Oncology [phone]    1/24/2025 11:30 AM SCHEDULE, [facility] MED ONC Infusion Therapy [phone]    3/18/2025 3:15 PM Kendell Willson MD Cardiology [phone]            Care Transition Nurse provided contact information.  Plan for follow-up call in 6-10 days based on severity of symptoms and risk factors.  Plan for next call: symptom management-HFU, med changes, respiratory assessment, mobility, bowels      Ranjeet Holley RN

## 2024-12-05 ENCOUNTER — OFFICE VISIT (OUTPATIENT)
Dept: PRIMARY CARE CLINIC | Age: 84
End: 2024-12-05

## 2024-12-05 VITALS
OXYGEN SATURATION: 93 % | TEMPERATURE: 97.8 F | HEIGHT: 71 IN | DIASTOLIC BLOOD PRESSURE: 70 MMHG | WEIGHT: 184 LBS | RESPIRATION RATE: 18 BRPM | HEART RATE: 64 BPM | BODY MASS INDEX: 25.76 KG/M2 | SYSTOLIC BLOOD PRESSURE: 136 MMHG

## 2024-12-05 DIAGNOSIS — D50.9 IRON DEFICIENCY ANEMIA, UNSPECIFIED IRON DEFICIENCY ANEMIA TYPE: ICD-10-CM

## 2024-12-05 DIAGNOSIS — I10 ESSENTIAL HYPERTENSION: Primary | ICD-10-CM

## 2024-12-05 DIAGNOSIS — E88.09 HYPOALBUMINEMIA: ICD-10-CM

## 2024-12-05 DIAGNOSIS — N18.32 STAGE 3B CHRONIC KIDNEY DISEASE (HCC): ICD-10-CM

## 2024-12-05 LAB
ALBUMIN SERPL-MCNC: 4 G/DL (ref 3.5–5.2)
ALP SERPL-CCNC: 120 U/L (ref 40–129)
ALT SERPL-CCNC: 15 U/L (ref 5–41)
ANION GAP SERPL CALCULATED.3IONS-SCNC: 10 MMOL/L (ref 7–19)
AST SERPL-CCNC: 16 U/L (ref 5–40)
BILIRUB SERPL-MCNC: <0.2 MG/DL (ref 0.2–1.2)
BUN SERPL-MCNC: 25 MG/DL (ref 8–23)
CALCIUM SERPL-MCNC: 9.5 MG/DL (ref 8.8–10.2)
CHLORIDE SERPL-SCNC: 98 MMOL/L (ref 98–111)
CO2 SERPL-SCNC: 30 MMOL/L (ref 22–29)
CREAT SERPL-MCNC: 1.7 MG/DL (ref 0.7–1.2)
ERYTHROCYTE [DISTWIDTH] IN BLOOD BY AUTOMATED COUNT: 14.2 % (ref 11.5–14.5)
GLUCOSE SERPL-MCNC: 160 MG/DL (ref 70–99)
HCT VFR BLD AUTO: 32.4 % (ref 42–52)
HGB BLD-MCNC: 9.9 G/DL (ref 14–18)
MCH RBC QN AUTO: 29.7 PG (ref 27–31)
MCHC RBC AUTO-ENTMCNC: 30.6 G/DL (ref 33–37)
MCV RBC AUTO: 97.3 FL (ref 80–94)
PLATELET # BLD AUTO: 337 K/UL (ref 130–400)
PMV BLD AUTO: 10.4 FL (ref 9.4–12.4)
POTASSIUM SERPL-SCNC: 4.9 MMOL/L (ref 3.5–5)
PROT SERPL-MCNC: 7.6 G/DL (ref 6.4–8.3)
RBC # BLD AUTO: 3.33 M/UL (ref 4.7–6.1)
SODIUM SERPL-SCNC: 138 MMOL/L (ref 136–145)
WBC # BLD AUTO: 6.6 K/UL (ref 4.8–10.8)

## 2024-12-07 PROBLEM — R91.8 LUNG MASS: Status: ACTIVE | Noted: 2024-12-07

## 2024-12-07 ASSESSMENT — ENCOUNTER SYMPTOMS
GASTROINTESTINAL NEGATIVE: 1
SHORTNESS OF BREATH: 1

## 2024-12-10 ENCOUNTER — CARE COORDINATION (OUTPATIENT)
Dept: CASE MANAGEMENT | Age: 84
End: 2024-12-10

## 2024-12-10 NOTE — CARE COORDINATION
contact information.  Plan for follow-up call in 6-10 days based on severity of symptoms and risk factors.  Plan for next call: symptom management-mobility, appetite, Palliative      Ranjeet Holley RN

## 2024-12-11 ENCOUNTER — OFFICE VISIT (OUTPATIENT)
Dept: PALLATIVE CARE | Age: 84
End: 2024-12-11
Payer: MEDICARE

## 2024-12-11 DIAGNOSIS — Z51.5 ENCOUNTER FOR PALLIATIVE CARE: ICD-10-CM

## 2024-12-11 DIAGNOSIS — R60.0 BILATERAL LOWER EXTREMITY EDEMA: Primary | ICD-10-CM

## 2024-12-11 DIAGNOSIS — I50.22 CHRONIC SYSTOLIC (CONGESTIVE) HEART FAILURE (HCC): ICD-10-CM

## 2024-12-11 DIAGNOSIS — J42 CHRONIC BRONCHITIS, UNSPECIFIED CHRONIC BRONCHITIS TYPE (HCC): ICD-10-CM

## 2024-12-11 PROCEDURE — 1123F ACP DISCUSS/DSCN MKR DOCD: CPT | Performed by: NURSE PRACTITIONER

## 2024-12-11 PROCEDURE — G8482 FLU IMMUNIZE ORDER/ADMIN: HCPCS | Performed by: NURSE PRACTITIONER

## 2024-12-11 PROCEDURE — 1036F TOBACCO NON-USER: CPT | Performed by: NURSE PRACTITIONER

## 2024-12-11 PROCEDURE — G8417 CALC BMI ABV UP PARAM F/U: HCPCS | Performed by: NURSE PRACTITIONER

## 2024-12-11 PROCEDURE — 99349 HOME/RES VST EST MOD MDM 40: CPT | Performed by: NURSE PRACTITIONER

## 2024-12-11 NOTE — PROGRESS NOTES
Supportive Care/Community Based Palliative Care  Follow Up Note        Patient Name:  Jose Tate  Medical Record Number:  [id number]  YOB: 1940    Date of Visit: 12/11/2024  Location of Visit:  Home    Patient Care Team:  Chayo Grande MD as PCP - General (Family Medicine)  Chayo Grande MD as PCP - Empaneled Provider  Vanessa Cifuentes APRN - CNP as Advanced Practice Nurse (Nurse Practitioner)  Ranjeet Holley RN as Care Transitions Nurse    History obtained from:  patient, non-family caregiver - aniket Ellen, electronic medical record    PALLIATIVE DIAGNOSES AND ORDERS/RECOMMENDATIONS/PLAN:   1. Bilateral lower extremity edema  Encouraged daily weight  Encouraged elevation of feet when seated  Encouraged compression during the day    2. Chronic systolic (congestive) heart failure (HCC)  Encouraged daily weight  Monitor sodium intake    3. Chronic bronchitis, unspecified chronic bronchitis type (HCC)  Continue Advair and Albuterol    4. Encounter for palliative care  Current goals of care include: Continue treatment with palliative intent, Preserve independence/control/autonomy, Maintain or improve functional status, Maintain or improve quality of life, Focus on comfort and quality of life, Remain at home    Code status confirmed: Full Code  Will continue ACP discussions at future visit  Follow up home visit in 2-3 weeks and as needed    CHIEF COMPLAINT:     Chief Complaint   Patient presents with    Cough     Follow up on cough       CLINICAL SUMMARY:          Jose Tate is a 84 y.o. male with PMH of CHF, lung cancer s/p chemo and radiation, COPD, former smoker, atrial fibrillation, tachycardia-Lukasz syndrome s/p pacemaker placement, CKD stage IIIb, T2DM, and other co morbidities.     [facility] admission 8/15-8/20/2024 for evaluation and treatment of syncope, found to be in new onset atrial fibrillation, was found to have symptomatic bradycardia and pacemaker was placed, discharged to

## 2024-12-13 ENCOUNTER — TELEPHONE (OUTPATIENT)
Dept: PRIMARY CARE CLINIC | Age: 84
End: 2024-12-13

## 2024-12-13 DIAGNOSIS — E78.00 HYPERCHOLESTEROLEMIA: Primary | ICD-10-CM

## 2024-12-13 RX ORDER — ROSUVASTATIN CALCIUM 10 MG/1
10 TABLET, COATED ORAL DAILY
Qty: 90 TABLET | Refills: 3 | Status: SHIPPED | OUTPATIENT
Start: 2024-12-13

## 2024-12-13 NOTE — TELEPHONE ENCOUNTER
Patients daughter has called, she has been on the phone with [facility] and they are concerned about a drug interaction causing weakness with hi Lipitor and Cardizem, they are recommending that you change him to crestor or pravastatin

## 2024-12-18 ENCOUNTER — CARE COORDINATION (OUTPATIENT)
Dept: CASE MANAGEMENT | Age: 84
End: 2024-12-18

## 2024-12-18 NOTE — CARE COORDINATION
Care Transitions Note    Follow Up Call     Attempted to reach patient for transitions of care follow up.  Unable to reach patient.      Outreach Attempts:   Attempted to make contact with Jose for CT f/u call without success.  Unable to leave a message regarding intent of call and call back information.  Will try again at a later time.      Follow Up Appointment:   Future Appointments         Provider Specialty Dept Phone    12/23/2024 1:30 PM Vanessa Cifuentes APRN - CNP Palliative Care [phone]    1/16/2025 11:30 AM (Arrive by 11:15 AM) [facility] LMP CT RM 1 Radiology [phone]    1/24/2025 11:30 AM Darvin Malloy MD Hematology and Oncology [phone]    1/24/2025 11:30 AM SCHEDULE, [facility] MED ONC Infusion Therapy [phone]    3/5/2025 2:00 PM Chayo Grande MD Primary Care [phone]    3/18/2025 3:15 PM Kendell Willson MD Cardiology [phone]            Plan for follow-up call in 6-10 days based on severity of symptoms and risk factors. Plan for next call: symptom management-     Ranjeet Holley RN

## 2024-12-23 ENCOUNTER — TELEPHONE (OUTPATIENT)
Dept: PRIMARY CARE CLINIC | Age: 84
End: 2024-12-23

## 2024-12-23 ENCOUNTER — OFFICE VISIT (OUTPATIENT)
Dept: PALLATIVE CARE | Age: 84
End: 2024-12-23
Payer: MEDICARE

## 2024-12-23 DIAGNOSIS — J42 CHRONIC BRONCHITIS, UNSPECIFIED CHRONIC BRONCHITIS TYPE (HCC): ICD-10-CM

## 2024-12-23 DIAGNOSIS — R60.0 BILATERAL LOWER EXTREMITY EDEMA: Primary | ICD-10-CM

## 2024-12-23 DIAGNOSIS — Z51.5 ENCOUNTER FOR PALLIATIVE CARE: ICD-10-CM

## 2024-12-23 DIAGNOSIS — I50.22 CHRONIC SYSTOLIC (CONGESTIVE) HEART FAILURE (HCC): ICD-10-CM

## 2024-12-23 PROCEDURE — 1036F TOBACCO NON-USER: CPT | Performed by: NURSE PRACTITIONER

## 2024-12-23 PROCEDURE — 99349 HOME/RES VST EST MOD MDM 40: CPT | Performed by: NURSE PRACTITIONER

## 2024-12-23 PROCEDURE — G8482 FLU IMMUNIZE ORDER/ADMIN: HCPCS | Performed by: NURSE PRACTITIONER

## 2024-12-23 PROCEDURE — 1123F ACP DISCUSS/DSCN MKR DOCD: CPT | Performed by: NURSE PRACTITIONER

## 2024-12-23 PROCEDURE — G8417 CALC BMI ABV UP PARAM F/U: HCPCS | Performed by: NURSE PRACTITIONER

## 2024-12-23 NOTE — TELEPHONE ENCOUNTER
[facility] states Pt BP and BS have been up a little lately. Yesterday /89. Today 140/75. . HH states BS usually 160's. HH advised on diet and swelling. Pt states BP and BS ahd been good until last couple of days but Pt states he has been eating more salty food.

## 2024-12-25 NOTE — PROGRESS NOTES
tenderness  Extremities:  no cyanosis, no clubbing, 2+ bilateral LE edema  Neurologic: no focal neurologic deficits, normal sensation, no tremor, alert and oriented   Psychiatric: alert and oriented, no recent or remote memory deficits, good judgement, mood and affect appropriate  Skin: warm, dry, no obvious lesions    LAB DATA REVIEWED:     No visits with results within 7 Day(s) from this visit.   Latest known visit with results is:   Office Visit on 12/05/2024   Component Date Value    WBC 12/05/2024 6.6     RBC 12/05/2024 3.33 (L)     Hemoglobin 12/05/2024 9.9 (L)     Hematocrit 12/05/2024 32.4 (L)     MCV 12/05/2024 97.3 (H)     MCH 12/05/2024 29.7     MCHC 12/05/2024 30.6 (L)     RDW 12/05/2024 14.2     Platelets 12/05/2024 337     MPV 12/05/2024 10.4     Sodium 12/05/2024 138     Potassium 12/05/2024 4.9     Chloride 12/05/2024 98     CO2 12/05/2024 30 (H)     Anion Gap 12/05/2024 10     Glucose 12/05/2024 160 (H)     BUN 12/05/2024 25 (H)     Creatinine 12/05/2024 1.7 (H)     Est, Glom Filt Rate 12/05/2024 39 (A)     Calcium 12/05/2024 9.5     Total Protein 12/05/2024 7.6     Albumin 12/05/2024 4.0     Total Bilirubin 12/05/2024 <0.2     Alkaline Phosphatase 12/05/2024 120     ALT 12/05/2024 15     AST 12/05/2024 16        Total Visit Time: 40 minutes including face to face, chart review and documentation     This dictation was generated by voice recognition computer software.  Although all attempts are made to edit the dictation for accuracy, there may be errors in the transcription that are not intended.      Electronically signed by TAWANA Albert CNP on 12/25/2024 at 1:54 PM

## 2024-12-27 ENCOUNTER — CARE COORDINATION (OUTPATIENT)
Dept: CASE MANAGEMENT | Age: 84
End: 2024-12-27

## 2024-12-27 NOTE — CARE COORDINATION
Care Transitions Note    Final Call     Patient Current Location:  Home: [address] [place] [phone]    Care Transition Nurse contacted the patient by telephone. Verified name and  as identifiers.    Patient graduated from the Care Transitions program on today.  Patient/family progressing towards self management. .        Handoff:   Condition management for DM, Afib.     Care Summary Note: Spoke with patient today for f/u call. He is doing fairly well, bs a bit elevated at 187 but he says he was at a holiday party last night and ate about 6 chocolate covered strawberries. His bp has been up too, but better today. CTN discussed watching the sodium intake and sugar intake. He has another party tonight and says he will do better. He is doing better, making sure he keeps watch on things, HH was there today he says. No problems or complaints. Will end CTN outreach and hand off to ACM for further outreach.     Assessments:  Care Transitions Subsequent and Final Call    Subsequent and Final Calls  Are you currently active with any services?: Home Health  Care Transitions Interventions  Other Interventions:              Upcoming Appointments:    Future Appointments         Provider Specialty Dept Phone    2025 11:30 AM (Arrive by 11:15 AM) [facility] CT RM 1 Radiology [phone]    2025 11:30 AM Darvin Malloy MD Hematology and Oncology [phone]    2025 11:30 AM SCHEDULE, [facility] MED ONC Infusion Therapy [phone]    3/5/2025 2:00 PM Chayo Grande MD Primary Care [phone]    3/18/2025 3:15 PM Kendell Willson MD Cardiology [phone]            Patient has agreed to contact primary care provider and/or specialist for any further questions, concerns, or needs.    Ranjeet Holley RN

## 2025-01-02 ENCOUNTER — CARE COORDINATION (OUTPATIENT)
Dept: CARE COORDINATION | Age: 85
End: 2025-01-02

## 2025-01-02 NOTE — CARE COORDINATION
Ambulatory Care Coordination Note     2025 3:53 PM     Patient Current Location:  Home: [address] [place] [phone]     This patient was received as a referral from Care Transition Nurse.    ACM contacted the patient by telephone. Verified name and  with patient as identifiers. Provided introduction to self, and explanation of the ACM role.   Patient accepted care management services at this time.          ACM: Jazmin Balderas RN     Challenges to be reviewed by the provider   Additional needs identified to be addressed with provider No  none               Method of communication with provider: none.    Utilization: N/A - Initial Call     Care Summary Note: Spoke to patient.  He does not drive, stated someone takes him for errands. He uses a walker and does pretty well. He has PT visit weekly, does walking in the house and does lower body exercises to strengthen his legs. Patient has Palliative Care - monthly home visit with TAWANA.  He does have nightly urination 1-2 episodes nightly, stated he has lights that come on when he gets up and moves. He uses the furniture to steady himself on the way. He has a Medic Alert for fall support if needed. He tries to carry his cell phone. No recent falls. He denied any episodes of chest pain or SoB. He has a good appetite, stated eats all the time. He has a  5d/week who assists from 9 am - 2 pm. She will cook for patient but he mostly eats from restaurants. Discussed option of MOW, patient declined - he tried and does not like the food.  He checks his fasting glucose daily - 154 today. Weight 180.4 lb today. Patient reported his legs swell every day and he does elevate them.  BP check today = 145/77, HR not recorded. SpO2 check during call = 96%, HR 68.  Discussed compression socks with patient, encouraged him to consider using zip up socks. Patient stated ACM could talk with his daughter about this next week - Dulce - when she is back in town.

## 2025-01-08 RX ORDER — APIXABAN 2.5 MG/1
2.5 TABLET, FILM COATED ORAL 2 TIMES DAILY
Qty: 60 TABLET | Refills: 2 | OUTPATIENT
Start: 2025-01-08

## 2025-01-08 RX ORDER — MONTELUKAST SODIUM 10 MG/1
TABLET ORAL
Qty: 30 TABLET | Refills: 1 | Status: SHIPPED | OUTPATIENT
Start: 2025-01-08

## 2025-01-08 RX ORDER — CETIRIZINE HYDROCHLORIDE 10 MG/1
TABLET ORAL
Qty: 30 TABLET | Refills: 1 | Status: SHIPPED | OUTPATIENT
Start: 2025-01-08

## 2025-01-08 RX ORDER — PRIMIDONE 50 MG/1
TABLET ORAL
Qty: 60 TABLET | Refills: 1 | Status: SHIPPED | OUTPATIENT
Start: 2025-01-08

## 2025-01-08 RX ORDER — TAMSULOSIN HYDROCHLORIDE 0.4 MG/1
CAPSULE ORAL
Qty: 30 CAPSULE | Refills: 1 | Status: SHIPPED | OUTPATIENT
Start: 2025-01-08

## 2025-01-08 NOTE — TELEPHONE ENCOUNTER
Requested Prescriptions     Pending Prescriptions Disp Refills    ELIQUIS 2.5 MG TABS tablet [Pharmacy Med Name: ELIQUIS 2.5 MG TAB 2.5 Tablet] 60 tablet 2     Sig: Take 1 tablet by mouth 2 times daily       Last Office Visit: Visit date not found  Next Office Visit: Visit date not found  Last Medication Refill: 11/26/2024 with 0 RF

## 2025-01-09 ENCOUNTER — CARE COORDINATION (OUTPATIENT)
Dept: CARE COORDINATION | Age: 85
End: 2025-01-09

## 2025-01-09 NOTE — CARE COORDINATION
Ambulatory Care Coordination Note     2025 5:32 PM     Patient Current Location:  Home: [address] [place] [phone]     ACM contacted the patient by telephone. Verified name and  with patient as identifiers.         ACM: Jazmin Balderas RN     Challenges to be reviewed by the provider   Additional needs identified to be addressed with provider No  none               Method of communication with provider: none.    Utilization: Patient has not had any utilization since our last call.     Care Summary Note: Spoke to patient.  Fasting glucose 177 today. Patient had a strawberry milkshake for supper last night. BP today 161/87, SpO2 97%, HR not recorded. His BLE edema is mostly his ankles and feet. He denied any falls. Patient denied any concerning symptoms, stated he feels well. Denied CP, headache, vision changes, shortness of breath, cough, chills, fever. His  plans to try and come tomorrow despite the snow forecast. Patient said he has all of his medications, has plenty of food in the house. He postponed discussion with daughter about compression socks as she has the flu right now. Suggested pt try to moderate his carb intake to improve his fasting glucose readings. Encouraged pt to call sooner for any changes from his baseline health to reduce risk of severe illness. He voiced understanding. He was agreeable to call with ACM next week.    Offered patient enrollment in the Remote Patient Monitoring (RPM) program for in-home monitoring: Yes, but did not enroll at this time: already monitoring with home equipment.     Assessments Completed:   Care Coordination Interventions    Referral from Primary Care Provider: No  Suggested Interventions and Community Resources  Disease Specific Clinic: Completed (Comment: Cardiology, Oncology)  Meals on Wheels: Declined (Comment: Pt tried MOW, did not like the food.)  Palliative Care: Completed (Comment: Monthly home visit)  Physical Therapy:

## 2025-01-13 RX ORDER — APIXABAN 2.5 MG/1
2.5 TABLET, FILM COATED ORAL 2 TIMES DAILY
Qty: 60 TABLET | Refills: 0 | OUTPATIENT
Start: 2025-01-13

## 2025-01-14 RX ORDER — APIXABAN 2.5 MG/1
2.5 TABLET, FILM COATED ORAL 2 TIMES DAILY
Qty: 60 TABLET | Refills: 3 | Status: SHIPPED | OUTPATIENT
Start: 2025-01-14

## 2025-01-15 RX ORDER — METOPROLOL SUCCINATE 100 MG/1
100 TABLET, EXTENDED RELEASE ORAL 2 TIMES DAILY
Qty: 60 TABLET | Refills: 0 | Status: SHIPPED | OUTPATIENT
Start: 2025-01-15

## 2025-01-15 RX ORDER — ASPIRIN 81 MG
2 TABLET, DELAYED RELEASE (ENTERIC COATED) ORAL 2 TIMES DAILY
Qty: 120 TABLET | Refills: 0 | Status: SHIPPED | OUTPATIENT
Start: 2025-01-15

## 2025-01-16 ENCOUNTER — HOSPITAL ENCOUNTER (OUTPATIENT)
Dept: CT IMAGING | Age: 85
Discharge: HOME | End: 2025-01-16
Payer: MEDICARE

## 2025-01-16 DIAGNOSIS — C34.91 ADENOCARCINOMA OF RIGHT LUNG (HCC): ICD-10-CM

## 2025-01-16 PROCEDURE — 71250 CT THORAX DX C-: CPT

## 2025-01-20 DIAGNOSIS — Z95.0 PACEMAKER: Primary | ICD-10-CM

## 2025-01-20 DIAGNOSIS — I49.5 TACHY-BRADY SYNDROME (HCC): ICD-10-CM

## 2025-01-22 ENCOUNTER — TELEPHONE (OUTPATIENT)
Dept: HEMATOLOGY | Age: 85
End: 2025-01-22

## 2025-01-27 ENCOUNTER — TELEPHONE (OUTPATIENT)
Dept: HEMATOLOGY | Age: 85
End: 2025-01-27

## 2025-01-27 NOTE — TELEPHONE ENCOUNTER
I called patient and left detailed voicemail about their appointment on 1/30/25. I made patient aware not to arrive any earlier than the appointment time and to come at the time of the follow up not the time of the lab appointment if it is different than the follow up appt time. I also made patient aware to eat and drink plenty of water to hydrate properly before coming to these appointments because this will make their lab draw much easier.  Made patient aware that we are now located at the [facility] at [address]. Located between [facility] and the [facility]. Front entrance faces [facility].

## 2025-01-28 DIAGNOSIS — Z08 ENCOUNTER FOR FOLLOW-UP SURVEILLANCE OF LUNG CANCER: ICD-10-CM

## 2025-01-28 DIAGNOSIS — R53.83 OTHER FATIGUE: ICD-10-CM

## 2025-01-28 DIAGNOSIS — C34.91 ADENOCARCINOMA OF RIGHT LUNG (HCC): Primary | ICD-10-CM

## 2025-01-28 DIAGNOSIS — Z85.118 ENCOUNTER FOR FOLLOW-UP SURVEILLANCE OF LUNG CANCER: ICD-10-CM

## 2025-01-28 NOTE — PROGRESS NOTES
Mother     Cancer Father         Lung     Current Hospital Medications:    Current Outpatient Medications   Medication Sig Dispense Refill    SENNA PLUS 8.6-50 MG per tablet Take 2 tablets by mouth in the morning and at bedtime 120 tablet 0    metoprolol succinate (TOPROL XL) 100 MG extended release tablet Take 1 tablet by mouth in the morning and at bedtime 60 tablet 0    ELIQUIS 2.5 MG TABS tablet Take 1 tablet by mouth 2 times daily 60 tablet 3    primidone (MYSOLINE) 50 MG tablet TAKE ONE TABLET BY MOUTH EVERY MORNING AND AT BEDTIME FOR TREMORS 60 tablet 1    tamsulosin (FLOMAX) 0.4 MG capsule Take 1 capsule by mouth daily For prostate and urination 30 capsule 1    cetirizine (ZYRTEC) 10 MG tablet Take 1 tablet by mouth daily For chronic allergies 30 tablet 1    montelukast (SINGULAIR) 10 MG tablet Take 1 tablet by mouth nightly For allergies 30 tablet 1    rosuvastatin (CRESTOR) 10 MG tablet Take 1 tablet by mouth daily At bedtime for high cholesterol 90 tablet 3    fluticasone-salmeterol (ADVAIR) 250-50 MCG/ACT AEPB diskus inhaler Inhale 1 puff into the lungs in the morning and 1 puff in the evening. 60 each 3    albuterol sulfate HFA (PROVENTIL;VENTOLIN;PROAIR) 108 (90 Base) MCG/ACT inhaler Inhale 2 puffs into the lungs every 6 hours as needed for Wheezing 18 g 3    albuterol (PROVENTIL) (2.5 MG/3ML) 0.083% nebulizer solution Take 3 mLs by nebulization 4 times daily as needed for Wheezing 120 each 3    arformoterol tartrate (BROVANA) 15 MCG/2ML NEBU Take 2 mLs by nebulization in the morning and 2 mLs in the evening. 120 mL 0    ipratropium (ATROVENT) 0.02 % nebulizer solution Take 2.5 mLs by nebulization 4 times daily 2.5 mL 0    dilTIAZem (CARDIZEM CD) 120 MG extended release capsule Take 1 capsule by mouth daily 30 capsule 0    guaiFENesin (MUCINEX) 600 MG extended release tablet Take 1 tablet by mouth 2 times daily 60 tablet 0    furosemide (LASIX) 40 MG tablet Take 1 tablet by mouth every other day 30

## 2025-01-30 ENCOUNTER — OFFICE VISIT (OUTPATIENT)
Dept: HEMATOLOGY | Age: 85
End: 2025-01-30
Payer: MEDICARE

## 2025-01-30 ENCOUNTER — HOSPITAL ENCOUNTER (OUTPATIENT)
Dept: INFUSION THERAPY | Age: 85
Discharge: HOME | End: 2025-01-30
Payer: MEDICARE

## 2025-01-30 VITALS
OXYGEN SATURATION: 100 % | TEMPERATURE: 97 F | SYSTOLIC BLOOD PRESSURE: 144 MMHG | DIASTOLIC BLOOD PRESSURE: 58 MMHG | WEIGHT: 193.9 LBS | RESPIRATION RATE: 20 BRPM | BODY MASS INDEX: 27.15 KG/M2 | HEART RATE: 90 BPM | HEIGHT: 71 IN

## 2025-01-30 DIAGNOSIS — Z45.2 ENCOUNTER FOR CENTRAL LINE CARE: Primary | ICD-10-CM

## 2025-01-30 DIAGNOSIS — Z71.89 CARE PLAN DISCUSSED WITH PATIENT: ICD-10-CM

## 2025-01-30 DIAGNOSIS — Z08 ENCOUNTER FOR FOLLOW-UP SURVEILLANCE OF LUNG CANCER: ICD-10-CM

## 2025-01-30 DIAGNOSIS — N28.9 RENAL IMPAIRMENT: ICD-10-CM

## 2025-01-30 DIAGNOSIS — Z85.118 ENCOUNTER FOR FOLLOW-UP SURVEILLANCE OF LUNG CANCER: ICD-10-CM

## 2025-01-30 DIAGNOSIS — D64.9 NORMOCYTIC ANEMIA: ICD-10-CM

## 2025-01-30 DIAGNOSIS — R91.1 LEFT LOWER LOBE PULMONARY NODULE: ICD-10-CM

## 2025-01-30 DIAGNOSIS — C34.91 ADENOCARCINOMA OF RIGHT LUNG (HCC): Primary | ICD-10-CM

## 2025-01-30 DIAGNOSIS — N18.32 CKD STAGE 3B, GFR 30-44 ML/MIN (HCC): ICD-10-CM

## 2025-01-30 DIAGNOSIS — C34.91 ADENOCARCINOMA OF RIGHT LUNG (HCC): ICD-10-CM

## 2025-01-30 DIAGNOSIS — R53.83 OTHER FATIGUE: ICD-10-CM

## 2025-01-30 LAB
ALBUMIN SERPL-MCNC: 4 G/DL (ref 3.5–5.2)
ALP SERPL-CCNC: 124 U/L (ref 40–129)
ALT SERPL-CCNC: 15 U/L (ref 5–41)
ANION GAP SERPL CALCULATED.3IONS-SCNC: 12 MMOL/L (ref 7–19)
AST SERPL-CCNC: 18 U/L (ref 5–40)
BASOPHILS # BLD: 0.05 K/UL (ref 0–0.2)
BASOPHILS NFR BLD: 0.7 % (ref 0–1)
BILIRUB SERPL-MCNC: <0.2 MG/DL (ref 0–1.2)
BUN SERPL-MCNC: 34 MG/DL (ref 8–23)
CALCIUM SERPL-MCNC: 8.8 MG/DL (ref 8.8–10.2)
CHLORIDE SERPL-SCNC: 98 MMOL/L (ref 98–107)
CO2 SERPL-SCNC: 26 MMOL/L (ref 22–29)
CREAT SERPL-MCNC: 1.8 MG/DL (ref 0.7–1.2)
EOSINOPHIL # BLD: 0.3 K/UL (ref 0–0.6)
EOSINOPHIL NFR BLD: 4.1 % (ref 0–5)
ERYTHROCYTE [DISTWIDTH] IN BLOOD BY AUTOMATED COUNT: 14.6 % (ref 11.5–14.5)
GLUCOSE SERPL-MCNC: 205 MG/DL (ref 70–99)
HCT VFR BLD AUTO: 29.4 % (ref 42–52)
HGB BLD-MCNC: 9.8 G/DL (ref 14–18)
LYMPHOCYTES # BLD: 1.89 K/UL (ref 1.1–4.5)
LYMPHOCYTES NFR BLD: 26 % (ref 20–40)
MCH RBC QN AUTO: 30.7 PG (ref 27–31)
MCHC RBC AUTO-ENTMCNC: 33.3 G/DL (ref 33–37)
MCV RBC AUTO: 92.2 FL (ref 80–94)
MONOCYTES # BLD: 0.55 K/UL (ref 0–0.9)
MONOCYTES NFR BLD: 7.6 % (ref 1–10)
NEUTROPHILS # BLD: 4.45 K/UL (ref 1.5–7.5)
NEUTS SEG NFR BLD: 61.2 % (ref 50–65)
PLATELET # BLD AUTO: 227 K/UL (ref 130–400)
PMV BLD AUTO: 9.6 FL (ref 9.4–12.4)
POTASSIUM SERPL-SCNC: 4.7 MMOL/L (ref 3.5–5.1)
PROT SERPL-MCNC: 7.1 G/DL (ref 6.4–8.3)
RBC # BLD AUTO: 3.19 M/UL (ref 4.7–6.1)
SODIUM SERPL-SCNC: 136 MMOL/L (ref 136–145)
TSH SERPL DL<=0.005 MIU/L-ACNC: 2.99 UIU/ML (ref 0.27–4.2)
WBC # BLD AUTO: 7.27 K/UL (ref 4.8–10.8)

## 2025-01-30 PROCEDURE — 96523 IRRIG DRUG DELIVERY DEVICE: CPT

## 2025-01-30 PROCEDURE — 80053 COMPREHEN METABOLIC PANEL: CPT

## 2025-01-30 PROCEDURE — 3078F DIAST BP <80 MM HG: CPT | Performed by: INTERNAL MEDICINE

## 2025-01-30 PROCEDURE — G8427 DOCREV CUR MEDS BY ELIG CLIN: HCPCS | Performed by: INTERNAL MEDICINE

## 2025-01-30 PROCEDURE — 1123F ACP DISCUSS/DSCN MKR DOCD: CPT | Performed by: INTERNAL MEDICINE

## 2025-01-30 PROCEDURE — G2211 COMPLEX E/M VISIT ADD ON: HCPCS | Performed by: INTERNAL MEDICINE

## 2025-01-30 PROCEDURE — 85025 COMPLETE CBC W/AUTO DIFF WBC: CPT

## 2025-01-30 PROCEDURE — 1159F MED LIST DOCD IN RCRD: CPT | Performed by: INTERNAL MEDICINE

## 2025-01-30 PROCEDURE — 99213 OFFICE O/P EST LOW 20 MIN: CPT

## 2025-01-30 PROCEDURE — 2500000003 HC RX 250 WO HCPCS: Performed by: INTERNAL MEDICINE

## 2025-01-30 PROCEDURE — G8417 CALC BMI ABV UP PARAM F/U: HCPCS | Performed by: INTERNAL MEDICINE

## 2025-01-30 PROCEDURE — 3077F SYST BP >= 140 MM HG: CPT | Performed by: INTERNAL MEDICINE

## 2025-01-30 PROCEDURE — 84443 ASSAY THYROID STIM HORMONE: CPT

## 2025-01-30 PROCEDURE — 6360000002 HC RX W HCPCS: Performed by: INTERNAL MEDICINE

## 2025-01-30 PROCEDURE — 36415 COLL VENOUS BLD VENIPUNCTURE: CPT

## 2025-01-30 PROCEDURE — 99213 OFFICE O/P EST LOW 20 MIN: CPT | Performed by: INTERNAL MEDICINE

## 2025-01-30 PROCEDURE — 1036F TOBACCO NON-USER: CPT | Performed by: INTERNAL MEDICINE

## 2025-01-30 RX ORDER — SODIUM CHLORIDE 0.9 % (FLUSH) 0.9 %
5-40 SYRINGE (ML) INJECTION PRN
Status: DISCONTINUED | OUTPATIENT
Start: 2025-01-30 | End: 2025-01-31 | Stop reason: HOSPADM

## 2025-01-30 RX ORDER — HEPARIN 100 UNIT/ML
500 SYRINGE INTRAVENOUS PRN
Status: DISCONTINUED | OUTPATIENT
Start: 2025-01-30 | End: 2025-01-31 | Stop reason: HOSPADM

## 2025-01-30 RX ORDER — SODIUM CHLORIDE 0.9 % (FLUSH) 0.9 %
5-40 SYRINGE (ML) INJECTION PRN
OUTPATIENT
Start: 2025-01-30

## 2025-01-30 RX ORDER — HEPARIN 100 UNIT/ML
500 SYRINGE INTRAVENOUS PRN
OUTPATIENT
Start: 2025-01-30

## 2025-01-30 RX ADMIN — SODIUM CHLORIDE, PRESERVATIVE FREE 10 ML: 5 INJECTION INTRAVENOUS at 10:27

## 2025-01-30 RX ADMIN — HEPARIN 500 UNITS: 100 SYRINGE at 10:27

## 2025-02-10 RX ORDER — DILTIAZEM HYDROCHLORIDE 120 MG/1
120 CAPSULE, COATED, EXTENDED RELEASE ORAL DAILY
Qty: 30 CAPSULE | Refills: 0 | OUTPATIENT
Start: 2025-02-10

## 2025-02-10 RX ORDER — PANTOPRAZOLE SODIUM 40 MG/1
TABLET, DELAYED RELEASE ORAL
Qty: 90 TABLET | Refills: 3 | Status: SHIPPED | OUTPATIENT
Start: 2025-02-10

## 2025-02-13 ENCOUNTER — OFFICE VISIT (OUTPATIENT)
Dept: PALLATIVE CARE | Age: 85
End: 2025-02-13
Payer: MEDICARE

## 2025-02-13 DIAGNOSIS — I48.0 PAROXYSMAL ATRIAL FIBRILLATION (HCC): ICD-10-CM

## 2025-02-13 DIAGNOSIS — I50.22 CHRONIC SYSTOLIC (CONGESTIVE) HEART FAILURE (HCC): ICD-10-CM

## 2025-02-13 DIAGNOSIS — N18.31 TYPE 2 DIABETES MELLITUS WITH STAGE 3A CHRONIC KIDNEY DISEASE, WITHOUT LONG-TERM CURRENT USE OF INSULIN (HCC): ICD-10-CM

## 2025-02-13 DIAGNOSIS — J42 CHRONIC BRONCHITIS, UNSPECIFIED CHRONIC BRONCHITIS TYPE (HCC): ICD-10-CM

## 2025-02-13 DIAGNOSIS — Z51.5 ENCOUNTER FOR PALLIATIVE CARE: ICD-10-CM

## 2025-02-13 DIAGNOSIS — R60.0 BILATERAL LOWER EXTREMITY EDEMA: Primary | ICD-10-CM

## 2025-02-13 DIAGNOSIS — E11.22 TYPE 2 DIABETES MELLITUS WITH STAGE 3A CHRONIC KIDNEY DISEASE, WITHOUT LONG-TERM CURRENT USE OF INSULIN (HCC): ICD-10-CM

## 2025-02-13 PROCEDURE — G8417 CALC BMI ABV UP PARAM F/U: HCPCS | Performed by: NURSE PRACTITIONER

## 2025-02-13 PROCEDURE — 1036F TOBACCO NON-USER: CPT | Performed by: NURSE PRACTITIONER

## 2025-02-13 PROCEDURE — 1123F ACP DISCUSS/DSCN MKR DOCD: CPT | Performed by: NURSE PRACTITIONER

## 2025-02-13 PROCEDURE — 99350 HOME/RES VST EST HIGH MDM 60: CPT | Performed by: NURSE PRACTITIONER

## 2025-02-13 RX ORDER — METOPROLOL SUCCINATE 100 MG/1
100 TABLET, EXTENDED RELEASE ORAL 2 TIMES DAILY
Qty: 60 TABLET | Refills: 5 | Status: SHIPPED | OUTPATIENT
Start: 2025-02-13

## 2025-02-13 RX ORDER — FLUTICASONE PROPIONATE AND SALMETEROL XINAFOATE 230; 21 UG/1; UG/1
2 AEROSOL, METERED RESPIRATORY (INHALATION) 2 TIMES DAILY
Qty: 1 EACH | Refills: 3 | Status: SHIPPED | OUTPATIENT
Start: 2025-02-13

## 2025-02-13 RX ORDER — DILTIAZEM HYDROCHLORIDE 120 MG/1
120 CAPSULE, COATED, EXTENDED RELEASE ORAL DAILY
Qty: 30 CAPSULE | Refills: 0 | Status: SHIPPED | OUTPATIENT
Start: 2025-02-13

## 2025-02-13 NOTE — PROGRESS NOTES
Supportive Care/Community Based Palliative Care  Follow Up Note        Patient Name:  Jose Tate  Medical Record Number:  [id number]  YOB: 1940    Date of Visit: 2/13/2025  Location of Visit:  Home    Patient Care Team:  Chayo Grande MD as PCP - General (Family Medicine)  [facility]  Vanessa Cifuentes APRN - CNP as Advanced Practice Nurse (Nurse Practitioner)  Jazmin Balderas RN as Ambulatory Care Manager    History obtained from:  patient, non-family caregiver - Ellen, electronic medical record    PALLIATIVE DIAGNOSES AND ORDERS/RECOMMENDATIONS/PLAN:   1. Bilateral lower extremity edema  Suspect related to #2, discussed with PCP    2. Chronic systolic (congestive) heart failure (HCC)  Increase lasix to 40 mg daily, repeat BMP in 7-10 days    3. Chronic bronchitis, unspecified chronic bronchitis type (HCC)  Continue inhalers and neds    4. Paroxysmal atrial fibrillation (HCC)  Continue metoprolol    5. Type 2 diabetes mellitus with stage 3a chronic kidney disease, without long-term current use of insulin (HCC)  Continue to monitor glucose daily     6. Encounter for palliative care  Current goals of care include: Continue treatment with palliative intent, Preserve independence/control/autonomy, Maintain or improve functional status, Maintain or improve quality of life, Focus on comfort and quality of life, Would not consider facility placement, Would want hospitalization if needed    Will continue goals of care discussions based on clinical course  Follow up home visit in 1 week and as needed    CHIEF COMPLAINT:     Chief Complaint   Patient presents with    Leg Swelling     Worsening leg swelling        CLINICAL SUMMARY:          Jose Tate is a 84 y.o. male with PMH of CHF, lung cancer s/p chemo and radiation, COPD, former smoker, atrial fibrillation, tachycardia-Lukasz syndrome s/p pacemaker placement, CKD stage IIIb, T2DM, and other co morbidities.

## 2025-02-19 ENCOUNTER — OFFICE VISIT (OUTPATIENT)
Dept: PALLATIVE CARE | Age: 85
End: 2025-02-19
Payer: MEDICARE

## 2025-02-19 DIAGNOSIS — I50.22 CHRONIC SYSTOLIC (CONGESTIVE) HEART FAILURE (HCC): Primary | ICD-10-CM

## 2025-02-19 DIAGNOSIS — I50.22 CHRONIC SYSTOLIC (CONGESTIVE) HEART FAILURE (HCC): ICD-10-CM

## 2025-02-19 DIAGNOSIS — R60.0 BILATERAL LOWER EXTREMITY EDEMA: ICD-10-CM

## 2025-02-19 DIAGNOSIS — Z51.5 ENCOUNTER FOR PALLIATIVE CARE: ICD-10-CM

## 2025-02-19 LAB
ANION GAP SERPL CALCULATED.3IONS-SCNC: 11 MMOL/L (ref 8–16)
BUN SERPL-MCNC: 29 MG/DL (ref 8–23)
CALCIUM SERPL-MCNC: 9.1 MG/DL (ref 8.8–10.2)
CHLORIDE SERPL-SCNC: 99 MMOL/L (ref 98–107)
CO2 SERPL-SCNC: 26 MMOL/L (ref 22–29)
CREAT SERPL-MCNC: 1.8 MG/DL (ref 0.7–1.2)
GLUCOSE SERPL-MCNC: 216 MG/DL (ref 70–99)
POTASSIUM SERPL-SCNC: 4.7 MMOL/L (ref 3.5–5.1)
SODIUM SERPL-SCNC: 136 MMOL/L (ref 136–145)

## 2025-02-19 PROCEDURE — 1123F ACP DISCUSS/DSCN MKR DOCD: CPT | Performed by: NURSE PRACTITIONER

## 2025-02-19 PROCEDURE — 1036F TOBACCO NON-USER: CPT | Performed by: NURSE PRACTITIONER

## 2025-02-19 PROCEDURE — 99349 HOME/RES VST EST MOD MDM 40: CPT | Performed by: NURSE PRACTITIONER

## 2025-02-19 PROCEDURE — G8417 CALC BMI ABV UP PARAM F/U: HCPCS | Performed by: NURSE PRACTITIONER

## 2025-02-19 NOTE — PROGRESS NOTES
Supportive Care/Community Based Palliative Care  Follow Up Note        Patient Name:  Jose Tate  Medical Record Number:  [id number]  YOB: 1940    Date of Visit: 2/19/2025  Location of Visit:  Home    Patient Care Team:  Chayo Grande MD as PCP - General (Family Medicine)  [facility]  Vanessa Cifuentes APRN - CNP as Advanced Practice Nurse (Nurse Practitioner)  Jazmin Balderas RN as Ambulatory Care Manager    History obtained from:  patient, non-family caregiver - Ellen, electronic medical record    PALLIATIVE DIAGNOSES AND ORDERS/RECOMMENDATIONS/PLAN:   1. Chronic systolic (congestive) heart failure (HCC)  Continue lasix daily, continue metoprolol  - Basic Metabolic Panel; Future    2. Bilateral lower extremity edema  Encouraged patient to elevate when seated, continue lasix daily  - Basic Metabolic Panel; Future    3. Encounter for palliative care  Current goals of care include: Continue treatment with palliative intent, Preserve independence/control/autonomy, Maintain or improve functional status, Maintain or improve quality of life, Focus on comfort and quality of life    Code status discussed and patient will continue to consider options.  Will continue goals of care discussions based on clinical course  Follow up home visit in 2-3 weeks and as needed    CHIEF COMPLAINT:     Chief Complaint   Patient presents with    Follow-up     Needs BMP to monitor renal function and electrolytes after increasing Lasix to daily       CLINICAL SUMMARY:          Jose Tate is a 85 y.o. male with PMH of CHF, lung cancer s/p chemo and radiation, COPD, former smoker, atrial fibrillation, tachycardia-Lukasz syndrome s/p pacemaker placement, CKD stage IIIb, T2DM, and other co morbidities.     [facility] admission 8/15-8/20/2024 for evaluation and treatment of syncope, found to be in new onset atrial fibrillation, was found to have symptomatic bradycardia and pacemaker was

## 2025-02-24 ENCOUNTER — CARE COORDINATION (OUTPATIENT)
Dept: CARE COORDINATION | Age: 85
End: 2025-02-24

## 2025-02-24 NOTE — CARE COORDINATION
CTN referral. DM, Chronic Bronchitis, Afib, h/o lung ca)          Medications Reviewed:   Not completed during this call: pt dependent on lay caregiver who was not present during call.    Advance Care Planning:   Not reviewed during this call     Care Planning:    Goals Addressed                   This Visit's Progress     Conditions and Symptoms   On track     I will schedule office visits, as directed by my provider.  I will keep my appointment or reschedule if I have to cancel.  I will notify my provider of any barriers to my plan of care.  I will follow my Zone Management tool to seek urgent or emergent care.  I will notify my provider of any symptoms that indicate a worsening of my condition.    Barriers: lack of education  Plan for overcoming my barriers:   Patient willing to improve knowledge of chronic conditions and measures that support symptom control  Patient willing to review vitals log with ACM, notify ACM or PCP of changes to baseline or poorly controlled BP, glucose.  Patient willing to adjust diet as indicated to reduce carb and salt intake.  Confidence: 7/10  Anticipated Goal Completion Date: 4/2/25                 PCP/Specialist follow up:   Future Appointments         Provider Specialty Dept Phone    3/5/2025 2:00 PM Chayo Grande MD Primary Care [phone]    3/18/2025 3:15 PM Kendell Willson MD Cardiology [phone]    4/24/2025 10:30 AM SCHEDULE, [facility] MED ONC Infusion Therapy [phone]    5/7/2025 10:30 AM (Arrive by 10:15 AM) [facility] CT RM 1 Radiology [phone]    5/21/2025 10:30 AM Darvin Malloy MD Hematology and Oncology [phone]    5/21/2025 10:30 AM SCHEDULE, [facility] MED ONC MA Infusion Therapy [phone]            Follow Up:   Plan for next ACM outreach in approximately 1 week to complete:  - medication review   - education   - follow up appointment with PCP .   Patient  is agreeable to this plan.

## 2025-03-05 RX ORDER — CETIRIZINE HYDROCHLORIDE 10 MG/1
TABLET ORAL
Qty: 30 TABLET | Refills: 1 | Status: SHIPPED | OUTPATIENT
Start: 2025-03-05

## 2025-03-05 RX ORDER — PRIMIDONE 50 MG/1
TABLET ORAL
Qty: 60 TABLET | Refills: 1 | Status: SHIPPED | OUTPATIENT
Start: 2025-03-05

## 2025-03-05 RX ORDER — TAMSULOSIN HYDROCHLORIDE 0.4 MG/1
CAPSULE ORAL
Qty: 30 CAPSULE | Refills: 1 | Status: SHIPPED | OUTPATIENT
Start: 2025-03-05

## 2025-03-05 RX ORDER — ASPIRIN 81 MG
2 TABLET, DELAYED RELEASE (ENTERIC COATED) ORAL 2 TIMES DAILY
Qty: 120 TABLET | Refills: 0 | Status: SHIPPED | OUTPATIENT
Start: 2025-03-05

## 2025-03-10 ENCOUNTER — OFFICE VISIT (OUTPATIENT)
Dept: PRIMARY CARE CLINIC | Age: 85
End: 2025-03-10

## 2025-03-10 ENCOUNTER — RESULTS FOLLOW-UP (OUTPATIENT)
Dept: PRIMARY CARE CLINIC | Age: 85
End: 2025-03-10

## 2025-03-10 ENCOUNTER — CARE COORDINATION (OUTPATIENT)
Dept: CARE COORDINATION | Age: 85
End: 2025-03-10

## 2025-03-10 VITALS
RESPIRATION RATE: 18 BRPM | DIASTOLIC BLOOD PRESSURE: 80 MMHG | WEIGHT: 197.4 LBS | TEMPERATURE: 97.1 F | OXYGEN SATURATION: 99 % | SYSTOLIC BLOOD PRESSURE: 130 MMHG | BODY MASS INDEX: 28.26 KG/M2 | HEART RATE: 90 BPM | HEIGHT: 70 IN

## 2025-03-10 DIAGNOSIS — D50.9 IRON DEFICIENCY ANEMIA, UNSPECIFIED IRON DEFICIENCY ANEMIA TYPE: ICD-10-CM

## 2025-03-10 DIAGNOSIS — L72.9 INFECTED CYST OF SKIN: ICD-10-CM

## 2025-03-10 DIAGNOSIS — E11.9 TYPE 2 DIABETES MELLITUS WITHOUT COMPLICATION, WITHOUT LONG-TERM CURRENT USE OF INSULIN (HCC): Primary | ICD-10-CM

## 2025-03-10 DIAGNOSIS — I10 ESSENTIAL HYPERTENSION: ICD-10-CM

## 2025-03-10 DIAGNOSIS — L08.9 INFECTED CYST OF SKIN: ICD-10-CM

## 2025-03-10 DIAGNOSIS — H61.92 LESION OF LEFT EARLOBE: ICD-10-CM

## 2025-03-10 PROBLEM — J96.00 ACUTE RESPIRATORY FAILURE: Status: RESOLVED | Noted: 2024-11-20 | Resolved: 2025-03-10

## 2025-03-10 PROBLEM — J96.01 ACUTE RESPIRATORY FAILURE WITH HYPOXIA: Status: RESOLVED | Noted: 2024-11-20 | Resolved: 2025-03-10

## 2025-03-10 LAB
ALBUMIN SERPL-MCNC: 4.2 G/DL (ref 3.5–5.2)
ALP SERPL-CCNC: 132 U/L (ref 40–129)
ALT SERPL-CCNC: 15 U/L (ref 10–50)
ANION GAP SERPL CALCULATED.3IONS-SCNC: 11 MMOL/L (ref 8–16)
AST SERPL-CCNC: 19 U/L (ref 10–50)
BILIRUB SERPL-MCNC: <0.2 MG/DL (ref 0.2–1.2)
BUN SERPL-MCNC: 34 MG/DL (ref 8–23)
CALCIUM SERPL-MCNC: 9.6 MG/DL (ref 8.8–10.2)
CHLORIDE SERPL-SCNC: 99 MMOL/L (ref 98–107)
CO2 SERPL-SCNC: 26 MMOL/L (ref 22–29)
CREAT SERPL-MCNC: 1.9 MG/DL (ref 0.7–1.2)
ERYTHROCYTE [DISTWIDTH] IN BLOOD BY AUTOMATED COUNT: 14.7 % (ref 11.5–14.5)
GLUCOSE SERPL-MCNC: 162 MG/DL (ref 70–99)
HBA1C MFR BLD: 8.5 % (ref 4–5.6)
HCT VFR BLD AUTO: 32.9 % (ref 42–52)
HGB BLD-MCNC: 10.5 G/DL (ref 14–18)
MCH RBC QN AUTO: 29.9 PG (ref 27–31)
MCHC RBC AUTO-ENTMCNC: 31.9 G/DL (ref 33–37)
MCV RBC AUTO: 93.7 FL (ref 80–94)
PLATELET # BLD AUTO: 230 K/UL (ref 130–400)
PMV BLD AUTO: 10.2 FL (ref 9.4–12.4)
POTASSIUM SERPL-SCNC: 4.7 MMOL/L (ref 3.5–5.1)
PROT SERPL-MCNC: 7.5 G/DL (ref 6.4–8.3)
RBC # BLD AUTO: 3.51 M/UL (ref 4.7–6.1)
SODIUM SERPL-SCNC: 136 MMOL/L (ref 136–145)
WBC # BLD AUTO: 8.7 K/UL (ref 4.8–10.8)

## 2025-03-10 RX ORDER — DOXYCYCLINE HYCLATE 100 MG
100 TABLET ORAL 2 TIMES DAILY
Qty: 10 TABLET | Refills: 0 | Status: SHIPPED | OUTPATIENT
Start: 2025-03-10 | End: 2025-03-15

## 2025-03-10 ASSESSMENT — PATIENT HEALTH QUESTIONNAIRE - PHQ9
2. FEELING DOWN, DEPRESSED OR HOPELESS: NOT AT ALL
SUM OF ALL RESPONSES TO PHQ QUESTIONS 1-9: 0
1. LITTLE INTEREST OR PLEASURE IN DOING THINGS: NOT AT ALL

## 2025-03-10 NOTE — PATIENT INSTRUCTIONS
Wt Readings from Last 3 Encounters:   03/10/25 89.5 kg (197 lb 6.4 oz)   01/30/25 88 kg (193 lb 14.4 oz)   12/05/24 83.5 kg (184 lb)

## 2025-03-10 NOTE — CARE COORDINATION
Ambulatory Care Coordination Note     3/10/2025 2:07 PM     Telephone  outreach attempt by this ACM today to perform care management follow up . ACM was unable to reach the  lay caregiver  by telephone today;   left voice message requesting a return phone call to this ACM.     ACM: Jazmin Balderas RN      PCP/Specialist follow up:   Future Appointments         Provider Specialty Dept Phone    3/18/2025 3:15 PM Kendell Willson MD Cardiology [phone]    4/24/2025 10:30 AM SCHEDULE, [facility] MED ONC Infusion Therapy [phone]    5/7/2025 10:30 AM (Arrive by 10:15 AM) [facility] CT RM 1 Radiology [phone]    5/21/2025 10:30 AM Darvin Malloy MD Hematology and Oncology [phone]    5/21/2025 10:30 AM SCHEDULE, [facility] MED ONC MA Infusion Therapy [phone]            Follow Up:   Plan for next ACM outreach in approximately 1 week to complete:  - medication review.

## 2025-03-11 ENCOUNTER — CARE COORDINATION (OUTPATIENT)
Dept: CARE COORDINATION | Age: 85
End: 2025-03-11

## 2025-03-11 RX ORDER — ACETAMINOPHEN/DIPHENHYDRAMINE 500MG-25MG
2 TABLET ORAL NIGHTLY PRN
COMMUNITY

## 2025-03-11 NOTE — CARE COORDINATION
Ambulatory Care Coordination Note     3/11/2025 11:45 AM     Patient Current Location:  Home: [address] [place] [id number]     Caregiver, Kimberly  contacted the [facility] by telephone. Verified name and  with patient as identifiers.         ACM: Jazmin Balderas RN     Challenges to be reviewed by the provider   Additional needs identified to be addressed with provider Yes  medications-Question about resuming Metformin  Dermatology referral               Method of communication with provider: staff message.    Utilization: Patient has not had any utilization since our last call.     Care Summary Note: Spoke to caregiver, Kimberly.  CG reviewed medications with [facility], reported patient has been off his Metformin since hospital discharge last . Patient's A1c has increased to 8.6 from 5.7. Fasting glucose today was in the 190s. Mail order pharmacy sent Metformin refill but CG continues to follow instructions from the discharge medication list that instructed pt to stop the medication. Patient weight this morning was 191.1 lb. He does check his fasting glucose and his blood pressure. CG will keep log for further review. Patient requested referral to a dermatologist for a skin issue, stated he spoke to PCP about it yesterday. [facility] sent staff message to PCP requesting recommendation on Metformin and dermatology referral.    Offered patient enrollment in the Remote Patient Monitoring (RPM) program for in-home monitoring: Yes, but did not enroll at this time: already monitoring with home equipment.     Assessments Completed:   Care Coordination Interventions    Referral from Primary Care Provider: No  Suggested Interventions and Community Resources  Disease Specific Clinic: Completed (Comment: Cardiology, Oncology)  Meals on Wheels: Declined (Comment: Pt tried [facility], did not like the food.)  Palliative Care: Completed (Comment: Monthly home visit)  Physical Therapy: Completed (Comment: 1x/week)  Other Services: Not Started

## 2025-03-14 ENCOUNTER — CARE COORDINATION (OUTPATIENT)
Dept: CARE COORDINATION | Age: 85
End: 2025-03-14

## 2025-03-14 NOTE — CARE COORDINATION
Ambulatory Care Coordination Note     3/14/2025 3:06 PM     Patient Current Location:  Home: [address] [place] [id number]     Caregiver, Ellen  contacted the ACM by telephone. Verified name and  with caregiver as identifiers.         ACM: Jazmin Balderas RN     Challenges to be reviewed by the provider   Additional needs identified to be addressed with provider No  none               Method of communication with provider: none.    Utilization: Patient has not had any utilization since our last call.     Care Summary Note:   Patient has had some nausea in the mornings the past 3 days. Diarrhea for the past 3 days, CG gave Imodium yesterday. No overnight diarrhea. Wgt yesterday was 191.1 lb, today 186.2 lb.   CG denied fever.  Patient takes a daily fluid pill.  Glucose today 178 fasting. Pt is drinking unsweet tea and water. PO intake - turnover this am, part of lunch (waffle and hashbrowns).  BP yesterday 191/97, HR 80; Wednesday 183/90, HR 72; Tuesday 154/77, HR 77. Patient started an oral antibiotic earlier this week also for a cyst on his back, pt may be having GI SE from the abx. He resumed Metformin also. ACM offered same day appointment for today but patient declined, stated he will tough it out. Advised CG to encouraged good hydration, consider sugar free electrolyte drink until abx completed - final day is Saturday. Monitor for worsening symptoms and take pt to Urgent care clinic if he changes his mind about evaluation. CG voiced understanding.    Offered patient enrollment in the Remote Patient Monitoring (RPM) program for in-home monitoring: Yes, but did not enroll at this time: already monitoring with home equipment.     Assessments Completed:   Care Coordination Interventions    Referral from Primary Care Provider: No  Suggested Interventions and Community Resources  Disease Specific Clinic: Completed (Comment: Cardiology, Oncology)  Meals on Wheels: Declined (Comment: Pt tried [facility], did

## 2025-03-15 ASSESSMENT — ENCOUNTER SYMPTOMS
GASTROINTESTINAL NEGATIVE: 1
SHORTNESS OF BREATH: 1

## 2025-03-15 NOTE — PROGRESS NOTES
SUBJECTIVE:    Jose Tate is 85 y.o.male who comes in complaining of 3 Month Follow-Up   .       HPI: Mr. Tate comes in today with his daughter Dulce for a 3-month appointment.  He has several concerns.  His wife  a few years ago and while he has caregivers he is still very unsteady on his feet.  He has had falls but none recently.  He denies polyuria polyphagia or polydipsia.  He does have some numbness in his feet at times.  No episodes of hypoglycemia.  He is currently on Glucophage 1000 mg twice a day for type 2 diabetes.  He does not always check his sugars but when he does they are ranging anywhere from 113 to 207.  Blood pressure is fairly well-controlled with a heart rate usually in the 70s to 80s and blood pressure anywhere from 108/85 276/77.  He denies any chest pain.  He is short of breath and has a history of COPD and was treated for lung cancer      No Known Allergies    Social History     Socioeconomic History    Marital status:      Spouse name: None    Number of children: None    Years of education: None    Highest education level: None   Tobacco Use    Smoking status: Former     Current packs/day: 0.00     Average packs/day: 1.5 packs/day for 30.0 years (45.0 ttl pk-yrs)     Types: Cigarettes     Start date:      Quit date:      Years since quittin.2    Smokeless tobacco: Never   Vaping Use    Vaping status: Never Used   Substance and Sexual Activity    Alcohol use: No    Drug use: No    Sexual activity: Defer     Social Drivers of Health     Financial Resource Strain: Low Risk  (2024)    Overall Financial Resource Strain (CARDIA)     Difficulty of Paying Living Expenses: Not hard at all   Food Insecurity: No Food Insecurity (2024)    Hunger Vital Sign     Worried About Running Out of Food in the Last Year: Never true     Ran Out of Food in the Last Year: Never true   Transportation Needs: No Transportation Needs (2024)    PRAPARE - Transportation

## 2025-03-18 ENCOUNTER — OFFICE VISIT (OUTPATIENT)
Dept: CARDIOLOGY CLINIC | Age: 85
End: 2025-03-18

## 2025-03-18 ENCOUNTER — CARE COORDINATION (OUTPATIENT)
Dept: CARE COORDINATION | Age: 85
End: 2025-03-18

## 2025-03-18 VITALS
HEIGHT: 70 IN | WEIGHT: 195 LBS | HEART RATE: 80 BPM | SYSTOLIC BLOOD PRESSURE: 124 MMHG | DIASTOLIC BLOOD PRESSURE: 62 MMHG | BODY MASS INDEX: 27.92 KG/M2

## 2025-03-18 DIAGNOSIS — Z95.0 PACEMAKER: Primary | ICD-10-CM

## 2025-03-18 DIAGNOSIS — I49.5 TACHY-BRADY SYNDROME (HCC): ICD-10-CM

## 2025-03-18 NOTE — PROGRESS NOTES
[facility]  [address]  [address]  [facility] [address]  [phone]    Jose Tate is a 85 y.o. male presents with atrial fibrillation, lower extremity edema, and pacemaker therapy.  Overall he feels well and has had no symptomatic A-fib.  He has had worsening edema and since October this has been a real problem.  He has started taking the Lasix daily.      Review of Systems   Constitutional: Negative for fever, chills, diaphoresis, activity change, appetite change, fatigue and unexpected weight change.   Eyes: Negative for photophobia, pain, redness and visual disturbance.   Respiratory: Negative for apnea, cough, chest tightness, shortness of breath, wheezing and stridor.    Cardiovascular: Negative for chest pain, palpitations and leg swelling.   Gastrointestinal: Negative for abdominal distention.   Genitourinary: Negative for dysuria, urgency and frequency.   Musculoskeletal: Negative for myalgias, arthralgias and gait problem.   Skin: Negative for color change, pallor, rash and wound.   Neurological: Negative for dizziness, tremors, speech difficulty, weakness and numbness.   Hematological: Does not bruise/bleed easily.   Psychiatric/Behavioral: Negative.          Social History     Socioeconomic History    Marital status:      Spouse name: Not on file    Number of children: Not on file    Years of education: Not on file    Highest education level: Not on file   Occupational History    Not on file   Tobacco Use    Smoking status: Former     Current packs/day: 0.00     Average packs/day: 1.5 packs/day for 30.0 years (45.0 ttl pk-yrs)     Types: Cigarettes     Start date:      Quit date:      Years since quittin.2    Smokeless tobacco: Never   Vaping Use    Vaping status: Never Used   Substance and Sexual Activity    Alcohol use: No    Drug use: No    Sexual activity: Defer   Other Topics Concern    Not on file   Social History Narrative    Not on file     Social Drivers

## 2025-03-18 NOTE — CARE COORDINATION
Planning:    Goals Addressed                   This Visit's Progress     Conditions and Symptoms   On track     I will schedule office visits, as directed by my provider.  I will keep my appointment or reschedule if I have to cancel.  I will notify my provider of any barriers to my plan of care.  I will follow my Zone Management tool to seek urgent or emergent care.  I will notify my provider of any symptoms that indicate a worsening of my condition.    Barriers: lack of education  Plan for overcoming my barriers:   Patient willing to improve knowledge of chronic conditions and measures that support symptom control  Patient willing to review vitals log with ACM, notify ACM or PCP of changes to baseline or poorly controlled BP, glucose.  Patient willing to adjust diet as indicated to reduce carb and salt intake.  Confidence: 7/10  Anticipated Goal Completion Date: 4/2/25                 PCP/Specialist follow up:   Future Appointments         Provider Specialty Dept Phone    4/24/2025 10:30 AM SCHEDULE, [facility] MED ONC Infusion Therapy [phone]    5/7/2025 10:30 AM (Arrive by 10:15 AM) [facility] CT RM 1 Radiology [phone]    5/21/2025 10:30 AM Darvin Malloy MD Hematology and Oncology [phone]    5/21/2025 10:30 AM SCHEDULE, [facility] MED ONC MA Infusion Therapy [phone]    9/24/2025 3:00 PM Chayo Grande MD Primary Care [phone]    9/25/2025 11:15 AM Kendell Willson MD Cardiology [phone]            Follow Up:   Plan for next ACM outreach in approximately 1 week to complete:  - medication review   - advance care planning   - education .   Caregiver is agreeable to this plan.

## 2025-03-18 NOTE — PROGRESS NOTES
Pacemaker interrogated  Presenting rhythm:  AS VS, AP 59.7%,  54.6%  Battey voltage 11.7 years  Lead status:  Lead impedance within range and stable  Sensing:  P waves 4.6 mV,  R waves >20 mV  Thresholds:  Atrial 1.0V @ 0.4ms, ventricular 0.75@ 0.4ms  Observations:  2 new monitored NSVT duration 1 second  See scanned report for details  Reprogramming for sensitivity and threshold testing  Next carelink appointment:  6/18/25

## 2025-03-19 DIAGNOSIS — E78.00 HYPERCHOLESTEROLEMIA: ICD-10-CM

## 2025-03-19 RX ORDER — MONTELUKAST SODIUM 10 MG/1
TABLET ORAL
Qty: 90 TABLET | Refills: 1 | OUTPATIENT
Start: 2025-03-19

## 2025-03-19 RX ORDER — ROSUVASTATIN CALCIUM 10 MG/1
10 TABLET, COATED ORAL DAILY
Qty: 90 TABLET | Refills: 1 | Status: SHIPPED | OUTPATIENT
Start: 2025-03-19

## 2025-03-19 RX ORDER — DILTIAZEM HYDROCHLORIDE 120 MG/1
120 CAPSULE, COATED, EXTENDED RELEASE ORAL DAILY
Qty: 90 CAPSULE | Refills: 1 | Status: SHIPPED | OUTPATIENT
Start: 2025-03-19

## 2025-03-25 ENCOUNTER — CARE COORDINATION (OUTPATIENT)
Dept: CARE COORDINATION | Age: 85
End: 2025-03-25

## 2025-03-25 NOTE — CARE COORDINATION
Ambulatory Care Coordination Note     3/25/2025 10:33 AM     Telephone  outreach attempt by this ACM today to perform care management follow up . ACM was unable to reach the caregiver, Ellen  by telephone today;   left voice message requesting a return phone call to this ACM.     ACM: Jazmin Balderas RN      PCP/Specialist follow up:   Future Appointments         Provider Specialty Dept Phone    4/24/2025 10:30 AM SCHEDULE, [facility] MED ONC Infusion Therapy [phone]    5/7/2025 10:30 AM (Arrive by 10:15 AM) [facility] CT RM 1 Radiology [phone]    5/21/2025 10:30 AM Darvin Malloy MD Hematology and Oncology [phone]    5/21/2025 10:30 AM SCHEDULE, [facility] MED ONC MA Infusion Therapy [phone]    9/24/2025 3:00 PM Chayo Grande MD Primary Care [phone]    9/25/2025 11:15 AM Kendell Willson MD Cardiology [phone]            Follow Up:   Plan for next ACM outreach in approximately 1 week to complete:  - disease specific assessments  - medication review  Derm appt scheduled or completed .

## 2025-03-31 ENCOUNTER — CARE COORDINATION (OUTPATIENT)
Dept: CARE COORDINATION | Age: 85
End: 2025-03-31

## 2025-03-31 NOTE — CARE COORDINATION
Ambulatory Care Coordination Note     3/31/2025 1:36 PM     Patient Current Location:  Home: [address] [place] [id number]     ACM contacted the caregiver by telephone. Verified name and  with caregiver as identifiers.         ACM: Jazmin Balderas RN     Challenges to be reviewed by the provider   Additional needs identified to be addressed with provider Yes  medications-CG concerned that pt has derm procedure scheduled and he is on Eliquis               Method of communication with provider: phone.    Utilization: Patient has not had any utilization since our last call.     Care Summary Note: Caregiver reported patient had a derm procedure on 3/20/25 at [facility]. Patient had significant bleeding afterward, on the way home. CG reported that patient had a skin cancer removed from the top of his head and his ear. Patient will return on  and on  for further in office surgery. CG is concerned about the bleeding pt had on 3/20/25. Patient is on Eliquis.  FBS review beginning 3/24/25: 153, 164, 167, 171, 158, 161, 155, 136, 136(today).  BP review: 154/70, 154/80, 150/84, 147/80, 150/73.    ACM advised that CG always provide an up to date medication list at every appointment, advised that multiple providers need to be updated at each visit in case of med changes outside of their facility or ministry. ACM contacted [facility] and faxed patient's current medication list for the provider's review and asked that office note patient is taking Eliquis so they may advise the patient appropriately before his next procedure date. Requested they notify patient or caregiver of their instructions. Notified CG of same and to call the dermatologist by end of next week if she has not been contacted. CG voiced understanding.  Routed FBS and blood pressure log for PCP review.    Offered patient enrollment in the Remote Patient Monitoring (RPM) program for in-home monitoring: Yes, but did

## 2025-04-08 ENCOUNTER — CARE COORDINATION (OUTPATIENT)
Dept: CARE COORDINATION | Age: 85
End: 2025-04-08

## 2025-04-08 NOTE — CARE COORDINATION
Ambulatory Care Coordination Note     2025 4:03 PM     Patient Current Location:  Home: [address] [place] [id number]     ACM contacted the patient by telephone. Verified name and  with patient as identifiers.         ACM: Jazmin Balderas RN     Challenges to be reviewed by the provider   Additional needs identified to be addressed with provider No  none               Method of communication with provider: none.    Utilization: Patient has not had any utilization since our last call.     Care Summary Note: Patient reported diarrhea last week for 2-3 days, resolved 1-2 days ago. He denied nausea or vomiting. Pt has been taking Pepto Bismol and Imodium. He is able to eat and has been drinking tea, water and today had coke zero with his lunch. Patient reported his urine is light color to orange color. He denied dizziness or lightheadedness when ambulating. Pt denied any palpitations or chest pain. FBS today was 170. BP today 128/71. Other readings 128/66; 138/66. Patient reported that his dermatologist instructed him to hold his Eliquis for 2 days before his next appointment. He is aware of his port flush appt on 25. Encouraged that patient drink sugar free electrolyte replacement fluids to improve his hydration and replace lost nutrition from his diarrhea. Patient voiced understanding. He was agreeable to follow up with ACM next week.    Offered patient enrollment in the Remote Patient Monitoring (RPM) program for in-home monitoring: Yes, but did not enroll at this time: controlled chronic disease management and already monitoring with home equipment.     Assessments Completed:   Care Coordination Interventions    Referral from Primary Care Provider: No  Suggested Interventions and Community Resources  Disease Specific Clinic: Completed (Comment: Cardiology, Oncology)  Meals on Wheels: Declined (Comment: Pt tried [facility], did not like the food.)  Palliative Care: Completed (Comment: Monthly home

## 2025-04-10 RX ORDER — APIXABAN 2.5 MG/1
2.5 TABLET, FILM COATED ORAL 2 TIMES DAILY
Qty: 60 TABLET | Refills: 3 | Status: SHIPPED | OUTPATIENT
Start: 2025-04-10

## 2025-04-18 ENCOUNTER — CARE COORDINATION (OUTPATIENT)
Dept: CARE COORDINATION | Age: 85
End: 2025-04-18

## 2025-04-18 NOTE — CARE COORDINATION
patient enrollment in the Remote Patient Monitoring (RPM) program for in-home monitoring: Yes, but did not enroll at this time: already monitoring with home equipment.     Assessments Completed:   Care Coordination Interventions    Referral from Primary Care Provider: No  Suggested Interventions and Community Resources  Disease Specific Clinic: Completed (Comment: Cardiology, Oncology)  Meals on Wheels: Declined (Comment: Pt tried [facility], did not like the food.)  Palliative Care: Completed (Comment: Monthly home visit)  Physical Therapy: Completed (Comment: 1x/week)  Other Services: Not Started (Comment: 3/11/25: Pt requesting referral to Dr. Ac - dermatology. Msg sent to PCP)  Zone Management Tools: Completed (Comment: CTN referral. DM, Chronic Bronchitis, Afib, h/o lung ca)          Medications Reviewed:   Patient denies any changes with medications and reports taking all medications as prescribed.    Advance Care Planning:   Not reviewed during this call     Care Planning:   Education Documentation  Pneumovax Recommendation, taught by Jazmin Balderas RN at 4/18/2025  4:04 PM.  Learner: Caregiver  Readiness: Acceptance  Method: Explanation, Teachback  Response: Demonstrated Understanding  Comment: CG assists pt to check glucose, blood pressure. Understands importance of skin care, medication compliance, appt follow up and proactive call for concerning symptoms.    Lifestyle Changes/Goal Setting, taught by Jazmin Balderas RN at 4/18/2025  4:04 PM.  Learner: Caregiver  Readiness: Acceptance  Method: Explanation, Teachback  Response: Demonstrated Understanding  Comment: CG assists pt to check glucose, blood pressure. Understands importance of skin care, medication compliance, appt follow up and proactive call for concerning symptoms.    Home Health Care Services, taught by Jazmin Balderas RN at 4/18/2025  4:04 PM.  Learner: Caregiver  Readiness: Acceptance  Method: Explanation, Teachback  Response: Demonstrated

## 2025-04-18 NOTE — TELEPHONE ENCOUNTER
Patient is currently taking Singulair. He would like for [facility] to [facility]. He will call back to reschedule his appointment. He said he has been getting chemo and radiation treatments.   Routed refill request to Dr. Amin.  
Pharmacy sent a request for refills on [facility]. Patient was last seen on 3/10/21 and does not have a return appointment.   Refill request sent to Dr. Amin.  
Dizziness

## 2025-04-22 ENCOUNTER — HOSPITAL ENCOUNTER (OUTPATIENT)
Dept: INFUSION THERAPY | Age: 85
Discharge: HOME | End: 2025-04-22
Payer: MEDICARE

## 2025-04-22 DIAGNOSIS — C34.91 ADENOCARCINOMA OF RIGHT LUNG (HCC): Primary | ICD-10-CM

## 2025-04-22 DIAGNOSIS — Z45.2 ENCOUNTER FOR CENTRAL LINE CARE: ICD-10-CM

## 2025-04-22 PROCEDURE — 2500000003 HC RX 250 WO HCPCS: Performed by: INTERNAL MEDICINE

## 2025-04-22 PROCEDURE — 96523 IRRIG DRUG DELIVERY DEVICE: CPT

## 2025-04-22 PROCEDURE — 6360000002 HC RX W HCPCS: Performed by: INTERNAL MEDICINE

## 2025-04-22 RX ORDER — SODIUM CHLORIDE 0.9 % (FLUSH) 0.9 %
5-40 SYRINGE (ML) INJECTION PRN
Status: DISCONTINUED | OUTPATIENT
Start: 2025-04-22 | End: 2025-04-23 | Stop reason: HOSPADM

## 2025-04-22 RX ORDER — SODIUM CHLORIDE 0.9 % (FLUSH) 0.9 %
5-40 SYRINGE (ML) INJECTION PRN
OUTPATIENT
Start: 2025-04-22

## 2025-04-22 RX ORDER — HEPARIN 100 UNIT/ML
500 SYRINGE INTRAVENOUS PRN
Status: DISCONTINUED | OUTPATIENT
Start: 2025-04-22 | End: 2025-04-23 | Stop reason: HOSPADM

## 2025-04-22 RX ORDER — HEPARIN 100 UNIT/ML
500 SYRINGE INTRAVENOUS PRN
OUTPATIENT
Start: 2025-04-22

## 2025-04-22 RX ADMIN — HEPARIN 500 UNITS: 100 SYRINGE at 11:01

## 2025-04-22 RX ADMIN — SODIUM CHLORIDE, PRESERVATIVE FREE 10 ML: 5 INJECTION INTRAVENOUS at 11:01

## 2025-05-05 ENCOUNTER — CARE COORDINATION (OUTPATIENT)
Dept: CARE COORDINATION | Age: 85
End: 2025-05-05

## 2025-05-05 ENCOUNTER — RESULTS FOLLOW-UP (OUTPATIENT)
Dept: PRIMARY CARE CLINIC | Age: 85
End: 2025-05-05

## 2025-05-05 ENCOUNTER — OFFICE VISIT (OUTPATIENT)
Dept: PRIMARY CARE CLINIC | Age: 85
End: 2025-05-05
Payer: MEDICARE

## 2025-05-05 VITALS
HEART RATE: 97 BPM | OXYGEN SATURATION: 94 % | WEIGHT: 178.4 LBS | DIASTOLIC BLOOD PRESSURE: 70 MMHG | HEIGHT: 70 IN | SYSTOLIC BLOOD PRESSURE: 112 MMHG | TEMPERATURE: 82 F | BODY MASS INDEX: 25.54 KG/M2

## 2025-05-05 DIAGNOSIS — E11.9 TYPE 2 DIABETES MELLITUS WITHOUT COMPLICATION, WITHOUT LONG-TERM CURRENT USE OF INSULIN (HCC): ICD-10-CM

## 2025-05-05 DIAGNOSIS — J31.0 CHRONIC RHINITIS: ICD-10-CM

## 2025-05-05 DIAGNOSIS — N40.0 BENIGN PROSTATIC HYPERPLASIA, UNSPECIFIED WHETHER LOWER URINARY TRACT SYMPTOMS PRESENT: ICD-10-CM

## 2025-05-05 DIAGNOSIS — G25.0 ESSENTIAL TREMOR: ICD-10-CM

## 2025-05-05 DIAGNOSIS — R11.2 NAUSEA AND VOMITING, UNSPECIFIED VOMITING TYPE: Primary | ICD-10-CM

## 2025-05-05 LAB
ALBUMIN SERPL-MCNC: 3.8 G/DL (ref 3.5–5.2)
ALP SERPL-CCNC: 138 U/L (ref 40–129)
ALT SERPL-CCNC: 14 U/L (ref 10–50)
ANION GAP SERPL CALCULATED.3IONS-SCNC: 17 MMOL/L (ref 8–16)
AST SERPL-CCNC: 20 U/L (ref 10–50)
BILIRUB SERPL-MCNC: <0.2 MG/DL (ref 0.2–1.2)
BUN SERPL-MCNC: 80 MG/DL (ref 8–23)
CALCIUM SERPL-MCNC: 9.2 MG/DL (ref 8.8–10.2)
CHLORIDE SERPL-SCNC: 97 MMOL/L (ref 98–107)
CO2 SERPL-SCNC: 23 MMOL/L (ref 22–29)
CREAT SERPL-MCNC: 2.7 MG/DL (ref 0.7–1.2)
ERYTHROCYTE [DISTWIDTH] IN BLOOD BY AUTOMATED COUNT: 14.6 % (ref 11.5–14.5)
GLUCOSE SERPL-MCNC: 245 MG/DL (ref 70–99)
HCT VFR BLD AUTO: 34.7 % (ref 42–52)
HGB BLD-MCNC: 11.2 G/DL (ref 14–18)
MCH RBC QN AUTO: 30 PG (ref 27–31)
MCHC RBC AUTO-ENTMCNC: 32.3 G/DL (ref 33–37)
MCV RBC AUTO: 93 FL (ref 80–94)
PLATELET # BLD AUTO: 253 K/UL (ref 130–400)
PMV BLD AUTO: 10.2 FL (ref 9.4–12.4)
POTASSIUM SERPL-SCNC: 4.9 MMOL/L (ref 3.5–5.1)
PROT SERPL-MCNC: 7.1 G/DL (ref 6.4–8.3)
RBC # BLD AUTO: 3.73 M/UL (ref 4.7–6.1)
SODIUM SERPL-SCNC: 137 MMOL/L (ref 136–145)
WBC # BLD AUTO: 8.7 K/UL (ref 4.8–10.8)

## 2025-05-05 PROCEDURE — 1123F ACP DISCUSS/DSCN MKR DOCD: CPT | Performed by: FAMILY MEDICINE

## 2025-05-05 PROCEDURE — 96372 THER/PROPH/DIAG INJ SC/IM: CPT | Performed by: FAMILY MEDICINE

## 2025-05-05 PROCEDURE — G8417 CALC BMI ABV UP PARAM F/U: HCPCS | Performed by: FAMILY MEDICINE

## 2025-05-05 PROCEDURE — 3052F HG A1C>EQUAL 8.0%<EQUAL 9.0%: CPT | Performed by: FAMILY MEDICINE

## 2025-05-05 PROCEDURE — 1036F TOBACCO NON-USER: CPT | Performed by: FAMILY MEDICINE

## 2025-05-05 PROCEDURE — 1159F MED LIST DOCD IN RCRD: CPT | Performed by: FAMILY MEDICINE

## 2025-05-05 PROCEDURE — 99214 OFFICE O/P EST MOD 30 MIN: CPT | Performed by: FAMILY MEDICINE

## 2025-05-05 PROCEDURE — 1160F RVW MEDS BY RX/DR IN RCRD: CPT | Performed by: FAMILY MEDICINE

## 2025-05-05 PROCEDURE — G8427 DOCREV CUR MEDS BY ELIG CLIN: HCPCS | Performed by: FAMILY MEDICINE

## 2025-05-05 RX ORDER — PRIMIDONE 50 MG/1
50 TABLET ORAL 2 TIMES DAILY
Qty: 180 TABLET | Refills: 3 | Status: SHIPPED | OUTPATIENT
Start: 2025-05-05

## 2025-05-05 RX ORDER — LOPERAMIDE HYDROCHLORIDE 2 MG/1
2 CAPSULE ORAL 4 TIMES DAILY PRN
COMMUNITY

## 2025-05-05 RX ORDER — CETIRIZINE HYDROCHLORIDE 10 MG/1
10 TABLET ORAL DAILY
Qty: 90 TABLET | Refills: 3 | Status: SHIPPED | OUTPATIENT
Start: 2025-05-05

## 2025-05-05 RX ORDER — PROMETHAZINE HYDROCHLORIDE 25 MG/ML
25 INJECTION, SOLUTION INTRAMUSCULAR; INTRAVENOUS ONCE
Status: COMPLETED | OUTPATIENT
Start: 2025-05-05 | End: 2025-05-05

## 2025-05-05 RX ORDER — TAMSULOSIN HYDROCHLORIDE 0.4 MG/1
0.4 CAPSULE ORAL DAILY
Qty: 90 CAPSULE | Refills: 3 | Status: SHIPPED | OUTPATIENT
Start: 2025-05-05

## 2025-05-05 RX ORDER — PROMETHAZINE HYDROCHLORIDE 25 MG/1
25 TABLET ORAL EVERY 6 HOURS PRN
Qty: 12 TABLET | Refills: 1 | Status: SHIPPED | OUTPATIENT
Start: 2025-05-05 | End: 2025-05-12

## 2025-05-05 RX ADMIN — PROMETHAZINE HYDROCHLORIDE 25 MG: 25 INJECTION, SOLUTION INTRAMUSCULAR; INTRAVENOUS at 11:33

## 2025-05-05 NOTE — CARE COORDINATION
Diarrhea, vomiting, no appetite. , 113. BP yest 141/73. Today 136/60. Glucose 165 this morning.  Not vomited today, told CG he vomited yesterday and had diarrhea. Has pt urinated. No fever.  Pt finished an abx on Thursday.  Wgt 178.5 lb today. No c/o cp, no shortness of breath. Sleeping more. Feels weak. Unsteady when up but denied dizziness.  Last po intake: baked potato about 6 pm.  Ambulatory Care Coordination Note     2025 9:59 AM     Patient Current Location:  Home: [address] [place] [id number]     Caregiver, Ellen Russell  contacted the ACM by telephone. Verified name and  with caregiver as identifiers.         ACM: Jazmin Balderas RN     Challenges to be reviewed by the provider   Additional needs identified to be addressed with provider Yes  GI sx, weakness, elevated heart rate               Method of communication with provider: phone.    Utilization: Patient has not had any utilization since our last call.     Care Summary Note: Caregiver called this ACM and reported pt has been ill over the weekend with diarrhea and vomiting. Patient has low appetite as well, though able to eat a baked potato last night. , 113. Blood pressure today 136/60 and fasting glucose 165. Wgt 178.5 lb today. No fever. Patient has been sleeping more, feels weak and unsteady on his feet but denied dizziness. Notified PCP office to request appt. Staff instructed per PCP to bring patient in and he will be worked in for evaluation. Caregiver notified and voiced understanding, will get pt ready and transport him to PCP office.      PCP/Specialist follow up:   Future Appointments         Provider Specialty Dept Phone    2025 1:30 PM (Arrive by 1:15 PM) [facility] CT RM 1 Radiology [phone]    2025 10:30 AM Darvin Malloy MD Hematology and Oncology [phone]    2025 10:30 AM SCHEDULE, [facility] MA Infusion Therapy [phone]    7/15/2025 10:30 AM SCHEDULE, [facility] Infusion

## 2025-05-05 NOTE — PATIENT INSTRUCTIONS
You can get over-the-counter Debrox drops.  Generic is fine.  Follow the instructions and put them in both the ears for earwax      He is already on pantoprazole 40 mg 1 tablet once a day.                  We are committed to providing you with the best care possible.   In order to help us achieve these goals please remember to bring all medications, herbal products, and over the counter supplements with you to each visit.     If your provider has ordered testing for you, please be sure to follow up with our office if you have not received results within 7 days after the testing took place.     *If you receive a survey after visiting one of our offices, please take time to share your experience concerning your physician office visit. These surveys are confidential and no health information about you is shared.  We are eager to improve for you and we are counting on your feedback to help make that happen.

## 2025-05-07 ENCOUNTER — OFFICE VISIT (OUTPATIENT)
Dept: PALLATIVE CARE | Age: 85
End: 2025-05-07
Payer: MEDICARE

## 2025-05-07 DIAGNOSIS — N17.9 AKI (ACUTE KIDNEY INJURY): ICD-10-CM

## 2025-05-07 DIAGNOSIS — N17.9 AKI (ACUTE KIDNEY INJURY): Primary | ICD-10-CM

## 2025-05-07 DIAGNOSIS — Z51.5 ENCOUNTER FOR PALLIATIVE CARE: ICD-10-CM

## 2025-05-07 DIAGNOSIS — Z74.09 DECREASED MOBILITY AND ENDURANCE: ICD-10-CM

## 2025-05-07 LAB
ANION GAP SERPL CALCULATED.3IONS-SCNC: 14 MMOL/L (ref 8–16)
BUN SERPL-MCNC: 65 MG/DL (ref 8–23)
CALCIUM SERPL-MCNC: 8.9 MG/DL (ref 8.8–10.2)
CHLORIDE SERPL-SCNC: 98 MMOL/L (ref 98–107)
CO2 SERPL-SCNC: 24 MMOL/L (ref 22–29)
CREAT SERPL-MCNC: 2.6 MG/DL (ref 0.7–1.2)
GLUCOSE SERPL-MCNC: 173 MG/DL (ref 70–99)
POTASSIUM SERPL-SCNC: 4.9 MMOL/L (ref 3.5–5.1)
SODIUM SERPL-SCNC: 136 MMOL/L (ref 136–145)

## 2025-05-07 PROCEDURE — G8417 CALC BMI ABV UP PARAM F/U: HCPCS | Performed by: NURSE PRACTITIONER

## 2025-05-07 PROCEDURE — 99349 HOME/RES VST EST MOD MDM 40: CPT | Performed by: NURSE PRACTITIONER

## 2025-05-07 PROCEDURE — 1036F TOBACCO NON-USER: CPT | Performed by: NURSE PRACTITIONER

## 2025-05-07 PROCEDURE — 1123F ACP DISCUSS/DSCN MKR DOCD: CPT | Performed by: NURSE PRACTITIONER

## 2025-05-07 NOTE — PROGRESS NOTES
Supportive Care/Community Based Palliative Care  Follow Up Note        Patient Name:  Jose Tate  Medical Record Number:  [id number]  YOB: 1940    Date of Visit: 5/7/2025  Location of Visit:  Home    Patient Care Team:  Chayo Grande MD as PCP - General (Family Medicine)  [facility]  Vanessa Cifuentes APRN - CNP as Advanced Practice Nurse (Nurse Practitioner)    History obtained from:  patient, electronic medical record    PALLIATIVE DIAGNOSES AND ORDERS/RECOMMENDATIONS/PLAN:   1. TOMER (acute kidney injury)  Discussed with PCP, continue with aggressive measures at home with oral rehydration, he does have follow up with Nephrology scheduled for next week as well  - Basic Metabolic Panel; Future    2. Decreased mobility and endurance  Continue use of walker, continue caregiver and family assist, fall detection in home, encouraged good nutrition, encouraged adequate rest/sleep    3. Encounter for palliative care  Current goals of care include: Continue treatment with palliative intent, Preserve independence/control/autonomy, Maintain or improve functional status, Maintain or improve quality of life, Focus on comfort and quality of life, Remain at home    Code status confirmed: Full Code  Follow up home visit in 1 week and as needed    CHIEF COMPLAINT:     Chief Complaint   Patient presents with    Dehydration     Seen earlier this week by PCP, follow up       CLINICAL SUMMARY:          Jose Tate is a 85 y.o. male with PMH of CHF, lung cancer s/p chemo and radiation, COPD, former smoker, atrial fibrillation, tachycardia-Lukasz syndrome s/p pacemaker placement, CKD stage IIIb, T2DM, and other co morbidities.     [facility] admission 8/15-8/20/2024 for evaluation and treatment of syncope, found to be in new onset atrial fibrillation, was found to have symptomatic bradycardia and pacemaker was placed, discharged to [facility], then home      [facility]

## 2025-05-08 ENCOUNTER — TELEPHONE (OUTPATIENT)
Dept: PALLATIVE CARE | Age: 85
End: 2025-05-08

## 2025-05-08 ASSESSMENT — ENCOUNTER SYMPTOMS
NAUSEA: 1
SHORTNESS OF BREATH: 1
DIARRHEA: 1

## 2025-05-08 NOTE — TELEPHONE ENCOUNTER
Attempted to contact patient to schedule [facility] visit. NO answer. V-mail left with contact information.

## 2025-05-09 NOTE — PROGRESS NOTES
SUBJECTIVE:    Jose Tate is 85 y.o.male who comes in complaining of elevated heart rate, Fatigue, Vomiting, Diarrhea, and Tremors   .       HPI:  History of Present Illness  The patient presents for evaluation of nausea, vomiting, diarrhea, and tremors.  He is accompanied by a caregiver.    He reports experiencing nausea, which he attributes to a recent course of doxycycline that he completed on Thursday. The onset of his symptoms was noted on the previous Monday. He has been managing his nausea with Pepto-Bismol and Zofran. He also reports feeling weak and shaky, with episodes of vomiting and diarrhea. Additionally, he mentions frequent urination.    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 She has been administering breathing treatments at home to manage his cough.    He has wax in his ears.         No Known Allergies    Social History     Socioeconomic History    Marital status:      Spouse name: None    Number of children: None    Years of education: None    Highest education level: None   Tobacco Use    Smoking status: Former     Current packs/day: 0.00     Average packs/day: 1.5 packs/day for 30.0 years (45.0 ttl pk-yrs)     Types: Cigarettes     Start date:      Quit date:      Years since quittin.3    Smokeless tobacco: Never   Vaping Use    Vaping status: Never Used   Substance and Sexual Activity    Alcohol use: No    Drug use: No    Sexual activity: Defer     Social Drivers of Health     Financial Resource Strain: Low Risk  (2024)    Overall Financial Resource Strain (CARDIA)     Difficulty of Paying Living Expenses: Not hard at all   Food Insecurity: No Food Insecurity (2024)    Hunger Vital Sign     Worried About Running Out of Food in the Last Year: Never true     Ran Out of Food in the Last Year: Never true   Transportation Needs: No Transportation Needs (2024)    PRAPARE -

## 2025-05-14 ENCOUNTER — OFFICE VISIT (OUTPATIENT)
Dept: PALLATIVE CARE | Age: 85
End: 2025-05-14
Payer: MEDICARE

## 2025-05-14 DIAGNOSIS — I50.22 CHRONIC SYSTOLIC (CONGESTIVE) HEART FAILURE (HCC): ICD-10-CM

## 2025-05-14 DIAGNOSIS — N18.32 STAGE 3B CHRONIC KIDNEY DISEASE (HCC): ICD-10-CM

## 2025-05-14 DIAGNOSIS — Z74.09 DECREASED MOBILITY AND ENDURANCE: ICD-10-CM

## 2025-05-14 DIAGNOSIS — Z51.5 ENCOUNTER FOR PALLIATIVE CARE: ICD-10-CM
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
## 2025-05-14 PROCEDURE — 1123F ACP DISCUSS/DSCN MKR DOCD: CPT | Performed by: NURSE PRACTITIONER

## 2025-05-14 PROCEDURE — 99349 HOME/RES VST EST MOD MDM 40: CPT | Performed by: NURSE PRACTITIONER

## 2025-05-14 PROCEDURE — 1036F TOBACCO NON-USER: CPT | Performed by: NURSE PRACTITIONER

## 2025-05-14 PROCEDURE — G8417 CALC BMI ABV UP PARAM F/U: HCPCS | Performed by: NURSE PRACTITIONER

## 2025-05-14 NOTE — PROGRESS NOTES
Supportive Care/Community Based Palliative Care  Follow Up Note        Patient Name:  Jose Tate  Medical Record Number:  [id number]  YOB: 1940    Date of Visit: 5/14/2025  Location of Visit:  Home    Patient Care Team:  Chayo Grande MD as PCP - General (Family Medicine)  [facility] MD as PCP - [facility]  Vanessa Cifuentes APRN - CNP as Advanced Practice Nurse (Nurse Practitioner)    History obtained from:  patient, electronic medical record    PALLIATIVE DIAGNOSES AND ORDERS/RECOMMENDATIONS/PLAN:   1. Fall, initial encounter  Fall precautions discussed, avoid quick movements    2. Decreased mobility and endurance  Continue use of walker, continue assist from family and caregivers    3. Chronic systolic (congestive) heart failure (HCC)  Continue Cardizem and toprol, lasix held for recent TOMER    4. Stage 3b chronic kidney disease (HCC)  Keep follow up with nephrology    5. Encounter for palliative care  Current goals of care include: Continue treatment with palliative intent, Preserve independence/control/autonomy, Maintain or improve functional status, Maintain or improve quality of life, Focus on comfort and quality of life, Remain at home, Would not consider facility placement, Would want hospitalization if needed    Code status confirmed: Full Code  Will continue goals of care discussions based on clinical course  Follow up home visit in 2-3 weeks and as needed    CHIEF COMPLAINT:     Chief Complaint   Patient presents with    Fall      Had fall over the weekend       CLINICAL SUMMARY:          Jose Tate is a 85 y.o. male with PMH of CHF, lung cancer s/p chemo and radiation, COPD, former smoker, atrial fibrillation, tachycardia-Lukasz syndrome s/p pacemaker placement, CKD stage IIIb, T2DM, and other co morbidities.     [facility] admission 8/15-8/20/2024 for evaluation and treatment of syncope, found to be in new onset atrial fibrillation, was found to have symptomatic

## 2025-05-16 ENCOUNTER — TELEPHONE (OUTPATIENT)
Dept: HEMATOLOGY | Age: 85
End: 2025-05-16

## 2025-05-16 ENCOUNTER — HOSPITAL ENCOUNTER (OUTPATIENT)
Dept: CT IMAGING | Age: 85
Discharge: HOME | End: 2025-05-16
Payer: COMMERCIAL

## 2025-05-16 DIAGNOSIS — C34.91 ADENOCARCINOMA OF RIGHT LUNG (HCC): ICD-10-CM

## 2025-05-16 PROCEDURE — 71250 CT THORAX DX C-: CPT

## 2025-05-16 NOTE — TELEPHONE ENCOUNTER

## 2025-05-19 DIAGNOSIS — C34.91 ADENOCARCINOMA OF RIGHT LUNG (HCC): Primary | ICD-10-CM

## 2025-05-19 DIAGNOSIS — R53.82 CHRONIC FATIGUE: ICD-10-CM

## 2025-05-19 DIAGNOSIS — D64.9 NORMOCYTIC ANEMIA: ICD-10-CM

## 2025-05-21 ENCOUNTER — HOSPITAL ENCOUNTER (OUTPATIENT)
Dept: INFUSION THERAPY | Age: 85
Discharge: HOME | End: 2025-05-21
Payer: MEDICARE

## 2025-05-21 ENCOUNTER — OFFICE VISIT (OUTPATIENT)
Dept: HEMATOLOGY | Age: 85
End: 2025-05-21
Payer: MEDICARE

## 2025-05-21 VITALS
DIASTOLIC BLOOD PRESSURE: 82 MMHG | TEMPERATURE: 98 F | WEIGHT: 189.2 LBS | OXYGEN SATURATION: 99 % | SYSTOLIC BLOOD PRESSURE: 150 MMHG | HEART RATE: 71 BPM | HEIGHT: 70 IN | BODY MASS INDEX: 27.09 KG/M2

## 2025-05-21 DIAGNOSIS — D63.1 ANEMIA IN STAGE 4 CHRONIC KIDNEY DISEASE (HCC): ICD-10-CM

## 2025-05-21 DIAGNOSIS — C34.91 ADENOCARCINOMA OF RIGHT LUNG (HCC): Primary | ICD-10-CM

## 2025-05-21 DIAGNOSIS — Z85.118 ENCOUNTER FOR FOLLOW-UP SURVEILLANCE OF LUNG CANCER: ICD-10-CM

## 2025-05-21 DIAGNOSIS — D64.9 NORMOCYTIC ANEMIA: ICD-10-CM

## 2025-05-21 DIAGNOSIS — Z71.89 CARE PLAN DISCUSSED WITH PATIENT: ICD-10-CM

## 2025-05-21 DIAGNOSIS — N28.9 RENAL IMPAIRMENT: ICD-10-CM

## 2025-05-21 DIAGNOSIS — R53.82 CHRONIC FATIGUE: ICD-10-CM

## 2025-05-21 DIAGNOSIS — C34.91 ADENOCARCINOMA OF RIGHT LUNG (HCC): ICD-10-CM

## 2025-05-21 DIAGNOSIS — N18.4 ANEMIA IN STAGE 4 CHRONIC KIDNEY DISEASE (HCC): ICD-10-CM

## 2025-05-21 DIAGNOSIS — Z08 ENCOUNTER FOR FOLLOW-UP SURVEILLANCE OF LUNG CANCER: ICD-10-CM

## 2025-05-21 LAB
ALBUMIN SERPL-MCNC: 4.2 G/DL (ref 3.5–5.2)
ALP SERPL-CCNC: 109 U/L (ref 40–129)
ALT SERPL-CCNC: 13 U/L (ref 5–41)
ANION GAP SERPL CALCULATED.3IONS-SCNC: 14 MMOL/L (ref 7–19)
AST SERPL-CCNC: 18 U/L (ref 5–40)
BASOPHILS # BLD: 0.07 K/UL (ref 0–0.2)
BASOPHILS NFR BLD: 1 % (ref 0–1)
BILIRUB SERPL-MCNC: <0.2 MG/DL (ref 0–1.2)
BUN SERPL-MCNC: 42 MG/DL (ref 8–23)
CALCIUM SERPL-MCNC: 9 MG/DL (ref 8.8–10.2)
CHLORIDE SERPL-SCNC: 100 MMOL/L (ref 98–107)
CO2 SERPL-SCNC: 27 MMOL/L (ref 22–29)
CREAT SERPL-MCNC: 2.4 MG/DL (ref 0.7–1.2)
EOSINOPHIL # BLD: 0.28 K/UL (ref 0–0.6)
EOSINOPHIL NFR BLD: 4.2 % (ref 0–5)
ERYTHROCYTE [DISTWIDTH] IN BLOOD BY AUTOMATED COUNT: 14.9 % (ref 11.5–14.5)
FERRITIN SERPL-MCNC: 215 NG/ML (ref 30–400)
FOLATE SERPL-MCNC: 8.4 NG/ML (ref 4.5–32.2)
GLUCOSE SERPL-MCNC: 110 MG/DL (ref 70–99)
HAPTOGLOB SERPL-MCNC: 236 MG/DL (ref 30–200)
HCT VFR BLD AUTO: 31.8 % (ref 42–52)
HGB BLD-MCNC: 10.6 G/DL (ref 14–18)
IRON SATN MFR SERPL: 31 % (ref 20–50)
IRON SERPL-MCNC: 78 UG/DL (ref 59–158)
LYMPHOCYTES # BLD: 2.06 K/UL (ref 1.1–4.5)
LYMPHOCYTES NFR BLD: 30.6 % (ref 20–40)
MCH RBC QN AUTO: 31.5 PG (ref 27–31)
MCHC RBC AUTO-ENTMCNC: 33.3 G/DL (ref 33–37)
MCV RBC AUTO: 94.4 FL (ref 80–94)
MONOCYTES # BLD: 0.51 K/UL (ref 0–0.9)
MONOCYTES NFR BLD: 7.6 % (ref 1–10)
NEUTROPHILS # BLD: 3.78 K/UL (ref 1.5–7.5)
NEUTS SEG NFR BLD: 56 % (ref 50–65)
PLATELET # BLD AUTO: 229 K/UL (ref 130–400)
PMV BLD AUTO: 9.8 FL (ref 9.4–12.4)
POTASSIUM SERPL-SCNC: 4.9 MMOL/L (ref 3.5–5.1)
PROT SERPL-MCNC: 7.6 G/DL (ref 6.4–8.3)
RBC # BLD AUTO: 3.37 M/UL (ref 4.7–6.1)
RETICS # AUTO: 0.08 M/UL (ref 0.03–0.12)
RETICS/RBC NFR: 2.25 % (ref 0.5–1.5)
SODIUM SERPL-SCNC: 141 MMOL/L (ref 136–145)
T4 FREE SERPL-MCNC: 0.94 NG/DL (ref 0.93–1.7)
TIBC SERPL-MCNC: 254 UG/DL (ref 250–400)
TSH SERPL DL<=0.005 MIU/L-ACNC: 2.54 UIU/ML (ref 0.27–4.2)
VIT B12 SERPL-MCNC: 355 PG/ML (ref 232–1245)
WBC # BLD AUTO: 6.74 K/UL (ref 4.8–10.8)

## 2025-05-21 PROCEDURE — 99214 OFFICE O/P EST MOD 30 MIN: CPT | Performed by: INTERNAL MEDICINE

## 2025-05-21 PROCEDURE — G8417 CALC BMI ABV UP PARAM F/U: HCPCS | Performed by: INTERNAL MEDICINE

## 2025-05-21 PROCEDURE — 1126F AMNT PAIN NOTED NONE PRSNT: CPT | Performed by: INTERNAL MEDICINE

## 2025-05-21 PROCEDURE — 85045 AUTOMATED RETICULOCYTE COUNT: CPT

## 2025-05-21 PROCEDURE — 1123F ACP DISCUSS/DSCN MKR DOCD: CPT | Performed by: INTERNAL MEDICINE

## 2025-05-21 PROCEDURE — 80053 COMPREHEN METABOLIC PANEL: CPT

## 2025-05-21 PROCEDURE — 83540 ASSAY OF IRON: CPT

## 2025-05-21 PROCEDURE — G2211 COMPLEX E/M VISIT ADD ON: HCPCS | Performed by: INTERNAL MEDICINE

## 2025-05-21 PROCEDURE — 83010 ASSAY OF HAPTOGLOBIN QUANT: CPT

## 2025-05-21 PROCEDURE — 82728 ASSAY OF FERRITIN: CPT

## 2025-05-21 PROCEDURE — 85025 COMPLETE CBC W/AUTO DIFF WBC: CPT

## 2025-05-21 PROCEDURE — 82746 ASSAY OF FOLIC ACID SERUM: CPT

## 2025-05-21 PROCEDURE — 1036F TOBACCO NON-USER: CPT | Performed by: INTERNAL MEDICINE

## 2025-05-21 PROCEDURE — 84443 ASSAY THYROID STIM HORMONE: CPT

## 2025-05-21 PROCEDURE — 82607 VITAMIN B-12: CPT

## 2025-05-21 PROCEDURE — 99213 OFFICE O/P EST LOW 20 MIN: CPT

## 2025-05-21 PROCEDURE — 83550 IRON BINDING TEST: CPT

## 2025-05-21 PROCEDURE — 1159F MED LIST DOCD IN RCRD: CPT | Performed by: INTERNAL MEDICINE

## 2025-05-21 PROCEDURE — 84439 ASSAY OF FREE THYROXINE: CPT

## 2025-05-21 PROCEDURE — 36415 COLL VENOUS BLD VENIPUNCTURE: CPT

## 2025-05-21 PROCEDURE — G8427 DOCREV CUR MEDS BY ELIG CLIN: HCPCS | Performed by: INTERNAL MEDICINE

## 2025-06-02 RX ORDER — FUROSEMIDE 40 MG/1
80 TABLET ORAL EVERY OTHER DAY
Qty: 90 TABLET | Refills: 3 | Status: SHIPPED | OUTPATIENT
Start: 2025-06-02

## 2025-06-05 ENCOUNTER — TELEPHONE (OUTPATIENT)
Dept: PALLATIVE CARE | Age: 85
End: 2025-06-05

## 2025-06-05 NOTE — TELEPHONE ENCOUNTER
Call placed to schedule visit with [facility] APRN. No answer and voicemail left with contact information.

## 2025-06-12 DIAGNOSIS — E78.00 HYPERCHOLESTEROLEMIA: ICD-10-CM

## 2025-06-12 RX ORDER — DILTIAZEM HYDROCHLORIDE 120 MG/1
120 CAPSULE, COATED, EXTENDED RELEASE ORAL DAILY
Qty: 90 CAPSULE | Refills: 1 | Status: SHIPPED | OUTPATIENT
Start: 2025-06-12

## 2025-06-12 RX ORDER — ROSUVASTATIN CALCIUM 10 MG/1
10 TABLET, COATED ORAL DAILY
Qty: 90 TABLET | Refills: 1 | Status: SHIPPED | OUTPATIENT
Start: 2025-06-12

## 2025-06-18 ENCOUNTER — RESULTS FOLLOW-UP (OUTPATIENT)
Dept: CARDIOLOGY CLINIC | Age: 85
End: 2025-06-18

## 2025-06-18 DIAGNOSIS — I49.5 TACHY-BRADY SYNDROME (HCC): ICD-10-CM

## 2025-06-18 DIAGNOSIS — Z95.0 PACEMAKER: Primary | ICD-10-CM

## 2025-06-20 ENCOUNTER — OFFICE VISIT (OUTPATIENT)
Dept: PALLATIVE CARE | Age: 85
End: 2025-06-20
Payer: MEDICARE

## 2025-06-20 DIAGNOSIS — Z74.09 DECREASED MOBILITY AND ENDURANCE: ICD-10-CM

## 2025-06-20 DIAGNOSIS — B37.2 YEAST DERMATITIS: Primary | ICD-10-CM

## 2025-06-20 DIAGNOSIS — Z51.5 ENCOUNTER FOR PALLIATIVE CARE: ICD-10-CM

## 2025-06-20 DIAGNOSIS — N18.32 STAGE 3B CHRONIC KIDNEY DISEASE (HCC): ICD-10-CM

## 2025-06-20 PROCEDURE — 1036F TOBACCO NON-USER: CPT | Performed by: NURSE PRACTITIONER

## 2025-06-20 PROCEDURE — G8417 CALC BMI ABV UP PARAM F/U: HCPCS | Performed by: NURSE PRACTITIONER

## 2025-06-20 PROCEDURE — 99349 HOME/RES VST EST MOD MDM 40: CPT | Performed by: NURSE PRACTITIONER

## 2025-06-20 PROCEDURE — 1123F ACP DISCUSS/DSCN MKR DOCD: CPT | Performed by: NURSE PRACTITIONER

## 2025-06-20 RX ORDER — NYSTATIN 100000 U/G
CREAM TOPICAL
Qty: 60 G | Refills: 2 | Status: SHIPPED | OUTPATIENT
Start: 2025-06-20

## 2025-07-15 ENCOUNTER — HOSPITAL ENCOUNTER (OUTPATIENT)
Dept: INFUSION THERAPY | Age: 85
Discharge: HOME | End: 2025-07-15
Payer: MEDICARE

## 2025-07-15 VITALS
DIASTOLIC BLOOD PRESSURE: 69 MMHG | SYSTOLIC BLOOD PRESSURE: 162 MMHG | HEART RATE: 90 BPM | TEMPERATURE: 98.2 F | OXYGEN SATURATION: 100 %

## 2025-07-15 DIAGNOSIS — C34.91 ADENOCARCINOMA OF RIGHT LUNG (HCC): Primary | ICD-10-CM

## 2025-07-15 DIAGNOSIS — Z45.2 ENCOUNTER FOR CENTRAL LINE CARE: ICD-10-CM

## 2025-07-15 LAB
ALBUMIN SERPL-MCNC: 3.9 G/DL (ref 3.5–5.2)
ALP SERPL-CCNC: 92 U/L (ref 40–129)
ALT SERPL-CCNC: 11 U/L (ref 5–41)
ANION GAP SERPL CALCULATED.3IONS-SCNC: 13 MMOL/L (ref 7–19)
AST SERPL-CCNC: 18 U/L (ref 5–40)
BASOPHILS # BLD: 0.08 K/UL (ref 0–0.2)
BASOPHILS NFR BLD: 1.2 % (ref 0–1)
BILIRUB SERPL-MCNC: <0.2 MG/DL (ref 0–1.2)
BUN SERPL-MCNC: 39 MG/DL (ref 8–23)
CALCIUM SERPL-MCNC: 9.2 MG/DL (ref 8.8–10.2)
CHLORIDE SERPL-SCNC: 100 MMOL/L (ref 98–107)
CO2 SERPL-SCNC: 25 MMOL/L (ref 22–29)
CREAT SERPL-MCNC: 2.3 MG/DL (ref 0.7–1.2)
EOSINOPHIL # BLD: 0.31 K/UL (ref 0–0.6)
EOSINOPHIL NFR BLD: 4.5 % (ref 0–5)
ERYTHROCYTE [DISTWIDTH] IN BLOOD BY AUTOMATED COUNT: 14.2 % (ref 11.5–14.5)
GLUCOSE SERPL-MCNC: 185 MG/DL (ref 70–99)
HCT VFR BLD AUTO: 30.5 % (ref 42–52)
HGB BLD-MCNC: 10.2 G/DL (ref 14–18)
LYMPHOCYTES # BLD: 2.12 K/UL (ref 1.1–4.5)
LYMPHOCYTES NFR BLD: 31.1 % (ref 20–40)
MCH RBC QN AUTO: 32.3 PG (ref 27–31)
MCHC RBC AUTO-ENTMCNC: 33.4 G/DL (ref 33–37)
MCV RBC AUTO: 96.5 FL (ref 80–94)
MONOCYTES # BLD: 0.46 K/UL (ref 0–0.9)
MONOCYTES NFR BLD: 6.7 % (ref 1–10)
NEUTROPHILS # BLD: 3.83 K/UL (ref 1.5–7.5)
NEUTS SEG NFR BLD: 56.2 % (ref 50–65)
PLATELET # BLD AUTO: 207 K/UL (ref 130–400)
PMV BLD AUTO: 10.1 FL (ref 9.4–12.4)
POTASSIUM SERPL-SCNC: 4.5 MMOL/L (ref 3.5–5.1)
PROT SERPL-MCNC: 6.9 G/DL (ref 6.4–8.3)
RBC # BLD AUTO: 3.16 M/UL (ref 4.7–6.1)
SODIUM SERPL-SCNC: 138 MMOL/L (ref 136–145)
WBC # BLD AUTO: 6.82 K/UL (ref 4.8–10.8)

## 2025-07-15 PROCEDURE — 6360000002 HC RX W HCPCS: Performed by: INTERNAL MEDICINE

## 2025-07-15 PROCEDURE — 2500000003 HC RX 250 WO HCPCS: Performed by: INTERNAL MEDICINE

## 2025-07-15 PROCEDURE — 96523 IRRIG DRUG DELIVERY DEVICE: CPT

## 2025-07-15 PROCEDURE — 80053 COMPREHEN METABOLIC PANEL: CPT

## 2025-07-15 PROCEDURE — 36415 COLL VENOUS BLD VENIPUNCTURE: CPT

## 2025-07-15 PROCEDURE — 85025 COMPLETE CBC W/AUTO DIFF WBC: CPT

## 2025-07-15 RX ORDER — SODIUM CHLORIDE 0.9 % (FLUSH) 0.9 %
5-40 SYRINGE (ML) INJECTION PRN
Status: DISCONTINUED | OUTPATIENT
Start: 2025-07-15 | End: 2025-07-16 | Stop reason: HOSPADM

## 2025-07-15 RX ORDER — HEPARIN 100 UNIT/ML
500 SYRINGE INTRAVENOUS PRN
Status: DISCONTINUED | OUTPATIENT
Start: 2025-07-15 | End: 2025-07-16 | Stop reason: HOSPADM

## 2025-07-15 RX ORDER — SODIUM CHLORIDE 0.9 % (FLUSH) 0.9 %
5-40 SYRINGE (ML) INJECTION PRN
OUTPATIENT
Start: 2025-07-15

## 2025-07-15 RX ORDER — HEPARIN 100 UNIT/ML
500 SYRINGE INTRAVENOUS PRN
OUTPATIENT
Start: 2025-07-15

## 2025-07-15 RX ADMIN — SODIUM CHLORIDE, PRESERVATIVE FREE 10 ML: 5 INJECTION INTRAVENOUS at 10:28

## 2025-07-15 RX ADMIN — HEPARIN 500 UNITS: 100 SYRINGE at 10:28

## 2025-07-18 RX ORDER — METOPROLOL SUCCINATE 100 MG/1
100 TABLET, EXTENDED RELEASE ORAL 2 TIMES DAILY
Qty: 60 TABLET | Refills: 5 | Status: SHIPPED | OUTPATIENT
Start: 2025-07-18

## 2025-07-25 ENCOUNTER — OFFICE VISIT (OUTPATIENT)
Dept: PALLATIVE CARE | Age: 85
End: 2025-07-25
Payer: MEDICARE

## 2025-07-25 DIAGNOSIS — Z74.09 DECREASED MOBILITY AND ENDURANCE: ICD-10-CM

## 2025-07-25 DIAGNOSIS — Z51.5 ENCOUNTER FOR PALLIATIVE CARE: ICD-10-CM

## 2025-07-25 DIAGNOSIS — L89.316 PRESSURE INJURY OF DEEP TISSUE OF RIGHT BUTTOCK: ICD-10-CM

## 2025-07-25 DIAGNOSIS — R60.0 BILATERAL LOWER EXTREMITY EDEMA: Primary | ICD-10-CM

## 2025-07-25 PROCEDURE — 99349 HOME/RES VST EST MOD MDM 40: CPT | Performed by: NURSE PRACTITIONER

## 2025-07-25 PROCEDURE — 1036F TOBACCO NON-USER: CPT | Performed by: NURSE PRACTITIONER

## 2025-07-25 PROCEDURE — G8417 CALC BMI ABV UP PARAM F/U: HCPCS | Performed by: NURSE PRACTITIONER

## 2025-07-25 PROCEDURE — 1123F ACP DISCUSS/DSCN MKR DOCD: CPT | Performed by: NURSE PRACTITIONER

## 2025-07-25 NOTE — PROGRESS NOTES
Supportive Care/Community Based Palliative Care  Follow Up Note        Patient Name:  Jose Tate  Medical Record Number:  [id number]  YOB: 1940    Date of Visit: 7/25/2025  Location of Visit:  Home    Patient Care Team:  Chayo Grande MD as PCP - General (Family Medicine)  [facility]  Vanessa Cifuentes APRN - CNP as Advanced Practice Nurse (Nurse Practitioner)    History obtained from:  patient, non-family caregiver - Ellen, electronic medical record    PALLIATIVE DIAGNOSES AND ORDERS/RECOMMENDATIONS/PLAN:   1. Bilateral lower extremity edema  Continue Lasix as prescribed, continue to elevate feet when seated, encouraged patient to adhere to low-sodium diet and monitor her sodium intake    2. Pressure injury of deep tissue of right buttock  Recommend offloading, area is no longer open, did encourage patient to change briefs frequently    3. Decreased mobility and endurance  Continue use of walker, continue assist from caregiver and family    4. Encounter for palliative care  Current goals of care include: Preserve independence/control/autonomy, Maintain or improve functional status, Maintain or improve quality of life, Focus on comfort and quality of life, Remain at home, Would not consider facility placement, Would want hospitalization if needed    Code status confirmed: Full Code  Follow up home visit in 2-3 weeks and as needed    CHIEF COMPLAINT:     Chief Complaint   Patient presents with    Wound Check     Recheck pressure injury on buttocks       CLINICAL SUMMARY:          Jose Tate is a 85 y.o. male with PMH of CHF, lung cancer s/p chemo and radiation, skin cancer, COPD, former smoker, atrial fibrillation, tachycardia-Lukasz syndrome s/p pacemaker placement, CKD stage IIIb, T2DM, and other co morbidities.     [facility] admission 8/15-8/20/2024 for evaluation and treatment of syncope, found to be in new onset atrial fibrillation, was found to have

## 2025-08-14 ENCOUNTER — CARE COORDINATION (OUTPATIENT)
Dept: CARE COORDINATION | Age: 85
End: 2025-08-14

## 2025-08-14 RX ORDER — APIXABAN 2.5 MG/1
2.5 TABLET, FILM COATED ORAL 2 TIMES DAILY
Qty: 60 TABLET | Refills: 3 | Status: SHIPPED | OUTPATIENT
Start: 2025-08-14

## 2025-08-21 ENCOUNTER — OFFICE VISIT (OUTPATIENT)
Dept: PRIMARY CARE CLINIC | Age: 85
End: 2025-08-21

## 2025-08-21 VITALS
TEMPERATURE: 97.9 F | DIASTOLIC BLOOD PRESSURE: 68 MMHG | WEIGHT: 197 LBS | HEIGHT: 70 IN | SYSTOLIC BLOOD PRESSURE: 124 MMHG | OXYGEN SATURATION: 92 % | BODY MASS INDEX: 28.2 KG/M2 | HEART RATE: 58 BPM

## 2025-08-21 DIAGNOSIS — R60.0 PERIPHERAL EDEMA: ICD-10-CM

## 2025-08-21 DIAGNOSIS — E11.9 TYPE 2 DIABETES MELLITUS WITHOUT COMPLICATION, WITHOUT LONG-TERM CURRENT USE OF INSULIN (HCC): Primary | ICD-10-CM

## 2025-08-21 RX ORDER — INSULIN GLARGINE 100 [IU]/ML
INJECTION, SOLUTION SUBCUTANEOUS
Qty: 5 ADJUSTABLE DOSE PRE-FILLED PEN SYRINGE | Refills: 3 | Status: SHIPPED | OUTPATIENT
Start: 2025-08-21

## 2025-08-21 ASSESSMENT — ENCOUNTER SYMPTOMS
GASTROINTESTINAL NEGATIVE: 1
RESPIRATORY NEGATIVE: 1

## 2025-08-22 ENCOUNTER — CARE COORDINATION (OUTPATIENT)
Dept: CARE COORDINATION | Age: 85
End: 2025-08-22

## 2025-08-22 ENCOUNTER — TELEPHONE (OUTPATIENT)
Dept: PRIMARY CARE CLINIC | Age: 85
End: 2025-08-22

## 2025-08-22 ASSESSMENT — SOCIAL DETERMINANTS OF HEALTH (SDOH)
HOW OFTEN DO YOU ATTEND CHURCH OR RELIGIOUS SERVICES?: NEVER
HOW OFTEN DO YOU GET TOGETHER WITH FRIENDS OR RELATIVES?: MORE THAN THREE TIMES A WEEK
WITHIN THE LAST YEAR, HAVE YOU BEEN AFRAID OF YOUR PARTNER OR EX-PARTNER?: NO
WITHIN THE LAST YEAR, HAVE TO BEEN RAPED OR FORCED TO HAVE ANY KIND OF SEXUAL ACTIVITY BY YOUR PARTNER OR EX-PARTNER?: NO
HOW OFTEN DO YOU ATTENT MEETINGS OF THE CLUB OR ORGANIZATION YOU BELONG TO?: NEVER
WITHIN THE LAST YEAR, HAVE YOU BEEN KICKED, HIT, SLAPPED, OR OTHERWISE PHYSICALLY HURT BY YOUR PARTNER OR EX-PARTNER?: NO
IN A TYPICAL WEEK, HOW MANY TIMES DO YOU TALK ON THE PHONE WITH FAMILY, FRIENDS, OR NEIGHBORS?: MORE THAN THREE TIMES A WEEK
DO YOU BELONG TO ANY CLUBS OR ORGANIZATIONS SUCH AS CHURCH GROUPS UNIONS, FRATERNAL OR ATHLETIC GROUPS, OR SCHOOL GROUPS?: NO
WITHIN THE LAST YEAR, HAVE YOU BEEN HUMILIATED OR EMOTIONALLY ABUSED IN OTHER WAYS BY YOUR PARTNER OR EX-PARTNER?: NO

## (undated) DEVICE — TRAP,MUCUS SPECIMEN, 80CC: Brand: MEDLINE

## (undated) DEVICE — INTRODUCER SHTH L13CM OD7FR SH ORNG HUB SEAMLESS SAFSHTH

## (undated) DEVICE — ADHS LIQ MASTISOL 2/3ML

## (undated) DEVICE — PK TURNOVER RM ADV

## (undated) DEVICE — THE CHANNEL CLEANING BRUSH IS A NYLON FLEXI BRUSH ATTACHED TO A FLEXIBLE PLASTIC SHEATH DESIGNED TO SAFELY REMOVE DEBRIS FROM FLEXIBLE ENDOSCOPES.

## (undated) DEVICE — PK CYSTO 30

## (undated) DEVICE — TBG SMPL FLTR LINE NASL 02/C02 A/ BX/100

## (undated) DEVICE — PK TURNOVER CYSTO RM

## (undated) DEVICE — GLV SURG BIOGEL M LTX PF 7 1/2

## (undated) DEVICE — TBG PRESS EXT

## (undated) DEVICE — SINGLE USE SUCTION VALVE MAJ-209: Brand: SINGLE USE SUCTION VALVE (STERILE)

## (undated) DEVICE — SENSR O2 OXIMAX FNGR A/ 18IN NONSTR

## (undated) DEVICE — 3M™ STERI-STRIP™ REINFORCED ADHESIVE SKIN CLOSURES, R1546, 1/4 IN X 4 IN (6 MM X 100 MM), 10 STRIPS/ENVELOPE: Brand: 3M™ STERI-STRIP™

## (undated) DEVICE — SYR LL TP 10ML STRL

## (undated) DEVICE — CLTH CLENS READYCLEANSE PERI CARE PK/5

## (undated) DEVICE — CUFF,BP,DISP,1 TUBE,ADULT,HP: Brand: MEDLINE

## (undated) DEVICE — ANTIBACTERIAL UNDYED BRAIDED (POLYGLACTIN 910), SYNTHETIC ABSORBABLE SUTURE: Brand: COATED VICRYL

## (undated) DEVICE — SINGLE USE BIOPSY VALVE MAJ-210: Brand: SINGLE USE BIOPSY VALVE (STERILE)

## (undated) DEVICE — RADIFOCUS GLIDEWIRE: Brand: GLIDEWIRE

## (undated) DEVICE — SUT PROLN 2/0 SH 36IN 8523H

## (undated) DEVICE — KT ASP VIZISHOT 5SYRG 5BIOPSY/VLV 22G

## (undated) DEVICE — ADAPT SWVL FIBROPTIC BRONCH

## (undated) DEVICE — Device: Brand: BALLOON

## (undated) DEVICE — PAD MINOR UNIVERSAL: Brand: MEDLINE INDUSTRIES, INC.

## (undated) DEVICE — Device: Brand: ALWAYS-ON TIP TRACKED 22GA SPIN FLEX NEEDLE, 12MM L, 1.8MM OD

## (undated) DEVICE — Device: Brand: SPIN VISION SCOPE, 4.0MM OD, 2.0MM WC

## (undated) DEVICE — GLV SURG BIOGEL M LTX PF 8

## (undated) DEVICE — SPNG GZ 2S 2X2 8PLY STRL PK/2

## (undated) DEVICE — Device: Brand: NOMOLINE™ LH ADULT NASAL CO2 CANNULA WITH O2 4M

## (undated) DEVICE — DRSNG SURESITE WNDW 4X4.5

## (undated) DEVICE — APPL CHLORAPREP HI/LITE 26ML ORNG

## (undated) DEVICE — Device: Brand: ALWAYS-ON TIP TRACKED FORCEPS, 1.8MM OD, SERRATED CUP

## (undated) DEVICE — CVR UNIV C/ARM